# Patient Record
Sex: FEMALE | Race: WHITE | Employment: OTHER
[De-identification: names, ages, dates, MRNs, and addresses within clinical notes are randomized per-mention and may not be internally consistent; named-entity substitution may affect disease eponyms.]

---

## 2017-01-03 ENCOUNTER — OFFICE VISIT (OUTPATIENT)
Dept: FAMILY MEDICINE CLINIC | Facility: CLINIC | Age: 72
End: 2017-01-03

## 2017-01-03 VITALS
DIASTOLIC BLOOD PRESSURE: 84 MMHG | WEIGHT: 151.5 LBS | BODY MASS INDEX: 29.74 KG/M2 | SYSTOLIC BLOOD PRESSURE: 120 MMHG | TEMPERATURE: 98.5 F | HEART RATE: 94 BPM | RESPIRATION RATE: 15 BRPM | HEIGHT: 60 IN

## 2017-01-03 DIAGNOSIS — K92.1 BLACK TARRY STOOLS: ICD-10-CM

## 2017-01-03 DIAGNOSIS — J40 BRONCHITIS: Primary | ICD-10-CM

## 2017-01-03 DIAGNOSIS — R19.7 DIARRHEA, UNSPECIFIED TYPE: ICD-10-CM

## 2017-01-03 PROCEDURE — 99214 OFFICE O/P EST MOD 30 MIN: CPT | Performed by: NURSE PRACTITIONER

## 2017-01-03 RX ORDER — AZITHROMYCIN 250 MG/1
TABLET, FILM COATED ORAL
Qty: 1 PACKET | Refills: 0 | Status: SHIPPED | OUTPATIENT
Start: 2017-01-03 | End: 2017-01-13

## 2017-01-03 ASSESSMENT — ENCOUNTER SYMPTOMS
DIARRHEA: 1
CHANGE IN BOWEL HABIT: 1
VISUAL CHANGE: 0
SWOLLEN GLANDS: 0
BLOOD IN STOOL: 0
ABDOMINAL DISTENTION: 0
WHEEZING: 1
CONSTIPATION: 0
ABDOMINAL PAIN: 1
NAUSEA: 1
SHORTNESS OF BREATH: 1
CHEST TIGHTNESS: 1
COUGH: 1
SORE THROAT: 1
VOMITING: 1
RECTAL PAIN: 0

## 2017-01-13 ENCOUNTER — TELEPHONE (OUTPATIENT)
Dept: FAMILY MEDICINE CLINIC | Facility: CLINIC | Age: 72
End: 2017-01-13

## 2017-01-13 RX ORDER — BENZONATATE 100 MG/1
100 CAPSULE ORAL 3 TIMES DAILY PRN
Qty: 28 CAPSULE | Refills: 0 | Status: SHIPPED | OUTPATIENT
Start: 2017-01-13 | End: 2017-04-25 | Stop reason: SDUPTHER

## 2017-01-13 RX ORDER — DOXYCYCLINE HYCLATE 100 MG
100 TABLET ORAL 2 TIMES DAILY
Qty: 20 TABLET | Refills: 0 | Status: SHIPPED | OUTPATIENT
Start: 2017-01-13 | End: 2017-01-23

## 2017-01-16 ENCOUNTER — NURSE ONLY (OUTPATIENT)
Dept: FAMILY MEDICINE CLINIC | Facility: CLINIC | Age: 72
End: 2017-01-16

## 2017-01-16 DIAGNOSIS — K92.1 BLACK TARRY STOOLS: ICD-10-CM

## 2017-01-16 DIAGNOSIS — Z12.11 COLON CANCER SCREENING: Primary | ICD-10-CM

## 2017-01-16 LAB
CONTROL: PRESENT
HEMOCCULT STL QL: NEGATIVE

## 2017-01-16 PROCEDURE — 82274 ASSAY TEST FOR BLOOD FECAL: CPT | Performed by: NURSE PRACTITIONER

## 2017-02-13 ENCOUNTER — HOSPITAL ENCOUNTER (OUTPATIENT)
Dept: PAIN MANAGEMENT | Age: 72
Discharge: HOME | End: 2017-02-13
Attending: NURSE PRACTITIONER | Admitting: NURSE PRACTITIONER
Payer: MEDICARE

## 2017-02-13 DIAGNOSIS — M19.011 ARTHRITIS OF SHOULDER REGION, RIGHT: ICD-10-CM

## 2017-02-13 DIAGNOSIS — G89.29 CHRONIC BILATERAL LOW BACK PAIN WITHOUT SCIATICA: ICD-10-CM

## 2017-02-13 DIAGNOSIS — M79.7 FIBROMYALGIA: ICD-10-CM

## 2017-02-13 DIAGNOSIS — G89.4 CHRONIC PAIN ASSOCIATED WITH SIGNIFICANT PSYCHOSOCIAL DYSFUNCTION: ICD-10-CM

## 2017-02-13 DIAGNOSIS — M47.817 LUMBAR AND SACRAL ARTHRITIS: ICD-10-CM

## 2017-02-13 DIAGNOSIS — G56.03 CARPAL TUNNEL SYNDROME ON BOTH SIDES: ICD-10-CM

## 2017-02-13 DIAGNOSIS — M51.36 DDD (DEGENERATIVE DISC DISEASE), LUMBAR: ICD-10-CM

## 2017-02-13 DIAGNOSIS — M25.561 ACUTE PAIN OF RIGHT KNEE: ICD-10-CM

## 2017-02-13 DIAGNOSIS — R52 PAIN MANAGEMENT: ICD-10-CM

## 2017-02-13 DIAGNOSIS — M15.9 PRIMARY OSTEOARTHRITIS INVOLVING MULTIPLE JOINTS: ICD-10-CM

## 2017-02-13 DIAGNOSIS — M47.26 OSTEOARTHRITIS OF SPINE WITH RADICULOPATHY, LUMBAR REGION: ICD-10-CM

## 2017-02-13 DIAGNOSIS — M77.8 TENDINITIS OF RIGHT SHOULDER: ICD-10-CM

## 2017-02-13 DIAGNOSIS — M47.816 SPONDYLOSIS OF LUMBAR REGION WITHOUT MYELOPATHY OR RADICULOPATHY: ICD-10-CM

## 2017-02-13 DIAGNOSIS — Z51.81 ENCOUNTER FOR MEDICATION MONITORING: Primary | ICD-10-CM

## 2017-02-13 DIAGNOSIS — M48.061 LUMBAR STENOSIS: ICD-10-CM

## 2017-02-13 DIAGNOSIS — M54.50 CHRONIC BILATERAL LOW BACK PAIN WITHOUT SCIATICA: ICD-10-CM

## 2017-02-13 DIAGNOSIS — M47.896 OTHER OSTEOARTHRITIS OF SPINE, LUMBAR REGION: ICD-10-CM

## 2017-02-13 DIAGNOSIS — M85.80 OSTEOPENIA: ICD-10-CM

## 2017-02-13 PROCEDURE — 99213 OFFICE O/P EST LOW 20 MIN: CPT

## 2017-02-13 PROCEDURE — 99212 OFFICE O/P EST SF 10 MIN: CPT | Performed by: NURSE PRACTITIONER

## 2017-02-13 RX ORDER — OXYCODONE AND ACETAMINOPHEN 10; 325 MG/1; MG/1
1 TABLET ORAL EVERY 6 HOURS PRN
Qty: 120 TABLET | Refills: 0 | Status: SHIPPED | OUTPATIENT
Start: 2017-02-13 | End: 2017-03-01 | Stop reason: SDUPTHER

## 2017-02-13 RX ORDER — QUETIAPINE FUMARATE 200 MG/1
200 TABLET, FILM COATED ORAL DAILY
Qty: 90 TABLET | Refills: 1 | Status: SHIPPED | OUTPATIENT
Start: 2017-02-13 | End: 2017-02-14 | Stop reason: SDUPTHER

## 2017-02-14 DIAGNOSIS — F41.9 ANXIETY: Chronic | ICD-10-CM

## 2017-02-14 DIAGNOSIS — F33.1 MAJOR DEPRESSIVE DISORDER, RECURRENT EPISODE, MODERATE (HCC): ICD-10-CM

## 2017-02-14 RX ORDER — FLUOXETINE HYDROCHLORIDE 40 MG/1
40 CAPSULE ORAL DAILY
Qty: 30 CAPSULE | Refills: 2 | Status: SHIPPED | OUTPATIENT
Start: 2017-02-14 | End: 2017-05-19 | Stop reason: SDUPTHER

## 2017-02-14 RX ORDER — QUETIAPINE FUMARATE 200 MG/1
TABLET, FILM COATED ORAL
Qty: 30 TABLET | Refills: 2 | Status: SHIPPED | OUTPATIENT
Start: 2017-02-14 | End: 2017-09-17 | Stop reason: SDUPTHER

## 2017-02-17 ENCOUNTER — OFFICE VISIT (OUTPATIENT)
Dept: FAMILY MEDICINE CLINIC | Facility: CLINIC | Age: 72
End: 2017-02-17

## 2017-02-17 VITALS
TEMPERATURE: 98 F | HEART RATE: 64 BPM | SYSTOLIC BLOOD PRESSURE: 120 MMHG | HEIGHT: 60 IN | WEIGHT: 153 LBS | RESPIRATION RATE: 20 BRPM | DIASTOLIC BLOOD PRESSURE: 86 MMHG | BODY MASS INDEX: 30.04 KG/M2

## 2017-02-17 DIAGNOSIS — E78.00 PURE HYPERCHOLESTEROLEMIA: Primary | Chronic | ICD-10-CM

## 2017-02-17 DIAGNOSIS — E55.9 VITAMIN D DEFICIENCY: ICD-10-CM

## 2017-02-17 DIAGNOSIS — J30.2 SEASONAL ALLERGIC RHINITIS, UNSPECIFIED ALLERGIC RHINITIS TRIGGER: ICD-10-CM

## 2017-02-17 DIAGNOSIS — J41.0 SIMPLE CHRONIC BRONCHITIS (HCC): Chronic | ICD-10-CM

## 2017-02-17 DIAGNOSIS — E11.42 DIABETIC PERIPHERAL NEUROPATHY (HCC): ICD-10-CM

## 2017-02-17 DIAGNOSIS — E53.8 VITAMIN B 12 DEFICIENCY: ICD-10-CM

## 2017-02-17 DIAGNOSIS — E79.0 HYPERURICEMIA: ICD-10-CM

## 2017-02-17 DIAGNOSIS — Z23 NEED FOR DIPHTHERIA-TETANUS-PERTUSSIS (TDAP) VACCINE, ADULT/ADOLESCENT: ICD-10-CM

## 2017-02-17 DIAGNOSIS — F11.20 UNCOMPLICATED OPIOID DEPENDENCE (HCC): ICD-10-CM

## 2017-02-17 DIAGNOSIS — F33.1 MODERATE EPISODE OF RECURRENT MAJOR DEPRESSIVE DISORDER (HCC): ICD-10-CM

## 2017-02-17 PROCEDURE — 99214 OFFICE O/P EST MOD 30 MIN: CPT | Performed by: PEDIATRICS

## 2017-02-17 RX ORDER — MONTELUKAST SODIUM 10 MG/1
10 TABLET ORAL NIGHTLY
Qty: 30 TABLET | Refills: 5 | Status: SHIPPED | OUTPATIENT
Start: 2017-02-17 | End: 2017-05-25

## 2017-02-17 RX ORDER — B-COMPLEX WITH VITAMIN C
1 TABLET ORAL 3 TIMES DAILY
Qty: 90 TABLET | Refills: 5 | Status: SHIPPED | OUTPATIENT
Start: 2017-02-17 | End: 2020-07-29

## 2017-02-17 ASSESSMENT — ENCOUNTER SYMPTOMS
DIARRHEA: 0
PRIMARY PULMONARY SYMPTOMS: SEASONAL ALLERGIES
CONSTIPATION: 0
WHEEZING: 0
SHORTNESS OF BREATH: 0
EYE DISCHARGE: 1
COUGH: 0

## 2017-02-17 ASSESSMENT — COPD QUESTIONNAIRES: COPD: 1

## 2017-03-01 RX ORDER — OXYCODONE AND ACETAMINOPHEN 10; 325 MG/1; MG/1
1 TABLET ORAL EVERY 6 HOURS PRN
Qty: 120 TABLET | Refills: 0 | Status: SHIPPED | OUTPATIENT
Start: 2017-03-15 | End: 2017-04-17 | Stop reason: SDUPTHER

## 2017-03-07 ENCOUNTER — OFFICE VISIT (OUTPATIENT)
Dept: FAMILY MEDICINE CLINIC | Facility: CLINIC | Age: 72
End: 2017-03-07

## 2017-03-07 ENCOUNTER — HOSPITAL ENCOUNTER (OUTPATIENT)
Age: 72
Setting detail: SPECIMEN
Discharge: HOME | End: 2017-03-07
Payer: MEDICARE

## 2017-03-07 VITALS
RESPIRATION RATE: 18 BRPM | DIASTOLIC BLOOD PRESSURE: 86 MMHG | TEMPERATURE: 97.7 F | HEART RATE: 78 BPM | BODY MASS INDEX: 30.19 KG/M2 | WEIGHT: 154.6 LBS | SYSTOLIC BLOOD PRESSURE: 128 MMHG

## 2017-03-07 DIAGNOSIS — E79.0 HYPERURICEMIA: ICD-10-CM

## 2017-03-07 DIAGNOSIS — K86.1 OTHER CHRONIC PANCREATITIS (HCC): ICD-10-CM

## 2017-03-07 DIAGNOSIS — J01.40 ACUTE NON-RECURRENT PANSINUSITIS: Primary | ICD-10-CM

## 2017-03-07 DIAGNOSIS — E53.8 VITAMIN B 12 DEFICIENCY: ICD-10-CM

## 2017-03-07 LAB
URIC ACID: 4.6 MG/DL (ref 2.4–5.7)
VITAMIN B-12: 351 PG/ML (ref 211–946)

## 2017-03-07 PROCEDURE — 99213 OFFICE O/P EST LOW 20 MIN: CPT | Performed by: NURSE PRACTITIONER

## 2017-03-07 RX ORDER — AZITHROMYCIN 250 MG/1
TABLET, FILM COATED ORAL
Qty: 1 PACKET | Refills: 0 | Status: SHIPPED | OUTPATIENT
Start: 2017-03-07 | End: 2017-03-17

## 2017-03-07 RX ORDER — LATANOPROST 50 UG/ML
SOLUTION/ DROPS OPHTHALMIC
Refills: 11 | COMMUNITY
Start: 2017-02-23 | End: 2017-10-23

## 2017-03-07 RX ORDER — ALLOPURINOL 100 MG/1
100 TABLET ORAL DAILY
Qty: 30 TABLET | Refills: 3 | Status: SHIPPED | OUTPATIENT
Start: 2017-03-07 | End: 2017-07-05 | Stop reason: SDUPTHER

## 2017-03-07 ASSESSMENT — ENCOUNTER SYMPTOMS
EYE ITCHING: 0
NAUSEA: 0
EYE PAIN: 0
EYE DISCHARGE: 0
CHEST TIGHTNESS: 0
PHOTOPHOBIA: 0
SINUS PRESSURE: 1
SORE THROAT: 0
ABDOMINAL DISTENTION: 0
RHINORRHEA: 1
COUGH: 1
DIARRHEA: 0

## 2017-03-08 DIAGNOSIS — D64.9 ANEMIA, NORMOCYTIC NORMOCHROMIC: Primary | ICD-10-CM

## 2017-03-08 RX ORDER — CYANOCOBALAMIN 1000 UG/ML
1000 INJECTION INTRAMUSCULAR; SUBCUTANEOUS
Status: DISCONTINUED | OUTPATIENT
Start: 2017-03-22 | End: 2017-10-23

## 2017-03-14 ENCOUNTER — TELEPHONE (OUTPATIENT)
Dept: FAMILY MEDICINE CLINIC | Age: 72
End: 2017-03-14

## 2017-03-14 DIAGNOSIS — J01.41 ACUTE RECURRENT PANSINUSITIS: Primary | ICD-10-CM

## 2017-03-14 RX ORDER — CIPROFLOXACIN 500 MG/1
500 TABLET, FILM COATED ORAL 2 TIMES DAILY
Qty: 20 TABLET | Refills: 0 | Status: SHIPPED | OUTPATIENT
Start: 2017-03-14 | End: 2017-03-24

## 2017-03-21 ENCOUNTER — HOSPITAL ENCOUNTER (OUTPATIENT)
Dept: PAIN MANAGEMENT | Age: 72
Discharge: HOME | End: 2017-03-21
Payer: MEDICARE

## 2017-03-21 DIAGNOSIS — M47.26 OSTEOARTHRITIS OF SPINE WITH RADICULOPATHY, LUMBAR REGION: ICD-10-CM

## 2017-03-21 DIAGNOSIS — E55.9 VITAMIN D DEFICIENCY: ICD-10-CM

## 2017-03-21 DIAGNOSIS — Z51.81 ENCOUNTER FOR MEDICATION MONITORING: Primary | ICD-10-CM

## 2017-03-21 DIAGNOSIS — M15.9 PRIMARY OSTEOARTHRITIS INVOLVING MULTIPLE JOINTS: ICD-10-CM

## 2017-03-21 DIAGNOSIS — M75.51 BURSITIS OF RIGHT SHOULDER: ICD-10-CM

## 2017-03-21 DIAGNOSIS — M47.816 SPONDYLOSIS OF LUMBAR REGION WITHOUT MYELOPATHY OR RADICULOPATHY: ICD-10-CM

## 2017-03-21 DIAGNOSIS — M48.061 LUMBAR STENOSIS: ICD-10-CM

## 2017-03-21 DIAGNOSIS — G89.4 CHRONIC PAIN ASSOCIATED WITH SIGNIFICANT PSYCHOSOCIAL DYSFUNCTION: ICD-10-CM

## 2017-03-21 DIAGNOSIS — M79.7 FIBROMYALGIA: ICD-10-CM

## 2017-03-21 DIAGNOSIS — G56.03 CARPAL TUNNEL SYNDROME ON BOTH SIDES: ICD-10-CM

## 2017-03-21 DIAGNOSIS — E53.8 VITAMIN B 12 DEFICIENCY: ICD-10-CM

## 2017-03-21 DIAGNOSIS — M54.50 CHRONIC BILATERAL LOW BACK PAIN WITHOUT SCIATICA: ICD-10-CM

## 2017-03-21 DIAGNOSIS — M47.817 LUMBAR AND SACRAL ARTHRITIS: ICD-10-CM

## 2017-03-21 DIAGNOSIS — F41.9 ANXIETY: ICD-10-CM

## 2017-03-21 DIAGNOSIS — M51.36 DDD (DEGENERATIVE DISC DISEASE), LUMBAR: ICD-10-CM

## 2017-03-21 DIAGNOSIS — G89.29 CHRONIC BILATERAL LOW BACK PAIN WITHOUT SCIATICA: ICD-10-CM

## 2017-03-21 DIAGNOSIS — R52 PAIN MANAGEMENT: ICD-10-CM

## 2017-03-21 DIAGNOSIS — M85.80 OSTEOPENIA: ICD-10-CM

## 2017-03-21 PROCEDURE — 99213 OFFICE O/P EST LOW 20 MIN: CPT | Performed by: NURSE PRACTITIONER

## 2017-03-21 PROCEDURE — 99213 OFFICE O/P EST LOW 20 MIN: CPT

## 2017-03-22 ENCOUNTER — NURSE ONLY (OUTPATIENT)
Dept: FAMILY MEDICINE CLINIC | Age: 72
End: 2017-03-22
Payer: MEDICARE

## 2017-03-22 DIAGNOSIS — Z23 IMMUNIZATION DUE: Primary | ICD-10-CM

## 2017-03-22 PROCEDURE — 96372 THER/PROPH/DIAG INJ SC/IM: CPT | Performed by: NURSE PRACTITIONER

## 2017-03-22 RX ADMIN — CYANOCOBALAMIN 1000 MCG: 1000 INJECTION INTRAMUSCULAR; SUBCUTANEOUS at 09:54

## 2017-04-06 ENCOUNTER — OFFICE VISIT (OUTPATIENT)
Dept: FAMILY MEDICINE CLINIC | Age: 72
End: 2017-04-06
Payer: MEDICARE

## 2017-04-06 VITALS
TEMPERATURE: 97.1 F | RESPIRATION RATE: 15 BRPM | BODY MASS INDEX: 30.04 KG/M2 | WEIGHT: 153.8 LBS | DIASTOLIC BLOOD PRESSURE: 86 MMHG | SYSTOLIC BLOOD PRESSURE: 120 MMHG | HEART RATE: 80 BPM

## 2017-04-06 DIAGNOSIS — R51.9 PRESSURE AND PAIN OF LEFT SIDE OF FACE: ICD-10-CM

## 2017-04-06 DIAGNOSIS — J32.9 RECURRENT SINUSITIS: Primary | ICD-10-CM

## 2017-04-06 PROCEDURE — 99213 OFFICE O/P EST LOW 20 MIN: CPT | Performed by: NURSE PRACTITIONER

## 2017-04-06 ASSESSMENT — ENCOUNTER SYMPTOMS
HOARSE VOICE: 0
COUGH: 1
SHORTNESS OF BREATH: 0
SINUS PRESSURE: 1
SORE THROAT: 0

## 2017-04-11 ASSESSMENT — ENCOUNTER SYMPTOMS
TROUBLE SWALLOWING: 0
CHEST TIGHTNESS: 0
DIARRHEA: 0
ABDOMINAL DISTENTION: 0
NAUSEA: 1

## 2017-04-17 ENCOUNTER — HOSPITAL ENCOUNTER (OUTPATIENT)
Dept: PAIN MANAGEMENT | Age: 72
Discharge: HOME | End: 2017-04-17
Payer: MEDICARE

## 2017-04-17 DIAGNOSIS — G89.4 CHRONIC PAIN ASSOCIATED WITH SIGNIFICANT PSYCHOSOCIAL DYSFUNCTION: ICD-10-CM

## 2017-04-17 DIAGNOSIS — M47.26 OSTEOARTHRITIS OF SPINE WITH RADICULOPATHY, LUMBAR REGION: ICD-10-CM

## 2017-04-17 DIAGNOSIS — G56.03 CARPAL TUNNEL SYNDROME ON BOTH SIDES: ICD-10-CM

## 2017-04-17 DIAGNOSIS — E55.9 VITAMIN D DEFICIENCY: ICD-10-CM

## 2017-04-17 DIAGNOSIS — M48.061 LUMBAR STENOSIS: ICD-10-CM

## 2017-04-17 DIAGNOSIS — M79.7 FIBROMYALGIA: ICD-10-CM

## 2017-04-17 DIAGNOSIS — M47.817 LUMBAR AND SACRAL ARTHRITIS: ICD-10-CM

## 2017-04-17 DIAGNOSIS — M77.8 TENDINITIS OF RIGHT SHOULDER: Primary | ICD-10-CM

## 2017-04-17 DIAGNOSIS — M47.816 SPONDYLOSIS OF LUMBAR REGION WITHOUT MYELOPATHY OR RADICULOPATHY: ICD-10-CM

## 2017-04-17 DIAGNOSIS — M75.51 BURSITIS OF RIGHT SHOULDER: ICD-10-CM

## 2017-04-17 DIAGNOSIS — M51.36 DDD (DEGENERATIVE DISC DISEASE), LUMBAR: ICD-10-CM

## 2017-04-17 DIAGNOSIS — M47.896 OTHER OSTEOARTHRITIS OF SPINE, LUMBAR REGION: ICD-10-CM

## 2017-04-17 DIAGNOSIS — M19.011 ARTHRITIS OF SHOULDER REGION, RIGHT: ICD-10-CM

## 2017-04-17 DIAGNOSIS — M54.50 CHRONIC BILATERAL LOW BACK PAIN WITHOUT SCIATICA: ICD-10-CM

## 2017-04-17 DIAGNOSIS — G89.29 CHRONIC BILATERAL LOW BACK PAIN WITHOUT SCIATICA: ICD-10-CM

## 2017-04-17 DIAGNOSIS — M85.80 OSTEOPENIA: ICD-10-CM

## 2017-04-17 DIAGNOSIS — M15.9 PRIMARY OSTEOARTHRITIS INVOLVING MULTIPLE JOINTS: ICD-10-CM

## 2017-04-17 DIAGNOSIS — E11.42 DIABETIC PERIPHERAL NEUROPATHY (HCC): ICD-10-CM

## 2017-04-17 DIAGNOSIS — F51.01 PRIMARY INSOMNIA: ICD-10-CM

## 2017-04-17 DIAGNOSIS — R52 PAIN MANAGEMENT: ICD-10-CM

## 2017-04-17 PROCEDURE — 99213 OFFICE O/P EST LOW 20 MIN: CPT

## 2017-04-17 PROCEDURE — 99213 OFFICE O/P EST LOW 20 MIN: CPT | Performed by: NURSE PRACTITIONER

## 2017-04-17 RX ORDER — OXYCODONE AND ACETAMINOPHEN 10; 325 MG/1; MG/1
1 TABLET ORAL EVERY 6 HOURS PRN
Qty: 120 TABLET | Refills: 0 | Status: SHIPPED | OUTPATIENT
Start: 2017-04-17 | End: 2017-05-17 | Stop reason: SDUPTHER

## 2017-04-17 RX ORDER — MELOXICAM 7.5 MG/1
7.5 TABLET ORAL DAILY
Qty: 30 TABLET | Refills: 2 | Status: SHIPPED | OUTPATIENT
Start: 2017-04-17 | End: 2017-07-24 | Stop reason: SDUPTHER

## 2017-04-21 ENCOUNTER — NURSE ONLY (OUTPATIENT)
Dept: FAMILY MEDICINE CLINIC | Age: 72
End: 2017-04-21
Payer: MEDICARE

## 2017-04-21 DIAGNOSIS — E53.8 VITAMIN B 12 DEFICIENCY: Primary | ICD-10-CM

## 2017-04-21 PROCEDURE — 96372 THER/PROPH/DIAG INJ SC/IM: CPT | Performed by: PEDIATRICS

## 2017-04-21 RX ORDER — CYANOCOBALAMIN 1000 UG/ML
1000 INJECTION INTRAMUSCULAR; SUBCUTANEOUS ONCE
Status: COMPLETED | OUTPATIENT
Start: 2017-04-21 | End: 2017-04-21

## 2017-04-21 RX ADMIN — CYANOCOBALAMIN 1000 MCG: 1000 INJECTION INTRAMUSCULAR; SUBCUTANEOUS at 10:16

## 2017-04-26 RX ORDER — BENZONATATE 100 MG/1
100 CAPSULE ORAL 3 TIMES DAILY PRN
Qty: 28 CAPSULE | Refills: 0 | Status: SHIPPED | OUTPATIENT
Start: 2017-04-26 | End: 2017-07-17 | Stop reason: ALTCHOICE

## 2017-05-17 ENCOUNTER — HOSPITAL ENCOUNTER (OUTPATIENT)
Dept: PAIN MANAGEMENT | Age: 72
Discharge: HOME | End: 2017-05-17
Payer: MEDICARE

## 2017-05-17 VITALS
HEART RATE: 76 BPM | TEMPERATURE: 97.6 F | WEIGHT: 140 LBS | DIASTOLIC BLOOD PRESSURE: 70 MMHG | OXYGEN SATURATION: 96 % | BODY MASS INDEX: 27.48 KG/M2 | SYSTOLIC BLOOD PRESSURE: 114 MMHG | RESPIRATION RATE: 16 BRPM | HEIGHT: 60 IN

## 2017-05-17 DIAGNOSIS — M48.061 LUMBAR STENOSIS: Primary | ICD-10-CM

## 2017-05-17 DIAGNOSIS — M47.26 OSTEOARTHRITIS OF SPINE WITH RADICULOPATHY, LUMBAR REGION: ICD-10-CM

## 2017-05-17 DIAGNOSIS — G89.4 CHRONIC PAIN ASSOCIATED WITH SIGNIFICANT PSYCHOSOCIAL DYSFUNCTION: ICD-10-CM

## 2017-05-17 DIAGNOSIS — M47.816 SPONDYLOSIS OF LUMBAR REGION WITHOUT MYELOPATHY OR RADICULOPATHY: ICD-10-CM

## 2017-05-17 DIAGNOSIS — M1A.9XX1 CHRONIC GOUT WITH TOPHUS, UNSPECIFIED CAUSE, UNSPECIFIED SITE: ICD-10-CM

## 2017-05-17 DIAGNOSIS — M79.7 FIBROMYALGIA: ICD-10-CM

## 2017-05-17 DIAGNOSIS — M51.36 DDD (DEGENERATIVE DISC DISEASE), LUMBAR: ICD-10-CM

## 2017-05-17 DIAGNOSIS — M47.896 OTHER OSTEOARTHRITIS OF SPINE, LUMBAR REGION: ICD-10-CM

## 2017-05-17 DIAGNOSIS — M75.51 BURSITIS OF RIGHT SHOULDER: ICD-10-CM

## 2017-05-17 DIAGNOSIS — M85.80 OSTEOPENIA: ICD-10-CM

## 2017-05-17 DIAGNOSIS — G89.29 CHRONIC BILATERAL LOW BACK PAIN WITHOUT SCIATICA: ICD-10-CM

## 2017-05-17 DIAGNOSIS — M77.8 TENDINITIS OF RIGHT SHOULDER: ICD-10-CM

## 2017-05-17 DIAGNOSIS — R52 PAIN MANAGEMENT: ICD-10-CM

## 2017-05-17 DIAGNOSIS — Z51.81 ENCOUNTER FOR MEDICATION MONITORING: ICD-10-CM

## 2017-05-17 DIAGNOSIS — M19.011 ARTHRITIS OF SHOULDER REGION, RIGHT: ICD-10-CM

## 2017-05-17 DIAGNOSIS — M54.50 CHRONIC BILATERAL LOW BACK PAIN WITHOUT SCIATICA: ICD-10-CM

## 2017-05-17 PROCEDURE — G0463 HOSPITAL OUTPT CLINIC VISIT: HCPCS

## 2017-05-17 PROCEDURE — 99214 OFFICE O/P EST MOD 30 MIN: CPT | Performed by: PAIN MEDICINE

## 2017-05-17 PROCEDURE — 99213 OFFICE O/P EST LOW 20 MIN: CPT

## 2017-05-17 RX ORDER — OXYCODONE AND ACETAMINOPHEN 10; 325 MG/1; MG/1
1 TABLET ORAL EVERY 6 HOURS PRN
Qty: 120 TABLET | Refills: 0 | Status: SHIPPED | OUTPATIENT
Start: 2017-05-17 | End: 2017-06-14 | Stop reason: SDUPTHER

## 2017-05-17 RX ORDER — BRIMONIDINE TARTRATE 1.5 MG/ML
SOLUTION/ DROPS OPHTHALMIC
COMMUNITY
Start: 2017-03-30 | End: 2017-10-23

## 2017-05-17 ASSESSMENT — PAIN DESCRIPTION - PAIN TYPE: TYPE: CHRONIC PAIN

## 2017-05-17 ASSESSMENT — ENCOUNTER SYMPTOMS
DIARRHEA: 0
GASTROINTESTINAL NEGATIVE: 1
BACK PAIN: 1
CONSTIPATION: 0
VOMITING: 0
ABDOMINAL PAIN: 0
HEARTBURN: 0
BLURRED VISION: 0
SHORTNESS OF BREATH: 0
COUGH: 0
RESPIRATORY NEGATIVE: 1
PHOTOPHOBIA: 0

## 2017-05-17 ASSESSMENT — PAIN DESCRIPTION - FREQUENCY: FREQUENCY: CONTINUOUS

## 2017-05-17 ASSESSMENT — PAIN DESCRIPTION - ONSET: ONSET: ON-GOING

## 2017-05-17 ASSESSMENT — PAIN SCALES - GENERAL: PAINLEVEL_OUTOF10: 6

## 2017-05-17 ASSESSMENT — PAIN DESCRIPTION - ORIENTATION: ORIENTATION: RIGHT;LEFT;LOWER

## 2017-05-17 ASSESSMENT — PAIN DESCRIPTION - DIRECTION: RADIATING_TOWARDS: NO

## 2017-05-17 ASSESSMENT — PAIN DESCRIPTION - PROGRESSION: CLINICAL_PROGRESSION: NOT CHANGED

## 2017-05-19 DIAGNOSIS — F51.01 PRIMARY INSOMNIA: ICD-10-CM

## 2017-05-19 RX ORDER — FLUOXETINE HYDROCHLORIDE 40 MG/1
40 CAPSULE ORAL DAILY
Qty: 30 CAPSULE | Refills: 2 | Status: SHIPPED | OUTPATIENT
Start: 2017-05-19 | End: 2018-04-09

## 2017-05-19 RX ORDER — TRAZODONE HYDROCHLORIDE 50 MG/1
50 TABLET ORAL NIGHTLY
Qty: 30 TABLET | Refills: 2 | Status: SHIPPED | OUTPATIENT
Start: 2017-05-19 | End: 2017-09-07 | Stop reason: SDUPTHER

## 2017-05-25 ENCOUNTER — OFFICE VISIT (OUTPATIENT)
Dept: FAMILY MEDICINE CLINIC | Age: 72
End: 2017-05-25
Payer: MEDICARE

## 2017-05-25 VITALS
BODY MASS INDEX: 29.26 KG/M2 | HEART RATE: 83 BPM | TEMPERATURE: 98.7 F | DIASTOLIC BLOOD PRESSURE: 84 MMHG | RESPIRATION RATE: 15 BRPM | WEIGHT: 149.8 LBS | SYSTOLIC BLOOD PRESSURE: 126 MMHG

## 2017-05-25 DIAGNOSIS — E11.9 CONTROLLED TYPE 2 DIABETES MELLITUS WITHOUT COMPLICATION, WITHOUT LONG-TERM CURRENT USE OF INSULIN (HCC): ICD-10-CM

## 2017-05-25 DIAGNOSIS — E79.0 HYPERURICEMIA: ICD-10-CM

## 2017-05-25 DIAGNOSIS — G25.81 RESTLESS LEG: ICD-10-CM

## 2017-05-25 DIAGNOSIS — J30.89 ALLERGIC RHINITIS DUE TO OTHER ALLERGIC TRIGGER, UNSPECIFIED RHINITIS SEASONALITY: ICD-10-CM

## 2017-05-25 DIAGNOSIS — E53.8 VITAMIN B 12 DEFICIENCY: ICD-10-CM

## 2017-05-25 DIAGNOSIS — L23.7 POISON IVY DERMATITIS: Primary | ICD-10-CM

## 2017-05-25 PROCEDURE — 96372 THER/PROPH/DIAG INJ SC/IM: CPT | Performed by: NURSE PRACTITIONER

## 2017-05-25 PROCEDURE — 99214 OFFICE O/P EST MOD 30 MIN: CPT | Performed by: NURSE PRACTITIONER

## 2017-05-25 RX ORDER — METHYLPREDNISOLONE ACETATE 80 MG/ML
80 INJECTION, SUSPENSION INTRA-ARTICULAR; INTRALESIONAL; INTRAMUSCULAR; SOFT TISSUE ONCE
Status: COMPLETED | OUTPATIENT
Start: 2017-05-25 | End: 2017-05-25

## 2017-05-25 RX ORDER — CYANOCOBALAMIN 1000 UG/ML
1000 INJECTION INTRAMUSCULAR; SUBCUTANEOUS ONCE
Status: COMPLETED | OUTPATIENT
Start: 2017-05-25 | End: 2017-05-25

## 2017-05-25 RX ORDER — GABAPENTIN 300 MG/1
300 CAPSULE ORAL NIGHTLY
Qty: 30 CAPSULE | Refills: 3 | Status: SHIPPED | OUTPATIENT
Start: 2017-05-25 | End: 2018-04-09

## 2017-05-25 RX ADMIN — CYANOCOBALAMIN 1000 MCG: 1000 INJECTION INTRAMUSCULAR; SUBCUTANEOUS at 11:35

## 2017-05-25 RX ADMIN — METHYLPREDNISOLONE ACETATE 80 MG: 80 INJECTION, SUSPENSION INTRA-ARTICULAR; INTRALESIONAL; INTRAMUSCULAR; SOFT TISSUE at 11:36

## 2017-05-25 ASSESSMENT — PATIENT HEALTH QUESTIONNAIRE - PHQ9
SUM OF ALL RESPONSES TO PHQ9 QUESTIONS 1 & 2: 0
1. LITTLE INTEREST OR PLEASURE IN DOING THINGS: 0
SUM OF ALL RESPONSES TO PHQ QUESTIONS 1-9: 0
2. FEELING DOWN, DEPRESSED OR HOPELESS: 0

## 2017-05-25 ASSESSMENT — ENCOUNTER SYMPTOMS
COUGH: 1
RHINORRHEA: 1
SORE THROAT: 0
DIARRHEA: 1
SINUS PRESSURE: 0
NAUSEA: 0
VOICE CHANGE: 0
CHEST TIGHTNESS: 0
SHORTNESS OF BREATH: 0
TROUBLE SWALLOWING: 0

## 2017-06-14 ENCOUNTER — HOSPITAL ENCOUNTER (OUTPATIENT)
Dept: PAIN MANAGEMENT | Age: 72
Discharge: HOME | End: 2017-06-14
Payer: MEDICARE

## 2017-06-14 DIAGNOSIS — M51.36 DDD (DEGENERATIVE DISC DISEASE), LUMBAR: ICD-10-CM

## 2017-06-14 DIAGNOSIS — M75.51 BURSITIS OF RIGHT SHOULDER: ICD-10-CM

## 2017-06-14 DIAGNOSIS — M47.896 OTHER OSTEOARTHRITIS OF SPINE, LUMBAR REGION: ICD-10-CM

## 2017-06-14 DIAGNOSIS — F51.01 PRIMARY INSOMNIA: ICD-10-CM

## 2017-06-14 DIAGNOSIS — M48.061 LUMBAR STENOSIS: Primary | ICD-10-CM

## 2017-06-14 DIAGNOSIS — M77.8 TENDINITIS OF RIGHT SHOULDER: ICD-10-CM

## 2017-06-14 DIAGNOSIS — R52 PAIN MANAGEMENT: ICD-10-CM

## 2017-06-14 DIAGNOSIS — G89.29 CHRONIC BILATERAL LOW BACK PAIN WITHOUT SCIATICA: ICD-10-CM

## 2017-06-14 DIAGNOSIS — M47.816 SPONDYLOSIS OF LUMBAR REGION WITHOUT MYELOPATHY OR RADICULOPATHY: ICD-10-CM

## 2017-06-14 DIAGNOSIS — G89.4 CHRONIC PAIN ASSOCIATED WITH SIGNIFICANT PSYCHOSOCIAL DYSFUNCTION: ICD-10-CM

## 2017-06-14 DIAGNOSIS — M19.011 ARTHRITIS OF SHOULDER REGION, RIGHT: ICD-10-CM

## 2017-06-14 DIAGNOSIS — M79.7 FIBROMYALGIA: ICD-10-CM

## 2017-06-14 DIAGNOSIS — M54.50 CHRONIC BILATERAL LOW BACK PAIN WITHOUT SCIATICA: ICD-10-CM

## 2017-06-14 DIAGNOSIS — M15.9 PRIMARY OSTEOARTHRITIS INVOLVING MULTIPLE JOINTS: ICD-10-CM

## 2017-06-14 DIAGNOSIS — M47.26 OSTEOARTHRITIS OF SPINE WITH RADICULOPATHY, LUMBAR REGION: ICD-10-CM

## 2017-06-14 DIAGNOSIS — M47.817 LUMBAR AND SACRAL ARTHRITIS: ICD-10-CM

## 2017-06-14 DIAGNOSIS — M85.80 OSTEOPENIA: ICD-10-CM

## 2017-06-14 PROCEDURE — 99213 OFFICE O/P EST LOW 20 MIN: CPT

## 2017-06-14 PROCEDURE — 99213 OFFICE O/P EST LOW 20 MIN: CPT | Performed by: NURSE PRACTITIONER

## 2017-06-14 PROCEDURE — G0463 HOSPITAL OUTPT CLINIC VISIT: HCPCS

## 2017-06-14 RX ORDER — OXYCODONE AND ACETAMINOPHEN 10; 325 MG/1; MG/1
1 TABLET ORAL EVERY 6 HOURS PRN
Qty: 120 TABLET | Refills: 0 | Status: SHIPPED | OUTPATIENT
Start: 2017-06-16 | End: 2017-07-17 | Stop reason: SDUPTHER

## 2017-06-23 ENCOUNTER — NURSE ONLY (OUTPATIENT)
Dept: FAMILY MEDICINE CLINIC | Age: 72
End: 2017-06-23
Payer: MEDICARE

## 2017-06-23 DIAGNOSIS — E53.8 VITAMIN B12 DEFICIENCY: Primary | ICD-10-CM

## 2017-06-23 PROCEDURE — 96372 THER/PROPH/DIAG INJ SC/IM: CPT | Performed by: NURSE PRACTITIONER

## 2017-06-23 RX ADMIN — CYANOCOBALAMIN 1000 MCG: 1000 INJECTION INTRAMUSCULAR; SUBCUTANEOUS at 10:25

## 2017-07-05 ENCOUNTER — OFFICE VISIT (OUTPATIENT)
Dept: FAMILY MEDICINE CLINIC | Age: 72
End: 2017-07-05
Payer: MEDICARE

## 2017-07-05 VITALS
RESPIRATION RATE: 15 BRPM | SYSTOLIC BLOOD PRESSURE: 124 MMHG | DIASTOLIC BLOOD PRESSURE: 82 MMHG | HEART RATE: 75 BPM | TEMPERATURE: 98.7 F

## 2017-07-05 DIAGNOSIS — L23.7 POISON IVY DERMATITIS: Primary | ICD-10-CM

## 2017-07-05 DIAGNOSIS — M54.2 NECK PAIN, BILATERAL: ICD-10-CM

## 2017-07-05 PROCEDURE — 99213 OFFICE O/P EST LOW 20 MIN: CPT | Performed by: NURSE PRACTITIONER

## 2017-07-05 PROCEDURE — 96372 THER/PROPH/DIAG INJ SC/IM: CPT | Performed by: NURSE PRACTITIONER

## 2017-07-05 RX ORDER — METHYLPREDNISOLONE ACETATE 80 MG/ML
80 INJECTION, SUSPENSION INTRA-ARTICULAR; INTRALESIONAL; INTRAMUSCULAR; SOFT TISSUE ONCE
Status: COMPLETED | OUTPATIENT
Start: 2017-07-05 | End: 2017-07-05

## 2017-07-05 RX ORDER — BACLOFEN 10 MG/1
10 TABLET ORAL 3 TIMES DAILY PRN
Qty: 30 TABLET | Refills: 0 | Status: SHIPPED | OUTPATIENT
Start: 2017-07-05 | End: 2017-07-17

## 2017-07-05 RX ORDER — ALLOPURINOL 100 MG/1
100 TABLET ORAL DAILY
Qty: 30 TABLET | Refills: 3 | Status: SHIPPED | OUTPATIENT
Start: 2017-07-05 | End: 2018-04-09 | Stop reason: SDUPTHER

## 2017-07-05 RX ADMIN — METHYLPREDNISOLONE ACETATE 80 MG: 80 INJECTION, SUSPENSION INTRA-ARTICULAR; INTRALESIONAL; INTRAMUSCULAR; SOFT TISSUE at 14:05

## 2017-07-05 ASSESSMENT — ENCOUNTER SYMPTOMS
COUGH: 1
EYE PAIN: 0
SORE THROAT: 0
SHORTNESS OF BREATH: 0
RHINORRHEA: 0

## 2017-07-17 ENCOUNTER — HOSPITAL ENCOUNTER (OUTPATIENT)
Dept: PAIN MANAGEMENT | Age: 72
Discharge: HOME | End: 2017-07-17
Payer: MEDICARE

## 2017-07-17 VITALS
HEART RATE: 87 BPM | TEMPERATURE: 98.1 F | HEIGHT: 60 IN | SYSTOLIC BLOOD PRESSURE: 104 MMHG | BODY MASS INDEX: 27.48 KG/M2 | WEIGHT: 140 LBS | OXYGEN SATURATION: 96 % | RESPIRATION RATE: 16 BRPM | DIASTOLIC BLOOD PRESSURE: 71 MMHG

## 2017-07-17 DIAGNOSIS — M15.9 PRIMARY OSTEOARTHRITIS INVOLVING MULTIPLE JOINTS: ICD-10-CM

## 2017-07-17 DIAGNOSIS — M51.36 DDD (DEGENERATIVE DISC DISEASE), LUMBAR: ICD-10-CM

## 2017-07-17 DIAGNOSIS — M19.011 ARTHRITIS OF SHOULDER REGION, RIGHT: ICD-10-CM

## 2017-07-17 DIAGNOSIS — R52 PAIN MANAGEMENT: ICD-10-CM

## 2017-07-17 DIAGNOSIS — M85.80 OSTEOPENIA: ICD-10-CM

## 2017-07-17 DIAGNOSIS — M48.061 LUMBAR STENOSIS: Primary | ICD-10-CM

## 2017-07-17 DIAGNOSIS — M79.7 FIBROMYALGIA: ICD-10-CM

## 2017-07-17 DIAGNOSIS — M77.8 TENDINITIS OF RIGHT SHOULDER: ICD-10-CM

## 2017-07-17 DIAGNOSIS — M47.26 OSTEOARTHRITIS OF SPINE WITH RADICULOPATHY, LUMBAR REGION: ICD-10-CM

## 2017-07-17 DIAGNOSIS — Z51.81 ENCOUNTER FOR MEDICATION MONITORING: ICD-10-CM

## 2017-07-17 DIAGNOSIS — G89.4 CHRONIC PAIN ASSOCIATED WITH SIGNIFICANT PSYCHOSOCIAL DYSFUNCTION: ICD-10-CM

## 2017-07-17 DIAGNOSIS — M47.816 SPONDYLOSIS OF LUMBAR REGION WITHOUT MYELOPATHY OR RADICULOPATHY: ICD-10-CM

## 2017-07-17 DIAGNOSIS — M47.817 LUMBAR AND SACRAL ARTHRITIS: ICD-10-CM

## 2017-07-17 PROCEDURE — G0463 HOSPITAL OUTPT CLINIC VISIT: HCPCS

## 2017-07-17 PROCEDURE — 80307 DRUG TEST PRSMV CHEM ANLYZR: CPT

## 2017-07-17 PROCEDURE — 99213 OFFICE O/P EST LOW 20 MIN: CPT

## 2017-07-17 PROCEDURE — 99214 OFFICE O/P EST MOD 30 MIN: CPT | Performed by: PAIN MEDICINE

## 2017-07-17 RX ORDER — OXYCODONE AND ACETAMINOPHEN 10; 325 MG/1; MG/1
1 TABLET ORAL EVERY 6 HOURS PRN
Qty: 120 TABLET | Refills: 0 | Status: SHIPPED | OUTPATIENT
Start: 2017-07-17 | End: 2019-05-09 | Stop reason: ALTCHOICE

## 2017-07-17 ASSESSMENT — ENCOUNTER SYMPTOMS
SORE THROAT: 0
HEARTBURN: 0
SHORTNESS OF BREATH: 0
PHOTOPHOBIA: 0
ABDOMINAL PAIN: 0
BLURRED VISION: 0
COUGH: 0
RESPIRATORY NEGATIVE: 1
VOMITING: 0
GASTROINTESTINAL NEGATIVE: 1
DIARRHEA: 0
BACK PAIN: 1
CONSTIPATION: 0

## 2017-07-17 ASSESSMENT — PAIN DESCRIPTION - FREQUENCY: FREQUENCY: INTERMITTENT

## 2017-07-17 ASSESSMENT — PAIN DESCRIPTION - LOCATION: LOCATION: NECK

## 2017-07-17 ASSESSMENT — PAIN DESCRIPTION - ONSET: ONSET: ON-GOING

## 2017-07-17 ASSESSMENT — PAIN DESCRIPTION - DESCRIPTORS: DESCRIPTORS: ACHING

## 2017-07-17 ASSESSMENT — PAIN DESCRIPTION - ORIENTATION: ORIENTATION: LOWER

## 2017-07-17 ASSESSMENT — PAIN DESCRIPTION - PROGRESSION: CLINICAL_PROGRESSION: NOT CHANGED

## 2017-07-17 ASSESSMENT — PAIN DESCRIPTION - PAIN TYPE: TYPE: CHRONIC PAIN

## 2017-07-20 ENCOUNTER — OFFICE VISIT (OUTPATIENT)
Dept: FAMILY MEDICINE CLINIC | Age: 72
End: 2017-07-20
Payer: MEDICARE

## 2017-07-20 VITALS
BODY MASS INDEX: 29.23 KG/M2 | SYSTOLIC BLOOD PRESSURE: 112 MMHG | WEIGHT: 148.9 LBS | DIASTOLIC BLOOD PRESSURE: 74 MMHG | HEIGHT: 60 IN | RESPIRATION RATE: 16 BRPM | HEART RATE: 84 BPM

## 2017-07-20 DIAGNOSIS — L25.5 TOXICODENDRON DERMATITIS: Primary | ICD-10-CM

## 2017-07-20 DIAGNOSIS — E53.8 VITAMIN B 12 DEFICIENCY: ICD-10-CM

## 2017-07-20 PROCEDURE — 96372 THER/PROPH/DIAG INJ SC/IM: CPT | Performed by: INTERNAL MEDICINE

## 2017-07-20 PROCEDURE — 99212 OFFICE O/P EST SF 10 MIN: CPT | Performed by: INTERNAL MEDICINE

## 2017-07-20 RX ORDER — METHYLPREDNISOLONE ACETATE 80 MG/ML
80 INJECTION, SUSPENSION INTRA-ARTICULAR; INTRALESIONAL; INTRAMUSCULAR; SOFT TISSUE ONCE
Status: COMPLETED | OUTPATIENT
Start: 2017-07-20 | End: 2017-07-20

## 2017-07-20 RX ADMIN — METHYLPREDNISOLONE ACETATE 80 MG: 80 INJECTION, SUSPENSION INTRA-ARTICULAR; INTRALESIONAL; INTRAMUSCULAR; SOFT TISSUE at 14:46

## 2017-07-20 RX ADMIN — CYANOCOBALAMIN 1000 MCG: 1000 INJECTION INTRAMUSCULAR; SUBCUTANEOUS at 14:45

## 2017-07-20 ASSESSMENT — ENCOUNTER SYMPTOMS
ABDOMINAL PAIN: 0
NAUSEA: 0
VOMITING: 0

## 2017-07-21 ENCOUNTER — TELEPHONE (OUTPATIENT)
Dept: FAMILY MEDICINE CLINIC | Age: 72
End: 2017-07-21

## 2017-07-21 DIAGNOSIS — L25.5 TOXICODENDRON DERMATITIS: Primary | ICD-10-CM

## 2017-07-21 LAB
6-ACETYLMORPHINE, UR: NOT DETECTED
7-AMINOCLONAZEPAM, URINE: NOT DETECTED
ALPHA-OH-ALPRAZ, URINE: NOT DETECTED
ALPRAZOLAM, URINE: NOT DETECTED
AMPHETAMINES, URINE: NOT DETECTED
BARBITURATES, URINE: NOT DETECTED
BENZOYLECGONINE, UR: NOT DETECTED
BUPRENORPHINE URINE: NOT DETECTED
CARISOPRODOL, UR: NOT DETECTED
CLONAZEPAM, URINE: NOT DETECTED
CODEINE, URINE: NOT DETECTED
CREATININE URINE: 30.2 MG/DL (ref 20–400)
DIAZEPAM, URINE: NOT DETECTED
DRUGS EXPECTED, UR: NORMAL
EER HI RES INTERP UR: NORMAL
ETHYL GLUCURONIDE UR: PRESENT
FENTANYL URINE: NOT DETECTED
HYDROCODONE, URINE: NOT DETECTED
HYDROMORPHONE, URINE: NOT DETECTED
LORAZEPAM, URINE: NOT DETECTED
MARIJUANA METAB, UR: NOT DETECTED
MDA, UR: NOT DETECTED
MDEA, EVE, UR: NOT DETECTED
MDMA URINE: NOT DETECTED
MEPERIDINE METAB, UR: NOT DETECTED
METHADONE, URINE: NOT DETECTED
METHAMPHETAMINE, URINE: NOT DETECTED
METHYLPHENIDATE: NOT DETECTED
MIDAZOLAM, URINE: NOT DETECTED
MORPHINE URINE: NOT DETECTED
NORBUPRENORPHINE, URINE: NOT DETECTED
NORDIAZEPAM, URINE: NOT DETECTED
NORFENTANYL, URINE: NOT DETECTED
NORHYDROCODONE, URINE: NOT DETECTED
NOROXYCODONE, URINE: PRESENT
NOROXYMORPHONE, URINE: NOT DETECTED
OXAZEPAM, URINE: NOT DETECTED
OXYCODONE URINE: PRESENT
OXYMORPHONE, URINE: NOT DETECTED
PAIN MANAGEMENT DRUG PANEL INTERP, URINE: NORMAL
PAIN MGT DRUG PANEL, HI RES, UR: NORMAL
PCP,URINE: NOT DETECTED
PHENTERMINE, UR: NOT DETECTED
PROPOXYPHENE, URINE: NOT DETECTED
TAPENTADOL, URINE: NOT DETECTED
TAPENTADOL-O-SULFATE, URINE: NOT DETECTED
TEMAZEPAM, URINE: NOT DETECTED
TRAMADOL, URINE: NOT DETECTED
ZOLPIDEM, URINE: NOT DETECTED

## 2017-07-21 RX ORDER — RANITIDINE 150 MG/1
150 TABLET ORAL 2 TIMES DAILY
Qty: 20 TABLET | Refills: 0 | Status: SHIPPED | OUTPATIENT
Start: 2017-07-21 | End: 2018-04-09 | Stop reason: ALTCHOICE

## 2017-07-24 RX ORDER — MELOXICAM 7.5 MG/1
7.5 TABLET ORAL DAILY
Qty: 30 TABLET | Refills: 0 | Status: SHIPPED | OUTPATIENT
Start: 2017-07-24 | End: 2017-10-23 | Stop reason: ALTCHOICE

## 2017-07-24 RX ORDER — ATORVASTATIN CALCIUM 40 MG/1
40 TABLET, FILM COATED ORAL DAILY
Qty: 30 TABLET | Refills: 1 | Status: SHIPPED | OUTPATIENT
Start: 2017-07-24 | End: 2017-07-26 | Stop reason: SDUPTHER

## 2017-07-25 ENCOUNTER — TELEPHONE (OUTPATIENT)
Dept: PAIN MANAGEMENT | Age: 72
End: 2017-07-25

## 2017-07-26 RX ORDER — ATORVASTATIN CALCIUM 40 MG/1
40 TABLET, FILM COATED ORAL DAILY
Qty: 30 TABLET | Refills: 5 | Status: SHIPPED | OUTPATIENT
Start: 2017-07-26 | End: 2018-02-21 | Stop reason: SDUPTHER

## 2017-07-31 ENCOUNTER — OFFICE VISIT (OUTPATIENT)
Dept: FAMILY MEDICINE CLINIC | Age: 72
End: 2017-07-31
Payer: MEDICARE

## 2017-07-31 ENCOUNTER — HOSPITAL ENCOUNTER (OUTPATIENT)
Age: 72
Setting detail: SPECIMEN
Discharge: HOME | End: 2017-07-31
Payer: MEDICARE

## 2017-07-31 VITALS
HEART RATE: 80 BPM | SYSTOLIC BLOOD PRESSURE: 122 MMHG | BODY MASS INDEX: 28.27 KG/M2 | TEMPERATURE: 98.2 F | WEIGHT: 144 LBS | HEIGHT: 60 IN | RESPIRATION RATE: 20 BRPM | DIASTOLIC BLOOD PRESSURE: 80 MMHG

## 2017-07-31 DIAGNOSIS — E78.00 PURE HYPERCHOLESTEROLEMIA: Chronic | ICD-10-CM

## 2017-07-31 DIAGNOSIS — E11.42 DIABETIC PERIPHERAL NEUROPATHY (HCC): ICD-10-CM

## 2017-07-31 DIAGNOSIS — L23.7 POISON IVY DERMATITIS: Primary | ICD-10-CM

## 2017-07-31 LAB
ALBUMIN SERPL-MCNC: 4.3 G/DL (ref 3.5–5.2)
ALBUMIN/GLOBULIN RATIO: 1.4 (ref 1–2.5)
ALP BLD-CCNC: 85 U/L (ref 35–104)
ALT SERPL-CCNC: 20 U/L (ref 5–33)
ANION GAP SERPL CALCULATED.3IONS-SCNC: 19 MMOL/L (ref 9–17)
AST SERPL-CCNC: 22 U/L
BILIRUB SERPL-MCNC: 0.38 MG/DL (ref 0.3–1.2)
BUN BLDV-MCNC: 21 MG/DL (ref 8–23)
BUN/CREAT BLD: ABNORMAL (ref 9–20)
CALCIUM SERPL-MCNC: 9.2 MG/DL (ref 8.6–10.4)
CHLORIDE BLD-SCNC: 103 MMOL/L (ref 98–107)
CHOLESTEROL/HDL RATIO: 2.7
CHOLESTEROL: 226 MG/DL
CO2: 24 MMOL/L (ref 20–31)
CREAT SERPL-MCNC: 0.71 MG/DL (ref 0.5–0.9)
ESTIMATED AVERAGE GLUCOSE: 111 MG/DL
GFR AFRICAN AMERICAN: >60 ML/MIN
GFR NON-AFRICAN AMERICAN: >60 ML/MIN
GFR SERPL CREATININE-BSD FRML MDRD: ABNORMAL ML/MIN/{1.73_M2}
GFR SERPL CREATININE-BSD FRML MDRD: ABNORMAL ML/MIN/{1.73_M2}
GLUCOSE BLD-MCNC: 95 MG/DL (ref 70–99)
HBA1C MFR BLD: 5.5 % (ref 4–6)
HDLC SERPL-MCNC: 84 MG/DL
LDL CHOLESTEROL: 120 MG/DL (ref 0–130)
POTASSIUM SERPL-SCNC: 4.4 MMOL/L (ref 3.7–5.3)
SODIUM BLD-SCNC: 146 MMOL/L (ref 135–144)
TOTAL PROTEIN: 7.4 G/DL (ref 6.4–8.3)
TRIGL SERPL-MCNC: 111 MG/DL
VLDLC SERPL CALC-MCNC: ABNORMAL MG/DL (ref 1–30)

## 2017-07-31 PROCEDURE — 99213 OFFICE O/P EST LOW 20 MIN: CPT | Performed by: NURSE PRACTITIONER

## 2017-07-31 PROCEDURE — 96372 THER/PROPH/DIAG INJ SC/IM: CPT | Performed by: NURSE PRACTITIONER

## 2017-07-31 RX ORDER — METHYLPREDNISOLONE ACETATE 80 MG/ML
80 INJECTION, SUSPENSION INTRA-ARTICULAR; INTRALESIONAL; INTRAMUSCULAR; SOFT TISSUE ONCE
Status: COMPLETED | OUTPATIENT
Start: 2017-07-31 | End: 2017-07-31

## 2017-07-31 RX ADMIN — METHYLPREDNISOLONE ACETATE 80 MG: 80 INJECTION, SUSPENSION INTRA-ARTICULAR; INTRALESIONAL; INTRAMUSCULAR; SOFT TISSUE at 14:40

## 2017-08-01 ENCOUNTER — TELEPHONE (OUTPATIENT)
Dept: PAIN MANAGEMENT | Age: 72
End: 2017-08-01

## 2017-08-01 ENCOUNTER — TELEPHONE (OUTPATIENT)
Dept: FAMILY MEDICINE CLINIC | Age: 72
End: 2017-08-01

## 2017-08-01 DIAGNOSIS — E11.9 CONTROLLED TYPE 2 DIABETES MELLITUS WITHOUT COMPLICATION, WITHOUT LONG-TERM CURRENT USE OF INSULIN (HCC): Primary | ICD-10-CM

## 2017-08-01 DIAGNOSIS — E78.00 PURE HYPERCHOLESTEROLEMIA: ICD-10-CM

## 2017-08-03 ENCOUNTER — TELEPHONE (OUTPATIENT)
Dept: PAIN MANAGEMENT | Age: 72
End: 2017-08-03

## 2017-08-03 ENCOUNTER — TELEPHONE (OUTPATIENT)
Dept: FAMILY MEDICINE CLINIC | Age: 72
End: 2017-08-03

## 2017-08-03 DIAGNOSIS — M47.816 SPONDYLOSIS OF LUMBAR REGION WITHOUT MYELOPATHY OR RADICULOPATHY: Primary | ICD-10-CM

## 2017-08-03 DIAGNOSIS — G89.4 CHRONIC PAIN ASSOCIATED WITH SIGNIFICANT PSYCHOSOCIAL DYSFUNCTION: ICD-10-CM

## 2017-08-03 DIAGNOSIS — R52 PAIN MANAGEMENT: ICD-10-CM

## 2017-08-03 NOTE — TELEPHONE ENCOUNTER
Retrieved voice message left by pt on nurse line. She inquires re: Dr Dayron Joiner response to her call yesterday. Upon chart review, it is noted that she has had two urine drug screens positive for alcohol (August 2016, and July,2017). Writer calls patient to review same; left voice message advising will be available to take her call today until .  [employer] RN

## 2017-08-06 ASSESSMENT — ENCOUNTER SYMPTOMS
SORE THROAT: 0
COUGH: 0
SHORTNESS OF BREATH: 0
EYE PAIN: 0
RHINORRHEA: 0

## 2017-08-07 ENCOUNTER — TELEPHONE (OUTPATIENT)
Dept: FAMILY MEDICINE CLINIC | Age: 72
End: 2017-08-07

## 2017-08-07 RX ORDER — METHYLPREDNISOLONE 4 MG/1
TABLET ORAL
Qty: 1 KIT | Refills: 0 | Status: SHIPPED | OUTPATIENT
Start: 2017-08-07 | End: 2017-08-13

## 2017-08-09 ENCOUNTER — TELEPHONE (OUTPATIENT)
Dept: FAMILY MEDICINE CLINIC | Age: 72
End: 2017-08-09

## 2017-08-09 DIAGNOSIS — G89.4 CHRONIC PAIN ASSOCIATED WITH SIGNIFICANT PSYCHOSOCIAL DYSFUNCTION: Primary | ICD-10-CM

## 2017-08-09 DIAGNOSIS — R52 PAIN MANAGEMENT: ICD-10-CM

## 2017-08-15 ENCOUNTER — HOSPITAL ENCOUNTER (OUTPATIENT)
Dept: PAIN MANAGEMENT | Age: 72
Discharge: HOME | End: 2017-08-15

## 2017-08-17 ENCOUNTER — NURSE ONLY (OUTPATIENT)
Dept: FAMILY MEDICINE CLINIC | Age: 72
End: 2017-08-17
Payer: MEDICARE

## 2017-08-17 DIAGNOSIS — E53.8 VITAMIN B 12 DEFICIENCY: Primary | ICD-10-CM

## 2017-08-17 PROCEDURE — 96372 THER/PROPH/DIAG INJ SC/IM: CPT | Performed by: NURSE PRACTITIONER

## 2017-08-17 RX ORDER — CYANOCOBALAMIN 1000 UG/ML
1000 INJECTION INTRAMUSCULAR; SUBCUTANEOUS ONCE
Status: COMPLETED | OUTPATIENT
Start: 2017-08-17 | End: 2017-08-17

## 2017-08-17 RX ADMIN — CYANOCOBALAMIN 1000 MCG: 1000 INJECTION INTRAMUSCULAR; SUBCUTANEOUS at 11:02

## 2017-09-07 DIAGNOSIS — J30.2 SEASONAL ALLERGIC RHINITIS, UNSPECIFIED ALLERGIC RHINITIS TRIGGER: ICD-10-CM

## 2017-09-07 DIAGNOSIS — F51.01 PRIMARY INSOMNIA: ICD-10-CM

## 2017-09-08 RX ORDER — MONTELUKAST SODIUM 10 MG/1
10 TABLET ORAL NIGHTLY
Qty: 30 TABLET | Refills: 1 | Status: SHIPPED | OUTPATIENT
Start: 2017-09-08 | End: 2018-04-09

## 2017-09-08 RX ORDER — TRAZODONE HYDROCHLORIDE 50 MG/1
50 TABLET ORAL NIGHTLY
Qty: 30 TABLET | Refills: 1 | Status: SHIPPED | OUTPATIENT
Start: 2017-09-08 | End: 2017-11-10 | Stop reason: SDUPTHER

## 2017-09-17 DIAGNOSIS — F41.9 ANXIETY: Chronic | ICD-10-CM

## 2017-09-17 DIAGNOSIS — F33.1 MAJOR DEPRESSIVE DISORDER, RECURRENT EPISODE, MODERATE (HCC): ICD-10-CM

## 2017-09-17 RX ORDER — QUETIAPINE FUMARATE 200 MG/1
200 TABLET, FILM COATED ORAL DAILY
Qty: 90 TABLET | Refills: 1 | Status: SHIPPED | OUTPATIENT
Start: 2017-09-17 | End: 2018-02-14 | Stop reason: SDUPTHER

## 2017-09-22 ENCOUNTER — NURSE ONLY (OUTPATIENT)
Dept: FAMILY MEDICINE CLINIC | Age: 72
End: 2017-09-22
Payer: MEDICARE

## 2017-09-22 VITALS
DIASTOLIC BLOOD PRESSURE: 70 MMHG | SYSTOLIC BLOOD PRESSURE: 116 MMHG | HEART RATE: 84 BPM | WEIGHT: 143.96 LBS | BODY MASS INDEX: 28.59 KG/M2 | RESPIRATION RATE: 18 BRPM

## 2017-09-22 DIAGNOSIS — E53.8 VITAMIN B12 DEFICIENCY: Primary | ICD-10-CM

## 2017-09-22 DIAGNOSIS — E55.9 VITAMIN D DEFICIENCY: ICD-10-CM

## 2017-09-22 PROCEDURE — 96372 THER/PROPH/DIAG INJ SC/IM: CPT | Performed by: NURSE PRACTITIONER

## 2017-09-22 RX ADMIN — CYANOCOBALAMIN 1000 MCG: 1000 INJECTION INTRAMUSCULAR; SUBCUTANEOUS at 10:59

## 2017-10-23 ENCOUNTER — OFFICE VISIT (OUTPATIENT)
Dept: FAMILY MEDICINE CLINIC | Age: 72
End: 2017-10-23
Payer: MEDICARE

## 2017-10-23 VITALS
HEART RATE: 80 BPM | SYSTOLIC BLOOD PRESSURE: 132 MMHG | RESPIRATION RATE: 16 BRPM | WEIGHT: 149.5 LBS | DIASTOLIC BLOOD PRESSURE: 90 MMHG | TEMPERATURE: 97.5 F | BODY MASS INDEX: 29.69 KG/M2

## 2017-10-23 DIAGNOSIS — R03.0 ELEVATED BLOOD PRESSURE READING: ICD-10-CM

## 2017-10-23 DIAGNOSIS — Z12.31 ENCOUNTER FOR SCREENING MAMMOGRAM FOR MALIGNANT NEOPLASM OF BREAST: ICD-10-CM

## 2017-10-23 DIAGNOSIS — E11.9 CONTROLLED TYPE 2 DIABETES MELLITUS WITHOUT COMPLICATION, WITHOUT LONG-TERM CURRENT USE OF INSULIN (HCC): ICD-10-CM

## 2017-10-23 DIAGNOSIS — G25.81 RESTLESS LEG SYNDROME: ICD-10-CM

## 2017-10-23 DIAGNOSIS — Z23 NEEDS FLU SHOT: ICD-10-CM

## 2017-10-23 DIAGNOSIS — E53.8 VITAMIN B12 DEFICIENCY: ICD-10-CM

## 2017-10-23 DIAGNOSIS — E78.00 PURE HYPERCHOLESTEROLEMIA: Chronic | ICD-10-CM

## 2017-10-23 DIAGNOSIS — J41.0 SIMPLE CHRONIC BRONCHITIS (HCC): Primary | Chronic | ICD-10-CM

## 2017-10-23 DIAGNOSIS — M79.7 FIBROMYALGIA: ICD-10-CM

## 2017-10-23 PROCEDURE — 96372 THER/PROPH/DIAG INJ SC/IM: CPT | Performed by: PEDIATRICS

## 2017-10-23 PROCEDURE — 90688 IIV4 VACCINE SPLT 0.5 ML IM: CPT | Performed by: PEDIATRICS

## 2017-10-23 PROCEDURE — 99214 OFFICE O/P EST MOD 30 MIN: CPT | Performed by: PEDIATRICS

## 2017-10-23 PROCEDURE — G0008 ADMIN INFLUENZA VIRUS VAC: HCPCS | Performed by: PEDIATRICS

## 2017-10-23 RX ORDER — DORZOLAMIDE HCL 20 MG/ML
1 SOLUTION/ DROPS OPHTHALMIC 2 TIMES DAILY
Refills: 11 | COMMUNITY
Start: 2017-10-02 | End: 2018-10-23

## 2017-10-23 RX ORDER — ROPINIROLE 0.25 MG/1
0.25 TABLET, FILM COATED ORAL NIGHTLY
Qty: 30 TABLET | Refills: 2 | Status: SHIPPED | OUTPATIENT
Start: 2017-10-23 | End: 2018-04-09

## 2017-10-23 RX ORDER — CYANOCOBALAMIN 1000 UG/ML
1000 INJECTION INTRAMUSCULAR; SUBCUTANEOUS ONCE
Status: CANCELLED | OUTPATIENT
Start: 2017-10-23 | End: 2017-10-23

## 2017-10-23 RX ORDER — CYANOCOBALAMIN 1000 UG/ML
1000 INJECTION INTRAMUSCULAR; SUBCUTANEOUS
Status: SHIPPED | OUTPATIENT
Start: 2017-10-23 | End: 2018-10-18

## 2017-10-23 RX ADMIN — CYANOCOBALAMIN 1000 MCG: 1000 INJECTION INTRAMUSCULAR; SUBCUTANEOUS at 16:07

## 2017-10-23 ASSESSMENT — ENCOUNTER SYMPTOMS
BACK PAIN: 1
CONSTIPATION: 0
DIARRHEA: 0
SHORTNESS OF BREATH: 1

## 2017-10-23 NOTE — PROGRESS NOTES
Subjective:      Patient ID: Jaziel Huffman is a 74 y.o. female. Visit Information    Have you changed or started any medications since your last visit including any over-the-counter medicines, vitamins, or herbal medicines? yes - Reviewed   Are you having any side effects from any of your medications? -  no  Have you stopped taking any of your medications? Is so, why? -  yes -List updated    Have you seen any other physician or provider since your last visit? Yes - Records Obtained  Have you had any other diagnostic tests since your last visit? No  Have you been seen in the emergency room and/or had an admission to a hospital since we last saw you? No  Have you had your routine dental cleaning in the past 6 months? yes - routinely    Have you activated your [employer] account? If not, what are your barriers? Yes     Patient Care Team:  Pat Pena MD as PCP - General (Internal Medicine/Pediatrics)  Bunnie Litten, CNP as PCP - S Attributed Provider    Medical History Review  Past Medical, Family, and Social History reviewed and does contribute to the patient presenting condition    Health Maintenance   Topic Date Due    Diabetic retinal exam  05/24/1955    DTaP/Tdap/Td vaccine (1 - Tdap) 05/24/1962    Zostavax vaccine  05/24/2003    Diabetic microalbuminuria test  05/12/2017    Diabetic foot exam  05/23/2017    Flu vaccine (1) 09/01/2017    Colon Cancer Screen FIT/FOBT  01/13/2018    Breast cancer screen  05/25/2018    Diabetic hemoglobin A1C test  07/31/2018    Lipid screen  07/31/2018    DEXA (modify frequency per FRAX score)  Completed    Pneumococcal low/med risk  Completed     HPI     Hyperlipidemia: Bernard Curiel presents for follow up of lipids. She has had high cholesterol onset years ago. Compliance with treatment thus far has been excellent. A repeat fasting lipid profile was not done.   She does not use medications that may worsen dyslipidemias (corticosteroids, progestins, anabolic steroids, fatigue and fever. Respiratory: Positive for shortness of breath (at times this last week. ). Cardiovascular: Negative for chest pain and leg swelling. Gastrointestinal: Negative for constipation and diarrhea. Endocrine: Negative for polydipsia and polyuria. Genitourinary: Negative for dysuria and frequency. Musculoskeletal: Positive for arthralgias and back pain. Neurological: Negative for headaches. Psychiatric/Behavioral: Positive for sleep disturbance. Negative for confusion, decreased concentration and dysphoric mood. Objective:   Physical Exam   Constitutional: She is oriented to person, place, and time. She appears well-developed. No distress. Overweight    HENT:   Head: Normocephalic. Eyes: Conjunctivae are normal. Right eye exhibits no discharge. Left eye exhibits no discharge. Neck: No thyromegaly present. Cardiovascular: Normal rate, regular rhythm and normal heart sounds. Pulses:       Carotid pulses are 2+ on the right side, and 2+ on the left side. Radial pulses are 2+ on the right side, and 2+ on the left side. Pulmonary/Chest: Effort normal. She has decreased breath sounds (mild). She has no wheezes. She has no rhonchi. Abdominal: Soft. Bowel sounds are normal. She exhibits no distension. There is no tenderness. Protuberant    Musculoskeletal:   No red or swollen joints. Diffuse DJD   Lymphadenopathy:     She has no cervical adenopathy. Neurological: She is alert and oriented to person, place, and time. She exhibits normal muscle tone. Skin: Skin is warm. No erythema. Diabetic Foot Exam    Pulses:  normal,   Edema: negative  Skin: normal    Sensory exam  Monofilament Sensation:  abnormal - slight decrease in left foot  (minimum of 5 random plantar locations tested, avoid callous areas - > 1 area with absence of sensation is + for neuropathy)      Plus one of the following  Pinprick:  N/A  Proprioception:  N/A  Vibration (128 Hz):   Intact Psychiatric: She has a normal mood and affect. Her behavior is normal.       Assessment:      1. Simple chronic bronchitis (HCC)  Some increase in symptoms. Resume dulera  Monitor.   - mometasone-formoterol (DULERA) 200-5 MCG/ACT inhaler; Inhale 2 puffs into the lungs 2 times daily  Dispense: 1 Inhaler; Refill: 5  - XR CHEST STANDARD (2 VW); Future    2. Pure hypercholesterolemia  Stable. Continue meds. Labs as planned in December    3. Fibromyalgia  Stable pains. Follow up with pain management. 4. Restless leg syndrome  Still with symptoms. New. Trial of medication. monitor  - rOPINIRole (REQUIP) 0.25 MG tablet; Take 1 tablet by mouth nightly  Dispense: 30 tablet; Refill: 2    5. Vitamin B12 deficiency  Stable. Continue meds  - cyanocobalamin injection 1,000 mcg; Inject 1 mL into the muscle every 30 days    6. Needs flu shot    - INFLUENZA, QUADV, 3 YRS AND OLDER, IM, MDV, 0.5ML (Vikash Sand)    7. Encounter for screening mammogram for malignant neoplasm of breast    - LIBIA DIGITAL SCREEN W CAD BILATERAL; Future    8. Controlled type 2 diabetes mellitus without complication, without long-term current use of insulin (HCC)  Stable. Continue diet. Monitor labs for blood sugars. -  DIABETES FOOT EXAM  - POCT microalbumin    9. Elevated blood pressure reading  Monitor blood pressures. Consider bp medication. bp check   - [facility] Blood Pressure Flowsheet          Plan:      Resume inhaler. Fasting labs in early December  Follow breathing  Chest x-ray  Screening mammogram  Flu shot  Continue Vitamin B12   requip for restless leg - call in 2 weeks with update of treatment  Chest x-ray  Limit salt in diet. Follow blood pressure. Blood pressure check in 4 weeks  Monitor bp at home. 1.  Aure received counseling on the following healthy behaviors: nutrition, exercise and medication adherence  2. Reviewed prior labs and health maintenance  3. Continue current medications, diet and exercise.   4.

## 2017-10-23 NOTE — PATIENT INSTRUCTIONS
Today's office visit was for the following diagnosis    ICD-10-CM ICD-9-CM    1. Simple chronic bronchitis (HCC) J41.0 491.0 mometasone-formoterol (DULERA) 200-5 MCG/ACT inhaler      XR CHEST STANDARD (2 VW)   2. Pure hypercholesterolemia E78.00 272.0    3. Fibromyalgia M79.7 729.1    4. Restless leg syndrome G25.81 333.94 rOPINIRole (REQUIP) 0.25 MG tablet   5. Vitamin B12 deficiency E53.8 266.2    6. Needs flu shot Z23 V04.81 INFLUENZA, QUADV, 3 YRS AND OLDER, IM, MDV, 0.5ML ([facility])   7. Encounter for screening mammogram for malignant neoplasm of breast Z12.31 V76.12 LIBIA DIGITAL SCREEN W CAD BILATERAL       The treatment plan consists of the following     Resume inhaler. Fasting labs in early December  Follow breathing  Chest x-ray  Screening mammogram  Flu shot  Continue Vitamin B12   requip for restless leg - call in 2 weeks with update of treatment  Chest x-ray  Limit salt in diet. Follow blood pressure. Blood pressure check in 4 weeks        All Future Testing planned in [facility]  Lab Frequency Next Occurrence   CT Sinus WO Contrast Once 04/06/2017   Uric Acid Once 12/01/2017   CBC Once 12/01/2017   Microalbumin / Creatinine Urine Ratio Once 12/01/2017   Lipid Panel Once 12/01/2017   Comprehensive Metabolic Panel Once 27/20/3259   LIBIA DIGITAL SCREEN W CAD BILATERAL Once 11/22/2017   XR CHEST STANDARD (2 VW) Once 10/23/2017       Survey of office experience to help improve our quality of service. Please note that you may receive a patient satisfaction survey either by [employer] [address] postal mail service or email. We would appreciate you taking the time to complete and return the survey. We value your opinion and will use your comments to help improve our care and  service to our patients    Health Maintenance  Please also note the list of Health maintenance items for you and their due dates. Help us continue to provide excellent health care by keeping these items up to date.   We will be happy to assist you in getting this completed in a timely fashion. Health Maintenance Due   Topic Date Due    Diabetic retinal exam  05/24/1955    DTaP/Tdap/Td vaccine (1 - Tdap) 05/24/1962    Zostavax vaccine  05/24/2003    Diabetic microalbuminuria test  05/12/2017    Diabetic foot exam  05/23/2017    Flu vaccine (1) 09/01/2017         After-Hours [facility] [address] -  24 hours emergency services daily  [address]      Patient Education        Restless Legs Syndrome: Care Instructions  Your Care Instructions  Restless legs syndrome is a common nervous system problem. People with this syndrome feel a creeping, achy, or unpleasant feeling in the legs and an overpowering urge to move them. It often occurs in the evening and at night and can lead to sleep problems and tiredness. Your doctor may suggest doing a study of your sleep patterns to figure out what is happening when you try to sleep. Many people get relief from symptoms when they get regular exercise, eat well, and avoid caffeine, alcohol, and tobacco.  Follow-up care is a key part of your treatment and safety. Be sure to make and go to all appointments, and call your doctor if you are having problems. It's also a good idea to know your test results and keep a list of the medicines you take. How can you care for yourself at home? · Take your medicines exactly as prescribed. Call your doctor if you think you are having a problem with your medicine. · Try bathing in hot or cold water. Applying a heating pad or ice bag to your legs may also help symptoms. · Stretch and massage your legs before bed or when discomfort begins. · Get some exercise for at least 30 minutes a day on most days of the week. Stop exercising at least 3 hours before bedtime. · Try to plan for situations where you will need to remain seated for long stretches.  For example, if you are traveling by car, plan some stops so you can get out and walk under license by [facility] If you have questions about a medical condition or this instruction, always ask your healthcare professional. [address] any warranty or liability for your use of this information.

## 2017-10-26 ENCOUNTER — HOSPITAL ENCOUNTER (OUTPATIENT)
Dept: GENERAL RADIOLOGY | Age: 72
Discharge: HOME | End: 2017-10-26
Payer: MEDICARE

## 2017-10-26 ENCOUNTER — HOSPITAL ENCOUNTER (OUTPATIENT)
Age: 72
Discharge: HOME | End: 2017-10-26
Payer: MEDICARE

## 2017-10-26 DIAGNOSIS — M47.812 OSTEOARTHRITIS OF CERVICAL SPINE, UNSPECIFIED SPINAL OSTEOARTHRITIS COMPLICATION STATUS: ICD-10-CM

## 2017-10-26 DIAGNOSIS — M51.26 DISPLACEMENT OF LUMBAR INTERVERTEBRAL DISC WITHOUT MYELOPATHY: ICD-10-CM

## 2017-10-26 DIAGNOSIS — J41.0 SIMPLE CHRONIC BRONCHITIS (HCC): Chronic | ICD-10-CM

## 2017-10-26 PROCEDURE — 72050 X-RAY EXAM NECK SPINE 4/5VWS: CPT

## 2017-10-26 PROCEDURE — 72114 X-RAY EXAM L-S SPINE BENDING: CPT

## 2017-10-26 PROCEDURE — 71020 XR CHEST STANDARD TWO VW: CPT

## 2017-10-27 NOTE — TELEPHONE ENCOUNTER
Insurance denied Juli Medina, per [facility], patient is to try Anoro. Patient informed.     Electronically signed by Dunia Johnson on 10/27/2017 at 12:53 PM

## 2017-11-02 ENCOUNTER — TELEPHONE (OUTPATIENT)
Dept: FAMILY MEDICINE CLINIC | Age: 72
End: 2017-11-02

## 2017-11-02 DIAGNOSIS — J41.0 SIMPLE CHRONIC BRONCHITIS (HCC): Chronic | ICD-10-CM

## 2017-11-10 DIAGNOSIS — F51.01 PRIMARY INSOMNIA: ICD-10-CM

## 2017-11-10 RX ORDER — TRAZODONE HYDROCHLORIDE 50 MG/1
50 TABLET ORAL NIGHTLY
Qty: 30 TABLET | Refills: 1 | Status: SHIPPED | OUTPATIENT
Start: 2017-11-10 | End: 2018-01-19 | Stop reason: SDUPTHER

## 2017-11-10 NOTE — TELEPHONE ENCOUNTER
LOV 10-23-17  RTO 1-20-18  LRF 9-8-17  Health Maintenance   Topic Date Due    Diabetic retinal exam  05/24/1955    DTaP/Tdap/Td vaccine (1 - Tdap) 05/24/1962    Zostavax vaccine  05/24/2003    Diabetic microalbuminuria test  05/12/2017    Colon Cancer Screen FIT/FOBT  01/13/2018    Breast cancer screen  05/25/2018    Diabetic hemoglobin A1C test  07/31/2018    Lipid screen  07/31/2018    Diabetic foot exam  10/23/2018    DEXA (modify frequency per FRAX score)  Completed    Flu vaccine  Completed    Pneumococcal low/med risk  Completed             (applicable per patient's age: Cancer Screenings, Depression Screening, Fall Risk Screening, Immunizations)    Hemoglobin A1C (%)   Date Value   07/31/2017 5.5   05/12/2016 5.0   11/23/2014 5.6     Microalb/Crt.  Ratio (mcg/mg creat)   Date Value   05/12/2016 7     LDL Cholesterol (mg/dL)   Date Value   07/31/2017 120     LDL Calculated (mg/dL)   Date Value   11/13/2014 109     AST (U/L)   Date Value   07/31/2017 22     ALT (U/L)   Date Value   07/31/2017 20     BUN (mg/dL)   Date Value   07/31/2017 21      (goal A1C is < 7)   (goal LDL is <100) need 30-50% reduction from baseline     BP Readings from Last 3 Encounters:   10/23/17 (!) 132/90   09/22/17 116/70   07/31/17 122/80    (goal /80)      All Future Testing planned in [facility]:  Lab Frequency Next Occurrence   CT Sinus WO Contrast Once 04/06/2017   Uric Acid Once 12/01/2017   CBC Once 12/01/2017   Microalbumin / Creatinine Urine Ratio Once 12/01/2017   Lipid Panel Once 12/01/2017   Comprehensive Metabolic Panel Once 39/83/1937   LIBIA DIGITAL SCREEN W CAD BILATERAL Once 11/22/2017       Next Visit Date:  Future Appointments  Date Time Provider Glenroy Falcon   11/20/2017 11:00 AM ARACELI Ojeda PC JESUSOC Jean [facility]   1/30/2018 10:20 AM MD Rochelle Otero [facility]            Patient Active Problem List:     Hyperlipidemia     Fibromyalgia     Osteopenia     Hx of bilateral breast implants     Vitamin D deficiency     Grief at loss of child     Lumbar stenosis     Arthritis of shoulder region, right     COPD (chronic obstructive pulmonary disease) (HCC)     Anxiety     Anemia, normocytic normochromic     Chronic pain associated with significant psychosocial dysfunction     Carpal tunnel syndrome on both sides     Primary osteoarthritis involving multiple joints     Spondylosis 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     Vitamin B12 deficiency     Type 2 diabetes mellitus, controlled ([facility])     Diabetic peripheral neuropathy ([facility])     Primary insomnia     Lumbar and sacral arthritis     Chronic biliary pancreatitis ([facility])     Moderate episode of recurrent major depressive disorder ([facility])     Uncomplicated opioid dependence ([facility])     Chronic bilateral low back pain without sciatica     Hyperuricemia

## 2017-11-20 ENCOUNTER — NURSE ONLY (OUTPATIENT)
Dept: FAMILY MEDICINE CLINIC | Age: 72
End: 2017-11-20

## 2017-11-20 DIAGNOSIS — E53.8 B12 DEFICIENCY: Primary | ICD-10-CM

## 2017-11-20 NOTE — PROGRESS NOTES
Patient presents today for her B-12 injection. Patient was well dressed for the weather and seemed under no distress. Patient tolerated injection well.

## 2017-12-06 DIAGNOSIS — D64.9 ANEMIA, NORMOCYTIC NORMOCHROMIC: Primary | ICD-10-CM

## 2017-12-20 ENCOUNTER — HOSPITAL ENCOUNTER (OUTPATIENT)
Age: 72
Setting detail: SPECIMEN
Discharge: HOME | End: 2017-12-20
Payer: MEDICARE

## 2017-12-20 ENCOUNTER — NURSE ONLY (OUTPATIENT)
Dept: FAMILY MEDICINE CLINIC | Age: 72
End: 2017-12-20

## 2017-12-20 DIAGNOSIS — G25.81 RESTLESS LEG: ICD-10-CM

## 2017-12-20 DIAGNOSIS — D64.9 ANEMIA, NORMOCYTIC NORMOCHROMIC: ICD-10-CM

## 2017-12-20 DIAGNOSIS — E53.8 VITAMIN B12 DEFICIENCY: ICD-10-CM

## 2017-12-20 DIAGNOSIS — E53.8 VITAMIN B12 DEFICIENCY: Primary | ICD-10-CM

## 2017-12-20 LAB
HCT VFR BLD CALC: 43.9 % (ref 36.3–47.1)
HEMOGLOBIN: 14 G/DL (ref 11.9–15.1)
IRON SATURATION: 23 % (ref 20–55)
IRON: 73 UG/DL (ref 37–145)
MCH RBC QN AUTO: 29.9 PG (ref 25.2–33.5)
MCHC RBC AUTO-ENTMCNC: 31.9 G/DL (ref 28.4–34.8)
MCV RBC AUTO: 93.8 FL (ref 82.6–102.9)
PDW BLD-RTO: 12.9 % (ref 11.8–14.4)
PLATELET # BLD: 340 K/UL (ref 138–453)
PMV BLD AUTO: 10 FL (ref 8.1–13.5)
RBC # BLD: 4.68 M/UL (ref 3.95–5.11)
TOTAL IRON BINDING CAPACITY: 324 UG/DL (ref 250–450)
UNSATURATED IRON BINDING CAPACITY: 251 UG/DL (ref 112–347)
VITAMIN B-12: >2000 PG/ML (ref 232–1245)
WBC # BLD: 5.5 K/UL (ref 3.5–11.3)

## 2017-12-20 NOTE — PROGRESS NOTES
Patient presented for her B-12 injection. Patient was well dressed for the weather and seemed under no distress. Patient tolerated injection well. This was her last injection ordered. She has labs done wondered if she needs her B-12 added on. Saranya Lopez in lab said she did draw the correct tube if you want added on.  Electronically signed by Laney Hernandez on 12/20/2017 at 10:54 AM

## 2017-12-22 DIAGNOSIS — E53.8 VITAMIN B12 DEFICIENCY: Primary | ICD-10-CM

## 2018-01-10 RX ORDER — BENZONATATE 100 MG/1
100 CAPSULE ORAL 3 TIMES DAILY PRN
Qty: 30 CAPSULE | Refills: 0 | Status: SHIPPED | OUTPATIENT
Start: 2018-01-10 | End: 2018-05-25 | Stop reason: SDUPTHER

## 2018-01-11 ENCOUNTER — OFFICE VISIT (OUTPATIENT)
Dept: FAMILY MEDICINE CLINIC | Age: 73
End: 2018-01-11
Payer: MEDICARE

## 2018-01-11 VITALS
WEIGHT: 156 LBS | HEART RATE: 83 BPM | BODY MASS INDEX: 30.98 KG/M2 | TEMPERATURE: 96 F | SYSTOLIC BLOOD PRESSURE: 125 MMHG | RESPIRATION RATE: 19 BRPM | DIASTOLIC BLOOD PRESSURE: 81 MMHG

## 2018-01-11 DIAGNOSIS — R05.9 COUGH: ICD-10-CM

## 2018-01-11 DIAGNOSIS — J40 BRONCHITIS: Primary | ICD-10-CM

## 2018-01-11 DIAGNOSIS — R50.9 FEVER, UNSPECIFIED FEVER CAUSE: ICD-10-CM

## 2018-01-11 DIAGNOSIS — R52 BODY ACHES: ICD-10-CM

## 2018-01-11 LAB
INFLUENZA A ANTIBODY: NEGATIVE
INFLUENZA B ANTIBODY: NEGATIVE

## 2018-01-11 PROCEDURE — 87804 INFLUENZA ASSAY W/OPTIC: CPT | Performed by: NURSE PRACTITIONER

## 2018-01-11 PROCEDURE — 99213 OFFICE O/P EST LOW 20 MIN: CPT | Performed by: NURSE PRACTITIONER

## 2018-01-11 RX ORDER — OXYCODONE HYDROCHLORIDE 15 MG/1
15 TABLET, FILM COATED, EXTENDED RELEASE ORAL DAILY
Refills: 0 | COMMUNITY
Start: 2017-12-01 | End: 2018-07-09 | Stop reason: ALTCHOICE

## 2018-01-11 RX ORDER — AZITHROMYCIN 250 MG/1
TABLET, FILM COATED ORAL
Qty: 6 TABLET | Refills: 0 | Status: SHIPPED | OUTPATIENT
Start: 2018-01-11 | End: 2018-01-15 | Stop reason: ALTCHOICE

## 2018-01-11 ASSESSMENT — ENCOUNTER SYMPTOMS
RHINORRHEA: 1
COUGH: 1
SORE THROAT: 1
SINUS PAIN: 1
NAUSEA: 1
DIARRHEA: 0
WHEEZING: 1
VOMITING: 1
SWOLLEN GLANDS: 1
SHORTNESS OF BREATH: 1

## 2018-01-11 NOTE — PROGRESS NOTES
Subjective:      Patient ID: Bret Lemus is a 74 y.o. female. Visit Information    Have you changed or started any medications since your last visit including any over-the-counter medicines, vitamins, or herbal medicines? no   Have you stopped taking any of your medications? Is so, why? -  no  Are you having any side effects from any of your medications? - no    Have you seen any other physician or provider since your last visit?  no   Have you had any other diagnostic tests since your last visit?  no   Have you been seen in the emergency room and/or had an admission in a hospital since we last saw you?  no   Have you had your routine dental cleaning in the past 6 months?  no     Do you have an active MyChart account? If no, what is the barrier? Yes    Patient Care Team:  Checo Abraham MD as PCP - General (Internal Medicine/Pediatrics)  Octavia Ortiz CNP as PCP - S Attributed Provider    Medical History Review  Past Medical, Family, and Social History reviewed and does not contribute to the patient presenting condition    Health Maintenance   Topic Date Due    Diabetic retinal exam  05[birth year]24/1955    Zostavax vaccine  05/24/2003    Diabetic microalbuminuria test  05/12/2017    Colon Cancer Screen FIT/FOBT  01/13/2018    DTaP/Tdap/Td vaccine (1 - Tdap) 11/01/2018 (Originally [birth year]24/1962)    Breast cancer screen  05/25/2018    A1C test (Diabetic or Prediabetic)  07/31/2018    Lipid screen  07/31/2018    Diabetic foot exam  10/23/2018    DEXA (modify frequency per FRAX score)  Completed    Flu vaccine  Completed    Pneumococcal low/med risk  Completed       Has tried Theraflu, delsym, zarbee, nyquil, and mucinex without relief       Cough   This is a new problem. The current episode started 1 to 4 weeks ago (2 weeks). The problem has been gradually worsening. The problem occurs constantly. The cough is non-productive.  Associated symptoms include chest pain, chills, ear congestion, ear pain, a fever

## 2018-01-30 ENCOUNTER — TELEPHONE (OUTPATIENT)
Dept: FAMILY MEDICINE CLINIC | Age: 73
End: 2018-01-30

## 2018-01-30 NOTE — TELEPHONE ENCOUNTER
Called in regards to no show. Patient states that she has been busy with her son's cancer. She apologizes.

## 2018-02-22 RX ORDER — ATORVASTATIN CALCIUM 40 MG/1
40 TABLET, FILM COATED ORAL DAILY
Qty: 90 TABLET | Refills: 0 | Status: SHIPPED | OUTPATIENT
Start: 2018-02-22 | End: 2018-05-05 | Stop reason: SDUPTHER

## 2018-03-02 ENCOUNTER — HOSPITAL ENCOUNTER (OUTPATIENT)
Age: 73
Setting detail: SPECIMEN
Discharge: HOME | End: 2018-03-02
Payer: MEDICARE

## 2018-03-02 DIAGNOSIS — E78.00 PURE HYPERCHOLESTEROLEMIA: ICD-10-CM

## 2018-03-02 DIAGNOSIS — D64.9 ANEMIA, NORMOCYTIC NORMOCHROMIC: Primary | ICD-10-CM

## 2018-03-02 DIAGNOSIS — E53.8 VITAMIN B12 DEFICIENCY: ICD-10-CM

## 2018-03-02 DIAGNOSIS — D64.9 ANEMIA, NORMOCYTIC NORMOCHROMIC: ICD-10-CM

## 2018-03-02 DIAGNOSIS — E11.9 CONTROLLED TYPE 2 DIABETES MELLITUS WITHOUT COMPLICATION, WITHOUT LONG-TERM CURRENT USE OF INSULIN (HCC): ICD-10-CM

## 2018-03-02 LAB
ALBUMIN SERPL-MCNC: 4 G/DL (ref 3.5–5.2)
ALBUMIN/GLOBULIN RATIO: 1.4 (ref 1–2.5)
ALP BLD-CCNC: 96 U/L (ref 35–104)
ALT SERPL-CCNC: 14 U/L (ref 5–33)
ANION GAP SERPL CALCULATED.3IONS-SCNC: 17 MMOL/L (ref 9–17)
AST SERPL-CCNC: 26 U/L
BILIRUB SERPL-MCNC: 0.33 MG/DL (ref 0.3–1.2)
BUN BLDV-MCNC: 16 MG/DL (ref 8–23)
BUN/CREAT BLD: ABNORMAL (ref 9–20)
CALCIUM SERPL-MCNC: 8.8 MG/DL (ref 8.6–10.4)
CHLORIDE BLD-SCNC: 101 MMOL/L (ref 98–107)
CHOLESTEROL/HDL RATIO: 2.8
CHOLESTEROL: 179 MG/DL
CO2: 22 MMOL/L (ref 20–31)
CREAT SERPL-MCNC: 0.8 MG/DL (ref 0.5–0.9)
GFR AFRICAN AMERICAN: >60 ML/MIN
GFR NON-AFRICAN AMERICAN: >60 ML/MIN
GFR SERPL CREATININE-BSD FRML MDRD: ABNORMAL ML/MIN/{1.73_M2}
GFR SERPL CREATININE-BSD FRML MDRD: ABNORMAL ML/MIN/{1.73_M2}
GLUCOSE BLD-MCNC: 86 MG/DL (ref 70–99)
HDLC SERPL-MCNC: 64 MG/DL
LDL CHOLESTEROL: 84 MG/DL (ref 0–130)
POTASSIUM SERPL-SCNC: 5.5 MMOL/L (ref 3.7–5.3)
SODIUM BLD-SCNC: 140 MMOL/L (ref 135–144)
TOTAL PROTEIN: 6.8 G/DL (ref 6.4–8.3)
TRIGL SERPL-MCNC: 154 MG/DL
VITAMIN B-12: 463 PG/ML (ref 232–1245)
VLDLC SERPL CALC-MCNC: ABNORMAL MG/DL (ref 1–30)

## 2018-03-13 ENCOUNTER — HOSPITAL ENCOUNTER (OUTPATIENT)
Age: 73
Discharge: HOME | End: 2018-03-15
Payer: MEDICARE

## 2018-03-13 ENCOUNTER — HOSPITAL ENCOUNTER (OUTPATIENT)
Dept: GENERAL RADIOLOGY | Age: 73
Discharge: HOME | End: 2018-03-15
Payer: MEDICARE

## 2018-03-13 DIAGNOSIS — M17.0 ARTHRITIS OF BOTH KNEES: ICD-10-CM

## 2018-03-13 PROCEDURE — 73562 X-RAY EXAM OF KNEE 3: CPT

## 2018-04-09 ENCOUNTER — OFFICE VISIT (OUTPATIENT)
Dept: FAMILY MEDICINE CLINIC | Age: 73
End: 2018-04-09
Payer: MEDICARE

## 2018-04-09 VITALS
OXYGEN SATURATION: 97 % | HEIGHT: 59 IN | HEART RATE: 85 BPM | SYSTOLIC BLOOD PRESSURE: 112 MMHG | WEIGHT: 149.9 LBS | DIASTOLIC BLOOD PRESSURE: 72 MMHG | RESPIRATION RATE: 16 BRPM | BODY MASS INDEX: 30.22 KG/M2

## 2018-04-09 DIAGNOSIS — E11.42 DIABETIC PERIPHERAL NEUROPATHY (HCC): ICD-10-CM

## 2018-04-09 DIAGNOSIS — E78.00 PURE HYPERCHOLESTEROLEMIA: Primary | Chronic | ICD-10-CM

## 2018-04-09 DIAGNOSIS — E79.0 HYPERURICEMIA: ICD-10-CM

## 2018-04-09 DIAGNOSIS — F41.9 ANXIETY: Chronic | ICD-10-CM

## 2018-04-09 DIAGNOSIS — Z12.11 SCREEN FOR COLON CANCER: ICD-10-CM

## 2018-04-09 DIAGNOSIS — J41.0 SIMPLE CHRONIC BRONCHITIS (HCC): Chronic | ICD-10-CM

## 2018-04-09 DIAGNOSIS — E87.5 SERUM POTASSIUM ELEVATED: ICD-10-CM

## 2018-04-09 DIAGNOSIS — E53.8 B12 DEFICIENCY: ICD-10-CM

## 2018-04-09 DIAGNOSIS — E11.9 CONTROLLED TYPE 2 DIABETES MELLITUS WITHOUT COMPLICATION, WITHOUT LONG-TERM CURRENT USE OF INSULIN (HCC): ICD-10-CM

## 2018-04-09 DIAGNOSIS — F33.1 MODERATE EPISODE OF RECURRENT MAJOR DEPRESSIVE DISORDER (HCC): ICD-10-CM

## 2018-04-09 DIAGNOSIS — F11.20 UNCOMPLICATED OPIOID DEPENDENCE (HCC): ICD-10-CM

## 2018-04-09 DIAGNOSIS — Z13.31 POSITIVE DEPRESSION SCREENING: ICD-10-CM

## 2018-04-09 PROCEDURE — G0444 DEPRESSION SCREEN ANNUAL: HCPCS | Performed by: PEDIATRICS

## 2018-04-09 PROCEDURE — 99214 OFFICE O/P EST MOD 30 MIN: CPT | Performed by: PEDIATRICS

## 2018-04-09 PROCEDURE — 96372 THER/PROPH/DIAG INJ SC/IM: CPT | Performed by: PEDIATRICS

## 2018-04-09 PROCEDURE — G8431 POS CLIN DEPRES SCRN F/U DOC: HCPCS | Performed by: PEDIATRICS

## 2018-04-09 RX ORDER — ALLOPURINOL 100 MG/1
100 TABLET ORAL DAILY
Qty: 30 TABLET | Refills: 3 | Status: SHIPPED | OUTPATIENT
Start: 2018-04-09 | End: 2018-07-30 | Stop reason: SDUPTHER

## 2018-04-09 RX ORDER — CYANOCOBALAMIN 1000 UG/ML
1000 INJECTION INTRAMUSCULAR; SUBCUTANEOUS ONCE
Status: DISCONTINUED | OUTPATIENT
Start: 2018-04-09 | End: 2018-04-09

## 2018-04-09 RX ADMIN — CYANOCOBALAMIN 1000 MCG: 1000 INJECTION INTRAMUSCULAR; SUBCUTANEOUS at 11:45

## 2018-04-09 ASSESSMENT — PATIENT HEALTH QUESTIONNAIRE - PHQ9
4. FEELING TIRED OR HAVING LITTLE ENERGY: 2
2. FEELING DOWN, DEPRESSED OR HOPELESS: 1
3. TROUBLE FALLING OR STAYING ASLEEP: 2
1. LITTLE INTEREST OR PLEASURE IN DOING THINGS: 2
SUM OF ALL RESPONSES TO PHQ9 QUESTIONS 1 & 2: 3
10. IF YOU CHECKED OFF ANY PROBLEMS, HOW DIFFICULT HAVE THESE PROBLEMS MADE IT FOR YOU TO DO YOUR WORK, TAKE CARE OF THINGS AT HOME, OR GET ALONG WITH OTHER PEOPLE: 1
8. MOVING OR SPEAKING SO SLOWLY THAT OTHER PEOPLE COULD HAVE NOTICED. OR THE OPPOSITE, BEING SO FIGETY OR RESTLESS THAT YOU HAVE BEEN MOVING AROUND A LOT MORE THAN USUAL: 0
7. TROUBLE CONCENTRATING ON THINGS, SUCH AS READING THE NEWSPAPER OR WATCHING TELEVISION: 0
6. FEELING BAD ABOUT YOURSELF - OR THAT YOU ARE A FAILURE OR HAVE LET YOURSELF OR YOUR FAMILY DOWN: 0
9. THOUGHTS THAT YOU WOULD BE BETTER OFF DEAD, OR OF HURTING YOURSELF: 0
5. POOR APPETITE OR OVEREATING: 1
SUM OF ALL RESPONSES TO PHQ QUESTIONS 1-9: 8

## 2018-04-09 ASSESSMENT — ENCOUNTER SYMPTOMS
CONSTIPATION: 0
DIARRHEA: 0
EYE DISCHARGE: 0
COUGH: 1
WHEEZING: 1
SHORTNESS OF BREATH: 0

## 2018-04-20 DIAGNOSIS — F51.01 PRIMARY INSOMNIA: ICD-10-CM

## 2018-04-20 RX ORDER — TRAZODONE HYDROCHLORIDE 50 MG/1
50 TABLET ORAL NIGHTLY
Qty: 30 TABLET | Refills: 5 | Status: SHIPPED | OUTPATIENT
Start: 2018-04-20 | End: 2018-08-03 | Stop reason: SDUPTHER

## 2018-05-10 ENCOUNTER — NURSE ONLY (OUTPATIENT)
Dept: FAMILY MEDICINE CLINIC | Age: 73
End: 2018-05-10
Payer: MEDICARE

## 2018-05-10 DIAGNOSIS — E53.8 VITAMIN B12 DEFICIENCY: Primary | ICD-10-CM

## 2018-05-10 PROCEDURE — 96372 THER/PROPH/DIAG INJ SC/IM: CPT | Performed by: NURSE PRACTITIONER

## 2018-05-10 PROCEDURE — 99211 OFF/OP EST MAY X REQ PHY/QHP: CPT | Performed by: NURSE PRACTITIONER

## 2018-05-10 RX ADMIN — CYANOCOBALAMIN 1000 MCG: 1000 INJECTION INTRAMUSCULAR; SUBCUTANEOUS at 10:16

## 2018-05-16 DIAGNOSIS — F41.9 ANXIETY: Chronic | ICD-10-CM

## 2018-05-16 DIAGNOSIS — F33.1 MAJOR DEPRESSIVE DISORDER, RECURRENT EPISODE, MODERATE (HCC): ICD-10-CM

## 2018-05-16 RX ORDER — QUETIAPINE FUMARATE 200 MG/1
200 TABLET, FILM COATED ORAL DAILY
Qty: 90 TABLET | Refills: 0 | Status: SHIPPED | OUTPATIENT
Start: 2018-05-16 | End: 2018-07-17 | Stop reason: SDUPTHER

## 2018-05-23 ENCOUNTER — OFFICE VISIT (OUTPATIENT)
Dept: FAMILY MEDICINE CLINIC | Age: 73
End: 2018-05-23
Payer: MEDICARE

## 2018-05-23 ENCOUNTER — HOSPITAL ENCOUNTER (OUTPATIENT)
Age: 73
Setting detail: SPECIMEN
Discharge: HOME | End: 2018-05-23
Payer: MEDICARE

## 2018-05-23 VITALS
TEMPERATURE: 97.3 F | RESPIRATION RATE: 16 BRPM | WEIGHT: 151 LBS | HEART RATE: 64 BPM | DIASTOLIC BLOOD PRESSURE: 82 MMHG | SYSTOLIC BLOOD PRESSURE: 126 MMHG | BODY MASS INDEX: 30 KG/M2

## 2018-05-23 DIAGNOSIS — R05.3 CHRONIC COUGH: ICD-10-CM

## 2018-05-23 DIAGNOSIS — M25.562 POSTERIOR LEFT KNEE PAIN: Primary | ICD-10-CM

## 2018-05-23 DIAGNOSIS — E87.5 SERUM POTASSIUM ELEVATED: ICD-10-CM

## 2018-05-23 DIAGNOSIS — J41.0 SIMPLE CHRONIC BRONCHITIS (HCC): Chronic | ICD-10-CM

## 2018-05-23 DIAGNOSIS — K86.1 CHRONIC BILIARY PANCREATITIS (HCC): ICD-10-CM

## 2018-05-23 DIAGNOSIS — E11.9 CONTROLLED TYPE 2 DIABETES MELLITUS WITHOUT COMPLICATION, WITHOUT LONG-TERM CURRENT USE OF INSULIN (HCC): ICD-10-CM

## 2018-05-23 DIAGNOSIS — M71.22 BAKER'S CYST OF KNEE, LEFT: ICD-10-CM

## 2018-05-23 LAB — POTASSIUM SERPL-SCNC: 4.4 MMOL/L (ref 3.7–5.3)

## 2018-05-23 PROCEDURE — 99214 OFFICE O/P EST MOD 30 MIN: CPT | Performed by: NURSE PRACTITIONER

## 2018-05-23 ASSESSMENT — ENCOUNTER SYMPTOMS
ABDOMINAL PAIN: 0
RHINORRHEA: 0
SHORTNESS OF BREATH: 1
COUGH: 1
SINUS PRESSURE: 1
VOMITING: 1
CONSTIPATION: 0
DIARRHEA: 0
SORE THROAT: 0
VOICE CHANGE: 1
NAUSEA: 1
WHEEZING: 1

## 2018-05-24 ENCOUNTER — HOSPITAL ENCOUNTER (OUTPATIENT)
Age: 73
Discharge: HOME | End: 2018-05-26
Payer: MEDICARE

## 2018-05-24 ENCOUNTER — HOSPITAL ENCOUNTER (OUTPATIENT)
Dept: GENERAL RADIOLOGY | Age: 73
Discharge: HOME | End: 2018-05-26
Payer: MEDICARE

## 2018-05-24 DIAGNOSIS — Z12.11 SCREEN FOR COLON CANCER: ICD-10-CM

## 2018-05-24 DIAGNOSIS — J41.0 SIMPLE CHRONIC BRONCHITIS (HCC): Chronic | ICD-10-CM

## 2018-05-24 DIAGNOSIS — R05.3 CHRONIC COUGH: ICD-10-CM

## 2018-05-24 LAB
CONTROL: PRESENT
ESTIMATED AVERAGE GLUCOSE: 105 MG/DL
HBA1C MFR BLD: 5.3 % (ref 4–6)
HEMOCCULT STL QL: NORMAL

## 2018-05-24 PROCEDURE — 82274 ASSAY TEST FOR BLOOD FECAL: CPT | Performed by: PEDIATRICS

## 2018-05-24 PROCEDURE — 71046 X-RAY EXAM CHEST 2 VIEWS: CPT

## 2018-05-24 RX ORDER — AZITHROMYCIN 250 MG/1
TABLET, FILM COATED ORAL
Qty: 6 TABLET | Refills: 0 | Status: SHIPPED | OUTPATIENT
Start: 2018-05-24 | End: 2018-06-03

## 2018-05-25 DIAGNOSIS — R05.9 COUGH: Primary | ICD-10-CM

## 2018-05-25 RX ORDER — BENZONATATE 100 MG/1
100 CAPSULE ORAL 3 TIMES DAILY PRN
Qty: 30 CAPSULE | Refills: 0 | Status: SHIPPED | OUTPATIENT
Start: 2018-05-25 | End: 2018-06-22 | Stop reason: SDUPTHER

## 2018-06-05 ENCOUNTER — HOSPITAL ENCOUNTER (OUTPATIENT)
Dept: ULTRASOUND IMAGING | Facility: CLINIC | Age: 73
Discharge: HOME | End: 2018-06-07
Payer: MEDICARE

## 2018-06-05 DIAGNOSIS — M25.462 EFFUSION OF LEFT KNEE: Primary | ICD-10-CM

## 2018-06-05 DIAGNOSIS — M25.562 POSTERIOR LEFT KNEE PAIN: ICD-10-CM

## 2018-06-05 DIAGNOSIS — M71.22 BAKER'S CYST OF KNEE, LEFT: ICD-10-CM

## 2018-06-05 PROCEDURE — 76881 US COMPL JOINT R-T W/IMG: CPT

## 2018-06-05 RX ORDER — FLUOXETINE HYDROCHLORIDE 40 MG/1
40 CAPSULE ORAL DAILY
Qty: 7 CAPSULE | Refills: 1 | OUTPATIENT
Start: 2018-06-05 | End: 2019-06-05

## 2018-06-11 ENCOUNTER — NURSE ONLY (OUTPATIENT)
Dept: FAMILY MEDICINE CLINIC | Age: 73
End: 2018-06-11
Payer: MEDICARE

## 2018-06-11 VITALS — BODY MASS INDEX: 30.24 KG/M2 | HEART RATE: 74 BPM | HEIGHT: 59 IN | RESPIRATION RATE: 16 BRPM | WEIGHT: 150 LBS

## 2018-06-11 DIAGNOSIS — E53.8 VITAMIN B12 DEFICIENCY: Primary | ICD-10-CM

## 2018-06-11 PROCEDURE — 99211 OFF/OP EST MAY X REQ PHY/QHP: CPT | Performed by: PEDIATRICS

## 2018-06-11 PROCEDURE — 96372 THER/PROPH/DIAG INJ SC/IM: CPT | Performed by: PEDIATRICS

## 2018-06-11 RX ORDER — CYANOCOBALAMIN 1000 UG/ML
1000 INJECTION INTRAMUSCULAR; SUBCUTANEOUS ONCE
Status: COMPLETED | OUTPATIENT
Start: 2018-06-11 | End: 2018-06-11

## 2018-06-11 RX ADMIN — CYANOCOBALAMIN 1000 MCG: 1000 INJECTION INTRAMUSCULAR; SUBCUTANEOUS at 11:57

## 2018-06-13 ENCOUNTER — APPOINTMENT (OUTPATIENT)
Dept: GENERAL RADIOLOGY | Age: 73
End: 2018-06-13
Payer: MEDICARE

## 2018-06-13 ENCOUNTER — HOSPITAL ENCOUNTER (EMERGENCY)
Age: 73
Discharge: HOME | End: 2018-06-13
Attending: EMERGENCY MEDICINE
Payer: MEDICARE

## 2018-06-13 VITALS
OXYGEN SATURATION: 98 % | HEART RATE: 77 BPM | BODY MASS INDEX: 25.52 KG/M2 | SYSTOLIC BLOOD PRESSURE: 157 MMHG | WEIGHT: 130 LBS | TEMPERATURE: 97.7 F | RESPIRATION RATE: 16 BRPM | HEIGHT: 60 IN | DIASTOLIC BLOOD PRESSURE: 95 MMHG
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
## 2018-06-13 DIAGNOSIS — M25.562 ACUTE PAIN OF LEFT KNEE: Primary | ICD-10-CM

## 2018-06-13 PROCEDURE — 99284 EMERGENCY DEPT VISIT MOD MDM: CPT

## 2018-06-13 PROCEDURE — 73562 X-RAY EXAM OF KNEE 3: CPT

## 2018-06-13 ASSESSMENT — PAIN DESCRIPTION - PAIN TYPE: TYPE: ACUTE PAIN

## 2018-06-13 ASSESSMENT — PAIN DESCRIPTION - LOCATION: LOCATION: KNEE

## 2018-06-13 ASSESSMENT — PAIN SCALES - GENERAL: PAINLEVEL_OUTOF10: 6

## 2018-06-13 ASSESSMENT — PAIN DESCRIPTION - DESCRIPTORS: DESCRIPTORS: THROBBING

## 2018-06-13 ASSESSMENT — PAIN DESCRIPTION - ORIENTATION: ORIENTATION: LEFT

## 2018-06-14 ENCOUNTER — APPOINTMENT (OUTPATIENT)
Dept: CT IMAGING | Age: 73
End: 2018-06-14
Payer: MEDICARE

## 2018-06-14 ENCOUNTER — HOSPITAL ENCOUNTER (EMERGENCY)
Age: 73
Discharge: HOME | End: 2018-06-14
Attending: EMERGENCY MEDICINE
Payer: MEDICARE

## 2018-06-14 VITALS
HEART RATE: 82 BPM | WEIGHT: 130 LBS | DIASTOLIC BLOOD PRESSURE: 80 MMHG | HEIGHT: 60 IN | TEMPERATURE: 97.7 F | SYSTOLIC BLOOD PRESSURE: 134 MMHG | OXYGEN SATURATION: 98 % | BODY MASS INDEX: 25.52 KG/M2 | RESPIRATION RATE: 16 BRPM

## 2018-06-14 DIAGNOSIS — S16.1XXA ACUTE STRAIN OF NECK MUSCLE, INITIAL ENCOUNTER: Primary | ICD-10-CM
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
## 2018-06-14 PROCEDURE — 6370000000 HC RX 637 (ALT 250 FOR IP): Performed by: EMERGENCY MEDICINE

## 2018-06-14 PROCEDURE — 99284 EMERGENCY DEPT VISIT MOD MDM: CPT

## 2018-06-14 PROCEDURE — 72125 CT NECK SPINE W/O DYE: CPT

## 2018-06-14 RX ORDER — CYCLOBENZAPRINE HCL 10 MG
10 TABLET ORAL 3 TIMES DAILY PRN
Qty: 15 TABLET | Refills: 0 | Status: SHIPPED | OUTPATIENT
Start: 2018-06-14 | End: 2018-06-24

## 2018-06-14 RX ORDER — HYDROCODONE BITARTRATE AND ACETAMINOPHEN 5; 325 MG/1; MG/1
1 TABLET ORAL ONCE
Status: COMPLETED | OUTPATIENT
Start: 2018-06-14 | End: 2018-06-14

## 2018-06-14 RX ORDER — IBUPROFEN 600 MG/1
600 TABLET ORAL ONCE
Status: COMPLETED | OUTPATIENT
Start: 2018-06-14 | End: 2018-06-14

## 2018-06-14 RX ADMIN — IBUPROFEN 600 MG: 600 TABLET ORAL at 09:17

## 2018-06-14 RX ADMIN — HYDROCODONE BITARTRATE AND ACETAMINOPHEN 1 TABLET: 5; 325 TABLET ORAL at 09:17

## 2018-06-14 ASSESSMENT — PAIN SCALES - GENERAL
PAINLEVEL_OUTOF10: 8
PAINLEVEL_OUTOF10: 8

## 2018-06-22 ENCOUNTER — OFFICE VISIT (OUTPATIENT)
Dept: ORTHOPEDIC SURGERY | Age: 73
End: 2018-06-22
Payer: MEDICARE

## 2018-06-22 VITALS — RESPIRATION RATE: 18 BRPM | HEIGHT: 60 IN | HEART RATE: 100 BPM | BODY MASS INDEX: 25.52 KG/M2 | WEIGHT: 130 LBS

## 2018-06-22 DIAGNOSIS — R05.9 COUGH: ICD-10-CM

## 2018-06-22 DIAGNOSIS — M17.12 ARTHRITIS OF LEFT KNEE: ICD-10-CM

## 2018-06-22 DIAGNOSIS — S89.92XA INJURY OF LEFT KNEE, INITIAL ENCOUNTER: Primary | ICD-10-CM

## 2018-06-22 PROCEDURE — 99203 OFFICE O/P NEW LOW 30 MIN: CPT | Performed by: ORTHOPAEDIC SURGERY

## 2018-06-22 RX ORDER — BENZONATATE 100 MG/1
100 CAPSULE ORAL 3 TIMES DAILY PRN
Qty: 30 CAPSULE | Refills: 0 | Status: SHIPPED | OUTPATIENT
Start: 2018-06-22 | End: 2018-07-09 | Stop reason: ALTCHOICE

## 2018-06-22 NOTE — TELEPHONE ENCOUNTER
LR was 5-25-18  LOV was 5-23-18    Health Maintenance   Topic Date Due    Shingles Vaccine (1 of 2 - 2 Dose Series) 05/24/1993    DTaP/Tdap/Td vaccine (1 - Tdap) 11/01/2018 (Originally 5/24/1962)    Diabetic foot exam  10/23/2018    Lipid screen  03/02/2019    Colon Cancer Screen FIT/FOBT  05/22/2019    A1C test (Diabetic or Prediabetic)  05/23/2019    Diabetic retinal exam  06/04/2019    DEXA (modify frequency per FRAX score)  Completed    Flu vaccine  Completed    Pneumococcal low/med risk  Completed             (applicable per patient's age: Cancer Screenings, Depression Screening, Fall Risk Screening, Immunizations)    Hemoglobin A1C (%)   Date Value   05/23/2018 5.3   07/31/2017 5.5   05/12/2016 5.0     Microalb/Crt.  Ratio (mcg/mg creat)   Date Value   05/12/2016 7     LDL Cholesterol (mg/dL)   Date Value   03/02/2018 84     LDL Calculated (mg/dL)   Date Value   11/13/2014 109     AST (U/L)   Date Value   03/02/2018 26     ALT (U/L)   Date Value   03/02/2018 14     BUN (mg/dL)   Date Value   03/02/2018 16      (goal A1C is < 7)   (goal LDL is <100) need 30-50% reduction from baseline     BP Readings from Last 3 Encounters:   06/14/18 134/80   06/13/18 (!) 157/95   05/23/18 126/82    (goal /80)      All Future Testing planned in [facility]:  Lab Frequency Next Occurrence   Uric Acid Once 05/09/2018   Microalbumin / Creatinine Urine Ratio Once 05/09/2018   LIBIA DIGITAL SCREEN W CAD BILATERAL Once 05/09/2018   MRI Knee Left WO Contrast Once 06/22/2018       Next Visit Date:  Future Appointments  Date Time Provider Glenroy Falcon   7/9/2018 9:20 [facility]            Patient Active Problem List:     Hyperlipidemia     Fibromyalgia     Osteopenia     Hx of bilateral breast implants     Vitamin D deficiency     Grief at loss of child     Lumbar stenosis     Arthritis of shoulder region, right     COPD (chronic obstructive pulmonary disease) (HCC)     Anxiety     Anemia, normocytic normochromic     Chronic pain associated with significant psychosocial dysfunction     Carpal tunnel syndrome on both sides     Primary osteoarthritis involving multiple joints     Spondylosis 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     Vitamin B12 deficiency     Type 2 diabetes mellitus, controlled (HCC)     Diabetic peripheral neuropathy (HCC)     Primary insomnia     Lumbar and sacral arthritis     Chronic biliary pancreatitis (HCC)     Moderate episode of recurrent major depressive disorder (HCC)     Uncomplicated opioid dependence (HCC)     Chronic bilateral low back pain without sciatica     Hyperuricemia

## 2018-07-03 ENCOUNTER — HOSPITAL ENCOUNTER (OUTPATIENT)
Dept: MRI IMAGING | Age: 73
Discharge: HOME | End: 2018-07-05
Payer: MEDICARE

## 2018-07-03 DIAGNOSIS — S89.92XA INJURY OF LEFT KNEE, INITIAL ENCOUNTER: ICD-10-CM

## 2018-07-03 PROCEDURE — 73721 MRI JNT OF LWR EXTRE W/O DYE: CPT

## 2018-07-05 ENCOUNTER — TELEPHONE (OUTPATIENT)
Dept: ORTHOPEDIC SURGERY | Age: 73
End: 2018-07-05

## 2018-07-05 NOTE — TELEPHONE ENCOUNTER
LOV:8-21-25  ROV:7-9-18  LRF:7-17-17  Health Maintenance   Topic Date Due    Shingles Vaccine (1 of 2 - 2 Dose Series) 05/24/1993    DTaP/Tdap/Td vaccine (1 - Tdap) 11/01/2018 (Originally 5/24/1962)    Flu vaccine (1) 09/01/2018    Diabetic foot exam  10/23/2018    Lipid screen  03/02/2019    Colon Cancer Screen FIT/FOBT  05/22/2019    A1C test (Diabetic or Prediabetic)  05/23/2019    Diabetic retinal exam  06/04/2019    DEXA (modify frequency per FRAX score)  Completed    Pneumococcal low/med risk  Completed             (applicable per patient's age: Cancer Screenings, Depression Screening, Fall Risk Screening, Immunizations)    Hemoglobin A1C (%)   Date Value   05/23/2018 5.3   07/31/2017 5.5   05/12/2016 5.0     Microalb/Crt.  Ratio (mcg/mg creat)   Date Value   05/12/2016 7     LDL Cholesterol (mg/dL)   Date Value   03/02/2018 84     LDL Calculated (mg/dL)   Date Value   11/13/2014 109     AST (U/L)   Date Value   03/02/2018 26     ALT (U/L)   Date Value   03/02/2018 14     BUN (mg/dL)   Date Value   03/02/2018 16      (goal A1C is < 7)   (goal LDL is <100) need 30-50% reduction from baseline     BP Readings from Last 3 Encounters:   06/14/18 134/80   06/13/18 (!) 157/95   05/23/18 126/82    (goal /80)      All Future Testing planned in [facility]:  Lab Frequency Next Occurrence   Uric Acid Once 05/09/2018   Microalbumin / Creatinine Urine Ratio Once 05/09/2018   LIBIA DIGITAL SCREEN W CAD BILATERAL Once 05/09/2018       Next Visit Date:  Future Appointments  Date Time Provider Glenroy Falcon   7/9/2018 9:20 AM [facility]            Patient Active Problem List:     Hyperlipidemia     Fibromyalgia     Osteopenia     Hx of bilateral breast implants     Vitamin D deficiency     Grief at loss of child     Lumbar stenosis     Arthritis of shoulder region, right     COPD (chronic obstructive pulmonary disease) (HCC)     Anxiety     Anemia, normocytic normochromic     Chronic pain associated with significant psychosocial dysfunction     Carpal tunnel syndrome on both sides     Primary osteoarthritis involving multiple joints     Spondylosis 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     Vitamin B12 deficiency     Type 2 diabetes mellitus, controlled (HCC)     Diabetic peripheral neuropathy (HCC)     Primary insomnia     Lumbar and sacral arthritis     Chronic biliary pancreatitis (HCC)     Moderate episode of recurrent major depressive disorder (HCC)     Uncomplicated opioid dependence (HCC)     Chronic bilateral low back pain without sciatica     Hyperuricemia

## 2018-07-06 RX ORDER — FLUOXETINE HYDROCHLORIDE 40 MG/1
40 CAPSULE ORAL DAILY
Qty: 7 CAPSULE | Refills: 1 | OUTPATIENT
Start: 2018-07-06 | End: 2019-07-06

## 2018-07-06 NOTE — TELEPHONE ENCOUNTER
Pt state she would like to talk about this in her appointment and  She does not have mammogram scheduled.

## 2018-07-09 ENCOUNTER — OFFICE VISIT (OUTPATIENT)
Dept: FAMILY MEDICINE CLINIC | Age: 73
End: 2018-07-09
Payer: MEDICARE

## 2018-07-09 ENCOUNTER — TELEPHONE (OUTPATIENT)
Dept: ORTHOPEDIC SURGERY | Age: 73
End: 2018-07-09

## 2018-07-09 VITALS
DIASTOLIC BLOOD PRESSURE: 60 MMHG | RESPIRATION RATE: 16 BRPM | HEIGHT: 61 IN | SYSTOLIC BLOOD PRESSURE: 102 MMHG | HEART RATE: 80 BPM | WEIGHT: 151.8 LBS | BODY MASS INDEX: 28.66 KG/M2

## 2018-07-09 DIAGNOSIS — F33.1 MODERATE EPISODE OF RECURRENT MAJOR DEPRESSIVE DISORDER (HCC): ICD-10-CM

## 2018-07-09 DIAGNOSIS — J30.2 CHRONIC SEASONAL ALLERGIC RHINITIS, UNSPECIFIED TRIGGER: ICD-10-CM

## 2018-07-09 DIAGNOSIS — J41.0 SIMPLE CHRONIC BRONCHITIS (HCC): Primary | Chronic | ICD-10-CM

## 2018-07-09 DIAGNOSIS — F41.9 ANXIETY: Chronic | ICD-10-CM

## 2018-07-09 PROCEDURE — 96372 THER/PROPH/DIAG INJ SC/IM: CPT | Performed by: PEDIATRICS

## 2018-07-09 PROCEDURE — 99214 OFFICE O/P EST MOD 30 MIN: CPT | Performed by: PEDIATRICS

## 2018-07-09 RX ORDER — QUETIAPINE FUMARATE 200 MG/1
200 TABLET, FILM COATED ORAL DAILY
Qty: 90 TABLET | Refills: 1 | Status: CANCELLED | OUTPATIENT
Start: 2018-07-09 | End: 2019-07-09

## 2018-07-09 RX ORDER — MONTELUKAST SODIUM 10 MG/1
10 TABLET ORAL NIGHTLY
Qty: 30 TABLET | Refills: 5 | Status: SHIPPED | OUTPATIENT
Start: 2018-07-09 | End: 2019-03-01 | Stop reason: SDUPTHER

## 2018-07-09 RX ORDER — ALPRAZOLAM 0.25 MG/1
TABLET ORAL
Qty: 2 TABLET | Refills: 0 | Status: SHIPPED | OUTPATIENT
Start: 2018-07-09 | End: 2018-07-30

## 2018-07-09 RX ADMIN — CYANOCOBALAMIN 1000 MCG: 1000 INJECTION INTRAMUSCULAR; SUBCUTANEOUS at 09:45

## 2018-07-09 ASSESSMENT — ENCOUNTER SYMPTOMS
WHEEZING: 0
GASTROINTESTINAL NEGATIVE: 1
SHORTNESS OF BREATH: 0

## 2018-07-09 NOTE — PROGRESS NOTES
by mouth 3 times daily 90 tablet 5    tiotropium (SPIRIVA RESPIMAT) 2.5 MCG/ACT AERS inhaler Inhale 2 puffs into the lungs daily 1 Inhaler 2    oxyCODONE-acetaminophen (PERCOCET)  MG per tablet Take 1 tablet by mouth every 6 hours as needed for Pain . Earliest Fill Date: 7/17/17 120 tablet 0     Current Facility-Administered Medications   Medication Dose Route Frequency Provider Last Rate Last Dose    cyanocobalamin injection 1,000 mcg  1,000 mcg Intramuscular Q30 Days Sebastián Michelle MD   1,000 mcg at 07/09/18 0945       Patient Active Problem List   Diagnosis    Hyperlipidemia    Fibromyalgia    Osteopenia    Hx of bilateral breast implants    Vitamin D deficiency    Grief at loss of child    Lumbar stenosis    Arthritis of shoulder region, right    COPD (chronic obstructive pulmonary disease) (HCC)    Anxiety    Anemia, normocytic normochromic    Chronic pain associated with significant psychosocial dysfunction    Carpal tunnel syndrome on both sides    Primary osteoarthritis involving multiple joints    Spondylosis 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    Vitamin B12 deficiency    Type 2 diabetes mellitus, controlled ([address]    Diabetic peripheral neuropathy (HCC)    Primary insomnia    Lumbar and sacral arthritis    Chronic biliary pancreatitis (HCC)    Moderate episode of recurrent major depressive disorder ([address]    Uncomplicated opioid dependence ([address]    Chronic bilateral low back pain without sciatica    Hyperuricemia       Social History   Substance Use Topics    Smoking status: Never Smoker    Smokeless tobacco: Never Used    Alcohol use No         Review of Systems   Constitutional: Negative for fatigue and fever. Eyes:        Some blur. Has eye doc follow up. Respiratory: Negative for shortness of breath and wheezing. Doing well now. Cardiovascular: Negative. Gastrointestinal: Negative.     Endocrine: Negative for polydipsia and

## 2018-07-09 NOTE — PATIENT INSTRUCTIONS
tubes) get inflamed and make a lot of mucus. This can narrow or block the airways, making it hard for you to breathe. It is a form of COPD (chronic obstructive pulmonary disease). Chronic bronchitis is usually caused by smoking. But chemical fumes, dust, or air pollution also can cause it over time. What can you expect when you have chronic bronchitis? Chronic bronchitis gets worse over time. You cannot undo the damage to your lungs. Over time, you may find that:  · You get short of breath even when you do simple things like get dressed or fix a meal.  · It is hard to eat or exercise. · You lose weight and feel weaker. Over many years, the swelling and mucus from chronic bronchitis make it more likely that you will get lung infections. But there are things you can do to prevent more damage and feel better. What are the symptoms? The main symptoms of chronic bronchitis are:  · A cough that will not go away. · Mucus that comes up when you cough. · Shortness of breath that gets worse when you exercise. At times, your symptoms may suddenly flare up and get much worse. This is a called an exacerbation (say \"[facility]\"). When this happens, your usual symptoms quickly get worse and stay bad. This can be dangerous. You may have to go to the hospital.  How can you keep chronic bronchitis from getting worse? Don't smoke. That is the best way to keep chronic bronchitis from getting worse. If you already smoke, it is never too late to stop. If you need help quitting, talk to your doctor about stop-smoking programs and medicines. These can increase your chances of quitting for good. You can do other things to keep chronic bronchitis from getting worse:  · Avoid bad air. Air pollution, chemical fumes, and dust also can make chronic bronchitis worse. · Get a flu shot every year. A shot may keep the flu from turning into something more serious, like pneumonia.  A flu shot also may lower your chances of having a flare-up. · Get a pneumococcal shot. A shot can prevent some of the serious complications of pneumonia. Ask your doctor how often you should get this shot. How is chronic bronchitis treated? Chronic bronchitis is treated with medicines and oxygen. You also can take steps at home to stay healthy and keep your condition from getting worse. Medicines and oxygen therapy  · You may be taking medicines such as:  ¨ Bronchodilators. These help open your airways and make breathing easier. Bronchodilators are either short-acting (work for 6 to 9 hours) or long-acting (work for 24 hours). You inhale most bronchodilators, so they start to act quickly. Always carry your quick-relief inhaler with you in case you need it while you are away from home. ¨ Corticosteroids. These reduce airway inflammation. They come in pill or inhaled form. You must take these medicines every day for them to work well. ¨ Antibiotics. These medicines are used when you have a bacterial lung infection. · Take your medicines exactly as prescribed. Call your doctor if you think you are having a problem with your medicine. · Oxygen therapy boosts the amount of oxygen in your blood and helps you breathe easier. Use the flow rate your doctor has recommended, and do not change it without talking to your doctor first.  Other care at home  · If your doctor recommends it, get more exercise. Walking is a good choice. Bit by bit, increase the amount you walk every day. Try for at least 30 minutes on most days of the week. · Learn breathing methods-such as breathing through pursed lips-to help you become less short of breath. · If your doctor has not set you up with a pulmonary rehabilitation program, talk to him or her about whether rehab is right for you. Rehab includes exercise programs, education about your disease and how to manage it, help with diet and other changes, and emotional support. · Eat regular, healthy meals.  Use bronchodilators about 1 hour before you eat to make it easier to eat. Eat several small meals instead of three large ones. Drink beverages at the end of the meal. Avoid foods that are hard to chew. Follow-up care is a key part of your treatment and safety. Be sure to make and go to all appointments, and call your doctor if you are having problems. It's also a good idea to know your test results and keep a list of the medicines you take. Where can you learn more? Go to https://[email or web address]. org and sign in to your [employer] account. Enter W021 in the [facility] box to learn more about \"Learning About Chronic Bronchitis. \"     If you do not have an account, please click on the \"Sign Up Now\" link. Current as of: December 6, 2017  Content Version: 11.6  © 2453-4629 [employer]. Care instructions adapted under license by [facility] ([place]). If you have questions about a medical condition or this instruction, always ask your healthcare professional. [place] [address] any warranty or liability for your use of this information.

## 2018-07-17 DIAGNOSIS — F33.1 MAJOR DEPRESSIVE DISORDER, RECURRENT EPISODE, MODERATE (HCC): ICD-10-CM

## 2018-07-17 DIAGNOSIS — F41.9 ANXIETY: Chronic | ICD-10-CM

## 2018-07-17 RX ORDER — QUETIAPINE FUMARATE 200 MG/1
200 TABLET, FILM COATED ORAL DAILY
Qty: 90 TABLET | Refills: 1 | Status: SHIPPED | OUTPATIENT
Start: 2018-07-17 | End: 2019-01-18 | Stop reason: SDUPTHER

## 2018-07-17 NOTE — TELEPHONE ENCOUNTER
(chronic obstructive pulmonary disease) (HCC)     Anxiety     Anemia, normocytic normochromic     Chronic pain associated with significant psychosocial dysfunction     Carpal tunnel syndrome on both sides     Primary osteoarthritis involving multiple joints     Spondylosis 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     Vitamin B12 deficiency     Type 2 diabetes mellitus, controlled (HCC)     Diabetic peripheral neuropathy (HCC)     Primary insomnia     Lumbar and sacral arthritis     Chronic biliary pancreatitis (HCC)     Moderate episode of recurrent major depressive disorder (HCC)     Uncomplicated opioid dependence (HCC)     Chronic bilateral low back pain without sciatica     Hyperuricemia

## 2018-07-30 ENCOUNTER — HOSPITAL ENCOUNTER (OUTPATIENT)
Dept: PREADMISSION TESTING | Age: 73
Discharge: HOME | End: 2018-08-03
Payer: MEDICARE

## 2018-07-30 VITALS
HEIGHT: 60 IN | BODY MASS INDEX: 27.48 KG/M2 | OXYGEN SATURATION: 99 % | HEART RATE: 74 BPM | DIASTOLIC BLOOD PRESSURE: 89 MMHG | TEMPERATURE: 98.9 F | RESPIRATION RATE: 18 BRPM | WEIGHT: 140 LBS | SYSTOLIC BLOOD PRESSURE: 122 MMHG

## 2018-07-30 DIAGNOSIS — E79.0 HYPERURICEMIA: ICD-10-CM

## 2018-07-30 LAB
ABSOLUTE EOS #: 0.3 K/UL (ref 0–0.4)
ABSOLUTE IMMATURE GRANULOCYTE: ABNORMAL K/UL (ref 0–0.3)
ABSOLUTE LYMPH #: 1.8 K/UL (ref 1–4.8)
ABSOLUTE MONO #: 0.4 K/UL (ref 0.1–1.3)
ANION GAP SERPL CALCULATED.3IONS-SCNC: 13 MMOL/L (ref 9–17)
BASOPHILS # BLD: 2 % (ref 0–2)
BASOPHILS ABSOLUTE: 0.1 K/UL (ref 0–0.2)
BUN BLDV-MCNC: 15 MG/DL (ref 8–23)
BUN/CREAT BLD: NORMAL (ref 9–20)
CALCIUM SERPL-MCNC: 9.1 MG/DL (ref 8.6–10.4)
CHLORIDE BLD-SCNC: 104 MMOL/L (ref 98–107)
CO2: 22 MMOL/L (ref 20–31)
CREAT SERPL-MCNC: 0.86 MG/DL (ref 0.5–0.9)
DIFFERENTIAL TYPE: ABNORMAL
EKG ATRIAL RATE: 67 BPM
EKG P AXIS: 59 DEGREES
EKG P-R INTERVAL: 134 MS
EKG Q-T INTERVAL: 394 MS
EKG QRS DURATION: 74 MS
EKG QTC CALCULATION (BAZETT): 416 MS
EKG R AXIS: 17 DEGREES
EKG T AXIS: 34 DEGREES
EKG VENTRICULAR RATE: 67 BPM
EOSINOPHILS RELATIVE PERCENT: 7 % (ref 0–4)
GFR AFRICAN AMERICAN: >60 ML/MIN
GFR NON-AFRICAN AMERICAN: >60 ML/MIN
GFR SERPL CREATININE-BSD FRML MDRD: NORMAL ML/MIN/{1.73_M2}
GFR SERPL CREATININE-BSD FRML MDRD: NORMAL ML/MIN/{1.73_M2}
GLUCOSE BLD-MCNC: 90 MG/DL (ref 70–99)
HCT VFR BLD CALC: 40 % (ref 36–46)
HEMOGLOBIN: 13.3 G/DL (ref 12–16)
IMMATURE GRANULOCYTES: ABNORMAL %
LYMPHOCYTES # BLD: 39 % (ref 24–44)
MCH RBC QN AUTO: 30.7 PG (ref 26–34)
MCHC RBC AUTO-ENTMCNC: 33.2 G/DL (ref 31–37)
MCV RBC AUTO: 92.3 FL (ref 80–100)
MONOCYTES # BLD: 9 % (ref 1–7)
NRBC AUTOMATED: ABNORMAL PER 100 WBC
PDW BLD-RTO: 14.1 % (ref 11.5–14.9)
PLATELET # BLD: 284 K/UL (ref 150–450)
PLATELET ESTIMATE: ABNORMAL
PMV BLD AUTO: 7.7 FL (ref 6–12)
POTASSIUM SERPL-SCNC: 4.6 MMOL/L (ref 3.7–5.3)
RBC # BLD: 4.34 M/UL (ref 4–5.2)
RBC # BLD: ABNORMAL 10*6/UL
SEG NEUTROPHILS: 43 % (ref 36–66)
SEGMENTED NEUTROPHILS ABSOLUTE COUNT: 2 K/UL (ref 1.3–9.1)
SODIUM BLD-SCNC: 139 MMOL/L (ref 135–144)
WBC # BLD: 4.5 K/UL (ref 3.5–11)
WBC # BLD: ABNORMAL 10*3/UL

## 2018-07-30 PROCEDURE — 93005 ELECTROCARDIOGRAM TRACING: CPT

## 2018-07-30 PROCEDURE — 85025 COMPLETE CBC W/AUTO DIFF WBC: CPT

## 2018-07-30 PROCEDURE — 80048 BASIC METABOLIC PNL TOTAL CA: CPT

## 2018-07-30 PROCEDURE — 36415 COLL VENOUS BLD VENIPUNCTURE: CPT

## 2018-07-30 RX ORDER — MELOXICAM 10 MG/1
CAPSULE ORAL DAILY PRN
COMMUNITY
End: 2018-10-10 | Stop reason: SDUPTHER

## 2018-07-31 ENCOUNTER — ANESTHESIA EVENT (OUTPATIENT)
Dept: OPERATING ROOM | Age: 73
End: 2018-07-31
Payer: MEDICARE

## 2018-07-31 RX ORDER — SODIUM CHLORIDE 0.9 % (FLUSH) 0.9 %
10 SYRINGE (ML) INJECTION PRN
Status: CANCELLED | OUTPATIENT
Start: 2018-07-31

## 2018-07-31 RX ORDER — SODIUM CHLORIDE, SODIUM LACTATE, POTASSIUM CHLORIDE, CALCIUM CHLORIDE 600; 310; 30; 20 MG/100ML; MG/100ML; MG/100ML; MG/100ML
INJECTION, SOLUTION INTRAVENOUS CONTINUOUS
Status: CANCELLED | OUTPATIENT
Start: 2018-07-31

## 2018-07-31 RX ORDER — LIDOCAINE HYDROCHLORIDE 10 MG/ML
1 INJECTION, SOLUTION EPIDURAL; INFILTRATION; INTRACAUDAL; PERINEURAL
Status: CANCELLED | OUTPATIENT
Start: 2018-07-31 | End: 2018-07-31

## 2018-07-31 RX ORDER — SODIUM CHLORIDE 0.9 % (FLUSH) 0.9 %
10 SYRINGE (ML) INJECTION EVERY 12 HOURS SCHEDULED
Status: CANCELLED | OUTPATIENT
Start: 2018-07-31

## 2018-07-31 RX ORDER — ALLOPURINOL 100 MG/1
100 TABLET ORAL DAILY
Qty: 30 TABLET | Refills: 5 | Status: SHIPPED | OUTPATIENT
Start: 2018-07-31 | End: 2019-01-10 | Stop reason: SDUPTHER

## 2018-08-03 DIAGNOSIS — F51.01 PRIMARY INSOMNIA: ICD-10-CM

## 2018-08-03 DIAGNOSIS — E78.00 PURE HYPERCHOLESTEROLEMIA: Primary | Chronic | ICD-10-CM

## 2018-08-03 DIAGNOSIS — M15.9 PRIMARY OSTEOARTHRITIS INVOLVING MULTIPLE JOINTS: ICD-10-CM

## 2018-08-03 DIAGNOSIS — F41.9 ANXIETY: Chronic | ICD-10-CM

## 2018-08-03 RX ORDER — ATORVASTATIN CALCIUM 40 MG/1
40 TABLET, FILM COATED ORAL DAILY
Qty: 30 TABLET | Refills: 4 | OUTPATIENT
Start: 2018-08-03 | End: 2019-08-03

## 2018-08-03 RX ORDER — FLUOXETINE HYDROCHLORIDE 40 MG/1
40 CAPSULE ORAL DAILY
Qty: 30 CAPSULE | Refills: 11 | OUTPATIENT
Start: 2018-08-03 | End: 2019-08-03

## 2018-08-03 RX ORDER — ALLOPURINOL 100 MG/1
100 TABLET ORAL DAILY
Qty: 30 TABLET | Refills: 2 | OUTPATIENT
Start: 2018-08-03 | End: 2019-08-03

## 2018-08-03 NOTE — TELEPHONE ENCOUNTER
LRF   Fluoxetine 5/5/2018 - Patient wants to continue to take. Allopurinol 7/3/2018   Lipitor 5/22/2018  LOV 4/9/2018  RTO 3 Months    Health Maintenance   Topic Date Due    Shingles Vaccine (1 of 2 - 2 Dose Series) 05/24/1993    DTaP/Tdap/Td vaccine (1 - Tdap) 11/01/2018 (Originally 5/24/1962)    Flu vaccine (1) 09/01/2018    Diabetic foot exam  10/23/2018    Lipid screen  03/02/2019    Colon Cancer Screen FIT/FOBT  05/22/2019    A1C test (Diabetic or Prediabetic)  05/23/2019    Diabetic retinal exam  06/04/2019    DEXA (modify frequency per FRAX score)  Completed    Pneumococcal low/med risk  Completed             (applicable per patient's age: Cancer Screenings, Depression Screening, Fall Risk Screening, Immunizations)    Hemoglobin A1C (%)   Date Value   05/23/2018 5.3   07/31/2017 5.5   05/12/2016 5.0     Microalb/Crt.  Ratio (mcg/mg creat)   Date Value   05/12/2016 7     LDL Cholesterol (mg/dL)   Date Value   03/02/2018 84     LDL Calculated (mg/dL)   Date Value   11/13/2014 109     AST (U/L)   Date Value   03/02/2018 26     ALT (U/L)   Date Value   03/02/2018 14     BUN (mg/dL)   Date Value   07/30/2018 15      (goal A1C is < 7)   (goal LDL is <100) need 30-50% reduction from baseline     BP Readings from Last 3 Encounters:   07/30/18 122/89   07/09/18 102/60   06/14/18 134/80    (goal /80)      All Future Testing planned in [facility]:  Lab Frequency Next Occurrence   LIBIA DIGITAL SCREEN W CAD BILATERAL Once 05/09/2018       Next Visit Date:  Future Appointments  Date Time Provider Glenroy Falcon   8/23/2018 1:20 PM Sena Dockery MD [facility]   10/16/2018 10:20 AM MD Guillaume Storey [facility]            Patient Active Problem List:     Hyperlipidemia     Fibromyalgia     Osteopenia     Hx of bilateral breast implants     Vitamin D deficiency     Grief at loss of child     Lumbar stenosis     Arthritis of shoulder region, right     COPD (chronic obstructive pulmonary disease)

## 2018-08-03 NOTE — TELEPHONE ENCOUNTER
significant psychosocial dysfunction     Carpal tunnel syndrome on both sides     Primary osteoarthritis involving multiple joints     Spondylosis 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     Vitamin B12 deficiency     Type 2 diabetes mellitus, controlled (HCC)     Diabetic peripheral neuropathy (HCC)     Primary insomnia     Lumbar and sacral arthritis     Chronic biliary pancreatitis (HCC)     Moderate episode of recurrent major depressive disorder (HCC)     Uncomplicated opioid dependence (HCC)     Chronic bilateral low back pain without sciatica     Hyperuricemia

## 2018-08-06 RX ORDER — TRAZODONE HYDROCHLORIDE 50 MG/1
50 TABLET ORAL NIGHTLY
Qty: 30 TABLET | Refills: 1 | Status: SHIPPED | OUTPATIENT
Start: 2018-08-06 | End: 2018-10-27 | Stop reason: SDUPTHER

## 2018-08-09 ENCOUNTER — NURSE ONLY (OUTPATIENT)
Dept: FAMILY MEDICINE CLINIC | Age: 73
End: 2018-08-09
Payer: MEDICARE

## 2018-08-09 DIAGNOSIS — E53.8 VITAMIN B12 DEFICIENCY: Primary | ICD-10-CM

## 2018-08-09 PROCEDURE — 96372 THER/PROPH/DIAG INJ SC/IM: CPT | Performed by: NURSE PRACTITIONER

## 2018-08-09 RX ORDER — CYANOCOBALAMIN 1000 UG/ML
1000 INJECTION INTRAMUSCULAR; SUBCUTANEOUS ONCE
Status: COMPLETED | OUTPATIENT
Start: 2018-08-09 | End: 2018-08-09

## 2018-08-09 RX ADMIN — CYANOCOBALAMIN 1000 MCG: 1000 INJECTION INTRAMUSCULAR; SUBCUTANEOUS at 14:33

## 2018-08-09 NOTE — PROGRESS NOTES
Patient presents to office for b12 injection. Patient tolerated injection well. Denied any concerns or questions.

## 2018-08-13 ENCOUNTER — HOSPITAL ENCOUNTER (OUTPATIENT)
Age: 73
Setting detail: OUTPATIENT SURGERY
Discharge: HOME | End: 2018-08-13
Attending: ORTHOPAEDIC SURGERY | Admitting: ORTHOPAEDIC SURGERY
Payer: MEDICARE

## 2018-08-13 ENCOUNTER — ANESTHESIA (OUTPATIENT)
Dept: OPERATING ROOM | Age: 73
End: 2018-08-13
Payer: MEDICARE

## 2018-08-13 VITALS
HEART RATE: 78 BPM | HEIGHT: 60 IN | OXYGEN SATURATION: 98 % | SYSTOLIC BLOOD PRESSURE: 111 MMHG | DIASTOLIC BLOOD PRESSURE: 72 MMHG | TEMPERATURE: 97.5 F | BODY MASS INDEX: 27.48 KG/M2 | WEIGHT: 140 LBS | RESPIRATION RATE: 18 BRPM

## 2018-08-13 VITALS — SYSTOLIC BLOOD PRESSURE: 113 MMHG | TEMPERATURE: 94.8 F | OXYGEN SATURATION: 97 % | DIASTOLIC BLOOD PRESSURE: 76 MMHG

## 2018-08-13 DIAGNOSIS — S83.242A ACUTE MEDIAL MENISCUS TEAR OF LEFT KNEE, INITIAL ENCOUNTER: Primary | ICD-10-CM

## 2018-08-13 PROCEDURE — 3700000000 HC ANESTHESIA ATTENDED CARE: Performed by: ORTHOPAEDIC SURGERY

## 2018-08-13 PROCEDURE — 29880 ARTHRS KNE SRG MNISECTMY M&L: CPT | Performed by: ORTHOPAEDIC SURGERY

## 2018-08-13 PROCEDURE — 6370000000 HC RX 637 (ALT 250 FOR IP): Performed by: ANESTHESIOLOGY

## 2018-08-13 PROCEDURE — 7100000031 HC ASPR PHASE II RECOVERY - ADDTL 15 MIN: Performed by: ORTHOPAEDIC SURGERY

## 2018-08-13 PROCEDURE — 6360000002 HC RX W HCPCS: Performed by: ANESTHESIOLOGY

## 2018-08-13 PROCEDURE — 6360000002 HC RX W HCPCS

## 2018-08-13 PROCEDURE — 3600000003 HC SURGERY LEVEL 3 BASE: Performed by: ORTHOPAEDIC SURGERY

## 2018-08-13 PROCEDURE — 7100000001 HC PACU RECOVERY - ADDTL 15 MIN: Performed by: ORTHOPAEDIC SURGERY

## 2018-08-13 PROCEDURE — 3700000001 HC ADD 15 MINUTES (ANESTHESIA): Performed by: ORTHOPAEDIC SURGERY

## 2018-08-13 PROCEDURE — 2580000003 HC RX 258: Performed by: ANESTHESIOLOGY

## 2018-08-13 PROCEDURE — 6360000002 HC RX W HCPCS: Performed by: ORTHOPAEDIC SURGERY

## 2018-08-13 PROCEDURE — 2709999900 HC NON-CHARGEABLE SUPPLY: Performed by: ORTHOPAEDIC SURGERY

## 2018-08-13 PROCEDURE — 7100000030 HC ASPR PHASE II RECOVERY - FIRST 15 MIN: Performed by: ORTHOPAEDIC SURGERY

## 2018-08-13 PROCEDURE — 7100000000 HC PACU RECOVERY - FIRST 15 MIN: Performed by: ORTHOPAEDIC SURGERY

## 2018-08-13 PROCEDURE — 3600000013 HC SURGERY LEVEL 3 ADDTL 15MIN: Performed by: ORTHOPAEDIC SURGERY

## 2018-08-13 RX ORDER — SODIUM CHLORIDE 0.9 % (FLUSH) 0.9 %
10 SYRINGE (ML) INJECTION PRN
Status: DISCONTINUED | OUTPATIENT
Start: 2018-08-13 | End: 2018-08-13 | Stop reason: HOSPADM

## 2018-08-13 RX ORDER — ONDANSETRON 2 MG/ML
4 INJECTION INTRAMUSCULAR; INTRAVENOUS
Status: COMPLETED | OUTPATIENT
Start: 2018-08-13 | End: 2018-08-13

## 2018-08-13 RX ORDER — FENTANYL CITRATE 50 UG/ML
25 INJECTION, SOLUTION INTRAMUSCULAR; INTRAVENOUS EVERY 5 MIN PRN
Status: DISCONTINUED | OUTPATIENT
Start: 2018-08-13 | End: 2018-08-13 | Stop reason: HOSPADM

## 2018-08-13 RX ORDER — KETOROLAC TROMETHAMINE 30 MG/ML
INJECTION, SOLUTION INTRAMUSCULAR; INTRAVENOUS PRN
Status: DISCONTINUED | OUTPATIENT
Start: 2018-08-13 | End: 2018-08-13 | Stop reason: SDUPTHER

## 2018-08-13 RX ORDER — LABETALOL HYDROCHLORIDE 5 MG/ML
5 INJECTION, SOLUTION INTRAVENOUS EVERY 10 MIN PRN
Status: DISCONTINUED | OUTPATIENT
Start: 2018-08-13 | End: 2018-08-13 | Stop reason: HOSPADM

## 2018-08-13 RX ORDER — METOCLOPRAMIDE HYDROCHLORIDE 5 MG/ML
10 INJECTION INTRAMUSCULAR; INTRAVENOUS
Status: DISCONTINUED | OUTPATIENT
Start: 2018-08-13 | End: 2018-08-13 | Stop reason: HOSPADM

## 2018-08-13 RX ORDER — MEPERIDINE HYDROCHLORIDE 50 MG/ML
12.5 INJECTION INTRAMUSCULAR; INTRAVENOUS; SUBCUTANEOUS EVERY 5 MIN PRN
Status: DISCONTINUED | OUTPATIENT
Start: 2018-08-13 | End: 2018-08-13 | Stop reason: HOSPADM

## 2018-08-13 RX ORDER — SODIUM CHLORIDE 0.9 % (FLUSH) 0.9 %
10 SYRINGE (ML) INJECTION EVERY 12 HOURS SCHEDULED
Status: DISCONTINUED | OUTPATIENT
Start: 2018-08-13 | End: 2018-08-13 | Stop reason: HOSPADM

## 2018-08-13 RX ORDER — FENTANYL CITRATE 50 UG/ML
INJECTION, SOLUTION INTRAMUSCULAR; INTRAVENOUS PRN
Status: DISCONTINUED | OUTPATIENT
Start: 2018-08-13 | End: 2018-08-13 | Stop reason: SDUPTHER

## 2018-08-13 RX ORDER — MIDAZOLAM HYDROCHLORIDE 1 MG/ML
INJECTION INTRAMUSCULAR; INTRAVENOUS PRN
Status: DISCONTINUED | OUTPATIENT
Start: 2018-08-13 | End: 2018-08-13 | Stop reason: SDUPTHER

## 2018-08-13 RX ORDER — PROPOFOL 10 MG/ML
INJECTION, EMULSION INTRAVENOUS PRN
Status: DISCONTINUED | OUTPATIENT
Start: 2018-08-13 | End: 2018-08-13 | Stop reason: SDUPTHER

## 2018-08-13 RX ORDER — LIDOCAINE HYDROCHLORIDE 10 MG/ML
1 INJECTION, SOLUTION EPIDURAL; INFILTRATION; INTRACAUDAL; PERINEURAL
Status: DISCONTINUED | OUTPATIENT
Start: 2018-08-13 | End: 2018-08-13 | Stop reason: HOSPADM

## 2018-08-13 RX ORDER — ROPIVACAINE HYDROCHLORIDE 5 MG/ML
INJECTION, SOLUTION EPIDURAL; INFILTRATION; PERINEURAL PRN
Status: DISCONTINUED | OUTPATIENT
Start: 2018-08-13 | End: 2018-08-13 | Stop reason: HOSPADM

## 2018-08-13 RX ORDER — SODIUM CHLORIDE, SODIUM LACTATE, POTASSIUM CHLORIDE, CALCIUM CHLORIDE 600; 310; 30; 20 MG/100ML; MG/100ML; MG/100ML; MG/100ML
INJECTION, SOLUTION INTRAVENOUS CONTINUOUS
Status: DISCONTINUED | OUTPATIENT
Start: 2018-08-13 | End: 2018-08-13 | Stop reason: HOSPADM

## 2018-08-13 RX ORDER — FENTANYL CITRATE 50 UG/ML
50 INJECTION, SOLUTION INTRAMUSCULAR; INTRAVENOUS EVERY 5 MIN PRN
Status: DISCONTINUED | OUTPATIENT
Start: 2018-08-13 | End: 2018-08-13 | Stop reason: HOSPADM

## 2018-08-13 RX ORDER — HYDROCODONE BITARTRATE AND ACETAMINOPHEN 5; 325 MG/1; MG/1
1 TABLET ORAL PRN
Status: COMPLETED | OUTPATIENT
Start: 2018-08-13 | End: 2018-08-13

## 2018-08-13 RX ORDER — HYDROCODONE BITARTRATE AND ACETAMINOPHEN 5; 325 MG/1; MG/1
2 TABLET ORAL PRN
Status: COMPLETED | OUTPATIENT
Start: 2018-08-13 | End: 2018-08-13

## 2018-08-13 RX ORDER — DEXAMETHASONE SODIUM PHOSPHATE 4 MG/ML
INJECTION, SOLUTION INTRA-ARTICULAR; INTRALESIONAL; INTRAMUSCULAR; INTRAVENOUS; SOFT TISSUE PRN
Status: DISCONTINUED | OUTPATIENT
Start: 2018-08-13 | End: 2018-08-13 | Stop reason: SDUPTHER

## 2018-08-13 RX ORDER — MORPHINE SULFATE 2 MG/ML
2 INJECTION, SOLUTION INTRAMUSCULAR; INTRAVENOUS EVERY 5 MIN PRN
Status: DISCONTINUED | OUTPATIENT
Start: 2018-08-13 | End: 2018-08-13 | Stop reason: HOSPADM

## 2018-08-13 RX ORDER — HYDRALAZINE HYDROCHLORIDE 20 MG/ML
5 INJECTION INTRAMUSCULAR; INTRAVENOUS EVERY 10 MIN PRN
Status: DISCONTINUED | OUTPATIENT
Start: 2018-08-13 | End: 2018-08-13 | Stop reason: HOSPADM

## 2018-08-13 RX ORDER — ONDANSETRON 2 MG/ML
INJECTION INTRAMUSCULAR; INTRAVENOUS PRN
Status: DISCONTINUED | OUTPATIENT
Start: 2018-08-13 | End: 2018-08-13 | Stop reason: SDUPTHER

## 2018-08-13 RX ORDER — HYDROCODONE BITARTRATE AND ACETAMINOPHEN 5; 325 MG/1; MG/1
1-2 TABLET ORAL EVERY 4 HOURS PRN
Qty: 30 TABLET | Refills: 0 | Status: SHIPPED | OUTPATIENT
Start: 2018-08-13 | End: 2018-08-20

## 2018-08-13 RX ORDER — DIPHENHYDRAMINE HYDROCHLORIDE 50 MG/ML
12.5 INJECTION INTRAMUSCULAR; INTRAVENOUS
Status: DISCONTINUED | OUTPATIENT
Start: 2018-08-13 | End: 2018-08-13 | Stop reason: HOSPADM

## 2018-08-13 RX ADMIN — FENTANYL CITRATE 25 MCG: 50 INJECTION, SOLUTION INTRAMUSCULAR; INTRAVENOUS at 11:39

## 2018-08-13 RX ADMIN — ONDANSETRON 4 MG: 2 INJECTION INTRAMUSCULAR; INTRAVENOUS at 11:55

## 2018-08-13 RX ADMIN — PROPOFOL 40 MG: 10 INJECTION, EMULSION INTRAVENOUS at 11:48

## 2018-08-13 RX ADMIN — FENTANYL CITRATE 25 MCG: 50 INJECTION, SOLUTION INTRAMUSCULAR; INTRAVENOUS at 11:54

## 2018-08-13 RX ADMIN — FENTANYL CITRATE 25 MCG: 50 INJECTION, SOLUTION INTRAMUSCULAR; INTRAVENOUS at 11:53

## 2018-08-13 RX ADMIN — FENTANYL CITRATE 25 MCG: 50 INJECTION, SOLUTION INTRAMUSCULAR; INTRAVENOUS at 11:57

## 2018-08-13 RX ADMIN — FENTANYL CITRATE 25 MCG: 50 INJECTION, SOLUTION INTRAMUSCULAR; INTRAVENOUS at 11:49

## 2018-08-13 RX ADMIN — SODIUM CHLORIDE, POTASSIUM CHLORIDE, SODIUM LACTATE AND CALCIUM CHLORIDE: 600; 310; 30; 20 INJECTION, SOLUTION INTRAVENOUS at 10:19

## 2018-08-13 RX ADMIN — MIDAZOLAM 2 MG: 1 INJECTION INTRAMUSCULAR; INTRAVENOUS at 11:26

## 2018-08-13 RX ADMIN — HYDROCODONE BITARTRATE AND ACETAMINOPHEN 2 TABLET: 5; 325 TABLET ORAL at 13:21

## 2018-08-13 RX ADMIN — KETOROLAC TROMETHAMINE 30 MG: 30 INJECTION, SOLUTION INTRAMUSCULAR at 11:56

## 2018-08-13 RX ADMIN — PROPOFOL 150 MG: 10 INJECTION, EMULSION INTRAVENOUS at 11:31

## 2018-08-13 RX ADMIN — FENTANYL CITRATE 50 MCG: 50 INJECTION, SOLUTION INTRAMUSCULAR; INTRAVENOUS at 11:31

## 2018-08-13 RX ADMIN — FENTANYL CITRATE 25 MCG: 50 INJECTION, SOLUTION INTRAMUSCULAR; INTRAVENOUS at 12:01

## 2018-08-13 RX ADMIN — HYDROMORPHONE HYDROCHLORIDE 0.5 MG: 1 INJECTION, SOLUTION INTRAMUSCULAR; INTRAVENOUS; SUBCUTANEOUS at 12:40

## 2018-08-13 RX ADMIN — DEXAMETHASONE SODIUM PHOSPHATE 4 MG: 4 INJECTION, SOLUTION INTRAMUSCULAR; INTRAVENOUS at 11:38

## 2018-08-13 RX ADMIN — SODIUM CHLORIDE, POTASSIUM CHLORIDE, SODIUM LACTATE AND CALCIUM CHLORIDE 500 ML: 600; 310; 30; 20 INJECTION, SOLUTION INTRAVENOUS at 12:49

## 2018-08-13 RX ADMIN — ONDANSETRON 4 MG: 2 INJECTION, SOLUTION INTRAMUSCULAR; INTRAVENOUS at 12:35

## 2018-08-13 ASSESSMENT — PAIN DESCRIPTION - FREQUENCY: FREQUENCY: INTERMITTENT

## 2018-08-13 ASSESSMENT — PULMONARY FUNCTION TESTS
PIF_VALUE: 15
PIF_VALUE: 2
PIF_VALUE: 19
PIF_VALUE: 15
PIF_VALUE: 16
PIF_VALUE: 3
PIF_VALUE: 0
PIF_VALUE: 16
PIF_VALUE: 1
PIF_VALUE: 2
PIF_VALUE: 16
PIF_VALUE: 16
PIF_VALUE: 15
PIF_VALUE: 16
PIF_VALUE: 16
PIF_VALUE: 15
PIF_VALUE: 1
PIF_VALUE: 2
PIF_VALUE: 16
PIF_VALUE: 15
PIF_VALUE: 16
PIF_VALUE: 1
PIF_VALUE: 26
PIF_VALUE: 2
PIF_VALUE: 16
PIF_VALUE: 16
PIF_VALUE: 25
PIF_VALUE: 24
PIF_VALUE: 16
PIF_VALUE: 16
PIF_VALUE: 24
PIF_VALUE: 23
PIF_VALUE: 15
PIF_VALUE: 15

## 2018-08-13 ASSESSMENT — PAIN SCALES - GENERAL
PAINLEVEL_OUTOF10: 7
PAINLEVEL_OUTOF10: 6
PAINLEVEL_OUTOF10: 7
PAINLEVEL_OUTOF10: 0
PAINLEVEL_OUTOF10: 5

## 2018-08-13 ASSESSMENT — PAIN - FUNCTIONAL ASSESSMENT: PAIN_FUNCTIONAL_ASSESSMENT: 0-10

## 2018-08-13 ASSESSMENT — PAIN DESCRIPTION - PAIN TYPE
TYPE: SURGICAL PAIN
TYPE: SURGICAL PAIN;CHRONIC PAIN

## 2018-08-13 ASSESSMENT — PAIN DESCRIPTION - ONSET: ONSET: AWAKENED FROM SLEEP

## 2018-08-13 ASSESSMENT — PAIN DESCRIPTION - LOCATION
LOCATION: KNEE

## 2018-08-13 ASSESSMENT — PAIN DESCRIPTION - ORIENTATION
ORIENTATION: LEFT

## 2018-08-13 ASSESSMENT — PAIN DESCRIPTION - DESCRIPTORS: DESCRIPTORS: BURNING;PRESSURE

## 2018-08-13 NOTE — H&P
cracking in the left Knee. Pt describes the pain as 9/10 in intensity at times. Symptoms started 2 months ago. The knee does buckle and give way under the patient. No locking up, No recent falls or trauma. No redness, swelling or rashes.   No Hx of MRSA infections in the past.     PAST MEDICAL HISTORY      Past Medical History        Past Medical History:   Diagnosis Date    Anemia      COPD [facility] obstructive pulmonary disease) [facility] [address]      DDD (degenerative disc disease)      Depression      Fibromyalgia      Glaucoma      Hyperlipidemia      Osteoarthritis of more than one site 4/9/2014    Right middle lobe pneumonia ([facility] [address] 3/24/2016               SURGICAL HISTORY        Past Surgical History         Past Surgical History:   Procedure Laterality Date    BREAST BIOPSY Left 5/2016    CARPAL TUNNEL RELEASE Bilateral       x 2 each    COLONOSCOPY        ENDOSCOPY, COLON, DIAGNOSTIC         EGD    EYE SURGERY Right       stent placed    FOOT SURGERY Right      HYSTERECTOMY        PANCREAS BIOPSY   10/03/2016    SMALL INTESTINE SURGERY         due to blockage               SOCIAL HISTORY        Social History   Social History            Social History    Marital status:        Spouse name: N/A    Number of children: N/A    Years of education: N/A           Occupational History    NOT EMPLOYEED             Social History Main Topics    Smoking status: Never Smoker    Smokeless tobacco: Never Used    Alcohol use No    Drug use: No    Sexual activity: Not Asked           Other Topics Concern    None          Social History Narrative    None               REVIEW OF SYSTEMS             Allergies   Allergen Reactions    Pcn [Penicillins] Anaphylaxis       Patient states PCN allergy was 20 years ago, unsure if reaction still present    Adhesive Tape      Nubain [Nalbuphine Hcl]         Leg  Cramping  When mixed with vistaril    Seasonal      Vistaril [Hydroxyzine]

## 2018-08-13 NOTE — ANESTHESIA PRE PROCEDURE
Department of Anesthesiology  Preprocedure Note       Name:  Lynne Rivas   Age:  74 y.o.  :  1945                                          MRN:  [id number]         Date:  2018      Surgeon: Jesica Hilton):  Ministerio Castillo MD    Procedure: Procedure(s):  KNEE ARTHROSCOPY    Medications prior to admission:   Prior to Admission medications    Medication Sig Start Date End Date Taking? Authorizing Provider   HYDROcodone-acetaminophen (NORCO) 5-325 MG per tablet Take 1-2 tablets by mouth every 4 hours as needed for Pain for up to 7 days. Messi Yaquelin Date: 18 Yes Ministerio Castillo MD   traZODone (DESYREL) 50 MG tablet Take 1 tablet by mouth nightly 18 Yes Kimberlee Pulliam MD   allopurinol (ZYLOPRIM) 100 MG tablet Take 1 tablet by mouth daily 18 Yes Kimberlee Pulliam MD   sertraline (ZOLOFT) 50 MG tablet Take 1 tablet by mouth daily 18 Yes Kimberlee Pulliam MD   Meloxicam 10 MG CAPS Take by mouth daily as needed   Yes Historical Provider, MD   QUEtiapine (SEROQUEL) 200 MG tablet Take 1 tablet by mouth daily 18 Yes Kimberlee Pulliam MD   montelukast (SINGULAIR) 10 MG tablet Take 1 tablet by mouth nightly 18 Yes Kimberlee Pulliam MD   mometasone-formoterol [facility]) 100-5 MCG/ACT inhaler Inhale 2 puffs into the lungs 2 times daily 18 Yes Kimberlee Pulliam MD   atorvastatin (LIPITOR) 40 MG tablet Take 1 tablet by mouth daily 18 Yes Kimberlee Pulliam MD   dorzolamide (TRUSOPT) 2 % ophthalmic solution Place 1 drop into the right eye 2 times daily 10/2/17 10/23/18 Yes Historical Provider, MD   oxyCODONE-acetaminophen (PERCOCET)  MG per tablet Take 1 tablet by mouth every 6 hours as needed for Pain .  Earliest Fill Date: 17  Yes Parker Russ MD   Calcium Carbonate-Vitamin D (OYSTER SHELL CALCIUM/D) 500-200 MG-UNIT TABS Take 1 tablet by mouth 3 times daily  Patient taking differently: Take 1 tablet by mouth daily

## 2018-08-21 ENCOUNTER — OFFICE VISIT (OUTPATIENT)
Dept: FAMILY MEDICINE CLINIC | Age: 73
End: 2018-08-21
Payer: MEDICARE

## 2018-08-21 VITALS
WEIGHT: 149.4 LBS | TEMPERATURE: 98.8 F | RESPIRATION RATE: 16 BRPM | HEIGHT: 60 IN | BODY MASS INDEX: 29.33 KG/M2 | SYSTOLIC BLOOD PRESSURE: 120 MMHG | HEART RATE: 84 BPM | DIASTOLIC BLOOD PRESSURE: 72 MMHG

## 2018-08-21 DIAGNOSIS — R09.89 RHONCHI: Primary | ICD-10-CM

## 2018-08-21 DIAGNOSIS — R05.9 COUGH: ICD-10-CM

## 2018-08-21 DIAGNOSIS — Z87.01 HISTORY OF PNEUMONIA: ICD-10-CM

## 2018-08-21 PROCEDURE — 99214 OFFICE O/P EST MOD 30 MIN: CPT | Performed by: NURSE PRACTITIONER

## 2018-08-21 RX ORDER — GUAIFENESIN 600 MG/1
600 TABLET, EXTENDED RELEASE ORAL 2 TIMES DAILY
Qty: 28 TABLET | Refills: 0 | Status: SHIPPED | OUTPATIENT
Start: 2018-08-21 | End: 2018-09-04

## 2018-08-21 RX ORDER — LEVOFLOXACIN 750 MG/1
750 TABLET ORAL DAILY
Qty: 7 TABLET | Refills: 0 | Status: SHIPPED | OUTPATIENT
Start: 2018-08-21 | End: 2018-08-28

## 2018-08-21 RX ORDER — BENZONATATE 100 MG/1
100 CAPSULE ORAL 3 TIMES DAILY PRN
Qty: 30 CAPSULE | Refills: 0 | Status: SHIPPED | OUTPATIENT
Start: 2018-08-21 | End: 2018-11-14 | Stop reason: SDUPTHER

## 2018-08-21 ASSESSMENT — ENCOUNTER SYMPTOMS
SORE THROAT: 0
RHINORRHEA: 1
CONSTIPATION: 0
NAUSEA: 0
SHORTNESS OF BREATH: 1
EYE REDNESS: 0
DIARRHEA: 0
EYE DISCHARGE: 0
ABDOMINAL PAIN: 0
COUGH: 1
EYE ITCHING: 0

## 2018-08-21 NOTE — PROGRESS NOTES
Subjective:      Visit Information    Have you changed or started any medications since your last visit including any over-the-counter medicines, vitamins, or herbal medicines? yes - Med list updated   Are you having any side effects from any of your medications? -  no  Have you stopped taking any of your medications? Is so, why? -  yes - Med list updated    Have you seen any other physician or provider since your last visit? Yes - Kenny Carpenter doctor Dr. Evelia Fu   Have you had any other diagnostic tests since your last visit? No  Have you been seen in the emergency room and/or had an admission to a hospital since we last saw you? No  Have you had your routine dental cleaning in the past 6 months? no    Have you activated your [employer] account? If not, what are your barriers?  Yes     Patient Care Team:  Beck Rivear MD as PCP - General (Internal Medicine/Pediatrics)  Beck Rivera MD as PCP - S Attributed Provider    Medical History Review  Past Medical, Family, and Social History reviewed and does not contribute to the patient presenting condition    Health Maintenance   Topic Date Due    Shingles Vaccine (1 of 2 - 2 Dose Series) 05/24/1993    DTaP/Tdap/Td vaccine (1 - Tdap) 11/01/2018 (Originally 5/24/1962)    Flu vaccine (1) 09/01/2018    Diabetic foot exam  10/23/2018    Lipid screen  03/02/2019    Colon Cancer Screen FIT/FOBT  05/22/2019    A1C test (Diabetic or Prediabetic)  05/23/2019    Diabetic retinal exam  06/04/2019    DEXA (modify frequency per FRAX score)  Completed    Pneumococcal low/med risk  Completed       Current Outpatient Prescriptions   Medication Sig Dispense Refill    traZODone (DESYREL) 50 MG tablet Take 1 tablet by mouth nightly 30 tablet 1    allopurinol (ZYLOPRIM) 100 MG tablet Take 1 tablet by mouth daily 30 tablet 5    sertraline (ZOLOFT) 50 MG tablet Take 1 tablet by mouth daily 30 tablet 2    Meloxicam 10 MG CAPS Take by mouth daily as needed     

## 2018-08-23 ENCOUNTER — OFFICE VISIT (OUTPATIENT)
Dept: ORTHOPEDIC SURGERY | Age: 73
End: 2018-08-23

## 2018-08-23 DIAGNOSIS — Z98.890 HISTORY OF ARTHROSCOPY OF LEFT KNEE: Primary | ICD-10-CM

## 2018-08-23 DIAGNOSIS — Z48.02 VISIT FOR SUTURE REMOVAL: ICD-10-CM

## 2018-08-23 PROCEDURE — 99024 POSTOP FOLLOW-UP VISIT: CPT | Performed by: ORTHOPAEDIC SURGERY

## 2018-08-23 NOTE — PROGRESS NOTES
Gracie Schroeder M.D.            [address], [address] [facility]             Dept Phone: [phone]             Dept Fax:  [phone]  And returns today status post arthroscopic partial medial partial lateral meniscectomies of the left knee on 8/13/18. She states that her knee feels a little swollen achy but otherwise doing okay. She had feels, Dorita Moreno he has she does have a little bit of pneumonia    Examination notes her portal sites are pristine. She has a little to moderate effusion of the knee tenderness negative Homans motion 095 degrees. Impression  Status post arthroscopic partial medial partial lateral meniscectomy left knee    Plan  Patient was given a prescription for Medrol Dosepak as well as some exercises. Also she can wear a knee sleeve to help give her some stability. Also given exercises. Should do well overall. We'll see her back here when necessary          Review of Systems   Constitutional: Negative. HENT: Negative. Respiratory: Negative. Cardiovascular: Negative. Musculoskeletal: Negative. Neurological: Negative.          Past Medical History:   Diagnosis Date    Anemia     COPD (chronic obstructive pulmonary disease) ([address]     DDD (degenerative disc disease)     Depression     Fibromyalgia     Glaucoma     Hyperlipidemia     Osteoarthritis of more than one site 4/9/2014    Right middle lobe pneumonia ([address] 3/24/2016     Past Surgical History:   Procedure Laterality Date    BREAST BIOPSY Left 5/2016    CARPAL TUNNEL RELEASE Bilateral     x 2 each    COLONOSCOPY      ENDOSCOPY, COLON, DIAGNOSTIC      EGD    EYE SURGERY Right     stent placed    FOOT SURGERY Right     HYSTERECTOMY      PANCREAS BIOPSY  10/03/2016    WA ARTHRS KNE SURG W/MENISCECTOMY MED/LAT W/SHVG Left 8/13/2018    KNEE ARTHROSCOPY FOR PARTIAL MEDIAL AND PARTIAL LATERAL MENISCECTOMY performed by Amarilys Snyder Lanie Odonnell MD at [address]      due to blockage     Family History   Problem Relation Age of Onset    High Blood Pressure Mother     Stroke Mother     Heart Disease Father     Stroke Maternal Grandmother          No orders of the defined types were placed in this encounter. 1. History of arthroscopy of left knee    2. Visit for suture removal        Richar Diggs MD    Please note that this chart was generated using voice recognition Dragon dictation software. Although every effort was made to ensure the accuracy of this automated transcription, some errors in transcription may have occurred.

## 2018-08-24 RX ORDER — METHYLPREDNISOLONE 4 MG/1
TABLET ORAL
Qty: 1 KIT | Refills: 0 | Status: SHIPPED | OUTPATIENT
Start: 2018-08-24 | End: 2018-11-14 | Stop reason: ALTCHOICE

## 2018-09-04 RX ORDER — FLUOXETINE HYDROCHLORIDE 40 MG/1
40 CAPSULE ORAL DAILY
Qty: 7 CAPSULE | Refills: 1 | OUTPATIENT
Start: 2018-09-04 | End: 2019-09-04

## 2018-09-04 NOTE — TELEPHONE ENCOUNTER
LOV:7-9-18  YPD:64-80-68  LRF:5-19-17  Health Maintenance   Topic Date Due    Shingles Vaccine (1 of 2 - 2 Dose Series) 05/24/1993    Flu vaccine (1) 09/01/2018    DTaP/Tdap/Td vaccine (1 - Tdap) 11/01/2018 (Originally 5/24/1962)    Diabetic foot exam  10/23/2018    Lipid screen  03/02/2019    Colon Cancer Screen FIT/FOBT  05/22/2019    A1C test (Diabetic or Prediabetic)  05/23/2019    Diabetic retinal exam  06/04/2019    DEXA (modify frequency per FRAX score)  Completed    Pneumococcal low/med risk  Completed             (applicable per patient's age: Cancer Screenings, Depression Screening, Fall Risk Screening, Immunizations)    Hemoglobin A1C (%)   Date Value   05/23/2018 5.3   07/31/2017 5.5   05/12/2016 5.0     Microalb/Crt.  Ratio (mcg/mg creat)   Date Value   05/12/2016 7     LDL Cholesterol (mg/dL)   Date Value   03/02/2018 84     LDL Calculated (mg/dL)   Date Value   11/13/2014 109     AST (U/L)   Date Value   03/02/2018 26     ALT (U/L)   Date Value   03/02/2018 14     BUN (mg/dL)   Date Value   07/30/2018 15      (goal A1C is < 7)   (goal LDL is <100) need 30-50% reduction from baseline     BP Readings from Last 3 Encounters:   08/21/18 120/72   08/13/18 113/76   08/13/18 111/72    (goal /80)      All Future Testing planned in [facility]:  Lab Frequency Next Occurrence   LIBIA DIGITAL SCREEN W CAD BILATERAL Once 05/09/2018       Next Visit Date:  Future Appointments  Date Time Provider Glenroy Falcon   9/10/2018 2:00 PM SCHEDULE, ARACELI SHAHTOLPARNULFO   10/16/2018 10:20 AM MD Any Justice [facility]            Patient Active Problem List:     Hyperlipidemia     Fibromyalgia     Osteopenia     Hx of bilateral breast implants     Vitamin D deficiency     Grief at loss of child     Lumbar stenosis     Arthritis of shoulder region, right     COPD (chronic obstructive pulmonary disease) (HCC)     Anxiety     Anemia, normocytic normochromic     Chronic pain associated with significant psychosocial dysfunction     Carpal tunnel syndrome on both sides     Primary osteoarthritis involving multiple joints     Spondylosis 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     Vitamin B12 deficiency     Type 2 diabetes mellitus, controlled (HCC)     Diabetic peripheral neuropathy (HCC)     Primary insomnia     Lumbar and sacral arthritis     Chronic biliary pancreatitis (HCC)     Moderate episode of recurrent major depressive disorder (HCC)     Uncomplicated opioid dependence (HCC)     Chronic bilateral low back pain without sciatica     Hyperuricemia

## 2018-09-11 ENCOUNTER — NURSE ONLY (OUTPATIENT)
Dept: FAMILY MEDICINE CLINIC | Age: 73
End: 2018-09-11
Payer: MEDICARE

## 2018-09-11 VITALS — RESPIRATION RATE: 18 BRPM | BODY MASS INDEX: 29.29 KG/M2 | TEMPERATURE: 98.6 F | WEIGHT: 150 LBS

## 2018-09-11 DIAGNOSIS — E53.8 B12 DEFICIENCY: Primary | ICD-10-CM

## 2018-09-11 PROCEDURE — 99211 OFF/OP EST MAY X REQ PHY/QHP: CPT | Performed by: NURSE PRACTITIONER

## 2018-09-11 PROCEDURE — 96372 THER/PROPH/DIAG INJ SC/IM: CPT | Performed by: NURSE PRACTITIONER

## 2018-09-11 RX ORDER — CYANOCOBALAMIN 1000 UG/ML
1000 INJECTION INTRAMUSCULAR; SUBCUTANEOUS ONCE
Status: COMPLETED | OUTPATIENT
Start: 2018-09-11 | End: 2018-09-11

## 2018-09-11 RX ADMIN — CYANOCOBALAMIN 1000 MCG: 1000 INJECTION INTRAMUSCULAR; SUBCUTANEOUS at 14:05

## 2018-10-09 ENCOUNTER — NURSE ONLY (OUTPATIENT)
Dept: FAMILY MEDICINE CLINIC | Age: 73
End: 2018-10-09
Payer: MEDICARE

## 2018-10-09 ENCOUNTER — TELEPHONE (OUTPATIENT)
Dept: FAMILY MEDICINE CLINIC | Age: 73
End: 2018-10-09

## 2018-10-09 VITALS — WEIGHT: 148 LBS | TEMPERATURE: 98.1 F | RESPIRATION RATE: 18 BRPM | BODY MASS INDEX: 28.9 KG/M2

## 2018-10-09 DIAGNOSIS — M48.061 SPINAL STENOSIS OF LUMBAR REGION, UNSPECIFIED WHETHER NEUROGENIC CLAUDICATION PRESENT: ICD-10-CM

## 2018-10-09 DIAGNOSIS — F33.1 MODERATE EPISODE OF RECURRENT MAJOR DEPRESSIVE DISORDER (HCC): Primary | ICD-10-CM

## 2018-10-09 DIAGNOSIS — E53.8 VITAMIN B12 DEFICIENCY: Primary | ICD-10-CM

## 2018-10-09 PROCEDURE — 96372 THER/PROPH/DIAG INJ SC/IM: CPT | Performed by: NURSE PRACTITIONER

## 2018-10-09 RX ORDER — CYANOCOBALAMIN 1000 UG/ML
1000 INJECTION INTRAMUSCULAR; SUBCUTANEOUS ONCE
Status: COMPLETED | OUTPATIENT
Start: 2018-10-09 | End: 2018-10-09

## 2018-10-09 RX ADMIN — CYANOCOBALAMIN 1000 MCG: 1000 INJECTION INTRAMUSCULAR; SUBCUTANEOUS at 13:24

## 2018-10-09 NOTE — TELEPHONE ENCOUNTER
Luzmaria Tellez.  I will order PT when she has a location determined. Please verify her meloxicam dose. .. This is an nsaid.

## 2018-10-10 RX ORDER — MELOXICAM 15 MG/1
15 TABLET ORAL DAILY
Qty: 30 TABLET | Refills: 3 | COMMUNITY
Start: 2018-10-10 | End: 2020-07-29

## 2018-10-15 DIAGNOSIS — Z98.890 HISTORY OF ARTHROSCOPY OF LEFT KNEE: Primary | ICD-10-CM

## 2018-10-16 ENCOUNTER — OFFICE VISIT (OUTPATIENT)
Dept: FAMILY MEDICINE CLINIC | Age: 73
End: 2018-10-16
Payer: MEDICARE

## 2018-10-16 VITALS
HEART RATE: 68 BPM | RESPIRATION RATE: 14 BRPM | WEIGHT: 148 LBS | TEMPERATURE: 97.8 F | DIASTOLIC BLOOD PRESSURE: 80 MMHG | SYSTOLIC BLOOD PRESSURE: 118 MMHG | BODY MASS INDEX: 28.9 KG/M2

## 2018-10-16 DIAGNOSIS — Z23 NEED FOR INFLUENZA VACCINATION: ICD-10-CM

## 2018-10-16 DIAGNOSIS — E11.42 DIABETIC PERIPHERAL NEUROPATHY (HCC): ICD-10-CM

## 2018-10-16 DIAGNOSIS — J41.0 SIMPLE CHRONIC BRONCHITIS (HCC): Primary | Chronic | ICD-10-CM

## 2018-10-16 DIAGNOSIS — E78.00 PURE HYPERCHOLESTEROLEMIA: Chronic | ICD-10-CM

## 2018-10-16 DIAGNOSIS — E11.42 CONTROLLED TYPE 2 DIABETES MELLITUS WITH DIABETIC POLYNEUROPATHY, WITHOUT LONG-TERM CURRENT USE OF INSULIN (HCC): ICD-10-CM

## 2018-10-16 DIAGNOSIS — E53.8 VITAMIN B12 DEFICIENCY: ICD-10-CM

## 2018-10-16 LAB
CREATININE URINE POCT: NORMAL
MICROALBUMIN/CREAT 24H UR: NORMAL MG/G{CREAT}
MICROALBUMIN/CREAT UR-RTO: NORMAL

## 2018-10-16 PROCEDURE — G0008 ADMIN INFLUENZA VIRUS VAC: HCPCS | Performed by: PEDIATRICS

## 2018-10-16 PROCEDURE — 90688 IIV4 VACCINE SPLT 0.5 ML IM: CPT | Performed by: PEDIATRICS

## 2018-10-16 PROCEDURE — 99213 OFFICE O/P EST LOW 20 MIN: CPT | Performed by: PEDIATRICS

## 2018-10-16 PROCEDURE — 82044 UR ALBUMIN SEMIQUANTITATIVE: CPT | Performed by: PEDIATRICS

## 2018-10-16 ASSESSMENT — ENCOUNTER SYMPTOMS
SHORTNESS OF BREATH: 1
CONSTIPATION: 0
WHEEZING: 1
DIARRHEA: 0
COUGH: 0

## 2018-10-16 NOTE — PROGRESS NOTES
Subjective:      Patient ID: Tyler Munguia is a 74 y.o. female. Visit Information    Have you changed or started any medications since your last visit including any over-the-counter medicines, vitamins, or herbal medicines? no   Have you stopped taking any of your medications? Is so, why? -  no  Are you having any side effects from any of your medications? - no    Have you seen any other physician or provider since your last visit?  no   Have you had any other diagnostic tests since your last visit?  no   Have you been seen in the emergency room and/or had an admission in a hospital since we last saw you?  no   Have you had your routine dental cleaning in the past 6 months?  yes -      Do you have an active MyChart account? If no, what is the barrier?   Yes    Patient Care Team:  Kiersten Jerry MD as PCP - General (Internal Medicine/Pediatrics)  DES Mason CNP as PCP - S Attributed Provider    Medical History Review  Past Medical, Family, and Social History reviewed and does contribute to the patient presenting condition    Health Maintenance   Topic Date Due    Shingles Vaccine (1 of 2 - 2 Dose Series) 05/24/1993    Flu vaccine (1) 09/01/2018    Diabetic foot exam  10/23/2018    DTaP/Tdap/Td vaccine (1 - Tdap) 11/01/2018 (Originally 5/24/1962)    Lipid screen  03/02/2019    Colon Cancer Screen FIT/FOBT  05/22/2019    A1C test (Diabetic or Prediabetic)  05/23/2019    Diabetic retinal exam  06/04/2019    DEXA (modify frequency per FRAX score)  Completed    Pneumococcal low/med risk  Completed         HPI  Chief Complaint   Patient presents with    COPD    Mood Swings       1 Year Mortality Risk(calculation based on current risk factors)  8.64    The 10-year ASCVD risk score (Marielena Aguilar., et al., 2013) is: 24.9%    Values used to calculate the score:      Age: 74 years      Sex: Female      Is Non- : No      Diabetic: Yes      Tobacco smoker: No      Systolic Blood

## 2018-10-20 ASSESSMENT — ENCOUNTER SYMPTOMS: EYES NEGATIVE: 1

## 2018-10-23 ENCOUNTER — HOSPITAL ENCOUNTER (OUTPATIENT)
Dept: PHYSICAL THERAPY | Age: 73
Setting detail: THERAPIES SERIES
Discharge: HOME | End: 2018-10-23
Payer: MEDICARE

## 2018-10-23 PROCEDURE — 97161 PT EVAL LOW COMPLEX 20 MIN: CPT

## 2018-10-23 PROCEDURE — 97110 THERAPEUTIC EXERCISES: CPT

## 2018-10-23 PROCEDURE — G8979 MOBILITY GOAL STATUS: HCPCS

## 2018-10-23 PROCEDURE — G8978 MOBILITY CURRENT STATUS: HCPCS

## 2018-10-23 NOTE — PROGRESS NOTES
previously given. Treatment Plan:  [x] Therapeutic Exercise    [x] Modalities: PRN for pain  [] Therapeutic Activity    [] Ultrasound  [] Electrical Stimulation  [x] Gait Training     [] Massage       [] Lumbar/Cervical Traction  [] Neuromuscular Re-education [] Cold/hotpack [] Iontophoresis: 4 mg/mL  [x] Instruction in HEP             Dexamethasone Sodium  [] Manual Therapy             Phosphate 40-80 mAmin  [] Aquatic Therapy  [] Vasocompression/    [] Other:       Game Ready    []  Medication allergies reviewed for use of    Dexamethasone Sodium Phosphate 4mg/ml     with iontophoresis treatments. Pt is not allergic. Frequency:  2 x/week for 10 visits      Todays Treatment:  Modalities:   Precautions:  Exercises:  Exercise Reps/ Time Weight/ Level Comments   Heel slides 2x10     SLR 10 5\"    Calf stretch 3x30\"     HS stretch 3x30\"     Patellar mobs      Other:    Specific Instructions for next treatment: begin strengthening exercises    Treatment Charges: Mins Units   [x] Evaluation       [x]  Low       []  Moderate       []  High 45 1   []  Modalities     [x]  Ther Exercise 10 1   []  Manual Therapy     []  Ther Activities     []  Aquatics     []  Neuromuscular     []  Other       TOTAL TREATMENT TIME: 60    Time in:230   Time Out:328    Electronically signed by: Malaika Jaquez PT        Physician Signature:________________________________Date:__________________  By signing above or cosigning this note, I have reviewed this plan of care and certify a need for medically necessary rehabilitation services.      *PLEASE SIGN ABOVE AND FAX BACK ALL PAGES*

## 2018-10-25 ENCOUNTER — HOSPITAL ENCOUNTER (OUTPATIENT)
Dept: PHYSICAL THERAPY | Age: 73
Setting detail: THERAPIES SERIES
Discharge: HOME | End: 2018-10-25
Payer: MEDICARE

## 2018-10-25 PROCEDURE — 97110 THERAPEUTIC EXERCISES: CPT

## 2018-10-25 PROCEDURE — G0283 ELEC STIM OTHER THAN WOUND: HCPCS

## 2018-10-30 ENCOUNTER — HOSPITAL ENCOUNTER (OUTPATIENT)
Dept: PHYSICAL THERAPY | Age: 73
Setting detail: THERAPIES SERIES
Discharge: HOME | End: 2018-10-30
Payer: MEDICARE

## 2018-10-30 PROCEDURE — G0283 ELEC STIM OTHER THAN WOUND: HCPCS

## 2018-10-30 PROCEDURE — 97110 THERAPEUTIC EXERCISES: CPT

## 2018-10-31 RX ORDER — FLUOXETINE HYDROCHLORIDE 40 MG/1
CAPSULE ORAL
Qty: 7 CAPSULE | Refills: 1 | OUTPATIENT
Start: 2018-10-31

## 2018-11-02 ENCOUNTER — HOSPITAL ENCOUNTER (OUTPATIENT)
Dept: PHYSICAL THERAPY | Age: 73
Setting detail: THERAPIES SERIES
Discharge: HOME | End: 2018-11-02
Payer: MEDICARE

## 2018-11-02 PROCEDURE — 97110 THERAPEUTIC EXERCISES: CPT

## 2018-11-02 PROCEDURE — G0283 ELEC STIM OTHER THAN WOUND: HCPCS

## 2018-11-06 ENCOUNTER — HOSPITAL ENCOUNTER (OUTPATIENT)
Dept: PHYSICAL THERAPY | Age: 73
Setting detail: THERAPIES SERIES
Discharge: HOME | End: 2018-11-06
Payer: MEDICARE

## 2018-11-06 PROCEDURE — 97110 THERAPEUTIC EXERCISES: CPT

## 2018-11-06 PROCEDURE — G0283 ELEC STIM OTHER THAN WOUND: HCPCS

## 2018-11-09 ENCOUNTER — HOSPITAL ENCOUNTER (OUTPATIENT)
Dept: PHYSICAL THERAPY | Age: 73
Setting detail: THERAPIES SERIES
Discharge: HOME | End: 2018-11-09
Payer: MEDICARE

## 2018-11-09 PROCEDURE — 97110 THERAPEUTIC EXERCISES: CPT

## 2018-11-09 PROCEDURE — 97016 VASOPNEUMATIC DEVICE THERAPY: CPT

## 2018-11-09 NOTE — PROGRESS NOTES
Verbalizes understanding. [] Demonstrates understanding. [] Needs review. [] Demonstrates/verbalizes HEP/Ed previously given. Plan: [x] Continue per plan of care.    [] Other:      Time In: 145       Time Out: 245    Electronically signed by:  Meri Nageotte, PT

## 2018-11-12 ENCOUNTER — NURSE ONLY (OUTPATIENT)
Dept: FAMILY MEDICINE CLINIC | Age: 73
End: 2018-11-12
Payer: MEDICARE

## 2018-11-12 DIAGNOSIS — E53.8 VITAMIN B12 DEFICIENCY: Primary | ICD-10-CM

## 2018-11-12 PROCEDURE — 96372 THER/PROPH/DIAG INJ SC/IM: CPT | Performed by: PEDIATRICS

## 2018-11-12 RX ORDER — CYANOCOBALAMIN 1000 UG/ML
1000 INJECTION INTRAMUSCULAR; SUBCUTANEOUS ONCE
Status: COMPLETED | OUTPATIENT
Start: 2018-11-12 | End: 2018-11-12

## 2018-11-12 RX ADMIN — CYANOCOBALAMIN 1000 MCG: 1000 INJECTION INTRAMUSCULAR; SUBCUTANEOUS at 12:38

## 2018-11-14 ENCOUNTER — HOSPITAL ENCOUNTER (OUTPATIENT)
Dept: PHYSICAL THERAPY | Age: 73
Setting detail: THERAPIES SERIES
Discharge: HOME | End: 2018-11-14
Payer: MEDICARE

## 2018-11-14 ENCOUNTER — OFFICE VISIT (OUTPATIENT)
Dept: FAMILY MEDICINE CLINIC | Age: 73
End: 2018-11-14
Payer: MEDICARE

## 2018-11-14 VITALS
TEMPERATURE: 98 F | DIASTOLIC BLOOD PRESSURE: 80 MMHG | SYSTOLIC BLOOD PRESSURE: 116 MMHG | BODY MASS INDEX: 29.29 KG/M2 | HEART RATE: 76 BPM | RESPIRATION RATE: 16 BRPM | OXYGEN SATURATION: 95 % | WEIGHT: 150 LBS

## 2018-11-14 DIAGNOSIS — J41.0 SIMPLE CHRONIC BRONCHITIS (HCC): Primary | Chronic | ICD-10-CM

## 2018-11-14 DIAGNOSIS — R05.9 COUGH: ICD-10-CM

## 2018-11-14 LAB
EXPIRATORY TIME-POST: NORMAL SEC
EXPIRATORY TIME: NORMAL SEC
FEF 25-75% %CHNG: NORMAL
FEF 25-75% %PRED-POST: NORMAL %
FEF 25-75% %PRED-PRE: NORMAL L/SEC
FEF 25-75% PRED: NORMAL L/SEC
FEF 25-75%-POST: NORMAL L/SEC
FEF 25-75%-PRE: NORMAL L/SEC
FEV1 %PRED-POST: NORMAL %
FEV1 %PRED-PRE: NORMAL %
FEV1 PRED: NORMAL L
FEV1-POST: NORMAL L
FEV1-PRE: NORMAL L
FEV1/FVC %PRED-POST: NORMAL %
FEV1/FVC %PRED-PRE: NORMAL %
FEV1/FVC PRED: NORMAL %
FEV1/FVC-POST: NORMAL %
FEV1/FVC-PRE: NORMAL %
FVC %PRED-POST: NORMAL L
FVC %PRED-PRE: NORMAL %
FVC PRED: NORMAL L
FVC-POST: NORMAL L
FVC-PRE: NORMAL L
PEF %PRED-POST: NORMAL %
PEF %PRED-PRE: NORMAL L/SEC
PEF PRED: NORMAL L/SEC
PEF%CHNG: NORMAL
PEF-POST: NORMAL L/SEC
PEF-PRE: NORMAL L/SEC

## 2018-11-14 PROCEDURE — 99214 OFFICE O/P EST MOD 30 MIN: CPT | Performed by: NURSE PRACTITIONER

## 2018-11-14 RX ORDER — BUDESONIDE 1 MG/2ML
1 INHALANT ORAL 2 TIMES DAILY
Qty: 2 ML | Refills: 3 | Status: SHIPPED | OUTPATIENT
Start: 2018-11-14 | End: 2018-12-03 | Stop reason: SDUPTHER

## 2018-11-14 RX ORDER — AZITHROMYCIN 250 MG/1
250 TABLET, FILM COATED ORAL SEE ADMIN INSTRUCTIONS
Qty: 6 TABLET | Refills: 0 | Status: SHIPPED | OUTPATIENT
Start: 2018-11-14 | End: 2018-11-19

## 2018-11-14 RX ORDER — PREDNISONE 20 MG/1
TABLET ORAL
Qty: 30 TABLET | Refills: 0 | Status: SHIPPED | OUTPATIENT
Start: 2018-11-14 | End: 2018-11-24

## 2018-11-14 RX ORDER — BENZONATATE 100 MG/1
100 CAPSULE ORAL 3 TIMES DAILY PRN
Qty: 30 CAPSULE | Refills: 0 | Status: SHIPPED | OUTPATIENT
Start: 2018-11-14 | End: 2018-11-28 | Stop reason: SDUPTHER

## 2018-11-14 ASSESSMENT — ENCOUNTER SYMPTOMS
NAUSEA: 0
ABDOMINAL PAIN: 0
EYE ITCHING: 0
EYE REDNESS: 0
COUGH: 1
SORE THROAT: 0
DIARRHEA: 0
SHORTNESS OF BREATH: 1
EYE DISCHARGE: 0
WHEEZING: 1
CONSTIPATION: 0
CHEST TIGHTNESS: 1
RHINORRHEA: 0

## 2018-11-14 NOTE — PROGRESS NOTES
Subjective:      Patient ID: Horace Landers is a 74 y.o. female. Visit Information    Have you changed or started any medications since your last visit including any over-the-counter medicines, vitamins, or herbal medicines? no   Are you having any side effects from any of your medications? -  no  Have you stopped taking any of your medications? Is so, why? -  no    Have you seen any other physician or provider since your last visit? No  Have you had any other diagnostic tests since your last visit? No  Have you been seen in the emergency room and/or had an admission to a hospital since we last saw you? No  Have you had your routine dental cleaning in the past 6 months? yes - routine    Have you activated your [employer] account? If not, what are your barriers?  Yes     Patient Care Team:  Brad Alvarez MD as PCP - General (Internal Medicine/Pediatrics)  DES Mckeon CNP as PCP - S Attributed Provider    Medical History Review  Past Medical, Family, and Social History reviewed and does contribute to the patient presenting condition    Health Maintenance   Topic Date Due    DTaP/Tdap/Td vaccine (1 - Tdap) 05/24/1962    Shingles Vaccine (1 of 2 - 2 Dose Series) 05/24/1993    Lipid screen  03/02/2019    Colon Cancer Screen FIT/FOBT  05/22/2019    A1C test (Diabetic or Prediabetic)  05/23/2019    Diabetic retinal exam  06/04/2019    Diabetic foot exam  10/16/2019    DEXA (modify frequency per FRAX score)  Completed    Flu vaccine  Completed    Pneumococcal low/med risk  Completed       PHQ Scores 4/9/2018 5/25/2017 4/20/2016   PHQ2 Score 3 0 0   PHQ9 Score 8 0 0     Interpretation of Total Score DepressionSeverity: 1-4 = Minimal depression, 5-9 = Mild depression, 10-14 = Moderate depression, 15-19 = Moderately severe depression, 20-27 = Severe depression    Current Outpatient Prescriptions   Medication Sig Dispense Refill    budesonide (PULMICORT) 1 MG/2ML nebulizer suspension Take 2 mLs by this visit. Symptoms never completely went away but did get better after a week. At Dentist a couple months ago and told to keep brushing well. Cannot get into the dentist now. Sore above a tooth. Review of Systems   Constitutional: Positive for fatigue. Negative for activity change, appetite change and fever. HENT: Negative for congestion, ear pain, postnasal drip, rhinorrhea and sore throat. Decayed teeth, dentist soon, using salt water gurgles for pain and possible infection she believes. Eyes: Negative for discharge, redness and itching. Respiratory: Positive for cough (Coughs up yellow greenish material), chest tightness (and burning), shortness of breath and wheezing. Cardiovascular: Negative for chest pain. Gastrointestinal: Negative for abdominal pain, constipation, diarrhea and nausea. Genitourinary: Negative for dysuria. Skin: Negative for rash. Neurological: Positive for weakness (after activity). Negative for dizziness, light-headedness and headaches. Psychiatric/Behavioral: Negative for dysphoric mood and sleep disturbance. The patient is not nervous/anxious. Objective:     /80 (Site: Left Upper Arm, Position: Sitting, Cuff Size: Medium Adult)   Pulse 76   Temp 98 °F (36.7 °C) (Tympanic)   Resp 16   Wt 150 lb (68 kg)   SpO2 95%   BMI 29.29 kg/m²      Physical Exam   Constitutional: She is oriented to person, place, and time. She appears well-developed and well-nourished. She is active. No distress. HENT:   Head: Normocephalic and atraumatic. Right Ear: Tympanic membrane and external ear normal.   Left Ear: Tympanic membrane and external ear normal.   Nose: Nose normal.   Mouth/Throat: Uvula is midline and oropharynx is clear and moist. No oropharyngeal exudate. Eyes: Pupils are equal, round, and reactive to light. Right eye exhibits no discharge. Left eye exhibits no discharge. Cardiovascular: Normal rate, regular rhythm and normal heart sounds. No murmur heard. Pulses:       Radial pulses are 2+ on the right side, and 2+ on the left side. Pulmonary/Chest: Effort normal and breath sounds normal. No respiratory distress. She has no decreased breath sounds. She has no wheezes. Light decreased air movement and course lung sounds. Improved since writer's assessment in August.   Spirometry normal for age. Abdominal: Soft. Bowel sounds are normal.   Musculoskeletal:   No Red or Swollen Joints. Neurological: She is alert and oriented to person, place, and time. She has normal strength. Skin: Skin is warm, dry and intact. No rash noted. Psychiatric: She has a normal mood and affect. Her speech is normal and behavior is normal.   Vitals reviewed. Assessment:      Diagnosis Orders   1. Simple chronic bronchitis (HCC)  Spirometry With Bronchodilator    Spirometry With Bronchodilator    budesonide (PULMICORT) 1 MG/2ML nebulizer suspension    predniSONE (DELTASONE) 20 MG tablet    benzonatate (TESSALON PERLES) 100 MG capsule    azithromycin (ZITHROMAX) 250 MG tablet   2. Cough  predniSONE (DELTASONE) 20 MG tablet    benzonatate (TESSALON PERLES) 100 MG capsule     Plan:     Return if symptoms worsen or fail to improve. Discussed bronchitis etiology, diagnosis, symptomology, and treatment. No need for antibiotic at this time but may treat empirically due to patient's persistence. Continue with Nebulizer for breathing ease. Continue with Tessalon Perles to reduce coughing. Mucinex for excess mucous production. Steroid pack to reduce inflammation. Encouraged healthy diet and exercise. Call office with any new or worsening symptoms or concerns. Aure received counseling on the following healthy behaviors: nutrition, exercise and medication adherence  Reviewed prior labs and health maintenance. Continue current medications, diet and exercise. Discussed use, benefit, and side effects of prescribed medications.  Barriers to medication

## 2018-11-16 ENCOUNTER — HOSPITAL ENCOUNTER (OUTPATIENT)
Dept: PHYSICAL THERAPY | Age: 73
Setting detail: THERAPIES SERIES
Discharge: HOME | End: 2018-11-16
Payer: MEDICARE

## 2018-11-16 PROCEDURE — 97016 VASOPNEUMATIC DEVICE THERAPY: CPT

## 2018-11-16 PROCEDURE — 97110 THERAPEUTIC EXERCISES: CPT

## 2018-11-19 ENCOUNTER — HOSPITAL ENCOUNTER (OUTPATIENT)
Dept: PHYSICAL THERAPY | Age: 73
Setting detail: THERAPIES SERIES
Discharge: HOME | End: 2018-11-19
Payer: MEDICARE

## 2018-11-19 PROCEDURE — 97016 VASOPNEUMATIC DEVICE THERAPY: CPT

## 2018-11-19 PROCEDURE — 97110 THERAPEUTIC EXERCISES: CPT

## 2018-11-19 NOTE — PROGRESS NOTES
Programs  6. Demonstrate Knowledge of fall prevention                    Patient goals: to move knee without pain    Pt. Education:  [x] Yes  [] No  [] Reviewed Prior HEP/Ed  Method of Education: [x] Verbal  [x] Demo  [] Written  Comprehension of Education:  [x] Verbalizes understanding. [] Demonstrates understanding. [] Needs review. [] Demonstrates/verbalizes HEP/Ed previously given. Plan: [x] Continue per plan of care.    [] Other:      Time In: 1515       Time Out: 1600    Electronically signed by:  Hannah Infante, PTA

## 2018-11-21 ENCOUNTER — APPOINTMENT (OUTPATIENT)
Dept: PHYSICAL THERAPY | Age: 73
End: 2018-11-21
Payer: MEDICARE

## 2018-11-26 ENCOUNTER — HOSPITAL ENCOUNTER (OUTPATIENT)
Dept: PHYSICAL THERAPY | Age: 73
Setting detail: THERAPIES SERIES
Discharge: HOME | End: 2018-11-26
Payer: MEDICARE

## 2018-11-26 PROCEDURE — 97110 THERAPEUTIC EXERCISES: CPT

## 2018-11-26 PROCEDURE — G8979 MOBILITY GOAL STATUS: HCPCS

## 2018-11-26 PROCEDURE — G8978 MOBILITY CURRENT STATUS: HCPCS

## 2018-11-28 DIAGNOSIS — R05.9 COUGH: ICD-10-CM

## 2018-11-28 DIAGNOSIS — J41.0 SIMPLE CHRONIC BRONCHITIS (HCC): Chronic | ICD-10-CM

## 2018-11-28 RX ORDER — BENZONATATE 100 MG/1
100 CAPSULE ORAL 3 TIMES DAILY PRN
Qty: 30 CAPSULE | Refills: 0 | Status: SHIPPED | OUTPATIENT
Start: 2018-11-28 | End: 2018-12-27 | Stop reason: SDUPTHER

## 2018-11-30 ENCOUNTER — HOSPITAL ENCOUNTER (OUTPATIENT)
Dept: PHYSICAL THERAPY | Age: 73
Setting detail: THERAPIES SERIES
Discharge: HOME | End: 2018-11-30
Payer: MEDICARE

## 2018-12-03 DIAGNOSIS — J41.0 SIMPLE CHRONIC BRONCHITIS (HCC): Chronic | ICD-10-CM

## 2018-12-03 RX ORDER — BUDESONIDE 1 MG/2ML
1 INHALANT ORAL 2 TIMES DAILY
Qty: 120 ML | Refills: 5 | Status: SHIPPED | OUTPATIENT
Start: 2018-12-03 | End: 2018-12-27 | Stop reason: ALTCHOICE

## 2018-12-03 RX ORDER — FLUOXETINE HYDROCHLORIDE 40 MG/1
CAPSULE ORAL
Qty: 7 CAPSULE | Refills: 1 | OUTPATIENT
Start: 2018-12-03

## 2018-12-05 NOTE — PROGRESS NOTES
[address] [facility]  [address] [facility]         Phone: [phone]       Fax: [phone]    THERAPY RESPONSIBILITY OF CARE TRANSFER FORM       PATIENT NAME: Candelaria Mcneil  MRN: [id number]   : 1945      TRANSFERRING FACILITY:    [] [facility]   [x] [facility] Outpatient   []  [facility]   [] [facility]   [] [facility]   [] [facility]   [] [facility]    [] Other:       ACCEPTING FACILITY   [] [facility]   [x] [facility] Outpatient   []  [facility]   [] [facility]   [] [facility]   [] [facility]   [] [facility]    [] Other:          REASON FOR TRANSFER: Therapist Leaving      TRANSFER OF CARE:    I am transferring the care of the above patient to: Poly Alejandre, PT  2018      ACCEPTANCE OF CARE:     I am accepting the care of the above patient.  Chris Magallanes, PT

## 2018-12-12 ENCOUNTER — NURSE ONLY (OUTPATIENT)
Dept: FAMILY MEDICINE CLINIC | Age: 73
End: 2018-12-12
Payer: MEDICARE

## 2018-12-12 DIAGNOSIS — E53.8 B12 DEFICIENCY: Primary | ICD-10-CM

## 2018-12-12 PROCEDURE — 96372 THER/PROPH/DIAG INJ SC/IM: CPT | Performed by: NURSE PRACTITIONER

## 2018-12-12 RX ORDER — CYANOCOBALAMIN 1000 UG/ML
1000 INJECTION INTRAMUSCULAR; SUBCUTANEOUS ONCE
Status: COMPLETED | OUTPATIENT
Start: 2018-12-12 | End: 2018-12-12

## 2018-12-12 RX ADMIN — CYANOCOBALAMIN 1000 MCG: 1000 INJECTION INTRAMUSCULAR; SUBCUTANEOUS at 10:56

## 2018-12-26 DIAGNOSIS — R05.9 COUGH: ICD-10-CM

## 2018-12-26 DIAGNOSIS — J41.0 SIMPLE CHRONIC BRONCHITIS (HCC): Chronic | ICD-10-CM

## 2018-12-27 ENCOUNTER — OFFICE VISIT (OUTPATIENT)
Dept: FAMILY MEDICINE CLINIC | Age: 73
End: 2018-12-27
Payer: MEDICARE

## 2018-12-27 VITALS
HEART RATE: 84 BPM | TEMPERATURE: 98.4 F | RESPIRATION RATE: 20 BRPM | SYSTOLIC BLOOD PRESSURE: 136 MMHG | BODY MASS INDEX: 29.88 KG/M2 | DIASTOLIC BLOOD PRESSURE: 88 MMHG | WEIGHT: 153 LBS

## 2018-12-27 DIAGNOSIS — J41.0 SIMPLE CHRONIC BRONCHITIS (HCC): Chronic | ICD-10-CM

## 2018-12-27 DIAGNOSIS — R05.9 COUGH: ICD-10-CM

## 2018-12-27 DIAGNOSIS — J40 BRONCHITIS: Primary | ICD-10-CM

## 2018-12-27 PROCEDURE — 99213 OFFICE O/P EST LOW 20 MIN: CPT | Performed by: NURSE PRACTITIONER

## 2018-12-27 RX ORDER — BENZONATATE 100 MG/1
100 CAPSULE ORAL 3 TIMES DAILY PRN
Qty: 30 CAPSULE | Refills: 2 | OUTPATIENT
Start: 2018-12-27 | End: 2019-12-27

## 2018-12-27 RX ORDER — CEFDINIR 300 MG/1
300 CAPSULE ORAL 2 TIMES DAILY
Qty: 20 CAPSULE | Refills: 0 | Status: SHIPPED | OUTPATIENT
Start: 2018-12-27 | End: 2019-01-06

## 2018-12-27 RX ORDER — ALBUTEROL SULFATE 90 UG/1
2 AEROSOL, METERED RESPIRATORY (INHALATION) 4 TIMES DAILY PRN
Qty: 1 INHALER | Refills: 0 | Status: SHIPPED | OUTPATIENT
Start: 2018-12-27 | End: 2019-03-13 | Stop reason: SDUPTHER

## 2018-12-27 RX ORDER — BENZONATATE 100 MG/1
100 CAPSULE ORAL 3 TIMES DAILY PRN
Qty: 30 CAPSULE | Refills: 0 | Status: SHIPPED | OUTPATIENT
Start: 2018-12-27 | End: 2019-01-02 | Stop reason: SDUPTHER

## 2018-12-27 RX ORDER — PREDNISONE 20 MG/1
20 TABLET ORAL 2 TIMES DAILY
Qty: 10 TABLET | Refills: 0 | Status: SHIPPED | OUTPATIENT
Start: 2018-12-27 | End: 2019-01-01

## 2018-12-27 ASSESSMENT — ENCOUNTER SYMPTOMS
SHORTNESS OF BREATH: 1
DIARRHEA: 0
NAUSEA: 0
CHEST TIGHTNESS: 1
CONSTIPATION: 0
TROUBLE SWALLOWING: 0
EYE ITCHING: 0
WHEEZING: 1
SORE THROAT: 0
EYE REDNESS: 0
ABDOMINAL PAIN: 0
COUGH: 1
EYE DISCHARGE: 0
RHINORRHEA: 0

## 2018-12-27 NOTE — PROGRESS NOTES
Subjective:      Patient ID: America Bob is a 74 y.o. female. Visit Information    Have you changed or started any medications since your last visit including any over-the-counter medicines, vitamins, or herbal medicines? no   Are you having any side effects from any of your medications? -  no  Have you stopped taking any of your medications? Is so, why? -  no    Have you seen any other physician or provider since your last visit? Yes, Pain management  Have you had any other diagnostic tests since your last visit? No  Have you been seen in the emergency room and/or had an admission to a hospital since we last saw you? No  Have you had your routine dental cleaning in the past 6 months? yes -     Have you activated your [employer] account? If not, what are your barriers?  Yes     Patient Care Team:  Amandeep Albert MD as PCP - General (Internal Medicine/Pediatrics)  DES Hughes CNP as PCP - [facility] Attributed Provider    Medical History Review  Past Medical, Family, and Social History reviewed and does contribute to the patient presenting condition    Health Maintenance   Topic Date Due    DTaP/Tdap/Td vaccine (1 - Tdap) 05/24/1962    Shingles Vaccine (1 of 2 - 2 Dose Series) 05/24/1993    Lipid screen  03/02/2019    Colon Cancer Screen FIT/FOBT  05/22/2019    A1C test (Diabetic or Prediabetic)  05/23/2019    Diabetic retinal exam  06/04/2019    Diabetic foot exam  10/16/2019    DEXA (modify frequency per FRAX score)  Completed    Flu vaccine  Completed    Pneumococcal low/med risk  Completed     /88 (Site: Right Upper Arm, Position: Sitting, Cuff Size: Medium Adult)   Pulse 84   Temp 98.4 °F (36.9 °C) (Tympanic)   Resp 20   Wt 153 lb (69.4 kg)   BMI 29.88 kg/m²      PHQ Scores 4/9/2018 5/25/2017 4/20/2016   PHQ2 Score 3 0 0   PHQ9 Score 8 0 0     Interpretation of Total Score DepressionSeverity: 1-4 = Minimal depression, 5-9 = Mild depression, 10-14 = Moderate depression, 15-19 = Moderately

## 2018-12-28 ENCOUNTER — HOSPITAL ENCOUNTER (OUTPATIENT)
Dept: GENERAL RADIOLOGY | Age: 73
Discharge: HOME | End: 2018-12-30
Payer: MEDICARE

## 2018-12-28 ENCOUNTER — HOSPITAL ENCOUNTER (OUTPATIENT)
Age: 73
Discharge: HOME | End: 2018-12-30
Payer: MEDICARE

## 2018-12-28 DIAGNOSIS — J40 BRONCHITIS: ICD-10-CM

## 2018-12-28 DIAGNOSIS — J41.0 SIMPLE CHRONIC BRONCHITIS (HCC): Chronic | ICD-10-CM

## 2018-12-28 PROCEDURE — 71046 X-RAY EXAM CHEST 2 VIEWS: CPT

## 2018-12-30 DIAGNOSIS — R05.9 COUGH: ICD-10-CM

## 2018-12-30 DIAGNOSIS — F33.1 MODERATE EPISODE OF RECURRENT MAJOR DEPRESSIVE DISORDER (HCC): Primary | ICD-10-CM

## 2018-12-30 DIAGNOSIS — J41.0 SIMPLE CHRONIC BRONCHITIS (HCC): Chronic | ICD-10-CM

## 2019-01-02 RX ORDER — BENZONATATE 100 MG/1
100 CAPSULE ORAL 3 TIMES DAILY PRN
Qty: 30 CAPSULE | Refills: 0 | Status: SHIPPED | OUTPATIENT
Start: 2019-01-02 | End: 2019-03-14 | Stop reason: ALTCHOICE

## 2019-01-02 RX ORDER — FLUOXETINE HYDROCHLORIDE 40 MG/1
40 CAPSULE ORAL DAILY
Qty: 7 CAPSULE | Refills: 1 | OUTPATIENT
Start: 2019-01-02 | End: 2020-01-02

## 2019-01-11 ENCOUNTER — NURSE ONLY (OUTPATIENT)
Dept: FAMILY MEDICINE CLINIC | Age: 74
End: 2019-01-11
Payer: MEDICARE

## 2019-01-11 DIAGNOSIS — E53.8 B12 DEFICIENCY: Primary | ICD-10-CM

## 2019-01-11 PROCEDURE — 96372 THER/PROPH/DIAG INJ SC/IM: CPT | Performed by: NURSE PRACTITIONER

## 2019-01-11 RX ORDER — CYANOCOBALAMIN 1000 UG/ML
1000 INJECTION INTRAMUSCULAR; SUBCUTANEOUS ONCE
Status: COMPLETED | OUTPATIENT
Start: 2019-01-11 | End: 2019-01-11

## 2019-01-11 RX ORDER — ALLOPURINOL 100 MG/1
100 TABLET ORAL DAILY
Qty: 30 TABLET | Refills: 5 | Status: SHIPPED | OUTPATIENT
Start: 2019-01-11 | End: 2019-10-29 | Stop reason: SDUPTHER

## 2019-01-11 RX ADMIN — CYANOCOBALAMIN 1000 MCG: 1000 INJECTION INTRAMUSCULAR; SUBCUTANEOUS at 13:00

## 2019-01-18 DIAGNOSIS — F33.1 MAJOR DEPRESSIVE DISORDER, RECURRENT EPISODE, MODERATE (HCC): ICD-10-CM

## 2019-01-18 DIAGNOSIS — F41.9 ANXIETY: Chronic | ICD-10-CM

## 2019-01-18 RX ORDER — QUETIAPINE FUMARATE 200 MG/1
200 TABLET, FILM COATED ORAL DAILY
Qty: 90 TABLET | Refills: 1 | Status: SHIPPED | OUTPATIENT
Start: 2019-01-18 | End: 2019-07-17 | Stop reason: SDUPTHER

## 2019-02-06 ENCOUNTER — OFFICE VISIT (OUTPATIENT)
Dept: ORTHOPEDIC SURGERY | Age: 74
End: 2019-02-06
Payer: MEDICARE

## 2019-02-06 VITALS — HEIGHT: 60 IN | HEART RATE: 102 BPM | BODY MASS INDEX: 26.5 KG/M2 | WEIGHT: 135 LBS | OXYGEN SATURATION: 98 %

## 2019-02-06 DIAGNOSIS — G89.29 CHRONIC PAIN OF LEFT KNEE: Primary | ICD-10-CM

## 2019-02-06 DIAGNOSIS — M25.562 CHRONIC PAIN OF LEFT KNEE: Primary | ICD-10-CM

## 2019-02-06 PROBLEM — H40.1111 PRIMARY OPEN-ANGLE GLAUCOMA, RIGHT EYE, MILD STAGE: Status: ACTIVE | Noted: 2019-02-06

## 2019-02-06 PROBLEM — H40.1121 PRIMARY OPEN-ANGLE GLAUCOMA, LEFT EYE, MILD STAGE: Status: ACTIVE | Noted: 2019-02-06

## 2019-02-06 PROCEDURE — 99213 OFFICE O/P EST LOW 20 MIN: CPT | Performed by: ORTHOPAEDIC SURGERY

## 2019-02-19 ENCOUNTER — HOSPITAL ENCOUNTER (OUTPATIENT)
Age: 74
Setting detail: SPECIMEN
Discharge: HOME | End: 2019-02-19
Payer: MEDICARE

## 2019-02-19 ENCOUNTER — OFFICE VISIT (OUTPATIENT)
Dept: FAMILY MEDICINE CLINIC | Age: 74
End: 2019-02-19
Payer: MEDICARE

## 2019-02-19 VITALS
BODY MASS INDEX: 29.49 KG/M2 | DIASTOLIC BLOOD PRESSURE: 78 MMHG | RESPIRATION RATE: 16 BRPM | WEIGHT: 151 LBS | TEMPERATURE: 96.4 F | SYSTOLIC BLOOD PRESSURE: 116 MMHG | HEART RATE: 74 BPM

## 2019-02-19 DIAGNOSIS — E11.42 CONTROLLED TYPE 2 DIABETES MELLITUS WITH DIABETIC POLYNEUROPATHY, WITHOUT LONG-TERM CURRENT USE OF INSULIN (HCC): ICD-10-CM

## 2019-02-19 DIAGNOSIS — K86.1 CHRONIC BILIARY PANCREATITIS (HCC): ICD-10-CM

## 2019-02-19 DIAGNOSIS — E78.00 PURE HYPERCHOLESTEROLEMIA: Chronic | ICD-10-CM

## 2019-02-19 DIAGNOSIS — F33.1 MODERATE EPISODE OF RECURRENT MAJOR DEPRESSIVE DISORDER (HCC): ICD-10-CM

## 2019-02-19 DIAGNOSIS — E11.42 DIABETIC PERIPHERAL NEUROPATHY (HCC): ICD-10-CM

## 2019-02-19 DIAGNOSIS — E53.8 VITAMIN B12 DEFICIENCY: ICD-10-CM

## 2019-02-19 DIAGNOSIS — J41.0 SIMPLE CHRONIC BRONCHITIS (HCC): Primary | Chronic | ICD-10-CM

## 2019-02-19 LAB
ALBUMIN SERPL-MCNC: 4 G/DL (ref 3.5–5.2)
ALBUMIN/GLOBULIN RATIO: 1.5 (ref 1–2.5)
ALP BLD-CCNC: 110 U/L (ref 35–104)
ALT SERPL-CCNC: 17 U/L (ref 5–33)
ANION GAP SERPL CALCULATED.3IONS-SCNC: 14 MMOL/L (ref 9–17)
AST SERPL-CCNC: 22 U/L
BILIRUB SERPL-MCNC: 0.26 MG/DL (ref 0.3–1.2)
BUN BLDV-MCNC: 19 MG/DL (ref 8–23)
BUN/CREAT BLD: ABNORMAL (ref 9–20)
CALCIUM SERPL-MCNC: 9.3 MG/DL (ref 8.6–10.4)
CHLORIDE BLD-SCNC: 110 MMOL/L (ref 98–107)
CHOLESTEROL, FASTING: 181 MG/DL
CHOLESTEROL/HDL RATIO: 4.1
CO2: 18 MMOL/L (ref 20–31)
CREAT SERPL-MCNC: 0.74 MG/DL (ref 0.5–0.9)
ESTIMATED AVERAGE GLUCOSE: 111 MG/DL
GFR AFRICAN AMERICAN: >60 ML/MIN
GFR NON-AFRICAN AMERICAN: >60 ML/MIN
GFR SERPL CREATININE-BSD FRML MDRD: ABNORMAL ML/MIN/{1.73_M2}
GFR SERPL CREATININE-BSD FRML MDRD: ABNORMAL ML/MIN/{1.73_M2}
GLUCOSE FASTING: 69 MG/DL (ref 70–99)
HBA1C MFR BLD: 5.5 % (ref 4–6)
HDLC SERPL-MCNC: 44 MG/DL
LDL CHOLESTEROL: 89 MG/DL (ref 0–130)
POTASSIUM SERPL-SCNC: 4.3 MMOL/L (ref 3.7–5.3)
SODIUM BLD-SCNC: 142 MMOL/L (ref 135–144)
TOTAL PROTEIN: 6.7 G/DL (ref 6.4–8.3)
TRIGLYCERIDE, FASTING: 238 MG/DL
VITAMIN B-12: 472 PG/ML (ref 232–1245)
VLDLC SERPL CALC-MCNC: ABNORMAL MG/DL (ref 1–30)

## 2019-02-19 PROCEDURE — 96372 THER/PROPH/DIAG INJ SC/IM: CPT | Performed by: PEDIATRICS

## 2019-02-19 PROCEDURE — 99214 OFFICE O/P EST MOD 30 MIN: CPT | Performed by: PEDIATRICS

## 2019-02-19 RX ORDER — CYANOCOBALAMIN 1000 UG/ML
1000 INJECTION INTRAMUSCULAR; SUBCUTANEOUS ONCE
Status: COMPLETED | OUTPATIENT
Start: 2019-02-19 | End: 2019-02-19

## 2019-02-19 RX ORDER — DORZOLAMIDE HYDROCHLORIDE AND TIMOLOL MALEATE 20; 5 MG/ML; MG/ML
1 SOLUTION/ DROPS OPHTHALMIC 2 TIMES DAILY
Refills: 11 | COMMUNITY
Start: 2019-01-30 | End: 2020-07-29 | Stop reason: ALTCHOICE

## 2019-02-19 RX ADMIN — CYANOCOBALAMIN 1000 MCG: 1000 INJECTION INTRAMUSCULAR; SUBCUTANEOUS at 09:16

## 2019-02-19 ASSESSMENT — ENCOUNTER SYMPTOMS
SHORTNESS OF BREATH: 1
WHEEZING: 1
COUGH: 1

## 2019-02-21 DIAGNOSIS — R73.09 ELEVATED GLUCOSE: Primary | ICD-10-CM

## 2019-02-24 ASSESSMENT — ENCOUNTER SYMPTOMS
DIARRHEA: 0
CONSTIPATION: 0

## 2019-03-01 DIAGNOSIS — J30.2 CHRONIC SEASONAL ALLERGIC RHINITIS: ICD-10-CM

## 2019-03-01 RX ORDER — MONTELUKAST SODIUM 10 MG/1
10 TABLET ORAL NIGHTLY
Qty: 30 TABLET | Refills: 5 | Status: SHIPPED | OUTPATIENT
Start: 2019-03-01 | End: 2020-04-03

## 2019-03-13 ENCOUNTER — HOSPITAL ENCOUNTER (EMERGENCY)
Age: 74
Discharge: HOME | End: 2019-03-13
Attending: EMERGENCY MEDICINE
Payer: MEDICARE

## 2019-03-13 ENCOUNTER — APPOINTMENT (OUTPATIENT)
Dept: GENERAL RADIOLOGY | Age: 74
End: 2019-03-13
Payer: MEDICARE

## 2019-03-13 ENCOUNTER — APPOINTMENT (OUTPATIENT)
Dept: CT IMAGING | Age: 74
End: 2019-03-13
Payer: MEDICARE

## 2019-03-13 VITALS
TEMPERATURE: 97.7 F | WEIGHT: 140 LBS | HEART RATE: 105 BPM | DIASTOLIC BLOOD PRESSURE: 91 MMHG | HEIGHT: 60 IN | BODY MASS INDEX: 27.48 KG/M2 | SYSTOLIC BLOOD PRESSURE: 140 MMHG | OXYGEN SATURATION: 97 % | RESPIRATION RATE: 16 BRPM

## 2019-03-13 DIAGNOSIS — J40 BRONCHITIS: Primary | ICD-10-CM

## 2019-03-13 DIAGNOSIS — T80.1XXA INTRAVENOUS INFILTRATION, INITIAL ENCOUNTER: ICD-10-CM

## 2019-03-13 DIAGNOSIS — R06.02 SHORTNESS OF BREATH: ICD-10-CM

## 2019-03-13 LAB
-: NORMAL
ABSOLUTE EOS #: 0.4 K/UL (ref 0–0.4)
ABSOLUTE IMMATURE GRANULOCYTE: ABNORMAL K/UL (ref 0–0.3)
ABSOLUTE LYMPH #: 1.9 K/UL (ref 1–4.8)
ABSOLUTE MONO #: 0.6 K/UL (ref 0.1–1.3)
ANION GAP SERPL CALCULATED.3IONS-SCNC: 14 MMOL/L (ref 9–17)
BASOPHILS # BLD: 1 % (ref 0–2)
BASOPHILS ABSOLUTE: 0.1 K/UL (ref 0–0.2)
BNP INTERPRETATION: NORMAL
BUN BLDV-MCNC: 19 MG/DL (ref 8–23)
BUN/CREAT BLD: NORMAL (ref 9–20)
CALCIUM SERPL-MCNC: 9.9 MG/DL (ref 8.6–10.4)
CHLORIDE BLD-SCNC: 104 MMOL/L (ref 98–107)
CO2: 24 MMOL/L (ref 20–31)
CREAT SERPL-MCNC: 0.72 MG/DL (ref 0.5–0.9)
DIFFERENTIAL TYPE: ABNORMAL
DIRECT EXAM: NORMAL
EOSINOPHILS RELATIVE PERCENT: 7 % (ref 0–4)
GFR AFRICAN AMERICAN: >60 ML/MIN
GFR NON-AFRICAN AMERICAN: >60 ML/MIN
GFR SERPL CREATININE-BSD FRML MDRD: NORMAL ML/MIN/{1.73_M2}
GFR SERPL CREATININE-BSD FRML MDRD: NORMAL ML/MIN/{1.73_M2}
GLUCOSE BLD-MCNC: 85 MG/DL (ref 70–99)
HCT VFR BLD CALC: 41.4 % (ref 36–46)
HEMOGLOBIN: 13.7 G/DL (ref 12–16)
IMMATURE GRANULOCYTES: ABNORMAL %
LYMPHOCYTES # BLD: 29 % (ref 24–44)
Lab: NORMAL
MCH RBC QN AUTO: 30.8 PG (ref 26–34)
MCHC RBC AUTO-ENTMCNC: 33 G/DL (ref 31–37)
MCV RBC AUTO: 93.3 FL (ref 80–100)
MONOCYTES # BLD: 9 % (ref 1–7)
NRBC AUTOMATED: ABNORMAL PER 100 WBC
PDW BLD-RTO: 13.3 % (ref 11.5–14.9)
PLATELET # BLD: 307 K/UL (ref 150–450)
PLATELET ESTIMATE: ABNORMAL
PMV BLD AUTO: 8.1 FL (ref 6–12)
POTASSIUM SERPL-SCNC: 4.5 MMOL/L (ref 3.7–5.3)
PRO-BNP: 84 PG/ML
RBC # BLD: 4.44 M/UL (ref 4–5.2)
RBC # BLD: ABNORMAL 10*6/UL
REASON FOR REJECTION: NORMAL
SEG NEUTROPHILS: 54 % (ref 36–66)
SEGMENTED NEUTROPHILS ABSOLUTE COUNT: 3.5 K/UL (ref 1.3–9.1)
SODIUM BLD-SCNC: 142 MMOL/L (ref 135–144)
SPECIMEN DESCRIPTION: NORMAL
TROPONIN INTERP: NORMAL
TROPONIN T: NORMAL NG/ML
TROPONIN, HIGH SENSITIVITY: 8 NG/L (ref 0–14)
WBC # BLD: 6.5 K/UL (ref 3.5–11)
WBC # BLD: ABNORMAL 10*3/UL
ZZ NTE CLEAN UP: ORDERED TEST: NORMAL
ZZ NTE WITH NAME CLEAN UP: SPECIMEN SOURCE: NORMAL

## 2019-03-13 PROCEDURE — 83880 ASSAY OF NATRIURETIC PEPTIDE: CPT

## 2019-03-13 PROCEDURE — 87804 INFLUENZA ASSAY W/OPTIC: CPT

## 2019-03-13 PROCEDURE — 36415 COLL VENOUS BLD VENIPUNCTURE: CPT

## 2019-03-13 PROCEDURE — 85025 COMPLETE CBC W/AUTO DIFF WBC: CPT

## 2019-03-13 PROCEDURE — 80048 BASIC METABOLIC PNL TOTAL CA: CPT

## 2019-03-13 PROCEDURE — 84484 ASSAY OF TROPONIN QUANT: CPT

## 2019-03-13 PROCEDURE — 71046 X-RAY EXAM CHEST 2 VIEWS: CPT

## 2019-03-13 PROCEDURE — 99285 EMERGENCY DEPT VISIT HI MDM: CPT

## 2019-03-13 RX ORDER — ALBUTEROL SULFATE 90 UG/1
2 AEROSOL, METERED RESPIRATORY (INHALATION) 4 TIMES DAILY PRN
Qty: 1 INHALER | Refills: 0 | Status: SHIPPED | OUTPATIENT
Start: 2019-03-13 | End: 2019-10-31

## 2019-03-13 RX ORDER — AZITHROMYCIN 250 MG/1
TABLET, FILM COATED ORAL
Qty: 1 PACKET | Refills: 0 | Status: SHIPPED | OUTPATIENT
Start: 2019-03-13 | End: 2019-03-23

## 2019-03-13 ASSESSMENT — PAIN DESCRIPTION - FREQUENCY: FREQUENCY: INTERMITTENT

## 2019-03-13 ASSESSMENT — ENCOUNTER SYMPTOMS
NAUSEA: 0
WHEEZING: 1
ABDOMINAL PAIN: 0
VOMITING: 0
COUGH: 1
SHORTNESS OF BREATH: 1

## 2019-03-13 ASSESSMENT — PAIN SCALES - GENERAL: PAINLEVEL_OUTOF10: 3

## 2019-03-13 ASSESSMENT — PAIN DESCRIPTION - PAIN TYPE: TYPE: ACUTE PAIN

## 2019-03-14 ENCOUNTER — HOSPITAL ENCOUNTER (OUTPATIENT)
Dept: NUCLEAR MEDICINE | Age: 74
Discharge: HOME | End: 2019-03-16
Payer: MEDICARE

## 2019-03-14 ENCOUNTER — OFFICE VISIT (OUTPATIENT)
Dept: FAMILY MEDICINE CLINIC | Age: 74
End: 2019-03-14
Payer: MEDICARE

## 2019-03-14 VITALS
SYSTOLIC BLOOD PRESSURE: 126 MMHG | TEMPERATURE: 98.2 F | RESPIRATION RATE: 24 BRPM | WEIGHT: 153.2 LBS | DIASTOLIC BLOOD PRESSURE: 82 MMHG | BODY MASS INDEX: 29.92 KG/M2 | HEART RATE: 108 BPM

## 2019-03-14 DIAGNOSIS — F11.20 UNCOMPLICATED OPIOID DEPENDENCE (HCC): ICD-10-CM

## 2019-03-14 DIAGNOSIS — T80.1XXD INTRAVENOUS INFILTRATION, SUBSEQUENT ENCOUNTER: ICD-10-CM

## 2019-03-14 DIAGNOSIS — R06.89 DECREASED BREATH SOUNDS: ICD-10-CM

## 2019-03-14 DIAGNOSIS — R06.02 SHORTNESS OF BREATH: ICD-10-CM

## 2019-03-14 DIAGNOSIS — J41.0 SIMPLE CHRONIC BRONCHITIS (HCC): Primary | Chronic | ICD-10-CM

## 2019-03-14 DIAGNOSIS — R06.02 SHORT OF BREATH ON EXERTION: ICD-10-CM

## 2019-03-14 DIAGNOSIS — R05.3 CHRONIC COUGH: ICD-10-CM

## 2019-03-14 PROCEDURE — A9538 TC99M PYROPHOSPHATE: HCPCS | Performed by: NURSE PRACTITIONER

## 2019-03-14 PROCEDURE — A9540 TC99M MAA: HCPCS | Performed by: NURSE PRACTITIONER

## 2019-03-14 PROCEDURE — 3430000000 HC RX DIAGNOSTIC RADIOPHARMACEUTICAL: Performed by: NURSE PRACTITIONER

## 2019-03-14 PROCEDURE — 99214 OFFICE O/P EST MOD 30 MIN: CPT | Performed by: NURSE PRACTITIONER

## 2019-03-14 PROCEDURE — 2580000003 HC RX 258: Performed by: NURSE PRACTITIONER

## 2019-03-14 PROCEDURE — 78582 LUNG VENTILAT&PERFUS IMAGING: CPT

## 2019-03-14 RX ORDER — BENZONATATE 100 MG/1
100 CAPSULE ORAL EVERY 6 HOURS PRN
Qty: 30 CAPSULE | Refills: 0 | Status: SHIPPED | OUTPATIENT
Start: 2019-03-14 | End: 2019-03-24

## 2019-03-14 RX ORDER — SODIUM CHLORIDE 0.9 % (FLUSH) 0.9 %
10 SYRINGE (ML) INJECTION ONCE
Status: COMPLETED | OUTPATIENT
Start: 2019-03-14 | End: 2019-03-14

## 2019-03-14 RX ADMIN — Medication 10 ML: at 12:45

## 2019-03-14 RX ADMIN — Medication 4.6 MILLICURIE: at 12:45

## 2019-03-14 RX ADMIN — Medication 40.1 MILLICURIE: at 12:45

## 2019-03-14 ASSESSMENT — ENCOUNTER SYMPTOMS
CONSTIPATION: 0
COUGH: 1
WHEEZING: 1
SHORTNESS OF BREATH: 1
DIARRHEA: 0

## 2019-03-15 DIAGNOSIS — J44.9 CHRONIC OBSTRUCTIVE PULMONARY DISEASE, UNSPECIFIED COPD TYPE (HCC): Primary | Chronic | ICD-10-CM

## 2019-03-20 ENCOUNTER — NURSE ONLY (OUTPATIENT)
Dept: FAMILY MEDICINE CLINIC | Age: 74
End: 2019-03-20
Payer: MEDICARE

## 2019-03-20 DIAGNOSIS — E53.8 B12 DEFICIENCY: Primary | ICD-10-CM

## 2019-03-20 PROCEDURE — 96372 THER/PROPH/DIAG INJ SC/IM: CPT | Performed by: NURSE PRACTITIONER

## 2019-03-20 RX ORDER — CYANOCOBALAMIN 1000 UG/ML
1000 INJECTION INTRAMUSCULAR; SUBCUTANEOUS ONCE
Status: COMPLETED | OUTPATIENT
Start: 2019-03-20 | End: 2019-03-20

## 2019-03-20 RX ADMIN — CYANOCOBALAMIN 1000 MCG: 1000 INJECTION INTRAMUSCULAR; SUBCUTANEOUS at 11:15

## 2019-03-27 DIAGNOSIS — F51.01 PRIMARY INSOMNIA: ICD-10-CM

## 2019-03-28 RX ORDER — TRAZODONE HYDROCHLORIDE 50 MG/1
TABLET ORAL
Qty: 30 TABLET | Refills: 1 | Status: SHIPPED | OUTPATIENT
Start: 2019-03-28 | End: 2019-06-10 | Stop reason: SDUPTHER

## 2019-04-23 ENCOUNTER — HOSPITAL ENCOUNTER (OUTPATIENT)
Dept: PULMONOLOGY | Age: 74
Discharge: HOME | End: 2019-04-23
Payer: MEDICARE

## 2019-04-23 DIAGNOSIS — J44.9 CHRONIC OBSTRUCTIVE PULMONARY DISEASE, UNSPECIFIED COPD TYPE (HCC): Chronic | ICD-10-CM

## 2019-04-23 PROCEDURE — 94664 DEMO&/EVAL PT USE INHALER: CPT

## 2019-04-23 PROCEDURE — 94729 DIFFUSING CAPACITY: CPT

## 2019-04-23 PROCEDURE — 94728 AIRWY RESIST BY OSCILLOMETRY: CPT

## 2019-04-23 PROCEDURE — 94060 EVALUATION OF WHEEZING: CPT

## 2019-04-23 PROCEDURE — 6360000002 HC RX W HCPCS: Performed by: NURSE PRACTITIONER

## 2019-04-23 PROCEDURE — 94726 PLETHYSMOGRAPHY LUNG VOLUMES: CPT

## 2019-04-23 PROCEDURE — 94727 GAS DIL/WSHOT DETER LNG VOL: CPT

## 2019-04-23 RX ORDER — ALBUTEROL SULFATE 2.5 MG/3ML
2.5 SOLUTION RESPIRATORY (INHALATION) ONCE
Status: COMPLETED | OUTPATIENT
Start: 2019-04-23 | End: 2019-04-23

## 2019-04-23 RX ADMIN — ALBUTEROL SULFATE 2.5 MG: 2.5 SOLUTION RESPIRATORY (INHALATION) at 11:05

## 2019-04-25 ENCOUNTER — NURSE ONLY (OUTPATIENT)
Dept: FAMILY MEDICINE CLINIC | Age: 74
End: 2019-04-25
Payer: MEDICARE

## 2019-04-25 DIAGNOSIS — F51.01 PRIMARY INSOMNIA: Primary | ICD-10-CM

## 2019-04-25 DIAGNOSIS — E53.8 B12 DEFICIENCY: ICD-10-CM

## 2019-04-25 PROCEDURE — 96372 THER/PROPH/DIAG INJ SC/IM: CPT | Performed by: NURSE PRACTITIONER

## 2019-04-25 RX ORDER — CYANOCOBALAMIN 1000 UG/ML
1000 INJECTION INTRAMUSCULAR; SUBCUTANEOUS ONCE
Status: COMPLETED | OUTPATIENT
Start: 2019-04-25 | End: 2019-04-25

## 2019-04-25 RX ORDER — QUETIAPINE FUMARATE 50 MG/1
TABLET, FILM COATED ORAL
Qty: 60 TABLET | Refills: 0 | Status: CANCELLED | OUTPATIENT
Start: 2019-04-25 | End: 2020-04-25

## 2019-04-25 RX ADMIN — CYANOCOBALAMIN 1000 MCG: 1000 INJECTION INTRAMUSCULAR; SUBCUTANEOUS at 11:33

## 2019-04-25 NOTE — PROGRESS NOTES
Patient presents in office today for B12 injection. She is alert, dressed appropriately for the weather, and no signs of reaction. Injection tolerated well.

## 2019-04-26 RX ORDER — QUETIAPINE FUMARATE 50 MG/1
TABLET, FILM COATED ORAL
Qty: 60 TABLET | Refills: 0 | Status: SHIPPED | OUTPATIENT
Start: 2019-04-26 | End: 2019-04-29 | Stop reason: SDUPTHER

## 2019-04-29 DIAGNOSIS — E79.0 HYPERURICEMIA: ICD-10-CM

## 2019-04-29 DIAGNOSIS — F51.01 PRIMARY INSOMNIA: ICD-10-CM

## 2019-04-29 DIAGNOSIS — E78.00 PURE HYPERCHOLESTEROLEMIA: Primary | Chronic | ICD-10-CM

## 2019-04-29 RX ORDER — QUETIAPINE FUMARATE 50 MG/1
TABLET, FILM COATED ORAL
Qty: 60 TABLET | Refills: 0 | Status: SHIPPED | OUTPATIENT
Start: 2019-04-29 | End: 2019-06-28 | Stop reason: SDUPTHER

## 2019-04-29 RX ORDER — ATORVASTATIN CALCIUM 40 MG/1
40 TABLET, FILM COATED ORAL DAILY
Qty: 30 TABLET | Refills: 5 | Status: SHIPPED | OUTPATIENT
Start: 2019-04-29 | End: 2020-01-30

## 2019-04-29 NOTE — TELEPHONE ENCOUNTER
LRF 10-29-18 atorvastatin # 30 RF 5, sertraline # 30 RF 5,seroquel was sent 4-26-19. LOV 2-19-19  RTO 3 mo, scheduled    Health Maintenance   Topic Date Due    DTaP/Tdap/Td vaccine (1 - Tdap) 05/24/1962    Shingles Vaccine (1 of 2) 05/24/1993    Colon Cancer Screen FIT/FOBT  05/22/2019    Diabetic foot exam  10/16/2019    Diabetic retinal exam  02/01/2020    A1C test (Diabetic or Prediabetic)  02/19/2020    Lipid screen  02/19/2020    DEXA (modify frequency per FRAX score)  Completed    Flu vaccine  Completed    Pneumococcal 65+ years Vaccine  Completed             (applicable per patient's age: Cancer Screenings, Depression Screening, Fall Risk Screening, Immunizations)    Hemoglobin A1C (%)   Date Value   02/19/2019 5.5   05/23/2018 5.3   07/31/2017 5.5     Microalb/Crt.  Ratio (mcg/mg creat)   Date Value   05/12/2016 7     LDL Cholesterol (mg/dL)   Date Value   02/19/2019 89     LDL Calculated (mg/dL)   Date Value   11/13/2014 109     AST (U/L)   Date Value   02/19/2019 22     ALT (U/L)   Date Value   02/19/2019 17     BUN (mg/dL)   Date Value   03/13/2019 19      (goal A1C is < 7)   (goal LDL is <100) need 30-50% reduction from baseline     BP Readings from Last 3 Encounters:   03/14/19 126/82   03/13/19 (!) 140/91   02/19/19 116/78    (goal /80)      All Future Testing planned in [facility]:      Next Visit Date:  Future Appointments   Date Time Provider Glenroy Falcon   5/8/2019  9:40 AM DES Lan CNP [facility]   5/22/2019  9:20 AM [place] Beavers, APRN - CNP [id number]            Patient Active Problem List:     Hyperlipidemia     Fibromyalgia     Osteopenia     Hx of bilateral breast implants     Vitamin D deficiency     Grief at loss of child     Lumbar stenosis     Arthritis of shoulder region, right     COPD (chronic obstructive pulmonary disease) (HCC)     Anxiety     Anemia, normocytic normochromic     Chronic pain associated with significant psychosocial dysfunction     Carpal tunnel syndrome on both sides     Primary osteoarthritis involving multiple joints     Spondylosis 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 neuropathy (HCC)     Primary insomnia     Lumbar and sacral arthritis     Chronic biliary pancreatitis (HCC)     Moderate episode of recurrent major depressive disorder (HCC)     Uncomplicated opioid dependence (HCC)     Chronic bilateral low back pain without sciatica     Hyperuricemia     Primary open-angle glaucoma, right eye, mild stage     Primary open-angle glaucoma, left eye, mild stage

## 2019-04-30 NOTE — PROCEDURES
[address]                 [address] [facility], [address]                               PULMONARY FUNCTION    PATIENT NAME: Tyler RIVERO                   :        1945  MED REC NO:   [id number]                              ROOM:  ACCOUNT NO:   [de-identified]                           ADMIT DATE: 2019  PROVIDER:     Rosa Todd    DATE OF PROCEDURE:  2019    RESULTS:  The FVC and FEV1 are normal.  FEV1/FVC ratio is normal.  There  is no response to bronchodilators. Lung volumes are normal.  Diffusion  capacity is normal.  Airways resistance is normal.    IMPRESSION:  Normal pulmonary function testing.         Gilma Jimenez    D: 2019 22:38:12       T: 2019 7:34:08     [id number]  Job#: [id number]     Doc#: [id number]    CC:

## 2019-05-09 ENCOUNTER — HOSPITAL ENCOUNTER (EMERGENCY)
Age: 74
Discharge: HOME | End: 2019-05-09
Attending: EMERGENCY MEDICINE
Payer: MEDICARE

## 2019-05-09 ENCOUNTER — APPOINTMENT (OUTPATIENT)
Dept: GENERAL RADIOLOGY | Age: 74
End: 2019-05-09
Payer: MEDICARE

## 2019-05-09 VITALS
TEMPERATURE: 98 F | DIASTOLIC BLOOD PRESSURE: 86 MMHG | HEART RATE: 103 BPM | BODY MASS INDEX: 23.9 KG/M2 | WEIGHT: 140 LBS | HEIGHT: 64 IN | OXYGEN SATURATION: 96 % | RESPIRATION RATE: 16 BRPM | SYSTOLIC BLOOD PRESSURE: 150 MMHG

## 2019-05-09 DIAGNOSIS — M71.21 BAKER CYST, RIGHT: Primary | ICD-10-CM

## 2019-05-09 PROCEDURE — 99284 EMERGENCY DEPT VISIT MOD MDM: CPT

## 2019-05-09 PROCEDURE — 93971 EXTREMITY STUDY: CPT

## 2019-05-09 PROCEDURE — 73560 X-RAY EXAM OF KNEE 1 OR 2: CPT

## 2019-05-09 PROCEDURE — 6370000000 HC RX 637 (ALT 250 FOR IP): Performed by: EMERGENCY MEDICINE

## 2019-05-09 RX ORDER — TRAMADOL HYDROCHLORIDE 50 MG/1
50 TABLET ORAL EVERY 8 HOURS PRN
Qty: 9 TABLET | Refills: 0 | Status: SHIPPED | OUTPATIENT
Start: 2019-05-09 | End: 2019-05-12

## 2019-05-09 RX ORDER — HYDROCODONE BITARTRATE AND ACETAMINOPHEN 5; 325 MG/1; MG/1
1 TABLET ORAL ONCE
Status: COMPLETED | OUTPATIENT
Start: 2019-05-09 | End: 2019-05-09

## 2019-05-09 RX ADMIN — HYDROCODONE BITARTRATE AND ACETAMINOPHEN 1 TABLET: 5; 325 TABLET ORAL at 18:32

## 2019-05-09 ASSESSMENT — PAIN DESCRIPTION - PAIN TYPE: TYPE: ACUTE PAIN

## 2019-05-09 ASSESSMENT — PAIN SCALES - GENERAL
PAINLEVEL_OUTOF10: 10
PAINLEVEL_OUTOF10: 5

## 2019-05-09 ASSESSMENT — PAIN DESCRIPTION - ORIENTATION: ORIENTATION: RIGHT;POSTERIOR

## 2019-05-09 ASSESSMENT — PAIN DESCRIPTION - LOCATION: LOCATION: KNEE;LEG

## 2019-05-09 NOTE — ED PROVIDER NOTES
10/03/2016    [place] ARTHRS KNE SURG W/MENISCECTOMY MED/LAT W/SHVG Left 8/13/2018    KNEE ARTHROSCOPY FOR PARTIAL MEDIAL AND PARTIAL LATERAL MENISCECTOMY performed by Amelia Dillard MD at [address] [place] [address]      due to blockage     CURRENT MEDICATIONS       Discharge Medication List as of 5/9/2019  7:22 PM      CONTINUE these medications which have NOT CHANGED    Details   !! QUEtiapine (SEROQUEL) 50 MG tablet Take 1-2 tablets by mouth nightly as needed for sleep., Disp-60 tablet, R-0Normal      atorvastatin (LIPITOR) 40 MG tablet Take 1 tablet by mouth daily, Disp-30 tablet, R-5Normal      sertraline (ZOLOFT) 50 MG tablet Take 1 tablet by mouth daily, Disp-30 tablet, R-5Normal      traZODone (DESYREL) 50 MG tablet TAKE 1 TABLET BY MOUTH AT BEDTIME , Disp-30 tablet, R-1Normal      Fluticasone-Umeclidin-Vilant (TRELEGY ELLIPTA) 100-62.5-25 MCG/INH AEPB Inhale 1 puff into the lungs daily, Disp-2 each, R-0Normal      albuterol sulfate  (90 Base) MCG/ACT inhaler Inhale 2 puffs into the lungs 4 times daily as needed for Wheezing, Disp-1 Inhaler, R-0Print      montelukast (SINGULAIR) 10 MG tablet Take 1 tablet by mouth nightly, Disp-30 tablet, R-5Normal      metFORMIN (GLUCOPHAGE) 500 MG tablet Take 1 tablet by mouth 2 times daily (with meals), Disp-60 tablet, R-5Normal      dorzolamide-timolol (COSOPT) 22.3-6.8 MG/ML ophthalmic solution Place 1 drop into both eyes 2 times daily, R-11Historical Med      !! QUEtiapine (SEROQUEL) 200 MG tablet Take 1 tablet by mouth daily, Disp-90 tablet, R-1Normal      allopurinol (ZYLOPRIM) 100 MG tablet Take 1 tablet by mouth daily, Disp-30 tablet, R-5Normal      mometasone-formoterol (DULERA) 200-5 MCG/ACT inhaler Inhale 2 puffs into the lungs every 12 hours, Disp-1 Inhaler, R-0Normal      meloxicam (MOBIC) 15 MG tablet Take 1 tablet by mouth daily, Disp-30 tablet, R-3Historical Med      Calcium Carbonate-Vitamin D (OYSTER SHELL CALCIUM/D) 500-200 MG-UNIT TABS unlikely given no direct trauma or tenderness. Dislocation considered but also seems highly unlikely. ED Course as of May 09 2047   Thu May 09, 2019   4808 No acute fracture, multiple osteophytes, small loose foreign body in the posterior knee. Reviewing other x-rays, this is been before. XR KNEE RIGHT (1-2 VIEWS) [TR]   1919 DVT ultrasound negative for DVT but positive for Baker's cyst.   VL DUP LOWER EXTREMITY VENOUS RIGHT [TR]   1919 Given presentation, location of pain, ultrasound findings, no acute finding on x-ray, her clinical picture is most consistent with a Baker's cyst.  Patient will be given analgesic pain control, Ace wrap, and supportive care. [TR]      ED Course User Index  [TR] Janay Lafleur MD         Procedures    DIAGNOSTIC RESULTS   EKG:All EKG's are interpreted by the Emergency Department Physician who either signs or Co-signs this chart in the absence of a cardiologist.      RADIOLOGY:All plain film, CT, MRI, and formal ultrasound images (except ED bedside ultrasound) are read by the radiologist, see reports below, unless otherwisenoted in MDM or here. XR KNEE RIGHT (1-2 VIEWS)   Final Result   1. Small joint effusion. 2. Tricompartmental osteoarthrosis with mild narrowing of the patellofemoral   compartment. 3. Small loose body projecting over the posterior joint recess measuring up   to 3 mm. 4. No acute fracture or gross dislocation. VL DUP LOWER EXTREMITY VENOUS RIGHT    (Results Pending)     LABS: All lab results were reviewed by myself, and all abnormals are listed below.   Labs Reviewed - No data to display  EMERGENCY DEPARTMENTCOURSE:   Vitals:    Vitals:    05/09/19 1720   BP: (!) 150/86   Pulse: 103   Resp: 16   Temp: 98 °F (36.7 °C)   SpO2: 96%   Weight: 140 lb (63.5 kg)   Height: 5' 4\" (1.626 m)       The patient was given the following medications while in the emergency department:  Orders Placed This Encounter   Medications    HYDROcodone-acetaminophen (NORCO) 5-325 MG per tablet 1 tablet    traMADol (ULTRAM) 50 MG tablet     Sig: Take 1 tablet by mouth every 8 hours as needed for Pain for up to 3 days. Dispense:  9 tablet     Refill:  0     CONSULTS:  None    FINAL IMPRESSION      1. Baker cyst, right          DISPOSITION/PLAN   DISPOSITION Decision To Discharge 05/09/2019 07:17:04 PM      PATIENT REFERRED TO:  Gil Westfall MD  [address] [place] [address] 60. [address] [facility]  [phone]      If symptoms worsen    DISCHARGE MEDICATIONS:  Discharge Medication List as of 5/9/2019  7:22 PM      START taking these medications    Details   traMADol (ULTRAM) 50 MG tablet Take 1 tablet by mouth every 8 hours as needed for Pain for up to 3 days. , Disp-9 tablet, R-0Print           Lili Poe MD  Attending Emergency Physician  [employer] voice recognition software used in portions of this document.                     Lili Poe MD  05/09/19 1813

## 2019-05-10 ENCOUNTER — CARE COORDINATION (OUTPATIENT)
Dept: CARE COORDINATION | Age: 74
End: 2019-05-10

## 2019-05-10 NOTE — CARE COORDINATION
Patient was called to follow up with most recent ER visit. There was no answer. A message was left on voicemail to have patient call back regarding ER visit. Office number given [phone].       FU appts/Provider:    Future Appointments   Date Time Provider Glenroy Falcon   5/22/2019  9:20 AM Deborra Banker, APRN - CNP [facility]

## 2019-05-15 ENCOUNTER — OFFICE VISIT (OUTPATIENT)
Dept: FAMILY MEDICINE CLINIC | Age: 74
End: 2019-05-15
Payer: MEDICARE

## 2019-05-15 VITALS — TEMPERATURE: 98.4 F | DIASTOLIC BLOOD PRESSURE: 86 MMHG | HEART RATE: 80 BPM | SYSTOLIC BLOOD PRESSURE: 114 MMHG

## 2019-05-15 DIAGNOSIS — M71.21 BAKER'S CYST OF KNEE, RIGHT: Primary | ICD-10-CM

## 2019-05-15 DIAGNOSIS — M25.561 ACUTE PAIN OF RIGHT KNEE: ICD-10-CM

## 2019-05-15 PROCEDURE — 99213 OFFICE O/P EST LOW 20 MIN: CPT | Performed by: NURSE PRACTITIONER

## 2019-05-15 ASSESSMENT — ENCOUNTER SYMPTOMS
COUGH: 1
SHORTNESS OF BREATH: 1
CONSTIPATION: 0
DIARRHEA: 0
WHEEZING: 1

## 2019-05-15 NOTE — PROGRESS NOTES
Subjective:     HPI: Ludwig Mcdonald is a 74 y.o. female who presents in office today for knee pain. Was recently in ED for a baker's cyst. States she was going down steps when she suddenly felt something \"pop\" on the back of her knee along with severe pain. Leg buckled and she subsequently fell forwards. Was able to brace most of her fall and only hit her knee, although she denies any current anterior knee pain or leg pain. She denies any head injury. Back of her knee still extremely painful to the point that she cannot put any weight on it because it causes severe sharp pain. Has been keeping it up as much as she can.  isn't helping her much. Swelling is slowly getting better. She denies pain anywhere else. She denies any leg numbness or tingling. Trouble getting out of the car also. Cannot put pressure on it still. Walking with a quad cane. She denies any pain prior to this episode. Taking ibuprofen and tylenol, tries to take an old percocet and it made her sick, cannot take these. She denies any leg swelling.     Review of Systems   Constitutional: Negative for appetite change, chills, diaphoresis, fatigue and fever. HENT: Negative. Chronic cataracts   Eyes: Positive for visual disturbance. Feels vision is ok. Stable, recent eye surgery, still healing, another surgery coming up   Respiratory: Positive for cough, shortness of breath and wheezing. Chronic. Stable, no better lately, no worse   Cardiovascular: Negative for chest pain. Gastrointestinal: Negative for constipation and diarrhea. Endocrine: Negative for polydipsia and polyuria. Tries to eat healthy. Frustrated due to weight gain. Genitourinary: Negative for dysuria. Musculoskeletal: Positive for arthralgias (left knee) and gait problem (cannot put weight on legs). Hematological: Negative. Psychiatric/Behavioral: Positive for dysphoric mood and sleep disturbance (due to pain).  The patient is nervous/anxious. She feels her mood is ok. Follows with specialist.      Patient Care Team:  Harris Alston MD as PCP - Leatha Poon MD as PCP - S Attributed Provider    Visit Information    Have you changed or started any medications since your last visit including any over-the-counter medicines, vitamins, or herbal medicines? no   Are you having any side effects from any of your medications? -  no  Have you stopped taking any of your medications? Is so, why? -  no    Have you seen any other physician or provider since your last visit? No  Have you had any other diagnostic tests since your last visit? Yes - Records Obtained  Have you been seen in the emergency room and/or had an admission to a hospital since we last saw you? Yes - Records Obtained  Have you had your routine dental cleaning in the past 6 months? yes -     Have you activated your [employer] account? If not, what are your barriers? Yes   If activated, Do you have the mobile miguelina and comfortable using functions?  No:      Medical History Review    Social History     Socioeconomic History    Marital status:      Spouse name: None    Number of children: None    Years of education: None    Highest education level: None   Occupational History    Occupation: NOT EMPLOYEED   Social Needs    Financial resource strain: None    Food insecurity:     Worry: None     Inability: None    Transportation needs:     Medical: None     Non-medical: None   Tobacco Use    Smoking status: Never Smoker    Smokeless tobacco: Never Used   Substance and Sexual Activity    Alcohol use: No     Alcohol/week: 0.0 oz    Drug use: No    Sexual activity: None   Lifestyle    Physical activity:     Days per week: None     Minutes per session: None    Stress: None   Relationships    Social connections:     Talks on phone: None     Gets together: None     Attends Anglican service: None     Active member of club or organization: None Fluticasone-Umeclidin-Vilant (TRELEGY ELLIPTA) 100-62.5-25 MCG/INH AEPB Inhale 1 puff into the lungs daily 2 each 0    albuterol sulfate  (90 Base) MCG/ACT inhaler Inhale 2 puffs into the lungs 4 times daily as needed for Wheezing 1 Inhaler 0    montelukast (SINGULAIR) 10 MG tablet Take 1 tablet by mouth nightly 30 tablet 5    metFORMIN (GLUCOPHAGE) 500 MG tablet Take 1 tablet by mouth 2 times daily (with meals) 60 tablet 5    dorzolamide-timolol (COSOPT) 22.3-6.8 MG/ML ophthalmic solution Place 1 drop into both eyes 2 times daily  11    QUEtiapine (SEROQUEL) 200 MG tablet Take 1 tablet by mouth daily 90 tablet 1    allopurinol (ZYLOPRIM) 100 MG tablet Take 1 tablet by mouth daily 30 tablet 5    mometasone-formoterol (DULERA) 200-5 MCG/ACT inhaler Inhale 2 puffs into the lungs every 12 hours 1 Inhaler 0    meloxicam (MOBIC) 15 MG tablet Take 1 tablet by mouth daily 30 tablet 3    Calcium Carbonate-Vitamin D (OYSTER SHELL CALCIUM/D) 500-200 MG-UNIT TABS Take 1 tablet by mouth 3 times daily (Patient taking differently: Take 1 tablet by mouth daily ) 90 tablet 5     No current facility-administered medications for this visit. Objective:     /86 (Site: Left Upper Arm, Position: Sitting, Cuff Size: Medium Adult)   Pulse 80   Temp 98.4 °F (36.9 °C) (Tympanic)      Physical Exam   Constitutional: She is oriented to person, place, and time. She appears well-developed. HENT:   Head: Normocephalic. Eyes: Conjunctivae are normal.   Cardiovascular: Normal rate and regular rhythm. Pulmonary/Chest: Effort normal. No respiratory distress. She has decreased breath sounds. She has no wheezes. Diffuse decrease anterior and posterior. Bronchitic cough. No wheezes. Moderate effort. Abdominal: Soft. She exhibits no distension. There is no tenderness. Protuberant    Musculoskeletal: She exhibits no edema. Right knee: She exhibits decreased range of motion, swelling and effusion (mild). She exhibits no erythema and no bony tenderness. Tenderness found. Medial joint line tenderness noted. Neurological: She is alert and oriented to person, place, and time. Skin: Skin is warm. Capillary refill takes less than 2 seconds. No erythema. Psychiatric: She has a normal mood and affect. Assessment:      Diagnosis Orders   1. Baker's cyst of knee, right  Wilbert Pinedo MD, Orthopedic Surgery, [place]   2. Acute pain of right knee  Wilbert Pinedo MD, Orthopedic Surgery, [place]     Plan:     Return if symptoms worsen or fail to improve. Discussed acute musculoskeletal pain. Discussed course that we must take due to insurance and to heal this possible injury. Exercised provided. Referral to PT discussed. Reviewed xray's. Patient to call  to see if he will do her pain injection in knee. If not ortho likely can do this. Additional imaging possible in future. Referral to Ortho. Encouraged healthy diet and exercise. Call office with any new or worsening symptoms or concerns. Aure received counseling on the following healthy behaviors: nutrition, exercise and medication adherence  Reviewed prior labs and health maintenance. Continue current medications, diet and exercise. Discussed use, benefit, and side effects of prescribed medications. Barriers to medication compliance addressed. Patient given educational materials - see patient instructions. All patient questions answered. Patient voiced understanding.            Electronically signed by DES Nichole CNP on 5/15/2019 at 3:29 PM

## 2019-05-22 ENCOUNTER — OFFICE VISIT (OUTPATIENT)
Dept: FAMILY MEDICINE CLINIC | Age: 74
End: 2019-05-22
Payer: MEDICARE

## 2019-05-22 VITALS
OXYGEN SATURATION: 98 % | DIASTOLIC BLOOD PRESSURE: 84 MMHG | TEMPERATURE: 97.9 F | HEART RATE: 100 BPM | SYSTOLIC BLOOD PRESSURE: 128 MMHG

## 2019-05-22 DIAGNOSIS — F41.9 ANXIETY: Chronic | ICD-10-CM

## 2019-05-22 DIAGNOSIS — M15.9 PRIMARY OSTEOARTHRITIS INVOLVING MULTIPLE JOINTS: ICD-10-CM

## 2019-05-22 DIAGNOSIS — G89.4 CHRONIC PAIN ASSOCIATED WITH SIGNIFICANT PSYCHOSOCIAL DYSFUNCTION: ICD-10-CM

## 2019-05-22 DIAGNOSIS — H40.1111 PRIMARY OPEN-ANGLE GLAUCOMA, RIGHT EYE, MILD STAGE: ICD-10-CM

## 2019-05-22 DIAGNOSIS — M19.011 ARTHRITIS OF SHOULDER REGION, RIGHT: Chronic | ICD-10-CM

## 2019-05-22 DIAGNOSIS — J41.0 SIMPLE CHRONIC BRONCHITIS (HCC): Primary | Chronic | ICD-10-CM

## 2019-05-22 DIAGNOSIS — H40.1121 PRIMARY OPEN-ANGLE GLAUCOMA, LEFT EYE, MILD STAGE: ICD-10-CM

## 2019-05-22 DIAGNOSIS — M79.7 FIBROMYALGIA: ICD-10-CM

## 2019-05-22 DIAGNOSIS — F11.20 UNCOMPLICATED OPIOID DEPENDENCE (HCC): ICD-10-CM

## 2019-05-22 DIAGNOSIS — M47.816 SPONDYLOSIS OF LUMBAR REGION WITHOUT MYELOPATHY OR RADICULOPATHY: ICD-10-CM

## 2019-05-22 DIAGNOSIS — F33.1 MODERATE EPISODE OF RECURRENT MAJOR DEPRESSIVE DISORDER (HCC): ICD-10-CM

## 2019-05-22 DIAGNOSIS — E78.00 PURE HYPERCHOLESTEROLEMIA: Chronic | ICD-10-CM

## 2019-05-22 DIAGNOSIS — E11.42 DIABETIC PERIPHERAL NEUROPATHY (HCC): ICD-10-CM

## 2019-05-22 DIAGNOSIS — F51.01 PRIMARY INSOMNIA: ICD-10-CM

## 2019-05-22 DIAGNOSIS — K86.1 CHRONIC BILIARY PANCREATITIS (HCC): ICD-10-CM

## 2019-05-22 DIAGNOSIS — E53.8 VITAMIN B12 DEFICIENCY: ICD-10-CM

## 2019-05-22 DIAGNOSIS — E55.9 VITAMIN D DEFICIENCY: ICD-10-CM

## 2019-05-22 DIAGNOSIS — E11.42 CONTROLLED TYPE 2 DIABETES MELLITUS WITH DIABETIC POLYNEUROPATHY, WITHOUT LONG-TERM CURRENT USE OF INSULIN (HCC): ICD-10-CM

## 2019-05-22 DIAGNOSIS — E79.0 HYPERURICEMIA: ICD-10-CM

## 2019-05-22 PROCEDURE — 96372 THER/PROPH/DIAG INJ SC/IM: CPT | Performed by: NURSE PRACTITIONER

## 2019-05-22 PROCEDURE — 99213 OFFICE O/P EST LOW 20 MIN: CPT | Performed by: NURSE PRACTITIONER

## 2019-05-22 RX ORDER — CYANOCOBALAMIN 1000 UG/ML
1000 INJECTION INTRAMUSCULAR; SUBCUTANEOUS ONCE
Status: COMPLETED | OUTPATIENT
Start: 2019-05-22 | End: 2019-05-22

## 2019-05-22 RX ADMIN — CYANOCOBALAMIN 1000 MCG: 1000 INJECTION INTRAMUSCULAR; SUBCUTANEOUS at 17:08

## 2019-05-22 ASSESSMENT — ENCOUNTER SYMPTOMS
SHORTNESS OF BREATH: 1
CONSTIPATION: 0
EYE DISCHARGE: 0
WHEEZING: 1
EYE ITCHING: 0
NAUSEA: 0
ABDOMINAL PAIN: 0
DIARRHEA: 0
RHINORRHEA: 0
SORE THROAT: 0
EYE REDNESS: 0
COUGH: 1

## 2019-05-22 NOTE — PROGRESS NOTES
Patient Care Team:  DES Pacheco CNP as PCP - General (Nurse Practitioner)  Raheel Keen MD as PCP - [facility] Attributed Provider    Visit Information    Have you changed or started any medications since your last visit including any over-the-counter medicines, vitamins, or herbal medicines? no   Are you having any side effects from any of your medications? -  no  Have you stopped taking any of your medications? Is so, why? -  no    Have you seen any other physician or provider since your last visit? No  Have you had any other diagnostic tests since your last visit? No  Have you been seen in the emergency room and/or had an admission to a hospital since we last saw you? No  Have you had your routine dental cleaning in the past 6 months? yes -     Have you activated your [employer] account? If not, what are your barriers? Yes   If activated, Do you have the mobile miguelina and comfortable using functions?  Yes     Medical History Review    Social History     Socioeconomic History    Marital status:      Spouse name: None    Number of children: None    Years of education: None    Highest education level: None   Occupational History    Occupation: NOT EMPLOYEED   Social Needs    Financial resource strain: None    Food insecurity:     Worry: None     Inability: None    Transportation needs:     Medical: None     Non-medical: None   Tobacco Use    Smoking status: Never Smoker    Smokeless tobacco: Never Used   Substance and Sexual Activity    Alcohol use: No     Alcohol/week: 0.0 oz    Drug use: No    Sexual activity: None   Lifestyle    Physical activity:     Days per week: None     Minutes per session: None    Stress: None   Relationships    Social connections:     Talks on phone: None     Gets together: None     Attends Adventist service: None     Active member of club or organization: None     Attends meetings of clubs or organizations: None     Relationship status: None    Intimate partner each 0    albuterol sulfate  (90 Base) MCG/ACT inhaler Inhale 2 puffs into the lungs 4 times daily as needed for Wheezing 1 Inhaler 0    montelukast (SINGULAIR) 10 MG tablet Take 1 tablet by mouth nightly 30 tablet 5    metFORMIN (GLUCOPHAGE) 500 MG tablet Take 1 tablet by mouth 2 times daily (with meals) 60 tablet 5    dorzolamide-timolol (COSOPT) 22.3-6.8 MG/ML ophthalmic solution Place 1 drop into both eyes 2 times daily  11    QUEtiapine (SEROQUEL) 200 MG tablet Take 1 tablet by mouth daily 90 tablet 1    allopurinol (ZYLOPRIM) 100 MG tablet Take 1 tablet by mouth daily 30 tablet 5    mometasone-formoterol (DULERA) 200-5 MCG/ACT inhaler Inhale 2 puffs into the lungs every 12 hours 1 Inhaler 0    meloxicam (MOBIC) 15 MG tablet Take 1 tablet by mouth daily 30 tablet 3    Calcium Carbonate-Vitamin D (OYSTER SHELL CALCIUM/D) 500-200 MG-UNIT TABS Take 1 tablet by mouth 3 times daily (Patient taking differently: Take 1 tablet by mouth daily ) 90 tablet 5     No current facility-administered medications for this visit. Objective:     /84 (Site: Right Upper Arm, Position: Sitting, Cuff Size: Medium Adult)   Pulse 100   Temp 97.9 °F (36.6 °C) (Tympanic)   SpO2 98%      Physical Exam   Constitutional: She is oriented to person, place, and time. She appears well-developed. HENT:   Head: Normocephalic. Eyes: Conjunctivae are normal.   Cardiovascular: Normal rate and regular rhythm. Pulmonary/Chest: Effort normal. No respiratory distress. She has no decreased breath sounds. She has no wheezes. Abdominal: Soft. She exhibits no distension. There is no tenderness. Protuberant    Musculoskeletal: She exhibits no edema. Right knee: She exhibits decreased range of motion, swelling and effusion (mild). She exhibits no erythema and no bony tenderness. Tenderness found. Medial joint line tenderness noted. Neurological: She is alert and oriented to person, place, and time.    Skin: Skin is warm. Capillary refill takes less than 2 seconds. No erythema. Psychiatric: She has a normal mood and affect. Assessment:      Diagnosis Orders   1. Simple chronic bronchitis ([address]     2. Chronic biliary pancreatitis ([address]     3. Controlled type 2 diabetes mellitus with diabetic polyneuropathy, without long-term current use of insulin ([address]     4. Diabetic peripheral neuropathy ([address]     5. Arthritis of shoulder region, right     6. Primary osteoarthritis involving multiple joints     7. Spondylosis 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     8. Anxiety     9. Chronic pain associated with significant psychosocial dysfunction     10. Fibromyalgia     11. Pure hypercholesterolemia     12. Hyperuricemia     13. Moderate episode of recurrent major depressive disorder ([address]     14. Primary insomnia     15. Primary open-angle glaucoma, left eye, mild stage     16. Primary open-angle glaucoma, right eye, mild stage     17. Uncomplicated opioid dependence ([address]     18. Vitamin B12 deficiency     19. Vitamin D deficiency         Plan:     No follow-ups on file. Stop inhaler for 2 weeks. No evidence of COPD in computer anywhere. Will continue to search through records. Will see if this resolves chart. Spirometry if she calls and says breathing is bad. Continue other medications as prescribed. Encouraged healthy diet and exercise. Call office with any new or worsening symptoms or concerns. Aure received counseling on the following healthy behaviors: nutrition, exercise and medication adherence  Reviewed prior labs and health maintenance. Continue current medications, diet and exercise. Discussed use, benefit, and side effects of prescribed medications. Barriers to medication compliance addressed. Patient given educational materials - see patient instructions. All patient questions answered. Patient voiced understanding.          Electronically signed by DES Mojica -

## 2019-05-31 DIAGNOSIS — F51.01 PRIMARY INSOMNIA: ICD-10-CM

## 2019-06-02 RX ORDER — TRAZODONE HYDROCHLORIDE 50 MG/1
50 TABLET ORAL NIGHTLY PRN
Qty: 30 TABLET | Refills: 5 | OUTPATIENT
Start: 2019-06-02

## 2019-06-10 DIAGNOSIS — F51.01 PRIMARY INSOMNIA: ICD-10-CM

## 2019-06-10 RX ORDER — TRAZODONE HYDROCHLORIDE 50 MG/1
TABLET ORAL
Qty: 30 TABLET | Refills: 1 | Status: SHIPPED | OUTPATIENT
Start: 2019-06-10 | End: 2019-06-20 | Stop reason: SDUPTHER

## 2019-06-10 NOTE — TELEPHONE ENCOUNTER
LRF 3-28-19 # 30 RF 5  LOV 2-19-19  RTO 3 mo  Left message for patient to call for appt    Health Maintenance   Topic Date Due    DTaP/Tdap/Td vaccine (1 - Tdap) 05/24/1962    Shingles Vaccine (1 of 2) 05/24/1993    DEXA (modify frequency per FRAX score)  Completed    Flu vaccine  Completed    Pneumococcal 65+ years Vaccine  Completed             (applicable per patient's age: Cancer Screenings, Depression Screening, Fall Risk Screening, Immunizations)    Hemoglobin A1C (%)   Date Value   02/19/2019 5.5   05/23/2018 5.3   07/31/2017 5.5     Microalb/Crt.  Ratio (mcg/mg creat)   Date Value   05/12/2016 7     LDL Cholesterol (mg/dL)   Date Value   02/19/2019 89     LDL Calculated (mg/dL)   Date Value   11/13/2014 109     AST (U/L)   Date Value   02/19/2019 22     ALT (U/L)   Date Value   02/19/2019 17     BUN (mg/dL)   Date Value   03/13/2019 19      (goal A1C is < 7)   (goal LDL is <100) need 30-50% reduction from baseline     BP Readings from Last 3 Encounters:   05/22/19 128/84   05/15/19 114/86   05/09/19 (!) 150/86    (goal /80)      All Future Testing planned in [facility]:      Next Visit Date:  Future Appointments   Date Time Provider Glenroy Falcon   6/11/2019  8:50 AM Kirt Parker MD [facility]            Patient Active Problem List:     Hyperlipidemia     Fibromyalgia     Osteopenia     Hx of bilateral breast implants     Vitamin D deficiency     Grief at loss of child     Arthritis of shoulder region, right     COPD (chronic obstructive pulmonary disease) (HCC)     Anxiety     Anemia, normocytic normochromic     Chronic pain associated with significant psychosocial dysfunction     Primary osteoarthritis involving multiple joints     Spondylosis 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     Vitamin B12 deficiency     Controlled type 2 diabetes mellitus with diabetic polyneuropathy, without long-term current use of insulin ([address]     Diabetic peripheral neuropathy ([address]

## 2019-06-11 ENCOUNTER — OFFICE VISIT (OUTPATIENT)
Dept: ORTHOPEDIC SURGERY | Age: 74
End: 2019-06-11
Payer: MEDICARE

## 2019-06-11 DIAGNOSIS — M17.11 ARTHRITIS OF RIGHT KNEE: ICD-10-CM

## 2019-06-11 DIAGNOSIS — M25.561 ACUTE PAIN OF RIGHT KNEE: ICD-10-CM

## 2019-06-11 DIAGNOSIS — M25.562 CHRONIC PAIN OF LEFT KNEE: Primary | ICD-10-CM

## 2019-06-11 DIAGNOSIS — G89.29 CHRONIC PAIN OF LEFT KNEE: Primary | ICD-10-CM

## 2019-06-11 PROCEDURE — 99213 OFFICE O/P EST LOW 20 MIN: CPT | Performed by: ORTHOPAEDIC SURGERY

## 2019-06-11 NOTE — PROGRESS NOTES
Arthritis of right knee            Plan:      Physical therapy    Patient is already been restarted on Mobic    Follow-up Dr. Romero Huynh 6 weeks        Wes Porter MD

## 2019-06-20 DIAGNOSIS — F51.01 PRIMARY INSOMNIA: ICD-10-CM

## 2019-06-20 RX ORDER — TRAZODONE HYDROCHLORIDE 50 MG/1
TABLET ORAL
Qty: 30 TABLET | Refills: 1 | Status: SHIPPED | OUTPATIENT
Start: 2019-06-20 | End: 2019-11-01 | Stop reason: SDUPTHER

## 2019-06-20 NOTE — TELEPHONE ENCOUNTER
Patient is taking 50 mg, writer did talk to her
([facility]     Uncomplicated opioid dependence ([facility]     Hyperuricemia     Primary open-angle glaucoma, right eye, mild stage     Primary open-angle glaucoma, left eye, mild stage  ]

## 2019-07-02 DIAGNOSIS — J41.0 SIMPLE CHRONIC BRONCHITIS (HCC): Chronic | ICD-10-CM

## 2019-07-03 NOTE — TELEPHONE ENCOUNTER
neuropathy ([facility]     Primary insomnia     Lumbar and sacral arthritis     Chronic biliary pancreatitis (HCC)     Moderate episode of recurrent major depressive disorder (HCC)     Uncomplicated opioid dependence (HCC)     Hyperuricemia     Primary open-angle glaucoma, right eye, mild stage     Primary open-angle glaucoma, left eye, mild stage

## 2019-07-05 ENCOUNTER — HOSPITAL ENCOUNTER (OUTPATIENT)
Dept: MRI IMAGING | Age: 74
Discharge: HOME | End: 2019-07-07
Payer: MEDICARE

## 2019-07-05 DIAGNOSIS — M17.0 ARTHRITIS OF BOTH KNEES: ICD-10-CM

## 2019-07-05 PROCEDURE — 73721 MRI JNT OF LWR EXTRE W/O DYE: CPT

## 2019-07-17 DIAGNOSIS — F41.9 ANXIETY: Chronic | ICD-10-CM

## 2019-07-17 DIAGNOSIS — F33.1 MAJOR DEPRESSIVE DISORDER, RECURRENT EPISODE, MODERATE (HCC): ICD-10-CM

## 2019-07-17 RX ORDER — QUETIAPINE FUMARATE 200 MG/1
TABLET, FILM COATED ORAL
Qty: 90 TABLET | Refills: 1 | Status: SHIPPED | OUTPATIENT
Start: 2019-07-17 | End: 2020-01-31 | Stop reason: SDUPTHER

## 2019-07-31 ENCOUNTER — NURSE ONLY (OUTPATIENT)
Dept: FAMILY MEDICINE CLINIC | Age: 74
End: 2019-07-31
Payer: MEDICARE

## 2019-07-31 DIAGNOSIS — F41.9 ANXIETY: Chronic | ICD-10-CM

## 2019-07-31 DIAGNOSIS — E53.8 VITAMIN B12 DEFICIENCY: Primary | ICD-10-CM

## 2019-07-31 DIAGNOSIS — F33.1 MAJOR DEPRESSIVE DISORDER, RECURRENT EPISODE, MODERATE (HCC): ICD-10-CM

## 2019-07-31 DIAGNOSIS — F51.01 PRIMARY INSOMNIA: ICD-10-CM

## 2019-07-31 PROCEDURE — 96372 THER/PROPH/DIAG INJ SC/IM: CPT | Performed by: NURSE PRACTITIONER

## 2019-07-31 RX ORDER — CYANOCOBALAMIN 1000 UG/ML
1000 INJECTION INTRAMUSCULAR; SUBCUTANEOUS ONCE
Status: COMPLETED | OUTPATIENT
Start: 2019-07-31 | End: 2019-07-31

## 2019-07-31 RX ORDER — QUETIAPINE FUMARATE 50 MG/1
TABLET, FILM COATED ORAL
Qty: 60 TABLET | Refills: 3 | OUTPATIENT
Start: 2019-07-31 | End: 2020-07-31

## 2019-07-31 RX ADMIN — CYANOCOBALAMIN 1000 MCG: 1000 INJECTION INTRAMUSCULAR; SUBCUTANEOUS at 14:49

## 2019-07-31 NOTE — TELEPHONE ENCOUNTER
Patient requesting refill of seroquel [facility]. LOV 5/22/19  RTO 6 months  LRF 7/02/19; 7/17/19    Health Maintenance   Topic Date Due    DTaP/Tdap/Td vaccine (1 - Tdap) 05/24/1962    Shingles Vaccine (1 of 2) 05/24/1993    Flu vaccine (1) 09/01/2019    DEXA (modify frequency per FRAX score)  Completed    Pneumococcal 65+ years Vaccine  Completed             (applicable per patient's age: Cancer Screenings, Depression Screening, Fall Risk Screening, Immunizations)    Hemoglobin A1C (%)   Date Value   02/19/2019 5.5   05/23/2018 5.3   07/31/2017 5.5     Microalb/Crt.  Ratio (mcg/mg creat)   Date Value   05/12/2016 7     LDL Cholesterol (mg/dL)   Date Value   02/19/2019 89     LDL Calculated (mg/dL)   Date Value   11/13/2014 109     AST (U/L)   Date Value   02/19/2019 22     ALT (U/L)   Date Value   02/19/2019 17     BUN (mg/dL)   Date Value   03/13/2019 19      (goal A1C is < 7)   (goal LDL is <100) need 30-50% reduction from baseline     BP Readings from Last 3 Encounters:   05/22/19 128/84   05/15/19 114/86   05/09/19 (!) 150/86    (goal /80)      All Future Testing planned in [facility]:      Next Visit Date:  Future Appointments   Date Time Provider Glenroy Falcon   8/21/2019  1:40 PM Michaelle Leak, APRN - CNP [facility]   9/3/2019 10:00 AM SCHEDULE, [facility]            Patient Active Problem List:     Hyperlipidemia     Fibromyalgia     Osteopenia     Hx of bilateral breast implants     Vitamin D deficiency     Grief at loss of child     Arthritis of shoulder region, right     COPD (chronic obstructive pulmonary disease) (HCC)     Anxiety     Anemia, normocytic normochromic     Chronic pain associated with significant psychosocial dysfunction     Primary osteoarthritis involving multiple joints     Spondylosis 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 neuropathy (HCC)     Primary insomnia     Lumbar and sacral arthritis     Chronic biliary pancreatitis (HCC)     Moderate episode of recurrent major depressive disorder (HCC)     Uncomplicated opioid dependence (HCC)     Hyperuricemia     Primary open-angle glaucoma, right eye, mild stage     Primary open-angle glaucoma, left eye, mild stage

## 2019-07-31 NOTE — PROGRESS NOTES
Patient presents in the office today for b12 injection. Patient is alert, oriented, and dressed appropriately for the day. Patient tolerated injection well with no reaction. States she is taking a b12 supplement when she remembers; trying to take care of herself again. Pre-op appointment was rescheduled per ortho. No concerns at this time.

## 2019-08-08 ENCOUNTER — HOSPITAL ENCOUNTER (OUTPATIENT)
Dept: PHYSICAL THERAPY | Age: 74
Setting detail: THERAPIES SERIES
Discharge: HOME | End: 2019-08-08
Payer: MEDICARE

## 2019-08-08 PROCEDURE — 97016 VASOPNEUMATIC DEVICE THERAPY: CPT

## 2019-08-08 PROCEDURE — 97161 PT EVAL LOW COMPLEX 20 MIN: CPT

## 2019-08-08 PROCEDURE — 97110 THERAPEUTIC EXERCISES: CPT

## 2019-08-12 ENCOUNTER — HOSPITAL ENCOUNTER (OUTPATIENT)
Dept: PHYSICAL THERAPY | Age: 74
Setting detail: THERAPIES SERIES
Discharge: HOME | End: 2019-08-12
Payer: MEDICARE

## 2019-08-12 PROCEDURE — 97110 THERAPEUTIC EXERCISES: CPT

## 2019-08-12 NOTE — PROGRESS NOTES
[facility]   [address]   Phone: [phone]   Fax: [phone]    Physical Therapy Daily Treatment Note    Date:  2019  Patient Name:  Stacey Kolb \"Shilpa\" : 1945                      MRN: [id number]  Physician: Dr. Jet Pardo DO                                   Insurance: [facility]  Medical Diagnosis: K65.858N - Other tear of medial meniscus, current injury, right knee, subsequent encounter               Rehab Codes: M25.561, M25.562, M25.469, M62.81, R26.2  Onset date: May 2019                       Next Dr's appt.: 8/15/19  Visit# / total visits: 2/ 12    (2 x/week for 12 visits) Cancels/No Shows: 0/0       Subjective:  Upon arrival, patient reported pain of about 5-6 out of 10 on the back of the right knee. Patient states that the pain is usually worse when she sits for too long. Patient has been using step-to technique on the stairs, instead of reciprocal pattern, like she normally does. Patient states that her pain is worse in the morning, she can barely walk around, she has to hold onto furniture or use a walker.   Pain:  [x] Yes  [] No Location: posterior right knee  Pain Rating: (0-10 scale) 6/10  Pain altered Tx:  [x] No  [] Yes  Action:  Comments:    Todays Treatment:    Game ready was not available today     Modalities: vasocompression x 13 minutes (pt asked to remove with 2 minutes remaining) on min pressure and 34 degrees  Precautions:  Exercises:  Exercise Reps/ Time Weight/ Level Performed Today Comments   Supine HS stretch 2x30\" ea   X     Heel slides 10x   X     Hooklying hip adduction 15x 5\" ball X     Hooklying hip abduction 15x 5\" blueberry X            3-Way Hip 10x  X           Quad sets 10x 5\"  X    SAQ 15x 5\"  X    LAQ 15x  X    Bridges 10x  X    Clamshells 15x  X                                    Other:    Specific Instructions for next treatment: gait training, continue to progress hip strengthening

## 2019-08-14 ENCOUNTER — HOSPITAL ENCOUNTER (OUTPATIENT)
Dept: PHYSICAL THERAPY | Age: 74
Setting detail: THERAPIES SERIES
Discharge: HOME | End: 2019-08-14
Payer: MEDICARE

## 2019-08-14 PROCEDURE — 97016 VASOPNEUMATIC DEVICE THERAPY: CPT

## 2019-08-14 PROCEDURE — 97110 THERAPEUTIC EXERCISES: CPT

## 2019-08-19 ENCOUNTER — HOSPITAL ENCOUNTER (OUTPATIENT)
Dept: PHYSICAL THERAPY | Age: 74
Setting detail: THERAPIES SERIES
Discharge: HOME | End: 2019-08-19
Payer: MEDICARE

## 2019-08-19 PROCEDURE — 97110 THERAPEUTIC EXERCISES: CPT

## 2019-08-21 ENCOUNTER — OFFICE VISIT (OUTPATIENT)
Dept: FAMILY MEDICINE CLINIC | Age: 74
End: 2019-08-21
Payer: MEDICARE

## 2019-08-21 VITALS
BODY MASS INDEX: 25.16 KG/M2 | WEIGHT: 146.6 LBS | HEART RATE: 97 BPM | DIASTOLIC BLOOD PRESSURE: 80 MMHG | RESPIRATION RATE: 18 BRPM | SYSTOLIC BLOOD PRESSURE: 122 MMHG | OXYGEN SATURATION: 98 %

## 2019-08-21 DIAGNOSIS — R05.3 CHRONIC COUGH: Primary | ICD-10-CM

## 2019-08-21 DIAGNOSIS — E11.42 CONTROLLED TYPE 2 DIABETES MELLITUS WITH DIABETIC POLYNEUROPATHY, WITHOUT LONG-TERM CURRENT USE OF INSULIN (HCC): ICD-10-CM

## 2019-08-21 DIAGNOSIS — E53.8 VITAMIN B 12 DEFICIENCY: ICD-10-CM

## 2019-08-21 PROCEDURE — 99213 OFFICE O/P EST LOW 20 MIN: CPT | Performed by: NURSE PRACTITIONER

## 2019-08-21 PROCEDURE — 96372 THER/PROPH/DIAG INJ SC/IM: CPT | Performed by: NURSE PRACTITIONER

## 2019-08-21 RX ORDER — BENZONATATE 100 MG/1
CAPSULE ORAL
COMMUNITY
End: 2019-08-21 | Stop reason: SDUPTHER

## 2019-08-21 RX ORDER — OXYCODONE AND ACETAMINOPHEN 10; 325 MG/1; MG/1
1 TABLET ORAL EVERY 4 HOURS PRN
Refills: 0 | COMMUNITY
Start: 2019-07-19

## 2019-08-21 RX ORDER — BENZONATATE 100 MG/1
100 CAPSULE ORAL 3 TIMES DAILY PRN
Qty: 90 CAPSULE | Refills: 0 | Status: SHIPPED | OUTPATIENT
Start: 2019-08-21 | End: 2019-11-19

## 2019-08-21 RX ORDER — OMEPRAZOLE 40 MG/1
40 CAPSULE, DELAYED RELEASE ORAL DAILY
Qty: 30 CAPSULE | Refills: 0 | Status: SHIPPED | OUTPATIENT
Start: 2019-08-21 | End: 2019-11-20

## 2019-08-21 RX ORDER — PREDNISONE 20 MG/1
TABLET ORAL
Qty: 30 TABLET | Refills: 0 | Status: SHIPPED | OUTPATIENT
Start: 2019-08-21 | End: 2019-08-31

## 2019-08-21 RX ORDER — CYANOCOBALAMIN 1000 UG/ML
1000 INJECTION INTRAMUSCULAR; SUBCUTANEOUS ONCE
Status: COMPLETED | OUTPATIENT
Start: 2019-08-21 | End: 2019-08-21

## 2019-08-21 RX ADMIN — CYANOCOBALAMIN 1000 MCG: 1000 INJECTION INTRAMUSCULAR; SUBCUTANEOUS at 14:42

## 2019-08-21 ASSESSMENT — ENCOUNTER SYMPTOMS
EYE REDNESS: 0
EYE DISCHARGE: 0
SHORTNESS OF BREATH: 1
CONSTIPATION: 0
ABDOMINAL PAIN: 0
COUGH: 1
RHINORRHEA: 0
EYE ITCHING: 0
SORE THROAT: 0
WHEEZING: 1
NAUSEA: 0
DIARRHEA: 0

## 2019-08-21 NOTE — PROGRESS NOTES
Talks on phone: None     Gets together: None     Attends Mandaen service: None     Active member of club or organization: None     Attends meetings of clubs or organizations: None     Relationship status: None    Intimate partner violence:     Fear of current or ex partner: None     Emotionally abused: None     Physically abused: None     Forced sexual activity: None   Other Topics Concern    None   Social History Narrative    None     Past Medical History:   Diagnosis Date    Anemia     Bronchitis 02/19/2019    COPD [facility] obstructive [facility] disease) [facility]     DDD (degenerative disc disease)     Depression     Fibromyalgia     Glaucoma     Hyperlipidemia     Knee injuries 02/19/2019    Osteoarthritis of more than one site 4/9/2014    Right middle lobe pneumonia ([facility] 3/24/2016     Past Medical, Family, and Social History reviewed and does contribute to the patient presenting condition    Health Maintenance   Topic Date Due    DTaP/Tdap/Td vaccine (1 - Tdap) 05/24/1962    Shingles Vaccine (1 of 2) 05/24/1993    Flu vaccine (1) 09/01/2019    DEXA (modify frequency per FRAX score)  Completed    Pneumococcal 65+ years Vaccine  Completed     PHQ Scores 4/9/2018 5/25/2017 4/20/2016   PHQ2 Score 3 0 0   PHQ9 Score 8 0 0     Interpretation of Total Score DepressionSeverity: 1-4 = Minimal depression, 5-9 = Mild depression, 10-14 = Moderate depression, 15-19 = Moderately severe depression, 20-27 = Severe depression    Current Outpatient Medications   Medication Sig Dispense Refill    benzonatate (TESSALON) 100 MG capsule Take 1 capsule by mouth 3 times daily as needed for Cough 90 capsule 0    omeprazole (PRILOSEC) 40 MG delayed release capsule Take 1 capsule by mouth Daily 30 capsule 0    predniSONE (DELTASONE) 20 MG tablet 4 tabs po daily for 4 days, then 3 po daily for 3 days, then 2 po daily for 2 days, then 1 po daily for 1 day.  30 tablet 0    QUEtiapine (SEROQUEL) 200 MG tablet TAKE 1

## 2019-08-22 ENCOUNTER — HOSPITAL ENCOUNTER (OUTPATIENT)
Dept: PHYSICAL THERAPY | Age: 74
Setting detail: THERAPIES SERIES
Discharge: HOME | End: 2019-08-22
Payer: MEDICARE

## 2019-08-22 PROCEDURE — 97110 THERAPEUTIC EXERCISES: CPT

## 2019-08-22 NOTE — FLOWSHEET NOTE
[]  Aquatics     []  Neuromuscular     []  Other     Total Treatment time 40 3       Assessment: [x] Progressing toward goals. Patient had difficulty with end range flexion with \"popping\" @ about 75 degrees of flexion. Held vasocompression due to patient request and will use home cryotherapy later as needed. [] No change. [x] Other: Added sidelying hip abduction, slant board stretch      Goals (per PT, as of 8/8/2019):  STG: (to be met in 6 treatments)  1. ? Pain: Pt will report decreased right knee pain to 6/10 following stair climbing and walking. 2. ? ROM: Pt will increase right knee flexion AROM to 110 degrees in order to increase tolerance to descending stairs. 3. ? Strength: Pt will increase bilateral hip strength to 4+/5 globally in order to increase bilateral knee stability. 4. ? Function: Pt will report improvement in right knee giving way by 25%. 5. Independent with Home Exercise Programs  6. Demonstrate Knowledge of fall prevention  LTG: (to be met in 12 treatments)  7. ? Pain: Pt will report decreased right knee pain to 4/10 following stair climbing and walking. 8. ? Strength: Pt will increase right knee strength to 4+/5 in order to increase tolerance to weight bearing activities. 9. ? Function: Pt will demonstrate improved functional activity tolerance as evident by an improved score on the LEFI to less than 60% impairment.                  Patient goals: \"to be able to walk properly\"      Pt. Education:  [x] Yes  [] No  [x] Reviewed Prior HEP/Ed  Method of Education: [x] Verbal  [x] Demo  [] Written  Comprehension of Education:  [] Verbalizes understanding. [] Demonstrates understanding. [] Needs review. [x] Demonstrates/verbalizes HEP/Ed previously given. Plan: [x] Continue per plan of care.    [] Other:      Time In:1330              Time Out:  6109          Electronically signed by:  Michael Young PTA

## 2019-08-26 ENCOUNTER — HOSPITAL ENCOUNTER (OUTPATIENT)
Dept: PHYSICAL THERAPY | Age: 74
Setting detail: THERAPIES SERIES
Discharge: HOME | End: 2019-08-26
Payer: MEDICARE

## 2019-08-26 PROCEDURE — 97110 THERAPEUTIC EXERCISES: CPT

## 2019-08-26 NOTE — FLOWSHEET NOTE
5 4+   Ankle DF     5 4+   PF     5 4+   INV           EVER                                 Treatment Charges: Mins Units   []  Modalities Vasocompression     [x]  Ther Exercise 40 3   []  Manual Therapy     []  Ther Activities     []  Aquatics     []  Neuromuscular     []  Other     Total Treatment time 40 3       Assessment: [x] Progressing toward goals. Increased tolerance to flexion activities at this date, some catching at end range. [] No change. [x] Other:  Added fwd/lat step ups, assessed short term goals      Goals (per PT, as of 8/26/2019):  STG: (to be met in 6 treatments)  1. ? Pain: Pt will report decreased right knee pain to 6/10 following stair climbing and walking. - MET 8/26, averaging 4/10  2. ? ROM: Pt will increase right knee flexion AROM to 110 degrees in order to increase tolerance to descending stairs. - MET 8/26   3. ? Strength: Pt will increase bilateral hip strength to 4+/5 globally in order to increase bilateral knee stability. - Progressing 8/26  4. ? Function: Pt will report improvement in right knee giving way by 25%. - Progressing 8/26, improved by about 15-20% currently  5. Independent with Home Exercise Programs - MET 8/26  6. Demonstrate Knowledge of fall prevention - MET 8/26  LTG: (to be met in 12 treatments)  7. ? Pain: Pt will report decreased right knee pain to 4/10 following stair climbing and walking. 8. ? Strength: Pt will increase right knee strength to 4+/5 in order to increase tolerance to weight bearing activities. 9. ? Function: Pt will demonstrate improved functional activity tolerance as evident by an improved score on the LEFI to less than 60% impairment.                  Patient goals: \"to be able to walk properly\"      Pt. Education:  [] Yes  [] No  [x] Reviewed Prior HEP/Ed  Method of Education: [] Verbal  [x] Demo  [] Written  Comprehension of Education:  [] Verbalizes understanding. [] Demonstrates understanding. [] Needs review.   [x]

## 2019-08-28 ENCOUNTER — HOSPITAL ENCOUNTER (OUTPATIENT)
Dept: PHYSICAL THERAPY | Age: 74
Setting detail: THERAPIES SERIES
Discharge: HOME | End: 2019-08-28
Payer: MEDICARE

## 2019-08-28 PROCEDURE — 97110 THERAPEUTIC EXERCISES: CPT

## 2019-09-03 ENCOUNTER — HOSPITAL ENCOUNTER (OUTPATIENT)
Dept: PHYSICAL THERAPY | Age: 74
Setting detail: THERAPIES SERIES
Discharge: HOME | End: 2019-09-03
Payer: MEDICARE

## 2019-09-03 PROCEDURE — 97110 THERAPEUTIC EXERCISES: CPT

## 2019-09-05 ENCOUNTER — HOSPITAL ENCOUNTER (OUTPATIENT)
Dept: PHYSICAL THERAPY | Age: 74
Setting detail: THERAPIES SERIES
Discharge: HOME | End: 2019-09-05
Payer: MEDICARE

## 2019-09-05 PROCEDURE — 97110 THERAPEUTIC EXERCISES: CPT

## 2019-09-05 NOTE — FLOWSHEET NOTE
[address] [place] [address] Outpatient Physical Therapy   [facility] [place]   Phone: [phone]   Fax: [phone]    Physical Therapy Daily Treatment Note    Date:  2019  Patient Name:  David Stover \"Shilpa\" : 1945                      MRN: [id number]  Physician: Dr. Britany Castro,                                    Insurance: [facility]  Medical Diagnosis: U15.929O - Other tear of medial meniscus, current injury, right knee, subsequent encounter               Rehab Codes: M25.561, M25.562, M25.469, M62.81, R26.2  Onset date: May 2019                       Next Dr's appt.: 8/15/19  Visit# / total visits:    Cancels/No Shows: 0/0       Subjective:  Pt reports no changes at this time in pain or symptoms. States left knee having some pain at times and thinks she is over compensating when Right knee is bother her.   Pain:  [x] Yes  [] No Location: posterior right knee  Pain Rating: (0-10 scale) 2/10  Pain altered Tx:  [x] No  [] Yes  Action:  Comments:    Todays Treatment:      Modalities: vasocompression x 15 minutes on min pressure and 34 degrees - will use home cryotherapy as needed  Precautions:  Exercises:  Exercise Reps/ Time Weight/ Level Performed Today Comments   Supine HS stretch 2x30\" ea   X     Heel slides 10x   X     Hooklying hip adduction 15x 5\" ball X     Hooklying hip abduction 15x 5\" blueberry X            3-Way Hip 15x Orange X Added orange Tband           Quad sets 10x 5\"  X    SAQ 15x 5\" #1.5 X Added weight    LAQ 15x  X    Bridges 15x5\"  X Inc reps    Clamshells 20x  X Inc reps    Sidelying hip ABD 20x  X Inc reps   SLR  2x10 #1.5 X Added weight 9/3/19   Standing HS curl 15x  X Inc reps  15x  X Inc reps    Slant board stretch  3x30\"   X     Mini squats 10x      Fwd/lat step ups 20x ea 6\"  X Inc reps    Other:    Specific Instructions for next treatment: progress weights with HS curl and marches      Treatment Charges: Mins Units   []  Modalities Vasocompression     [x]  Ther Exercise 45 3   []  Manual Therapy     []  Ther Activities     []  Aquatics     []  Neuromuscular     []  Other     Total Treatment time 45 3       Assessment: [x] Progressing toward goals. Tolerated increased exercises reps and weight well today with no complaints of increased pain. Notes some discomfort with mini squats but not bad. [] No change. [x] Other:      Goals (per PT, as of 8/26/2019):  STG: (to be met in 6 treatments)  1. ? Pain: Pt will report decreased right knee pain to 6/10 following stair climbing and walking. - MET 8/26, averaging 4/10  2. ? ROM: Pt will increase right knee flexion AROM to 110 degrees in order to increase tolerance to descending stairs. - MET 8/26   3. ? Strength: Pt will increase bilateral hip strength to 4+/5 globally in order to increase bilateral knee stability. - Progressing 8/26  4. ? Function: Pt will report improvement in right knee giving way by 25%. - Progressing 8/26, improved by about 15-20% currently  5. Independent with Home Exercise Programs - MET 8/26  6. Demonstrate Knowledge of fall prevention - MET 8/26  LTG: (to be met in 12 treatments)  7. ? Pain: Pt will report decreased right knee pain to 4/10 following stair climbing and walking. 8. ? Strength: Pt will increase right knee strength to 4+/5 in order to increase tolerance to weight bearing activities. 9. ? Function: Pt will demonstrate improved functional activity tolerance as evident by an improved score on the LEFI to less than 60% impairment.                  Patient goals: \"to be able to walk properly\"      Pt. Education:  [] Yes  [] No  [x] Reviewed Prior HEP/Ed  Method of Education: [] Verbal  [x] Demo  [] Written  Comprehension of Education:  [] Verbalizes understanding. [] Demonstrates understanding. [] Needs review. [x] Demonstrates/verbalizes HEP/Ed previously given. Plan: [x] Continue per plan of care.    [] Other:      Time In: 1320              Time Out:  1410          Electronically signed by:  Jailyn Hamilton PTA

## 2019-09-09 NOTE — FLOWSHEET NOTE
[] [address]  [address]    [phone]  F: [phone]   [] [address] [facility] [address]   [address]  P: [phone]  F: [phone]  [] [address]  [address] [facility]  P: [phone]  F: [phone]   [] [address] [facility] [address]   P: [phone]  F: [phone]  [] [address]  [place]: [phone]   F: [phone]     Physical Therapy Cancel/No Show note    Date: 2019  Patient:  Bassam Ventura  : 1945  MRN: [id number]    Cancels/No Shows to date:     For today's appointment patient:    [x]  Cancelled    [] Rescheduled appointment    [] No-show     Reason given by patient:    []  Patient ill    [x]  Conflicting appointment    [] No transportation      [] Conflict with work    [] No reason given    [] Weather related    [] Other:      Comments:        [x] Next appointment was confirmed    Electronically signed by: Bill Nogueira PTA

## 2019-09-10 ENCOUNTER — HOSPITAL ENCOUNTER (OUTPATIENT)
Dept: PHYSICAL THERAPY | Age: 74
Setting detail: THERAPIES SERIES
Discharge: HOME | End: 2019-09-10
Payer: MEDICARE

## 2019-09-12 ENCOUNTER — HOSPITAL ENCOUNTER (OUTPATIENT)
Dept: PHYSICAL THERAPY | Age: 74
Setting detail: THERAPIES SERIES
Discharge: HOME | End: 2019-09-12
Payer: MEDICARE

## 2019-09-12 PROCEDURE — 97110 THERAPEUTIC EXERCISES: CPT

## 2019-09-16 ENCOUNTER — HOSPITAL ENCOUNTER (OUTPATIENT)
Dept: PHYSICAL THERAPY | Age: 74
Setting detail: THERAPIES SERIES
Discharge: HOME | End: 2019-09-16
Payer: MEDICARE

## 2019-09-16 PROCEDURE — 97110 THERAPEUTIC EXERCISES: CPT

## 2019-09-16 NOTE — FLOWSHEET NOTE
[]  Manual Therapy     []  Ther Activities     []  Aquatics     []  Neuromuscular     []  Other     Total Treatment time 45 3       Assessment: [x] Progressing toward goals. Progressed exercises and reps as noted above. Increased fatigue after exercises today. [] No change. [] Other:      STG: (to be met in 6 treatments)  1. ? Pain: Pt will report decreased right knee pain to 6/10 following stair climbing and walking. - MET 8/26, averaging 4/10  2. ? ROM: Pt will increase right knee flexion AROM to 110 degrees in order to increase tolerance to descending stairs. - MET 8/26   3. ? Strength: Pt will increase bilateral hip strength to 4+/5 globally in order to increase bilateral knee stability. - Progressing 9/12  4. ? Function: Pt will report improvement in right knee giving way by 25%. - MET 9/12, currently 35% improvement  5. Independent with Home Exercise Programs - MET 8/26  6. Demonstrate Knowledge of fall prevention - MET 8/26  LTG: (to be met in 12 treatments)  7. ? Pain: Pt will report decreased right knee pain to 4/10 following stair climbing and walking. - MET 9/12  8. ? Strength: Pt will increase right knee strength to 4+/5 in order to increase tolerance to weight bearing activities. - MET 9/12  9. ? Function: Pt will demonstrate improved functional activity tolerance as evident by an improved score on the LEFI to less than 60% impairment. - Progressing 9/12, currently 62.5% impairment                  Patient goals: \"to be able to walk properly\"      Pt. Education:  [] Yes  [] No  [x] Reviewed Prior HEP/Ed  Method of Education: [] Verbal  [x] Demo  [x] Written - updated HEP 9/12  Comprehension of Education:  [] Verbalizes understanding. [] Demonstrates understanding. [] Needs review. [x] Demonstrates/verbalizes HEP/Ed previously given. Plan: [x] Continue per plan of care.    [] Other:      Time In: 6894              Time Out:  1430          Electronically signed by:  Estela Don PTA

## 2019-09-19 ENCOUNTER — HOSPITAL ENCOUNTER (OUTPATIENT)
Dept: PHYSICAL THERAPY | Age: 74
Setting detail: THERAPIES SERIES
Discharge: HOME | End: 2019-09-19
Payer: MEDICARE

## 2019-09-19 PROCEDURE — 97016 VASOPNEUMATIC DEVICE THERAPY: CPT

## 2019-09-19 PROCEDURE — 97110 THERAPEUTIC EXERCISES: CPT

## 2019-09-19 NOTE — FLOWSHEET NOTE
Education:  [] Verbalizes understanding. [] Demonstrates understanding. [] Needs review. [x] Demonstrates/verbalizes HEP/Ed previously given. Plan: [x] Continue per plan of care.    [] Other:      Time In: 1330              Time Out:  [address]          Electronically signed by:  Amadeo Anne PTA

## 2019-09-23 ENCOUNTER — HOSPITAL ENCOUNTER (OUTPATIENT)
Dept: PHYSICAL THERAPY | Age: 74
Setting detail: THERAPIES SERIES
Discharge: HOME | End: 2019-09-23
Payer: MEDICARE

## 2019-09-23 PROCEDURE — 97110 THERAPEUTIC EXERCISES: CPT

## 2019-09-24 ENCOUNTER — NURSE ONLY (OUTPATIENT)
Dept: FAMILY MEDICINE CLINIC | Age: 74
End: 2019-09-24
Payer: MEDICARE

## 2019-09-24 DIAGNOSIS — E53.8 VITAMIN B 12 DEFICIENCY: Primary | ICD-10-CM

## 2019-09-24 PROCEDURE — 96372 THER/PROPH/DIAG INJ SC/IM: CPT | Performed by: NURSE PRACTITIONER

## 2019-09-24 RX ORDER — CYANOCOBALAMIN 1000 UG/ML
1000 INJECTION INTRAMUSCULAR; SUBCUTANEOUS ONCE
Status: COMPLETED | OUTPATIENT
Start: 2019-09-24 | End: 2019-09-24

## 2019-09-24 RX ADMIN — CYANOCOBALAMIN 1000 MCG: 1000 INJECTION INTRAMUSCULAR; SUBCUTANEOUS at 11:36

## 2019-09-25 ENCOUNTER — HOSPITAL ENCOUNTER (OUTPATIENT)
Dept: PHYSICAL THERAPY | Age: 74
Setting detail: THERAPIES SERIES
Discharge: HOME | End: 2019-09-25
Payer: MEDICARE

## 2019-09-25 PROCEDURE — 97110 THERAPEUTIC EXERCISES: CPT

## 2019-09-30 ENCOUNTER — HOSPITAL ENCOUNTER (OUTPATIENT)
Dept: PHYSICAL THERAPY | Age: 74
Setting detail: THERAPIES SERIES
Discharge: HOME | End: 2019-09-30
Payer: MEDICARE

## 2019-09-30 PROCEDURE — 97110 THERAPEUTIC EXERCISES: CPT

## 2019-10-03 ENCOUNTER — HOSPITAL ENCOUNTER (OUTPATIENT)
Dept: PHYSICAL THERAPY | Age: 74
Setting detail: THERAPIES SERIES
Discharge: HOME | End: 2019-10-03
Payer: MEDICARE

## 2019-10-25 ENCOUNTER — NURSE ONLY (OUTPATIENT)
Dept: FAMILY MEDICINE CLINIC | Age: 74
End: 2019-10-25
Payer: MEDICARE

## 2019-10-25 DIAGNOSIS — E53.8 VITAMIN B 12 DEFICIENCY: ICD-10-CM

## 2019-10-25 DIAGNOSIS — J40 BRONCHITIS: ICD-10-CM

## 2019-10-25 DIAGNOSIS — Z23 NEED FOR INFLUENZA VACCINATION: Primary | ICD-10-CM

## 2019-10-25 PROCEDURE — G0008 ADMIN INFLUENZA VIRUS VAC: HCPCS | Performed by: NURSE PRACTITIONER

## 2019-10-25 PROCEDURE — 96372 THER/PROPH/DIAG INJ SC/IM: CPT | Performed by: NURSE PRACTITIONER

## 2019-10-25 PROCEDURE — 90686 IIV4 VACC NO PRSV 0.5 ML IM: CPT | Performed by: NURSE PRACTITIONER

## 2019-10-25 RX ORDER — DOXYCYCLINE HYCLATE 100 MG
100 TABLET ORAL 2 TIMES DAILY
Qty: 20 TABLET | Refills: 0 | Status: SHIPPED | OUTPATIENT
Start: 2019-10-25 | End: 2019-11-04

## 2019-10-25 RX ORDER — CYANOCOBALAMIN 1000 UG/ML
1000 INJECTION INTRAMUSCULAR; SUBCUTANEOUS ONCE
Status: COMPLETED | OUTPATIENT
Start: 2019-10-25 | End: 2019-10-25

## 2019-10-25 RX ADMIN — CYANOCOBALAMIN 1000 MCG: 1000 INJECTION INTRAMUSCULAR; SUBCUTANEOUS at 12:42

## 2019-10-27 DIAGNOSIS — F51.01 PRIMARY INSOMNIA: ICD-10-CM

## 2019-10-29 ENCOUNTER — TELEPHONE (OUTPATIENT)
Dept: FAMILY MEDICINE CLINIC | Age: 74
End: 2019-10-29

## 2019-10-29 RX ORDER — QUETIAPINE FUMARATE 50 MG/1
50 TABLET, FILM COATED ORAL 2 TIMES DAILY
Qty: 60 TABLET | Refills: 5 | Status: SHIPPED | OUTPATIENT
Start: 2019-10-29 | End: 2019-10-31 | Stop reason: SDUPTHER

## 2019-10-29 RX ORDER — ALLOPURINOL 100 MG/1
100 TABLET ORAL DAILY
Qty: 30 TABLET | Refills: 5 | Status: SHIPPED | OUTPATIENT
Start: 2019-10-29 | End: 2020-07-29

## 2019-10-29 NOTE — H&P
Anaphylaxis     Patient states PCN allergy was 20 years ago, unsure if reaction still present    Adhesive Tape     Nubain [Nalbuphine Hcl]      Leg  Cramping  When mixed with vistaril    Seasonal     Vistaril [Hydroxyzine]        Current Outpatient Prescriptions on File Prior to Encounter   Medication Sig Dispense Refill    QUEtiapine (SEROQUEL) 200 MG tablet Take 1 tablet by mouth daily 90 tablet 1    montelukast (SINGULAIR) 10 MG tablet Take 1 tablet by mouth nightly 30 tablet 5    mometasone-formoterol (DULERA) 100-5 MCG/ACT inhaler Inhale 2 puffs into the lungs 2 times daily 1 Inhaler 3    atorvastatin (LIPITOR) 40 MG tablet Take 1 tablet by mouth daily 90 tablet 1    traZODone (DESYREL) 50 MG tablet Take 1 tablet by mouth nightly 30 tablet 5    allopurinol (ZYLOPRIM) 100 MG tablet Take 1 tablet by mouth daily 30 tablet 3    dorzolamide (TRUSOPT) 2 % ophthalmic solution Place 1 drop into the right eye 2 times daily  11    oxyCODONE-acetaminophen (PERCOCET)  MG per tablet Take 1 tablet by mouth every 6 hours as needed for Pain . Earliest Fill Date: 7/17/17 120 tablet 0    Calcium Carbonate-Vitamin D (OYSTER SHELL CALCIUM/D) 500-200 MG-UNIT TABS Take 1 tablet by mouth 3 times daily (Patient taking differently: Take 1 tablet by mouth daily ) 90 tablet 5     Current Facility-Administered Medications on File Prior to Encounter   Medication Dose Route Frequency Provider Last Rate Last Dose    cyanocobalamin injection 1,000 mcg  1,000 mcg Intramuscular Q30 Days Sebastián Michelle MD   1,000 mcg at 07/09/18 0945       Negative except for what is mentioned in the HPI. GENERAL PHYSICAL EXAM     Vitals: /89   Pulse 74   Temp 98.9 °F (37.2 °C)   Resp 18   Ht 5' (1.524 m)   Wt 140 lb (63.5 kg)   SpO2 99%   BMI 27.34 kg/m²  Body mass index is 27.34 kg/m². GENERAL APPEARANCE:   Robe Johns is 74 y.o.,  female, moderately obese, nourished, conscious, alert.   Does not appear to
Warm

## 2019-10-31 ENCOUNTER — OFFICE VISIT (OUTPATIENT)
Dept: FAMILY MEDICINE CLINIC | Age: 74
End: 2019-10-31
Payer: MEDICARE

## 2019-10-31 VITALS
TEMPERATURE: 96.9 F | SYSTOLIC BLOOD PRESSURE: 120 MMHG | OXYGEN SATURATION: 96 % | HEART RATE: 88 BPM | RESPIRATION RATE: 16 BRPM | HEIGHT: 59 IN | BODY MASS INDEX: 30.7 KG/M2 | DIASTOLIC BLOOD PRESSURE: 82 MMHG | WEIGHT: 152.3 LBS

## 2019-10-31 DIAGNOSIS — E78.00 PURE HYPERCHOLESTEROLEMIA: Chronic | ICD-10-CM

## 2019-10-31 DIAGNOSIS — M47.817 LUMBAR AND SACRAL ARTHRITIS: ICD-10-CM

## 2019-10-31 DIAGNOSIS — E55.9 VITAMIN D DEFICIENCY: ICD-10-CM

## 2019-10-31 DIAGNOSIS — M79.7 FIBROMYALGIA: ICD-10-CM

## 2019-10-31 DIAGNOSIS — D64.9 ANEMIA, NORMOCYTIC NORMOCHROMIC: ICD-10-CM

## 2019-10-31 DIAGNOSIS — E79.0 HYPERURICEMIA: ICD-10-CM

## 2019-10-31 DIAGNOSIS — F41.9 ANXIETY: Chronic | ICD-10-CM

## 2019-10-31 DIAGNOSIS — F51.01 PRIMARY INSOMNIA: ICD-10-CM

## 2019-10-31 DIAGNOSIS — H40.1111 PRIMARY OPEN-ANGLE GLAUCOMA, RIGHT EYE, MILD STAGE: ICD-10-CM

## 2019-10-31 DIAGNOSIS — M19.011 ARTHRITIS OF SHOULDER REGION, RIGHT: Chronic | ICD-10-CM

## 2019-10-31 DIAGNOSIS — M15.9 PRIMARY OSTEOARTHRITIS INVOLVING MULTIPLE JOINTS: ICD-10-CM

## 2019-10-31 DIAGNOSIS — Z63.4 GRIEF AT LOSS OF CHILD: ICD-10-CM

## 2019-10-31 DIAGNOSIS — G89.4 CHRONIC PAIN ASSOCIATED WITH SIGNIFICANT PSYCHOSOCIAL DYSFUNCTION: ICD-10-CM

## 2019-10-31 DIAGNOSIS — H40.1121 PRIMARY OPEN-ANGLE GLAUCOMA, LEFT EYE, MILD STAGE: ICD-10-CM

## 2019-10-31 DIAGNOSIS — F11.20 UNCOMPLICATED OPIOID DEPENDENCE (HCC): ICD-10-CM

## 2019-10-31 DIAGNOSIS — Z01.818 PRE-OP EVALUATION: ICD-10-CM

## 2019-10-31 DIAGNOSIS — S83.241S ACUTE MEDIAL MENISCUS TEAR OF RIGHT KNEE, SEQUELA: Primary | ICD-10-CM

## 2019-10-31 DIAGNOSIS — F43.21 GRIEF AT LOSS OF CHILD: ICD-10-CM

## 2019-10-31 DIAGNOSIS — E53.8 VITAMIN B12 DEFICIENCY: ICD-10-CM

## 2019-10-31 DIAGNOSIS — F33.1 MODERATE EPISODE OF RECURRENT MAJOR DEPRESSIVE DISORDER (HCC): ICD-10-CM

## 2019-10-31 DIAGNOSIS — M47.816 SPONDYLOSIS OF LUMBAR REGION WITHOUT MYELOPATHY OR RADICULOPATHY: ICD-10-CM

## 2019-10-31 DIAGNOSIS — K86.1 CHRONIC BILIARY PANCREATITIS (HCC): ICD-10-CM

## 2019-10-31 DIAGNOSIS — E11.42 CONTROLLED TYPE 2 DIABETES MELLITUS WITH DIABETIC POLYNEUROPATHY, WITHOUT LONG-TERM CURRENT USE OF INSULIN (HCC): ICD-10-CM

## 2019-10-31 DIAGNOSIS — Z98.82 HX OF BILATERAL BREAST IMPLANTS: ICD-10-CM

## 2019-10-31 DIAGNOSIS — E11.42 DIABETIC PERIPHERAL NEUROPATHY (HCC): ICD-10-CM

## 2019-10-31 PROCEDURE — 93000 ELECTROCARDIOGRAM COMPLETE: CPT | Performed by: NURSE PRACTITIONER

## 2019-10-31 PROCEDURE — 99214 OFFICE O/P EST MOD 30 MIN: CPT | Performed by: NURSE PRACTITIONER

## 2019-10-31 RX ORDER — MELOXICAM 15 MG/1
15 TABLET ORAL DAILY
Qty: 30 TABLET | Refills: 3 | Status: CANCELLED | OUTPATIENT
Start: 2019-10-31

## 2019-10-31 RX ORDER — AZITHROMYCIN 250 MG/1
250 TABLET, FILM COATED ORAL DAILY
Refills: 0 | COMMUNITY
Start: 2019-10-09 | End: 2019-11-20

## 2019-10-31 RX ORDER — QUETIAPINE FUMARATE 50 MG/1
50 TABLET, FILM COATED ORAL 2 TIMES DAILY
Qty: 60 TABLET | Refills: 5 | Status: SHIPPED | OUTPATIENT
Start: 2019-10-31 | End: 2020-06-29 | Stop reason: SDUPTHER

## 2019-10-31 SDOH — SOCIAL STABILITY - SOCIAL INSECURITY: DISSAPEARANCE AND DEATH OF FAMILY MEMBER: Z63.4

## 2019-10-31 ASSESSMENT — ENCOUNTER SYMPTOMS
EYE REDNESS: 0
COUGH: 1
SORE THROAT: 0
EYE ITCHING: 0
ABDOMINAL PAIN: 0
EYE DISCHARGE: 0
SHORTNESS OF BREATH: 1
CONSTIPATION: 0
RHINORRHEA: 0
DIARRHEA: 0
WHEEZING: 1
NAUSEA: 0

## 2019-11-01 DIAGNOSIS — F51.01 PRIMARY INSOMNIA: ICD-10-CM

## 2019-11-02 RX ORDER — TRAZODONE HYDROCHLORIDE 50 MG/1
TABLET ORAL
Qty: 30 TABLET | Refills: 5 | Status: SHIPPED | OUTPATIENT
Start: 2019-11-02 | End: 2020-07-29

## 2019-11-06 ENCOUNTER — HOSPITAL ENCOUNTER (OUTPATIENT)
Age: 74
Setting detail: SPECIMEN
Discharge: HOME | End: 2019-11-06
Payer: MEDICARE

## 2019-11-06 DIAGNOSIS — Z01.818 PRE-OP EVALUATION: ICD-10-CM

## 2019-11-06 LAB
ABSOLUTE EOS #: 0.37 K/UL (ref 0–0.44)
ABSOLUTE IMMATURE GRANULOCYTE: <0.03 K/UL (ref 0–0.3)
ABSOLUTE LYMPH #: 2.13 K/UL (ref 1.1–3.7)
ABSOLUTE MONO #: 0.49 K/UL (ref 0.1–1.2)
ANION GAP SERPL CALCULATED.3IONS-SCNC: 14 MMOL/L (ref 9–17)
BASOPHILS # BLD: 1 % (ref 0–2)
BASOPHILS ABSOLUTE: 0.06 K/UL (ref 0–0.2)
BUN BLDV-MCNC: 18 MG/DL (ref 8–23)
BUN/CREAT BLD: NORMAL (ref 9–20)
CALCIUM SERPL-MCNC: 9.3 MG/DL (ref 8.6–10.4)
CHLORIDE BLD-SCNC: 103 MMOL/L (ref 98–107)
CO2: 23 MMOL/L (ref 20–31)
CREAT SERPL-MCNC: 0.67 MG/DL (ref 0.5–0.9)
DIFFERENTIAL TYPE: ABNORMAL
EOSINOPHILS RELATIVE PERCENT: 8 % (ref 1–4)
GFR AFRICAN AMERICAN: >60 ML/MIN
GFR NON-AFRICAN AMERICAN: >60 ML/MIN
GFR SERPL CREATININE-BSD FRML MDRD: NORMAL ML/MIN/{1.73_M2}
GFR SERPL CREATININE-BSD FRML MDRD: NORMAL ML/MIN/{1.73_M2}
GLUCOSE BLD-MCNC: 79 MG/DL (ref 70–99)
HCT VFR BLD CALC: 43.1 % (ref 36.3–47.1)
HEMOGLOBIN: 13.2 G/DL (ref 11.9–15.1)
IMMATURE GRANULOCYTES: 0 %
LYMPHOCYTES # BLD: 45 % (ref 24–43)
MCH RBC QN AUTO: 29.7 PG (ref 25.2–33.5)
MCHC RBC AUTO-ENTMCNC: 30.6 G/DL (ref 28.4–34.8)
MCV RBC AUTO: 96.9 FL (ref 82.6–102.9)
MONOCYTES # BLD: 10 % (ref 3–12)
NRBC AUTOMATED: 0 PER 100 WBC
PDW BLD-RTO: 13.6 % (ref 11.8–14.4)
PLATELET # BLD: 315 K/UL (ref 138–453)
PLATELET ESTIMATE: ABNORMAL
PMV BLD AUTO: 10.4 FL (ref 8.1–13.5)
POTASSIUM SERPL-SCNC: 4.3 MMOL/L (ref 3.7–5.3)
RBC # BLD: 4.45 M/UL (ref 3.95–5.11)
RBC # BLD: ABNORMAL 10*6/UL
SEG NEUTROPHILS: 36 % (ref 36–65)
SEGMENTED NEUTROPHILS ABSOLUTE COUNT: 1.73 K/UL (ref 1.5–8.1)
SODIUM BLD-SCNC: 140 MMOL/L (ref 135–144)
WBC # BLD: 4.8 K/UL (ref 3.5–11.3)
WBC # BLD: ABNORMAL 10*3/UL

## 2019-11-20 ENCOUNTER — HOSPITAL ENCOUNTER (OUTPATIENT)
Dept: GENERAL RADIOLOGY | Age: 74
Discharge: HOME | End: 2019-11-22
Payer: MEDICARE

## 2019-11-20 ENCOUNTER — HOSPITAL ENCOUNTER (OUTPATIENT)
Dept: PREADMISSION TESTING | Age: 74
Discharge: HOME | End: 2019-11-24
Payer: MEDICARE

## 2019-11-20 ENCOUNTER — TELEPHONE (OUTPATIENT)
Dept: FAMILY MEDICINE CLINIC | Age: 74
End: 2019-11-20

## 2019-11-20 ENCOUNTER — HOSPITAL ENCOUNTER (OUTPATIENT)
Age: 74
Discharge: HOME | End: 2019-11-22
Payer: MEDICARE

## 2019-11-20 VITALS
HEIGHT: 59 IN | WEIGHT: 151.24 LBS | HEART RATE: 80 BPM | DIASTOLIC BLOOD PRESSURE: 62 MMHG | BODY MASS INDEX: 30.49 KG/M2 | OXYGEN SATURATION: 97 % | RESPIRATION RATE: 16 BRPM | SYSTOLIC BLOOD PRESSURE: 113 MMHG

## 2019-11-20 DIAGNOSIS — S83.241D ACUTE MEDIAL MENISCAL TEAR, RIGHT, SUBSEQUENT ENCOUNTER: ICD-10-CM

## 2019-11-20 DIAGNOSIS — R05.9 COUGH: Primary | ICD-10-CM

## 2019-11-20 DIAGNOSIS — Z79.2 PROPHYLACTIC ANTIBIOTIC: ICD-10-CM

## 2019-11-20 DIAGNOSIS — R06.2 WHEEZING: ICD-10-CM

## 2019-11-20 PROCEDURE — 73562 X-RAY EXAM OF KNEE 3: CPT

## 2019-11-20 PROCEDURE — 71046 X-RAY EXAM CHEST 2 VIEWS: CPT

## 2019-11-21 RX ORDER — DOXYCYCLINE HYCLATE 100 MG
100 TABLET ORAL 2 TIMES DAILY
Qty: 28 TABLET | Refills: 0 | Status: SHIPPED | OUTPATIENT
Start: 2019-11-21 | End: 2019-12-05

## 2019-11-26 ENCOUNTER — NURSE ONLY (OUTPATIENT)
Dept: FAMILY MEDICINE CLINIC | Age: 74
End: 2019-11-26
Payer: MEDICARE

## 2019-11-26 DIAGNOSIS — E55.9 VITAMIN D DEFICIENCY: Primary | ICD-10-CM

## 2019-11-26 PROCEDURE — 96372 THER/PROPH/DIAG INJ SC/IM: CPT | Performed by: NURSE PRACTITIONER

## 2019-11-26 RX ORDER — CYANOCOBALAMIN 1000 UG/ML
1000 INJECTION INTRAMUSCULAR; SUBCUTANEOUS ONCE
Status: COMPLETED | OUTPATIENT
Start: 2019-11-26 | End: 2019-11-26

## 2019-11-26 RX ADMIN — CYANOCOBALAMIN 1000 MCG: 1000 INJECTION INTRAMUSCULAR; SUBCUTANEOUS at 10:19

## 2019-12-04 ENCOUNTER — ANESTHESIA EVENT (OUTPATIENT)
Dept: OPERATING ROOM | Age: 74
End: 2019-12-04
Payer: MEDICARE

## 2019-12-04 ENCOUNTER — HOSPITAL ENCOUNTER (OUTPATIENT)
Age: 74
Setting detail: OUTPATIENT SURGERY
Discharge: HOME | End: 2019-12-04
Attending: ORTHOPAEDIC SURGERY | Admitting: ORTHOPAEDIC SURGERY
Payer: MEDICARE

## 2019-12-04 ENCOUNTER — ANESTHESIA (OUTPATIENT)
Dept: OPERATING ROOM | Age: 74
End: 2019-12-04
Payer: MEDICARE

## 2019-12-04 VITALS
RESPIRATION RATE: 17 BRPM | SYSTOLIC BLOOD PRESSURE: 136 MMHG | OXYGEN SATURATION: 95 % | BODY MASS INDEX: 30.49 KG/M2 | TEMPERATURE: 97.2 F | WEIGHT: 151.24 LBS | HEART RATE: 81 BPM | DIASTOLIC BLOOD PRESSURE: 84 MMHG | HEIGHT: 59 IN

## 2019-12-04 VITALS — SYSTOLIC BLOOD PRESSURE: 131 MMHG | DIASTOLIC BLOOD PRESSURE: 63 MMHG | OXYGEN SATURATION: 99 % | TEMPERATURE: 96.3 F

## 2019-12-04 DIAGNOSIS — G89.18 POST-OP PAIN: Primary | ICD-10-CM

## 2019-12-04 LAB — VITAMIN D 25-HYDROXY: 50.3 NG/ML (ref 30–100)

## 2019-12-04 PROCEDURE — 7100000011 HC PHASE II RECOVERY - ADDTL 15 MIN: Performed by: ORTHOPAEDIC SURGERY

## 2019-12-04 PROCEDURE — 2500000003 HC RX 250 WO HCPCS: Performed by: NURSE ANESTHETIST, CERTIFIED REGISTERED

## 2019-12-04 PROCEDURE — 6360000002 HC RX W HCPCS: Performed by: ANESTHESIOLOGY

## 2019-12-04 PROCEDURE — 7100000000 HC PACU RECOVERY - FIRST 15 MIN: Performed by: ORTHOPAEDIC SURGERY

## 2019-12-04 PROCEDURE — 3700000000 HC ANESTHESIA ATTENDED CARE: Performed by: ORTHOPAEDIC SURGERY

## 2019-12-04 PROCEDURE — 6370000000 HC RX 637 (ALT 250 FOR IP): Performed by: ANESTHESIOLOGY

## 2019-12-04 PROCEDURE — 2500000003 HC RX 250 WO HCPCS: Performed by: ORTHOPAEDIC SURGERY

## 2019-12-04 PROCEDURE — 7100000001 HC PACU RECOVERY - ADDTL 15 MIN: Performed by: ORTHOPAEDIC SURGERY

## 2019-12-04 PROCEDURE — 7100000010 HC PHASE II RECOVERY - FIRST 15 MIN: Performed by: ORTHOPAEDIC SURGERY

## 2019-12-04 PROCEDURE — 2580000003 HC RX 258: Performed by: ANESTHESIOLOGY

## 2019-12-04 PROCEDURE — 3600000013 HC SURGERY LEVEL 3 ADDTL 15MIN: Performed by: ORTHOPAEDIC SURGERY

## 2019-12-04 PROCEDURE — 3700000001 HC ADD 15 MINUTES (ANESTHESIA): Performed by: ORTHOPAEDIC SURGERY

## 2019-12-04 PROCEDURE — 3600000003 HC SURGERY LEVEL 3 BASE: Performed by: ORTHOPAEDIC SURGERY

## 2019-12-04 PROCEDURE — 2709999900 HC NON-CHARGEABLE SUPPLY: Performed by: ORTHOPAEDIC SURGERY

## 2019-12-04 PROCEDURE — 6360000002 HC RX W HCPCS: Performed by: NURSE ANESTHETIST, CERTIFIED REGISTERED

## 2019-12-04 PROCEDURE — 2720000010 HC SURG SUPPLY STERILE: Performed by: ORTHOPAEDIC SURGERY

## 2019-12-04 PROCEDURE — 82306 VITAMIN D 25 HYDROXY: CPT

## 2019-12-04 RX ORDER — CLINDAMYCIN PHOSPHATE 900 MG/50ML
INJECTION INTRAVENOUS PRN
Status: DISCONTINUED | OUTPATIENT
Start: 2019-12-04 | End: 2019-12-04 | Stop reason: SDUPTHER

## 2019-12-04 RX ORDER — FENTANYL CITRATE 50 UG/ML
INJECTION, SOLUTION INTRAMUSCULAR; INTRAVENOUS PRN
Status: DISCONTINUED | OUTPATIENT
Start: 2019-12-04 | End: 2019-12-04 | Stop reason: SDUPTHER

## 2019-12-04 RX ORDER — SODIUM CHLORIDE 9 MG/ML
INJECTION, SOLUTION INTRAVENOUS CONTINUOUS
Status: DISCONTINUED | OUTPATIENT
Start: 2019-12-04 | End: 2019-12-04 | Stop reason: HOSPADM

## 2019-12-04 RX ORDER — SODIUM CHLORIDE 0.9 % (FLUSH) 0.9 %
10 SYRINGE (ML) INJECTION PRN
Status: DISCONTINUED | OUTPATIENT
Start: 2019-12-04 | End: 2019-12-04 | Stop reason: HOSPADM

## 2019-12-04 RX ORDER — PROPOFOL 10 MG/ML
INJECTION, EMULSION INTRAVENOUS PRN
Status: DISCONTINUED | OUTPATIENT
Start: 2019-12-04 | End: 2019-12-04 | Stop reason: SDUPTHER

## 2019-12-04 RX ORDER — FENTANYL CITRATE 50 UG/ML
50 INJECTION, SOLUTION INTRAMUSCULAR; INTRAVENOUS EVERY 5 MIN PRN
Status: COMPLETED | OUTPATIENT
Start: 2019-12-04 | End: 2019-12-04

## 2019-12-04 RX ORDER — HYDROMORPHONE HCL 110MG/55ML
0.5 PATIENT CONTROLLED ANALGESIA SYRINGE INTRAVENOUS EVERY 5 MIN PRN
Status: DISCONTINUED | OUTPATIENT
Start: 2019-12-04 | End: 2019-12-04 | Stop reason: HOSPADM

## 2019-12-04 RX ORDER — HYDROMORPHONE HCL 110MG/55ML
0.25 PATIENT CONTROLLED ANALGESIA SYRINGE INTRAVENOUS EVERY 5 MIN PRN
Status: DISCONTINUED | OUTPATIENT
Start: 2019-12-04 | End: 2019-12-04 | Stop reason: HOSPADM

## 2019-12-04 RX ORDER — LIDOCAINE HYDROCHLORIDE 10 MG/ML
1 INJECTION, SOLUTION EPIDURAL; INFILTRATION; INTRACAUDAL; PERINEURAL
Status: DISCONTINUED | OUTPATIENT
Start: 2019-12-04 | End: 2019-12-04 | Stop reason: HOSPADM

## 2019-12-04 RX ORDER — SODIUM CHLORIDE 0.9 % (FLUSH) 0.9 %
10 SYRINGE (ML) INJECTION EVERY 12 HOURS SCHEDULED
Status: DISCONTINUED | OUTPATIENT
Start: 2019-12-04 | End: 2019-12-04 | Stop reason: HOSPADM

## 2019-12-04 RX ORDER — SODIUM CHLORIDE, SODIUM LACTATE, POTASSIUM CHLORIDE, CALCIUM CHLORIDE 600; 310; 30; 20 MG/100ML; MG/100ML; MG/100ML; MG/100ML
INJECTION, SOLUTION INTRAVENOUS CONTINUOUS
Status: DISCONTINUED | OUTPATIENT
Start: 2019-12-04 | End: 2019-12-04 | Stop reason: HOSPADM

## 2019-12-04 RX ORDER — ONDANSETRON 2 MG/ML
4 INJECTION INTRAMUSCULAR; INTRAVENOUS
Status: DISCONTINUED | OUTPATIENT
Start: 2019-12-04 | End: 2019-12-04 | Stop reason: HOSPADM

## 2019-12-04 RX ORDER — OXYCODONE HYDROCHLORIDE AND ACETAMINOPHEN 5; 325 MG/1; MG/1
1 TABLET ORAL ONCE
Status: COMPLETED | OUTPATIENT
Start: 2019-12-04 | End: 2019-12-04

## 2019-12-04 RX ORDER — FENTANYL CITRATE 50 UG/ML
25 INJECTION, SOLUTION INTRAMUSCULAR; INTRAVENOUS EVERY 5 MIN PRN
Status: DISCONTINUED | OUTPATIENT
Start: 2019-12-04 | End: 2019-12-04 | Stop reason: HOSPADM

## 2019-12-04 RX ORDER — BUPIVACAINE HYDROCHLORIDE AND EPINEPHRINE 5; 5 MG/ML; UG/ML
INJECTION, SOLUTION EPIDURAL; INTRACAUDAL; PERINEURAL PRN
Status: DISCONTINUED | OUTPATIENT
Start: 2019-12-04 | End: 2019-12-04 | Stop reason: ALTCHOICE

## 2019-12-04 RX ORDER — LIDOCAINE HYDROCHLORIDE 20 MG/ML
INJECTION, SOLUTION EPIDURAL; INFILTRATION; INTRACAUDAL; PERINEURAL PRN
Status: DISCONTINUED | OUTPATIENT
Start: 2019-12-04 | End: 2019-12-04 | Stop reason: SDUPTHER

## 2019-12-04 RX ORDER — DEXAMETHASONE SODIUM PHOSPHATE 10 MG/ML
INJECTION INTRAMUSCULAR; INTRAVENOUS PRN
Status: DISCONTINUED | OUTPATIENT
Start: 2019-12-04 | End: 2019-12-04 | Stop reason: SDUPTHER

## 2019-12-04 RX ORDER — OXYCODONE HYDROCHLORIDE AND ACETAMINOPHEN 5; 325 MG/1; MG/1
1 TABLET ORAL EVERY 6 HOURS PRN
Qty: 28 TABLET | Refills: 0 | Status: SHIPPED | OUTPATIENT
Start: 2019-12-04 | End: 2019-12-11

## 2019-12-04 RX ORDER — EPHEDRINE SULFATE/0.9% NACL/PF 50 MG/5 ML
SYRINGE (ML) INTRAVENOUS PRN
Status: DISCONTINUED | OUTPATIENT
Start: 2019-12-04 | End: 2019-12-04 | Stop reason: SDUPTHER

## 2019-12-04 RX ADMIN — OXYCODONE AND ACETAMINOPHEN 1 TABLET: 5; 325 TABLET ORAL at 15:17

## 2019-12-04 RX ADMIN — Medication 10 MG: at 12:58

## 2019-12-04 RX ADMIN — SODIUM CHLORIDE, POTASSIUM CHLORIDE, SODIUM LACTATE AND CALCIUM CHLORIDE: 600; 310; 30; 20 INJECTION, SOLUTION INTRAVENOUS at 14:40

## 2019-12-04 RX ADMIN — FENTANYL CITRATE 50 MCG: 50 INJECTION, SOLUTION INTRAMUSCULAR; INTRAVENOUS at 14:25

## 2019-12-04 RX ADMIN — Medication 10 MG: at 13:02

## 2019-12-04 RX ADMIN — Medication 50 MCG: at 12:50

## 2019-12-04 RX ADMIN — DEXAMETHASONE SODIUM PHOSPHATE 10 MG: 10 INJECTION INTRAMUSCULAR; INTRAVENOUS at 13:00

## 2019-12-04 RX ADMIN — SODIUM CHLORIDE, POTASSIUM CHLORIDE, SODIUM LACTATE AND CALCIUM CHLORIDE: 600; 310; 30; 20 INJECTION, SOLUTION INTRAVENOUS at 12:18

## 2019-12-04 RX ADMIN — Medication 50 MCG: at 13:12

## 2019-12-04 RX ADMIN — Medication 15 MG: at 13:04

## 2019-12-04 RX ADMIN — Medication 50 MCG: at 13:20

## 2019-12-04 RX ADMIN — LIDOCAINE HYDROCHLORIDE 60 MG: 20 INJECTION, SOLUTION EPIDURAL; INFILTRATION; INTRACAUDAL; PERINEURAL at 12:55

## 2019-12-04 RX ADMIN — PROPOFOL 150 MG: 10 INJECTION, EMULSION INTRAVENOUS at 12:55

## 2019-12-04 RX ADMIN — FENTANYL CITRATE 50 MCG: 50 INJECTION, SOLUTION INTRAMUSCULAR; INTRAVENOUS at 14:41

## 2019-12-04 RX ADMIN — FENTANYL CITRATE 50 MCG: 50 INJECTION, SOLUTION INTRAMUSCULAR; INTRAVENOUS at 14:15

## 2019-12-04 RX ADMIN — Medication 50 MCG: at 12:54

## 2019-12-04 RX ADMIN — FENTANYL CITRATE 50 MCG: 50 INJECTION, SOLUTION INTRAMUSCULAR; INTRAVENOUS at 14:59

## 2019-12-04 RX ADMIN — CLINDAMYCIN PHOSPHATE 600 MG: 18 INJECTION, SOLUTION INTRAMUSCULAR; INTRAVENOUS at 13:03

## 2019-12-04 RX ADMIN — Medication 15 MG: at 13:00

## 2019-12-04 ASSESSMENT — PULMONARY FUNCTION TESTS
PIF_VALUE: 20
PIF_VALUE: 20
PIF_VALUE: 18
PIF_VALUE: 2
PIF_VALUE: 3
PIF_VALUE: 14
PIF_VALUE: 20
PIF_VALUE: 18
PIF_VALUE: 1
PIF_VALUE: 20
PIF_VALUE: 1
PIF_VALUE: 2
PIF_VALUE: 18
PIF_VALUE: 20
PIF_VALUE: 2
PIF_VALUE: 20
PIF_VALUE: 20
PIF_VALUE: 2
PIF_VALUE: 20
PIF_VALUE: 2
PIF_VALUE: 4
PIF_VALUE: 20
PIF_VALUE: 18
PIF_VALUE: 1
PIF_VALUE: 20
PIF_VALUE: 20
PIF_VALUE: 1
PIF_VALUE: 20
PIF_VALUE: 18
PIF_VALUE: 20
PIF_VALUE: 18
PIF_VALUE: 20
PIF_VALUE: 20
PIF_VALUE: 12
PIF_VALUE: 20
PIF_VALUE: 18
PIF_VALUE: 20
PIF_VALUE: 18
PIF_VALUE: 19
PIF_VALUE: 20
PIF_VALUE: 20
PIF_VALUE: 18
PIF_VALUE: 20
PIF_VALUE: 0
PIF_VALUE: 18
PIF_VALUE: 20
PIF_VALUE: 18
PIF_VALUE: 20
PIF_VALUE: 20
PIF_VALUE: 2
PIF_VALUE: 18
PIF_VALUE: 18
PIF_VALUE: 2
PIF_VALUE: 20
PIF_VALUE: 20
PIF_VALUE: 2
PIF_VALUE: 18
PIF_VALUE: 20
PIF_VALUE: 20
PIF_VALUE: 18
PIF_VALUE: 18
PIF_VALUE: 2
PIF_VALUE: 20

## 2019-12-04 ASSESSMENT — PAIN DESCRIPTION - DESCRIPTORS
DESCRIPTORS: THROBBING
DESCRIPTORS: THROBBING
DESCRIPTORS: CONSTANT;THROBBING;SHARP
DESCRIPTORS: THROBBING
DESCRIPTORS: THROBBING;SHARP
DESCRIPTORS: THROBBING
DESCRIPTORS: THROBBING
DESCRIPTORS: ACHING;THROBBING
DESCRIPTORS: THROBBING

## 2019-12-04 ASSESSMENT — PAIN - FUNCTIONAL ASSESSMENT
PAIN_FUNCTIONAL_ASSESSMENT: 0-10
PAIN_FUNCTIONAL_ASSESSMENT: PREVENTS OR INTERFERES SOME ACTIVE ACTIVITIES AND ADLS

## 2019-12-04 ASSESSMENT — PAIN DESCRIPTION - ONSET
ONSET: ON-GOING
ONSET: GRADUAL
ONSET: AWAKENED FROM SLEEP
ONSET: ON-GOING

## 2019-12-04 ASSESSMENT — PAIN DESCRIPTION - ORIENTATION
ORIENTATION: RIGHT

## 2019-12-04 ASSESSMENT — PAIN DESCRIPTION - PAIN TYPE
TYPE: SURGICAL PAIN

## 2019-12-04 ASSESSMENT — PAIN SCALES - GENERAL
PAINLEVEL_OUTOF10: 5
PAINLEVEL_OUTOF10: 5
PAINLEVEL_OUTOF10: 7
PAINLEVEL_OUTOF10: 5
PAINLEVEL_OUTOF10: 7
PAINLEVEL_OUTOF10: 3
PAINLEVEL_OUTOF10: 7

## 2019-12-04 ASSESSMENT — PAIN DESCRIPTION - LOCATION
LOCATION: KNEE

## 2019-12-04 ASSESSMENT — PAIN DESCRIPTION - FREQUENCY
FREQUENCY: CONTINUOUS

## 2019-12-04 ASSESSMENT — PAIN DESCRIPTION - PROGRESSION
CLINICAL_PROGRESSION: GRADUALLY IMPROVING
CLINICAL_PROGRESSION: NOT CHANGED

## 2020-01-03 ENCOUNTER — NURSE ONLY (OUTPATIENT)
Dept: FAMILY MEDICINE CLINIC | Age: 75
End: 2020-01-03
Payer: MEDICARE

## 2020-01-03 PROCEDURE — 96372 THER/PROPH/DIAG INJ SC/IM: CPT | Performed by: NURSE PRACTITIONER

## 2020-01-03 RX ORDER — CYANOCOBALAMIN 1000 UG/ML
1000 INJECTION INTRAMUSCULAR; SUBCUTANEOUS ONCE
Status: COMPLETED | OUTPATIENT
Start: 2020-01-03 | End: 2020-01-03

## 2020-01-03 RX ADMIN — CYANOCOBALAMIN 1000 MCG: 1000 INJECTION INTRAMUSCULAR; SUBCUTANEOUS at 10:33

## 2020-01-03 NOTE — PROGRESS NOTES
Patient presents to office for b12 injection. Patient is well alert and oriented. Dressed approprietly. Tolerated injection well. No concerns or questions.

## 2020-01-09 ENCOUNTER — HOSPITAL ENCOUNTER (OUTPATIENT)
Dept: GENERAL RADIOLOGY | Age: 75
Discharge: HOME | End: 2020-01-11
Payer: OTHER MISCELLANEOUS

## 2020-01-09 ENCOUNTER — HOSPITAL ENCOUNTER (OUTPATIENT)
Age: 75
Discharge: HOME | End: 2020-01-11
Payer: OTHER MISCELLANEOUS

## 2020-01-09 PROCEDURE — 73562 X-RAY EXAM OF KNEE 3: CPT

## 2020-01-14 ENCOUNTER — HOSPITAL ENCOUNTER (OUTPATIENT)
Dept: PHYSICAL THERAPY | Age: 75
Setting detail: THERAPIES SERIES
Discharge: HOME | End: 2020-01-14
Payer: MEDICARE

## 2020-01-14 PROCEDURE — 97016 VASOPNEUMATIC DEVICE THERAPY: CPT

## 2020-01-14 PROCEDURE — 97161 PT EVAL LOW COMPLEX 20 MIN: CPT

## 2020-01-14 PROCEDURE — 97110 THERAPEUTIC EXERCISES: CPT

## 2020-01-14 NOTE — FLOWSHEET NOTE
Joy Fall Risk Assessment    Patient Name:  Marcey Osgood  : 1945        Risk Factor Scale  Score   History of Falls [] Yes  [x] No 25  0 0   Secondary Diagnosis [] Yes  [x] No 15  0 0   Ambulatory Aid [] Furniture  [x] Crutches/cane/walker  [] None/bedrest/wheelchair/nurse 30  15  0 15   IV/Heparin Lock [] Yes  [x] No 20  0 0   Gait/Transferring [] Impaired  [] Weak  [x] Normal/bedrest/immobile 20  10  0 0   Mental Status [] Forgets limitations  [x] Oriented to own ability 15  0 0      Total: 15     Based on the Assessment score: check the appropriate box.     [x]  No intervention needed   Low =   Score of 0-24    []  Use standard prevention interventions Moderate =  Score of 24-44   [] Give patient handout and discuss fall prevention strategies   [] Establish goal of education for patient/family RE: fall prevention strategies    []  Use high risk prevention interventions High = Score of 45 and higher   [] Give patient handout and discuss fall prevention strategies   [] Establish goal of education for patient/family Re: fall prevention strategies   [] Discuss lifeline / other resources    Electronically signed by:   Rahat Malik PT  Date: 2020
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
## 2020-01-17 ENCOUNTER — HOSPITAL ENCOUNTER (OUTPATIENT)
Dept: PHYSICAL THERAPY | Age: 75
Setting detail: THERAPIES SERIES
Discharge: HOME | End: 2020-01-17
Payer: OTHER MISCELLANEOUS

## 2020-01-17 PROCEDURE — 97016 VASOPNEUMATIC DEVICE THERAPY: CPT

## 2020-01-17 PROCEDURE — 97110 THERAPEUTIC EXERCISES: CPT

## 2020-01-17 NOTE — PROGRESS NOTES
Physical Therapy    [address] [facility]   [address]   Phone: [phone]   Fax: [phone]    Physical Therapy Daily Treatment Note      Date:  2020  Patient Name:  Marcey Osgood    :  1945  MRN: [id number]  Physician: Britney Ruiz DO                             Insurance: Generic [employer]  Medical Diagnosis: Z49.817J - Other tear of medial meniscus, current injury, right knee, subsequent encounter; M17.0 - Bilateral primary osteoarthritis of knee                    Rehab Codes: M25.561, M25.562, M62.81, R26.2  Onset date: 19 R knee scope with total meniscectomy                     Next Dr's appt. : Visit# / total visits:   Cancels/No Shows: 0/0    Subjective:    Pain:  [] Yes  [] No Location: R KNEE Pain Rating: (0-10 scale)  7 /10  Pain altered Tx:  [] No  [] Yes  Action:  Comments: BURNING ANTERIOR KNEE PAIN TODAY. PATIENT REPORTS SHE HAS ONLY DONE HER HEP A FEW TIMES SINCE HER LAST SESSION. Objective:  Modalities: vasocompression to R knee on min pressure and 34 degrees following treatment session  Precautions:  EXERCISES:  Exercise Reps/ Time Weight/ Level Comments   NUSTEP  5' L2    3 WAY SLR (R) ONLY TODAY 10   TRIED LEFT BUT HAD INCREASED PAIN    HEEL/TOE RAISES 10 EA     MINI SQUATS 10X5\"     STANDING HS CURLS   (R) ONLY         SITING HS STRETCH 10\" X5     Quad sets (R) 10x5\"       SLR 5x ea       SAQ 10x5' ea       Heel slides (R) 10x5\"       Sidelying hip abd 10x ea         Other: PATIENT ARRIVES [facility]) WITH CANE IN R UE, REMINDED CANE SHOULD BE IN THE LEFT. CUES TO PLACE ALL LEGS OF QUAD CANE DOWN TOGETHER. Specific Instructions for next treatment:PROGRESS HIP AND KNEE STRENGTHENING EXERCISES.     Treatment Charges: Mins Units   []  Modalities     [x]  Ther Exercise 30 2   []  Manual Therapy     []  Ther Activities     []  Aquatics     []  Neuromuscular     [x] Vasocompression 15 1   []  Other     Total Treatment time

## 2020-01-20 ENCOUNTER — HOSPITAL ENCOUNTER (OUTPATIENT)
Dept: PHYSICAL THERAPY | Age: 75
Setting detail: THERAPIES SERIES
Discharge: HOME | End: 2020-01-20
Payer: OTHER MISCELLANEOUS

## 2020-01-20 PROCEDURE — 97110 THERAPEUTIC EXERCISES: CPT

## 2020-01-20 PROCEDURE — 97016 VASOPNEUMATIC DEVICE THERAPY: CPT

## 2020-01-20 NOTE — PROGRESS NOTES
changes made today due to high pain after last visit. Better tolerance to exercises with no complaints of pain throughout treatment. [] Other:    STG: (to be met in 8 treatments)  1. ? Pain: Pt will report decreased bilateral knee pain to no greater than 5/10 upon waking and getting out of bed.  2. ? ROM: Pt will increase R knee AROM to 0 degrees of extension and 120 degrees of flexion in order to normalize gait pattern. 3. ? Strength: Pt will increase bilateral hip abduction strength to 4/5 in order to decrease strain placed on bilateral knees. 4. ? Function: Pt will demonstrate normalized gait pattern without assistive device in order to increase ambulation distance int he community. 5. Independent with Home Exercise Programs  LTG: (to be met in 16 treatments)  1. Pt will increase R knee strength to 5/5 in order to improve stair climbing ability. 2. Pt will report no instances over 2 week duration of R knee giving way. 3. Pt will demonstrate improved functional activity tolerance as evident by an improved score on the LEFI to less than 50% impairment. 4. Pt will demonstrate improvement in R knee edema as evident by decreased circumference around the patella by 2 cm.                   Patient goals: \"less pain, more mobility\"    Pt. Education:  [x] Yes  [] No  [x] Reviewed Prior HEP/Ed  Method of Education: [x] Verbal  [x] Demo  [] Written  Comprehension of Education:  [x] Verbalizes understanding. [x] Demonstrates understanding. [] Needs review. [] Demonstrates/verbalizes HEP/Ed previously given. Plan: [x] Continue per plan of care.    [] Other:      Time In: 1300           Time Out: 1404    Electronically signed by:  Orly Lainez PTA

## 2020-01-23 ENCOUNTER — HOSPITAL ENCOUNTER (OUTPATIENT)
Dept: PHYSICAL THERAPY | Age: 75
Discharge: HOME | End: 2020-01-23

## 2020-01-23 NOTE — FLOWSHEET NOTE
[] [address]  [address]    [phone]  F: [phone]   [] [address]  [facility] [address]   [address]  P: [phone]  F: [phone]  [] [place]  [address]  P: [phone]  F: [phone]  [] [address] [facility]   [address]   [place]: [phone]  F: [phone]   [x] [address]  [place]: [phone]   F: [phone]     Physical Therapy Cancel/No Show note    Date: 2020  Patient: Silvina Anguiano  : 1945  MRN: [id number]    Cancels/No Shows to date:     For today's appointment patient:    [x]  Cancelled    [] Rescheduled appointment    [] No-show     Reason given by patient:    []  Patient ill    []  Conflicting appointment    [] No transportation      [] Conflict with work    [] No reason given    [] Weather related    [x] Other:      Comments: Too much pain to come in today.       [] Next appointment was confirmed    Electronically signed by: Kimmy Saenz PTA

## 2020-01-24 ENCOUNTER — TELEPHONE (OUTPATIENT)
Dept: FAMILY MEDICINE CLINIC | Age: 75
End: 2020-01-24

## 2020-01-27 ENCOUNTER — HOSPITAL ENCOUNTER (OUTPATIENT)
Dept: GENERAL RADIOLOGY | Age: 75
Discharge: HOME | End: 2020-01-29
Payer: MEDICARE

## 2020-01-27 ENCOUNTER — HOSPITAL ENCOUNTER (OUTPATIENT)
Dept: PHYSICAL THERAPY | Age: 75
Discharge: HOME | End: 2020-01-27

## 2020-01-27 ENCOUNTER — HOSPITAL ENCOUNTER (OUTPATIENT)
Age: 75
Discharge: HOME | End: 2020-01-29
Payer: MEDICARE

## 2020-01-27 ENCOUNTER — OFFICE VISIT (OUTPATIENT)
Dept: FAMILY MEDICINE CLINIC | Age: 75
End: 2020-01-27
Payer: MEDICARE

## 2020-01-27 VITALS
HEART RATE: 91 BPM | HEIGHT: 60 IN | OXYGEN SATURATION: 95 % | TEMPERATURE: 97 F | WEIGHT: 151 LBS | DIASTOLIC BLOOD PRESSURE: 70 MMHG | BODY MASS INDEX: 29.64 KG/M2 | SYSTOLIC BLOOD PRESSURE: 101 MMHG

## 2020-01-27 PROCEDURE — 71046 X-RAY EXAM CHEST 2 VIEWS: CPT

## 2020-01-27 PROCEDURE — 99213 OFFICE O/P EST LOW 20 MIN: CPT | Performed by: PHYSICIAN ASSISTANT

## 2020-01-27 RX ORDER — BROMPHENIRAMINE MALEATE, PSEUDOEPHEDRINE HYDROCHLORIDE, AND DEXTROMETHORPHAN HYDROBROMIDE 2; 30; 10 MG/5ML; MG/5ML; MG/5ML
SYRUP ORAL
Qty: 180 ML | Refills: 0 | Status: SHIPPED | OUTPATIENT
Start: 2020-01-27 | End: 2020-07-29 | Stop reason: ALTCHOICE

## 2020-01-27 RX ORDER — LEVOFLOXACIN 500 MG/1
500 TABLET, FILM COATED ORAL DAILY
Qty: 7 TABLET | Refills: 0 | Status: SHIPPED | OUTPATIENT
Start: 2020-01-27 | End: 2020-02-03

## 2020-01-27 RX ORDER — BENZONATATE 200 MG/1
200 CAPSULE ORAL 3 TIMES DAILY PRN
Qty: 21 CAPSULE | Refills: 0 | Status: SHIPPED | OUTPATIENT
Start: 2020-01-27 | End: 2020-02-03

## 2020-01-27 RX ORDER — METHYLPREDNISOLONE 4 MG/1
TABLET ORAL
Qty: 1 KIT | Refills: 0 | Status: SHIPPED | OUTPATIENT
Start: 2020-01-27 | End: 2020-02-04

## 2020-01-27 ASSESSMENT — ENCOUNTER SYMPTOMS
WHEEZING: 1
SINUS PRESSURE: 0
SHORTNESS OF BREATH: 1
GASTROINTESTINAL NEGATIVE: 1
RHINORRHEA: 1
HEARTBURN: 0
COUGH: 1
SORE THROAT: 0
SINUS PAIN: 0
HEMOPTYSIS: 0
EYES NEGATIVE: 1

## 2020-01-27 NOTE — TELEPHONE ENCOUNTER
Patient came in to office today to see Olvin Núñez for acute cough. She is asking if she can be prescribed xanax or something comparable to help with her anxiety. Her son was just placed in palliative care and she is having a hard time with it. She does have an upcoming appointment with PCP in the next 14 days to follow up on this. Please advise. Next Visit Date:  Future Appointments   Date Time Provider Glenroy Falcon   1/27/2020  1:00 PM Yuan Rivas, PTA [facility]   1/30/2020  1:30 PM Jonnathan Lweis, PT [facility] [place]   2/4/2020 11:00 AM DES Sims CNP [facility]   2/11/2020 11:40 AM DES Sims CNP [facility] [place] [facility]"" [address] Maintenance   Topic Date Due    DTaP/Tdap/Td vaccine (1 - Tdap) 05/24/1954    Shingles Vaccine (1 of 2) 05/24/1993    Lipid screen  02/19/2020    DEXA (modify frequency per FRAX score)  Completed    Flu vaccine  Completed    Pneumococcal 65+ years Vaccine  Completed       Hemoglobin A1C (%)   Date Value   02/19/2019 5.5   05/23/2018 5.3   07/31/2017 5.5             ( goal A1C is < 7)   Microalb/Crt.  Ratio (mcg/mg creat)   Date Value   05/12/2016 7     LDL Cholesterol (mg/dL)   Date Value   02/19/2019 89   03/02/2018 84     LDL Calculated (mg/dL)   Date Value   11/13/2014 109       (goal LDL is <100)   AST (U/L)   Date Value   02/19/2019 22     ALT (U/L)   Date Value   02/19/2019 17     BUN (mg/dL)   Date Value   11/06/2019 18     BP Readings from Last 3 Encounters:   01/27/20 101/70   12/04/19 136/84   12/04/19 131/63          (goal 120/80)    All Future Testing planned in [facility]  Lab Frequency Next Occurrence   XR CHEST STANDARD (2 VW) Once 01/27/2020               Patient Active Problem List:     Hyperlipidemia     Fibromyalgia     Osteopenia     Hx of bilateral breast implants     Vitamin D deficiency     Arthritis of shoulder region, right     Anxiety     Anemia, normocytic normochromic     Chronic pain associated with significant psychosocial dysfunction     Primary osteoarthritis involving multiple joints     Spondylosis 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 neuropathy (HCC)     Primary insomnia     Lumbar and sacral arthritis     Chronic biliary pancreatitis (HCC)     Moderate episode of recurrent major depressive disorder (HCC)     Uncomplicated opioid dependence (HCC)     Hyperuricemia     Primary open-angle glaucoma, right eye, mild stage     Primary open-angle glaucoma, left eye, mild stage

## 2020-01-27 NOTE — PATIENT INSTRUCTIONS
Patient Education        Cough: Care Instructions  Your Care Instructions    A cough is your body's response to something that bothers your throat or airways. Many things can cause a cough. You might cough because of a cold or the flu, bronchitis, or asthma. Smoking, postnasal drip, allergies, and stomach acid that backs up into your throat also can cause coughs. A cough is a symptom, not a disease. Most coughs stop when the cause, such as a cold, goes away. You can take a few steps at home to cough less and feel better. Follow-up care is a key part of your treatment and safety. Be sure to make and go to all appointments, and call your doctor if you are having problems. It's also a good idea to know your test results and keep a list of the medicines you take. How can you care for yourself at home? · Drink lots of water and other fluids. This helps thin the mucus and soothes a dry or sore throat. Honey or lemon juice in hot water or tea may ease a dry cough. · Take cough medicine as directed by your doctor. · Prop up your head on pillows to help you breathe and ease a dry cough. · Try cough drops to soothe a dry or sore throat. Cough drops don't stop a cough. Medicine-flavored cough drops are no better than candy-flavored drops or hard candy. · Do not smoke. Avoid secondhand smoke. If you need help quitting, talk to your doctor about stop-smoking programs and medicines. These can increase your chances of quitting for good. When should you call for help? Call 911 anytime you think you may need emergency care.  For example, call if:    · You have severe trouble breathing.    Call your doctor now or seek immediate medical care if:    · You cough up blood.     · You have new or worse trouble breathing.     · You have a new or higher fever.     · You have a new rash.    Watch closely for changes in your health, and be sure to contact your doctor if:    · You cough more deeply or more often, especially if you notice more mucus or a change in the color of your mucus.     · You have new symptoms, such as a sore throat, an earache, or sinus pain.     · You do not get better as expected. Where can you learn more? Go to [email or web address] and sign in to your [employer] account. Enter D279 in the [employer] box to learn more about \"Cough: Care Instructions. \"     If you do not have an account, please click on the \"Sign Up Now\" link. Current as of: June 9, 2019  Content Version: 12.3  © 5963-5780 Healthwise, Incorporated. Care instructions adapted under license by [facility] ([facility]). If you have questions about a medical condition or this instruction, always ask your healthcare professional. [address] any warranty or liability for your use of this information.

## 2020-01-27 NOTE — PROGRESS NOTES
MOUTH AT BEDTIME 30 tablet 5    QUEtiapine (SEROQUEL) 50 MG tablet Take 1 tablet by mouth 2 times daily TAKE 1 OR 2 TABLETS BY MOUTH AT BEDTIME AS NEEDED FOR SLEEP 60 tablet 5    allopurinol (ZYLOPRIM) 100 MG tablet Take 1 tablet by mouth daily 30 tablet 5    oxyCODONE-acetaminophen (PERCOCET)  MG per tablet Take 1 tablet by mouth every 4 hours as needed for Pain.   0    QUEtiapine (SEROQUEL) 200 MG tablet TAKE 1 TABLET DAILY (Patient taking differently: Take 200 mg by mouth nightly ) 90 tablet 1    atorvastatin (LIPITOR) 40 MG tablet Take 1 tablet by mouth daily (Patient taking differently: Take 40 mg by mouth nightly ) 30 tablet 5    montelukast (SINGULAIR) 10 MG tablet Take 1 tablet by mouth nightly 30 tablet 5    dorzolamide-timolol (COSOPT) 22.3-6.8 MG/ML ophthalmic solution Place 1 drop into both eyes 2 times daily  11    meloxicam (MOBIC) 15 MG tablet Take 1 tablet by mouth daily 30 tablet 3    Calcium Carbonate-Vitamin D (OYSTER SHELL CALCIUM/D) 500-200 MG-UNIT TABS Take 1 tablet by mouth 3 times daily 90 tablet 5     No current facility-administered medications for this visit. ALLERGIES     Pcn [penicillins]; Adhesive tape; Nubain [nalbuphine hcl]; Seasonal; and Vistaril [hydroxyzine]    FAMILY HISTORY           Problem Relation Age of Onset    High Blood Pressure Mother     Stroke Mother     Heart Disease Father     Stroke Maternal Grandmother      Family Status   Relation Name Status    Mother      Father      MGM  (Not Specified)          SOCIAL HISTORY      reports that she has never smoked. She has never used smokeless tobacco. She reports that she does not drink alcohol or use drugs.       PHYSICAL EXAM    (up to 7 for level 4, 8 or more for level 5)     Vitals:    20 0926   BP: 101/70   Pulse: 91   Temp: 97 °F (36.1 °C)   TempSrc: Tympanic   SpO2: 95%   Weight: 151 lb (68.5 kg)   Height: 5' (1.524 m)         Physical Exam  Vitals signs and nursing note reviewed. Constitutional:       Appearance: Normal appearance. HENT:      Head: Normocephalic and atraumatic. Right Ear: Tympanic membrane, ear canal and external ear normal. There is no impacted cerumen. Left Ear: Tympanic membrane, ear canal and external ear normal. There is no impacted cerumen. Nose: Congestion present. Mouth/Throat:      Mouth: Mucous membranes are moist.   Eyes:      Extraocular Movements: Extraocular movements intact. Conjunctiva/sclera: Conjunctivae normal.      Pupils: Pupils are equal, round, and reactive to light. Neck:      Musculoskeletal: Normal range of motion and neck supple. Cardiovascular:      Rate and Rhythm: Normal rate and regular rhythm. Pulses: Normal pulses. Heart sounds: Normal heart sounds. Pulmonary:      Effort: Pulmonary effort is normal.      Breath sounds: Wheezing present. Abdominal:      Palpations: Abdomen is soft. Skin:     General: Skin is warm and dry. Neurological:      Mental Status: She is alert and oriented to person, place, and time. DIFFERENTIAL DIAGNOSIS:       Cleven Holter reviewed the disposition diagnosis with the patient and or their family/guardian. I have answered their questions and given discharge instructions. They voiced understanding of these instructions and did not have anyfurther questions or complaints. PROCEDURES:  Orders Placed This Encounter   Procedures    XR CHEST STANDARD (2 VW)     Standing Status:   Future     Standing Expiration Date:   1/27/2021     Order Specific Question:   Reason for exam:     Answer:   Cough/ chest congestion       No results found for this visit on 01/27/20. FINALIMPRESSION      1. Cough    2. Chest congestion            PLAN     Return if symptoms worsen or fail to improve.         DISCHARGEMEDICATIONS:  Orders Placed This Encounter   Medications    methylPREDNISolone (MEDROL DOSEPACK) 4 MG tablet     Sig: Take as medrol dose

## 2020-01-28 RX ORDER — ALPRAZOLAM 0.5 MG/1
0.5 TABLET ORAL 2 TIMES DAILY PRN
Qty: 20 TABLET | Refills: 0 | Status: SHIPPED | OUTPATIENT
Start: 2020-01-28 | End: 2020-02-04 | Stop reason: SDUPTHER

## 2020-01-30 ENCOUNTER — HOSPITAL ENCOUNTER (OUTPATIENT)
Dept: PHYSICAL THERAPY | Age: 75
Discharge: HOME | End: 2020-01-30

## 2020-01-30 RX ORDER — ATORVASTATIN CALCIUM 40 MG/1
40 TABLET, FILM COATED ORAL DAILY
Qty: 30 TABLET | Refills: 5 | Status: SHIPPED | OUTPATIENT
Start: 2020-01-30 | End: 2020-08-13 | Stop reason: DRUGHIGH

## 2020-01-30 NOTE — TELEPHONE ENCOUNTER
ZIZ:[id number]  BSP:3-4-14  LRF:4-29-19  Health Maintenance   Topic Date Due    DTaP/Tdap/Td vaccine (1 - Tdap) 05/24/1954    Shingles Vaccine (1 of 2) 05/24/1993    Lipid screen  02/19/2020    DEXA (modify frequency per FRAX score)  Completed    Flu vaccine  Completed    Pneumococcal 65+ years Vaccine  Completed             (applicable per patient's age: Cancer Screenings, Depression Screening, Fall Risk Screening, Immunizations)    Hemoglobin A1C (%)   Date Value   02/19/2019 5.5   05/23/2018 5.3   07/31/2017 5.5     Microalb/Crt.  Ratio (mcg/mg creat)   Date Value   05/12/2016 7     LDL Cholesterol (mg/dL)   Date Value   02/19/2019 89     LDL Calculated (mg/dL)   Date Value   11/13/2014 109     AST (U/L)   Date Value   02/19/2019 22     ALT (U/L)   Date Value   02/19/2019 17     BUN (mg/dL)   Date Value   11/06/2019 18      (goal A1C is < 7)   (goal LDL is <100) need 30-50% reduction from baseline     BP Readings from Last 3 Encounters:   01/27/20 101/70   12/04/19 136/84   12/04/19 131/63    (goal /80)      All Future Testing planned in [facility]:      Next Visit Date:  Future Appointments   Date Time Provider Glenroy Falcon   1/30/2020  1:30 PM Piper Nichols, PT [facility]   2/4/2020 11:00 AM Ardena Latch, APRN - CNP [place] [facility]   2/11/2020 11:40 AM DES Kaufman CNP [facility]            Patient Active Problem List:     Hyperlipidemia     Fibromyalgia     Osteopenia     Hx of bilateral breast implants     Vitamin D deficiency     Arthritis of shoulder region, right     Anxiety     Anemia, normocytic normochromic     Chronic pain associated with significant psychosocial dysfunction     Primary osteoarthritis involving multiple joints     Spondylosis 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

## 2020-01-30 NOTE — FLOWSHEET NOTE
[] [address]  [address]    [phone]  F: [phone]   [] [address]  [facility] [address]   [address]  P: [phone]  F: [phone]  [] [address]  [address] [address]  P: [phone]  F: [phone]  [] [address]  [facility]   [address]   [place]: [phone]  F: [phone]   [x] [place] [address] [facility] [address]  [place]: [phone]   F: [phone]     Physical Therapy Cancel/No Show note    Date: 2020  Patient: Bryce Lanza  : 1945  MRN: [id number]    Cancels/No Shows to date: 3/0    For today's appointment patient:    [x]  Cancelled    [] Rescheduled appointment    [] No-show     Reason given by patient:    [x]  Patient ill    []  Conflicting appointment    [] No transportation      [] Conflict with work    [] No reason given    [] Weather related    [] Other:      Comments: Pt cancelled due to having pneumonia, will call to reschedule when she is feeling better.       [] Next appointment was confirmed    Electronically signed by: Bryant Magana PT

## 2020-01-31 RX ORDER — QUETIAPINE FUMARATE 200 MG/1
TABLET, FILM COATED ORAL
Qty: 90 TABLET | Refills: 1 | Status: SHIPPED | OUTPATIENT
Start: 2020-01-31 | End: 2020-06-29 | Stop reason: SDUPTHER

## 2020-02-04 ENCOUNTER — HOSPITAL ENCOUNTER (OUTPATIENT)
Age: 75
Setting detail: SPECIMEN
Discharge: HOME | End: 2020-02-04
Payer: MEDICARE

## 2020-02-04 ENCOUNTER — NURSE ONLY (OUTPATIENT)
Dept: FAMILY MEDICINE CLINIC | Age: 75
End: 2020-02-04
Payer: MEDICARE

## 2020-02-04 LAB

## 2020-02-04 PROCEDURE — 96372 THER/PROPH/DIAG INJ SC/IM: CPT | Performed by: NURSE PRACTITIONER

## 2020-02-04 RX ORDER — ALPRAZOLAM 0.5 MG/1
0.5 TABLET ORAL 2 TIMES DAILY PRN
Qty: 20 TABLET | Refills: 0 | Status: SHIPPED | OUTPATIENT
Start: 2020-02-04 | End: 2020-07-29 | Stop reason: SDUPTHER

## 2020-02-04 RX ORDER — PREDNISONE 20 MG/1
TABLET ORAL
Qty: 30 TABLET | Refills: 0 | Status: SHIPPED | OUTPATIENT
Start: 2020-02-04 | End: 2020-02-14

## 2020-02-04 RX ORDER — CYANOCOBALAMIN 1000 UG/ML
1000 INJECTION INTRAMUSCULAR; SUBCUTANEOUS ONCE
Status: COMPLETED | OUTPATIENT
Start: 2020-02-04 | End: 2020-02-04

## 2020-02-04 RX ORDER — DOXYCYCLINE HYCLATE 100 MG
100 TABLET ORAL 2 TIMES DAILY
Qty: 20 TABLET | Refills: 0 | Status: SHIPPED | OUTPATIENT
Start: 2020-02-04 | End: 2020-02-14

## 2020-02-04 RX ADMIN — CYANOCOBALAMIN 1000 MCG: 1000 INJECTION INTRAMUSCULAR; SUBCUTANEOUS at 12:31

## 2020-02-04 NOTE — PROGRESS NOTES
Patient presents in the office today for b12 injection. Patient is alert, oriented, and dressed appropriately for the day. Tolerated injection well with no reactions.

## 2020-02-04 NOTE — PROGRESS NOTES
Sent patient another antibiotic and steroid, try these for continue pneumonia type illness, Resp swab today as well, xray in 1 week, xanax for son's illness. -Chente NUNES-CNP

## 2020-02-10 ENCOUNTER — HOSPITAL ENCOUNTER (OUTPATIENT)
Age: 75
Discharge: HOME | End: 2020-02-12
Payer: MEDICARE

## 2020-02-10 ENCOUNTER — HOSPITAL ENCOUNTER (OUTPATIENT)
Dept: GENERAL RADIOLOGY | Age: 75
Discharge: HOME | End: 2020-02-12
Payer: MEDICARE

## 2020-02-10 PROCEDURE — 71046 X-RAY EXAM CHEST 2 VIEWS: CPT

## 2020-02-11 ENCOUNTER — OFFICE VISIT (OUTPATIENT)
Dept: FAMILY MEDICINE CLINIC | Age: 75
End: 2020-02-11
Payer: MEDICARE

## 2020-02-11 VITALS
RESPIRATION RATE: 16 BRPM | DIASTOLIC BLOOD PRESSURE: 70 MMHG | OXYGEN SATURATION: 96 % | SYSTOLIC BLOOD PRESSURE: 102 MMHG | WEIGHT: 158.8 LBS | BODY MASS INDEX: 31.01 KG/M2 | HEART RATE: 91 BPM | TEMPERATURE: 96.5 F

## 2020-02-11 PROCEDURE — 99213 OFFICE O/P EST LOW 20 MIN: CPT | Performed by: NURSE PRACTITIONER

## 2020-02-11 ASSESSMENT — ENCOUNTER SYMPTOMS
WHEEZING: 0
SHORTNESS OF BREATH: 0
CONSTIPATION: 0
SORE THROAT: 0
ABDOMINAL PAIN: 0
RHINORRHEA: 0
DIARRHEA: 0
EYE REDNESS: 0
NAUSEA: 0
COUGH: 1
EYE ITCHING: 0
EYE DISCHARGE: 0

## 2020-02-11 NOTE — PROGRESS NOTES
Face to face end of shift report received from Amee WADSWORTH RN. Rounding completed. Patient observed.     Ita Montenegro  5/23/2017  11:32 PM           History of Present Illness: Humera Whaley is a 66 y.o. female who presents in office today with Self medication specific follow up for pneumonia like symtpoms. Patient is still on antibiotic and prednisone. Completed chest XR. Overall improving. Son still in palliative care, xanax has helped her cope significantly, able to sleep at night. No other concerns right now. HPI    Patient Care Team:  DES Downing CNP as PCP - General (Nurse Practitioner)  DES Downing CNP as PCP - [facility] Empaneled Provider    Visit Information    Have you changed or started any medications since your last visit including any over-the-counter medicines, vitamins, or herbal medicines? yes - Med list udpated   Are you having any side effects from any of your medications? -  no  Have you stopped taking any of your medications? Is so, why? -  yes - Med list updated  Have you seen any other physician or provider since your last visit? No  Have you had any other diagnostic tests since your last visit? Yes - Records Obtained Chest XR  Have you been seen in the emergency room and/or had an admission to a hospital since we last saw you? No  Have you had your routine dental cleaning in the past 6 months? Yes  Have you activated your ERUCES account? Yes  If activated, Do you have the mobile miguelina and comfortable using functions? No    Reviewed     [x] Past Medical, Family, and Social History was reviewed per writer and does contribute to the patient presenting condition.     [x] Laboratory Results, Vital signs, Imaging, Active Problems, Immunizations, Current/Recently Discontinued Medications, Health Maintenance Activities Due, Referral Notes (if available) were reviewed per writer     [x] Reviewed Depression screening if taken or valid today or any other valid screening tool (others seen below) Interpretation of Total Score DepressionSeverity: 1-4 = Minimal depression, 5-9 = Mild depression, 10-14 = Moderate depression, BMI 31.01 kg/m²      Physical Exam  Vitals signs reviewed. Constitutional:       General: She is not in acute distress. Appearance: She is well-developed. HENT:      Head: Normocephalic and atraumatic. Right Ear: Tympanic membrane and external ear normal.      Left Ear: Tympanic membrane and external ear normal.      Nose: Nose normal.      Mouth/Throat:      Pharynx: Uvula midline. No oropharyngeal exudate. Eyes:      General:         Right eye: No discharge. Left eye: No discharge. Conjunctiva/sclera: Conjunctivae normal.      Pupils: Pupils are equal, round, and reactive to light. Cardiovascular:      Rate and Rhythm: Normal rate and regular rhythm. Pulses:           Radial pulses are 2+ on the right side and 2+ on the left side. Heart sounds: Normal heart sounds. No murmur. Pulmonary:      Effort: Pulmonary effort is normal. No respiratory distress. Breath sounds: Decreased breath sounds (mild, improving) present. No wheezing. Abdominal:      General: Bowel sounds are normal. There is no distension. Palpations: Abdomen is soft. Tenderness: There is no abdominal tenderness. Comments: Protuberant    Musculoskeletal:      Right knee: She exhibits decreased range of motion, swelling and effusion (mild). She exhibits no erythema and no bony tenderness. Tenderness found. Medial joint line tenderness noted. Comments: No visible red or swollen joints to bilateral upper and lower extremities. Right knee brace on today. Lymphadenopathy:      Cervical: No cervical adenopathy. Skin:     General: Skin is warm and dry. Capillary Refill: Capillary refill takes less than 2 seconds. Findings: No erythema or rash. Neurological:      Mental Status: She is alert and oriented to person, place, and time. Psychiatric:         Mood and Affect: Mood is anxious and depressed.          Speech: Speech normal.         Behavior: Behavior normal. Thought Content: Thought content normal.       Diagnoses / Plan:     1. Pneumonia of both lower lobes due to infectious organism [facility])    Continue medication as prescribed, alert to worsening symptoms. 2. Moderate episode of recurrent major depressive disorder (HCC)    Continue current medications. 3. Anxiety    Xanax okay in interim with ill child. and Electronically signed today by DES Katz CNP on 2/11/2020 at 12:06 PM    Items for Patient/Writer/Staff to [address] [place] [address] Records in System from urgent care/EMERGENCY ROOM/Admission/Specialist, Medications sent and will be available at pharmacy or mail away and Mychart already complete or Patient to complete their Mychart Sign up and instructed on easiest way to contact writer. Encouraged healthy diet and exercise. Call office with any new or worsening symptoms or concerns. Return if symptoms worsen or fail to improve. \"There is beauty in all things if you choose to see it\"    Yusuf Lobo, MSN, APRN-CNP   [address] [facility]  [email or web address]   Office: [phone]   Cell: [phone]

## 2020-02-25 ENCOUNTER — HOSPITAL ENCOUNTER (OUTPATIENT)
Dept: PHYSICAL THERAPY | Age: 75
Setting detail: THERAPIES SERIES
Discharge: HOME | End: 2020-02-25
Payer: OTHER MISCELLANEOUS

## 2020-02-26 NOTE — TELEPHONE ENCOUNTER
Controlled type 2 diabetes mellitus with diabetic polyneuropathy, without long-term current use of insulin (HCC)     Diabetic peripheral neuropathy (HCC)     Primary insomnia     Lumbar and sacral arthritis     Chronic biliary pancreatitis (HCC)     Moderate episode of recurrent major depressive disorder (HCC)     Uncomplicated opioid dependence (HCC)     Hyperuricemia     Primary open-angle glaucoma, right eye, mild stage     Primary open-angle glaucoma, left eye, mild stage

## 2020-03-05 ENCOUNTER — NURSE ONLY (OUTPATIENT)
Dept: FAMILY MEDICINE CLINIC | Age: 75
End: 2020-03-05
Payer: MEDICARE

## 2020-03-05 PROCEDURE — 96372 THER/PROPH/DIAG INJ SC/IM: CPT | Performed by: NURSE PRACTITIONER

## 2020-03-05 RX ORDER — CYANOCOBALAMIN 1000 UG/ML
1000 INJECTION INTRAMUSCULAR; SUBCUTANEOUS ONCE
Status: COMPLETED | OUTPATIENT
Start: 2020-03-05 | End: 2020-03-05

## 2020-03-05 RX ADMIN — CYANOCOBALAMIN 1000 MCG: 1000 INJECTION INTRAMUSCULAR; SUBCUTANEOUS at 11:30

## 2020-03-05 NOTE — PROGRESS NOTES
Patient presents in the office today for b12 injection. Patient is alert, oriented, and dressed appropriately for the day. Tolerated injection well with no reaction.

## 2020-04-04 RX ORDER — MONTELUKAST SODIUM 10 MG/1
10 TABLET ORAL NIGHTLY
Qty: 30 TABLET | Refills: 0 | Status: SHIPPED | OUTPATIENT
Start: 2020-04-04 | End: 2020-05-21

## 2020-05-11 ENCOUNTER — NURSE ONLY (OUTPATIENT)
Dept: FAMILY MEDICINE CLINIC | Age: 75
End: 2020-05-11
Payer: MEDICARE

## 2020-05-11 PROCEDURE — 96372 THER/PROPH/DIAG INJ SC/IM: CPT | Performed by: NURSE PRACTITIONER

## 2020-05-11 RX ORDER — CYANOCOBALAMIN 1000 UG/ML
1000 INJECTION INTRAMUSCULAR; SUBCUTANEOUS ONCE
Status: COMPLETED | OUTPATIENT
Start: 2020-05-11 | End: 2020-05-11

## 2020-05-11 RX ADMIN — CYANOCOBALAMIN 1000 MCG: 1000 INJECTION INTRAMUSCULAR; SUBCUTANEOUS at 13:27

## 2020-05-22 ENCOUNTER — HOSPITAL ENCOUNTER (OUTPATIENT)
Dept: GENERAL RADIOLOGY | Age: 75
Discharge: HOME | End: 2020-05-24
Payer: MEDICARE

## 2020-05-22 ENCOUNTER — HOSPITAL ENCOUNTER (OUTPATIENT)
Age: 75
Discharge: HOME | End: 2020-05-24
Payer: MEDICARE

## 2020-05-22 PROCEDURE — 73562 X-RAY EXAM OF KNEE 3: CPT

## 2020-07-07 RX ORDER — QUETIAPINE FUMARATE 200 MG/1
200 TABLET, FILM COATED ORAL DAILY
Qty: 90 TABLET | Refills: 1 | Status: SHIPPED | OUTPATIENT
Start: 2020-07-07 | End: 2020-12-24

## 2020-07-07 RX ORDER — QUETIAPINE FUMARATE 50 MG/1
TABLET, FILM COATED ORAL
Qty: 60 TABLET | Refills: 5 | Status: SHIPPED | OUTPATIENT
Start: 2020-07-07 | End: 2021-02-19

## 2020-07-07 NOTE — TELEPHONE ENCOUNTER
Patient states that she needs these sent to [facility] because [facility] is delayed right now and she cannot wait.  I did verify dosing for her and poli and she said everything is correct and she needs them as 90d sup because it costs the same
type 2 diabetes mellitus with diabetic polyneuropathy, without long-term current use of insulin (HCC)     Diabetic peripheral neuropathy (HCC)     Primary insomnia     Lumbar and sacral arthritis     Chronic biliary pancreatitis (HCC)     Moderate episode of recurrent major depressive disorder (HCC)     Uncomplicated opioid dependence (HCC)     Hyperuricemia     Primary open-angle glaucoma, right eye, mild stage     Primary open-angle glaucoma, left eye, mild stage

## 2020-07-29 ENCOUNTER — OFFICE VISIT (OUTPATIENT)
Dept: FAMILY MEDICINE CLINIC | Age: 75
End: 2020-07-29
Payer: MEDICARE

## 2020-07-29 VITALS
DIASTOLIC BLOOD PRESSURE: 70 MMHG | BODY MASS INDEX: 30.47 KG/M2 | HEART RATE: 78 BPM | SYSTOLIC BLOOD PRESSURE: 110 MMHG | WEIGHT: 156 LBS | OXYGEN SATURATION: 96 % | RESPIRATION RATE: 16 BRPM

## 2020-07-29 PROCEDURE — 81003 URINALYSIS AUTO W/O SCOPE: CPT | Performed by: NURSE PRACTITIONER

## 2020-07-29 PROCEDURE — 99215 OFFICE O/P EST HI 40 MIN: CPT | Performed by: NURSE PRACTITIONER

## 2020-07-29 RX ORDER — ALPRAZOLAM 0.5 MG/1
0.5 TABLET ORAL NIGHTLY PRN
Qty: 14 TABLET | Refills: 0 | Status: SHIPPED | OUTPATIENT
Start: 2020-07-29 | End: 2020-11-23

## 2020-07-29 RX ORDER — MONTELUKAST SODIUM 10 MG/1
10 TABLET ORAL DAILY
Qty: 90 TABLET | Refills: 1 | Status: SHIPPED | OUTPATIENT
Start: 2020-07-29 | End: 2021-02-03

## 2020-07-29 ASSESSMENT — ENCOUNTER SYMPTOMS
CONSTIPATION: 0
TROUBLE SWALLOWING: 0
SHORTNESS OF BREATH: 0
NAUSEA: 0
COUGH: 0
SINUS PRESSURE: 0
WHEEZING: 0
CHEST TIGHTNESS: 0
SORE THROAT: 0
RHINORRHEA: 0
VOMITING: 0
ABDOMINAL PAIN: 0
DIARRHEA: 0
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
## 2020-07-31 ASSESSMENT — ENCOUNTER SYMPTOMS: BACK PAIN: 1

## 2020-08-12 ENCOUNTER — HOSPITAL ENCOUNTER (OUTPATIENT)
Age: 75
Setting detail: SPECIMEN
Discharge: HOME | End: 2020-08-12
Payer: MEDICARE

## 2020-08-12 LAB
-: NORMAL
ALBUMIN SERPL-MCNC: 4.1 G/DL (ref 3.5–5.2)
ALBUMIN/GLOBULIN RATIO: 1.6 (ref 1–2.5)
ALP BLD-CCNC: 117 U/L (ref 35–104)
ALT SERPL-CCNC: 19 U/L (ref 5–33)
AMORPHOUS: NORMAL
ANION GAP SERPL CALCULATED.3IONS-SCNC: 17 MMOL/L (ref 9–17)
AST SERPL-CCNC: 25 U/L
BACTERIA: NORMAL
BILIRUB SERPL-MCNC: 0.22 MG/DL (ref 0.3–1.2)
BILIRUBIN URINE: NEGATIVE
BUN BLDV-MCNC: 15 MG/DL (ref 8–23)
BUN/CREAT BLD: ABNORMAL (ref 9–20)
CALCIUM SERPL-MCNC: 9.7 MG/DL (ref 8.6–10.4)
CASTS UA: NORMAL /LPF (ref 0–8)
CHLORIDE BLD-SCNC: 103 MMOL/L (ref 98–107)
CHOLESTEROL/HDL RATIO: 6.2
CHOLESTEROL: 285 MG/DL
CO2: 22 MMOL/L (ref 20–31)
COLOR: YELLOW
COMMENT UA: ABNORMAL
CREAT SERPL-MCNC: 0.78 MG/DL (ref 0.5–0.9)
CRYSTALS, UA: NORMAL /HPF
EPITHELIAL CELLS UA: NORMAL /HPF (ref 0–5)
ESTIMATED AVERAGE GLUCOSE: 114 MG/DL
FOLATE: 11.9 NG/ML
GFR AFRICAN AMERICAN: >60 ML/MIN
GFR NON-AFRICAN AMERICAN: >60 ML/MIN
GFR SERPL CREATININE-BSD FRML MDRD: ABNORMAL ML/MIN/{1.73_M2}
GFR SERPL CREATININE-BSD FRML MDRD: ABNORMAL ML/MIN/{1.73_M2}
GLUCOSE FASTING: 87 MG/DL (ref 70–99)
GLUCOSE URINE: NEGATIVE
HBA1C MFR BLD: 5.6 % (ref 4–6)
HCT VFR BLD CALC: 46.6 % (ref 36.3–47.1)
HDLC SERPL-MCNC: 46 MG/DL
HEMOGLOBIN: 14.1 G/DL (ref 11.9–15.1)
KETONES, URINE: NEGATIVE
LDL CHOLESTEROL: 179 MG/DL (ref 0–130)
LEUKOCYTE ESTERASE, URINE: NEGATIVE
MCH RBC QN AUTO: 28.9 PG (ref 25.2–33.5)
MCHC RBC AUTO-ENTMCNC: 30.3 G/DL (ref 28.4–34.8)
MCV RBC AUTO: 95.5 FL (ref 82.6–102.9)
MUCUS: NORMAL
NITRITE, URINE: NEGATIVE
NRBC AUTOMATED: 0 PER 100 WBC
OTHER OBSERVATIONS UA: NORMAL
PDW BLD-RTO: 13.2 % (ref 11.8–14.4)
PH UA: 6.5 (ref 5–8)
PLATELET # BLD: 347 K/UL (ref 138–453)
PMV BLD AUTO: 10.6 FL (ref 8.1–13.5)
POTASSIUM SERPL-SCNC: 5.2 MMOL/L (ref 3.7–5.3)
PROTEIN UA: ABNORMAL
RBC # BLD: 4.88 M/UL (ref 3.95–5.11)
RBC UA: NORMAL /HPF (ref 0–4)
RENAL EPITHELIAL, UA: NORMAL /HPF
SODIUM BLD-SCNC: 142 MMOL/L (ref 135–144)
SPECIFIC GRAVITY UA: 1.02 (ref 1–1.03)
TOTAL PROTEIN: 6.7 G/DL (ref 6.4–8.3)
TRICHOMONAS: NORMAL
TRIGL SERPL-MCNC: 301 MG/DL
TSH SERPL DL<=0.05 MIU/L-ACNC: 2.79 MIU/L (ref 0.3–5)
TURBIDITY: CLEAR
URINE HGB: NEGATIVE
UROBILINOGEN, URINE: NORMAL
VITAMIN B-12: 370 PG/ML (ref 232–1245)
VITAMIN D 25-HYDROXY: 45.6 NG/ML (ref 30–100)
VLDLC SERPL CALC-MCNC: ABNORMAL MG/DL (ref 1–30)
WBC # BLD: 4.4 K/UL (ref 3.5–11.3)
WBC UA: NORMAL /HPF (ref 0–5)
YEAST: NORMAL

## 2020-08-13 ENCOUNTER — TELEPHONE (OUTPATIENT)
Dept: FAMILY MEDICINE CLINIC | Age: 75
End: 2020-08-13

## 2020-08-13 RX ORDER — ATORVASTATIN CALCIUM 80 MG/1
80 TABLET, FILM COATED ORAL DAILY
Qty: 30 TABLET | Refills: 3 | Status: SHIPPED | OUTPATIENT
Start: 2020-08-13 | End: 2020-11-18

## 2020-09-09 ENCOUNTER — NURSE TRIAGE (OUTPATIENT)
Dept: OTHER | Facility: CLINIC | Age: 75
End: 2020-09-09

## 2020-11-05 ENCOUNTER — OFFICE VISIT (OUTPATIENT)
Dept: FAMILY MEDICINE CLINIC | Age: 75
End: 2020-11-05
Payer: MEDICARE

## 2020-11-05 VITALS
HEART RATE: 82 BPM | SYSTOLIC BLOOD PRESSURE: 110 MMHG | TEMPERATURE: 98 F | DIASTOLIC BLOOD PRESSURE: 76 MMHG | WEIGHT: 152.6 LBS | BODY MASS INDEX: 30.76 KG/M2 | RESPIRATION RATE: 18 BRPM | OXYGEN SATURATION: 97 % | HEIGHT: 59 IN

## 2020-11-05 PROCEDURE — 90694 VACC AIIV4 NO PRSRV 0.5ML IM: CPT | Performed by: NURSE PRACTITIONER

## 2020-11-05 PROCEDURE — G0008 ADMIN INFLUENZA VIRUS VAC: HCPCS | Performed by: NURSE PRACTITIONER

## 2020-11-05 PROCEDURE — 99497 ADVNCD CARE PLAN 30 MIN: CPT | Performed by: NURSE PRACTITIONER

## 2020-11-05 PROCEDURE — 99213 OFFICE O/P EST LOW 20 MIN: CPT | Performed by: NURSE PRACTITIONER

## 2020-11-05 PROCEDURE — G0438 PPPS, INITIAL VISIT: HCPCS | Performed by: NURSE PRACTITIONER

## 2020-11-05 RX ORDER — IBUPROFEN 800 MG/1
TABLET ORAL
COMMUNITY
End: 2020-11-05 | Stop reason: ALTCHOICE

## 2020-11-05 RX ORDER — DEXAMETHASONE 4 MG/1
TABLET ORAL
COMMUNITY
End: 2020-11-05 | Stop reason: ALTCHOICE

## 2020-11-05 RX ORDER — DORZOLAMIDE HYDROCHLORIDE AND TIMOLOL MALEATE 20; 5 MG/ML; MG/ML
SOLUTION/ DROPS OPHTHALMIC
COMMUNITY
Start: 2020-11-02 | End: 2021-06-24

## 2020-11-05 RX ORDER — MELOXICAM 7.5 MG/1
7.5 TABLET ORAL DAILY
Qty: 90 TABLET | Refills: 1 | Status: SHIPPED | OUTPATIENT
Start: 2020-11-05 | End: 2021-03-08

## 2020-11-05 RX ORDER — GREEN TEA/HOODIA GORDONII 315-12.5MG
1 CAPSULE ORAL 2 TIMES DAILY
Qty: 60 TABLET | Refills: 0 | Status: SHIPPED | OUTPATIENT
Start: 2020-11-05 | End: 2021-01-08

## 2020-11-05 ASSESSMENT — ENCOUNTER SYMPTOMS
SINUS PRESSURE: 0
CHEST TIGHTNESS: 0
DIARRHEA: 0
CONSTIPATION: 0
BACK PAIN: 0
WHEEZING: 0
SORE THROAT: 0
TROUBLE SWALLOWING: 0
COUGH: 0
EYES NEGATIVE: 1
ABDOMINAL PAIN: 0
NAUSEA: 0
SHORTNESS OF BREATH: 0
RHINORRHEA: 0
BLOOD IN STOOL: 0

## 2020-11-05 ASSESSMENT — PATIENT HEALTH QUESTIONNAIRE - PHQ9
SUM OF ALL RESPONSES TO PHQ QUESTIONS 1-9: 0
SUM OF ALL RESPONSES TO PHQ9 QUESTIONS 1 & 2: 0
1. LITTLE INTEREST OR PLEASURE IN DOING THINGS: 0
2. FEELING DOWN, DEPRESSED OR HOPELESS: 0

## 2020-11-05 ASSESSMENT — LIFESTYLE VARIABLES: HOW OFTEN DO YOU HAVE A DRINK CONTAINING ALCOHOL: 0

## 2020-11-05 ASSESSMENT — VISUAL ACUITY: OU: 1
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
## 2020-11-05 NOTE — PROGRESS NOTES
Vaccine Information Sheet, \"Influenza - Inactivated\"  given to Tegan Christianson  ,or parent/legal guardian of  Tegan Christianson and verbalized understanding. Patient responses:    Have you ever had a reaction to a flu vaccine? No  Are you able to eat eggs without adverse effects? Yes  Do you have any current illness? No  Have you ever had [place] Syndrome? No    Flu vaccine given per order. Please see immunization tab.

## 2020-11-13 ENCOUNTER — TELEPHONE (OUTPATIENT)
Dept: FAMILY MEDICINE CLINIC | Age: 75
End: 2020-11-13

## 2020-11-13 NOTE — TELEPHONE ENCOUNTER
Pt picked up her prescription prescribed to her by Jyoti New but there was another in her bag that she is not familiar with and is for yeast infections/irritable bowel and other things that she does not suffer from. She will not take it until she speaks to someone from the office about what it is and if she should be taking it.

## 2020-11-16 NOTE — TELEPHONE ENCOUNTER
Patient wondering why probiotic was [facility] 11[facility]? She states she is not experiencing any abdominal pain/discomfort or digestion issues. Please advise.

## 2020-11-18 RX ORDER — ATORVASTATIN CALCIUM 80 MG/1
80 TABLET, FILM COATED ORAL DAILY
Qty: 30 TABLET | Refills: 5 | Status: SHIPPED | OUTPATIENT
Start: 2020-11-18 | End: 2021-11-30 | Stop reason: SDUPTHER

## 2020-11-18 NOTE — TELEPHONE ENCOUNTER
Chronic biliary pancreatitis (HCC)     Moderate episode of recurrent major depressive disorder (HCC)     Uncomplicated opioid dependence ([facility] [address]     Hyperuricemia     Primary open-angle glaucoma, right eye, mild stage     Primary open-angle glaucoma, left eye, mild stage

## 2020-11-23 NOTE — TELEPHONE ENCOUNTER
Last visit: 11/5/2020  Last Med refill: 7/29/2020  Does patient have enough medication for 72 hours: No:     Next Visit Date:  Future Appointments   Date Time Provider Glenroy Falcon   5/5/2021 11:00 AM DES Hendricks - TORITO Staley [address] Maintenance   Topic Date Due    Shingles Vaccine (1 of 2) 07/29/2021 (Originally 5/24/1993)    DTaP/Tdap/Td vaccine (1 - Tdap) 11/05/2021 (Originally 5/24/1964)    Lipid screen  08/12/2021    DEXA (modify frequency per FRAX score)  Completed    Flu vaccine  Completed    Pneumococcal 65+ years Vaccine  Completed    Hepatitis A vaccine  Aged Out    Hib vaccine  Aged Out    Meningococcal (ACWY) vaccine  Aged Out       Hemoglobin A1C (%)   Date Value   08/12/2020 5.6   02/19/2019 5.5   05/23/2018 5.3             ( goal A1C is < 7)   Microalb/Crt.  Ratio (mcg/mg creat)   Date Value   05/12/2016 7     LDL Cholesterol (mg/dL)   Date Value   08/12/2020 179 (H)   02/19/2019 89     LDL Calculated (mg/dL)   Date Value   11/13/2014 109       (goal LDL is <100)   AST (U/L)   Date Value   08/12/2020 25     ALT (U/L)   Date Value   08/12/2020 19     BUN (mg/dL)   Date Value   08/12/2020 15     BP Readings from Last 3 Encounters:   11/05/20 110/76   07/29/20 110/70   02/11/20 102/70          (goal 120/80)    All Future Testing planned in [facility]              Patient Active Problem List:     Hyperlipidemia     Fibromyalgia     Osteopenia     Hx of bilateral breast implants     Vitamin D deficiency     Arthritis of shoulder region, right     Anxiety     Anemia, normocytic normochromic     Chronic pain associated with significant psychosocial dysfunction     Primary osteoarthritis involving multiple joints     Spondylosis 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     Vitamin B12 deficiency     Controlled type 2 diabetes mellitus with diabetic polyneuropathy, without long-term current use of insulin (HCC)     Primary insomnia     Lumbar and sacral arthritis     Chronic biliary pancreatitis (HCC)     Moderate episode of recurrent major depressive disorder ([address]     Uncomplicated opioid dependence ([address]     Hyperuricemia     Primary open-angle glaucoma, right eye, mild stage     Primary open-angle glaucoma, left eye, mild stage

## 2020-11-24 RX ORDER — ALPRAZOLAM 0.5 MG/1
0.5 TABLET ORAL NIGHTLY PRN
Qty: 14 TABLET | Refills: 0 | Status: SHIPPED | OUTPATIENT
Start: 2020-11-24 | End: 2020-12-22

## 2020-12-22 RX ORDER — ALPRAZOLAM 0.5 MG/1
0.5 TABLET ORAL NIGHTLY PRN
Qty: 14 TABLET | Refills: 0 | Status: SHIPPED | OUTPATIENT
Start: 2020-12-22 | End: 2021-01-12 | Stop reason: SDUPTHER

## 2020-12-22 NOTE — TELEPHONE ENCOUNTER
LOV 11/5/20  RTO 6 months; F/U scheduled  [facility] 11/24/20    Health Maintenance   Topic Date Due    Shingles Vaccine (1 of 2) 07/29/2021 (Originally 5/24/1993)    DTaP/Tdap/Td vaccine (1 - Tdap) 11/05/2021 (Originally 5/24/1962)    Lipid screen  08/12/2021    DEXA (modify frequency per FRAX score)  Completed    Flu vaccine  Completed    Pneumococcal 65+ years Vaccine  Completed    Hepatitis A vaccine  Aged Out    Hib vaccine  Aged Out    Meningococcal (ACWY) vaccine  Aged Out             (applicable per patient's age: Cancer Screenings, Depression Screening, Fall Risk Screening, Immunizations)    Hemoglobin A1C (%)   Date Value   08/12/2020 5.6   02/19/2019 5.5   05/23/2018 5.3     Microalb/Crt.  Ratio (mcg/mg creat)   Date Value   05/12/2016 7     LDL Cholesterol (mg/dL)   Date Value   08/12/2020 179 (H)     LDL Calculated (mg/dL)   Date Value   11/13/2014 109     AST (U/L)   Date Value   08/12/2020 25     ALT (U/L)   Date Value   08/12/2020 19     BUN (mg/dL)   Date Value   08/12/2020 15      (goal A1C is < 7)   (goal LDL is <100) need 30-50% reduction from baseline     BP Readings from Last 3 Encounters:   11/05/20 110/76   07/29/20 110/70   02/11/20 102/70    (goal /80)      All Future Testing planned in [facility]:      Next Visit Date:  Future Appointments   Date Time Provider Glenroy Falcon   5/5/2021 11:00 AM Marli Vergara, APRN - CNP [facility]            Patient Active Problem List:     Hyperlipidemia     Fibromyalgia     Osteopenia     Hx of bilateral breast implants     Vitamin D deficiency     Arthritis of shoulder region, right     Anxiety     Anemia, normocytic normochromic     Chronic pain associated with significant psychosocial dysfunction     Primary osteoarthritis involving multiple joints     Spondylosis 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     Vitamin B12 deficiency     Controlled type 2 diabetes mellitus with diabetic polyneuropathy, without long-term current use of insulin (HCC)     Primary insomnia     Lumbar and sacral arthritis     Chronic biliary pancreatitis (HCC)     Moderate episode of recurrent major depressive disorder (HCC)     Uncomplicated opioid dependence (HCC)     Hyperuricemia     Primary open-angle glaucoma, right eye, mild stage     Primary open-angle glaucoma, left eye, mild stage

## 2020-12-24 RX ORDER — QUETIAPINE FUMARATE 200 MG/1
TABLET, FILM COATED ORAL
Qty: 90 TABLET | Refills: 1 | Status: SHIPPED | OUTPATIENT
Start: 2020-12-24 | End: 2021-02-03 | Stop reason: SDUPTHER

## 2020-12-24 NOTE — TELEPHONE ENCOUNTER
polyneuropathy, without long-term current use of insulin (HCC)     Primary insomnia     Lumbar and sacral arthritis     Chronic biliary pancreatitis (HCC)     Moderate episode of recurrent major depressive disorder (HCC)     Uncomplicated opioid dependence (HCC)     Hyperuricemia     Primary open-angle glaucoma, right eye, mild stage     Primary open-angle glaucoma, left eye, mild stage

## 2021-01-08 DIAGNOSIS — G89.4 CHRONIC PAIN ASSOCIATED WITH SIGNIFICANT PSYCHOSOCIAL DYSFUNCTION: ICD-10-CM

## 2021-01-08 RX ORDER — L. ACIDOPHILUS/PECTIN, CITRUS 25MM-100MG
TABLET ORAL
Qty: 60 TABLET | Refills: 5 | Status: SHIPPED | OUTPATIENT
Start: 2021-01-08 | End: 2021-06-24

## 2021-01-08 NOTE — TELEPHONE ENCOUNTER
Last visit: 11/05/20  Last Med refill: 11/5/20    Next Visit Date:  Future Appointments   Date Time Provider Glenroy Falcon   1/11/2021  3:30 PM DES Phillips CNP  [facility]   5/5/2021 11:00 AM DES Phillips CNP [facility] [address] Maintenance   Topic Date Due    Hepatitis C screen  1945    Shingles Vaccine (1 of 2) 07/29/2021 (Originally 5/24/1993)    DTaP/Tdap/Td vaccine (1 - Tdap) 11/05/2021 (Originally 5/24/1962)    Lipid screen  08/12/2021    DEXA (modify frequency per FRAX score)  Completed    Flu vaccine  Completed    Pneumococcal 65+ years Vaccine  Completed    Hepatitis A vaccine  Aged Out    Hib vaccine  Aged Out    Meningococcal (ACWY) vaccine  Aged Out       Hemoglobin A1C (%)   Date Value   08/12/2020 5.6   02/19/2019 5.5   05/23/2018 5.3             ( goal A1C is < 7)   Microalb/Crt.  Ratio (mcg/mg creat)   Date Value   05/12/2016 7     LDL Cholesterol (mg/dL)   Date Value   08/12/2020 179 (H)   02/19/2019 89     LDL Calculated (mg/dL)   Date Value   11/13/2014 109       (goal LDL is <100)   AST (U/L)   Date Value   08/12/2020 25     ALT (U/L)   Date Value   08/12/2020 19     BUN (mg/dL)   Date Value   08/12/2020 15     BP Readings from Last 3 Encounters:   11/05/20 110/76   07/29/20 110/70   02/11/20 102/70          (goal 120/80)    All Future Testing planned in [facility]              Patient Active Problem List:     Hyperlipidemia     Fibromyalgia     Osteopenia     Hx of bilateral breast implants     Vitamin D deficiency     Arthritis of shoulder region, right     Anxiety     Anemia, normocytic normochromic     Chronic pain associated with significant psychosocial dysfunction     Primary osteoarthritis involving multiple joints     Spondylosis 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     Vitamin B12 deficiency     Controlled type 2 diabetes mellitus with diabetic polyneuropathy, without long-term current use of insulin ([address] Primary insomnia     Lumbar and sacral arthritis     Chronic biliary pancreatitis (HCC)     Moderate episode of recurrent major depressive disorder (HCC)     Uncomplicated opioid dependence (HCC)     Hyperuricemia     Primary open-angle glaucoma, right eye, mild stage     Primary open-angle glaucoma, left eye, mild stage

## 2021-01-11 ENCOUNTER — OFFICE VISIT (OUTPATIENT)
Dept: FAMILY MEDICINE CLINIC | Age: 76
End: 2021-01-11
Payer: MEDICARE

## 2021-01-11 VITALS
BODY MASS INDEX: 31.02 KG/M2 | RESPIRATION RATE: 20 BRPM | TEMPERATURE: 97.8 F | SYSTOLIC BLOOD PRESSURE: 124 MMHG | WEIGHT: 153.6 LBS | OXYGEN SATURATION: 96 % | HEART RATE: 65 BPM | DIASTOLIC BLOOD PRESSURE: 82 MMHG

## 2021-01-11 DIAGNOSIS — J41.0 SIMPLE CHRONIC BRONCHITIS (HCC): Primary | Chronic | ICD-10-CM

## 2021-01-11 DIAGNOSIS — E53.8 VITAMIN B12 DEFICIENCY: ICD-10-CM

## 2021-01-11 DIAGNOSIS — R09.89 RHONCHI: ICD-10-CM

## 2021-01-11 DIAGNOSIS — F41.9 ANXIETY: ICD-10-CM

## 2021-01-11 PROCEDURE — 99213 OFFICE O/P EST LOW 20 MIN: CPT | Performed by: NURSE PRACTITIONER

## 2021-01-11 NOTE — PROGRESS NOTES
[facility] [address]  [phone]    1/11/21     Patient ID: Yamilex Doss is a 66 y.o. female. Established patient    CHIEF COMPLAINT:     Yamilex Doss presents today for   Chief Complaint   Patient presents with    Shortness of Breath     Recheck lungs    Cough     Possible bronchitis. Productive. Onset 2 weeks. Gets bronchitis every year     .     VISIT INFORMATION:      Patient Care Team:  DES Miller CNP as PCP - General (Nurse Practitioner Family)  DES Miller CNP as PCP - [facility] Empaneled Provider  Seth Teague MD (Anesthesiology)  Linette Mosquera DO as Surgeon (Orthopedic Surgery)    REVIEWED:     [x] Laboratory Results, Vital signs, Imaging, Active Problems, Immunizations, Current/Recently Discontinued Medications, Health Maintenance Activities Due, Referral Notes (if available) were reviewed per writer     [x] Reviewed Depression screening if taken or valid today or any other valid screening tool (others seen below) Interpretation of Total Score DepressionSeverity: 1-4 = Minimal depression, 5-9 = Mild depression, 10-14 = Moderate depression, 15-19 = Moderately severe depression, 20-27 =Severe depression    PHQ Scores 11/5/2020 4/9/2018 5/25/2017 4/20/2016   PHQ2 Score 0 3 0 0   PHQ9 Score 0 8 0 0       Interpretation of Total Score DepressionSeverity: 1-4 = Minimal depression, 5-9 = Mild depression, 10-14 = Moderate depression, 15-19 = Moderately severe depression, 20-27 = Severe depression    Allergies   Allergen Reactions    Pcn [Penicillins] Anaphylaxis     Patient states PCN allergy was 20 years ago, unsure if reaction still present    Adhesive Tape     Nubain [Nalbuphine Hcl]      Leg  Cramping  When mixed with vistaril    Seasonal     Vistaril [Hydroxyzine] Other (See Comments)     fainted     Current Outpatient Medications   Medication Sig Dispense Refill    cyanocobalamin ([employer] VITAMIN B12) 1000 MCG tablet Take 2 tablets by mouth daily 60 tablet 2    ALPRAZolam (XANAX) 0.5 MG tablet Take 1 tablet by mouth nightly as needed for Sleep. 14 tablet 0    fluticasone-umeclidin-vilant (TRELEGY ELLIPTA) 100-62.5-25 MCG/INH AEPB Inhale 1 puff into the lungs daily 1 each 5    albuterol sulfate HFA (VENTOLIN HFA) 108 (90 Base) MCG/ACT inhaler Inhale 2 puffs into the lungs 4 times daily as needed for Wheezing 3 Inhaler 1    doxycycline hyclate (VIBRA-TABS) 100 MG tablet Take 1 tablet by mouth 2 times daily for 10 days 20 tablet 0    benzonatate (TESSALON) 200 MG capsule Take 1 capsule by mouth 3 times daily as needed for Cough 30 capsule 0    diclofenac sodium (VOLTAREN) 1 % GEL as needed      Lactobacillus Acid-Pectin (ACIDOPHILUS/CITRUS PECTIN) TABS TAKE 1 TABLET BY MOUTH TWO TIMES A DAY  60 tablet 5    QUEtiapine (SEROQUEL) 200 MG tablet TAKE 1 TABLET BY MOUTH ONE TIME A DAY  90 tablet 1    atorvastatin (LIPITOR) 80 MG tablet Take 1 tablet by mouth daily 30 tablet 5    dorzolamide-timolol (COSOPT) 22.3-6.8 MG/ML ophthalmic solution       meloxicam (MOBIC) 7.5 MG tablet Take 1 tablet by mouth daily 90 tablet 1    montelukast (SINGULAIR) 10 MG tablet Take 1 tablet by mouth daily 90 tablet 1    QUEtiapine (SEROQUEL) 50 MG tablet TAKE 1 OR 2 TABLETS BY MOUTH AT BEDTIME AS NEEDED FOR SLEEP 60 tablet 5    oxyCODONE-acetaminophen (PERCOCET)  MG per tablet Take 1 tablet by mouth every 4 hours as needed for Pain.   0     No current facility-administered medications for this visit. Past Medical History:   Diagnosis Date    Anemia     Bowel obstruction ([address]     Bronchitis 02/19/2019    Frequent episodes of bronchitis.      DDD (degenerative disc disease)     Depression     Diabetic peripheral neuropathy ([facility] [address] 5/24/2016    Fibromyalgia     Glaucoma     Hyperlipidemia     Knee injuries 02/19/2019    Osteoarthritis of more than one site 4/9/2014    Pneumonia     Last episode of pneumonia was in the winter of 2018 (Written 11/20/2019).  Right middle lobe pneumonia 3/24/2016    Seasonal allergies       Past Surgical History:   Procedure Laterality Date    BREAST BIOPSY Left 5/2016    CARPAL TUNNEL RELEASE Bilateral     x 2 each    COLONOSCOPY      ENDOSCOPY, COLON, DIAGNOSTIC      EGD    EYE SURGERY Left     stent for glaucoma    EYE SURGERY Left     cataract    EYE SURGERY Left     lower lid    FOOT SURGERY Right     HYSTERECTOMY      KNEE ARTHROSCOPY Right 12/4/2019    RIGHT KNEE ARTHROSCOPY WITH TOTAL MENISCECTOMY performed by Shelbie Martin DO at [facility] [address]  10/03/2016    CO ARTHRS KNE SURG W/MENISCECTOMY MED/LAT W/SHVG Left 8/13/2018    KNEE ARTHROSCOPY FOR PARTIAL MEDIAL AND PARTIAL LATERAL MENISCECTOMY performed by Zain Cohen MD at [facility]      due to blockage     Family History   Problem Relation Age of Onset    High Blood Pressure Mother     Stroke Mother     Heart Disease Father     Stroke Maternal Grandmother      Social History     Tobacco Use    Smoking status: Never Smoker    Smokeless tobacco: Never Used   Substance Use Topics    Alcohol use: No     Alcohol/week: 0.0 standard drinks      Health Maintenance   Topic Date Due    Hepatitis C screen  1945    Shingles Vaccine (1 of 2) 07/29/2021 (Originally 5/24/1993)    DTaP/Tdap/Td vaccine (1 - Tdap) 11/05/2021 (Originally 5/24/1962)    Lipid screen  08/12/2021    DEXA (modify frequency per FRAX score)  Completed    Flu vaccine  Completed    Pneumococcal 65+ years Vaccine  Completed    Hepatitis A vaccine  Aged Out    Hib vaccine  Aged Out    Meningococcal (ACWY) vaccine  Aged Out       VISIT SUMMARY:      Patient is a very pleasant 69-year-old  female. She presents the office today for a follow-up. It is noted that she has been caring for her  who tested positive for COVID-19 about a month ago.   Patient also is known to get chronic bronchitis as well as pneumonia sometimes this time year. Patient denies any fever. However she does have a very deep and congested cough she does have some fatigue. No other symptoms of coronavirus. No abdominal discomfort, no diarrhea, no body aches, no fever, no loss of taste or smell, no headaches. HISTOrY OF PRESENT ILLNESS:     Cough  This is a recurrent problem. The current episode started 1 to 4 weeks ago. The problem has been waxing and waning. The cough is productive of sputum. Associated symptoms include nasal congestion and shortness of breath. Pertinent negatives include no chest pain, chills, fever, headaches, postnasal drip, rhinorrhea, sore throat or wheezing. She has tried a beta-agonist inhaler and rest for the symptoms. The treatment provided mild relief. Her past medical history is significant for bronchitis, COPD and pneumonia. REVIEW OF SYMPTOMS:      Review of Systems   Constitutional: Positive for fatigue. Negative for appetite change, chills and fever. HENT: Positive for congestion. Negative for postnasal drip, rhinorrhea and sore throat. Respiratory: Positive for cough and shortness of breath. Negative for chest tightness and wheezing. Cardiovascular: Negative for chest pain and palpitations. Gastrointestinal: Negative for abdominal pain, constipation, diarrhea and nausea. Genitourinary: Negative for difficulty urinating and dysuria. Musculoskeletal: Positive for arthralgias, back pain, gait problem and joint swelling. Chronic    Neurological: Negative for dizziness, light-headedness and headaches. Psychiatric/Behavioral: Positive for sleep disturbance. Negative for agitation. The patient is not nervous/anxious.         PHYSICAL EXAM:     /82 (Site: Right Upper Arm, Position: Sitting, Cuff Size: Medium Adult)   Pulse 65   Temp 97.8 °F (36.6 °C) (Temporal)   Resp 20   Wt 153 lb 9.6 oz (69.7 kg)   SpO2 96%   BMI 31.02 kg/m²      Physical Exam  Vitals signs and nursing note reviewed. Constitutional:       General: She is not in acute distress. Appearance: Normal appearance. She is well-developed, well-groomed and overweight. She is not ill-appearing or toxic-appearing. Cardiovascular:      Rate and Rhythm: Normal rate and regular rhythm. No extrasystoles are present. Heart sounds: Normal heart sounds, S1 normal and S2 normal. No murmur. Pulmonary:      Effort: Pulmonary effort is normal. No prolonged expiration or respiratory distress. Breath sounds: Decreased air movement (LLL) present. Examination of the left-lower field reveals rhonchi. Rhonchi present. Skin:     General: Skin is warm and dry. Coloration: Skin is not ashen, cyanotic, jaundiced or pale. Neurological:      Mental Status: She is alert and oriented to person, place, and time. Motor: Motor function is intact. Gait: Gait is intact. Psychiatric:         Attention and Perception: Attention and perception normal.         Mood and Affect: Mood and affect normal.         Speech: Speech normal.         Behavior: Behavior normal. Behavior is cooperative. Thought Content: Thought content normal.         Cognition and Memory: Cognition and memory normal.         Judgment: Judgment normal.          ASSESSMENT / PLAN     1. Simple chronic bronchitis ([facility] [address]  -     fluticasone-umeclidin-vilant (Raynelle Lundborg) 100-62.5-25 MCG/INH AEPB; Inhale 1 puff into the lungs daily, Disp-1 each, R-5Normal  -     albuterol sulfate HFA (VENTOLIN HFA) 108 (90 Base) MCG/ACT inhaler; Inhale 2 puffs into the lungs 4 times daily as needed for Wheezing, Disp-3 Inhaler, R-1Normal  -     doxycycline hyclate (VIBRA-TABS) 100 MG tablet; Take 1 tablet by mouth 2 times daily for 10 days, Disp-20 tablet, R-0Normal  -     benzonatate (TESSALON) 200 MG capsule; Take 1 capsule by mouth 3 times daily as needed for Cough, Disp-30 capsule, R-0Normal  2. Rhonchi  3.  Anxiety  -     ALPRAZolam (XANAX) 0.5 MG tablet; Take 1 tablet by mouth nightly as needed for Sleep., Disp-14 tablet, R-0Normal  4. Vitamin B12 deficiency  -     cyanocobalamin ([employer] VITAMIN B12) 1000 MCG tablet; Take 2 tablets by mouth daily, Disp-60 tablet, R-2Normal      Return for Call if systems do not improve or get worse. No orders of the defined types were placed in this encounter. Orders Placed This Encounter   Medications    cyanocobalamin ([employer] VITAMIN B12) 1000 MCG tablet     Sig: Take 2 tablets by mouth daily     Dispense:  60 tablet     Refill:  2    ALPRAZolam (XANAX) 0.5 MG tablet     Sig: Take 1 tablet by mouth nightly as needed for Sleep.      Dispense:  14 tablet     Refill:  0    fluticasone-umeclidin-vilant (TRELEGY ELLIPTA) 100-62.5-25 MCG/INH AEPB     Sig: Inhale 1 puff into the lungs daily     Dispense:  1 each     Refill:  5    albuterol sulfate HFA (VENTOLIN HFA) 108 (90 Base) MCG/ACT inhaler     Sig: Inhale 2 puffs into the lungs 4 times daily as needed for Wheezing     Dispense:  3 Inhaler     Refill:  1    doxycycline hyclate (VIBRA-TABS) 100 MG tablet     Sig: Take 1 tablet by mouth 2 times daily for 10 days     Dispense:  20 tablet     Refill:  0    benzonatate (TESSALON) 200 MG capsule     Sig: Take 1 capsule by mouth 3 times daily as needed for Cough     Dispense:  30 capsule     Refill:  0           COMMUNICATION:     Health maintenance discussed  Education given  All patient and spouse questions answered  Patient and spouse voiced understanding to plan of care  Instructed to continue medications as discussed, healthy diet and exercise    QUALITY MEASURES:     BMI Readings from Last 1 Encounters:   01/11/21 31.02 kg/m²        Lab Results   Component Value Date    LABA1C 5.6 08/12/2020       BP Readings from Last 3 Encounters:   01/11/21 124/82   11/05/20 110/76   07/29/20 110/70        The 10-year ASCVD risk score (Curt Magallanes et al., 2013) is: 32.6%    Values used to calculate the score:      Age: 66 years Sex: Female      Is Non- : No      Diabetic: Yes      Tobacco smoker: No      Systolic Blood Pressure: 547 mmHg      Is BP treated: No      HDL Cholesterol: 46 mg/dL      Total Cholesterol: 285 mg/dL         This note is created with the assistance of a speech-recognition program. While intending to generate a document that actually reflects the content of the visit, no guarantees can be provided that every mistake has been identified and corrected by editing.     Electronically signed by ELOISE Syed on 1/14/2021 at 5:23 AM

## 2021-01-11 NOTE — PROGRESS NOTES
Mis Coombs (:  1945) is a 66 y.o. female,Established patient, here for evaluation of the following chief complaint(s):  Shortness of Breath (Recheck lungs)      ASSESSMENT/PLAN:  {There are no diagnoses linked to this encounter. (Refresh or delete this SmartLink)}    No follow-ups on file. SUBJECTIVE/OBJECTIVE:  HPI    Review of Systems    Physical Exam      {Time Documentation Optional:[id number]}      An electronic signature was used to authenticate this note.     --Enrique Base

## 2021-01-12 RX ORDER — ALPRAZOLAM 0.5 MG/1
0.5 TABLET ORAL NIGHTLY PRN
Qty: 14 TABLET | Refills: 0 | Status: SHIPPED | OUTPATIENT
Start: 2021-01-12 | End: 2021-03-12

## 2021-01-12 RX ORDER — FLUTICASONE FUROATE, UMECLIDINIUM BROMIDE AND VILANTEROL TRIFENATATE 100; 62.5; 25 UG/1; UG/1; UG/1
1 POWDER RESPIRATORY (INHALATION) DAILY
Qty: 1 EACH | Refills: 5 | Status: SHIPPED | OUTPATIENT
Start: 2021-01-12 | End: 2021-03-16 | Stop reason: SDUPTHER

## 2021-01-12 RX ORDER — DOXYCYCLINE HYCLATE 100 MG
100 TABLET ORAL 2 TIMES DAILY
Qty: 20 TABLET | Refills: 0 | Status: SHIPPED | OUTPATIENT
Start: 2021-01-12 | End: 2021-01-22

## 2021-01-12 RX ORDER — ALBUTEROL SULFATE 90 UG/1
2 AEROSOL, METERED RESPIRATORY (INHALATION) 4 TIMES DAILY PRN
Qty: 3 INHALER | Refills: 1 | Status: SHIPPED | OUTPATIENT
Start: 2021-01-12 | End: 2021-08-13

## 2021-01-12 RX ORDER — BENZONATATE 200 MG/1
200 CAPSULE ORAL 3 TIMES DAILY PRN
Qty: 30 CAPSULE | Refills: 0 | Status: SHIPPED | OUTPATIENT
Start: 2021-01-12 | End: 2021-01-26 | Stop reason: SDUPTHER

## 2021-01-12 NOTE — TELEPHONE ENCOUNTER
Pt called stating pharmacy does not have her medications from appt on 1/12/21. Pharmacy confirmed with pt. Pt would like a call once meds are sent.

## 2021-01-13 ENCOUNTER — HOSPITAL ENCOUNTER (OUTPATIENT)
Age: 76
Discharge: HOME | End: 2021-01-15
Payer: MEDICARE

## 2021-01-13 ENCOUNTER — HOSPITAL ENCOUNTER (OUTPATIENT)
Dept: GENERAL RADIOLOGY | Age: 76
Discharge: HOME | End: 2021-01-15
Payer: MEDICARE

## 2021-01-13 DIAGNOSIS — M47.812 CERVICAL SPONDYLOSIS WITHOUT MYELOPATHY: ICD-10-CM

## 2021-01-13 DIAGNOSIS — M51.26 DISPLACEMENT OF LUMBAR INTERVERTEBRAL DISC WITHOUT MYELOPATHY: ICD-10-CM

## 2021-01-13 DIAGNOSIS — M17.0 ARTHRITIS OF BOTH KNEES: ICD-10-CM

## 2021-01-13 PROCEDURE — 72114 X-RAY EXAM L-S SPINE BENDING: CPT

## 2021-01-13 PROCEDURE — 73560 X-RAY EXAM OF KNEE 1 OR 2: CPT

## 2021-01-13 PROCEDURE — 72050 X-RAY EXAM NECK SPINE 4/5VWS: CPT

## 2021-01-14 ASSESSMENT — ENCOUNTER SYMPTOMS
ABDOMINAL PAIN: 0
CHEST TIGHTNESS: 0
CONSTIPATION: 0
BACK PAIN: 1
RHINORRHEA: 0
DIARRHEA: 0
NAUSEA: 0
SORE THROAT: 0
SHORTNESS OF BREATH: 1
WHEEZING: 0
COUGH: 1

## 2021-01-26 DIAGNOSIS — J41.0 SIMPLE CHRONIC BRONCHITIS (HCC): Chronic | ICD-10-CM

## 2021-01-26 RX ORDER — BENZONATATE 200 MG/1
200 CAPSULE ORAL 3 TIMES DAILY PRN
Qty: 30 CAPSULE | Refills: 0 | Status: SHIPPED | OUTPATIENT
Start: 2021-01-26 | End: 2021-03-16 | Stop reason: SDUPTHER

## 2021-02-03 DIAGNOSIS — F51.01 PRIMARY INSOMNIA: ICD-10-CM

## 2021-02-03 DIAGNOSIS — F41.9 ANXIETY: Chronic | ICD-10-CM

## 2021-02-03 DIAGNOSIS — J30.89 NON-SEASONAL ALLERGIC RHINITIS DUE TO OTHER ALLERGIC TRIGGER: ICD-10-CM

## 2021-02-03 DIAGNOSIS — F33.1 MAJOR DEPRESSIVE DISORDER, RECURRENT EPISODE, MODERATE (HCC): ICD-10-CM

## 2021-02-03 RX ORDER — QUETIAPINE FUMARATE 200 MG/1
200 TABLET, FILM COATED ORAL DAILY
Qty: 90 TABLET | Refills: 1 | Status: SHIPPED | OUTPATIENT
Start: 2021-02-03 | End: 2021-05-28 | Stop reason: SDUPTHER

## 2021-02-03 RX ORDER — MONTELUKAST SODIUM 10 MG/1
10 TABLET ORAL DAILY
Qty: 90 TABLET | Refills: 1 | Status: SHIPPED | OUTPATIENT
Start: 2021-02-03 | End: 2021-08-04

## 2021-02-03 NOTE — TELEPHONE ENCOUNTER
LOV 1/11/21  RTO If symptoms worsen; F/U scheduled  LRF 12/24/20 and 7/29/20    Health Maintenance   Topic Date Due    Hepatitis C screen  1945    COVID-19 Vaccine (1 of 2) 05/24/1959    Shingles Vaccine (1 of 2) 07/29/2021 (Originally 5/24/1993)    DTaP/Tdap/Td vaccine (1 - Tdap) 11/05/2021 (Originally 5/24/1962)    Lipid screen  08/12/2021    DEXA (modify frequency per FRAX score)  Completed    Flu vaccine  Completed    Pneumococcal 65+ years Vaccine  Completed    Hepatitis A vaccine  Aged Out    Hib vaccine  Aged Out    Meningococcal (ACWY) vaccine  Aged Out             (applicable per patient's age: Cancer Screenings, Depression Screening, Fall Risk Screening, Immunizations)    Hemoglobin A1C (%)   Date Value   08/12/2020 5.6   02/19/2019 5.5   05/23/2018 5.3     Microalb/Crt.  Ratio (mcg/mg creat)   Date Value   05/12/2016 7     LDL Cholesterol (mg/dL)   Date Value   08/12/2020 179 (H)     LDL Calculated (mg/dL)   Date Value   11/13/2014 109     AST (U/L)   Date Value   08/12/2020 25     ALT (U/L)   Date Value   08/12/2020 19     BUN (mg/dL)   Date Value   08/12/2020 15      (goal A1C is < 7)   (goal LDL is <100) need 30-50% reduction from baseline     BP Readings from Last 3 Encounters:   01/11/21 124/82   11/05/20 110/76   07/29/20 110/70    (goal /80)      All Future Testing planned in [facility]:      Next Visit Date:  Future Appointments   Date Time Provider Glenroy Falcon   5/5/2021 11:00 AM Marli Corado APRN - CNP [place] [facility]            Patient Active Problem List:     Hyperlipidemia     Fibromyalgia     Osteopenia     Hx of bilateral breast implants     Vitamin D deficiency     Arthritis of shoulder region, right     Anxiety     Anemia, normocytic normochromic     Chronic pain associated with significant psychosocial dysfunction     Primary osteoarthritis involving multiple joints     Spondylosis 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     Vitamin B12 deficiency     Controlled type 2 diabetes mellitus with diabetic polyneuropathy, without long-term current use of insulin (HCC)     Primary insomnia     Lumbar and sacral arthritis     Chronic biliary pancreatitis (HCC)     Moderate episode of recurrent major depressive disorder (HCC)     Uncomplicated opioid dependence (HCC)     Hyperuricemia     Primary open-angle glaucoma, right eye, mild stage     Primary open-angle glaucoma, left eye, mild stage

## 2021-02-19 DIAGNOSIS — F51.01 PRIMARY INSOMNIA: ICD-10-CM

## 2021-02-19 RX ORDER — QUETIAPINE FUMARATE 50 MG/1
TABLET, FILM COATED ORAL
Qty: 60 TABLET | Refills: 5 | Status: SHIPPED | OUTPATIENT
Start: 2021-02-19 | End: 2021-10-05

## 2021-02-19 NOTE — TELEPHONE ENCOUNTER
LOV 1/11/21  RTO If symptoms do not improve; F/U scheduled  LRF 7/7/20    Health Maintenance   Topic Date Due    Hepatitis C screen  1945    COVID-19 Vaccine (1 of 2) 05/24/1959    Shingles Vaccine (1 of 2) 07/29/2021 (Originally 5/24/1993)    DTaP/Tdap/Td vaccine (1 - Tdap) 11/05/2021 (Originally 5/24/1962)    Lipid screen  08/12/2021    DEXA (modify frequency per FRAX score)  Completed    Flu vaccine  Completed    Pneumococcal 65+ years Vaccine  Completed    Hepatitis A vaccine  Aged Out    Hib vaccine  Aged Out    Meningococcal (ACWY) vaccine  Aged Out             (applicable per patient's age: Cancer Screenings, Depression Screening, Fall Risk Screening, Immunizations)    Hemoglobin A1C (%)   Date Value   08/12/2020 5.6   02/19/2019 5.5   05/23/2018 5.3     Microalb/Crt.  Ratio (mcg/mg creat)   Date Value   05/12/2016 7     LDL Cholesterol (mg/dL)   Date Value   08/12/2020 179 (H)     LDL Calculated (mg/dL)   Date Value   11/13/2014 109     AST (U/L)   Date Value   08/12/2020 25     ALT (U/L)   Date Value   08/12/2020 19     BUN (mg/dL)   Date Value   08/12/2020 15      (goal A1C is < 7)   (goal LDL is <100) need 30-50% reduction from baseline     BP Readings from Last 3 Encounters:   01/11/21 124/82   11/05/20 110/76   07/29/20 110/70    (goal /80)      All Future Testing planned in [facility]:      Next Visit Date:  Future Appointments   Date Time Provider Glenroy Falcon   5/5/2021 11:00 AM Marli Carmona, APRN - CNP [facility]            Patient Active Problem List:     Hyperlipidemia     Fibromyalgia     Osteopenia     Hx of bilateral breast implants     Vitamin D deficiency     Arthritis of shoulder region, right     Anxiety     Anemia, normocytic normochromic     Chronic pain associated with significant psychosocial dysfunction     Primary osteoarthritis involving multiple joints     Spondylosis 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     Vitamin B12 deficiency Controlled type 2 diabetes mellitus with diabetic polyneuropathy, without long-term current use of insulin (HCC)     Primary insomnia     Lumbar and sacral arthritis     Chronic biliary pancreatitis (HCC)     Moderate episode of recurrent major depressive disorder (HCC)     Uncomplicated opioid dependence (HCC)     Hyperuricemia     Primary open-angle glaucoma, right eye, mild stage     Primary open-angle glaucoma, left eye, mild stage

## 2021-03-08 ENCOUNTER — TELEPHONE (OUTPATIENT)
Dept: FAMILY MEDICINE CLINIC | Age: 76
End: 2021-03-08

## 2021-03-08 DIAGNOSIS — M79.7 FIBROMYALGIA: ICD-10-CM

## 2021-03-08 DIAGNOSIS — M15.9 PRIMARY OSTEOARTHRITIS INVOLVING MULTIPLE JOINTS: ICD-10-CM

## 2021-03-08 DIAGNOSIS — M85.88 OSTEOPENIA OF LUMBAR SPINE: ICD-10-CM

## 2021-03-08 DIAGNOSIS — G89.4 CHRONIC PAIN ASSOCIATED WITH SIGNIFICANT PSYCHOSOCIAL DYSFUNCTION: Primary | ICD-10-CM

## 2021-03-08 DIAGNOSIS — E53.8 VITAMIN B12 DEFICIENCY: ICD-10-CM

## 2021-03-08 RX ORDER — MELOXICAM 7.5 MG/1
7.5 TABLET ORAL DAILY
Qty: 90 TABLET | Refills: 1 | Status: SHIPPED | OUTPATIENT
Start: 2021-03-08 | End: 2021-11-30 | Stop reason: ALTCHOICE

## 2021-03-08 NOTE — TELEPHONE ENCOUNTER
LOV 1/11/21  RTO If symptoms worsen; F/U scheduled  LRF 11/5/20 and 1/12/21    Health Maintenance   Topic Date Due    Hepatitis C screen  Never done    COVID-19 Vaccine (1 of 2) Never done    Shingles Vaccine (1 of 2) 07/29/2021 (Originally 5/24/1993)    DTaP/Tdap/Td vaccine (1 - Tdap) 11/05/2021 (Originally 5/24/1962)    Lipid screen  08/12/2021    DEXA (modify frequency per FRAX score)  Completed    Flu vaccine  Completed    Pneumococcal 65+ years Vaccine  Completed    Hepatitis A vaccine  Aged Out    Hib vaccine  Aged Out    Meningococcal (ACWY) vaccine  Aged Out             (applicable per patient's age: Cancer Screenings, Depression Screening, Fall Risk Screening, Immunizations)    Hemoglobin A1C (%)   Date Value   08/12/2020 5.6   02/19/2019 5.5   05/23/2018 5.3     Microalb/Crt.  Ratio (mcg/mg creat)   Date Value   05/12/2016 7     LDL Cholesterol (mg/dL)   Date Value   08/12/2020 179 (H)     LDL Calculated (mg/dL)   Date Value   11/13/2014 109     AST (U/L)   Date Value   08/12/2020 25     ALT (U/L)   Date Value   08/12/2020 19     BUN (mg/dL)   Date Value   08/12/2020 15      (goal A1C is < 7)   (goal LDL is <100) need 30-50% reduction from baseline     BP Readings from Last 3 Encounters:   01/11/21 124/82   11/05/20 110/76   07/29/20 110/70    (goal /80)      All Future Testing planned in [facility]:      Next Visit Date:  Future Appointments   Date Time Provider Glenroy Falcon   5/5/2021 11:00 AM [facility]li Rogers[facility]            Patient Active Problem List:     Hyperlipidemia     Fibromyalgia     Osteopenia     Hx of bilateral breast implants     Vitamin D deficiency     Arthritis of shoulder region, right     Anxiety     Anemia, normocytic normochromic     Chronic pain associated with significant psychosocial dysfunction     Primary osteoarthritis involving multiple joints     Spondylosis 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     Vitamin B12 deficiency     Controlled type 2 diabetes mellitus with diabetic polyneuropathy, without long-term current use of insulin (HCC)     Primary insomnia     Lumbar and sacral arthritis     Chronic biliary pancreatitis (HCC)     Moderate episode of recurrent major depressive disorder (HCC)     Uncomplicated opioid dependence (HCC)     Hyperuricemia     Primary open-angle glaucoma, right eye, mild stage     Primary open-angle glaucoma, left eye, mild stage

## 2021-03-10 ENCOUNTER — APPOINTMENT (OUTPATIENT)
Dept: GENERAL RADIOLOGY | Age: 76
End: 2021-03-10
Payer: MEDICARE

## 2021-03-10 ENCOUNTER — HOSPITAL ENCOUNTER (EMERGENCY)
Age: 76
Discharge: HOME | End: 2021-03-10
Attending: EMERGENCY MEDICINE
Payer: MEDICARE

## 2021-03-10 ENCOUNTER — APPOINTMENT (OUTPATIENT)
Dept: CT IMAGING | Age: 76
End: 2021-03-10
Payer: MEDICARE

## 2021-03-10 ENCOUNTER — NURSE TRIAGE (OUTPATIENT)
Dept: OTHER | Facility: CLINIC | Age: 76
End: 2021-03-10

## 2021-03-10 VITALS
HEART RATE: 85 BPM | OXYGEN SATURATION: 92 % | BODY MASS INDEX: 30.84 KG/M2 | DIASTOLIC BLOOD PRESSURE: 93 MMHG | HEIGHT: 59 IN | RESPIRATION RATE: 19 BRPM | SYSTOLIC BLOOD PRESSURE: 163 MMHG | WEIGHT: 153 LBS | TEMPERATURE: 97 F

## 2021-03-10 DIAGNOSIS — J40 BRONCHITIS: Primary | ICD-10-CM

## 2021-03-10 LAB
-: NORMAL
ABSOLUTE EOS #: 0.3 K/UL (ref 0–0.4)
ABSOLUTE IMMATURE GRANULOCYTE: ABNORMAL K/UL (ref 0–0.3)
ABSOLUTE LYMPH #: 1.8 K/UL (ref 1–4.8)
ABSOLUTE MONO #: 0.5 K/UL (ref 0.1–1.3)
ANION GAP SERPL CALCULATED.3IONS-SCNC: 11 MMOL/L (ref 9–17)
BASOPHILS # BLD: 1 % (ref 0–2)
BASOPHILS ABSOLUTE: 0 K/UL (ref 0–0.2)
BNP INTERPRETATION: NORMAL
BUN BLDV-MCNC: 20 MG/DL (ref 8–23)
BUN/CREAT BLD: ABNORMAL (ref 9–20)
CALCIUM SERPL-MCNC: 9.2 MG/DL (ref 8.6–10.4)
CHLORIDE BLD-SCNC: 108 MMOL/L (ref 98–107)
CO2: 20 MMOL/L (ref 20–31)
CREAT SERPL-MCNC: 0.88 MG/DL (ref 0.5–0.9)
D-DIMER QUANTITATIVE: 0.99 MG/L FEU (ref 0–0.59)
DIFFERENTIAL TYPE: ABNORMAL
EOSINOPHILS RELATIVE PERCENT: 5 % (ref 0–4)
GFR AFRICAN AMERICAN: >60 ML/MIN
GFR NON-AFRICAN AMERICAN: >60 ML/MIN
GFR SERPL CREATININE-BSD FRML MDRD: ABNORMAL ML/MIN/{1.73_M2}
GFR SERPL CREATININE-BSD FRML MDRD: ABNORMAL ML/MIN/{1.73_M2}
GLUCOSE BLD-MCNC: 87 MG/DL (ref 70–99)
HCT VFR BLD CALC: 42.3 % (ref 36–46)
HEMOGLOBIN: 13.8 G/DL (ref 12–16)
IMMATURE GRANULOCYTES: ABNORMAL %
LYMPHOCYTES # BLD: 32 % (ref 24–44)
MCH RBC QN AUTO: 31 PG (ref 26–34)
MCHC RBC AUTO-ENTMCNC: 32.6 G/DL (ref 31–37)
MCV RBC AUTO: 95.1 FL (ref 80–100)
MONOCYTES # BLD: 9 % (ref 1–7)
NRBC AUTOMATED: ABNORMAL PER 100 WBC
PDW BLD-RTO: 14.1 % (ref 11.5–14.9)
PLATELET # BLD: 278 K/UL (ref 150–450)
PLATELET ESTIMATE: ABNORMAL
PMV BLD AUTO: 7.7 FL (ref 6–12)
POTASSIUM SERPL-SCNC: 4.7 MMOL/L (ref 3.7–5.3)
PRO-BNP: 142 PG/ML
RBC # BLD: 4.45 M/UL (ref 4–5.2)
RBC # BLD: ABNORMAL 10*6/UL
REASON FOR REJECTION: NORMAL
SARS-COV-2, RAPID: NOT DETECTED
SEG NEUTROPHILS: 53 % (ref 36–66)
SEGMENTED NEUTROPHILS ABSOLUTE COUNT: 3 K/UL (ref 1.3–9.1)
SODIUM BLD-SCNC: 139 MMOL/L (ref 135–144)
SPECIMEN DESCRIPTION: NORMAL
TROPONIN INTERP: NORMAL
TROPONIN INTERP: NORMAL
TROPONIN T: NORMAL NG/ML
TROPONIN T: NORMAL NG/ML
TROPONIN, HIGH SENSITIVITY: 11 NG/L (ref 0–14)
TROPONIN, HIGH SENSITIVITY: 13 NG/L (ref 0–14)
TSH SERPL DL<=0.05 MIU/L-ACNC: 2.16 MIU/L (ref 0.3–5)
WBC # BLD: 5.6 K/UL (ref 3.5–11)
WBC # BLD: ABNORMAL 10*3/UL
ZZ NTE CLEAN UP: ORDERED TEST: NORMAL
ZZ NTE WITH NAME CLEAN UP: SPECIMEN SOURCE: NORMAL

## 2021-03-10 PROCEDURE — 84484 ASSAY OF TROPONIN QUANT: CPT

## 2021-03-10 PROCEDURE — 87804 INFLUENZA ASSAY W/OPTIC: CPT

## 2021-03-10 PROCEDURE — 36415 COLL VENOUS BLD VENIPUNCTURE: CPT

## 2021-03-10 PROCEDURE — 71260 CT THORAX DX C+: CPT

## 2021-03-10 PROCEDURE — 80048 BASIC METABOLIC PNL TOTAL CA: CPT

## 2021-03-10 PROCEDURE — 71045 X-RAY EXAM CHEST 1 VIEW: CPT

## 2021-03-10 PROCEDURE — 96374 THER/PROPH/DIAG INJ IV PUSH: CPT

## 2021-03-10 PROCEDURE — 85379 FIBRIN DEGRADATION QUANT: CPT

## 2021-03-10 PROCEDURE — 85025 COMPLETE CBC W/AUTO DIFF WBC: CPT

## 2021-03-10 PROCEDURE — 84443 ASSAY THYROID STIM HORMONE: CPT

## 2021-03-10 PROCEDURE — 93005 ELECTROCARDIOGRAM TRACING: CPT | Performed by: GENERAL PRACTICE

## 2021-03-10 PROCEDURE — 6360000004 HC RX CONTRAST MEDICATION: Performed by: GENERAL PRACTICE

## 2021-03-10 PROCEDURE — 83880 ASSAY OF NATRIURETIC PEPTIDE: CPT

## 2021-03-10 PROCEDURE — 2580000003 HC RX 258: Performed by: GENERAL PRACTICE

## 2021-03-10 PROCEDURE — U0002 COVID-19 LAB TEST NON-CDC: HCPCS

## 2021-03-10 PROCEDURE — 99285 EMERGENCY DEPT VISIT HI MDM: CPT

## 2021-03-10 RX ORDER — SODIUM CHLORIDE 0.9 % (FLUSH) 0.9 %
10 SYRINGE (ML) INJECTION PRN
Status: DISCONTINUED | OUTPATIENT
Start: 2021-03-10 | End: 2021-03-11 | Stop reason: HOSPADM

## 2021-03-10 RX ORDER — 0.9 % SODIUM CHLORIDE 0.9 %
80 INTRAVENOUS SOLUTION INTRAVENOUS ONCE
Status: COMPLETED | OUTPATIENT
Start: 2021-03-10 | End: 2021-03-10

## 2021-03-10 RX ORDER — 0.9 % SODIUM CHLORIDE 0.9 %
500 INTRAVENOUS SOLUTION INTRAVENOUS ONCE
Status: COMPLETED | OUTPATIENT
Start: 2021-03-10 | End: 2021-03-10

## 2021-03-10 RX ADMIN — Medication 10 ML: at 20:43

## 2021-03-10 RX ADMIN — SODIUM CHLORIDE 500 ML: 9 INJECTION, SOLUTION INTRAVENOUS at 21:35

## 2021-03-10 RX ADMIN — SODIUM CHLORIDE 80 ML: 9 INJECTION, SOLUTION INTRAVENOUS at 20:43

## 2021-03-10 RX ADMIN — IOPAMIDOL 75 ML: 755 INJECTION, SOLUTION INTRAVENOUS at 20:43

## 2021-03-10 ASSESSMENT — ENCOUNTER SYMPTOMS
SHORTNESS OF BREATH: 1
COUGH: 1
BACK PAIN: 1
VOMITING: 0
CHEST TIGHTNESS: 0
NAUSEA: 0
ABDOMINAL PAIN: 0
SORE THROAT: 0

## 2021-03-10 ASSESSMENT — PAIN SCALES - GENERAL: PAINLEVEL_OUTOF10: 4

## 2021-03-10 NOTE — PLAN OF CARE
RN notified tech that patient has no IV access. Unable to do CT scan with contrast at this time. ED will call CT once the ordering provider decides what to do.

## 2021-03-10 NOTE — ED NOTES
Covid swab collected. Patient refusing flu swab. Resdient notified.       Kirk Jeffery RN  03/10/21 [id number]

## 2021-03-10 NOTE — TELEPHONE ENCOUNTER
Reason for Disposition   MODERATE difficulty breathing (e.g., speaks in phrases, SOB even at rest, pulse 100-120) of new onset or worse than normal    Answer Assessment - Initial Assessment Questions  1. RESPIRATORY STATUS: \"Describe your breathing? \" (e.g., wheezing, shortness of breath, unable to speak, severe coughing)       Short of breath, non-productive cough, wheezing    2. ONSET: \"When did this breathing problem begin? \"       SOB two months since diagnosed with pneumonia. Cough x 1 day. 3. PATTERN \"Does the difficult breathing come and go, or has it been constant since it started? \"       Constant for 2 months. 4. SEVERITY: \"How bad is your breathing? \" (e.g., mild, moderate, severe)     - MILD: No SOB at rest, mild SOB with walking, speaks normally in sentences, can lay down, no retractions, pulse < 100.     - MODERATE: SOB at rest, SOB with minimal exertion and prefers to sit, cannot lie down flat, speaks in phrases, mild retractions, audible wheezing, pulse 100-120.     - SEVERE: Very SOB at rest, speaks in single words, struggling to breathe, sitting hunched forward, retractions, pulse > 120       Moderate,  worse with exertion    5. RECURRENT SYMPTOM: \"Have you had difficulty breathing before? \" If so, ask: \"When was the last time? \" and \"What happened that time? \"       Two months    6. CARDIAC HISTORY: \"Do you have any history of heart disease? \" (e.g., heart attack, angina, bypass surgery, angioplasty)       None  \  7. LUNG HISTORY: \"Do you have any history of lung disease? \"  (e.g., pulmonary embolus, asthma, emphysema)      Bronchitis, pneumonia every year    8. CAUSE: \"What do you think is causing the breathing problem? \"       Worried about pneumonia    9. OTHER SYMPTOMS: \"Do you have any other symptoms? (e.g., dizziness, runny nose, cough, chest pain, fever)      Intermittent runny nose    10. PREGNANCY: \"Is there any chance you are pregnant? \" \"When was your last menstrual period? \" No    11. TRAVEL: \"Have you traveled out of the country in the last month? \" (e.g., travel history, exposures)        no    Protocols used: BREATHING DIFFICULTY-ADULT-OH    Patient called Lucille Guidry at [facility])  with red flag complaint. Brief description of triage: Chronic SOB x one month since diagnosed with pneumonia, worse since yesterday and with a new cough (non-productive) since yesterday. Audible SOB, pt states wheezing as well- no change in wheezing with inhaler. First COVID vaccine yesterday. Triage indicates for patient to go to ED now. Pt agrees. Care advice provided, patient verbalizes understanding; denies any other questions or concerns; instructed to call back for any new or worsening symptoms. Attention Provider: Thank you for allowing me to participate in the care of your patient. The patient was connected to triage in response to information provided to the ECC. Please do not respond through this encounter as the response is not directed to a shared pool.

## 2021-03-10 NOTE — ED PROVIDER NOTES
[address] ED  Emergency Department Encounter  EmergencyMedicine Resident     Pt Name:Aure Torres  MRN: [id number]  Birthdate 1945  Date of evaluation: 3/10/21  PCP:  DES Srivastava CNP    CHIEF COMPLAINT       Chief Complaint   Patient presents with    Fatigue    Shortness of Breath    Cough    Chest Pain    Back Pain       HISTORY OF PRESENT ILLNESS  (Location/Symptom, Timing/Onset, Context/Setting, Quality, Duration, Modifying Factors, Severity.)      Emily Zarate is a 66 y.o. female who presents with chest pain shortness of breath. Patient states that she was diagnosed with pneumonia on February 15 finished antibiotics and was prescribed albuterol. Patient states that she gets pneumonia every month secondary to allergies and bronchitis. Patient states that for the last 2 days she has had worsening shortness of breath, nonproductive cough, fever and chills. Patient states that she has chronic back pain which she is in pain management for. Patient denies any trauma, no inciting event. Denies any tearing or ripping sensation, no focal neuro deficit, no numbness or tingling in the upper or lower extremities. Patient states she called her primary care provider and was instructed to come to emergency department for evaluation. PAST MEDICAL / SURGICAL / SOCIAL / FAMILY HISTORY      has a past medical history of Anemia, Bowel obstruction (HCC), Bronchitis, DDD (degenerative disc disease), Depression, Diabetic peripheral neuropathy ([address] Fibromyalgia, Glaucoma, Hyperlipidemia, Knee injuries, Osteoarthritis of more than one site, Pneumonia, Right middle lobe pneumonia, and Seasonal allergies. has a past surgical history that includes Hysterectomy; Foot surgery (Right); Small intestine surgery; Carpal tunnel release (Bilateral); Breast biopsy (Left, 5/2016); Pancreas Biopsy (10/03/2016);  Colonoscopy; Endoscopy, colon, diagnostic; pr arthrs kne surg w/meniscectomy med/lat w/shvg (Left, 8/13/2018); eye surgery (Left); eye surgery (Left); eye surgery (Left); and Knee arthroscopy (Right, 12/4/2019). Social History     Socioeconomic History    Marital status:      Spouse name: Not on file    Number of children: Not on file    Years of education: Not on file    Highest education level: Not on file   Occupational History    Occupation: NOT EMPLOYEED   Social Needs    Financial resource strain: Not on file    Food insecurity     Worry: Not on file     Inability: Not on file    Transportation needs     Medical: Not on file     Non-medical: Not on file   Tobacco Use    Smoking status: Never Smoker    Smokeless tobacco: Never Used   Substance and Sexual Activity    Alcohol use: No     Alcohol/week: 0.0 standard drinks    Drug use: No    Sexual activity: Not Currently   Lifestyle    Physical activity     Days per week: Not on file     Minutes per session: Not on file    Stress: Not on file   Relationships    Social connections     Talks on phone: Not on file     Gets together: Not on file     Attends Voodoo service: Not on file     Active member of club or organization: Not on file     Attends meetings of clubs or organizations: Not on file     Relationship status: Not on file    Intimate partner violence     Fear of current or ex partner: Not on file     Emotionally abused: Not on file     Physically abused: Not on file     Forced sexual activity: Not on file   Other Topics Concern    Not on file   Social History Narrative    Not on file       Family History   Problem Relation Age of Onset    High Blood Pressure Mother     Stroke Mother     Heart Disease Father     Stroke Maternal Grandmother        Allergies:  Pcn [penicillins], Adhesive tape, Nubain [nalbuphine hcl], Seasonal, and Vistaril [hydroxyzine]    Home Medications:  Prior to Admission medications    Medication Sig Start Date End Date Taking?  Authorizing Provider   meloxicam (MOBIC) 7.5 MG tablet Take 1 tablet by mouth daily 3/8/21 9/4/21  DES Leigh CNP   Handicap Placard MISC by Does not apply route Exp 3/8/2026 3/8/21   DES Leigh CNP   cyanocobalamin ([employer] VITAMIN B12) 1000 MCG tablet Take 2 tablets by mouth daily 3/8/21 9/4/21  DES Leigh CNP   QUEtiapine (SEROQUEL) 50 MG tablet TAKE 1 OR 2 TABLETS BY MOUTH AT BEDTIME AS NEEDED FOR SLEEP 2/19/21 2/19/22  DES Flores NP   montelukast (SINGULAIR) 10 MG tablet Take 1 tablet by mouth daily 2/3/21 8/2/21  DES Leigh CNP   QUEtiapine (SEROQUEL) 200 MG tablet Take 1 tablet by mouth daily 2/3/21 8/2/21  DES Leigh - TORITO   ALPRAZolam Gillermina Hunter) 0.5 MG tablet Take 1 tablet by mouth nightly as needed for Sleep. 1/12/21 1/12/22  DES Leigh CNP   albuterol sulfate HFA (VENTOLIN HFA) 108 (90 Base) MCG/ACT inhaler Inhale 2 puffs into the lungs 4 times daily as needed for Wheezing 1/12/21   DES Leigh CNP   diclofenac sodium (VOLTAREN) 1 % GEL as needed    Historical Provider, MD   Lactobacillus Acid-Pectin (ACIDOPHILUS/CITRUS PECTIN) TABS TAKE 1 TABLET BY MOUTH TWO TIMES A DAY  1/8/21   EDS Leigh CNP   atorvastatin (LIPITOR) 80 MG tablet Take 1 tablet by mouth daily 11/18/20 5/17/21  DES Leigh CNP   dorzolamide-timolol (COSOPT) 22.3-6.8 MG/ML ophthalmic solution  11/2/20   Historical Provider, MD   oxyCODONE-acetaminophen (PERCOCET)  MG per tablet Take 1 tablet by mouth every 4 hours as needed for Pain. 7/19/19   Historical Provider, MD       REVIEW OF SYSTEMS    (2-9 systems for level 4, 10 or more for level 5)      Review of Systems   Constitutional: Positive for activity change, chills and fever. HENT: Negative for ear pain and sore throat. Respiratory: Positive for cough and shortness of breath. Negative for chest tightness. Cardiovascular: Positive for chest pain. Negative for leg swelling.    Gastrointestinal: Negative for abdominal pain, nausea and vomiting. Musculoskeletal: Positive for back pain. Negative for gait problem. Skin: Negative for rash and wound. Neurological: Negative for dizziness, light-headedness and headaches. Psychiatric/Behavioral: Negative for confusion. The patient is not nervous/anxious. PHYSICAL EXAM   (up to 7 for level 4, 8 or more for level 5)      INITIAL VITALS:   BP (!) 163/93   Pulse 85   Temp 97 °F (36.1 °C) (Temporal)   Resp 19   Ht 4' 11\" (1.499 m)   Wt 153 lb (69.4 kg)   SpO2 92%   BMI 30.90 kg/m²     Physical Exam  Constitutional:       General: She is not in acute distress. Appearance: She is well-developed. She is not ill-appearing, toxic-appearing or diaphoretic. HENT:      Head: Normocephalic and atraumatic. Eyes:      Extraocular Movements: Extraocular movements intact. Pupils: Pupils are equal, round, and reactive to light. Cardiovascular:      Rate and Rhythm: Normal rate and regular rhythm. Pulmonary:      Effort: Pulmonary effort is normal.      Comments: Breathing comfortably room air, symmetric chest rise, mild wheezing noted in lung fields. Chest:      Chest wall: No mass, deformity or tenderness. Musculoskeletal:      Right lower leg: No edema. Left lower leg: No edema. Skin:     Capillary Refill: Capillary refill takes less than 2 seconds. Neurological:      General: No focal deficit present. Mental Status: She is alert and oriented to person, place, and time. Cranial Nerves: No cranial nerve deficit. Motor: No weakness. Psychiatric:         Mood and Affect: Mood normal. Mood is not anxious. Behavior: Behavior normal. Behavior is not agitated.          DIFFERENTIAL  DIAGNOSIS     PLAN (LABS / IMAGING / EKG):  Orders Placed This Encounter   Procedures    SARS-CoV-2 NAAT (Rapid)    RAPID INFLUENZA A/B ANTIGENS    XR CHEST PORTABLE    CT CHEST PULMONARY EMBOLISM W CONTRAST    Troponin    SPECIMEN REJECTION    Basic Metabolic Panel    Brain Natriuretic Peptide    CBC Auto Differential    D-Dimer, Quantitative    Troponin    TSH with Reflex    EKG 12 Lead       MEDICATIONS ORDERED:  Orders Placed This Encounter   Medications    0.9 % sodium chloride bolus    0.9 % sodium chloride bolus    sodium chloride flush 0.9 % injection 10 mL    iopamidol (ISOVUE-370) 76 % injection 75 mL       DDX: Pneumonia, ACS/MI, Covid, PE, bronchitis    DIAGNOSTIC RESULTS / EMERGENCY DEPARTMENT COURSE / MDM   :  Results for orders placed or performed during the hospital encounter of 03/10/21   SARS-CoV-2 NAAT (Rapid)    Specimen: Nasopharyngeal Swab   Result Value Ref Range    Specimen Description . NASOPHARYNGEAL SWAB     SARS-CoV-2, Rapid Not Detected Not Detected   Troponin   Result Value Ref Range    Troponin, High Sensitivity 13 0 - 14 ng/L    Troponin T NOT REPORTED <0.03 ng/mL    Troponin Interp NOT REPORTED    SPECIMEN REJECTION   Result Value Ref Range    Specimen Source . BLOOD     Ordered Test BNP BMP TROPI CDP DIME     Reason for Rejection Unable to perform testing: Specimen clotted.      - NOT REPORTED    Basic Metabolic Panel   Result Value Ref Range    Glucose 87 70 - 99 mg/dL    BUN 20 8 - 23 mg/dL    CREATININE 0.88 0.50 - 0.90 mg/dL    Bun/Cre Ratio NOT REPORTED 9 - 20    Calcium 9.2 8.6 - 10.4 mg/dL    Sodium 139 135 - 144 mmol/L    Potassium 4.7 3.7 - 5.3 mmol/L    Chloride 108 (H) 98 - 107 mmol/L    CO2 20 20 - 31 mmol/L    Anion Gap 11 9 - 17 mmol/L    GFR Non-African American >60 >60 mL/min    GFR African American >60 >60 mL/min    GFR Comment          GFR Staging NOT REPORTED    Brain Natriuretic Peptide   Result Value Ref Range    Pro- <300 pg/mL    BNP Interpretation Pro-BNP Reference Range:    CBC Auto Differential   Result Value Ref Range    WBC 5.6 3.5 - 11.0 k/uL    RBC 4.45 4.0 - 5.2 m/uL    Hemoglobin 13.8 12.0 - 16.0 g/dL    Hematocrit 42.3 36 - 46 %    MCV 95.1 80 - 100 fL    MCH 31.0 26 - 34 pg    MCHC Condition (MA)   Reason for Exam: Fatigue; Shortness of Breath; Cough; Chest Pain; Back Pain.   patient states she had pneumonia one month ago and the SOB never went away   Acuity: Unknown   Type of Exam: Unknown       FINDINGS:   Pulmonary Arteries: Pulmonary arteries are adequately opacified for   evaluation.  No evidence of intraluminal filling defect to suggest pulmonary   embolism.  Main pulmonary artery is normal in caliber.       Mediastinum: No enlarged thoracic lymph node by CT criteria.  The heart and   pericardium demonstrate no acute abnormality.  There is no acute abnormality   of the thoracic aorta.       Large hiatus hernia has increased in size.       Lungs/pleura: Redemonstrated is chronic atelectasis versus scarring in the   posterior left lower lobe.  There is mild mosaic attenuation of the lungs. Otherwise, no lung consolidation or infiltrate.  No suspicious or significant   pulmonary nodule.  Stable trace bilateral pleural fluid versus pleural   thickening.  No pneumothorax.       Upper Abdomen: Limited images of the upper abdomen are unremarkable.       Soft Tissues/Bones:  The right breast saline implant demonstrates interval   rupture.  No acute bone abnormality.           Impression   No evidence of pulmonary embolism.       Nonspecific mosaic attenuation of the lungs.  This could be due to   constrictive bronchiolitis, hypersensitivity pneumonitis or related to   pulmonary hypertension.             EKG  EKG Interpretation    Interpreted by me    Rhythm: normal sinus   Rate: normal  Axis: normal  Ectopy: none  Conduction: normal  ST Segments: no acute change  T Waves: no acute change  Q Waves: none    Clinical Impression: no acute changes and normal EKG    All EKG's are interpreted by the Emergency Department Physician who either signs or Co-signs this chart in the absence of a cardiologist.    EMERGENCY DEPARTMENT COURSE/IMPRESSION: 57-year-old female present emergency department with worsening shortness of breath and cough. Patient afebrile, upon initial presentation patient was tachycardic in the 120s, this was transient. Patient has history of bronchitis and pneumonia. Recently finished antibiotics. Concern for return pneumonia. Work-up was initiated found elevated D-dimer, patient was a very difficult stick, once lab was obtained obtain CT chest to rule out PE which was unremarkable. Remainder of work-up was negative. Symptoms are likely secondary to bronchitis. Patient has primary care provider she can follow-up with, edition return precautions were discussed. PROCEDURES:  None    CONSULTS:  None    CRITICAL CARE:  None    FINAL IMPRESSION      1.  Bronchitis          DISPOSITION / PLAN     DISPOSITION Decision To Discharge 03/10/2021 09:03:42 PM      PATIENT REFERRED TO:  DES Kim - CNP  [phone] [id number]    In 2 days      [address] [facility] ED  [facility] [address]  [address] [facility]  [phone]    As needed, If symptoms worsen      DISCHARGE MEDICATIONS:  New Prescriptions    No medications on file       Teodora Jeff DO  Emergency Medicine Resident    (Please note that portions of thisnote were completed with a voice recognition program.  Efforts were made to edit the dictations but occasionally words are mis-transcribed.)     Teodora Jeff DO  Resident  03/10/21 2948

## 2021-03-11 ENCOUNTER — TELEPHONE (OUTPATIENT)
Dept: FAMILY MEDICINE CLINIC | Age: 76
End: 2021-03-11

## 2021-03-11 LAB
EKG ATRIAL RATE: 90 BPM
EKG P AXIS: 37 DEGREES
EKG P-R INTERVAL: 136 MS
EKG Q-T INTERVAL: 356 MS
EKG QRS DURATION: 72 MS
EKG QTC CALCULATION (BAZETT): 435 MS
EKG R AXIS: 4 DEGREES
EKG T AXIS: 24 DEGREES
EKG VENTRICULAR RATE: 90 BPM

## 2021-03-11 PROCEDURE — 93010 ELECTROCARDIOGRAM REPORT: CPT | Performed by: INTERNAL MEDICINE

## 2021-03-11 NOTE — ED NOTES
Report given to KEN Wagoner from ED. Report method in person   The following was reviewed with receiving RN:   Current vital signs:  BP (!) 138/98   Pulse 86   Temp 97 °F (36.1 °C) (Temporal)   Resp 22   Ht 4' 11\" (1.499 m)   Wt 153 lb (69.4 kg)   SpO2 91%   BMI 30.90 kg/m²                MEWS Score: 4     Any medication or safety alerts were reviewed. Any pending diagnostics and notifications were also reviewed, as well as any safety concerns or issues, abnormal labs, abnormal imaging, and abnormal assessment findings. Questions were answered.             Andressa Ace RN  03/10/21 1422

## 2021-03-12 DIAGNOSIS — F41.9 ANXIETY: ICD-10-CM

## 2021-03-12 RX ORDER — ALPRAZOLAM 0.5 MG/1
TABLET ORAL
Qty: 14 TABLET | Refills: 0 | Status: SHIPPED | OUTPATIENT
Start: 2021-03-12 | End: 2022-03-01 | Stop reason: SDUPTHER

## 2021-03-12 NOTE — TELEPHONE ENCOUNTER
Patient is requesting something for cough and a antibiotic. Patient stated Dom Montemayor did not treat with medication only testing and dx her with Bronchitis.

## 2021-03-12 NOTE — TELEPHONE ENCOUNTER
Last visit: 1/11/21  Last Med refill: 1/12/21    Next Visit Date:  Future Appointments   Date Time Provider Glenroy Falcon   3/16/2021 11:30 AM DES Burt CNP [facility]   5/5/2021 11:00 AM [place] [facility] [facility] [address] Maintenance   Topic Date Due    Hepatitis C screen  Never done    Shingles Vaccine (1 of 2) 07/29/2021 (Originally 5/24/1993)    DTaP/Tdap/Td vaccine (1 - Tdap) 11/05/2021 (Originally 5/24/1962)    COVID-19 Vaccine (2 - Moderna 2-dose series) 04/06/2021    Lipid screen  08/12/2021    DEXA (modify frequency per FRAX score)  Completed    Flu vaccine  Completed    Pneumococcal 65+ years Vaccine  Completed    Hepatitis A vaccine  Aged Out    Hib vaccine  Aged Out    Meningococcal (ACWY) vaccine  Aged Out       Hemoglobin A1C (%)   Date Value   08/12/2020 5.6   02/19/2019 5.5   05/23/2018 5.3             ( goal A1C is < 7)   Microalb/Crt.  Ratio (mcg/mg creat)   Date Value   05/12/2016 7     LDL Cholesterol (mg/dL)   Date Value   08/12/2020 179 (H)   02/19/2019 89     LDL Calculated (mg/dL)   Date Value   11/13/2014 109       (goal LDL is <100)   AST (U/L)   Date Value   08/12/2020 25     ALT (U/L)   Date Value   08/12/2020 19     BUN (mg/dL)   Date Value   03/10/2021 20     BP Readings from Last 3 Encounters:   03/10/21 (!) 163/93   01/11/21 124/82   11/05/20 110/76          (goal 120/80)    All Future Testing planned in [facility]              Patient Active Problem List:     Hyperlipidemia     Fibromyalgia     Osteopenia     Hx of bilateral breast implants     Vitamin D deficiency     Arthritis of shoulder region, right     Anxiety     Anemia, normocytic normochromic     Chronic pain associated with significant psychosocial dysfunction     Primary osteoarthritis involving multiple joints     Spondylosis 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     Vitamin B12 deficiency     Controlled type 2 diabetes mellitus with diabetic polyneuropathy, without long-term current use of insulin (HCC)     Primary insomnia     Lumbar and sacral arthritis     Chronic biliary pancreatitis (HCC)     Moderate episode of recurrent major depressive disorder (HCC)     Uncomplicated opioid dependence (HCC)     Hyperuricemia     Primary open-angle glaucoma, right eye, mild stage     Primary open-angle glaucoma, left eye, mild stage

## 2021-03-12 NOTE — TELEPHONE ENCOUNTER
ED Follow up Call    Reason for ED visit:  Chest congestion and cough  Status:     not changed    Did you call your PCP prior to going to the ED? No      Did you receive a discharge instructions from the Emergency Room? Yes  Review of Instructions:     Understands what to report/when to return?:  Yes   Understands discharge instructions?:  Yes   Following discharge instructions?:  Yes   If not why? Are there any new complaints of pain? No  New Pain Meds? No    Constipation prophylaxis needed? No  If you have a wound is the dressing clean, dry, and intact? No  Understands wound care regimen? No    Are there any other complaints/concerns that you wish to tell your provider? No     FU appts/Provider:    Future Appointments   Date Time Provider Glenroy Falcon   5/5/2021 11:00 AM DES Leigh - CNP [place] [facility]           New Medications?:   No      Medication Reconciliation by phone - No  Understands Medications? Yes  Taking Medications? No  Can you swallow your pills? Yes    Any further needs in the home i.e. Equipment?   No    Link to services in community?:  No   Which services:

## 2021-03-16 ENCOUNTER — OFFICE VISIT (OUTPATIENT)
Dept: FAMILY MEDICINE CLINIC | Age: 76
End: 2021-03-16
Payer: MEDICARE

## 2021-03-16 ENCOUNTER — TELEPHONE (OUTPATIENT)
Dept: FAMILY MEDICINE CLINIC | Age: 76
End: 2021-03-16

## 2021-03-16 VITALS
HEART RATE: 76 BPM | BODY MASS INDEX: 31.1 KG/M2 | RESPIRATION RATE: 18 BRPM | SYSTOLIC BLOOD PRESSURE: 140 MMHG | WEIGHT: 154 LBS | OXYGEN SATURATION: 96 % | DIASTOLIC BLOOD PRESSURE: 84 MMHG | TEMPERATURE: 97.9 F

## 2021-03-16 DIAGNOSIS — R05.9 COUGH: ICD-10-CM

## 2021-03-16 DIAGNOSIS — Z20.822 SUSPECTED COVID-19 VIRUS INFECTION: ICD-10-CM

## 2021-03-16 DIAGNOSIS — J02.0 ACUTE STREPTOCOCCAL PHARYNGITIS: ICD-10-CM

## 2021-03-16 DIAGNOSIS — J18.9 PNEUMONIA OF BOTH LOWER LOBES DUE TO INFECTIOUS ORGANISM: Primary | ICD-10-CM

## 2021-03-16 DIAGNOSIS — F11.20 UNCOMPLICATED OPIOID DEPENDENCE (HCC): ICD-10-CM

## 2021-03-16 DIAGNOSIS — F33.1 MODERATE EPISODE OF RECURRENT MAJOR DEPRESSIVE DISORDER (HCC): ICD-10-CM

## 2021-03-16 DIAGNOSIS — K86.1 CHRONIC BILIARY PANCREATITIS (HCC): ICD-10-CM

## 2021-03-16 DIAGNOSIS — R50.9 FEVER, UNSPECIFIED FEVER CAUSE: ICD-10-CM

## 2021-03-16 DIAGNOSIS — J02.9 SORE THROAT: ICD-10-CM

## 2021-03-16 DIAGNOSIS — J41.0 SIMPLE CHRONIC BRONCHITIS (HCC): Chronic | ICD-10-CM

## 2021-03-16 DIAGNOSIS — E11.9 CONTROLLED TYPE 2 DIABETES MELLITUS WITHOUT COMPLICATION, WITHOUT LONG-TERM CURRENT USE OF INSULIN (HCC): ICD-10-CM

## 2021-03-16 LAB
INFLUENZA A ANTIBODY: NEGATIVE
INFLUENZA B ANTIBODY: NEGATIVE
S PYO AG THROAT QL: POSITIVE

## 2021-03-16 PROCEDURE — 87880 STREP A ASSAY W/OPTIC: CPT | Performed by: NURSE PRACTITIONER

## 2021-03-16 PROCEDURE — 87804 INFLUENZA ASSAY W/OPTIC: CPT | Performed by: NURSE PRACTITIONER

## 2021-03-16 PROCEDURE — 99214 OFFICE O/P EST MOD 30 MIN: CPT | Performed by: NURSE PRACTITIONER

## 2021-03-16 RX ORDER — BENZONATATE 200 MG/1
200 CAPSULE ORAL 3 TIMES DAILY PRN
Qty: 30 CAPSULE | Refills: 0 | Status: SHIPPED | OUTPATIENT
Start: 2021-03-16 | End: 2021-03-26

## 2021-03-16 RX ORDER — METHYLPREDNISOLONE 4 MG/1
TABLET ORAL
Qty: 21 TABLET | Refills: 0 | Status: SHIPPED | OUTPATIENT
Start: 2021-03-16 | End: 2021-03-22

## 2021-03-16 RX ORDER — FLUTICASONE FUROATE, UMECLIDINIUM BROMIDE AND VILANTEROL TRIFENATATE 100; 62.5; 25 UG/1; UG/1; UG/1
1 POWDER RESPIRATORY (INHALATION) DAILY
Qty: 1 EACH | Refills: 5 | Status: SHIPPED | OUTPATIENT
Start: 2021-03-16 | End: 2022-03-15 | Stop reason: SDUPTHER

## 2021-03-16 RX ORDER — LEVOFLOXACIN 500 MG/1
500 TABLET, FILM COATED ORAL DAILY
Qty: 14 TABLET | Refills: 0 | Status: SHIPPED | OUTPATIENT
Start: 2021-03-16 | End: 2021-03-30

## 2021-03-16 ASSESSMENT — ENCOUNTER SYMPTOMS
CONSTIPATION: 0
SHORTNESS OF BREATH: 1
EYE ITCHING: 1
CHEST TIGHTNESS: 1
TROUBLE SWALLOWING: 1
EYE DISCHARGE: 1
BACK PAIN: 1
SORE THROAT: 1
SINUS PRESSURE: 1
SINUS PAIN: 1
DIARRHEA: 0
ABDOMINAL DISTENTION: 0
COUGH: 1
RHINORRHEA: 1
NAUSEA: 0
ABDOMINAL PAIN: 0
WHEEZING: 0

## 2021-03-16 ASSESSMENT — VISUAL ACUITY: OU: 1

## 2021-03-16 NOTE — TELEPHONE ENCOUNTER
[id number] [facility] [place] [facility] contacted the office with drug interaction clarification for xanax and percocet. Per provider she reviewed OARRS and patient has been taking both medications consistently for 2 years. No issues. She is just continuing a medication. Writer spoke to Bernadette Gómez.

## 2021-03-16 NOTE — PROGRESS NOTES
Controlled type 2 diabetes mellitus without complication, without long-term current use of insulin (HCC)  Assessment & Plan:  Stable, medicated, monitored. 9. Moderate episode of recurrent major depressive disorder (HCC)  Assessment & Plan:  Stable, medicated, monitored. 10. Uncomplicated opioid dependence ([facility] [address]  Assessment & Plan:  Stable, monitored. 11. Chronic biliary pancreatitis ([facility] [address]  Assessment & Plan:  Stable, monitored. Return for Call if systems do not improve or get worse. Patient Care Team:  DES Limon CNP as PCP - General (Nurse Practitioner Family)  DES Limon CNP as PCP - [facility]  Efren Garcia MD (Anesthesiology)  Mae Rosas DO as Surgeon (Orthopedic Surgery)    SUBJECTIVE/OBJECTIVE    History of Present illness / Visit Summary   And is a pleasant 49-year-old  female. She presents the office today for ED follow-up. It is noted the patient recently went to the emergency room and was diagnosed with bronchitis. Patient at this appointment time is actually worse since she was in the emergency room. It is noted that his CT of her chest was completed to rule out PE. Also a chest x-ray was completed. Patient was given 1 L of IV fluids and sent home. Her T-max is 100.0. It is also noted that she was told if she wants Neobiotic she needs to follow-up with her primary care provider. Patient in the office was rapid tested for influenza a and B both negative. She was however positive for strep. Patient will be treated with antibiotics for the coverage of both strep as well as potential pneumonia. Patient encouraged to complete nebulizer aerosol treatments throughout the day and something to rest her cough in the evening so she can rest better. Patient also encouraged to cough and deep breathe multiple times throughout the day.   Patient is to notify me if her symptoms worsen or she still is not feeling better within a few days.  Cough  This is a recurrent problem. The current episode started 1 to 4 weeks ago. The problem has been gradually worsening. The cough is productive of brown sputum and productive of purulent sputum. Associated symptoms include a fever (T amx 100.2), headaches, myalgias, nasal congestion, rhinorrhea, a sore throat and shortness of breath. Pertinent negatives include no chest pain, chills, hemoptysis, postnasal drip, rash or wheezing. She has tried a beta-agonist inhaler, body position changes, OTC cough suppressant and rest for the symptoms. The treatment provided mild relief. Her past medical history is significant for bronchitis and emphysema. Review of Systems  Review of Systems   Constitutional: Positive for activity change (declined), appetite change (declined), fatigue and fever (T amx 100.2). Negative for chills. HENT: Positive for congestion, rhinorrhea, sinus pressure, sinus pain, sore throat and trouble swallowing. Negative for postnasal drip. Eyes: Positive for discharge and itching. Respiratory: Positive for cough (productive, barky ), chest tightness and shortness of breath. Negative for hemoptysis and wheezing. Cardiovascular: Negative for chest pain, palpitations and leg swelling. Gastrointestinal: Negative for abdominal distention, abdominal pain, constipation, diarrhea and nausea. Genitourinary: Positive for frequency. Negative for decreased urine volume, difficulty urinating, dysuria, hematuria and urgency. Musculoskeletal: Positive for arthralgias (generalized), back pain (chronic lumbar), gait problem, joint swelling and myalgias. Chronic    Skin: Positive for pallor. Negative for rash. Neurological: Positive for dizziness and headaches. Negative for syncope, light-headedness and numbness. Hematological: Does not bruise/bleed easily. Psychiatric/Behavioral: Positive for sleep disturbance. Negative for agitation, self-injury and suicidal ideas.  The patient is not nervous/anxious. Physical exam   Physical Exam  Vitals signs and nursing note reviewed. Constitutional:       General: She is not in acute distress. Appearance: Normal appearance. She is well-developed, well-groomed and overweight. She is not ill-appearing or toxic-appearing. HENT:      Head: Normocephalic. Right Ear: Ear canal and external ear normal. No middle ear effusion. There is no impacted cerumen. Tympanic membrane is erythematous. Tympanic membrane is not retracted or bulging. Left Ear: Ear canal and external ear normal.  No middle ear effusion. There is no impacted cerumen. Tympanic membrane is erythematous. Tympanic membrane is not retracted or bulging. Nose: Mucosal edema present. No congestion or rhinorrhea. Right Turbinates: Not enlarged or swollen. Left Turbinates: Not enlarged or swollen. Right Sinus: No maxillary sinus tenderness or frontal sinus tenderness. Left Sinus: No maxillary sinus tenderness or frontal sinus tenderness. Mouth/Throat:      Lips: Pink. Mouth: Mucous membranes are moist.      Dentition: Abnormal dentition. Pharynx: Oropharynx is clear. Uvula midline. Posterior oropharyngeal erythema and uvula swelling present. No oropharyngeal exudate. Tonsils: No tonsillar exudate. Eyes:      General: Lids are normal. Vision grossly intact. No allergic shiner. Conjunctiva/sclera:      Right eye: Right conjunctiva is injected. Left eye: Left conjunctiva is injected. Neck:      Musculoskeletal: Normal range of motion. No pain with movement or torticollis. Cardiovascular:      Rate and Rhythm: Normal rate and regular rhythm. No extrasystoles are present. Pulses: Normal pulses. Radial pulses are 2+ on the right side and 2+ on the left side. Dorsalis pedis pulses are 2+ on the right side and 2+ on the left side. Heart sounds: Normal heart sounds, S1 normal and S2 normal. No murmur. Pulmonary:      Effort: Pulmonary effort is normal. Prolonged expiration present. No accessory muscle usage or respiratory distress. Breath sounds: Decreased air movement and transmitted upper airway sounds present. Examination of the right-lower field reveals decreased breath sounds. Examination of the left-lower field reveals decreased breath sounds. Decreased breath sounds present. Abdominal:      General: Bowel sounds are normal. There is no distension. Palpations: Abdomen is soft. Tenderness: There is no abdominal tenderness. Musculoskeletal:      Right lower leg: No edema. Left lower leg: No edema. Lymphadenopathy:      Cervical: Cervical adenopathy present. Right cervical: Superficial cervical adenopathy present. Left cervical: Superficial cervical adenopathy present. Skin:     General: Skin is warm and dry. Capillary Refill: Capillary refill takes 2 to 3 seconds. Coloration: Skin is pale. Skin is not ashen, cyanotic or jaundiced. Neurological:      General: No focal deficit present. Mental Status: She is alert and oriented to person, place, and time. Motor: Motor function is intact. Gait: Gait is intact. Psychiatric:         Attention and Perception: Attention and perception normal.         Mood and Affect: Affect normal. Mood is anxious. Speech: Speech normal.         Behavior: Behavior normal. Behavior is cooperative. Thought Content: Thought content normal. Thought content does not include suicidal ideation. Thought content does not include suicidal plan.          Cognition and Memory: Cognition and memory normal.         Judgment: Judgment normal.         Medical history    [x] Laboratory Results, Vital signs, Imaging, Active Problems, Immunizations, Current/Recently Discontinued Medications, Health Maintenance Activities Due, Referral Notes (if available) were reviewed per writer     Quality Measures    [x] Reviewed

## 2021-03-22 ENCOUNTER — TELEPHONE (OUTPATIENT)
Dept: FAMILY MEDICINE CLINIC | Age: 76
End: 2021-03-22

## 2021-03-25 NOTE — TELEPHONE ENCOUNTER
Unable to [facility] phone rang busy.

## 2021-03-27 ASSESSMENT — ENCOUNTER SYMPTOMS: HEMOPTYSIS: 0

## 2021-04-06 ENCOUNTER — TELEPHONE (OUTPATIENT)
Dept: FAMILY MEDICINE CLINIC | Age: 76
End: 2021-04-06

## 2021-04-06 NOTE — TELEPHONE ENCOUNTER
Pt states she is having left arm pain between shoulder & elbow. Pt states she is taking pain meds & cream with no relief. Pt wants to know if this discomfort could from pneumonia/bronchitis. Pt states she does not want to go to ER. Pt also states she has had some chest discomfort for 2 days but was able to fall asleep after taking antacid. UPDATE:  Provider advises pt must go to ER. Writer called pt, pt plans to go to [facility].

## 2021-05-20 ENCOUNTER — CARE COORDINATION (OUTPATIENT)
Dept: CARE COORDINATION | Age: 76
End: 2021-05-20

## 2021-05-20 NOTE — CARE COORDINATION
Ambulatory Care Coordination Note  5/20/2021  CM Risk Score: 9  Charlson 10 Year Mortality Risk Score: 100%     ACC: Shantell Jordan RN    Summary Note: was asked by the pt payor to reach out to the pt and follow up from her pneumonia in March. Pt said she thinks she still has pneumonia. She still feels like she is wheezing. She is using her inhalers. There is a call from April when she was advised to go to the ER she declined going to the ER. She agrees she can make an apt to see her pcp. She is about an hour away from her provider. Will see when she can get her in to see her. Prior to Admission medications    Medication Sig Start Date End Date Taking?  Authorizing Provider   Handicap Placard MISC by Does not apply route Exp 3/16/2026 3/16/21   DES Wang CNP   meloxicam (MOBIC) 7.5 MG tablet Take 1 tablet by mouth daily 3/8/21 9/4/21  DES Wang CNP   cyanocobalamin ([employer] VITAMIN B12) 1000 MCG tablet Take 2 tablets by mouth daily 3/8/21 9/4/21  DES Wang CNP   QUEtiapine (SEROQUEL) 50 MG tablet TAKE 1 OR 2 TABLETS BY MOUTH AT BEDTIME AS NEEDED FOR SLEEP 2/19/21 2/19/22  DES Zuniga NP   montelukast (SINGULAIR) 10 MG tablet Take 1 tablet by mouth daily 2/3/21 8/2/21  DES Wang CNP   QUEtiapine (SEROQUEL) 200 MG tablet Take 1 tablet by mouth daily 2/3/21 8/2/21  DES Wang CNP   albuterol sulfate HFA (VENTOLIN HFA) 108 (90 Base) MCG/ACT inhaler Inhale 2 puffs into the lungs 4 times daily as needed for Wheezing 1/12/21   DES Wang CNP   diclofenac sodium (VOLTAREN) 1 % GEL as needed    Historical Provider, MD   Lactobacillus Acid-Pectin (ACIDOPHILUS/CITRUS PECTIN) TABS TAKE 1 TABLET BY MOUTH TWO TIMES A DAY  1/8/21   DES Wang CNP   atorvastatin (LIPITOR) 80 MG tablet Take 1 tablet by mouth daily 11/18/20 5/17/21  DES Wang CNP   dorzolamide-timolol (COSOPT) 22.3-6.8 MG/ML ophthalmic solution  11/2/20 Historical Provider, MD   oxyCODONE-acetaminophen (PERCOCET)  MG per tablet Take 1 tablet by mouth every 4 hours as needed for Pain. 7/19/19   Historical Provider, MD       No future appointments.

## 2021-05-27 ENCOUNTER — CARE COORDINATION (OUTPATIENT)
Dept: CARE COORDINATION | Age: 76
End: 2021-05-27

## 2021-05-27 DIAGNOSIS — F41.9 ANXIETY: Chronic | ICD-10-CM

## 2021-05-27 DIAGNOSIS — F33.1 MAJOR DEPRESSIVE DISORDER, RECURRENT EPISODE, MODERATE (HCC): ICD-10-CM

## 2021-05-27 NOTE — CARE COORDINATION
tablet Take 1 tablet by mouth daily 11/18/20 5/27/21 Yes DES Falk CNP   dorzolamide-timolol (COSOPT) 22.3-6.8 MG/ML ophthalmic solution  11/2/20  Yes Historical Provider, MD   oxyCODONE-acetaminophen (PERCOCET)  MG per tablet Take 1 tablet by mouth every 4 hours as needed for Pain. 7/19/19  Yes Historical Provider, MD   Handicap Placard [facility] by Does not apply route Exp 3/16/2026 3/16/21   DES Falk CNP   meloxicam (MOBIC) 7.5 MG tablet Take 1 tablet by mouth daily  Patient not taking: Reported on 5/27/2021 3/8/21 9/4/21  DES Falk CNP   diclofenac sodium (VOLTAREN) 1 % GEL as needed    Historical Provider, MD       Future Appointments   Date Time Provider Glenroy Falcon   6/24/2021  3:00 PM DES Falk CNP  [facility]      and   Diabetes Assessment    Medic Alert ID: No  Meal Planning: None   How often do you test your blood sugar?: No Testing   Do you have barriers with adherence to non-pharmacologic self-management interventions?  (Nutrition/Exercise/Self-Monitoring): No   Have you ever had to go to the ED for symptoms of low blood sugar?: No       No patient-reported symptoms

## 2021-05-27 NOTE — TELEPHONE ENCOUNTER
Last refill feb 20201 pt wants to [facility] to [facility].  She takes a total of 30o nightly 2 50 mg tabs and a 200 mg tab. 50's were sent to [facility] but not 200's

## 2021-05-28 RX ORDER — QUETIAPINE FUMARATE 200 MG/1
200 TABLET, FILM COATED ORAL DAILY
Qty: 90 TABLET | Refills: 1 | Status: SHIPPED | OUTPATIENT
Start: 2021-05-28 | End: 2021-11-30 | Stop reason: SDUPTHER

## 2021-06-03 ENCOUNTER — CARE COORDINATION (OUTPATIENT)
Dept: CARE COORDINATION | Age: 76
End: 2021-06-03

## 2021-06-03 SDOH — ECONOMIC STABILITY: TRANSPORTATION INSECURITY
IN THE PAST 12 MONTHS, HAS LACK OF TRANSPORTATION KEPT YOU FROM MEETINGS, WORK, OR FROM GETTING THINGS NEEDED FOR DAILY LIVING?: NO

## 2021-06-03 SDOH — HEALTH STABILITY: PHYSICAL HEALTH: ON AVERAGE, HOW MANY MINUTES DO YOU ENGAGE IN EXERCISE AT THIS LEVEL?: 20 MIN

## 2021-06-03 SDOH — ECONOMIC STABILITY: INCOME INSECURITY: IN THE LAST 12 MONTHS, WAS THERE A TIME WHEN YOU WERE NOT ABLE TO PAY THE MORTGAGE OR RENT ON TIME?: NO

## 2021-06-03 SDOH — ECONOMIC STABILITY: HOUSING INSECURITY
IN THE LAST 12 MONTHS, WAS THERE A TIME WHEN YOU DID NOT HAVE A STEADY PLACE TO SLEEP OR SLEPT IN A SHELTER (INCLUDING NOW)?: NO

## 2021-06-03 SDOH — ECONOMIC STABILITY: TRANSPORTATION INSECURITY
IN THE PAST 12 MONTHS, HAS THE LACK OF TRANSPORTATION KEPT YOU FROM MEDICAL APPOINTMENTS OR FROM GETTING MEDICATIONS?: NO

## 2021-06-03 SDOH — ECONOMIC STABILITY: FOOD INSECURITY: WITHIN THE PAST 12 MONTHS, YOU WORRIED THAT YOUR FOOD WOULD RUN OUT BEFORE YOU GOT MONEY TO BUY MORE.: NEVER TRUE

## 2021-06-03 SDOH — ECONOMIC STABILITY: FOOD INSECURITY: WITHIN THE PAST 12 MONTHS, THE FOOD YOU BOUGHT JUST DIDN'T LAST AND YOU DIDN'T HAVE MONEY TO GET MORE.: NEVER TRUE

## 2021-06-03 SDOH — ECONOMIC STABILITY: HOUSING INSECURITY: IN THE LAST 12 MONTHS, HOW MANY PLACES HAVE YOU LIVED?: 1

## 2021-06-03 SDOH — HEALTH STABILITY: PHYSICAL HEALTH: ON AVERAGE, HOW MANY DAYS PER WEEK DO YOU ENGAGE IN MODERATE TO STRENUOUS EXERCISE (LIKE A BRISK WALK)?: 3 DAYS

## 2021-06-03 ASSESSMENT — SOCIAL DETERMINANTS OF HEALTH (SDOH)
HOW OFTEN DO YOU ATTEND CHURCH OR RELIGIOUS SERVICES?: 1 TO 4 TIMES PER YEAR
DO YOU BELONG TO ANY CLUBS OR ORGANIZATIONS SUCH AS CHURCH GROUPS UNIONS, FRATERNAL OR ATHLETIC GROUPS, OR SCHOOL GROUPS?: NO
HOW OFTEN DO YOU GET TOGETHER WITH FRIENDS OR RELATIVES?: MORE THAN THREE TIMES A WEEK
HOW HARD IS IT FOR YOU TO PAY FOR THE VERY BASICS LIKE FOOD, HOUSING, MEDICAL CARE, AND HEATING?: NOT HARD AT ALL
HOW OFTEN DO YOU ATTENT MEETINGS OF THE CLUB OR ORGANIZATION YOU BELONG TO?: NEVER
IN A TYPICAL WEEK, HOW MANY TIMES DO YOU TALK ON THE PHONE WITH FAMILY, FRIENDS, OR NEIGHBORS?: MORE THAN THREE TIMES A WEEK

## 2021-06-03 ASSESSMENT — LIFESTYLE VARIABLES
HOW MANY STANDARD DRINKS CONTAINING ALCOHOL DO YOU HAVE ON A TYPICAL DAY: 1 OR 2
HOW OFTEN DO YOU HAVE A DRINK CONTAINING ALCOHOL: NEVER

## 2021-06-03 NOTE — CARE COORDINATION
Ambulatory Care Coordination Note  6/3/2021  CM Risk Score: 9  Charlson 10 Year Mortality Risk Score: 100%     ACC: Elliot Richardson RN    Summary Note: spoke with pt who said she is feeling about the same. She will see her provider on June 24 to follow up on her breathing. She is followed by pain mgt as well    1) acp done   2) sdoh done   3) med review done   4) fu pcp June  5) fu on breathing. Care Coordination Interventions    Program Enrollment: Complex Care  Referral from Primary Care Provider: No  Suggested Interventions and Community Resources  Zone Management Tools: Completed         Goals Addressed                 This Visit's Progress     Conditions and Symptoms   On track     I will schedule office visits, as directed by my provider. I will keep my appointment or reschedule if I have to cancel. I will notify my provider of any barriers to my plan of care. Barriers: overwhelmed by complexity of regimen  Plan for overcoming my barriers: care coordination   Confidence: 9/10  Anticipated Goal Completion Date: 11/20/21              Prior to Admission medications    Medication Sig Start Date End Date Taking?  Authorizing Provider   QUEtiapine (SEROQUEL) 200 MG tablet Take 1 tablet by mouth daily 5/28/21 11/24/21  DES Hobbs CNP   Handicap Placard MISC by Does not apply route Exp 3/16/2026 3/16/21   DES Hobbs CNP   meloxicam (MOBIC) 7.5 MG tablet Take 1 tablet by mouth daily  Patient not taking: Reported on 5/27/2021 3/8/21 9/4/21  DES Hobbs CNP   cyanocobalamin ([employer] VITAMIN B12) 1000 MCG tablet Take 2 tablets by mouth daily 3/8/21 9/4/21  DES Hobbs CNP   QUEtiapine (SEROQUEL) 50 MG tablet TAKE 1 OR 2 TABLETS BY MOUTH AT BEDTIME AS NEEDED FOR SLEEP 2/19/21 2/19/22  Minor DES Waite NP   montelukast (SINGULAIR) 10 MG tablet Take 1 tablet by mouth daily 2/3/21 8/2/21  DES Hobbs CNP   albuterol sulfate HFA (VENTOLIN HFA) 108 (90 Base) MCG/ACT inhaler Inhale 2 puffs into the lungs 4 times daily as needed for Wheezing 1/12/21   DES Oliveira CNP   diclofenac sodium (VOLTAREN) 1 % GEL as needed    Historical Provider, MD   Lactobacillus Acid-Pectin (ACIDOPHILUS/CITRUS PECTIN) TABS TAKE 1 TABLET BY MOUTH TWO TIMES A DAY  1/8/21   DES Oliveira CNP   atorvastatin (LIPITOR) 80 MG tablet Take 1 tablet by mouth daily 11/18/20 5/27/21  DES Oliveira CNP   dorzolamide-timolol (COSOPT) 22.3-6.8 MG/ML ophthalmic solution  11/2/20   Historical Provider, MD   oxyCODONE-acetaminophen (PERCOCET)  MG per tablet Take 1 tablet by mouth every 4 hours as needed for Pain. 7/19/19   Historical Provider, MD       Future Appointments   Date Time Provider Glenroy Falcon   6/24/2021  3:00 PM DES Oliveira CNP [facility]      and   Diabetes Assessment    Medic Alert ID: No  Meal Planning: None   How often do you test your blood sugar?: No Testing   Do you have barriers with adherence to non-pharmacologic self-management interventions?  (Nutrition/Exercise/Self-Monitoring): No   Have you ever had to go to the ED for symptoms of low blood sugar?: No       No patient-reported symptoms

## 2021-06-03 NOTE — ACP (ADVANCE CARE PLANNING)
Advance Care Planning   Healthcare Decision Maker:poli       Click here to complete Healthcare Decision Makers including selection of the Healthcare Decision Maker Relationship (ie \"Primary\"). Today we documented Decision Maker(s) consistent with Legal Next of Kin hierarchy.

## 2021-06-08 ENCOUNTER — OFFICE VISIT (OUTPATIENT)
Dept: PRIMARY CARE CLINIC | Age: 76
End: 2021-06-08
Payer: MEDICARE

## 2021-06-08 VITALS
BODY MASS INDEX: 30.5 KG/M2 | TEMPERATURE: 98 F | HEART RATE: 78 BPM | SYSTOLIC BLOOD PRESSURE: 132 MMHG | WEIGHT: 151 LBS | OXYGEN SATURATION: 98 % | DIASTOLIC BLOOD PRESSURE: 86 MMHG

## 2021-06-08 DIAGNOSIS — J40 BRONCHITIS: Primary | ICD-10-CM

## 2021-06-08 PROCEDURE — 99214 OFFICE O/P EST MOD 30 MIN: CPT | Performed by: FAMILY MEDICINE

## 2021-06-08 RX ORDER — PREDNISONE 20 MG/1
40 TABLET ORAL DAILY
Qty: 10 TABLET | Refills: 0 | Status: SHIPPED | OUTPATIENT
Start: 2021-06-08 | End: 2021-06-13

## 2021-06-08 RX ORDER — AZITHROMYCIN 250 MG/1
TABLET, FILM COATED ORAL
Qty: 1 PACKET | Refills: 0 | Status: SHIPPED | OUTPATIENT
Start: 2021-06-08 | End: 2021-06-18

## 2021-06-08 NOTE — PROGRESS NOTES
Subjective: Juan Murcia presents for   Chief Complaint   Patient presents with    Cough     x 2weeks. Off and on all winter. She has has bronchitis/strep/pneumonia and hasnt really ever gotten much better.  Fever    Chills    Fatigue    Shortness of Breath   does not want covid testing    Place of employment: retired  Exposure history to COVID-19: no  Length of symptoms? It has been months since she fetl well. Got worse 2weeks ago. SOB: yes  Dry Cough: mostly    Nasal congestion/rhinorrhea: slight drippy  Sinus pressure: no  Sore throat: no    Any GI sx? no    Fever: ?she blames the airconditionning  Myalgias: yes  Fatigue: yes    Fluid intake: good  Appetite: good        Risk Factors  Smoker?: no  COPD/underlying lung disease?: no  CAD/CHF?: no  DM2?: no  CKD?: no  Liver disease?: no  Immunosuppressed or current chemotherapy use?: no  Steroid use?no  Travel recently/Where?: no      Objective:  Physical Exam   Vitals:   Vitals:    06/08/21 1321   BP: 132/86   Pulse: 78   Temp: 98 °F (36.7 °C)   SpO2: 98%   Weight: 151 lb (68.5 kg)     Wt Readings from Last 3 Encounters:   06/08/21 151 lb (68.5 kg)   03/16/21 154 lb (69.9 kg)   03/10/21 153 lb (69.4 kg)     Ht Readings from Last 3 Encounters:   03/10/21 4' 11\" (1.499 m)   11/05/20 4' 11\" (1.499 m)   01/27/20 5' (1.524 m)     Body mass index is 30.5 kg/m². Constitutional: She is oriented to person, place, and time. She appears well-developed and well-nourished and in no acute distress. Answers all my questions appropriately. Head: Normocephalic and atraumatic. Eyes:conjunctiva appear normal.  Right Ear: External ear normal. TM is clear  Left Ear: External ear normal. TM is clear  Nose: pink, non-edematous mucosa. No polyps. No septal deviation  Throat: no erythema, tonsillar hypertrophy or exudate. No ulcerations noted. Lips/Teeth/Gums all appear normal.  Neck: Normal range of motion. Neck supple. No tracheal deviation present.  No abnormal lymphadenopathy. Heart - RRR w/o murmur. No S3/S4 noted  Chest: decreased breath sounds noted. wehezing noted mildy bilaterally, whit a few rhonchi   No respiratory retractions noted. Wall has symmetrical movement with respirations. Assessment:   Encounter Diagnosis   Name Primary?  Bronchitis Yes         Plan:   There are no discontinued medications. THE ABOVE NOTED DISCONTINUED MEDS MAY ONLY BE FROM 'CLEANING UP' THE MED LIST AND WERE NOT ACTUALLY CANCELED;  SEE CHART FOR DETAILS! Orders Placed This Encounter   Medications    predniSONE (DELTASONE) 20 MG tablet     Sig: Take 2 tablets by mouth daily for 5 days     Dispense:  10 tablet     Refill:  0    azithromycin (ZITHROMAX) 250 MG tablet     Si tablets now then 1 daily until gone. Dispense:  1 packet     Refill:  0     No orders of the defined types were placed in this encounter. Return in about 2 weeks (around 2021). There are no Patient Instructions on file for this visit. Follow up with Damon Perez      Use the inhalers at home. Stay out of the heat. This visit was provided as a focused evaluation during the COVID -19 pandemic/national emergency. A comprehensive review of all previous patient history and testing was not conducted. Pertinent findings were elicited during the visit.

## 2021-06-17 ENCOUNTER — CARE COORDINATION (OUTPATIENT)
Dept: CARE COORDINATION | Age: 76
End: 2021-06-17

## 2021-06-17 NOTE — CARE COORDINATION
Ambulatory Care Coordination Note  6/17/2021  CM Risk Score: 9  Charlson 10 Year Mortality Risk Score: 100%     ACC: Darío Melton RN    Summary Note: spoke with pt who said she did go to the walk in clinic who gave her zpack 250 mg for 5 days. She said 250 mg \"does not ever help me\" and she usually needs something stronger. She complete a steroid as well she still feels sob and has wheezing. She is coughing sometimes productive. When she did cough up something it was black phlegm. She is speaking in full sentences while on phone with acm. She has not had a repeat chest xray since march 2021. She has not had any fevers she is aware of no night sweats. She does have fatigue. She is using her inhaler and nebulizer. 1) acp done   2) sdoh done   3) med review done   4) fu pcp June  5) fu on breathing.       Care Coordination Interventions    Program Enrollment: Complex Care  Referral from Primary Care Provider: No  Suggested Interventions and Community Resources  Zone Management Tools: Completed         Goals Addressed                 This Visit's Progress     Conditions and Symptoms   On track     I will schedule office visits, as directed by my provider. I will keep my appointment or reschedule if I have to cancel. I will notify my provider of any barriers to my plan of care. Barriers: overwhelmed by complexity of regimen  Plan for overcoming my barriers: care coordination   Confidence: 9/10  Anticipated Goal Completion Date: 11/20/21              Prior to Admission medications    Medication Sig Start Date End Date Taking? Authorizing Provider   azithromycin (ZITHROMAX) 250 MG tablet 2 tablets now then 1 daily until gone.  6/8/21 6/18/21  Ro Grayson MD   QUEtiapine (SEROQUEL) 200 MG tablet Take 1 tablet by mouth daily 5/28/21 11/24/21  DES Ryan CNP   Handicap Placard MISC by Does not apply route Exp 3/16/2026 3/16/21   DES Ryan CNP   meloxicam (MOBIC) 7.5 MG tablet Take 1 tablet by mouth daily 3/8/21 9/4/21  DES Aguilar CNP   cyanocobalamin ([employer] VITAMIN B12) 1000 MCG tablet Take 2 tablets by mouth daily 3/8/21 9/4/21  DES Aguilar CNP   QUEtiapine (SEROQUEL) 50 MG tablet TAKE 1 OR 2 TABLETS BY MOUTH AT BEDTIME AS NEEDED FOR SLEEP 2/19/21 2/19/22  DES Pierre NP   montelukast (SINGULAIR) 10 MG tablet Take 1 tablet by mouth daily 2/3/21 8/2/21  DES Aguilar CNP   albuterol sulfate HFA (VENTOLIN HFA) 108 (90 Base) MCG/ACT inhaler Inhale 2 puffs into the lungs 4 times daily as needed for Wheezing 1/12/21   DES Aguilar CNP   diclofenac sodium (VOLTAREN) 1 % GEL as needed    Historical Provider, MD   Lactobacillus Acid-Pectin (ACIDOPHILUS/CITRUS PECTIN) TABS TAKE 1 TABLET BY MOUTH TWO TIMES A DAY  1/8/21   DES Aguilar CNP   atorvastatin (LIPITOR) 80 MG tablet Take 1 tablet by mouth daily 11/18/20 5/27/21  DES Aguilar CNP   dorzolamide-timolol (COSOPT) 22.3-6.8 MG/ML ophthalmic solution  11/2/20   Historical Provider, MD   oxyCODONE-acetaminophen (PERCOCET)  MG per tablet Take 1 tablet by mouth every 4 hours as needed for Pain. 7/19/19   Historical Provider, MD       Future Appointments   Date Time Provider Glenroy Falcon   6/24/2021  3:00 PM Marli Kimani Pleasant, APRN - [facility]      and   Diabetes Assessment    Medic Alert ID: No  Meal Planning: None   How often do you test your blood sugar?: No Testing   Do you have barriers with adherence to non-pharmacologic self-management interventions?  (Nutrition/Exercise/Self-Monitoring): No   Have you ever had to go to the ED for symptoms of low blood sugar?: No

## 2021-06-24 ENCOUNTER — HOSPITAL ENCOUNTER (OUTPATIENT)
Age: 76
Setting detail: SPECIMEN
Discharge: HOME | End: 2021-06-24
Payer: MEDICARE

## 2021-06-24 ENCOUNTER — OFFICE VISIT (OUTPATIENT)
Dept: FAMILY MEDICINE CLINIC | Age: 76
End: 2021-06-24
Payer: MEDICARE

## 2021-06-24 VITALS
HEART RATE: 87 BPM | SYSTOLIC BLOOD PRESSURE: 118 MMHG | OXYGEN SATURATION: 98 % | WEIGHT: 154 LBS | BODY MASS INDEX: 31.1 KG/M2 | TEMPERATURE: 98 F | RESPIRATION RATE: 22 BRPM | DIASTOLIC BLOOD PRESSURE: 84 MMHG

## 2021-06-24 DIAGNOSIS — K21.00 HIATAL HERNIA WITH GERD AND ESOPHAGITIS: Primary | ICD-10-CM

## 2021-06-24 DIAGNOSIS — D64.9 ANEMIA, NORMOCYTIC NORMOCHROMIC: ICD-10-CM

## 2021-06-24 DIAGNOSIS — K92.1 FRANK BLOOD IN STOOL: ICD-10-CM

## 2021-06-24 DIAGNOSIS — K44.9 HIATAL HERNIA WITH GERD AND ESOPHAGITIS: ICD-10-CM

## 2021-06-24 DIAGNOSIS — K21.9 GASTROESOPHAGEAL REFLUX DISEASE, UNSPECIFIED WHETHER ESOPHAGITIS PRESENT: ICD-10-CM

## 2021-06-24 DIAGNOSIS — E53.8 VITAMIN B12 DEFICIENCY: ICD-10-CM

## 2021-06-24 DIAGNOSIS — K44.9 HIATAL HERNIA WITH GERD AND ESOPHAGITIS: Primary | ICD-10-CM

## 2021-06-24 DIAGNOSIS — K86.1 CHRONIC BILIARY PANCREATITIS (HCC): ICD-10-CM

## 2021-06-24 DIAGNOSIS — K21.00 HIATAL HERNIA WITH GERD AND ESOPHAGITIS: ICD-10-CM

## 2021-06-24 LAB
-: NORMAL
ABSOLUTE EOS #: 0.33 K/UL (ref 0–0.44)
ABSOLUTE IMMATURE GRANULOCYTE: <0.03 K/UL (ref 0–0.3)
ABSOLUTE LYMPH #: 2.1 K/UL (ref 1.1–3.7)
ABSOLUTE MONO #: 0.52 K/UL (ref 0.1–1.2)
ALBUMIN SERPL-MCNC: 3.5 G/DL (ref 3.5–5.2)
ALBUMIN/GLOBULIN RATIO: 1 (ref 1–2.5)
ALP BLD-CCNC: 127 U/L (ref 35–104)
ALT SERPL-CCNC: 14 U/L (ref 5–33)
AMYLASE: 101 U/L (ref 28–100)
ANION GAP SERPL CALCULATED.3IONS-SCNC: 12 MMOL/L (ref 9–17)
AST SERPL-CCNC: 22 U/L
BASOPHILS # BLD: 1 % (ref 0–2)
BASOPHILS ABSOLUTE: 0.07 K/UL (ref 0–0.2)
BILIRUB SERPL-MCNC: 0.18 MG/DL (ref 0.3–1.2)
BILIRUBIN URINE: NEGATIVE
BUN BLDV-MCNC: 10 MG/DL (ref 8–23)
BUN/CREAT BLD: ABNORMAL (ref 9–20)
C-REACTIVE PROTEIN: 11.7 MG/L (ref 0–5)
CALCIUM SERPL-MCNC: 9.2 MG/DL (ref 8.6–10.4)
CHLORIDE BLD-SCNC: 103 MMOL/L (ref 98–107)
CO2: 20 MMOL/L (ref 20–31)
COLOR: YELLOW
COMMENT UA: NORMAL
CREAT SERPL-MCNC: 0.67 MG/DL (ref 0.5–0.9)
DIFFERENTIAL TYPE: ABNORMAL
EOSINOPHILS RELATIVE PERCENT: 6 % (ref 1–4)
FERRITIN: 34 UG/L (ref 13–150)
FOLATE: 14.8 NG/ML
GFR AFRICAN AMERICAN: >60 ML/MIN
GFR NON-AFRICAN AMERICAN: >60 ML/MIN
GFR SERPL CREATININE-BSD FRML MDRD: ABNORMAL ML/MIN/{1.73_M2}
GFR SERPL CREATININE-BSD FRML MDRD: ABNORMAL ML/MIN/{1.73_M2}
GLUCOSE FASTING: 86 MG/DL (ref 70–99)
GLUCOSE URINE: NEGATIVE
HCT VFR BLD CALC: 47.4 % (ref 36.3–47.1)
HEMOGLOBIN: 13.6 G/DL (ref 11.9–15.1)
IMMATURE GRANULOCYTES: 0 %
IRON SATURATION: 12 % (ref 20–55)
IRON: 41 UG/DL (ref 37–145)
KETONES, URINE: NEGATIVE
LEUKOCYTE ESTERASE, URINE: NEGATIVE
LIPASE: 33 U/L (ref 13–60)
LYMPHOCYTES # BLD: 37 % (ref 24–43)
MCH RBC QN AUTO: 29 PG (ref 25.2–33.5)
MCHC RBC AUTO-ENTMCNC: 28.7 G/DL (ref 28.4–34.8)
MCV RBC AUTO: 101.1 FL (ref 82.6–102.9)
MONOCYTES # BLD: 9 % (ref 3–12)
NITRITE, URINE: NEGATIVE
NRBC AUTOMATED: 0 PER 100 WBC
PDW BLD-RTO: 13 % (ref 11.8–14.4)
PH UA: 7.5 (ref 5–8)
PLATELET # BLD: 321 K/UL (ref 138–453)
PLATELET ESTIMATE: ABNORMAL
PMV BLD AUTO: 9.8 FL (ref 8.1–13.5)
POTASSIUM SERPL-SCNC: 4.8 MMOL/L (ref 3.7–5.3)
PROTEIN UA: NEGATIVE
RBC # BLD: 4.69 M/UL (ref 3.95–5.11)
RBC # BLD: ABNORMAL 10*6/UL
REASON FOR REJECTION: NORMAL
SEG NEUTROPHILS: 47 % (ref 36–65)
SEGMENTED NEUTROPHILS ABSOLUTE COUNT: 2.62 K/UL (ref 1.5–8.1)
SODIUM BLD-SCNC: 135 MMOL/L (ref 135–144)
SPECIFIC GRAVITY UA: 1.01 (ref 1–1.03)
TOTAL IRON BINDING CAPACITY: 355 UG/DL (ref 250–450)
TOTAL PROTEIN: 7 G/DL (ref 6.4–8.3)
TRANSFERRIN: 295 MG/DL (ref 200–360)
TURBIDITY: CLEAR
UNSATURATED IRON BINDING CAPACITY: 314 UG/DL (ref 112–347)
URINE HGB: NEGATIVE
UROBILINOGEN, URINE: NORMAL
VITAMIN B-12: 1103 PG/ML (ref 232–1245)
WBC # BLD: 5.7 K/UL (ref 3.5–11.3)
WBC # BLD: ABNORMAL 10*3/UL
ZZ NTE CLEAN UP: ORDERED TEST: NORMAL
ZZ NTE WITH NAME CLEAN UP: SPECIMEN SOURCE: NORMAL

## 2021-06-24 PROCEDURE — 99214 OFFICE O/P EST MOD 30 MIN: CPT | Performed by: NURSE PRACTITIONER

## 2021-06-24 NOTE — PROGRESS NOTES
that this chart was generated using voice recognition Dragon dictation software. Although every effort was made to ensure the accuracy of this automated transcription, some errors in transcription may have occurred.

## 2021-06-25 NOTE — RESULT ENCOUNTER NOTE
Labs reviewed. There is an elevation in her anti-inflammatory markers. Iron is low. Still elevation in amylase (pancreatic enzyme). Blood and urine showing no signs of infection. I will watch for stool studies and CT chest/abdomen. We will decide plan of care at that time.

## 2021-06-28 ENCOUNTER — TELEPHONE (OUTPATIENT)
Dept: FAMILY MEDICINE CLINIC | Age: 76
End: 2021-06-28

## 2021-06-28 DIAGNOSIS — K21.00 HIATAL HERNIA WITH GERD AND ESOPHAGITIS: Primary | ICD-10-CM

## 2021-06-28 DIAGNOSIS — K44.9 HIATAL HERNIA WITH GERD AND ESOPHAGITIS: Primary | ICD-10-CM

## 2021-06-28 NOTE — TELEPHONE ENCOUNTER
Patient contacted office stating that she is unable to schedule the CT. Imaging states the order has to be more specific rather it is with or without contrast. Please clarify. Patient would like a call once the order is updated.

## 2021-06-29 ENCOUNTER — CARE COORDINATION (OUTPATIENT)
Dept: CARE COORDINATION | Age: 76
End: 2021-06-29

## 2021-06-29 NOTE — CARE COORDINATION
Ambulatory Care Coordination Note  6/29/2021  CM Risk Score: 9  Charlson 10 Year Mortality Risk Score: 100%     ACC: Cindy Jeff RN    Summary Note: spoke with pt who said she is still sob and not feeling great. Her CT her pcp ordered is scheduled for Friday July 2 at 2:30. Offered to make her a follow up apt with her pcp and pt would like to wait until after her CT is done to make the apt.   1) acp done   2) sdoh done   3) med review done   4) fu pcp June done   5) fu on breathing. same          Care Coordination Interventions    Program Enrollment: Complex Care  Referral from Primary Care Provider: No  Suggested Interventions and Community Resources  Zone Management Tools: Completed         Goals Addressed                 This Visit's Progress     Conditions and Symptoms   On track     I will schedule office visits, as directed by my provider. I will keep my appointment or reschedule if I have to cancel. I will notify my provider of any barriers to my plan of care. Barriers: overwhelmed by complexity of regimen  Plan for overcoming my barriers: care coordination   Confidence: 9/10  Anticipated Goal Completion Date: 11/20/21              Prior to Admission medications    Medication Sig Start Date End Date Taking?  Authorizing Provider   QUEtiapine (SEROQUEL) 200 MG tablet Take 1 tablet by mouth daily 5/28/21 11/24/21  DES Kennedy CNP   Handicap Placard MISC by Does not apply route Exp 3/16/2026 3/16/21   DES Kennedy CNP   meloxicam (MOBIC) 7.5 MG tablet Take 1 tablet by mouth daily 3/8/21 9/4/21  DES Kennedy CNP   cyanocobalamin ([employer] VITAMIN B12) 1000 MCG tablet Take 2 tablets by mouth daily 3/8/21 9/4/21  DES Kennedy CNP   QUEtiapine (SEROQUEL) 50 MG tablet TAKE 1 OR 2 TABLETS BY MOUTH AT BEDTIME AS NEEDED FOR SLEEP 2/19/21 2/19/22  DES Field NP   montelukast (SINGULAIR) 10 MG tablet Take 1 tablet by mouth daily 2/3/21 8/2/21  DES Kennedy CNP albuterol sulfate HFA (VENTOLIN HFA) 108 (90 Base) MCG/ACT inhaler Inhale 2 puffs into the lungs 4 times daily as needed for Wheezing 1/12/21   DES Edge CNP   diclofenac sodium (VOLTAREN) 1 % GEL as needed    Historical Provider, MD   atorvastatin (LIPITOR) 80 MG tablet Take 1 tablet by mouth daily 11/18/20 6/24/22  DES Edge CNP   oxyCODONE-acetaminophen (PERCOCET)  MG per tablet Take 1 tablet by mouth every 4 hours as needed for Pain. 7/19/19   Historical Provider, MD       Future Appointments   Date Time Provider Glenroy Falcon   7/2/2021  2:30 PM [facility] CT RM 2 FAST SCANNER [facility] CT SCAN [facility] Radiolog      and   Diabetes Assessment    Medic Alert ID: No  Meal Planning: None   How often do you test your blood sugar?: No Testing   Do you have barriers with adherence to non-pharmacologic self-management interventions?  (Nutrition/Exercise/Self-Monitoring): No   Have you ever had to go to the ED for symptoms of low blood sugar?: No       No patient-reported symptoms

## 2021-06-29 NOTE — TELEPHONE ENCOUNTER
Please review did provider still want CT CHEST abd/pelvis W contrast OR just CT ABDOMEN/PELVIS W contrast.

## 2021-06-30 PROBLEM — K21.00 HIATAL HERNIA WITH GERD AND ESOPHAGITIS: Status: ACTIVE | Noted: 2021-06-30

## 2021-06-30 PROBLEM — K44.9 HIATAL HERNIA WITH GERD AND ESOPHAGITIS: Status: ACTIVE | Noted: 2021-06-30

## 2021-06-30 PROBLEM — K21.9 GASTROESOPHAGEAL REFLUX DISEASE: Status: ACTIVE | Noted: 2021-06-30

## 2021-06-30 ASSESSMENT — ENCOUNTER SYMPTOMS
CONSTIPATION: 0
COUGH: 1
BACK PAIN: 0
CHEST TIGHTNESS: 1
HEARTBURN: 0
BLOOD IN STOOL: 1
RHINORRHEA: 0
WHEEZING: 0
ABDOMINAL PAIN: 0
BELCHING: 1
GLOBUS SENSATION: 1
DIARRHEA: 0
SHORTNESS OF BREATH: 1
SORE THROAT: 0
SINUS PRESSURE: 0
TROUBLE SWALLOWING: 0
NAUSEA: 0
ABDOMINAL DISTENTION: 1

## 2021-07-02 ENCOUNTER — HOSPITAL ENCOUNTER (OUTPATIENT)
Dept: CT IMAGING | Age: 76
Discharge: HOME | End: 2021-07-04
Payer: MEDICARE

## 2021-07-02 ENCOUNTER — HOSPITAL ENCOUNTER (OUTPATIENT)
Age: 76
Discharge: HOME | End: 2021-07-02
Payer: MEDICARE

## 2021-07-02 DIAGNOSIS — K21.00 HIATAL HERNIA WITH GERD AND ESOPHAGITIS: ICD-10-CM

## 2021-07-02 DIAGNOSIS — K44.9 HIATAL HERNIA WITH GERD AND ESOPHAGITIS: ICD-10-CM

## 2021-07-02 DIAGNOSIS — K92.1 FRANK BLOOD IN STOOL: ICD-10-CM

## 2021-07-02 LAB
DATE, STOOL #1: NORMAL
DATE, STOOL #2: NORMAL
DATE, STOOL #3: NORMAL
HEMOCCULT SP1 STL QL: NEGATIVE
HEMOCCULT SP2 STL QL: NORMAL
HEMOCCULT SP3 STL QL: NORMAL
TIME, STOOL #1: NORMAL
TIME, STOOL #2: NORMAL
TIME, STOOL #3: NORMAL

## 2021-07-02 PROCEDURE — 74176 CT ABD & PELVIS W/O CONTRAST: CPT

## 2021-07-02 PROCEDURE — 83630 LACTOFERRIN FECAL (QUAL): CPT

## 2021-07-02 PROCEDURE — 83993 ASSAY FOR CALPROTECTIN FECAL: CPT

## 2021-07-02 PROCEDURE — 82272 OCCULT BLD FECES 1-3 TESTS: CPT

## 2021-07-02 RX ORDER — SODIUM CHLORIDE 0.9 % (FLUSH) 0.9 %
10 SYRINGE (ML) INJECTION PRN
Status: DISCONTINUED | OUTPATIENT
Start: 2021-07-02 | End: 2021-07-05 | Stop reason: HOSPADM

## 2021-07-02 RX ORDER — 0.9 % SODIUM CHLORIDE 0.9 %
80 INTRAVENOUS SOLUTION INTRAVENOUS ONCE
Status: DISCONTINUED | OUTPATIENT
Start: 2021-07-02 | End: 2021-07-05 | Stop reason: HOSPADM

## 2021-07-03 LAB — LACTOFERRIN, QUAL: NORMAL

## 2021-07-06 ENCOUNTER — CARE COORDINATION (OUTPATIENT)
Dept: CARE COORDINATION | Age: 76
End: 2021-07-06

## 2021-07-06 LAB — CALPROTECTIN, FECAL: 12 UG/G

## 2021-07-07 DIAGNOSIS — Q79.0: ICD-10-CM

## 2021-07-07 DIAGNOSIS — E53.8 VITAMIN B12 DEFICIENCY: ICD-10-CM

## 2021-07-07 DIAGNOSIS — K44.9 HIATAL HERNIA WITH GERD AND ESOPHAGITIS: ICD-10-CM

## 2021-07-07 DIAGNOSIS — K21.00 HIATAL HERNIA WITH GERD AND ESOPHAGITIS: ICD-10-CM

## 2021-07-07 DIAGNOSIS — D64.9 ANEMIA, NORMOCYTIC NORMOCHROMIC: ICD-10-CM

## 2021-07-07 DIAGNOSIS — K21.00 GASTROESOPHAGEAL REFLUX DISEASE WITH ESOPHAGITIS WITHOUT HEMORRHAGE: ICD-10-CM

## 2021-07-07 DIAGNOSIS — J90 PLEURAL EFFUSION, BILATERAL: Primary | ICD-10-CM

## 2021-07-07 RX ORDER — PANTOPRAZOLE SODIUM 40 MG/1
40 TABLET, DELAYED RELEASE ORAL
Qty: 60 TABLET | Refills: 2 | Status: SHIPPED | OUTPATIENT
Start: 2021-07-07 | End: 2021-08-13

## 2021-07-07 RX ORDER — POTASSIUM CHLORIDE 750 MG/1
10 TABLET, EXTENDED RELEASE ORAL DAILY
Qty: 7 TABLET | Refills: 0 | Status: SHIPPED | OUTPATIENT
Start: 2021-07-07 | End: 2021-08-13

## 2021-07-07 RX ORDER — LEVOFLOXACIN 500 MG/1
500 TABLET, FILM COATED ORAL DAILY
Qty: 7 TABLET | Refills: 0 | Status: SHIPPED | OUTPATIENT
Start: 2021-07-07 | End: 2021-07-14

## 2021-07-07 RX ORDER — FUROSEMIDE 20 MG/1
20 TABLET ORAL DAILY
Qty: 7 TABLET | Refills: 0 | Status: SHIPPED | OUTPATIENT
Start: 2021-07-07 | End: 2021-08-13

## 2021-07-07 NOTE — RESULT ENCOUNTER NOTE
Please call and let her know I reviewed CT of her chest, abdomen and pelvis. It is showing inflammation of her esophagus as well as a moderate to large hiatal hernia. I am sending Protonix for her to take twice a day to the pharmacy. Also with her low levels of iron, I am sending referral to gastroenterology. Further testing will be needed such as an EGD. Also has noted she has another type of hernia on her diaphragm that could be causing some of her symptoms. Her chest is showing atelectasis, and opacities in her bilateral lower lobes. Also showing small bilateral pleural effusions. Please let her know this is additional fluid on the outside of her lungs. Due to the fact we cannot rule this out as bacterial I am sending antibiotics for 1 week. I am also sending a diuretic with potassium replacement for the same week.

## 2021-07-13 ENCOUNTER — CARE COORDINATION (OUTPATIENT)
Dept: CARE COORDINATION | Age: 76
End: 2021-07-13

## 2021-07-27 ENCOUNTER — CARE COORDINATION (OUTPATIENT)
Dept: CARE COORDINATION | Age: 76
End: 2021-07-27

## 2021-07-29 ENCOUNTER — TELEPHONE (OUTPATIENT)
Dept: GASTROENTEROLOGY | Age: 76
End: 2021-07-29

## 2021-07-29 NOTE — TELEPHONE ENCOUNTER
LVM to call and schedule appt from referral - 1st attempt  Letter sent out to call and schedule appt from referral - 2nd attempt

## 2021-08-03 ENCOUNTER — CARE COORDINATION (OUTPATIENT)
Dept: CARE COORDINATION | Age: 76
End: 2021-08-03

## 2021-08-03 DIAGNOSIS — J30.89 NON-SEASONAL ALLERGIC RHINITIS DUE TO OTHER ALLERGIC TRIGGER: ICD-10-CM

## 2021-08-04 NOTE — TELEPHONE ENCOUNTER
Last visit:6/24/21  Last Med refill:2/3/21  Does patient have enough medication for 72 hours: Yes    Next Visit Date:  No future appointments. Health Maintenance   Topic Date Due    Hepatitis C screen  Never done    Shingles Vaccine (1 of 2) Never done    Lipid screen  08/12/2021    DTaP/Tdap/Td vaccine (1 - Tdap) 11/05/2021 (Originally 5/24/1964)    Flu vaccine (1) 09/01/2021    DEXA (modify frequency per FRAX score)  Completed    Pneumococcal 65+ years Vaccine  Completed    COVID-19 Vaccine  Completed    Hepatitis A vaccine  Aged Out    Hib vaccine  Aged Out    Meningococcal (ACWY) vaccine  Aged Out       Hemoglobin A1C (%)   Date Value   08/12/2020 5.6   02/19/2019 5.5   05/23/2018 5.3             ( goal A1C is < 7)   Microalb/Crt.  Ratio (mcg/mg creat)   Date Value   05/12/2016 7     LDL Cholesterol (mg/dL)   Date Value   08/12/2020 179 (H)   02/19/2019 89     LDL Calculated (mg/dL)   Date Value   11/13/2014 109       (goal LDL is <100)   AST (U/L)   Date Value   06/24/2021 22     ALT (U/L)   Date Value   06/24/2021 14     BUN (mg/dL)   Date Value   06/24/2021 10     BP Readings from Last 3 Encounters:   06/24/21 118/84   06/08/21 132/86   03/16/21 (!) 140/84          (goal 120/80)    All Future Testing planned in [facility]  Lab Frequency Next Occurrence   Respiratory Panel, Molecular, with COVID-19 Once 03/16/2022   Comprehensive Metabolic Panel Once 65/34/2346   Lactate Dehydrogenase Once 03/16/2022   Culture, Blood 1 Once 03/16/2022               Patient Active Problem List:     Hyperlipidemia     Fibromyalgia     Osteopenia     Hx of bilateral breast implants     Vitamin D deficiency     Arthritis of shoulder region, right     Anxiety     Anemia, normocytic normochromic     Chronic pain associated with significant psychosocial dysfunction     Primary osteoarthritis involving multiple joints     Spondylosis 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     Vitamin B12 deficiency Controlled type 2 diabetes mellitus without complication, without long-term current use of insulin (HCC)     Primary insomnia     Lumbar and sacral arthritis     Chronic biliary pancreatitis (HCC)     Moderate episode of recurrent major depressive disorder (HCC)     Uncomplicated opioid dependence (HCC)     Hyperuricemia     Primary open-angle glaucoma, right eye, mild stage     Primary open-angle glaucoma, left eye, mild stage     Gastroesophageal reflux disease

## 2021-08-05 RX ORDER — MONTELUKAST SODIUM 10 MG/1
10 TABLET ORAL NIGHTLY
Qty: 90 TABLET | Refills: 1 | Status: SHIPPED | OUTPATIENT
Start: 2021-08-05 | End: 2021-08-13

## 2021-08-10 ENCOUNTER — CARE COORDINATION (OUTPATIENT)
Dept: CARE COORDINATION | Age: 76
End: 2021-08-10

## 2021-08-12 ENCOUNTER — OFFICE VISIT (OUTPATIENT)
Dept: GASTROENTEROLOGY | Age: 76
End: 2021-08-12
Payer: MEDICARE

## 2021-08-12 VITALS
SYSTOLIC BLOOD PRESSURE: 130 MMHG | WEIGHT: 150 LBS | HEIGHT: 59 IN | BODY MASS INDEX: 30.24 KG/M2 | DIASTOLIC BLOOD PRESSURE: 84 MMHG

## 2021-08-12 DIAGNOSIS — F41.9 ANXIETY: Chronic | ICD-10-CM

## 2021-08-12 DIAGNOSIS — M79.7 FIBROMYALGIA: ICD-10-CM

## 2021-08-12 DIAGNOSIS — K44.9 HIATAL HERNIA: Primary | ICD-10-CM

## 2021-08-12 DIAGNOSIS — R13.19 ESOPHAGEAL DYSPHAGIA: ICD-10-CM

## 2021-08-12 DIAGNOSIS — R93.89 ABNORMAL FINDING ON IMAGING: ICD-10-CM

## 2021-08-12 PROCEDURE — 99204 OFFICE O/P NEW MOD 45 MIN: CPT | Performed by: INTERNAL MEDICINE

## 2021-08-12 ASSESSMENT — ENCOUNTER SYMPTOMS
GASTROINTESTINAL NEGATIVE: 1
TROUBLE SWALLOWING: 1

## 2021-08-12 NOTE — PROGRESS NOTES
GI NEW PATIENT OFFICE VISIT    INTERVAL HISTORY:   Tanisha Borrero, APRN - CNP  [address] [facility],  [address] [facility]    Chief Complaint   Patient presents with    New Patient     Patient referred for HH/GERD and anemia. Patient notes she was on a shot for her anemia but her doctor took her off. She notes once those stopped her symptoms worsen and feels very tired. Denies abd pain, nausea or vomiting. Denies constipation or diarrhea. Notes having a stuck sensation in her chest. Notes this isn't all the time. Notices it if she eats fast or is really stressed. Denies GERD       1. Hiatal hernia    2. Esophageal dysphagia    3. Abnormal finding on imaging    4. Fibromyalgia    5. Anxiety      This patient is seen in my office for the first time    She has hx of multiple med issues    She has been having some nonspecific abdominal pain discomfort  A CT scan of the abdomen pelvis has been performed which has revealed the following findings    Moderate to large hiatal hernia, similar when compared to the previous exam.   There is mural thickening of the lower esophagus, which is stable to mildly   increased when compared to the previous exam, presumably reflecting   esophagitis.  However, neoplasm does remain in the differential, therefore   consider direct visualization.      She gives history for chronic acid reflux indigestion burping gas symptoms has been taking pantoprazole  Apparently never had an upper endoscopy done  She said her brother had issues with upper endoscopy so she is scared about it    She also complains of dysphagia symptoms mainly in the lower esophageal area mainly with solid food    She also take narcotic pain medications she denies any rectal bleeding melanotic stools      No known family history for colon cancer  Patient has been complaining of some abdominal pains, off and on cramping  Also complains of abdominal bloating and gas  Has off and on nausea without any sig vomiting  Has some alternating constipation and diarrhea  Has no weight loss  Has some anxiety issues    HISTORY OF PRESENT ILLNESS: Ms.Aure Wilson is a 64 y.o. female with a past history remarkable for , referred for evaluation of   Chief Complaint   Patient presents with    New Patient     Patient referred for HH/GERD and anemia. Patient notes she was on a shot for her anemia but her doctor took her off. She notes once those stopped her symptoms worsen and feels very tired. Denies abd pain, nausea or vomiting. Denies constipation or diarrhea. Notes having a stuck sensation in her chest. Notes this isn't all the time. Notices it if she eats fast or is really stressed. Denies GERD   . Past Medical,Family, and Social History reviewed and does contribute to the patient presenting condition. Patient's PMH/PSH,SH,PSYCH Hx, MEDs, ALLERGIES, and ROS were all reviewed and updated in the appropriate sections. PAST MEDICAL HISTORY:  Past Medical History:   Diagnosis Date    Anemia     Bowel obstruction ([address]     Bronchitis 02/19/2019    Frequent episodes of bronchitis.  Controlled type 2 diabetes mellitus with diabetic polyneuropathy, without long-term current use of insulin ([address]     DDD (degenerative disc disease)     Depression     Diabetic peripheral neuropathy ([address] 5/24/2016    Fibromyalgia     Glaucoma     Hyperlipidemia     Knee injuries 02/19/2019    Osteoarthritis of more than one site 4/9/2014    Pneumonia     Last episode of pneumonia was in the winter of 2018 (Written 11/20/2019).      Right middle lobe pneumonia 3/24/2016    Seasonal allergies        Past Surgical History:   Procedure Laterality Date    BREAST BIOPSY Left 5/2016    CARPAL TUNNEL RELEASE Bilateral     x 2 each    COLONOSCOPY      ENDOSCOPY, COLON, DIAGNOSTIC      EGD    EYE SURGERY Left     stent for glaucoma    EYE SURGERY Left     cataract    EYE SURGERY Left     lower lid    FOOT SURGERY Right     HYSTERECTOMY      KNEE ARTHROSCOPY Right 12/4/2019    RIGHT KNEE ARTHROSCOPY WITH TOTAL MENISCECTOMY performed by Ana Huerta DO at [address]  10/03/2016    NC ARTHRS KNE SURG W/MENISCECTOMY MED/LAT W/SHVG Left 8/13/2018    KNEE ARTHROSCOPY FOR PARTIAL MEDIAL AND PARTIAL LATERAL MENISCECTOMY performed by Roman Roach MD at [address] [place] [address]      due to blockage       CURRENT MEDICATIONS:    Current Outpatient Medications:     montelukast (SINGULAIR) 10 MG tablet, Take 1 tablet by mouth nightly, Disp: 90 tablet, Rfl: 1    pantoprazole (PROTONIX) 40 MG tablet, Take 1 tablet by mouth 2 times daily (before meals), Disp: 60 tablet, Rfl: 2    furosemide (LASIX) 20 MG tablet, Take 1 tablet by mouth daily for 7 days, Disp: 7 tablet, Rfl: 0    potassium chloride (KLOR-CON M) 10 MEQ extended release tablet, Take 1 tablet by mouth daily for 7 days, Disp: 7 tablet, Rfl: 0    QUEtiapine (SEROQUEL) 200 MG tablet, Take 1 tablet by mouth daily, Disp: 90 tablet, Rfl: 1    Handicap Placard MISC, by Does not apply route Exp 3/16/2026, Disp: 1 each, Rfl: 0    meloxicam (MOBIC) 7.5 MG tablet, Take 1 tablet by mouth daily, Disp: 90 tablet, Rfl: 1    cyanocobalamin ([employer]S VITAMIN B12) 1000 MCG tablet, Take 2 tablets by mouth daily, Disp: 180 tablet, Rfl: 1    QUEtiapine (SEROQUEL) 50 MG tablet, TAKE 1 OR 2 TABLETS BY MOUTH AT BEDTIME AS NEEDED FOR SLEEP, Disp: 60 tablet, Rfl: 5    albuterol sulfate HFA (VENTOLIN HFA) 108 (90 Base) MCG/ACT inhaler, Inhale 2 puffs into the lungs 4 times daily as needed for Wheezing, Disp: 3 Inhaler, Rfl: 1    diclofenac sodium (VOLTAREN) 1 % GEL, as needed, Disp: , Rfl:     atorvastatin (LIPITOR) 80 MG tablet, Take 1 tablet by mouth daily, Disp: 30 tablet, Rfl: 5    oxyCODONE-acetaminophen (PERCOCET)  MG per tablet, Take 1 tablet by mouth every 4 hours as needed for Pain. , Disp: , Rfl: 0    ALLERGIES:   Allergies   Allergen Reactions    Pcn [Penicillins] Anaphylaxis     Patient states PCN allergy was 20 years ago, unsure if reaction still present    Adhesive Tape     Nubain [Nalbuphine Hcl]      Leg  Cramping  When mixed with vistaril    Seasonal     Vistaril [Hydroxyzine] Other (See Comments)     fainted       FAMILY HISTORY:       Problem Relation Age of Onset    High Blood Pressure Mother     Stroke Mother     Heart Disease Father     Stroke Maternal Grandmother          SOCIAL HISTORY:   Social History     Socioeconomic History    Marital status:      Spouse name: Not on file    Number of children: Not on file    Years of education: Not on file    Highest education level: Not on file   Occupational History    Occupation: NOT EMPLOYEED   Tobacco Use    Smoking status: Never Smoker    Smokeless tobacco: Never Used   Vaping Use    Vaping Use: Never used   Substance and Sexual Activity    Alcohol use: No     Alcohol/week: 0.0 standard drinks    Drug use: No    Sexual activity: Not Currently   Other Topics Concern    Not on file   Social History Narrative    Not on file     Social Determinants of Health     Financial Resource Strain: Low Risk     Difficulty of Paying Living Expenses: Not hard at all   Food Insecurity: No Food Insecurity    Worried About Running Out of Food in the Last Year: Never true    Maile of Food in the Last Year: Never true   Transportation Needs: No Transportation Needs    Lack of Transportation (Medical): No    Lack of Transportation (Non-Medical): No   Physical Activity: Insufficiently Active    Days of Exercise per Week: 3 days    Minutes of Exercise per Session: 20 min   Stress: No Stress Concern Present    Feeling of Stress : Only a little   Social Connections:  Moderately Integrated    Frequency of Communication with Friends and Family: More than three times a week    Frequency of Social Gatherings with Friends and Family: More than three times a week    Attends Congregational Services: 1 to 4 times per year    Active Member of [employer] or Organizations: No    Attends Club or Organization Meetings: Never    Marital Status:    Intimate Partner Violence:     Fear of Current or Ex-Partner:     Emotionally Abused:     Physically Abused:     Sexually Abused:          REVIEW OF SYSTEMS:         Review of Systems   Constitutional: Positive for fatigue and unexpected weight change (loss x3 week down 7lbs). Negative for appetite change. HENT: Positive for trouble swallowing (occasional). Cardiovascular: Negative. Gastrointestinal: Negative. Neurological: Negative for headaches. PHYSICAL EXAMINATION: Vital signs reviewed per the nursing documentation. /84   Ht 4' 11\" (1.499 m)   Wt 150 lb (68 kg)   BMI 30.30 kg/m²   Body mass index is 30.3 kg/m². Physical Exam  Nursing note reviewed. Constitutional:       Appearance: She is well-developed. Comments: Anxious    HENT:      Head: Normocephalic and atraumatic. Eyes:      Conjunctiva/sclera: Conjunctivae normal.      Pupils: Pupils are equal, round, and reactive to light. Cardiovascular:      Heart sounds: Normal heart sounds. Pulmonary:      Effort: Pulmonary effort is normal.      Breath sounds: Normal breath sounds. Abdominal:      General: Bowel sounds are normal.      Palpations: Abdomen is soft. Comments: NON TENDER, NON DISTENTED  LIVER SPLEEN AND HERNIAS ARE NOT  PALPABLE  BOWEL SOUNDS ARE POSITIVE      Musculoskeletal:         General: Normal range of motion. Cervical back: Normal range of motion and neck supple. Skin:     General: Skin is warm. Neurological:      Mental Status: She is alert and oriented to person, place, and time.    Psychiatric:         Behavior: Behavior normal.           LABORATORY DATA: Reviewed  Lab Results   Component Value Date    WBC 5.7 06/24/2021    HGB 13.6 06/24/2021 HCT 47.4 (H) 06/24/2021    .1 06/24/2021     06/24/2021     06/24/2021    K 4.8 06/24/2021     06/24/2021    CO2 20 06/24/2021    BUN 10 06/24/2021    CREATININE 0.67 06/24/2021    LABPROT 7.2 05/09/2012    LABALBU 3.5 06/24/2021    BILITOT 0.18 (L) 06/24/2021    ALKPHOS 127 (H) 06/24/2021    AST 22 06/24/2021    ALT 14 06/24/2021    INR 1.0 11/21/2014         Lab Results   Component Value Date    RBC 4.69 06/24/2021    HGB 13.6 06/24/2021    .1 06/24/2021    MCH 29.0 06/24/2021    MCHC 28.7 06/24/2021    RDW 13.0 06/24/2021    MPV 9.8 06/24/2021    BASOPCT 1 06/24/2021    LYMPHSABS 2.10 06/24/2021    MONOSABS 0.52 06/24/2021    NEUTROABS 2.62 06/24/2021    EOSABS 0.33 06/24/2021    BASOSABS 0.07 06/24/2021         DIAGNOSTIC TESTING:     No results found. Assessment  1. Hiatal hernia    2. Esophageal dysphagia    3. Abnormal finding on imaging    4. Fibromyalgia    5. Anxiety        Plan    Plan EGD    She is not interested in getting colonoscopy at this time    The Endoscopic procedure was explained to the patient in detail  The prep and NPO were explained  All the Risks, Benefits, and Alternatives were explained  Risk of Bleeding, Perforation and Cardio Respiratory risks were explained  her questions were answered  The procedure has been scheduled with the  in the office  Patient was asked to give us a call for any questions  The patient has verbalized understanding and agreement to this plan. Pt was asked to chew food well  Take time in eating  Sit up or prop up when eating  Don't talk when eating  Walk after finish eating  Use liquids with meals if has issues  The patient has verbalized understanding and agreemenet to this. Pt seems to have signs and symptoms consistent with GERD, acid indigestion and heartburns. She was discussed  in detail about some possible life style and dietary modifications.  She was stressed about the maintenance  of appropriate weight and effect of obesity contributing to reflux symptoms. Routine exercise was streesed. Avoidance of Caffeine, nicotine and chocolate were explained. Pt was asked to avoid spices grease and fried food. Advices were also given about avoidance of any kind of fast foods, soda pops and high energy drinks. Pt was advised to place two small block under the head end of the bed which may help with night time reflux. Was advised not to eat any thin at least 2-3 hrs before going to bed and walk especially after dinner    Pt has verbalized understanding and agreement to this plan. Pt was advised in detail about some life style and dietary modifications. She was advised about avoidance of caffeine, nicotine and chocolate. Pt was also told to stay away from any kind of fast foods, soda pops. She was also advised to avoid lots of spices, grease and fried food etc.     Instructions were also given about trying to arrange the timing, quality and quantity of food. Instructions were given about using ample amount of fiber including dietary and supplemental fiber either metamucil, bennafiber or citrucell etc.  Pt was advised about drinking ample amount of water without any colors or chemicals. Stress was given about regular exercise. Pt has verbalized understanding and agreement to these modifications.       The patient was instructed to start taking some OTC Probiotics products   These are available over the counter at the Pharmacy stores and Grocery stores  He was explained about the beneficial effects they have in the GI track  They will help to establish the good bacterial mago and will help with the digestion and bowel movements  The patient has verbalized understanding and agreement to this plan      More than half of patient's clinic visit time was spent in counseling about lifestyle and dietary modifications  Patient's  questions were answered in this regard as well  The patient has verbalized understanding and agreement       Thank you for allowing me to participate in the care of Ms. Aiken. For any further questions please do not hesitate to contact me. I have reviewed and agree with the ROS entered by the MA/Nurse.          Giovani Condon MD, [facility]  Board Certified in Gastroenterology and 4 Located within [facility]  Office #: [phone]

## 2021-08-13 ENCOUNTER — TELEPHONE (OUTPATIENT)
Dept: FAMILY MEDICINE CLINIC | Age: 76
End: 2021-08-13

## 2021-08-13 ENCOUNTER — HOSPITAL ENCOUNTER (OUTPATIENT)
Dept: PREADMISSION TESTING | Age: 76
Discharge: HOME | End: 2021-08-17
Payer: MEDICARE

## 2021-08-13 VITALS — HEIGHT: 60 IN | WEIGHT: 150 LBS | BODY MASS INDEX: 29.45 KG/M2

## 2021-08-13 RX ORDER — BENZONATATE 100 MG/1
100 CAPSULE ORAL 3 TIMES DAILY PRN
COMMUNITY
End: 2021-10-08

## 2021-08-13 NOTE — PROGRESS NOTES

## 2021-08-13 NOTE — TELEPHONE ENCOUNTER
Information for Provider? Patient states she saw Dr. Amparo Stockton and is   scheduled for a scope for 8/17/21, cough from bronchitis is no better,   wants to know if she should still get the scope at this time. Cough is bad   coughing up phlegm, sometimes can't get to sleep due to cough. What do you   recommend? Please advise.

## 2021-08-13 NOTE — TELEPHONE ENCOUNTER
----- Message from Nancie Maharaj MA sent at 8/13/2021  8:20 AM EDT -----  Subject: Message to Provider    QUESTIONS  Information for Provider? Patient states she saw Dr. Yanelis Hinkle and is   scheduled for a scope for 8/17/21, cough from bronchitis is no better,   wants to know if she should still get the scope at this time. Cough is bad   coughing up phlegm, sometimes can't get to sleep due to cough. What do you   recommend?  ---------------------------------------------------------------------------  --------------  CALL BACK INFO  What is the best way for the office to contact you? OK to leave message on   voicemail  Preferred Call Back Phone Number? [phone]  ---------------------------------------------------------------------------  --------------  SCRIPT ANSWERS  Relationship to Patient?  Self

## 2021-08-16 ENCOUNTER — ANESTHESIA EVENT (OUTPATIENT)
Dept: ENDOSCOPY | Age: 76
End: 2021-08-16
Payer: MEDICARE

## 2021-08-17 ENCOUNTER — ANESTHESIA (OUTPATIENT)
Dept: ENDOSCOPY | Age: 76
End: 2021-08-17
Payer: MEDICARE

## 2021-08-17 ENCOUNTER — HOSPITAL ENCOUNTER (OUTPATIENT)
Age: 76
Setting detail: OUTPATIENT SURGERY
Discharge: HOME | End: 2021-08-17
Attending: INTERNAL MEDICINE | Admitting: INTERNAL MEDICINE
Payer: MEDICARE

## 2021-08-17 VITALS
HEART RATE: 84 BPM | HEIGHT: 60 IN | TEMPERATURE: 97.1 F | DIASTOLIC BLOOD PRESSURE: 89 MMHG | RESPIRATION RATE: 16 BRPM | BODY MASS INDEX: 29.45 KG/M2 | SYSTOLIC BLOOD PRESSURE: 149 MMHG | OXYGEN SATURATION: 97 % | WEIGHT: 150 LBS

## 2021-08-17 VITALS — DIASTOLIC BLOOD PRESSURE: 56 MMHG | SYSTOLIC BLOOD PRESSURE: 98 MMHG | TEMPERATURE: 98.2 F | OXYGEN SATURATION: 100 %

## 2021-08-17 PROCEDURE — 2709999900 HC NON-CHARGEABLE SUPPLY: Performed by: INTERNAL MEDICINE

## 2021-08-17 PROCEDURE — 3700000000 HC ANESTHESIA ATTENDED CARE: Performed by: INTERNAL MEDICINE

## 2021-08-17 PROCEDURE — 88305 TISSUE EXAM BY PATHOLOGIST: CPT

## 2021-08-17 PROCEDURE — 7100000031 HC ASPR PHASE II RECOVERY - ADDTL 15 MIN: Performed by: INTERNAL MEDICINE

## 2021-08-17 PROCEDURE — 7100000001 HC PACU RECOVERY - ADDTL 15 MIN: Performed by: INTERNAL MEDICINE

## 2021-08-17 PROCEDURE — 3609012400 HC EGD TRANSORAL BIOPSY SINGLE/MULTIPLE: Performed by: INTERNAL MEDICINE

## 2021-08-17 PROCEDURE — 7100000010 HC PHASE II RECOVERY - FIRST 15 MIN: Performed by: INTERNAL MEDICINE

## 2021-08-17 PROCEDURE — 2580000003 HC RX 258: Performed by: ANESTHESIOLOGY

## 2021-08-17 PROCEDURE — 6360000002 HC RX W HCPCS: Performed by: NURSE ANESTHETIST, CERTIFIED REGISTERED

## 2021-08-17 PROCEDURE — 3700000001 HC ADD 15 MINUTES (ANESTHESIA): Performed by: INTERNAL MEDICINE

## 2021-08-17 PROCEDURE — 7100000030 HC ASPR PHASE II RECOVERY - FIRST 15 MIN: Performed by: INTERNAL MEDICINE

## 2021-08-17 PROCEDURE — 7100000000 HC PACU RECOVERY - FIRST 15 MIN: Performed by: INTERNAL MEDICINE

## 2021-08-17 PROCEDURE — 7100000011 HC PHASE II RECOVERY - ADDTL 15 MIN: Performed by: INTERNAL MEDICINE

## 2021-08-17 PROCEDURE — 43239 EGD BIOPSY SINGLE/MULTIPLE: CPT | Performed by: INTERNAL MEDICINE

## 2021-08-17 PROCEDURE — 2500000003 HC RX 250 WO HCPCS: Performed by: NURSE ANESTHETIST, CERTIFIED REGISTERED

## 2021-08-17 RX ORDER — PROPOFOL 10 MG/ML
INJECTION, EMULSION INTRAVENOUS CONTINUOUS PRN
Status: DISCONTINUED | OUTPATIENT
Start: 2021-08-17 | End: 2021-08-17 | Stop reason: SDUPTHER

## 2021-08-17 RX ORDER — SODIUM CHLORIDE 9 MG/ML
INJECTION, SOLUTION INTRAVENOUS CONTINUOUS
Status: DISCONTINUED | OUTPATIENT
Start: 2021-08-17 | End: 2021-08-17 | Stop reason: HOSPADM

## 2021-08-17 RX ORDER — LIDOCAINE HYDROCHLORIDE 10 MG/ML
INJECTION, SOLUTION EPIDURAL; INFILTRATION; INTRACAUDAL; PERINEURAL PRN
Status: DISCONTINUED | OUTPATIENT
Start: 2021-08-17 | End: 2021-08-17 | Stop reason: SDUPTHER

## 2021-08-17 RX ORDER — SODIUM CHLORIDE 9 MG/ML
25 INJECTION, SOLUTION INTRAVENOUS PRN
Status: DISCONTINUED | OUTPATIENT
Start: 2021-08-17 | End: 2021-08-17 | Stop reason: HOSPADM

## 2021-08-17 RX ORDER — SODIUM CHLORIDE 0.9 % (FLUSH) 0.9 %
10 SYRINGE (ML) INJECTION PRN
Status: DISCONTINUED | OUTPATIENT
Start: 2021-08-17 | End: 2021-08-17 | Stop reason: HOSPADM

## 2021-08-17 RX ADMIN — LIDOCAINE HYDROCHLORIDE 50 MG: 10 INJECTION, SOLUTION EPIDURAL; INFILTRATION; INTRACAUDAL; PERINEURAL at 09:16

## 2021-08-17 RX ADMIN — SODIUM CHLORIDE: 9 INJECTION, SOLUTION INTRAVENOUS at 08:08

## 2021-08-17 RX ADMIN — PROPOFOL 200 MCG/KG/MIN: 10 INJECTION, EMULSION INTRAVENOUS at 09:16

## 2021-08-17 ASSESSMENT — ENCOUNTER SYMPTOMS
COUGH: 1
SHORTNESS OF BREATH: 1
CHOKING: 1
RHINORRHEA: 0
TROUBLE SWALLOWING: 1
SORE THROAT: 0
WHEEZING: 1

## 2021-08-17 ASSESSMENT — PULMONARY FUNCTION TESTS
PIF_VALUE: 1
PIF_VALUE: 1
PIF_VALUE: 0
PIF_VALUE: 1

## 2021-08-17 ASSESSMENT — PAIN SCALES - GENERAL: PAINLEVEL_OUTOF10: 0

## 2021-08-17 ASSESSMENT — PAIN - FUNCTIONAL ASSESSMENT: PAIN_FUNCTIONAL_ASSESSMENT: 0-10

## 2021-08-17 ASSESSMENT — PAIN DESCRIPTION - DESCRIPTORS: DESCRIPTORS: DISCOMFORT;DULL

## 2021-08-17 NOTE — ANESTHESIA PRE PROCEDURE
Department of Anesthesiology  Preprocedure Note       Name:  Yeni Carey   Age:  64 y.o.  :  1945                                          MRN:  [id number]         Date:  2021      Surgeon: Kuldeep Chan):  Mariah Case MD    Procedure: Procedure(s):  EGD ESOPHAGOGASTRODUODENOSCOPY    Medications prior to admission:   Prior to Admission medications    Medication Sig Start Date End Date Taking? Authorizing Provider   benzonatate (TESSALON) 100 MG capsule Take 100 mg by mouth 3 times daily as needed for Cough   Yes Historical Provider, MD   QUEtiapine (SEROQUEL) 200 MG tablet Take 1 tablet by mouth daily 21 Yes Marli Carter APRN - CNP   meloxicam (MOBIC) 7.5 MG tablet Take 1 tablet by mouth daily 3/8/21 9/4/21 Yes Marli Carter APRN - CNP   cyanocobalamin ([employer] VITAMIN B12) 1000 MCG tablet Take 2 tablets by mouth daily 3/8/21 9/4/21 Yes Marli Carter APRN - CNP   QUEtiapine (SEROQUEL) 50 MG tablet TAKE 1 OR 2 TABLETS BY MOUTH AT BEDTIME AS NEEDED FOR SLEEP 21 Yes DES Mae - NP   diclofenac sodium (VOLTAREN) 1 % GEL as needed   Yes Historical Provider, MD   atorvastatin (LIPITOR) 80 MG tablet Take 1 tablet by mouth daily 20 Yes Marli Carter APRN - T[place]IT[place]   oxyCODONE-acetaminophen (PERCOCET)  MG per tablet Take 1 tablet by mouth every 4 hours as needed for Pain.   19  Yes Historical Provider, MD   Handicap Placard MISC by Does not apply route Exp 3/16/2026 3/16/21   DES Bradshaw CNP       Current medications:    Current Facility-Administered Medications   Medication Dose Route Frequency Provider Last Rate Last Admin    sodium chloride flush 0.9 % injection 10 mL  10 mL Intravenous PRN Bobbi Mckinney MD        0.9 % sodium chloride infusion  25 mL Intravenous PRN Bobbi Mckinney MD        0.9 % sodium chloride infusion   Intravenous Continuous Jeferson Bae  mL/hr at 21 0808 New Bag at 21 0096 Allergies:     Allergies   Allergen Reactions    Pcn [Penicillins] Anaphylaxis     Patient states PCN allergy was 20 years ago, unsure if reaction still present    Adhesive Tape     Nubain [Nalbuphine Hcl]      Leg  Cramping  When mixed with vistaril    Seasonal     Vistaril [Hydroxyzine] Other (See Comments)     fainted       Problem List:    Patient Active Problem List   Diagnosis Code    Hyperlipidemia E78.5    Fibromyalgia M79.7    Osteopenia M85.80    Hx of bilateral breast implants Z98.82    Vitamin D deficiency E55.9    Arthritis of shoulder region, right M19.011    Anxiety F41.9    Anemia, normocytic normochromic D64.9    Chronic pain associated with significant psychosocial dysfunction G89.4    Primary osteoarthritis involving multiple joints M89.49    Spondylosis 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 M47.816    Vitamin B12 deficiency E53.8    Primary insomnia F51.01    Lumbar and sacral arthritis M47.817    Chronic biliary pancreatitis (HCC) K86.1    Moderate episode of recurrent major depressive disorder ([address] L52.2    Uncomplicated opioid dependence ([address] F11.20    Hyperuricemia E79.0    Primary open-angle glaucoma, right eye, mild stage H40.1111    Primary open-angle glaucoma, left eye, mild stage H40.1121    Gastroesophageal reflux disease K21.9    Abnormal finding on imaging R93.89    Esophageal dysphagia R13.10    Hiatal hernia K44.9       Past Medical History:        Diagnosis Date    Anemia     Bochdalek hernia     PER CT 7-2-21    Bowel obstruction ([address]     Bronchitis 02/19/2019    Frequent episodes of bronchitis, usually yearly with pneumonia    Chronic biliary pancreatitis ([address] 09/22/2016    Controlled type 2 diabetes mellitus without complication, without long-term current use of insulin ([address]     Cough 08/13/2021    has had cough for several months    DDD (degenerative disc disease)     Depression     Diverticulosis     Esophageal Readings from Last 3 Encounters:   08/17/21 150 lb (68 kg)   08/13/21 150 lb (68 kg)   08/12/21 150 lb (68 kg)     Body mass index is 29.29 kg/m². CBC:   Lab Results   Component Value Date    WBC 5.7 06/24/2021    RBC 4.69 06/24/2021    RBC 4.55 05/09/2012    HGB 13.6 06/24/2021    HCT 47.4 06/24/2021    .1 06/24/2021    RDW 13.0 06/24/2021     06/24/2021     05/09/2012       CMP:   Lab Results   Component Value Date     06/24/2021    K 4.8 06/24/2021     06/24/2021    CO2 20 06/24/2021    BUN 10 06/24/2021    CREATININE 0.67 06/24/2021    GFRAA >60 06/24/2021    LABGLOM >60 06/24/2021    GLUCOSE 87 03/10/2021    GLUCOSE 87 05/09/2012    PROT 7.0 06/24/2021    CALCIUM 9.2 06/24/2021    BILITOT 0.18 06/24/2021    ALKPHOS 127 06/24/2021    AST 22 06/24/2021    ALT 14 06/24/2021       POC Tests: No results for input(s): POCGLU, POCNA, POCK, POCCL, POCBUN, POCHEMO, POCHCT in the last 72 hours. Coags:   Lab Results   Component Value Date    PROTIME 10.3 11/21/2014    INR 1.0 11/21/2014    APTT 28.1 11/21/2014       HCG (If Applicable): No results found for: PREGTESTUR, PREGSERUM, HCG, HCGQUANT     ABGs:   Lab Results   Component Value Date    PHART 7.399 03/05/2014    PO2ART 72.0 03/05/2014    DEH1MNG 39.6 03/05/2014    EGB4FEA 24.5 03/05/2014    U5GPKLAW 94.8 03/05/2014        Type & Screen (If Applicable):  No results found for: LABABO, LABRH    Drug/Infectious Status (If Applicable):  No results found for: HIV, HEPCAB    COVID-19 Screening (If Applicable):   Lab Results   Component Value Date    COVID19 Not Detected 03/10/2021           Anesthesia Evaluation  Patient summary reviewed and Nursing notes reviewed no history of anesthetic complications:   Airway: Mallampati: III  TM distance: >3 FB   Neck ROM: full  Mouth opening: < 3 FB Dental:      Comment:  Many missing    Pulmonary:normal exam  breath sounds clear to auscultation  (+) pneumonia: resolved, Cardiovascular:Negative CV ROS    (+) hyperlipidemia        Rhythm: regular  Rate: normal                    Neuro/Psych:   (+) neuromuscular disease:, psychiatric history:            GI/Hepatic/Renal:   (+) hiatal hernia, GERD:,           Endo/Other:    (+) DiabetesType II DM, , .                 Abdominal:             Vascular: Other Findings:             Anesthesia Plan      general     ASA 2     (GA with TIVA)  Induction: intravenous. MIPS: Prophylactic antiemetics administered. Anesthetic plan and risks discussed with patient. Plan discussed with CRNA.                   Florence Mitchell MD   8/17/2021

## 2021-08-17 NOTE — H&P
HISTORY and [facility] [address] [address]       NAME:  Jj Fontanez  MRN: [id number]   YOB: 1945   Date: 8/17/2021   Age: 64 y.o. Gender: female     COMPLAINT AND PRESENT HISTORY:   Jj Fontanez is 64 y.o.,  female, undergoing EGD for GERD. EGD QUESTIONNAIRE  LAST EGD: Not sure when. S/S START DATE: Has had issues \"for a long time\"- hx of PNA, bronchitis- dx'd with hiatal hernia. AGGRAVATING FACTORS: Eating. ABD PAIN: Denies. CONSTIPATION: Denies. DIARRHEA: Denies. BLOOD IN STOOL/BLACK, TARRY STOOLS: Denies. NAUSEA/VOMITING/HEMATEMESIS: Denies. FEVER/CHILLS: Denies. APPETITE CHANGE: Denies. RECENT UNINTENDED WEIGHT LOSS: Denies. DIFFICULTY SWALLOWING/PHARYNGITIS/VOICE CHANGE: Denies except some dysphagia when stressed, once in a while notes choking. ADDITIONAL GI HX: Bowel obstruction, chronic biliary pancreatitis. GI MEDICATIONS: None. Narrative   EXAMINATION:   CT OF THE CHEST, ABDOMEN, AND PELVIS WITHOUT CONTRAST 7/2/2021 11:42 am       TECHNIQUE:   CT of the chest, abdomen and pelvis was performed without the administration   of intravenous contrast. Multiplanar reformatted images are provided for   review.  Dose modulation, iterative reconstruction, and/or weight based   adjustment of the mA/kV was utilized to reduce the radiation dose to as low   as reasonably achievable.       COMPARISON:   CT PA, 03/10/2021       CT abdomen pelvis, 07/01/2016       HISTORY:   ORDERING SYSTEM PROVIDED HISTORY: Hiatal hernia with GERD and esophagitis   TECHNOLOGIST PROVIDED HISTORY:       Reason for Exam: gerd, hital hernia       FINDINGS:       Chest:       Mediastinum: Visualized thyroid is unremarkable.  No pathologically enlarged   lymph nodes are identified.  Multiple small shotty lymph nodes are seen,   similar when compared to the previous exam, and likely reactive.       Noncontrast imaging of the cardiac chambers, thoracic aorta, and pulmonary   arteries is unremarkable.       There is a moderate to large hiatal hernia, similar when compared to the   previous exam.  There is mural thickening involving the lower esophagus,   similar to mildly increased when compared to the previous exam.  The upper   esophagus is relatively unremarkable in appearance.       Lungs/pleura: There is a Bochdalek's hernia seen posteriorly and medially on   the left, containing fat only.  Adjacent scarring is noted, some which has a   lobular appearance, stable dating back to at least 2016.       There are low lung volumes with secondary bronchovascular crowding.       There are small bilateral pleural effusions.  Mild adjacent passive   atelectasis is noted.       The seen on the examination from March there is mild patchy ground-glass   opacity noted within the lungs bilaterally.  Mild bronchial wall thickening   is noted diffusely.  No filling defects are identified within the airways.       Soft Tissues/Bones: Redemonstration of a ruptured right breast prosthesis. Visualized extra thoracic soft tissues otherwise unremarkable.  No acute or   suspicious bony abnormalities are identified.           Abdomen/Pelvis:       Lack of intravenous contrast limits evaluation of the solid abdominal   viscera, the hollow abdominal viscera, and the vascular structures.       Organs: Having said that, no acute or suspicious hepatic abnormality is   identified.  The gallbladder is unremarkable.  Noncontrast imaging of the   spleen, adrenals, and pancreas is unremarkable.  The pancreatic ductal   dilation seen in 2016 is not well visualized without intravenous contrast.       No acute or suspicious renal abnormality.  No hydronephrosis or hydroureter.    No renal or ureteral calculi.       GI/Bowel: Mild diverticulosis of the large bowel is present without CT   evidence of diverticulitis.  The large bowel is otherwise unremarkable in   appearance.  The appendix is normal.       Again, moderate to large hiatal hernia.  Stomach and duodenal sweep otherwise   unremarkable.       Anastomotic staple line is seen within the small bowel of the mid to left   lower abdomen.  Small bowel is otherwise unremarkable, with no evidence of   obstruction.       Pelvis: Uterus is surgically absent.  No free pelvic fluid is found.  Urinary   bladder unremarkable.  No pelvic sidewall lymphadenopathy.       Peritoneum/Retroperitoneum: Abdominal aorta is normal in caliber.  No   retroperitoneal lymphadenopathy.       Bones/Soft Tissues: No osteolytic or osteoblastic bone lesions are   identified.  No acute bony abnormalities are detected.           Impression   Chest CT:       Moderate to large hiatal hernia, similar when compared to the previous exam.   There is mural thickening of the lower esophagus, which is stable to mildly   increased when compared to the previous exam, presumably reflecting   esophagitis.  However, neoplasm does remain in the differential, therefore   consider direct visualization.       Patchy ground-glass opacities within both lungs, which are very nonspecific. Given the low lung volumes, it most likely reflects microatelectasis. However, inflammatory/infectious etiologies remain in the differential.       Abdomen and pelvis CT:       Moderate to large hiatal hernia.       Mild diverticulosis of the large bowel is present without CT evidence of   diverticulitis. Review of additional significant medical hx:  HLD:  Current medications r/t condition: LIPITOR    PNA, BRONCHITIS: She states she has had a cough since last Summer- has had PNA and bronchitis numerous time- does not follow w/pulmonogist. Sometimes feels SOB, denies currently because she is sitting- worse w/exhaustion. C/o wheezing/cough, feels like she has bronchitis- did f/u with PCP. She does not use an inhalers. States she spits up a lot of phlegm at times. Denies hx of COPD. Denies hx of DM.     NPO status: Patient states they have been NPO since before midnight. Medications taken TODAY (with sip of water): None. Anticoagulation status: Patient denies taking any anti-coagulants, including aspirin currently. Pertinent family hx: Denies family hx of esophageal and colon CA. Denies personal hx of blood clots. Denies personal hx of MRSA infection. Denies any personal or family hx of previous complications w/anesthesia. Nicotine status: Never smoker.   PAST MEDICAL HISTORY     Past Medical History:   Diagnosis Date    Anemia     Bochdalek hernia     PER CT 7-2-21    Bowel obstruction ([address]     Bronchitis 02/19/2019    Frequent episodes of bronchitis, usually yearly with pneumonia    Chronic biliary pancreatitis ([address] 09/22/2016    Controlled type 2 diabetes mellitus without complication, without long-term current use of insulin ([address]     Cough 08/13/2021    has had cough for several months    DDD (degenerative disc disease)     Depression     Diverticulosis     Esophageal dysphagia     Fibromyalgia     GERD (gastroesophageal reflux disease)     Glaucoma     Hiatal hernia     Hyperlipidemia     Knee injuries 02/19/2019    Osteoarthritis of more than one site 04/09/2014    Pneumonia     gets pneumonia yearly    Right middle lobe pneumonia 03/24/2016    Seasonal allergies        SURGICAL HISTORY       Past Surgical History:   Procedure Laterality Date    BREAST BIOPSY Left 05/2016    CARPAL TUNNEL RELEASE Bilateral     x 2 each    COLONOSCOPY      EYE SURGERY Left     stent for glaucoma    EYE SURGERY Left     cataract    EYE SURGERY Left     lower lid    FOOT SURGERY Right     HYSTERECTOMY      KNEE ARTHROSCOPY Right 12/04/2019    RIGHT KNEE ARTHROSCOPY WITH TOTAL MENISCECTOMY performed by Mich Jessica DO at [facility] [address]  10/03/2016    NM ARTHRS KNE SURG W/MENISCECTOMY MED/LAT W/SHVG Left 08/13/2018    KNEE ARTHROSCOPY FOR PARTIAL MEDIAL AND PARTIAL LATERAL MENISCECTOMY performed by Maribel Diggs Dori Mcclure MD at [address]      due to blockage    UPPER GASTROINTESTINAL ENDOSCOPY      EGD       SOCIAL HISTORY       Social History     Socioeconomic History    Marital status:      Spouse name: None    Number of children: None    Years of education: None    Highest education level: None   Occupational History    Occupation: NOT EMPLOYEED   Tobacco Use    Smoking status: Never Smoker    Smokeless tobacco: Never Used   Vaping Use    Vaping Use: Never used   Substance and Sexual Activity    Alcohol use: No     Alcohol/week: 0.0 standard drinks    Drug use: No    Sexual activity: Not Currently   Other Topics Concern    None   Social History Narrative    None     Social Determinants of Health     Financial Resource Strain: Low Risk     Difficulty of Paying Living Expenses: Not hard at all   Food Insecurity: No Food Insecurity    Worried About Running Out of Food in the Last Year: Never true    Maile of Food in the Last Year: Never true   Transportation Needs: No Transportation Needs    Lack of Transportation (Medical): No    Lack of Transportation (Non-Medical): No   Physical Activity: Insufficiently Active    Days of Exercise per Week: 3 days    Minutes of Exercise per Session: 20 min   Stress: No Stress Concern Present    Feeling of Stress : Only a little   Social Connections:  Moderately Integrated    Frequency of Communication with Friends and Family: More than three times a week    Frequency of Social Gatherings with Friends and Family: More than three times a week    Attends Oriental orthodox Services: 1 to 4 times per year    Active Member of [employer] or Organizations: No    Attends Club or Organization Meetings: Never    Marital Status:    Intimate Partner Violence:     Fear of Current or Ex-Partner:     Emotionally Abused:     Physically Abused:     Sexually Abused:        REVIEW OF SYSTEMS      Allergies   Allergen Reactions    Pcn [Penicillins] Anaphylaxis     Patient states PCN allergy was 20 years ago, unsure if reaction still present    Adhesive Tape     Nubain [Nalbuphine Hcl]      Leg  Cramping  When mixed with vistaril    Seasonal     Vistaril [Hydroxyzine] Other (See Comments)     fainted       No current facility-administered medications on file prior to encounter. Current Outpatient Medications on File Prior to Encounter   Medication Sig Dispense Refill    QUEtiapine (SEROQUEL) 200 MG tablet Take 1 tablet by mouth daily 90 tablet 1    Handicap Placard MISC by Does not apply route Exp 3/16/2026 1 each 0    meloxicam (MOBIC) 7.5 MG tablet Take 1 tablet by mouth daily 90 tablet 1    cyanocobalamin ([employer] VITAMIN B12) 1000 MCG tablet Take 2 tablets by mouth daily 180 tablet 1    QUEtiapine (SEROQUEL) 50 MG tablet TAKE 1 OR 2 TABLETS BY MOUTH AT BEDTIME AS NEEDED FOR SLEEP 60 tablet 5    diclofenac sodium (VOLTAREN) 1 % GEL as needed      atorvastatin (LIPITOR) 80 MG tablet Take 1 tablet by mouth daily 30 tablet 5    oxyCODONE-acetaminophen (PERCOCET)  MG per tablet Take 1 tablet by mouth every 4 hours as needed for Pain.   0     (Notation: Medications listed above are not currently reconciled at the signing of this H&P note, to be reconciled in pre-op per RN)    Review of Systems   Constitutional: Negative for chills and fever. HENT: Positive for trouble swallowing (Ate watermelon yesterday, got stuck, took 30 minutes to vomit back up- last time she had solid food yesterday was 5 PM). Negative for congestion, ear pain, rhinorrhea and sore throat. Respiratory: Positive for cough, choking, shortness of breath and wheezing. Cardiovascular: Negative for chest pain, palpitations and leg swelling. Gastrointestinal:        SEE HPI. Genitourinary: Negative for dysuria and frequency. Musculoskeletal: Positive for arthralgias and gait problem (ANTALGIC). Neurological: Negative for dizziness and headaches.    Hematological: Does not bruise/bleed easily. GENERAL PHYSICAL EXAM     Vitals: Review current vital signs per RN flow sheet. GENERAL APPEARANCE:   Nabor Dumont is 64 y.o.,  female, mildly obese, nourished, conscious, alert. Does not appear to be in any distress or pain at this time. Physical Exam  Constitutional:       General: She is not in acute distress. Appearance: She is well-developed. She is not ill-appearing, toxic-appearing or diaphoretic. HENT:      Head: Normocephalic. Right Ear: External ear normal.      Left Ear: External ear normal.      Nose: Nose normal.      Mouth/Throat:      Pharynx: No oropharyngeal exudate or posterior oropharyngeal erythema. Tonsils: No tonsillar abscesses. Eyes:      General:         Right eye: No discharge. Left eye: No discharge. Cardiovascular:      Rate and Rhythm: Normal rate and regular rhythm. Pulses: Intact distal pulses. Heart sounds: Normal heart sounds. Pulmonary:      Effort: Pulmonary effort is normal. No accessory muscle usage or respiratory distress. Breath sounds: Normal breath sounds. No decreased breath sounds, wheezing, rhonchi or rales. Abdominal:      General: Bowel sounds are normal. There is no distension. Palpations: Abdomen is soft. There is no mass. Tenderness: There is no abdominal tenderness. There is no guarding or rebound. Musculoskeletal:      Right lower leg: No swelling or tenderness. Left lower leg: No swelling or tenderness. Skin:     General: Skin is warm and dry. Neurological:      Mental Status: She is alert and oriented to person, place, and time.    Psychiatric:         Behavior: Behavior normal.         RECENT LAB WORK     Lab Results   Component Value Date     06/24/2021    K 4.8 06/24/2021     06/24/2021    CO2 20 06/24/2021    BUN 10 06/24/2021    CREATININE 0.67 06/24/2021    GLUCOSE 87 03/10/2021    CALCIUM 9.2 06/24/2021    PROT 7.0 06/24/2021    LABALBU 3.5 06/24/2021    BILITOT 0.18 (L) 06/24/2021    ALKPHOS 127 (H) 06/24/2021    AST 22 06/24/2021    ALT 14 06/24/2021    LABGLOM >60 06/24/2021    GFRAA >60 06/24/2021    GLOB NOT REPORTED 11/22/2014     Lab Results   Component Value Date    WBC 5.7 06/24/2021    HGB 13.6 06/24/2021    HCT 47.4 (H) 06/24/2021    .1 06/24/2021     06/24/2021     PROVISIONAL DIAGNOSES / SURGERY:      GERD    EGD    Patient Active Problem List    Diagnosis Date Noted    Abnormal finding on imaging 08/12/2021    Esophageal dysphagia 08/12/2021    Hiatal hernia 08/12/2021    Gastroesophageal reflux disease 06/30/2021    Primary open-angle glaucoma, right eye, mild stage 02/06/2019    Primary open-angle glaucoma, left eye, mild stage 02/06/2019    Hyperuricemia 02/17/2017    Moderate episode of recurrent major depressive disorder ([address] 92/38/7010    Uncomplicated opioid dependence ([address] 10/24/2016    Chronic biliary pancreatitis [address] [address] 09/22/2016    Lumbar and sacral arthritis     Primary insomnia 05/24/2016    Vitamin B12 deficiency 03/10/2016    Primary osteoarthritis involving multiple joints     Spondylosis 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     Chronic pain associated with significant psychosocial dysfunction     Anemia, normocytic normochromic 11/29/2014    Anxiety 11/22/2014    Arthritis of shoulder region, right 07/08/2014    Vitamin D deficiency 06/05/2012    Osteopenia 05/31/2012    Hx of bilateral breast implants 05/31/2012    Hyperlipidemia 05/08/2012    Fibromyalgia 05/08/2012           DES Becker - CNP on 8/17/2021 at 7:49 AM

## 2021-08-17 NOTE — OP NOTE
PROCEDURE NOTE    DATE OF PROCEDURE: 8/17/2021     SURGEON: Bernardo Alfonso MD    ASSISTANT: None    PREOPERATIVE DIAGNOSIS: DYSPHAGIA  ABNORMAL IMAGING    POSTOPERATIVE DIAGNOSIS: As described below    OPERATION: Upper GI endoscopy with Biopsy    ANESTHESIA: MAC PER ANESTHESIA     ESTIMATED BLOOD LOSS: Less than 50 ml    COMPLICATIONS: None. SPECIMENS:  Was Obtained:     HISTORY: The patient is a 64y.o. year old female with history of above preop diagnosis. I recommended esophagogastroduodenoscopy with possible biopsy and I explained the risk, benefits, expected outcome, and alternatives to the procedure. Risks included but are not limited to bleeding, infection, respiratory distress, hypotension, and perforation of the esophagus, stomach, or duodenum. Patient understands and is in agreement. PROCEDURE: The patient was given IV conscious sedation. The patient's SPO2 remained above 90% throughout the procedure. The gastroscope was inserted orally and advanced under direct vision through the esophagus, through the stomach, through the pylorus, and into the descending duodenum. Findings:    Retropharyngeal area was grossly normal appearing    Esophagus: abnormal: SPASMATIC TERTIARY CONTRACTIONS  NON OBSTRUCTING SHCHATZKI;S RING WITH MILD IRREGULAR SCJ AND MILD ESOPHAGITIS   BIOPSIES AND PICTURES WERE TAKEN    PROMINENT 5 CM HIATAL HERNI WITH NORMAL UNDERLYING MUCOSA    Stomach:    Fundus: abnormal: AS ABOVE    Body: normal    Antrum: normal    Duodenum:     Descending: normal    Bulb: normal    The scope was removed and the patient tolerated the procedure well. Recommendations/Plan:   1. F/U Biopsies  2. F/U In Office in 3-4 weeks  3. Discussed with the family  4.  Post sedation patient was stable with stable vital signs and stable O2 saturations    Electronically signed by Bernardo Alfonso MD  on 8/17/2021 at 9:29 AM

## 2021-08-17 NOTE — ANESTHESIA POSTPROCEDURE EVALUATION
Department of Anesthesiology  Postprocedure Note    Patient: Demi Bell  MRN: [id number]  YOB: 1945  Date of evaluation: 8/17/2021  Time:  10:50 AM     Procedure Summary     Date: 08/17/21 Room / Location: [place] [address] / [facility]    Anesthesia Start: 9199 Anesthesia Stop: 5349    Procedure: EGD BIOPSY (N/A Esophagus) Diagnosis: (GERD      PT HAS HAD COVID VACCINE DONE)    Surgeons: Rickey Acuna MD Responsible Provider: Elaine Stephenson MD    Anesthesia Type: general ASA Status: 2          Anesthesia Type: general    Trena Phase I: Trena Score: 9    Trena Phase II:      Last vitals: Reviewed and per EMR flowsheets.        Anesthesia Post Evaluation    Comments: POST- ANESTHESIA EVALUATION       Pt Name: Demi Bell  MRN: [id number]  YOB: 1945  Date of evaluation: 8/17/2021  Time:  10:50 AM      /68   Pulse 83   Temp 97.1 °F (36.2 °C) (Temporal)   Resp 16   Ht 5' (1.524 m)   Wt 150 lb (68 kg)   SpO2 97%   BMI 29.29 kg/m²      Consciousness Level  Awake  Cardiopulmonary Status  Stable  Pain Adequately Treated YES  Nausea / Vomiting  NO  Adequate Hydration  YES  Anesthesia Related Complications NONE      Electronically signed by Serafin Park MD on 8/17/2021 at 10:50 AM

## 2021-08-18 LAB — SURGICAL PATHOLOGY REPORT: NORMAL

## 2021-09-02 ENCOUNTER — TELEPHONE (OUTPATIENT)
Dept: GASTROENTEROLOGY | Age: 76
End: 2021-09-02

## 2021-10-05 DIAGNOSIS — F51.01 PRIMARY INSOMNIA: ICD-10-CM

## 2021-10-05 NOTE — TELEPHONE ENCOUNTER
Last visit: 6-24-21  Last Med refill: 5-28-21  Does patient have enough medication for 72 hours: No:     Next Visit Date:  Future Appointments   Date Time Provider Glenroy Falcon   11/4/2021  9:30 AM DES Kumari - CNP [facility]   11/12/2021 12:30 PM Denzel Reyes MD [facility] Maintenance   Topic Date Due    Hepatitis C screen  Never done    Shingles Vaccine (1 of 2) Never done    Lipid screen  08/12/2021    Flu vaccine (1) 09/01/2021    DTaP/Tdap/Td vaccine (1 - Tdap) 11/05/2021 (Originally 5/24/1964)    DEXA (modify frequency per FRAX score)  Completed    Pneumococcal 65+ years Vaccine  Completed    COVID-19 Vaccine  Completed    Hepatitis A vaccine  Aged Out    Hib vaccine  Aged Out    Meningococcal (ACWY) vaccine  Aged Out       Hemoglobin A1C (%)   Date Value   08/12/2020 5.6   02/19/2019 5.5   05/23/2018 5.3             ( goal A1C is < 7)   Microalb/Crt.  Ratio (mcg/mg creat)   Date Value   05/12/2016 7     LDL Cholesterol (mg/dL)   Date Value   08/12/2020 179 (H)   02/19/2019 89     LDL Calculated (mg/dL)   Date Value   11/13/2014 109       (goal LDL is <100)   AST (U/L)   Date Value   06/24/2021 22     ALT (U/L)   Date Value   06/24/2021 14     BUN (mg/dL)   Date Value   06/24/2021 10     BP Readings from Last 3 Encounters:   08/17/21 (!) 98/56   08/17/21 (!) 149/89   08/12/21 130/84          (goal 120/80)    All Future Testing planned in [facility]  Lab Frequency Next Occurrence   Respiratory Panel, Molecular, with COVID-19 Once 03/16/2022   Comprehensive Metabolic Panel Once 91/79/8378   Lactate Dehydrogenase Once 03/16/2022   Culture, Blood 1 Once 03/16/2022   EGD Once 11/11/2021               Patient Active Problem List:     Hyperlipidemia     Fibromyalgia     Osteopenia     Hx of bilateral breast implants     Vitamin D deficiency     Arthritis of shoulder region, right     Anxiety     Anemia, normocytic normochromic     Chronic pain associated with significant psychosocial dysfunction     Primary osteoarthritis involving multiple joints     Spondylosis 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     Vitamin B12 deficiency     Primary insomnia     Lumbar and sacral arthritis     Chronic biliary pancreatitis (HCC)     Moderate episode of recurrent major depressive disorder (HCC)     Uncomplicated opioid dependence (HCC)     Hyperuricemia     Primary open-angle glaucoma, right eye, mild stage     Primary open-angle glaucoma, left eye, mild stage     Gastroesophageal reflux disease     Abnormal finding on imaging     Esophageal dysphagia     Hiatal hernia

## 2021-10-07 RX ORDER — QUETIAPINE FUMARATE 50 MG/1
TABLET, FILM COATED ORAL
Qty: 60 TABLET | Refills: 0 | Status: SHIPPED | OUTPATIENT
Start: 2021-10-07 | End: 2021-11-29

## 2021-10-08 ENCOUNTER — OFFICE VISIT (OUTPATIENT)
Dept: FAMILY MEDICINE CLINIC | Age: 76
End: 2021-10-08
Payer: MEDICARE

## 2021-10-08 VITALS
SYSTOLIC BLOOD PRESSURE: 135 MMHG | TEMPERATURE: 97.7 F | DIASTOLIC BLOOD PRESSURE: 87 MMHG | OXYGEN SATURATION: 97 % | HEART RATE: 91 BPM

## 2021-10-08 DIAGNOSIS — J98.8 RESPIRATORY INFECTION: Primary | ICD-10-CM

## 2021-10-08 PROCEDURE — 99214 OFFICE O/P EST MOD 30 MIN: CPT | Performed by: FAMILY MEDICINE

## 2021-10-08 RX ORDER — GABAPENTIN 800 MG/1
TABLET ORAL
COMMUNITY
Start: 2021-09-07

## 2021-10-08 RX ORDER — BENZONATATE 200 MG/1
200 CAPSULE ORAL 3 TIMES DAILY PRN
Qty: 30 CAPSULE | Refills: 0 | Status: SHIPPED | OUTPATIENT
Start: 2021-10-08 | End: 2021-10-18

## 2021-10-08 RX ORDER — LEVOFLOXACIN 500 MG/1
500 TABLET, FILM COATED ORAL DAILY
Qty: 7 TABLET | Refills: 0 | Status: SHIPPED | OUTPATIENT
Start: 2021-10-08 | End: 2021-10-15

## 2021-10-08 RX ORDER — PREDNISONE 20 MG/1
40 TABLET ORAL DAILY
Qty: 10 TABLET | Refills: 0 | Status: SHIPPED | OUTPATIENT
Start: 2021-10-08 | End: 2021-10-13

## 2021-10-08 RX ORDER — DORZOLAMIDE HYDROCHLORIDE AND TIMOLOL MALEATE 20; 5 MG/ML; MG/ML
SOLUTION/ DROPS OPHTHALMIC
COMMUNITY
Start: 2021-10-01

## 2021-10-08 ASSESSMENT — ENCOUNTER SYMPTOMS
COUGH: 1
SHORTNESS OF BREATH: 1
RHINORRHEA: 1
WHEEZING: 0
VOMITING: 0
SORE THROAT: 0
DIARRHEA: 0
ABDOMINAL PAIN: 0
NAUSEA: 0

## 2021-10-08 NOTE — PATIENT INSTRUCTIONS
Patient Education        Bronchitis: Care Instructions  Your Care Instructions     Bronchitis is inflammation of the bronchial tubes, which carry air to the lungs. The tubes swell and produce mucus, or phlegm. The mucus and inflamed bronchial tubes make you cough. You may have trouble breathing. Most cases of bronchitis are caused by viruses like those that cause colds. Antibiotics usually do not help and they may be harmful. Bronchitis usually develops rapidly and lasts about 2 to 3 weeks in otherwise healthy people. Follow-up care is a key part of your treatment and safety. Be sure to make and go to all appointments, and call your doctor if you are having problems. It's also a good idea to know your test results and keep a list of the medicines you take. How can you care for yourself at home? · Take all medicines exactly as prescribed. Call your doctor if you think you are having a problem with your medicine. · Get some extra rest.  · Take an over-the-counter pain medicine, such as acetaminophen (Tylenol), ibuprofen (Advil, Motrin), or naproxen (Aleve) to reduce fever and relieve body aches. Read and follow all instructions on the label. · Do not take two or more pain medicines at the same time unless the doctor told you to. Many pain medicines have acetaminophen, which is Tylenol. Too much acetaminophen (Tylenol) can be harmful. · Take an over-the-counter cough medicine to help quiet a dry, hacking cough so that you can sleep. Avoid cough medicines that have more than one active ingredient. Read and follow all instructions on the label. · Do not smoke. Smoking can make bronchitis worse. If you need help quitting, talk to your doctor about stop-smoking programs and medicines. These can increase your chances of quitting for good. When should you call for help? Call 911 anytime you think you may need emergency care. For example, call if:    · You have severe trouble breathing.    Call your doctor now or seek immediate medical care if:    · You have new or worse trouble breathing.     · You cough up dark brown or bloody mucus (sputum).     · You have a new or higher fever.     · You have a new rash. Watch closely for changes in your health, and be sure to contact your doctor if:    · You cough more deeply or more often, especially if you notice more mucus or a change in the color of your mucus.     · You are not getting better as expected. Where can you learn more? Go to [email or web address]://[email or web address]. org and sign in to your [employer] account. Enter H333 in the [employer] box to learn more about \"Bronchitis: Care Instructions. \"     If you do not have an account, please click on the \"Sign Up Now\" link. Current as of: July 6, 2021               Content Version: 13.0  © 0095-9422 Healthwise, Incorporated. Care instructions adapted under license by [facility] [address] If you have questions about a medical condition or this instruction, always ask your healthcare professional. [address] any warranty or liability for your use of this information.        Take levaquin and steroid as prescribed for respiratory infection  If symptoms worsen or do not improve please follow-up with PCP or return to clinic

## 2021-10-08 NOTE — PROGRESS NOTES
[address] [facility]   [address] [place] [address] [address]  Dept: [phone]  Dept Fax: [phone]    Olena Reddy is a 64 y.o. female who presents today for her medical conditions/complaintsas noted below. Olena Reddy is c/o of Cough (onset for 3 weeks , declined covid testing, pt was already tested this week and was negative by her denist. ) and Nasal Congestion        HPI:     Cough  This is a new problem. The current episode started in the past 7 days (3 weeks). The problem has been unchanged. The problem occurs constantly. The cough is non-productive. Associated symptoms include myalgias, nasal congestion, rhinorrhea and shortness of breath. Pertinent negatives include no chills, ear pain, fever, rash, sore throat or wheezing. Associated symptoms comments: Post-tussive. Nothing aggravates the symptoms. Treatments tried: tylenol. The treatment provided no relief. Her past medical history is significant for bronchitis, COPD (Possible), environmental allergies and pneumonia. There is no history of asthma.    Past medical history of anemia, type 2 diabetes, GERD  Recently had negative Covid testing by dentist  Past Medical History:   Diagnosis Date    Anemia     [place] hernia     PER CT 7-2-21    Bowel obstruction ([address]     Bronchitis 02/19/2019    Frequent episodes of bronchitis, usually yearly with pneumonia    Chronic biliary pancreatitis ([address] 09/22/2016    Controlled type 2 diabetes mellitus without complication, without long-term current use of insulin ([address]     Cough 08/13/2021    has had cough for several months    DDD (degenerative disc disease)     Depression     Diverticulosis     Esophageal dysphagia     Fibromyalgia     GERD (gastroesophageal reflux disease)     Glaucoma     Hiatal hernia     Hyperlipidemia     Knee injuries 02/19/2019    Osteoarthritis of more than one site 04/09/2014    Pneumonia     gets pneumonia yearly    Right middle lobe pneumonia 03/24/2016    Seasonal allergies     Past medical history reviewed and pertinent positives/negatives in the HPI    Past Surgical History:   Procedure Laterality Date    BREAST BIOPSY Left 05/2016    CARPAL TUNNEL RELEASE Bilateral     x 2 each    COLONOSCOPY      EYE SURGERY Left     stent for glaucoma    EYE SURGERY Left     cataract    EYE SURGERY Left     lower lid    FOOT SURGERY Right     HYSTERECTOMY      KNEE ARTHROSCOPY Right 12/04/2019    RIGHT KNEE ARTHROSCOPY WITH TOTAL MENISCECTOMY performed by Ban Iraheta DO at [address]  10/03/2016    MS ARTHRS KNE SURG W/MENISCECTOMY MED/LAT W/SHVG Left 08/13/2018    KNEE ARTHROSCOPY FOR PARTIAL MEDIAL AND PARTIAL LATERAL MENISCECTOMY performed by Cara Simeon MD at [address]      due to blockage    UPPER GASTROINTESTINAL ENDOSCOPY      EGD    UPPER GASTROINTESTINAL ENDOSCOPY N/A 8/17/2021    EGD BIOPSY performed [facility] Jered Coombs MD [facility]       Family History   Problem Relation Age of Onset    High Blood Pressure Mother     Stroke Mother     Heart Disease Father     Stroke Maternal Grandmother        Social History     Tobacco Use    Smoking status: Never Smoker    Smokeless tobacco: Never Used   Substance Use Topics    Alcohol use: No     Alcohol/week: 0.0 standard drinks      Current Outpatient Medications   Medication Sig Dispense Refill    dorzolamide-timolol (COSOPT) 22.3-6.8 MG/ML ophthalmic solution INSTILL 1 DROP IN EACH EYE TWO TIMES A DAY      gabapentin (NEURONTIN) 800 MG tablet TAKE 1 TAB BY MOUTH AT BEDTIME FOR ONE WEEK THEN 2 TABS AT BEDTIME FOR ONE WEEK THEN 3 TABS AT BEDTIME THEREAFTER      levoFLOXacin (LEVAQUIN) 500 MG tablet Take 1 tablet by mouth daily for 7 days 7 tablet 0    predniSONE (DELTASONE) 20 MG tablet Take 2 tablets by mouth daily for 5 days 10 tablet 0    benzonatate (TESSALON) 200 MG capsule Take 1 capsule by mouth 3 times daily as needed for Cough 30 capsule 0    QUEtiapine (SEROQUEL) 50 MG tablet TAKE 1 OR 2 TABLETS BY MOUTH AT BEDTIME AS NEEDED FOR SLEEP 60 tablet 0    QUEtiapine (SEROQUEL) 200 MG tablet Take 1 tablet by mouth daily 90 tablet 1    Handicap Placard MISC by Does not apply route Exp 3/16/2026 1 each 0    diclofenac sodium (VOLTAREN) 1 % GEL as needed      atorvastatin (LIPITOR) 80 MG tablet Take 1 tablet by mouth daily 30 tablet 5    oxyCODONE-acetaminophen (PERCOCET)  MG per tablet Take 1 tablet by mouth every 4 hours as needed for Pain.   0    meloxicam (MOBIC) 7.5 MG tablet Take 1 tablet by mouth daily 90 tablet 1    cyanocobalamin ([employer] VITAMIN B12) 1000 MCG tablet Take 2 tablets by mouth daily 180 tablet 1     No current facility-administered medications for this visit. Allergies   Allergen Reactions    Pcn [Penicillins] Anaphylaxis     Patient states PCN allergy was 20 years ago, unsure if reaction still present    Adhesive Tape     Nubain [Nalbuphine Hcl]      Leg  Cramping  When mixed with vistaril    Seasonal     Vistaril [Hydroxyzine] Other (See Comments)     fainted       Health Maintenance   Topic Date Due    Hepatitis C screen  Never done    Shingles Vaccine (1 of 2) Never done    Lipid screen  08/12/2021    Flu vaccine (1) 09/01/2021    DTaP/Tdap/Td vaccine (1 - Tdap) 11/05/2021 (Originally 5/24/1964)    DEXA (modify frequency per FRAX score)  Completed    Pneumococcal 65+ years Vaccine  Completed    COVID-19 Vaccine  Completed    Hepatitis A vaccine  Aged Out    Hib vaccine  Aged Out    Meningococcal (ACWY) vaccine  Aged Out       :      Review of Systems   Constitutional: Negative for chills and fever. HENT: Positive for congestion and rhinorrhea. Negative for ear pain and sore throat. Respiratory: Positive for cough and shortness of breath. Negative for wheezing.     Gastrointestinal: Negative for abdominal pain, diarrhea, nausea and vomiting. Musculoskeletal: Positive for myalgias. Skin: Negative for pallor and rash. Allergic/Immunologic: Positive for environmental allergies. Hematological: Negative for adenopathy. Objective:     Physical Exam  Vitals and nursing note reviewed. Constitutional:       Appearance: Normal appearance. She is ill-appearing. HENT:      Head: Normocephalic and atraumatic. Right Ear: Hearing normal.      Left Ear: Hearing normal.      Nose: Nose normal.      Mouth/Throat:      Lips: Pink. Mouth: Mucous membranes are moist.      Pharynx: Oropharynx is clear. Eyes:      Extraocular Movements: Extraocular movements intact. Conjunctiva/sclera: Conjunctivae normal.   Cardiovascular:      Rate and Rhythm: Normal rate and regular rhythm. Heart sounds: Normal heart sounds. Pulmonary:      Effort: Pulmonary effort is normal.      Breath sounds: Rhonchi present. Musculoskeletal:      Cervical back: Normal range of motion. No muscular tenderness. Skin:     General: Skin is warm and dry. Neurological:      Mental Status: She is alert and oriented to person, place, and time. Mental status is at baseline. Psychiatric:         Mood and Affect: Mood normal.         Behavior: Behavior normal.         Thought Content: Thought content normal.         Judgment: Judgment normal.       /87 (Site: Left Upper Arm, Position: Sitting, Cuff Size: Medium Adult)   Pulse 91   Temp 97.7 °F (36.5 °C) (Infrared)   SpO2 97%     Assessment:       Diagnosis Orders   1. Respiratory infection         Plan:   Due to duration and severity of symptoms we will treat as a bacterial infection at this time  Take levaquin and steroid as prescribed for respiratory infection  If symptoms worsen or do not improve please follow-up with PCP or return to clinic  No orders of the defined types were placed in this encounter.     Orders Placed This Encounter   Medications    levoFLOXacin (LEVAQUIN) 500 MG tablet Sig: Take 1 tablet by mouth daily for 7 days     Dispense:  7 tablet     Refill:  0    predniSONE (DELTASONE) 20 MG tablet     Sig: Take 2 tablets by mouth daily for 5 days     Dispense:  10 tablet     Refill:  0    benzonatate (TESSALON) 200 MG capsule     Sig: Take 1 capsule by mouth 3 times daily as needed for Cough     Dispense:  30 capsule     Refill:  0      Patient given educational materials - see patient instructions. Discussed use, benefit, and side effects of prescribed medications. All patient questions answered. Pt voiced understanding. Follow up as directed.      Electronicallysigned by Macrina Cole MD on 10/8/2021 at 3:33 PM

## 2021-11-02 DIAGNOSIS — F51.01 PRIMARY INSOMNIA: ICD-10-CM

## 2021-11-03 RX ORDER — QUETIAPINE FUMARATE 50 MG/1
TABLET, FILM COATED ORAL
Qty: 60 TABLET | Refills: 0 | OUTPATIENT
Start: 2021-11-03

## 2021-11-03 NOTE — TELEPHONE ENCOUNTER
Last visit: 6/24/21  Last Med refill: 10/7/21    Next Visit Date:  Future Appointments   Date Time Provider Glenroy Navai   11/12/2021 12:30 PM Kevyn Langford MD [facility]   11/30/2021 10:00 AM Marli Pierce, DES - [facility] [address] Maintenance   Topic Date Due    Hepatitis C screen  Never done    Shingles Vaccine (1 of 2) Never done    Lipid screen  08/12/2021    Flu vaccine (1) 09/01/2021    DTaP/Tdap/Td vaccine (1 - Tdap) 11/05/2021 (Originally 5/24/1964)    DEXA (modify frequency per FRAX score)  Completed    Pneumococcal 65+ years Vaccine  Completed    COVID-19 Vaccine  Completed    Hepatitis A vaccine  Aged Out    Hib vaccine  Aged Out    Meningococcal (ACWY) vaccine  Aged Out       Hemoglobin A1C (%)   Date Value   08/12/2020 5.6   02/19/2019 5.5   05/23/2018 5.3             ( goal A1C is < 7)   Microalb/Crt.  Ratio (mcg/mg creat)   Date Value   05/12/2016 7     LDL Cholesterol (mg/dL)   Date Value   08/12/2020 179 (H)   02/19/2019 89     LDL Calculated (mg/dL)   Date Value   11/13/2014 109       (goal LDL is <100)   AST (U/L)   Date Value   06/24/2021 22     ALT (U/L)   Date Value   06/24/2021 14     BUN (mg/dL)   Date Value   06/24/2021 10     BP Readings from Last 3 Encounters:   10/08/21 135/87   08/17/21 (!) 98/56   08/17/21 (!) 149/89          (goal 120/80)    All Future Testing planned in [facility]  Lab Frequency Next Occurrence   Respiratory Panel, Molecular, with COVID-19 Once 03/16/2022   Comprehensive Metabolic Panel Once 46/95/7719   Lactate Dehydrogenase Once 03/16/2022   Culture, Blood 1 Once 03/16/2022   EGD Once 11/11/2021               Patient Active Problem List:     Hyperlipidemia     Fibromyalgia     Osteopenia     Hx of bilateral breast implants     Vitamin D deficiency     Arthritis of shoulder region, right     Anxiety     Anemia, normocytic normochromic     Chronic pain associated with significant psychosocial dysfunction     Primary osteoarthritis involving multiple joints     Spondylosis 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     Vitamin B12 deficiency     Primary insomnia     Lumbar and sacral arthritis     Chronic biliary pancreatitis (HCC)     Moderate episode of recurrent major depressive disorder (HCC)     Uncomplicated opioid dependence (HCC)     Hyperuricemia     Primary open-angle glaucoma, right eye, mild stage     Primary open-angle glaucoma, left eye, mild stage     Gastroesophageal reflux disease     Abnormal finding on imaging     Esophageal dysphagia     Hiatal hernia

## 2021-11-12 ENCOUNTER — OFFICE VISIT (OUTPATIENT)
Dept: GASTROENTEROLOGY | Age: 76
End: 2021-11-12
Payer: MEDICARE

## 2021-11-12 VITALS
WEIGHT: 147.9 LBS | BODY MASS INDEX: 28.88 KG/M2 | HEART RATE: 81 BPM | TEMPERATURE: 97.3 F | DIASTOLIC BLOOD PRESSURE: 87 MMHG | SYSTOLIC BLOOD PRESSURE: 120 MMHG

## 2021-11-12 DIAGNOSIS — J69.0 RECURRENT ASPIRATION PNEUMONIA (HCC): ICD-10-CM

## 2021-11-12 DIAGNOSIS — K44.9 ESOPHAGEAL HIATUS HERNIA: Primary | ICD-10-CM

## 2021-11-12 DIAGNOSIS — K21.9 GASTROESOPHAGEAL REFLUX DISEASE, UNSPECIFIED WHETHER ESOPHAGITIS PRESENT: ICD-10-CM

## 2021-11-12 PROBLEM — E11.9 TYPE 2 DIABETES MELLITUS (HCC): Status: ACTIVE | Noted: 2021-11-12

## 2021-11-12 PROCEDURE — 99214 OFFICE O/P EST MOD 30 MIN: CPT | Performed by: INTERNAL MEDICINE

## 2021-11-12 ASSESSMENT — ENCOUNTER SYMPTOMS
TROUBLE SWALLOWING: 1
GASTROINTESTINAL NEGATIVE: 1

## 2021-11-12 NOTE — PROGRESS NOTES
GI OFFICE FOLLOW UP    Renu Fleming is a 64 y.o. female evaluated via on 11/12/2021. Consent:  She and/or health care decision maker is aware that that she may receive a bill for this telephone service, depending on her insurance coverage, and has provided verbal consent to proceed: YES      INTERVAL HISTORY:   No referring provider defined for this encounter. Chief Complaint   Patient presents with    Follow-up     Patient here for follow up visit for a Hiatal hernia / patient states her GERD is still the same /        1. Esophageal hiatus hernia    2. Gastroesophageal reflux disease, unspecified whether esophagitis present    3. Recurrent aspiration pneumonia [facility])         The patient is here as a follow up of her recent GI procedure.    The results have been sent to you separately   The findings were explained to the patient in detail and biopsies were also discussed   with her    This patient recently underwent an upper endoscopy by me was found to have a 5 cm hiatus hernia some esophagitis was also noted  To have some esophagitis  Patient has history for recurrent pneumonia  She is very upset that nobody told her before that she has hiatus hernia and why she was told recently  I explained that her CT scan has shown the findings and confirmed by endoscopy  She is concerned that she has been getting pneumonia because of these hiatus hernia    She denies any significant rectal bleeding melanotic stools  She is taking over-the-counter Zantac and says that it is working for her and she does not want to change it    She has history for fibromyalgia    Mild anemia  Never had colonoscopy done and does not want to have it done at this time    HISTORY OF PRESENT ILLNESS: Ms.Mary Nile Trejo is a 64 y.o. female with a past history remarkable for , referred for evaluation of   Chief Complaint   Patient presents with    Follow-up     Patient here for follow up visit for a Hiatal hernia / patient states her GERD is still the same /    .    Past Medical,Family, and Social History reviewed and does contribute to the patient presenting condition. Patient's PMH/PSH,SH,PSYCH Hx, MEDs, ALLERGIES, and ROS were all reviewed and updated in the appropriate sections.     PAST MEDICAL HISTORY:  Past Medical History:   Diagnosis Date    Anemia     Bochdalek hernia     PER CT 7-2-21    Bowel obstruction ([address]     Bronchitis 02/19/2019    Frequent episodes of bronchitis, usually yearly with pneumonia    Chronic biliary pancreatitis ([address] 09/22/2016    Controlled type 2 diabetes mellitus without complication, without long-term current use of insulin ([address]     Cough 08/13/2021    has had cough for several months    DDD (degenerative disc disease)     Depression     Diverticulosis     Esophageal dysphagia     Fibromyalgia     GERD (gastroesophageal reflux disease)     Glaucoma     Hiatal hernia     Hyperlipidemia     Knee injuries 02/19/2019    Osteoarthritis of more than one site 04/09/2014    Pneumonia     gets pneumonia yearly    Right middle lobe pneumonia 03/24/2016    Seasonal allergies        Past Surgical History:   Procedure Laterality Date    BREAST BIOPSY Left 05/2016    CARPAL TUNNEL RELEASE Bilateral     x 2 each    COLONOSCOPY      EYE SURGERY Left     stent for glaucoma    EYE SURGERY Left     cataract    EYE SURGERY Left     lower lid    FOOT SURGERY Right     HYSTERECTOMY      KNEE ARTHROSCOPY Right 12/04/2019    RIGHT KNEE ARTHROSCOPY WITH TOTAL MENISCECTOMY performed by Cindy Rincon DO at [facility]  10/03/2016    ME ARTHRS KNE SURG W/MENISCECTOMY MED/LAT W/SHVG Left 08/13/2018    KNEE ARTHROSCOPY FOR PARTIAL MEDIAL AND PARTIAL LATERAL MENISCECTOMY performed by Roya Jaramillo MD at [address]      due to blockage    UPPER GASTROINTESTINAL ENDOSCOPY      EGD    UPPER GASTROINTESTINAL ENDOSCOPY N/A 8/17/2021    EGD BIOPSY performed by Jered Coombs MD at [address]:    Current Outpatient Medications:     dorzolamide-timolol (COSOPT) 22.3-6.8 MG/ML ophthalmic solution, INSTILL 1 DROP IN [facility] EYE TWO TIMES A DAY, Disp: , Rfl:     gabapentin (NEURONTIN) 800 MG tablet, TAKE 1 TAB BY MOUTH AT BEDTIME FOR ONE WEEK THEN 2 TABS AT BEDTIME FOR ONE WEEK THEN 3 TABS AT BEDTIME THEREAFTER, Disp: , Rfl:     QUEtiapine (SEROQUEL) 50 MG tablet, TAKE 1 OR 2 TABLETS BY MOUTH AT BEDTIME AS NEEDED FOR SLEEP, Disp: 60 tablet, Rfl: 0    QUEtiapine (SEROQUEL) 200 MG tablet, Take 1 tablet by mouth daily, Disp: 90 tablet, Rfl: 1    Handicap Placard MISC, by Does not apply route Exp 3/16/2026, Disp: 1 each, Rfl: 0    diclofenac sodium (VOLTAREN) 1 % GEL, as needed, Disp: , Rfl:     atorvastatin (LIPITOR) 80 MG tablet, Take 1 tablet by mouth daily, Disp: 30 tablet, Rfl: 5    oxyCODONE-acetaminophen (PERCOCET)  MG per tablet, Take 1 tablet by mouth every 4 hours as needed for Pain. , Disp: , Rfl: 0    meloxicam (MOBIC) 7.5 MG tablet, Take 1 tablet by mouth daily, Disp: 90 tablet, Rfl: 1    cyanocobalamin ([employer] VITAMIN B12) 1000 MCG tablet, Take 2 tablets by mouth daily, Disp: 180 tablet, Rfl: 1    ALLERGIES:   Allergies   Allergen Reactions    Pcn [Penicillins] Anaphylaxis     Patient states PCN allergy was 20 years ago, unsure if reaction still present    Adhesive Tape     Nubain [Nalbuphine Hcl]      Leg  Cramping  When mixed with vistaril    Seasonal     Vistaril [Hydroxyzine] Other (See Comments)     fainted       FAMILY HISTORY:       Problem Relation Age of Onset    High Blood Pressure Mother     Stroke Mother     Heart Disease Father     Stroke Maternal Grandmother          SOCIAL HISTORY:   Social History     Socioeconomic History    Marital status:      Spouse name: Not on file    Number of children: Not on file    Years of education: Not on file    Highest education level: Not on file   Occupational History    Occupation: NOT EMPLOYEED   Tobacco Use    Smoking status: Never Smoker    Smokeless tobacco: Never Used   Vaping Use    Vaping Use: Never used   Substance and Sexual Activity    Alcohol use: No     Alcohol/week: 0.0 standard drinks    Drug use: No    Sexual activity: Not Currently   Other Topics Concern    Not on file   Social History Narrative    Not on file     Social Determinants of Health     Financial Resource Strain: Low Risk     Difficulty of Paying Living Expenses: Not hard at all   Food Insecurity: No Food Insecurity    Worried About [address] [place] [address] in the Last Year: Never true    Maile of Food in the Last Year: Never true   Transportation Needs: No Transportation Needs    Lack of Transportation (Medical): No    Lack of Transportation (Non-Medical): No   Physical Activity: Insufficiently Active    Days of Exercise per Week: 3 days    Minutes of Exercise per Session: 20 min   Stress: No Stress Concern Present    Feeling of Stress : Only a little   Social Connections:  Moderately Integrated    Frequency of Communication with Friends and Family: More than three times a week    Frequency of Social Gatherings with Friends and Family: More than three times a week    Attends Mu-ism Services: 1 to 4 times per year    Active Member of [address] [place] [address] or Organizations: No    Attends Club or Organization Meetings: Never    Marital Status:    Intimate Partner Violence:     Fear of Current or Ex-Partner: Not on file    Emotionally Abused: Not on file    Physically Abused: Not on file    Sexually Abused: Not on file   Housing Stability: [address] Unable to Pay for Housing in the Last Year: No    Number of [place] in the Last Year: 1    Unstable Housing in the Last Year: No         REVIEW OF SYSTEMS:         Review of Systems   Constitutional: Positive for fatigue and unexpected weight change (loss x3 week down 7lbs). Negative for appetite change. HENT: Positive for trouble swallowing (occasional). Cardiovascular: Negative. Gastrointestinal: Negative. Musculoskeletal: Positive for myalgias. Negative for arthralgias. Neurological: Positive for numbness. Negative for headaches. PHYSICAL EXAMINATION: Vital signs reviewed per the nursing documentation. /87   Pulse 81   Temp 97.3 °F (36.3 °C)   Wt 147 lb 14.4 oz (67.1 kg)   BMI 28.88 kg/m²   Body mass index is 28.88 kg/m². Physical Exam  Nursing note reviewed. Constitutional:       Appearance: She is well-developed. Comments: Anxious    HENT:      Head: Normocephalic and atraumatic. Eyes:      Conjunctiva/sclera: Conjunctivae normal.      Pupils: Pupils are equal, round, and reactive to light. Cardiovascular:      Heart sounds: Normal heart sounds. Pulmonary:      Effort: Pulmonary effort is normal.      Breath sounds: Normal breath sounds. Abdominal:      General: Bowel sounds are normal.      Palpations: Abdomen is soft. Comments: Mild TENDER, NON DISTENTED  LIVER SPLEEN AND HERNIAS ARE NOT  PALPABLE  BOWEL SOUNDS ARE POSITIVE      Musculoskeletal:         General: Normal range of motion. Cervical back: Normal range of motion and neck supple. Skin:     General: Skin is warm. Neurological:      Mental Status: She is alert and oriented to person, place, and time.    Psychiatric:         Behavior: Behavior normal.           LABORATORY DATA: Reviewed  Lab Results   Component Value Date    WBC 5.7 06/24/2021    HGB 13.6 06/24/2021    HCT 47.4 (H) 06/24/2021    .1 06/24/2021     06/24/2021     06/24/2021    K 4.8 06/24/2021     06/24/2021    CO2 20 06/24/2021    BUN 10 06/24/2021    CREATININE 0.67 06/24/2021    LABPROT 7.2 05/09/2012    LABALBU 3.5 06/24/2021    BILITOT 0.18 (L) 06/24/2021    ALKPHOS 127 (H) 06/24/2021    AST 22 06/24/2021    ALT 14 06/24/2021    INR 1.0 11/21/2014         Lab Results   Component Value Date    RBC 4.69 06/24/2021    HGB 13.6 06/24/2021    .1 06/24/2021    MCH 29.0 06/24/2021    MCHC 28.7 06/24/2021    RDW 13.0 06/24/2021    MPV 9.8 06/24/2021    BASOPCT 1 06/24/2021    LYMPHSABS 2.10 06/24/2021    MONOSABS 0.52 06/24/2021    NEUTROABS 2.62 06/24/2021    EOSABS 0.33 06/24/2021    BASOSABS 0.07 06/24/2021         DIAGNOSTIC TESTING:     No results found. Assessment  1. Esophageal hiatus hernia    2. Gastroesophageal reflux disease, unspecified whether esophagitis present    3. Recurrent aspiration pneumonia ([address]        Plan    She wants to keep taking Zantac since it is working for her    Will send to Dr Gregory Moran for evaluation for possible surgery since she has recurrent pneumonia    She does not want to have Colonoscopy understanding the rationale    Pt seems to have signs and symptoms consistent with GERD, acid indigestion and heartburns. She was discussed  in detail about some possible life style and dietary modifications. She was stressed about the maintenance  of appropriate weight and effect of obesity contributing to reflux symptoms. Routine exercise was streesed. Avoidance of Caffeine, nicotine and chocolate were explained. Pt was asked to avoid spices grease and fried food. Advices were also given about avoidance of any kind of fast foods, soda pops and high energy drinks. Pt was advised to place two small block under the head end of the bed which may help with night time reflux. Was advised not to eat any thin at least 2-3 hrs before going to bed and walk especially after dinner    Pt has verbalized understanding and agreement to this plan.     The patient was instructed to start taking some OTC Probiotics products   These are available over the counter at the Pharmacy stores and Grocery stores  He was explained about the beneficial effects they have in the GI track  They will help to establish the good bacterial mago and will help with the digestion and bowel movements  The patient has verbalized understanding and agreement to this plan    She had multiple questions were answered to her satisfaction    More than half of patient's clinic visit time was spent in counseling about lifestyle and dietary modifications  Patient's  questions were answered in this regard as well  The patient has verbalized understanding and agreement       I communicated with the patient and/or health care decision maker about   Details of this discussion including any medical advice provided:YES      I affirm this is a Patient Initiated Episode with an Established Patient who has not had a related appointment within my department in the past 7 days or scheduled within the next 24 hours. Total Time: minutes: 21-30 minutes    Note: not billable if this call serves to triage the patient into an appointment for the relevant concern      Thank you for allowing me to participate in the care of Ms. Aiken. For any further questions please do not hesitate to contact me. I have reviewed and agree with the ROS entered by the MA/JINN.          Courtney Cabot, MD, [facility]  Board Certified in Gastroenterology and [address] [place] [address] Gastroenterology  Office #: [phone]

## 2021-11-26 DIAGNOSIS — F51.01 PRIMARY INSOMNIA: ICD-10-CM

## 2021-11-29 ENCOUNTER — HOSPITAL ENCOUNTER (OUTPATIENT)
Age: 76
Discharge: HOME | End: 2021-12-01
Payer: MEDICARE

## 2021-11-29 ENCOUNTER — HOSPITAL ENCOUNTER (OUTPATIENT)
Dept: GENERAL RADIOLOGY | Age: 76
Discharge: HOME | End: 2021-12-01
Payer: MEDICARE

## 2021-11-29 DIAGNOSIS — R52 PAIN: ICD-10-CM

## 2021-11-29 PROCEDURE — 72114 X-RAY EXAM L-S SPINE BENDING: CPT

## 2021-11-29 PROCEDURE — 73560 X-RAY EXAM OF KNEE 1 OR 2: CPT

## 2021-11-29 PROCEDURE — 72050 X-RAY EXAM NECK SPINE 4/5VWS: CPT

## 2021-11-29 RX ORDER — QUETIAPINE FUMARATE 50 MG/1
TABLET, FILM COATED ORAL
Qty: 60 TABLET | Refills: 2 | Status: SHIPPED | OUTPATIENT
Start: 2021-11-29 | End: 2022-03-02

## 2021-11-29 NOTE — RESULT ENCOUNTER NOTE
Noted. Ordered (to be addressed/treated) per ordering provider. Pain management. Available if needed for correlation of care.

## 2021-11-29 NOTE — TELEPHONE ENCOUNTER
LOV 6/24/21  RTO If sx's worsen; F/U scheduled  LRF 10/7/21    Health Maintenance   Topic Date Due    Hepatitis C screen  Never done    DTaP/Tdap/Td vaccine (1 - Tdap) Never done    Shingles Vaccine (1 of 2) Never done    Lipid screen  08/12/2021    Flu vaccine (1) 09/01/2021    COVID-19 Vaccine (3 - Booster for Moderna series) 10/07/2021    DEXA (modify frequency per FRAX score)  Completed    Pneumococcal 65+ years Vaccine  Completed    Hepatitis A vaccine  Aged Out    Hib vaccine  Aged Out    Meningococcal (ACWY) vaccine  Aged Out             (applicable per patient's age: Cancer Screenings, Depression Screening, Fall Risk Screening, Immunizations)    Hemoglobin A1C (%)   Date Value   08/12/2020 5.6   02/19/2019 5.5   05/23/2018 5.3     Microalb/Crt.  Ratio (mcg/mg creat)   Date Value   05/12/2016 7     LDL Cholesterol (mg/dL)   Date Value   08/12/2020 179 (H)     LDL Calculated (mg/dL)   Date Value   11/13/2014 109     AST (U/L)   Date Value   06/24/2021 22     ALT (U/L)   Date Value   06/24/2021 14     BUN (mg/dL)   Date Value   06/24/2021 10      (goal A1C is < 7)   (goal LDL is <100) need 30-50% reduction from baseline     BP Readings from Last 3 Encounters:   11/12/21 120/87   10/08/21 135/87   08/17/21 (!) 98/56    (goal /80)      All Future Testing planned in [facility]:  Lab Frequency Next Occurrence   Respiratory Panel, Molecular, with COVID-19 Once 03/16/2022   Comprehensive Metabolic Panel Once 10/84/2895   Lactate Dehydrogenase Once 03/16/2022   Culture, Blood 1 Once 03/16/2022   EGD Once 11/11/2021       Next Visit Date:  Future Appointments   Date Time Provider [place] Falcon   11/30/2021 10:00 AM Marli Gutierrez APRN - CNP [facility]   5/13/2022 11:00 AM [facility]            Patient Active Problem List:     Hyperlipidemia     Fibromyalgia     Osteopenia     Hx of bilateral breast implants     Vitamin D deficiency     Arthritis of shoulder region, right     Anxiety     Anemia, normocytic normochromic     Chronic pain associated with significant psychosocial dysfunction     Primary osteoarthritis involving multiple joints     Spondylosis 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     Vitamin B12 deficiency     Primary insomnia     Lumbar and sacral arthritis     Chronic biliary pancreatitis (HCC)     Moderate episode of recurrent major depressive disorder (HCC)     Uncomplicated opioid dependence (HCC)     Hyperuricemia     Primary open-angle glaucoma, right eye, mild stage     Primary open-angle glaucoma, left eye, mild stage     Gastroesophageal reflux disease     Abnormal finding on imaging     Esophageal dysphagia     Hiatal hernia     Type 2 diabetes mellitus     Recurrent aspiration pneumonia (HCC)     Esophageal hiatus hernia

## 2021-11-30 ENCOUNTER — OFFICE VISIT (OUTPATIENT)
Dept: FAMILY MEDICINE CLINIC | Age: 76
End: 2021-11-30
Payer: MEDICARE

## 2021-11-30 VITALS
HEART RATE: 91 BPM | WEIGHT: 147 LBS | HEIGHT: 60 IN | OXYGEN SATURATION: 99 % | BODY MASS INDEX: 28.86 KG/M2 | DIASTOLIC BLOOD PRESSURE: 76 MMHG | SYSTOLIC BLOOD PRESSURE: 118 MMHG

## 2021-11-30 DIAGNOSIS — R73.9 ELEVATED BLOOD SUGAR LEVEL: ICD-10-CM

## 2021-11-30 DIAGNOSIS — E78.00 PURE HYPERCHOLESTEROLEMIA: Chronic | ICD-10-CM

## 2021-11-30 DIAGNOSIS — F41.9 ANXIETY: Chronic | ICD-10-CM

## 2021-11-30 DIAGNOSIS — J30.89 NON-SEASONAL ALLERGIC RHINITIS DUE TO OTHER ALLERGIC TRIGGER: ICD-10-CM

## 2021-11-30 DIAGNOSIS — E79.0 HYPERURICEMIA: ICD-10-CM

## 2021-11-30 DIAGNOSIS — E53.8 VITAMIN B12 DEFICIENCY: ICD-10-CM

## 2021-11-30 DIAGNOSIS — E55.9 VITAMIN D DEFICIENCY: ICD-10-CM

## 2021-11-30 DIAGNOSIS — R13.19 ESOPHAGEAL DYSPHAGIA: ICD-10-CM

## 2021-11-30 DIAGNOSIS — F33.1 MAJOR DEPRESSIVE DISORDER, RECURRENT EPISODE, MODERATE (HCC): Primary | ICD-10-CM

## 2021-11-30 DIAGNOSIS — D64.9 ANEMIA, NORMOCYTIC NORMOCHROMIC: ICD-10-CM

## 2021-11-30 DIAGNOSIS — Z23 NEED FOR INFLUENZA VACCINATION: ICD-10-CM

## 2021-11-30 DIAGNOSIS — K44.9 ESOPHAGEAL HIATUS HERNIA: ICD-10-CM

## 2021-11-30 DIAGNOSIS — K21.00 GASTROESOPHAGEAL REFLUX DISEASE WITH ESOPHAGITIS WITHOUT HEMORRHAGE: ICD-10-CM

## 2021-11-30 PROCEDURE — G0008 ADMIN INFLUENZA VIRUS VAC: HCPCS | Performed by: NURSE PRACTITIONER

## 2021-11-30 PROCEDURE — 99215 OFFICE O/P EST HI 40 MIN: CPT | Performed by: NURSE PRACTITIONER

## 2021-11-30 PROCEDURE — 90694 VACC AIIV4 NO PRSRV 0.5ML IM: CPT | Performed by: NURSE PRACTITIONER

## 2021-11-30 RX ORDER — QUETIAPINE FUMARATE 200 MG/1
200 TABLET, FILM COATED ORAL DAILY
Qty: 90 TABLET | Refills: 1 | Status: SHIPPED | OUTPATIENT
Start: 2021-11-30 | End: 2022-02-02

## 2021-11-30 RX ORDER — MONTELUKAST SODIUM 10 MG/1
10 TABLET ORAL NIGHTLY
Qty: 90 TABLET | Refills: 1 | Status: SHIPPED | OUTPATIENT
Start: 2021-11-30 | End: 2022-03-01 | Stop reason: ALTCHOICE

## 2021-11-30 RX ORDER — ATORVASTATIN CALCIUM 80 MG/1
80 TABLET, FILM COATED ORAL DAILY
Qty: 90 TABLET | Refills: 1 | Status: SHIPPED | OUTPATIENT
Start: 2021-11-30 | End: 2022-05-31

## 2021-11-30 RX ORDER — PANTOPRAZOLE SODIUM 40 MG/1
40 TABLET, DELAYED RELEASE ORAL
Qty: 180 TABLET | Refills: 1 | Status: SHIPPED | OUTPATIENT
Start: 2021-11-30 | End: 2022-03-01 | Stop reason: ALTCHOICE

## 2021-11-30 ASSESSMENT — ENCOUNTER SYMPTOMS
ABDOMINAL DISTENTION: 1
WHEEZING: 0
NAUSEA: 0
GLOBUS SENSATION: 1
DIARRHEA: 0
SHORTNESS OF BREATH: 1
BELCHING: 1
CHEST TIGHTNESS: 1
TROUBLE SWALLOWING: 0
SORE THROAT: 0
RHINORRHEA: 1
COUGH: 1
ABDOMINAL PAIN: 0
HEARTBURN: 1
VOMITING: 0

## 2021-11-30 NOTE — PATIENT INSTRUCTIONS
Follow next week with Dr Tae Trimble   All medicines refilled today to [facility]  Call Dr Livingston File office for a follow up?  Order PT?

## 2021-11-30 NOTE — PROGRESS NOTES
[address]  [phone]    11/30/21     Patient ID  Edwar Galeas is a 64 y.o. female  Established patient    Chief Complaint  Edwar Galeas presents today for Flu Vaccine and Anxiety      ASSESSMENT/PLAN  1. Major depressive disorder, recurrent episode, moderate (HCC)  -     QUEtiapine (SEROQUEL) 200 MG tablet; Take 1 tablet by mouth daily, Disp-90 tablet, R-1Normal  2. Anxiety  -     QUEtiapine (SEROQUEL) 200 MG tablet; Take 1 tablet by mouth daily, Disp-90 tablet, R-1Normal  3. Esophageal hiatus hernia  -     Comprehensive Metabolic Panel, Fasting; Future  -     [facility], Corinna Phy, DO, General Surgery, [place]  4. Esophageal dysphagia  5. Gastroesophageal reflux disease with esophagitis without hemorrhage  -     pantoprazole (PROTONIX) 40 MG tablet; Take 1 tablet by mouth 2 times daily (before meals), Disp-180 tablet, R-1Normal  -     [facility], Corinna Phy, DO, General Surgery, [place]  6. Pure hypercholesterolemia  -     atorvastatin (LIPITOR) 80 MG tablet; Take 1 tablet by mouth daily, Disp-90 tablet, R-1Normal  -     Lipid Panel; Future  7. Anemia, normocytic normochromic  -     Ferritin; Future  -     Iron and TIBC; Future  -     Urinalysis Reflex to Culture; Future  -     Vitamin B12 & Folate; Future  -     CBC With Auto Differential; Future  8. Vitamin B12 deficiency  9. Vitamin D deficiency  -     Vitamin D 25 Hydroxy; Future  10. Non-seasonal allergic rhinitis due to other allergic trigger  -     montelukast (SINGULAIR) 10 MG tablet; Take 1 tablet by mouth nightly, Disp-90 tablet, R-1Normal  11. Hyperuricemia  -     Uric Acid; Future  12. Elevated blood sugar level  -     Hemoglobin A1C; Future  13. Need for influenza vaccination  -     INFLUENZA, QUADV, ADJUVANTED, 65 YRS =, IM, PF, PREFILL SYR, 0.5ML (FLUAD)     Refills sent  Protonix resent - never started?    Make appt with ortho for right knee, potential PT? Follow with [facility] next week     Return in about 4 months (around 3/30/2022). On this date 11/30/2021 I have spent 47+ minutes reviewing previous notes, test results and face to face with the patient discussing the diagnosis and importance of compliance with the treatment plan as well as documenting on the day of the visit. Patient Care Team:  DES Tadeo CNP as PCP - General (Nurse Practitioner Family)  DES Tadeo CNP as PCP - [facility] Empaneled Provider  Perri Goetz MD (Anesthesiology)  Dolores Aronld DO as Surgeon (Orthopedic Surgery)    SUBJECTIVE/OBJECTIVE  History of Present illness / Visit Summary     [facility], pain management, follow up next week  Reviewed xrays   Make appt with ortho? PT? Referral to surgeon for hiatal hernia   Never started Protonix? Resent to pharmacy. GI charted zantac? Gastroesophageal Reflux  She complains of belching, coughing (non productive), early satiety, globus sensation and heartburn. She reports no abdominal pain, no chest pain, no dysphagia, no nausea, no sore throat or no wheezing. This is a recurrent problem. The problem occurs frequently. The problem has been waxing and waning. The heartburn is located in the substernum. The heartburn does not wake her from sleep. The heartburn does not limit her activity. The heartburn doesn't change with position. The symptoms are aggravated by certain foods. Associated symptoms include anemia, fatigue and weight loss. She has tried a PPI and a pro-motility drug for the symptoms. Past procedures include an EGD. 11/12/2021 GI   Assessment  1. Esophageal hiatus hernia    2. Gastroesophageal reflux disease, unspecified whether esophagitis present    3.  Recurrent aspiration pneumonia [facility])       Plan     She wants to keep taking Zantac since it is working for her     Will send to Dr Ana Suarez for evaluation for possible surgery since she has recurrent pneumonia     She does not want to have Colonoscopy understanding the rationale     Pt seems to have signs and symptoms consistent with GERD, acid indigestion and heartburns. She was discussed  in detail about some possible life style and dietary modifications. She was stressed about the maintenance  of appropriate weight and effect of obesity contributing to reflux symptoms. Routine exercise was streesed.      Avoidance of Caffeine, nicotine and chocolate were explained. Pt was asked to avoid spices grease and fried food. Advices were also given about avoidance of any kind of fast foods, soda pops and high energy drinks.     Pt was advised to place two small block under the head end of the bed which may help with night time reflux. Was advised not to eat any thin at least 2-3 hrs before going to bed and walk especially after dinner     Pt has verbalized understanding and agreement to this plan.     The patient was instructed to start taking some OTC Probiotics products   These are available over the counter at the Pharmacy stores and Grocery stores  He was explained about the beneficial effects they have in the GI track  They will help to establish the good bacterial mago and will help with the digestion and bowel movements  The patient has verbalized understanding and agreement to this plan     She had multiple questions were answered to her satisfaction     More than half of patient's clinic visit time was spent in counseling about lifestyle and dietary modifications  Patient's  questions were answered in this regard as well  The patient has verbalized understanding and agreement       Review of Systems  Review of Systems   Constitutional: Positive for fatigue and weight loss. Negative for activity change, appetite change (poor), chills, fever and unexpected weight change. HENT: Positive for congestion, postnasal drip and rhinorrhea. Negative for ear pain, sore throat and trouble swallowing.     Respiratory: Positive for cough (non productive), chest tightness and shortness of breath (w/exertion). Negative for wheezing. Cardiovascular: Negative for chest pain and palpitations. Gastrointestinal: Positive for abdominal distention and heartburn. Negative for abdominal pain, diarrhea, dysphagia, nausea and vomiting. Musculoskeletal: Positive for arthralgias (right knee), gait problem, joint swelling (right knee) and myalgias. Skin: Negative for pallor and rash. Allergic/Immunologic: Positive for environmental allergies. Neurological: Negative for dizziness, weakness, light-headedness and headaches. Hematological: Negative for adenopathy. Psychiatric/Behavioral: Positive for dysphoric mood and sleep disturbance. The patient is nervous/anxious. Cares for ill son        Physical exam   Physical Exam  Vitals and nursing note reviewed. Constitutional:       General: She is not in acute distress. Appearance: Normal appearance. She is well-developed and well-groomed. She is not ill-appearing or toxic-appearing. Cardiovascular:      Rate and Rhythm: Normal rate and regular rhythm. No extrasystoles are present. Heart sounds: S1 normal and S2 normal. Heart sounds are distant. No murmur heard. Pulmonary:      Effort: Pulmonary effort is normal. No prolonged expiration or respiratory distress. Breath sounds: Normal breath sounds and air entry. Musculoskeletal:      Right knee: Swelling present. No effusion, erythema, bony tenderness or crepitus. Decreased range of motion. Tenderness present over the medial joint line and MCL. Skin:     General: Skin is warm and dry. Coloration: Skin is not ashen, cyanotic, jaundiced or pale. Neurological:      Mental Status: She is alert and oriented to person, place, and time. Motor: Motor function is intact. Gait: Gait is intact.    Psychiatric:         Attention and Perception: Attention and perception normal.         Mood and Affect: Mood and affect normal.         Speech: Speech normal.         Behavior: Behavior normal. Behavior is cooperative. Thought Content: Thought content normal. Thought content does not include suicidal ideation. Thought content does not include suicidal plan. Cognition and Memory: Cognition and memory normal.         Judgment: Judgment normal.           Electronically signed by DES Flores CNP, APRN-CNP on 12/30/2021 at 9:45 PM    Please note that this chart was generated using voice recognition Dragon dictation software. Although every effort was made to ensure the accuracy of this automated transcription, some errors in transcription may have occurred.

## 2022-01-04 ENCOUNTER — INITIAL CONSULT (OUTPATIENT)
Dept: BARIATRICS/WEIGHT MGMT | Age: 77
End: 2022-01-04
Payer: MEDICARE

## 2022-01-04 VITALS
WEIGHT: 148 LBS | DIASTOLIC BLOOD PRESSURE: 76 MMHG | RESPIRATION RATE: 20 BRPM | BODY MASS INDEX: 29.06 KG/M2 | HEIGHT: 60 IN | SYSTOLIC BLOOD PRESSURE: 128 MMHG | HEART RATE: 70 BPM

## 2022-01-04 DIAGNOSIS — E66.9 DIABETES MELLITUS TYPE 2 IN OBESE (HCC): ICD-10-CM

## 2022-01-04 DIAGNOSIS — E11.69 DIABETES MELLITUS TYPE 2 IN OBESE (HCC): ICD-10-CM

## 2022-01-04 DIAGNOSIS — K21.9 HIATAL HERNIA WITH GERD: Primary | ICD-10-CM

## 2022-01-04 DIAGNOSIS — K44.9 HIATAL HERNIA WITH GERD: Primary | ICD-10-CM

## 2022-01-04 PROCEDURE — 99204 OFFICE O/P NEW MOD 45 MIN: CPT | Performed by: SURGERY

## 2022-01-04 NOTE — LETTER
[facility] Invasive Bariatric Surg  [address] [place] [address]  Phone: [phone]  Fax: [address] [facility],       January 10, 2022     Patient: Leticia Barton   MR Number: [id number]   YOB: 1945   Date of Visit: 1/4/2022       Dear Dr. Marcos Rodrigues: Thank you for referring Mariana Benton to me for evaluation/treatment. Below are the relevant portions of my assessment and plan of care. If you have questions, please do not hesitate to call me. I look forward to following Miller Lackey along with you.     Sincerely,        Tomer Jones DO    CC providers:  DES Toribio - CNP  [address] [place] [address]  [address] [place]

## 2022-01-10 NOTE — PROGRESS NOTES
[address] [facility] MIN INVASIVE BARIATRIC SURG  [address] [place] [address] CT  [address] [facility] [address]  Dept: [phone]    ROBOTIC & MINIMALLY INVASIVE SURGERY  PROGRESS NOTE INITIAL EVALUATION     Patient: Nola Parr        Service Date: 1/4/2022      HPI:     Chief Complaint   Patient presents with    Consultation     gen surg consult GERD    Gastroesophageal Reflux       The patient is a pleasant 64y.o. year old female  with long standing GERD and a hiatal hernia, who stands Height: 5' (152.4 cm) tall with a weight of Weight: 148 lb (67.1 kg) , resulting in a BMI of Body mass index is 28.9 kg/m². She reports recurrent pneumonia and this is suspected to be related to aspiration. She underwent EGD with Dr. Lana Cottrell showing a large hiatal hernia with signs of reflux. She does have on and off trouble swallowing more solid foods. She denies melena, hematochezia or hematemesis. She has been on PPI, but symptoms persist.    The patient denies  a history of myocardial infarction, deep vein thrombosis, pulmonary embolism, renal failure, hepatic failure and stroke.         Medical History:  Past Medical History:   Diagnosis Date    Anemia     Bochdalek hernia     PER CT 7-2-21    Bowel obstruction (HCC)     Bronchitis 02/19/2019    Frequent episodes of bronchitis, usually yearly with pneumonia    Chronic biliary pancreatitis ([address] 09/22/2016    Controlled type 2 diabetes mellitus without complication, without long-term current use of insulin ([address]     Cough 08/13/2021    has had cough for several months    DDD (degenerative disc disease)     Depression     Diverticulosis     Esophageal dysphagia     Fibromyalgia     GERD (gastroesophageal reflux disease)     Glaucoma     Hiatal hernia     Hyperlipidemia     Knee injuries 02/19/2019    Osteoarthritis of more than one site 04/09/2014    Pneumonia     gets pneumonia yearly    Right middle lobe pneumonia 03/24/2016    Seasonal allergies        Surgical History:  Past Surgical History:   Procedure Laterality Date    BREAST BIOPSY Left 05/2016    CARPAL TUNNEL RELEASE Bilateral     x 2 each    COLONOSCOPY      EYE SURGERY Left     stent for glaucoma    EYE SURGERY Left     cataract    EYE SURGERY Left     lower lid    FOOT SURGERY Right     HYSTERECTOMY      KNEE ARTHROSCOPY Right 12/04/2019    RIGHT KNEE ARTHROSCOPY WITH TOTAL MENISCECTOMY performed by Ever Meneses DO at [address]  10/03/2016    ID ARTHRS KNE SURG W/MENISCECTOMY MED/LAT W/SHVG Left 08/13/2018    KNEE ARTHROSCOPY FOR PARTIAL MEDIAL AND PARTIAL LATERAL MENISCECTOMY performed by Finesse Marks MD at [address] [place] [address]      due to blockage    UPPER GASTROINTESTINAL ENDOSCOPY      EGD    UPPER GASTROINTESTINAL ENDOSCOPY N/A 8/17/2021    EGD BIOPSY performed [facility] Emma Wei MD at [facility]       Family History:      Problem Relation Age of Onset    High Blood Pressure Mother     Stroke Mother     Heart Disease Father     Stroke Maternal Grandmother        Social History:   Social History     Tobacco Use    Smoking status: Never Smoker    Smokeless tobacco: Never Used   Vaping Use    Vaping Use: Never used   Substance Use Topics    Alcohol use: No     Alcohol/week: 0.0 standard drinks    Drug use: No       Current Med List:  Current Outpatient Medications   Medication Sig Dispense Refill    pantoprazole (PROTONIX) 40 MG tablet Take 1 tablet by mouth 2 times daily (before meals) 180 tablet 1    atorvastatin (LIPITOR) 80 MG tablet Take 1 tablet by mouth daily 90 tablet 1    montelukast (SINGULAIR) 10 MG tablet Take 1 tablet by mouth nightly 90 tablet 1    QUEtiapine (SEROQUEL) 200 MG tablet Take 1 tablet by mouth daily 90 tablet 1    QUEtiapine (SEROQUEL) 50 MG tablet TAKE 1 OR 2 TABLETS BY MOUTH AT BEDTIME AS NEEDED for sleep 60 tablet 2    dorzolamide-timolol (COSOPT) 22.3-6.8 MG/ML ophthalmic solution INSTILL 1 DROP IN [facility] EYE TWO TIMES A DAY      gabapentin (NEURONTIN) 800 MG tablet TAKE 1 TAB BY MOUTH AT BEDTIME FOR ONE WEEK THEN 2 TABS AT BEDTIME FOR ONE WEEK THEN 3 TABS AT BEDTIME THEREAFTER      Handicap Placard MISC by Does not apply route Exp 3/16/2026 1 each 0    cyanocobalamin ([employer] VITAMIN B12) 1000 MCG tablet Take 2 tablets by mouth daily 180 tablet 1    diclofenac sodium (VOLTAREN) 1 % GEL as needed      oxyCODONE-acetaminophen (PERCOCET)  MG per tablet Take 1 tablet by mouth every 4 hours as needed for Pain.   0     No current facility-administered medications for this visit. Allergies   Allergen Reactions    Pcn [Penicillins] Anaphylaxis     Patient states PCN allergy was 20 years ago, unsure if reaction still present    Adhesive Tape     Nubain [Nalbuphine Hcl]      Leg  Cramping  When mixed with vistaril    Seasonal     Vistaril [Hydroxyzine] Other (See Comments)     fainted       SOCIAL:      This patient is alone for the evaluation today.       [] HIV Risk Factors (i.e.) intravenous drug abuser; at risk sexual behavior; received blood products    [] TB Risk Factors (i.e.) Medically underserved, institutional care, foreign born, endemic area; exposure to active case    [] Hepatitis B&C Risk Factors (i.e.) Received blood transfusion prior to 1992; recreational drug use; high risk sexual behaviors; tattoos or body piercings; contact with blood or needle sticks in the workplace    Comprehension    Ability to grasp concepts and respond to questions:   [x] High   [] Medium   [] Low    Motivation    [x] Asks Questions; eager to learn   [] Needs education   [] Extreme anxiety    [] uncooperative   [] Denies need for education    English Speaking Ability    [x] Speaks English well   [x] Reads English well   [x] Understands spoken english    [x] Understands written English   [] No need for interpretive support      [] Might benefit from interpretive support   []  required for all services     REVIEW OF SYSTEMS: (Negative unless marked otherwise)     See review of Systems scanned into media    PRESENT ILLNESS:     Weight Parameters  Weight 148 lb (67.1 kg)   Height 5' (1.524 m)   BMI Body mass index is 28.9 kg/m². IBW     EBW               IMMUNIZATION STATUS  Immunization History   Administered Date(s) Administered    COVID-19, Moderna, Primary or Immunocompromised, PF, 100mcg/0.5mL 03/09/2021, 04/07/2021, 10/28/2021    Influenza 09/23/2013    Influenza Vaccine, unspecified formulation 09/23/2013    Influenza Virus Vaccine 09/23/2013, 10/05/2015, 10/16/2018    Influenza, [place] Hum, IM, (6 mo and older Fluzone, Flulaval, Fluarix and 3 yrs and older Afluria) 09/13/2016, 10/23/2017, 10/16/2018    Influenza, Quadv, IM, PF (6 mo and older Fluzone, Flulaval, Fluarix, and 3 yrs and older Afluria) 10/25/2019    Influenza, Quadv, adjuvanted, 65 yrs +, IM, PF (Fluad) 11/05/2020, 11/30/2021    Pneumococcal Conjugate 13-valent ([id number]) 10/05/2015    Pneumococcal Polysaccharide ([id number]) 10/17/2016       FALLS ASSESSMENT    [x] LOW RISK FOR FALLS    [] MODERATE RISK FOR FALLS    [] Difficulty walking/selfcare    [] Falls in the past 2 months    [] Suspicion of Clinician    [] Other:      SMOKING CESSATION     [x] Not needed     [] Instructed to stop smoking    [] Pamphlet community resources given     VTE SCREEN    [] Family hx DVT/PE  /   [] Personal hx of DVT/PE    [x] Denies any family or personal hx of DVT/PE    Physician Review    [x] Past medical, family, & social history reviewed and discussed with patient.      Review of surgery and post-surgical changes (by surgeon for surgical patients only)    [x] Lifelong diet expectations reviewed with patient    [x] Need for lifelong vitamin supplementation reviewed with patient    PHYSICAL EXAMINATION:      /76 (Site: Right Upper Arm, Position: Sitting, Cuff Size: Large Adult)   Pulse 70   Resp 20   Ht 5' (1.524 m)   Wt 148 lb (67.1 kg)   BMI 28.90 kg/m²     Constitutional:  Vital signs are normal. The patient appears well-developed   HEENT:      Head: Normocephalic. Atraumatic     Eyes: pupils are equal and reactive. No scleral icterus is present. Neck: No mass and no thyromegaly present. Cardiovascular: Normal rate, regular rhythm, S1 normal and S2 normal.  Bilateral pulses present. Pulmonary/Chest: Effort normal and breath sounds normal. No retractions. Abdominal: Soft. Normal appearance. There is no organomegaly. No tenderness. There is no rigidity, no rebound, no guarding and no Shine's sign. Musculoskeletal:      Right lower leg: Normal. No tenderness and no edema. Left lower leg: Normal. No tenderness and no edema. Lymphadenopathy:     No cervical adenopathy, No Exrtemity Adenopathy. Neurological: The patient is alert and oriented. Moving all four extremities equally, sensation grossly intact bilateral.  Skin: Skin is warm, dry and intact. Psychiatric: The patient has a normal mood and affect. Speech is normal and behavior is normal. Judgment and thought content normal. Cognition and memory are normal.     RECOMMENDATIONS:     We spent a great deal of time discussing the risks and benefits of Robotic/Laparoscopic Paraesophageal hernia repair with mesh, possible open, fundoplication, including but not limited to injury to intra-abdominal organs, breakdown of the gastric or esophageal wall or perforation, pneumothorax, pleural effusion, the need for re-operative therapy, prolonged hospitalization, mechanical ventilation and death. We discussed the possibility of bleeding, the need for blood transfusions, blood clots, hospital-acquired and intra-abdominal infection, stricture, and worsening GERD or dysphagia. We discussed the need for post-operative visit compliance, behavior modifications and diet changes. PLAN:       Diagnosis Orders   1.  Hiatal hernia with GERD  FL Upper Gi W [place] Oil Contrast    Manometry Esophageal   2.  Diabetes mellitus type 2 in obese [facility])              Check Upper GI  EGD reviewed with patient, large hiatal hernia  Check manometry to rule out motility disorder with consideration of fundoplication  PPI  Sleep with head of bed elevated  Avoid alcohol and nicotine  No meals 3 hours prior to bedtime  All questions answered  We discuss repair and she would like to move forward with work up for repair of her hiatal hernia  Follow up after testing  She will need medical clearance prior to any repair given diabetes and her advanced age    Electronically signed by Ngozi Guido DO on 1/10/2022 at 5:36 PM

## 2022-01-18 ENCOUNTER — TELEPHONE (OUTPATIENT)
Dept: BARIATRICS/WEIGHT MGMT | Age: 77
End: 2022-01-18

## 2022-01-18 DIAGNOSIS — K44.9 HIATAL HERNIA: Primary | ICD-10-CM

## 2022-01-18 RX ORDER — ALPRAZOLAM 0.25 MG/1
0.25 TABLET ORAL ONCE
Qty: 1 TABLET | Refills: 0 | Status: SHIPPED | OUTPATIENT
Start: 2022-01-18 | End: 2022-01-18

## 2022-01-21 ENCOUNTER — HOSPITAL ENCOUNTER (OUTPATIENT)
Age: 77
Setting detail: OUTPATIENT SURGERY
Discharge: HOME | End: 2022-01-21
Attending: SURGERY | Admitting: SURGERY
Payer: MEDICARE

## 2022-01-21 VITALS
HEIGHT: 60 IN | SYSTOLIC BLOOD PRESSURE: 104 MMHG | WEIGHT: 140 LBS | BODY MASS INDEX: 27.48 KG/M2 | RESPIRATION RATE: 18 BRPM | TEMPERATURE: 97.2 F | HEART RATE: 82 BPM | DIASTOLIC BLOOD PRESSURE: 60 MMHG | OXYGEN SATURATION: 91 %

## 2022-01-21 PROCEDURE — 3609015500 HC GASTRIC/DUODENAL MOTILITY &/OR MANOMETRY STUDY: Performed by: SURGERY

## 2022-01-21 PROCEDURE — 91010 ESOPHAGUS MOTILITY STUDY: CPT | Performed by: INTERNAL MEDICINE

## 2022-01-21 ASSESSMENT — PAIN - FUNCTIONAL ASSESSMENT: PAIN_FUNCTIONAL_ASSESSMENT: 0-10

## 2022-01-21 NOTE — PROGRESS NOTES
Patient states she had taken Xanax prior to procedure, appears drowsy but able to tolerate swallows and finish procedure with verbal stimulation throughout test.

## 2022-01-21 NOTE — PROGRESS NOTES
Patient talking, attempting to finish last few swallows. States she feels nauseated now. RN explains 2 swallows left, patient states ok.

## 2022-02-02 DIAGNOSIS — F41.9 ANXIETY: Chronic | ICD-10-CM

## 2022-02-02 DIAGNOSIS — F33.1 MAJOR DEPRESSIVE DISORDER, RECURRENT EPISODE, MODERATE (HCC): ICD-10-CM

## 2022-02-02 RX ORDER — QUETIAPINE FUMARATE 200 MG/1
TABLET, FILM COATED ORAL
Qty: 90 TABLET | Refills: 3 | Status: SHIPPED | OUTPATIENT
Start: 2022-02-02

## 2022-02-02 NOTE — TELEPHONE ENCOUNTER
Last visit: 11/30/21  Last Med refill: 11/30/21  Does patient have enough medication for 72 hours: Yes. Next Visit Date:  Future Appointments   Date Time Provider [place]roy Falcon   2/4/2022  4:00 PM Toan Sheehan, DO bariatric jay [facility]   3/1/2022  1:00 PM Marli Bass, APRN - CNP [facility]   5/13/2022 11:00 AM MD Elise Blue Maintenance   Topic Date Due    Hepatitis C screen  Never done    Depression Monitoring  Never done    DTaP/Tdap/Td vaccine (1 - Tdap) Never done    Shingles Vaccine (1 of 2) Never done    Lipid screen  08/12/2021    DEXA (modify frequency per FRAX score)  Completed    Flu vaccine  Completed    Pneumococcal 65+ years Vaccine  Completed    COVID-19 Vaccine  Completed    Hepatitis A vaccine  Aged Out    Hib vaccine  Aged Out    Meningococcal (ACWY) vaccine  Aged Out       Hemoglobin A1C (%)   Date Value   08/12/2020 5.6   02/19/2019 5.5   05/23/2018 5.3             ( goal A1C is < 7)   Microalb/Crt.  Ratio (mcg/mg creat)   Date Value   05/12/2016 7     LDL Cholesterol (mg/dL)   Date Value   08/12/2020 179 (H)   02/19/2019 89     LDL Calculated (mg/dL)   Date Value   11/13/2014 109       (goal LDL is <100)   AST (U/L)   Date Value   06/24/2021 22     ALT (U/L)   Date Value   06/24/2021 14     BUN (mg/dL)   Date Value   06/24/2021 10     BP Readings from Last 3 Encounters:   01/21/22 104/60   01/04/22 128/76   11/30/21 118/76          (goal 120/80)    All Future Testing planned in [facility]  Lab Frequency Next Occurrence   Respiratory Panel, Molecular, with COVID-19 Once 03/16/2022   Comprehensive Metabolic Panel Once 46/34/9764   Lactate Dehydrogenase Once 03/16/2022   Culture, Blood 1 Once 03/16/2022   EGD Once 11/11/2021   Comprehensive Metabolic Panel, Fasting Once 11/30/2021   Ferritin Once 11/30/2021   Iron and TIBC Once 11/30/2021   Lipid Panel Once 11/30/2021   Urinalysis Reflex to Culture Once 11/30/2021   Vitamin B12 & Folate Once 11/30/2021   Vitamin D 25 Hydroxy Once 11/30/2021   CBC With Auto Differential Once 11/30/2021   Hemoglobin A1C Once 11/30/2021   Uric Acid Once 11/30/2021   FL Upper Gi W Air Contrast Once 01/27/2022   Manometry Esophageal Once 02/12/2022               Patient Active Problem List:     Hyperlipidemia     Fibromyalgia     Osteopenia     Hx of bilateral breast implants     Vitamin D deficiency     Arthritis of shoulder region, right     Anxiety     Anemia, normocytic normochromic     Chronic pain associated with significant psychosocial dysfunction     Primary osteoarthritis involving multiple joints     Spondylosis 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     Vitamin B12 deficiency     Primary insomnia     Lumbar and sacral arthritis     Chronic biliary pancreatitis (HCC)     Moderate episode of recurrent major depressive disorder (HCC)     Uncomplicated opioid dependence ([facility] [address]     Hyperuricemia     Primary open-angle glaucoma, right eye, mild stage     Primary open-angle glaucoma, left eye, mild stage     Gastroesophageal reflux disease     Abnormal finding on imaging     Esophageal dysphagia     Hiatal hernia     Type 2 diabetes mellitus     Recurrent aspiration pneumonia (HCC)     Esophageal hiatus hernia

## 2022-02-10 ENCOUNTER — OFFICE VISIT (OUTPATIENT)
Dept: BARIATRICS/WEIGHT MGMT | Age: 77
End: 2022-02-10
Payer: MEDICARE

## 2022-02-10 VITALS
BODY MASS INDEX: 29.84 KG/M2 | RESPIRATION RATE: 20 BRPM | WEIGHT: 152 LBS | HEART RATE: 70 BPM | DIASTOLIC BLOOD PRESSURE: 80 MMHG | SYSTOLIC BLOOD PRESSURE: 114 MMHG | HEIGHT: 60 IN

## 2022-02-10 DIAGNOSIS — K44.9 HIATAL HERNIA WITH GERD: Primary | ICD-10-CM

## 2022-02-10 DIAGNOSIS — K21.9 HIATAL HERNIA WITH GERD: Primary | ICD-10-CM

## 2022-02-10 PROCEDURE — 99212 OFFICE O/P EST SF 10 MIN: CPT | Performed by: SURGERY

## 2022-02-17 ENCOUNTER — TELEPHONE (OUTPATIENT)
Dept: BARIATRICS/WEIGHT MGMT | Age: 77
End: 2022-02-17

## 2022-02-17 NOTE — PROGRESS NOTES
[address] INVASIVE BARIATRIC SURG  [address] [facility] [address]  [address]  [address] [facility] [address]  Dept: [phone]    ROBOTIC & MINIMALLY INVASIVE SURGERY  PROGRESS NOTE EVALUATION     Patient: Hazel Tristan        Service Date: 2/10/2022      HPI:     Chief Complaint   Patient presents with    Follow-up     f/u test results manometry/ UGI        The patient is a pleasant 64y.o. year old female  with long standing GERD and a hiatal hernia, who stands Height: 5' (152.4 cm) tall with a weight of Weight: 152 lb (68.9 kg) , resulting in a BMI of Body mass index is 29.69 kg/m². She reports recurrent pneumonia and this is suspected to be related to aspiration. She underwent EGD with Dr. Lucy Julien showing a large hiatal hernia with signs of reflux. She does have on and off trouble swallowing more solid foods. Manometry completed. Upper GI not yet completed. She continues to have symptoms.   She is considering repair        Medical History:  Past Medical History:   Diagnosis Date    Anemia     Bochdalek hernia     PER CT 7-2-21    Bowel obstruction ([address]     Bronchitis 02/19/2019    Frequent episodes of bronchitis, usually yearly with pneumonia    Chronic biliary pancreatitis ([address] 09/22/2016    Controlled type 2 diabetes mellitus without complication, without long-term current use of insulin ([address]     Cough 08/13/2021    has had cough for several months    DDD (degenerative disc disease)     Depression     Diverticulosis     Esophageal dysphagia     Fibromyalgia     GERD (gastroesophageal reflux disease)     Glaucoma     Hiatal hernia     Hyperlipidemia     Knee injuries 02/19/2019    Osteoarthritis of more than one site 04/09/2014    Pneumonia     gets pneumonia yearly    Right middle lobe pneumonia 03/24/2016    Seasonal allergies        Surgical History:  Past Surgical History:   Procedure Laterality Date    BREAST BIOPSY Left 05/2016    CARPAL TUNNEL RELEASE Bilateral     x 2 each    COLONOSCOPY      ESOPHAGEAL MOTILITY STUDY N/A 1/21/2022    PES RN-ESOPHAGEAL MOTILITY STUDY performed by Urban Berkowitz DO at [facility]    EYE SURGERY Left     stent for glaucoma    EYE SURGERY Left     cataract    EYE SURGERY Left     lower lid    FOOT SURGERY Right     HYSTERECTOMY      KNEE ARTHROSCOPY Right 12/04/2019    RIGHT KNEE ARTHROSCOPY WITH TOTAL MENISCECTOMY performed by Cooper Chadwick DO at [address]  10/03/2016    VA ARTHRS KNE SURG W/MENISCECTOMY MED/LAT W/SHVG Left 08/13/2018    KNEE ARTHROSCOPY FOR PARTIAL MEDIAL AND PARTIAL LATERAL MENISCECTOMY performed by Phoebe Rubio MD at [address] [place] [address]      due to blockage    UPPER GASTROINTESTINAL ENDOSCOPY      EGD    UPPER GASTROINTESTINAL ENDOSCOPY N/A 8/17/2021    EGD BIOPSY performed by Te Worrell MD at [facility]       Family History:      Problem Relation Age of Onset    High Blood Pressure Mother     Stroke Mother     Heart Disease Father     Stroke Maternal Grandmother        Social History:   Social History     Tobacco Use    Smoking status: Never Smoker    Smokeless tobacco: Never Used   Vaping Use    Vaping Use: Never used   Substance Use Topics    Alcohol use: No     Alcohol/week: 0.0 standard drinks    Drug use: No       Current Med List:  Current Outpatient Medications   Medication Sig Dispense Refill    QUEtiapine (SEROQUEL) 200 MG tablet TAKE 1 TABLET DAILY 90 tablet 3    pantoprazole (PROTONIX) 40 MG tablet Take 1 tablet by mouth 2 times daily (before meals) 180 tablet 1    atorvastatin (LIPITOR) 80 MG tablet Take 1 tablet by mouth daily 90 tablet 1    montelukast (SINGULAIR) 10 MG tablet Take 1 tablet by mouth nightly 90 tablet 1    QUEtiapine (SEROQUEL) 50 MG tablet TAKE 1 OR 2 TABLETS BY MOUTH AT BEDTIME AS NEEDED for sleep 60 tablet 2    dorzolamide-timolol (COSOPT) 22.3-6.8 MG/ML ophthalmic solution INSTILL 1 DROP IN [facility] EYE TWO TIMES A DAY      gabapentin (NEURONTIN) 800 MG tablet TAKE 1 TAB BY MOUTH AT BEDTIME FOR ONE WEEK THEN 2 TABS AT BEDTIME FOR ONE WEEK THEN 3 TABS AT BEDTIME THEREAFTER      Handicap Placard MISC by Does not apply route Exp 3/16/2026 1 each 0    cyanocobalamin ([employer] VITAMIN B12) 1000 MCG tablet Take 2 tablets by mouth daily 180 tablet 1    diclofenac sodium (VOLTAREN) 1 % GEL as needed      oxyCODONE-acetaminophen (PERCOCET)  MG per tablet Take 1 tablet by mouth every 4 hours as needed for Pain.   0     No current facility-administered medications for this visit. Allergies   Allergen Reactions    Pcn [Penicillins] Anaphylaxis     Patient states PCN allergy was 20 years ago, unsure if reaction still present    Adhesive Tape     Nubain [Nalbuphine Hcl]      Leg  Cramping  When mixed with vistaril    Seasonal     Vistaril [Hydroxyzine] Other (See Comments)     fainted       SOCIAL:      This patient is alone for the evaluation today.       [] HIV Risk Factors (i.e.) intravenous drug abuser; at risk sexual behavior; received blood products    [] TB Risk Factors (i.e.) Medically underserved, institutional care, foreign born, endemic area; exposure to active case    [] Hepatitis B&C Risk Factors (i.e.) Received blood transfusion prior to 1992; recreational drug use; high risk sexual behaviors; tattoos or body piercings; contact with blood or needle sticks in the workplace    Comprehension    Ability to grasp concepts and respond to questions:   [x] High   [] Medium   [] Low    Motivation    [x] Asks Questions; eager to learn   [] Needs education   [] Extreme anxiety    [] uncooperative   [] Denies need for education    English Speaking Ability    [x] Speaks English well   [x] Reads English well   [x] Understands spoken english    [x] Understands written English   [] No need for interpretive support      [] Might benefit from interpretive support   []  required for all services     REVIEW OF SYSTEMS: (Negative unless marked otherwise)     See review of Systems scanned into media    PRESENT ILLNESS:     Weight Parameters  Weight 152 lb (68.9 kg)   Height 5' (1.524 m)   BMI Body mass index is 29.69 kg/m². IBW     EBW               IMMUNIZATION STATUS  Immunization History   Administered Date(s) Administered    COVID-19, Moderna, Primary or Immunocompromised, PF, 100mcg/0.5mL 03/09/2021, 04/07/2021, 10/28/2021    Influenza 09/23/2013    Influenza Vaccine, unspecified formulation 09/23/2013    Influenza Virus Vaccine 09/23/2013, 10/05/2015, 10/16/2018    Influenza, Fabiana Dayhoff, IM, (6 mo and older Fluzone, Flulaval, Fluarix and 3 yrs and older Afluria) 09/13/2016, 10/23/2017, 10/16/2018    Influenza, Quadv, IM, PF (6 mo and older Fluzone, Flulaval, Fluarix, and 3 yrs and older Afluria) 10/25/2019    Influenza, Quadv, adjuvanted, 65 yrs +, IM, PF (Fluad) 11/05/2020, 11/30/2021    Pneumococcal Conjugate 13-valent ([id number]) 10/05/2015    Pneumococcal Polysaccharide ([id number]) 10/17/2016       FALLS ASSESSMENT    [x] LOW RISK FOR FALLS    [] MODERATE RISK FOR FALLS    [] Difficulty walking/selfcare    [] Falls in the past 2 months    [] Suspicion of Clinician    [] Other:      SMOKING CESSATION     [x] Not needed     [] Instructed to stop smoking    [] Pamphlet community resources given     VTE SCREEN    [] Family hx DVT/PE  /   [] Personal hx of DVT/PE    [x] Denies any family or personal hx of DVT/PE    Physician Review    [x] Past medical, family, & social history reviewed and discussed with patient.      Review of surgery and post-surgical changes (by surgeon for surgical patients only)    [x] Lifelong diet expectations reviewed with patient    [x] Need for lifelong vitamin supplementation reviewed with patient    PHYSICAL EXAMINATION:      /80 (Site: Right Upper Arm, Position: Sitting, Cuff Size: Medium Adult)   Pulse 70   Resp 20   Ht 5' (1.524 m)   Wt 152 lb (68.9 kg)   BMI 29.69 kg/m²   Constitutional:  Vital signs are normal. The patient appears well-developed and well-nourished. HEENT:   Head: Normocephalic. Atraumatic  Eyes: pupils are equal and reactive. No scleral icterus is present. Neck: No mass and no thyromegaly present. Cardiovascular: Normal rate, regular rhythm, S1 normal and S2 normal.  Radial pulses present   Pulmonary/Chest: Effort normal and breath sounds normal. No retractions  Abdominal: Soft. Normal appearance. There is no organomegaly. No tenderness. There is no rigidity, no rebound, no guarding and no Shine's sign. Musculoskeletal:        Right lower leg: Normal. No tenderness and no edema. Left lower leg: Normal. No tenderness and no edema. Neurological: The patient is alert and oriented. Moving all 4 extremities, sensation grossly intact bilateral  Skin: Skin is warm, dry and intact. Psychiatric: The patient has a normal mood and affect. Speech is normal and behavior is normal. Judgment and thought content normal. Cognition and memory are normal.     RECOMMENDATIONS:     We spent a great deal of time discussing the risks and benefits of Robotic/Laparoscopic Paraesophageal hernia repair with mesh, possible open, fundoplication, including but not limited to injury to intra-abdominal organs, breakdown of the gastric or esophageal wall or perforation, pneumothorax, pleural effusion, the need for re-operative therapy, prolonged hospitalization, mechanical ventilation and death. We discussed the possibility of bleeding, the need for blood transfusions, blood clots, hospital-acquired and intra-abdominal infection, stricture, and worsening GERD or dysphagia. We discussed the need for post-operative visit compliance, behavior modifications and diet changes. PLAN:       Diagnosis Orders   1.  Hiatal hernia with GERD              Needs to complete Upper GI  EGD reviewed with patient, large hiatal hernia  Manometry reviewed with patient  PPI  Sleep with head of bed elevated  Avoid alcohol and nicotine  No meals 3 hours prior to bedtime  All questions answered  We discuss repair and she would like to move forward with work up for repair of her hiatal hernia  Follow up after testing  She will need medical clearance prior to any repair given diabetes and her advanced age    Electronically signed by Toan Sheehan DO on 2/16/2022 at 8:59 PM

## 2022-02-17 NOTE — TELEPHONE ENCOUNTER
Patient called in about her upper GI test. Patient wanted to know if she needed to have another Upper GI test done even though she's had one done in the past.      Advised patient 24-48 hrs for call back

## 2022-02-22 NOTE — OP NOTE
Esophageal high-resolution motility study operative Note      Patient: Issa Enciso  YOB: 1945  MRN: [id number]    Date of Procedure: 1/21/2022    Pre-Op Diagnosis: HIATAL HERNIA WITH GERD    Post-Op Diagnosis: Looks like this is technically not a good study with the catheter possibly curve back rather than going into the stomach evidenced by the butterfly appearance of the tomographic pictures    Patient does have a hiatal hernia    IRP on the negative side but there is evidence of relaxation    Although the computer is reading spasm but I think this is because of the technical difficulty could not be confirmed  Distal latency is been normal in value but one cannot trust the study. Procedure(s):  PES RN-ESOPHAGEAL MOTILITY STUDY    Findings:  During the study which was done by the nurses at [facility] and I am reading the computer sheets generated by them. Ina's endoscopy nurses are still following the [address] parameters and not a doctor the [address] for parameters yet. Looking at the sheet seems like this patient had hiatal hernia evidenced by the two bands of compression at the lower esophageal sphincter. It is estimated to be around 2 cm    The fact that the diagrams are showing butterfly appearance make me think that this motility catheter probably did not go all the way to the stomach and probably cut back in the esophagus  Which make the parameters hard to read  And probably misleading    Nevertheless there is some relaxation of the lower esophageal sphincter following most of the swallows  In the IOP is on the negative side? ?   With that I do not think this patient had achalasia or EG J outflow obstruction    Could not comment on the peristalsis but the patient seems to have peristalsis  But with the possibility of the catheter curving back it is hard to read  The distal latencies are normal  For which although the computer is reading spasm but I do not agree 100% with that      So in summary this patient had hiatal hernia  The catheter seems to be curved back during the study making reading the study difficult    But there is relaxation of the lower esophageal sphincter  And there is peristalsis

## 2022-02-23 DIAGNOSIS — F41.9 ANXIETY: ICD-10-CM

## 2022-02-23 NOTE — TELEPHONE ENCOUNTER
Last visit: 11/30/21  Last Med refill: 01/18/22  Does patient have enough medication for 72 hours: yes. Next Visit Date:  Future Appointments   Date Time Provider Glenroy Falcon   3/1/2022  1:00 PM DES Hicks - CNP [place]  [facility]   5/13/2022 11:00 AM Dasha Mallory MD [facility]   Topic Date Due    Hepatitis C screen  Never done    Depression Monitoring  Never done    DTaP/Tdap/Td vaccine (1 - Tdap) Never done    Shingles Vaccine (1 of 2) Never done    Lipid screen  08/12/2021    DEXA (modify frequency per FRAX score)  Completed    Flu vaccine  Completed    Pneumococcal 65+ years Vaccine  Completed    COVID-19 Vaccine  Completed    Hepatitis A vaccine  Aged Out    Hib vaccine  Aged Out    Meningococcal (ACWY) vaccine  Aged Out       Hemoglobin A1C (%)   Date Value   08/12/2020 5.6   02/19/2019 5.5   05/23/2018 5.3             ( goal A1C is < 7)   Microalb/Crt.  Ratio (mcg/mg creat)   Date Value   05/12/2016 7     LDL Cholesterol (mg/dL)   Date Value   08/12/2020 179 (H)   02/19/2019 89     LDL Calculated (mg/dL)   Date Value   11/13/2014 109       (goal LDL is <100)   AST (U/L)   Date Value   06/24/2021 22     ALT (U/L)   Date Value   06/24/2021 14     BUN (mg/dL)   Date Value   06/24/2021 10     BP Readings from Last 3 Encounters:   02/10/22 114/80   01/21/22 104/60   01/04/22 128/76          (goal 120/80)    All Future Testing planned in [facility]  Lab Frequency Next Occurrence   Respiratory Panel, Molecular, with COVID-19 Once 03/16/2022   Comprehensive Metabolic Panel Once 40/88/6015   Lactate Dehydrogenase Once 03/16/2022   Culture, Blood 1 Once 03/16/2022   EGD Once 11/11/2021   Comprehensive Metabolic Panel, Fasting Once 11/30/2021   Ferritin Once 11/30/2021   Iron and TIBC Once 11/30/2021   Lipid Panel Once 11/30/2021   Urinalysis Reflex to Culture Once 11/30/2021   Vitamin B12 & Folate Once 11/30/2021   Vitamin D 25 Hydroxy Once 11/30/2021

## 2022-02-24 RX ORDER — ALPRAZOLAM 0.5 MG/1
TABLET ORAL
Qty: 14 TABLET | Refills: 0 | OUTPATIENT
Start: 2022-02-24

## 2022-02-25 DIAGNOSIS — K44.9 HIATAL HERNIA WITH GERD: ICD-10-CM

## 2022-02-25 DIAGNOSIS — K21.9 HIATAL HERNIA WITH GERD: ICD-10-CM

## 2022-03-01 ENCOUNTER — HOSPITAL ENCOUNTER (OUTPATIENT)
Age: 77
Setting detail: SPECIMEN
Discharge: HOME | End: 2022-03-01

## 2022-03-01 ENCOUNTER — OFFICE VISIT (OUTPATIENT)
Dept: FAMILY MEDICINE CLINIC | Age: 77
End: 2022-03-01
Payer: MEDICARE

## 2022-03-01 VITALS
OXYGEN SATURATION: 96 % | HEART RATE: 95 BPM | TEMPERATURE: 98.2 F | RESPIRATION RATE: 18 BRPM | SYSTOLIC BLOOD PRESSURE: 120 MMHG | DIASTOLIC BLOOD PRESSURE: 68 MMHG | BODY MASS INDEX: 30.15 KG/M2 | WEIGHT: 154.4 LBS

## 2022-03-01 DIAGNOSIS — F33.1 MODERATE EPISODE OF RECURRENT MAJOR DEPRESSIVE DISORDER (HCC): ICD-10-CM

## 2022-03-01 DIAGNOSIS — F51.01 PRIMARY INSOMNIA: ICD-10-CM

## 2022-03-01 DIAGNOSIS — R13.19 ESOPHAGEAL DYSPHAGIA: ICD-10-CM

## 2022-03-01 DIAGNOSIS — F11.20 UNCOMPLICATED OPIOID DEPENDENCE (HCC): ICD-10-CM

## 2022-03-01 DIAGNOSIS — J41.0 SIMPLE CHRONIC BRONCHITIS (HCC): ICD-10-CM

## 2022-03-01 DIAGNOSIS — K86.1 CHRONIC BILIARY PANCREATITIS (HCC): ICD-10-CM

## 2022-03-01 DIAGNOSIS — F41.9 ANXIETY: Primary | ICD-10-CM

## 2022-03-01 DIAGNOSIS — J69.0 RECURRENT ASPIRATION PNEUMONIA (HCC): ICD-10-CM

## 2022-03-01 DIAGNOSIS — E53.8 VITAMIN B12 DEFICIENCY: ICD-10-CM

## 2022-03-01 PROCEDURE — 99214 OFFICE O/P EST MOD 30 MIN: CPT | Performed by: NURSE PRACTITIONER

## 2022-03-01 RX ORDER — ALPRAZOLAM 0.5 MG/1
0.5 TABLET ORAL DAILY PRN
Qty: 14 TABLET | Refills: 0 | Status: SHIPPED | OUTPATIENT
Start: 2022-03-01 | End: 2022-03-15 | Stop reason: SDUPTHER

## 2022-03-01 NOTE — PROGRESS NOTES
[address]  [phone]    3/1/22     Patient ID  Daniel Lovell is a 64 y.o. female  Established patient    Chief Complaint  Daniel Lovell presents today for Depression (Anxiety), Diabetes, Gastroesophageal Reflux, and Hyperlipidemia    Have you seen any other physician or provider since your last visit? Yes - Records Requested Dr. Jeffrey Sawyer, Bariatrics-Dr. Alferdo Cali, Endoscopy   Have you had any other diagnostic tests since your last visit? No Biopsy, Endoscopy   Have you been seen in the emergency room and/or had an admission to a hospital since we last saw you? No    ASSESSMENT/PLAN  1. Anxiety  -     ALPRAZolam (XANAX) 0.5 MG tablet; Take 1 tablet by mouth daily as needed for Sleep or Anxiety for up to 14 days. , Disp-14 tablet, R-0Normal  2. Moderate episode of recurrent major depressive disorder [facility])  Assessment & Plan:   Monitored by specialist- no acute findings meriting change in the plan  3. Primary insomnia  4. Esophageal dysphagia  5. Simple chronic bronchitis ([address]  Assessment & Plan:  Stable, monitored. 6. Recurrent aspiration pneumonia ([facility]  Assessment & Plan:  Stable, monitored. 7. Uncomplicated opioid dependence ([facility]  Assessment & Plan:   Monitored by specialist- no acute findings meriting change in the plan  8. Chronic biliary pancreatitis [facility])  Assessment & Plan:   Monitored by specialist- no acute findings meriting change in the plan    Refills sent   Encouraged more conversation with surgery for potential procedure for hiatal hernia repair    Return in about 3 months (around 6/1/2022) for Anxiety. On this date 3/1/2022 I have spent 35+ minutes reviewing previous notes, test results and face to face with the patient discussing the diagnosis and importance of compliance with the treatment plan as well as documenting on the day of the visit.     Patient Care Team:  DES Collins - CNP as PCP - General (Nurse Practitioner Family)  DES Simmons - CNP as PCP - [address] Provider  Valerie Armando MD (Anesthesiology)  Jackelyn Camacho DO as Surgeon (Orthopedic Surgery)  Hosea Boudreaux MD as Consulting Physician (Gastroenterology)    SUBJECTIVE/OBJECTIVE  History of Present illness / Visit Summary   Patient presents for follow up   Reviewed health maintenance and any recent labs/imaging   Continues to have ongoing bilateral knee pain, left worse than right  Has had many steroid injections, will need total knee replacements, she is aware. Had pain management appointment this morning, no changes in her plan   Continues to follow with GI and bariatric surgery for large hiatal hernia  Also with frequent chronic bronchitis, aspiration???     2/10/2022 Dr Angelina Camara -   We spent a great deal of time discussing the risks and benefits of Robotic/Laparoscopic Paraesophageal hernia repair with mesh, possible open, fundoplication, including but not limited to injury to intra-abdominal organs, breakdown of the gastric or esophageal wall or perforation, pneumothorax, pleural effusion, the need for re-operative therapy, prolonged hospitalization, mechanical ventilation and death. We discussed the possibility of bleeding, the need for blood transfusions, blood clots, hospital-acquired and intra-abdominal infection, stricture, and worsening GERD or dysphagia. We discussed the need for post-operative visit compliance, behavior modifications and diet changes. Review of Systems  Review of Systems   Constitutional: Positive for fatigue. Negative for activity change, appetite change (poor), chills, fever and unexpected weight change. HENT: Positive for congestion, postnasal drip, rhinorrhea and trouble swallowing. Negative for ear pain and sore throat. Respiratory: Positive for cough (non productive), chest tightness (w/cough ) and shortness of breath (w/exertion). Negative for wheezing.          Chronic pneumonia, bronchitis Cardiovascular: Negative for chest pain and palpitations. Gastrointestinal: Positive for abdominal distention. Negative for abdominal pain, diarrhea, nausea and vomiting. Musculoskeletal: Positive for arthralgias (right knee), gait problem, joint swelling (right knee) and myalgias. Following with pain management and orthopedic    Skin: Negative for pallor and rash. Allergic/Immunologic: Positive for environmental allergies. Neurological: Negative for dizziness, weakness, light-headedness and headaches. Hematological: Negative for adenopathy. Psychiatric/Behavioral: Positive for dysphoric mood and sleep disturbance. The patient is nervous/anxious. Cares for ill son        Physical exam   Physical Exam  Vitals and nursing note reviewed. Constitutional:       General: She is not in acute distress. Appearance: Normal appearance. She is well-developed, well-groomed and overweight. She is not ill-appearing or toxic-appearing. Cardiovascular:      Rate and Rhythm: Normal rate and regular rhythm. No extrasystoles are present. Heart sounds: Normal heart sounds, S1 normal and S2 normal. No murmur heard. Pulmonary:      Effort: Pulmonary effort is normal. No prolonged expiration or respiratory distress. Breath sounds: Normal breath sounds. Decreased air movement and transmitted upper airway sounds present. Musculoskeletal:      Right lower leg: No edema. Left lower leg: No edema. Skin:     General: Skin is warm and dry. Coloration: Skin is not ashen, cyanotic, jaundiced or pale. Neurological:      Mental Status: She is alert and oriented to person, place, and time. Motor: Motor function is intact. Gait: Gait is intact. Psychiatric:         Attention and Perception: Attention and perception normal.         Mood and Affect: Affect normal. Mood is anxious. Speech: Speech normal.         Behavior: Behavior normal. Behavior is cooperative. Thought Content: Thought content normal. Thought content does not include suicidal ideation. Thought content does not include suicidal plan. Cognition and Memory: Cognition and memory normal.         Judgment: Judgment normal.           Electronically signed by DES Rico CNP, APRN-CNP on 3/13/2022 at 1:17 PM    Please note that this chart was generated using voice recognition Dragon dictation software. Although every effort was made to ensure the accuracy of this automated transcription, some errors in transcription may have occurred.

## 2022-03-01 NOTE — TELEPHONE ENCOUNTER
Last visit: 11/30/21  Last Med refill: B-12 07/22/16, Quetiapine 11/29/21  Does patient have enough medication for 72 hours: Yes. Next Visit Date:  Future Appointments   Date Time Provider Glenroy Terrie   3/1/2022  1:00 PM DES Ortega - CNP [facility]   5/13/2022 11:00 AM Marley Simpson MD [facility] Maintenance   Topic Date Due    Hepatitis C screen  Never done    Depression Monitoring  Never done    DTaP/Tdap/Td vaccine (1 - Tdap) Never done    Shingles Vaccine (1 of 2) Never done    Lipid screen  08/12/2021    DEXA (modify frequency per FRAX score)  Completed    Flu vaccine  Completed    Pneumococcal 65+ years Vaccine  Completed    COVID-19 Vaccine  Completed    Hepatitis A vaccine  Aged Out    Hib vaccine  Aged Out    Meningococcal (ACWY) vaccine  Aged Out       Hemoglobin A1C (%)   Date Value   08/12/2020 5.6   02/19/2019 5.5   05/23/2018 5.3             ( goal A1C is < 7)   Microalb/Crt.  Ratio (mcg/mg creat)   Date Value   05/12/2016 7     LDL Cholesterol (mg/dL)   Date Value   08/12/2020 179 (H)   02/19/2019 89     LDL Calculated (mg/dL)   Date Value   11/13/2014 109       (goal LDL is <100)   AST (U/L)   Date Value   06/24/2021 22     ALT (U/L)   Date Value   06/24/2021 14     BUN (mg/dL)   Date Value   06/24/2021 10     BP Readings from Last 3 Encounters:   02/10/22 114/80   01/21/22 104/60   01/04/22 128/76          (goal 120/80)    All Future Testing planned in [facility]  Lab Frequency Next Occurrence   Respiratory Panel, Molecular, with COVID-19 Once 03/16/2022   Comprehensive Metabolic Panel Once 72/96/5540   Lactate Dehydrogenase Once 03/16/2022   Culture, Blood 1 Once 03/16/2022   EGD Once 11/11/2021   CBC With Auto Differential Once 11/30/2021   Hemoglobin A1C Once 11/30/2021   Uric Acid Once 11/30/2021   FL Upper Gi W Air Contrast Once 01/27/2022               Patient Active Problem List:     Hyperlipidemia     Fibromyalgia     Osteopenia Hx of bilateral breast implants     Vitamin D deficiency     Arthritis of shoulder region, right     Anxiety     Anemia, normocytic normochromic     Chronic pain associated with significant psychosocial dysfunction     Primary osteoarthritis involving multiple joints     Spondylosis 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     Vitamin B12 deficiency     Primary insomnia     Lumbar and sacral arthritis     Chronic biliary pancreatitis (HCC)     Moderate episode of recurrent major depressive disorder (HCC)     Uncomplicated opioid dependence (HCC)     Hyperuricemia     Primary open-angle glaucoma, right eye, mild stage     Primary open-angle glaucoma, left eye, mild stage     Gastroesophageal reflux disease     Abnormal finding on imaging     Esophageal dysphagia     Hiatal hernia     Type 2 diabetes mellitus     Recurrent aspiration pneumonia (HCC)     Esophageal hiatus hernia

## 2022-03-02 DIAGNOSIS — E79.0 HYPERURICEMIA: ICD-10-CM

## 2022-03-02 DIAGNOSIS — D64.9 ANEMIA, NORMOCYTIC NORMOCHROMIC: ICD-10-CM

## 2022-03-02 DIAGNOSIS — K21.9 GASTROESOPHAGEAL REFLUX DISEASE WITHOUT ESOPHAGITIS: Primary | ICD-10-CM

## 2022-03-02 DIAGNOSIS — R73.9 ELEVATED BLOOD SUGAR LEVEL: ICD-10-CM

## 2022-03-02 DIAGNOSIS — J18.9 PNEUMONIA OF BOTH LOWER LOBES DUE TO INFECTIOUS ORGANISM: ICD-10-CM

## 2022-03-02 LAB
-: NORMAL
ABSOLUTE EOS #: 0.37 K/UL (ref 0–0.44)
ABSOLUTE IMMATURE GRANULOCYTE: <0.03 K/UL (ref 0–0.3)
ABSOLUTE LYMPH #: 2.21 K/UL (ref 1.1–3.7)
ABSOLUTE MONO #: 0.49 K/UL (ref 0.1–1.2)
ALBUMIN SERPL-MCNC: 4.1 G/DL (ref 3.5–5.2)
ALBUMIN/GLOBULIN RATIO: 1.5 (ref 1–2.5)
ALP BLD-CCNC: 127 U/L (ref 35–104)
ALT SERPL-CCNC: 12 U/L (ref 5–33)
ANION GAP SERPL CALCULATED.3IONS-SCNC: 12 MMOL/L (ref 9–17)
AST SERPL-CCNC: 17 U/L
BASOPHILS # BLD: 1 % (ref 0–2)
BASOPHILS ABSOLUTE: 0.07 K/UL (ref 0–0.2)
BILIRUB SERPL-MCNC: <0.1 MG/DL (ref 0.3–1.2)
BILIRUBIN URINE: NEGATIVE
BUN BLDV-MCNC: 16 MG/DL (ref 8–23)
CALCIUM SERPL-MCNC: 9.3 MG/DL (ref 8.6–10.4)
CASTS UA: NORMAL /LPF (ref 0–8)
CHLORIDE BLD-SCNC: 104 MMOL/L (ref 98–107)
CHOLESTEROL/HDL RATIO: 7.8
CHOLESTEROL: 318 MG/DL
CO2: 25 MMOL/L (ref 20–31)
COLOR: YELLOW
CREAT SERPL-MCNC: 0.68 MG/DL (ref 0.5–0.9)
EOSINOPHILS RELATIVE PERCENT: 7 % (ref 1–4)
EPITHELIAL CELLS UA: NORMAL /HPF (ref 0–5)
ESTIMATED AVERAGE GLUCOSE: 114 MG/DL
FERRITIN: 30 UG/L (ref 13–150)
FOLATE: 13.9 NG/ML
GFR AFRICAN AMERICAN: >60 ML/MIN
GFR NON-AFRICAN AMERICAN: >60 ML/MIN
GFR SERPL CREATININE-BSD FRML MDRD: ABNORMAL ML/MIN/{1.73_M2}
GLUCOSE BLD-MCNC: 85 MG/DL (ref 70–99)
GLUCOSE URINE: NEGATIVE
HBA1C MFR BLD: 5.6 % (ref 4–6)
HCT VFR BLD CALC: 43.6 % (ref 36.3–47.1)
HDLC SERPL-MCNC: 41 MG/DL
HEMOGLOBIN: 13.5 G/DL (ref 11.9–15.1)
IMMATURE GRANULOCYTES: 0 %
IRON SATURATION: 22 % (ref 20–55)
IRON: 85 UG/DL (ref 37–145)
KETONES, URINE: NEGATIVE
LDL CHOLESTEROL DIRECT: 204 MG/DL
LDL CHOLESTEROL: ABNORMAL MG/DL (ref 0–130)
LEUKOCYTE ESTERASE, URINE: ABNORMAL
LYMPHOCYTES # BLD: 40 % (ref 24–43)
MCH RBC QN AUTO: 29.3 PG (ref 25.2–33.5)
MCHC RBC AUTO-ENTMCNC: 31 G/DL (ref 28.4–34.8)
MCV RBC AUTO: 94.8 FL (ref 82.6–102.9)
MONOCYTES # BLD: 9 % (ref 3–12)
NITRITE, URINE: NEGATIVE
NRBC AUTOMATED: 0 PER 100 WBC
PDW BLD-RTO: 14.2 % (ref 11.8–14.4)
PH UA: 6 (ref 5–8)
PLATELET # BLD: 394 K/UL (ref 138–453)
PMV BLD AUTO: 10.3 FL (ref 8.1–13.5)
POTASSIUM SERPL-SCNC: 4.9 MMOL/L (ref 3.7–5.3)
PROTEIN UA: NEGATIVE
RBC # BLD: 4.6 M/UL (ref 3.95–5.11)
RBC UA: NORMAL /HPF (ref 0–4)
SEG NEUTROPHILS: 43 % (ref 36–65)
SEGMENTED NEUTROPHILS ABSOLUTE COUNT: 2.44 K/UL (ref 1.5–8.1)
SODIUM BLD-SCNC: 141 MMOL/L (ref 135–144)
SPECIFIC GRAVITY UA: 1.01 (ref 1–1.03)
TOTAL IRON BINDING CAPACITY: 383 UG/DL (ref 250–450)
TOTAL PROTEIN: 6.8 G/DL (ref 6.4–8.3)
TRIGL SERPL-MCNC: 489 MG/DL
TURBIDITY: CLEAR
UNSATURATED IRON BINDING CAPACITY: 298 UG/DL (ref 112–347)
URIC ACID: 5.9 MG/DL (ref 2.4–5.7)
URINE HGB: NEGATIVE
UROBILINOGEN, URINE: NORMAL
VITAMIN B-12: 489 PG/ML (ref 232–1245)
VITAMIN D 25-HYDROXY: 37.5 NG/ML
WBC # BLD: 5.6 K/UL (ref 3.5–11.3)
WBC UA: NORMAL /HPF (ref 0–5)

## 2022-03-02 RX ORDER — LANOLIN ALCOHOL/MO/W.PET/CERES
CREAM (GRAM) TOPICAL
Qty: 60 TABLET | Refills: 5 | Status: SHIPPED | OUTPATIENT
Start: 2022-03-02 | End: 2022-10-10 | Stop reason: SDUPTHER

## 2022-03-02 RX ORDER — QUETIAPINE FUMARATE 50 MG/1
TABLET, FILM COATED ORAL
Qty: 60 TABLET | Refills: 5 | Status: SHIPPED | OUTPATIENT
Start: 2022-03-02 | End: 2022-09-06

## 2022-03-04 ENCOUNTER — TELEPHONE (OUTPATIENT)
Dept: FAMILY MEDICINE CLINIC | Age: 77
End: 2022-03-04

## 2022-03-04 NOTE — TELEPHONE ENCOUNTER
----- Message from Derek Jeffries sent at 3/1/2022  5:04 PM EST -----  Subject: Message to Provider    QUESTIONS  Information for Provider? Pharmacy needs clarification of a prescription   ---------------------------------------------------------------------------  --------------  CALL BACK INFO  What is the best way for the office to contact you? OK to leave message on   voicemail  Preferred Call Back Phone Number? [phone]  ---------------------------------------------------------------------------  --------------  SCRIPT ANSWERS  Relationship to Patient? Third Party  Representative Name?  pharmacy

## 2022-03-04 NOTE — TELEPHONE ENCOUNTER
Called pharmacy back. They wanted OK to fill xanax since she is also on percocet. Got OK from [facility] in office.

## 2022-03-08 ENCOUNTER — APPOINTMENT (OUTPATIENT)
Dept: GENERAL RADIOLOGY | Age: 77
DRG: 177 | End: 2022-03-08
Payer: MEDICARE

## 2022-03-08 ENCOUNTER — HOSPITAL ENCOUNTER (INPATIENT)
Age: 77
LOS: 2 days | Discharge: HOME | DRG: 177 | End: 2022-03-10
Attending: EMERGENCY MEDICINE | Admitting: INTERNAL MEDICINE
Payer: MEDICARE

## 2022-03-08 DIAGNOSIS — J18.9 PNEUMONIA OF BOTH LOWER LOBES DUE TO INFECTIOUS ORGANISM: ICD-10-CM

## 2022-03-08 DIAGNOSIS — J96.01 ACUTE RESPIRATORY FAILURE WITH HYPOXIA (HCC): Primary | ICD-10-CM

## 2022-03-08 DIAGNOSIS — E78.00 PURE HYPERCHOLESTEROLEMIA: Primary | ICD-10-CM

## 2022-03-08 LAB
ABSOLUTE EOS #: 0.2 K/UL (ref 0–0.4)
ABSOLUTE LYMPH #: 0.7 K/UL (ref 1–4.8)
ABSOLUTE MONO #: 0.5 K/UL (ref 0.1–1.3)
ALBUMIN SERPL-MCNC: 3.7 G/DL (ref 3.5–5.2)
ALLEN TEST: ABNORMAL
ALP BLD-CCNC: 117 U/L (ref 35–104)
ALT SERPL-CCNC: 11 U/L (ref 5–33)
ANION GAP SERPL CALCULATED.3IONS-SCNC: 12 MMOL/L (ref 9–17)
AST SERPL-CCNC: 26 U/L
BACTERIA: NORMAL
BASOPHILS # BLD: 1 % (ref 0–2)
BASOPHILS ABSOLUTE: 0 K/UL (ref 0–0.2)
BILIRUB SERPL-MCNC: 0.32 MG/DL (ref 0.3–1.2)
BILIRUBIN URINE: NEGATIVE
BUN BLDV-MCNC: 23 MG/DL (ref 8–23)
CALCIUM SERPL-MCNC: 8.5 MG/DL (ref 8.6–10.4)
CARBOXYHEMOGLOBIN: 0.4 % (ref 0–5)
CASTS UA: NORMAL /LPF
CHLORIDE BLD-SCNC: 99 MMOL/L (ref 98–107)
CO2: 23 MMOL/L (ref 20–31)
COLOR: YELLOW
CREAT SERPL-MCNC: 1.11 MG/DL (ref 0.5–0.9)
EOSINOPHILS RELATIVE PERCENT: 4 % (ref 0–4)
EPITHELIAL CELLS UA: NORMAL /HPF
GFR AFRICAN AMERICAN: 58 ML/MIN
GFR NON-AFRICAN AMERICAN: 48 ML/MIN
GFR SERPL CREATININE-BSD FRML MDRD: ABNORMAL ML/MIN/{1.73_M2}
GLUCOSE BLD-MCNC: 97 MG/DL (ref 70–99)
GLUCOSE URINE: NEGATIVE
HCO3 ARTERIAL: 25.4 MMOL/L (ref 22–26)
HCT VFR BLD CALC: 34.4 % (ref 36–46)
HEMOGLOBIN: 11.8 G/DL (ref 12–16)
INFLUENZA A: NOT DETECTED
INFLUENZA B: NOT DETECTED
KETONES, URINE: NEGATIVE
LACTIC ACID: 1.4 MMOL/L (ref 0.5–2.2)
LEGIONELLA PNEUMOPHILIA AG, URINE: NEGATIVE
LEUKOCYTE ESTERASE, URINE: ABNORMAL
LYMPHOCYTES # BLD: 11 % (ref 24–44)
MCH RBC QN AUTO: 30 PG (ref 26–34)
MCHC RBC AUTO-ENTMCNC: 34.4 G/DL (ref 31–37)
MCV RBC AUTO: 87.2 FL (ref 80–100)
METHEMOGLOBIN: 0.9 % (ref 0–1.9)
MONOCYTES # BLD: 7 % (ref 1–7)
NEGATIVE BASE EXCESS, ART: 0.4 MMOL/L (ref 0–2)
NITRITE, URINE: NEGATIVE
O2 DEVICE/FLOW/%: ABNORMAL
O2 SAT, ARTERIAL: 97.4 % (ref 95–98)
PATIENT TEMP: 37.1
PCO2 ARTERIAL: 48 MMHG (ref 35–45)
PDW BLD-RTO: 14.5 % (ref 11.5–14.9)
PH ARTERIAL: 7.33 (ref 7.35–7.45)
PH UA: 6 (ref 5–8)
PLATELET # BLD: 273 K/UL (ref 150–450)
PMV BLD AUTO: 7.4 FL (ref 6–12)
PO2 ARTERIAL: 134 MMHG (ref 80–100)
POTASSIUM SERPL-SCNC: 4.6 MMOL/L (ref 3.7–5.3)
PROTEIN UA: ABNORMAL
PT. POSITION: ABNORMAL
RBC # BLD: 3.95 M/UL (ref 4–5.2)
RBC UA: NORMAL /HPF
REASON FOR REJECTION: NORMAL
RESPIRATORY RATE: 20
SAMPLE SITE: ABNORMAL
SARS-COV-2 RNA, RT PCR: NOT DETECTED
SEG NEUTROPHILS: 77 % (ref 36–66)
SEGMENTED NEUTROPHILS ABSOLUTE COUNT: 5 K/UL (ref 1.3–9.1)
SODIUM BLD-SCNC: 134 MMOL/L (ref 135–144)
SOURCE: NORMAL
SOURCE: NORMAL
SPECIFIC GRAVITY UA: 1.01 (ref 1–1.03)
SPECIMEN DESCRIPTION: NORMAL
STREP PNEUMONIAE ANTIGEN: NEGATIVE
TEXT FOR RESPIRATORY: ABNORMAL
TOTAL PROTEIN: 6.9 G/DL (ref 6.4–8.3)
TROPONIN, HIGH SENSITIVITY: 19 NG/L (ref 0–14)
TROPONIN, HIGH SENSITIVITY: 20 NG/L (ref 0–14)
TURBIDITY: CLEAR
URINE HGB: ABNORMAL
UROBILINOGEN, URINE: NORMAL
WBC # BLD: 6.4 K/UL (ref 3.5–11)
WBC UA: NORMAL /HPF
ZZ NTE CLEAN UP: ORDERED TEST: NORMAL
ZZ NTE WITH NAME CLEAN UP: SPECIMEN SOURCE: NORMAL

## 2022-03-08 PROCEDURE — 87636 SARSCOV2 & INF A&B AMP PRB: CPT

## 2022-03-08 PROCEDURE — 87899 AGENT NOS ASSAY W/OPTIC: CPT

## 2022-03-08 PROCEDURE — 87449 NOS EACH ORGANISM AG IA: CPT

## 2022-03-08 PROCEDURE — 83605 ASSAY OF LACTIC ACID: CPT

## 2022-03-08 PROCEDURE — 84484 ASSAY OF TROPONIN QUANT: CPT

## 2022-03-08 PROCEDURE — 36415 COLL VENOUS BLD VENIPUNCTURE: CPT

## 2022-03-08 PROCEDURE — 96365 THER/PROPH/DIAG IV INF INIT: CPT

## 2022-03-08 PROCEDURE — 87040 BLOOD CULTURE FOR BACTERIA: CPT

## 2022-03-08 PROCEDURE — 99223 1ST HOSP IP/OBS HIGH 75: CPT | Performed by: INTERNAL MEDICINE

## 2022-03-08 PROCEDURE — 80053 COMPREHEN METABOLIC PANEL: CPT

## 2022-03-08 PROCEDURE — 85025 COMPLETE CBC W/AUTO DIFF WBC: CPT

## 2022-03-08 PROCEDURE — 6370000000 HC RX 637 (ALT 250 FOR IP)

## 2022-03-08 PROCEDURE — 6360000002 HC RX W HCPCS

## 2022-03-08 PROCEDURE — 2580000003 HC RX 258

## 2022-03-08 PROCEDURE — 81001 URINALYSIS AUTO W/SCOPE: CPT

## 2022-03-08 PROCEDURE — 96366 THER/PROPH/DIAG IV INF ADDON: CPT

## 2022-03-08 PROCEDURE — 82805 BLOOD GASES W/O2 SATURATION: CPT

## 2022-03-08 PROCEDURE — 6360000002 HC RX W HCPCS: Performed by: EMERGENCY MEDICINE

## 2022-03-08 PROCEDURE — 6370000000 HC RX 637 (ALT 250 FOR IP): Performed by: NURSE PRACTITIONER

## 2022-03-08 PROCEDURE — 2060000000 HC ICU INTERMEDIATE R&B

## 2022-03-08 PROCEDURE — 6370000000 HC RX 637 (ALT 250 FOR IP): Performed by: STUDENT IN AN ORGANIZED HEALTH CARE EDUCATION/TRAINING PROGRAM

## 2022-03-08 PROCEDURE — 93005 ELECTROCARDIOGRAM TRACING: CPT | Performed by: EMERGENCY MEDICINE

## 2022-03-08 PROCEDURE — 99285 EMERGENCY DEPT VISIT HI MDM: CPT

## 2022-03-08 PROCEDURE — 2580000003 HC RX 258: Performed by: EMERGENCY MEDICINE

## 2022-03-08 PROCEDURE — 71045 X-RAY EXAM CHEST 1 VIEW: CPT

## 2022-03-08 RX ORDER — OXYCODONE HYDROCHLORIDE AND ACETAMINOPHEN 5; 325 MG/1; MG/1
1 TABLET ORAL EVERY 4 HOURS PRN
Status: DISCONTINUED | OUTPATIENT
Start: 2022-03-08 | End: 2022-03-10 | Stop reason: HOSPADM

## 2022-03-08 RX ORDER — SODIUM CHLORIDE 0.9 % (FLUSH) 0.9 %
5-40 SYRINGE (ML) INJECTION EVERY 12 HOURS SCHEDULED
Status: DISCONTINUED | OUTPATIENT
Start: 2022-03-08 | End: 2022-03-08

## 2022-03-08 RX ORDER — LEVOFLOXACIN 5 MG/ML
750 INJECTION, SOLUTION INTRAVENOUS EVERY 24 HOURS
Status: DISCONTINUED | OUTPATIENT
Start: 2022-03-08 | End: 2022-03-08

## 2022-03-08 RX ORDER — LEVOFLOXACIN 5 MG/ML
750 INJECTION, SOLUTION INTRAVENOUS
Status: DISCONTINUED | OUTPATIENT
Start: 2022-03-10 | End: 2022-03-09

## 2022-03-08 RX ORDER — ONDANSETRON 4 MG/1
4 TABLET, ORALLY DISINTEGRATING ORAL EVERY 8 HOURS PRN
Status: DISCONTINUED | OUTPATIENT
Start: 2022-03-08 | End: 2022-03-08

## 2022-03-08 RX ORDER — ACETAMINOPHEN 325 MG/1
650 TABLET ORAL EVERY 6 HOURS PRN
Status: DISCONTINUED | OUTPATIENT
Start: 2022-03-08 | End: 2022-03-10 | Stop reason: HOSPADM

## 2022-03-08 RX ORDER — PANTOPRAZOLE SODIUM 40 MG/1
40 TABLET, DELAYED RELEASE ORAL
Status: DISCONTINUED | OUTPATIENT
Start: 2022-03-08 | End: 2022-03-08

## 2022-03-08 RX ORDER — ACETAMINOPHEN 325 MG/1
650 TABLET ORAL EVERY 6 HOURS PRN
Status: DISCONTINUED | OUTPATIENT
Start: 2022-03-08 | End: 2022-03-08

## 2022-03-08 RX ORDER — ACETAMINOPHEN 650 MG/1
650 SUPPOSITORY RECTAL EVERY 6 HOURS PRN
Status: DISCONTINUED | OUTPATIENT
Start: 2022-03-08 | End: 2022-03-08

## 2022-03-08 RX ORDER — BENZONATATE 200 MG/1
200 CAPSULE ORAL 3 TIMES DAILY PRN
Status: DISCONTINUED | OUTPATIENT
Start: 2022-03-08 | End: 2022-03-10 | Stop reason: HOSPADM

## 2022-03-08 RX ORDER — DEXTROMETHORPHAN POLISTIREX 30 MG/5ML
60 SUSPENSION ORAL NIGHTLY PRN
Status: DISCONTINUED | OUTPATIENT
Start: 2022-03-09 | End: 2022-03-10 | Stop reason: HOSPADM

## 2022-03-08 RX ORDER — POTASSIUM CHLORIDE 20 MEQ/1
40 TABLET, EXTENDED RELEASE ORAL PRN
Status: DISCONTINUED | OUTPATIENT
Start: 2022-03-08 | End: 2022-03-10 | Stop reason: HOSPADM

## 2022-03-08 RX ORDER — ONDANSETRON 2 MG/ML
4 INJECTION INTRAMUSCULAR; INTRAVENOUS EVERY 6 HOURS PRN
Status: DISCONTINUED | OUTPATIENT
Start: 2022-03-08 | End: 2022-03-08

## 2022-03-08 RX ORDER — SODIUM CHLORIDE 9 MG/ML
25 INJECTION, SOLUTION INTRAVENOUS PRN
Status: DISCONTINUED | OUTPATIENT
Start: 2022-03-08 | End: 2022-03-08

## 2022-03-08 RX ORDER — QUETIAPINE FUMARATE 200 MG/1
200 TABLET, FILM COATED ORAL NIGHTLY
Status: DISCONTINUED | OUTPATIENT
Start: 2022-03-08 | End: 2022-03-10 | Stop reason: HOSPADM

## 2022-03-08 RX ORDER — ACETAMINOPHEN 650 MG/1
650 SUPPOSITORY RECTAL EVERY 6 HOURS PRN
Status: DISCONTINUED | OUTPATIENT
Start: 2022-03-08 | End: 2022-03-10 | Stop reason: HOSPADM

## 2022-03-08 RX ORDER — 0.9 % SODIUM CHLORIDE 0.9 %
30 INTRAVENOUS SOLUTION INTRAVENOUS ONCE
Status: COMPLETED | OUTPATIENT
Start: 2022-03-08 | End: 2022-03-08

## 2022-03-08 RX ORDER — LEVOFLOXACIN 5 MG/ML
750 INJECTION, SOLUTION INTRAVENOUS ONCE
Status: COMPLETED | OUTPATIENT
Start: 2022-03-08 | End: 2022-03-08

## 2022-03-08 RX ORDER — QUETIAPINE FUMARATE 50 MG/1
50 TABLET, FILM COATED ORAL NIGHTLY
Status: DISCONTINUED | OUTPATIENT
Start: 2022-03-08 | End: 2022-03-10 | Stop reason: HOSPADM

## 2022-03-08 RX ORDER — SODIUM CHLORIDE 9 MG/ML
25 INJECTION, SOLUTION INTRAVENOUS PRN
Status: DISCONTINUED | OUTPATIENT
Start: 2022-03-08 | End: 2022-03-10 | Stop reason: HOSPADM

## 2022-03-08 RX ORDER — POLYETHYLENE GLYCOL 3350 17 G/17G
17 POWDER, FOR SOLUTION ORAL DAILY PRN
Status: DISCONTINUED | OUTPATIENT
Start: 2022-03-08 | End: 2022-03-10 | Stop reason: HOSPADM

## 2022-03-08 RX ORDER — SODIUM CHLORIDE 0.9 % (FLUSH) 0.9 %
5-40 SYRINGE (ML) INJECTION EVERY 12 HOURS SCHEDULED
Status: DISCONTINUED | OUTPATIENT
Start: 2022-03-08 | End: 2022-03-10 | Stop reason: HOSPADM

## 2022-03-08 RX ORDER — PANTOPRAZOLE SODIUM 40 MG/1
40 TABLET, DELAYED RELEASE ORAL
Status: DISCONTINUED | OUTPATIENT
Start: 2022-03-08 | End: 2022-03-10 | Stop reason: HOSPADM

## 2022-03-08 RX ORDER — SODIUM CHLORIDE 0.9 % (FLUSH) 0.9 %
5-40 SYRINGE (ML) INJECTION PRN
Status: DISCONTINUED | OUTPATIENT
Start: 2022-03-08 | End: 2022-03-08

## 2022-03-08 RX ORDER — POLYETHYLENE GLYCOL 3350 17 G/17G
17 POWDER, FOR SOLUTION ORAL DAILY PRN
Status: DISCONTINUED | OUTPATIENT
Start: 2022-03-08 | End: 2022-03-08

## 2022-03-08 RX ORDER — ATORVASTATIN CALCIUM 80 MG/1
80 TABLET, FILM COATED ORAL DAILY
Status: DISCONTINUED | OUTPATIENT
Start: 2022-03-08 | End: 2022-03-10 | Stop reason: HOSPADM

## 2022-03-08 RX ORDER — ONDANSETRON 4 MG/1
4 TABLET, ORALLY DISINTEGRATING ORAL EVERY 8 HOURS PRN
Status: DISCONTINUED | OUTPATIENT
Start: 2022-03-08 | End: 2022-03-10 | Stop reason: HOSPADM

## 2022-03-08 RX ORDER — POTASSIUM CHLORIDE 7.45 MG/ML
10 INJECTION INTRAVENOUS PRN
Status: DISCONTINUED | OUTPATIENT
Start: 2022-03-08 | End: 2022-03-10 | Stop reason: HOSPADM

## 2022-03-08 RX ORDER — SODIUM CHLORIDE 0.9 % (FLUSH) 0.9 %
5-40 SYRINGE (ML) INJECTION PRN
Status: DISCONTINUED | OUTPATIENT
Start: 2022-03-08 | End: 2022-03-10 | Stop reason: HOSPADM

## 2022-03-08 RX ORDER — ONDANSETRON 2 MG/ML
4 INJECTION INTRAMUSCULAR; INTRAVENOUS EVERY 6 HOURS PRN
Status: DISCONTINUED | OUTPATIENT
Start: 2022-03-08 | End: 2022-03-10 | Stop reason: HOSPADM

## 2022-03-08 RX ADMIN — QUETIAPINE FUMARATE 200 MG: 200 TABLET ORAL at 22:46

## 2022-03-08 RX ADMIN — ENOXAPARIN SODIUM 40 MG: 100 INJECTION SUBCUTANEOUS at 11:49

## 2022-03-08 RX ADMIN — BENZONATATE 200 MG: 200 CAPSULE ORAL at 22:46

## 2022-03-08 RX ADMIN — OXYCODONE HYDROCHLORIDE AND ACETAMINOPHEN 1 TABLET: 5; 325 TABLET ORAL at 11:50

## 2022-03-08 RX ADMIN — OXYCODONE HYDROCHLORIDE AND ACETAMINOPHEN 1 TABLET: 5; 325 TABLET ORAL at 16:58

## 2022-03-08 RX ADMIN — OXYCODONE HYDROCHLORIDE AND ACETAMINOPHEN 1 TABLET: 5; 325 TABLET ORAL at 21:43

## 2022-03-08 RX ADMIN — QUETIAPINE FUMARATE 50 MG: 50 TABLET ORAL at 21:43

## 2022-03-08 RX ADMIN — ATORVASTATIN CALCIUM 80 MG: 80 TABLET, FILM COATED ORAL at 11:50

## 2022-03-08 RX ADMIN — LEVOFLOXACIN 750 MG: 5 INJECTION, SOLUTION INTRAVENOUS at 06:45

## 2022-03-08 RX ADMIN — SODIUM CHLORIDE 2100 ML: 9 INJECTION, SOLUTION INTRAVENOUS at 06:25

## 2022-03-08 RX ADMIN — PANTOPRAZOLE SODIUM 40 MG: 40 TABLET, DELAYED RELEASE ORAL at 11:50

## 2022-03-08 RX ADMIN — SODIUM CHLORIDE, PRESERVATIVE FREE 10 ML: 5 INJECTION INTRAVENOUS at 21:43

## 2022-03-08 RX ADMIN — PANTOPRAZOLE SODIUM 40 MG: 40 TABLET, DELAYED RELEASE ORAL at 16:58

## 2022-03-08 RX ADMIN — SODIUM CHLORIDE, PRESERVATIVE FREE 10 ML: 5 INJECTION INTRAVENOUS at 11:51

## 2022-03-08 ASSESSMENT — ENCOUNTER SYMPTOMS
NAUSEA: 1
SORE THROAT: 0
WHEEZING: 1
NAUSEA: 0
EYE REDNESS: 0
ABDOMINAL PAIN: 0
VOMITING: 0
SHORTNESS OF BREATH: 1
CHEST TIGHTNESS: 0
VOMITING: 1
COLOR CHANGE: 0
WHEEZING: 0
COUGH: 1
BACK PAIN: 0

## 2022-03-08 ASSESSMENT — PAIN - FUNCTIONAL ASSESSMENT
PAIN_FUNCTIONAL_ASSESSMENT: PREVENTS OR INTERFERES SOME ACTIVE ACTIVITIES AND ADLS
PAIN_FUNCTIONAL_ASSESSMENT: PREVENTS OR INTERFERES SOME ACTIVE ACTIVITIES AND ADLS

## 2022-03-08 ASSESSMENT — PAIN SCALES - GENERAL
PAINLEVEL_OUTOF10: 5
PAINLEVEL_OUTOF10: 8
PAINLEVEL_OUTOF10: 8
PAINLEVEL_OUTOF10: 1
PAINLEVEL_OUTOF10: 6

## 2022-03-08 ASSESSMENT — PAIN DESCRIPTION - PROGRESSION
CLINICAL_PROGRESSION: GRADUALLY WORSENING
CLINICAL_PROGRESSION: GRADUALLY WORSENING

## 2022-03-08 ASSESSMENT — PAIN DESCRIPTION - PAIN TYPE
TYPE: CHRONIC PAIN
TYPE: CHRONIC PAIN

## 2022-03-08 NOTE — PROGRESS NOTES
Patient had pill bottle in purse that contained 97 Percocet . Count verified w/ Alesia ROGERS and placed in narc lock box.

## 2022-03-08 NOTE — ED NOTES
Report given to Kaity Block RN from PCU. Report method by phone   The following was reviewed with receiving RN:   Current vital signs:  /63   Pulse 84   Temp 98.7 °F (37.1 °C) (Oral)   Resp 17   SpO2 94%                MEWS Score: 1     Any medication or safety alerts were reviewed. Any pending diagnostics and notifications were also reviewed, as well as any safety concerns or issues, abnormal labs, abnormal imaging, and abnormal assessment findings. Questions were answered.           Terrie Johnson RN  03/08/22 3324

## 2022-03-08 NOTE — ED NOTES
Report given to KEN Martin from ER. Report method in person   The following was reviewed with receiving RN:   Current vital signs:  /69   Pulse 100   Temp 100.2 °F (37.9 °C)   Resp 15   SpO2 93%                MEWS Score: 1     Any medication or safety alerts were reviewed. Any pending diagnostics and notifications were also reviewed, as well as any safety concerns or issues, abnormal labs, abnormal imaging, and abnormal assessment findings. Questions were answered.             KEN Lakhani  03/08/22 1956 Lori Kasper RN  03/08/22 6298

## 2022-03-08 NOTE — ED PROVIDER NOTES
[address]  EMERGENCY DEPARTMENT ENCOUNTER      Pt Name: Pj Zambrano  MRN: [id number]  [place] 1945  Date of evaluation: 3/8/22      CHIEF COMPLAINT       Chief Complaint   Patient presents with    Fatigue     HISTORY OF PRESENT ILLNESS   HPI 64 y.o. female presents with c/o generalized weakness and fatigue. The patient reports that she started getting sick on the Saturday prior to presentation. She says that she has been coughing, and she feels like she is \" getting pneumonia again\". Cough has been nonproductive. She reports associated chills, fatigue, nausea and vomiting. She denies any abdominal pain or chest pain. Her symptoms of shortness of breath and fatigue worsened overnight EMS was called and they transferred her to the emergency department. No recent hospitalizations. She has not been on any antibiotics or taken any other prehospital treatments. Maryann Rob REVIEW OF SYSTEMS       Review of Systems   Constitutional: Positive for activity change, appetite change, chills and fatigue. Negative for fever. HENT: Negative for congestion. Eyes: Negative for visual disturbance. Respiratory: Positive for cough and shortness of breath. Negative for wheezing. Cardiovascular: Negative for chest pain and leg swelling. Gastrointestinal: Positive for nausea and vomiting. Negative for abdominal pain. Genitourinary: Negative for difficulty urinating. Musculoskeletal: Negative for arthralgias. Skin: Negative for rash. Neurological: Negative for headaches. Hematological: Does not bruise/bleed easily.        PAST MEDICAL HISTORY     Past Medical History:   Diagnosis Date    Anemia     Bochdalek hernia     PER CT 7-2-21    Bowel obstruction ([facility] [address]     Bronchitis 02/19/2019    Frequent episodes of bronchitis, usually yearly with pneumonia    Chronic biliary pancreatitis ([facility] [address] 09/22/2016    Controlled type 2 diabetes mellitus without complication, without long-term current use of insulin ([facility] [address]     Cough 08/13/2021    has had cough for several months    DDD (degenerative disc disease)     Depression     Diverticulosis     Esophageal dysphagia     Fibromyalgia     GERD (gastroesophageal reflux disease)     Glaucoma     Hiatal hernia     Hyperlipidemia     Knee injuries 02/19/2019    Osteoarthritis of more than one site 04/09/2014    Pneumonia     gets pneumonia yearly    Right middle lobe pneumonia 03/24/2016    Seasonal allergies        SURGICAL HISTORY       Past Surgical History:   Procedure Laterality Date    BREAST BIOPSY Left 05/2016    CARPAL TUNNEL RELEASE Bilateral     x 2 each    COLONOSCOPY      ESOPHAGEAL MOTILITY STUDY N/A 1/21/2022    PES RN-ESOPHAGEAL MOTILITY STUDY performed by Justice Leon DO at [facility]    EYE SURGERY Left     stent for glaucoma    EYE SURGERY Left     cataract    EYE SURGERY Left     lower lid    FOOT SURGERY Right     HYSTERECTOMY      KNEE ARTHROSCOPY Right 12/04/2019    RIGHT KNEE ARTHROSCOPY WITH TOTAL MENISCECTOMY [facility]ine [facility]  10/03/2016    TX ARTHRS KNE SURG W/MENISCECTOMY MED/LAT W/SHVG Left 08/13/2018    KNEE ARTHROSCOPY FOR PARTIAL MEDIAL AND PARTIAL LATERAL MENISCECTOMY performed by Kyle Montana MD at [facility]      due to blockage    UPPER GASTROINTESTINAL ENDOSCOPY      EGD    UPPER GASTROINTESTINAL ENDOSCOPY N/A 8/17/2021    EGD BIOPSY performed by Micki Ballesteros MD at [facility]       Previous Medications    ALPRAZOLAM (XANAX) 0.5 MG TABLET    Take 1 tablet by mouth daily as needed for Sleep or Anxiety for up to 14 days.     ATORVASTATIN (LIPITOR) 80 MG TABLET    Take 1 tablet by mouth daily    DICLOFENAC SODIUM (VOLTAREN) 1 % GEL    as needed    DORZOLAMIDE-TIMOLOL (COSOPT) 22.3-6.8 MG/ML OPHTHALMIC SOLUTION    INSTILL 1 DROP IN [facility] TWO TIMES A DAY    GABAPENTIN (NEURONTIN) 800 MG TABLET TAKE 1 TAB BY MOUTH AT BEDTIME FOR ONE WEEK THEN 2 TABS AT BEDTIME FOR ONE WEEK THEN 3 TABS AT BEDTIME THEREAFTER    HANDICAP PLACARD MISC    by Does not apply route Exp 3/16/2026    OXYCODONE-ACETAMINOPHEN (PERCOCET)  MG PER TABLET    Take 1 tablet by mouth every 4 hours as needed for Pain. QUETIAPINE (SEROQUEL) 200 MG TABLET    TAKE 1 TABLET DAILY    QUETIAPINE (SEROQUEL) 50 MG TABLET    TAKE 1 TO 2 TABLETS BY MOUTH AT BEDTIME AS NEEDED FOR SLEEP    VITAMIN B-12 (CYANOCOBALAMIN) 1000 MCG TABLET    TAKE 2 TABLETS BY MOUTH EVERY DAY       ALLERGIES     is allergic to pcn [penicillins], adhesive tape, nubain [nalbuphine hcl], seasonal, and vistaril [hydroxyzine]. FAMILY HISTORY     She indicated that her mother is . She indicated that her father is . She indicated that her maternal grandmother is . SOCIAL HISTORY      reports that she has never smoked. She has never used smokeless tobacco. She reports that she does not drink alcohol and does not use drugs. PHYSICAL EXAM     INITIAL VITALS: /69   Pulse 100   Temp 100.2 °F (37.9 °C)   Resp 15   SpO2 93%   Gen: Appears weak and fatigued  Head: Normocephalic, atraumatic  Eye: Pupils equal round reactive to light, no conjunctivitis  ENT: Dry oral mucosa, there is also dried emesis around her mouth and on her chin. Neck: No JVD  Heart: Tachycardic with a regular rhythm no murmurs  Lungs: Rhonchi in the lower lobes bilaterally worse on the right, hypoxic on presentation put on nasal cannula oxygen chest wall: No crepitus, no tenderness palpation  Abdomen: Soft, nontender, nondistended, with no peritoneal signs  Neurologic: Patient is alert and oriented x person and place, motor and sensation is intact in all 4 extremities, fluent speech  Extremities: No edema, no calf tenderness to palpation, no unequal edema    MEDICAL DECISION MAKING:     MDM  64 y.o. female presenting with hypoxia, clinical signs of pneumonia. She is borderline febrile and tachycardic on presentation septic work-up initiated. She does appear hypovolemic, started on IV fluids. Patient will require admission to the hospital because of her hypoxia. Emergency Department course:  X-ray shows pneumonia. White count and lactic acid are normal.  Because of her hypoxia will admit her to the hospital.  I discussed with the internal medicine service. Patient updated and she is in agreement with the plan. 7:15 AM EST  Spoke with resident physicians. They will be admitting pt. Waiting on call back for Dr. Suman Somers. DIAGNOSTIC RESULTS     EKG: All EKG's are interpreted by the Emergency Department Physician who either signs or Co-signs this chart in the absence of a cardiologist.    EKG shows a sinus rhythm. HR is 98, , QRS 82, , no COLIN, No STD, TWI, the axis is normal.      RADIOLOGY:All plain film, CT, MRI, and formal ultrasound images (except ED bedside ultrasound) are read by the radiologist and the images and interpretations are directly viewed by the emergency physician. XR CHEST PORTABLE   Final Result   1. Prominent perihilar and interstitial lung markings with scattered patchy   airspace opacities. Findings are suggestive of diffuse pulmonary edema. Multifocal pneumonia in the appropriate clinical setting is possible. LABS: All lab results were reviewed by myself, and all abnormals are listed below.   Labs Reviewed   CBC WITH AUTO DIFFERENTIAL - Abnormal; Notable for the following components:       Result Value    RBC 3.95 (*)     Hemoglobin 11.8 (*)     Hematocrit 34.4 (*)     Seg Neutrophils 77 (*)     Lymphocytes 11 (*)     Absolute Lymph # 0.70 (*)     All other components within normal limits   BLOOD GAS, ARTERIAL - Abnormal; Notable for the following components:    pH, Arterial 7.333 (*)     pCO2, Arterial 48.0 (*)     pO2, Arterial 134.0 (*)     All other components within normal limits   CULTURE, BLOOD 1 CULTURE, BLOOD 1   COVID-19 & INFLUENZA COMBO   LACTIC ACID   COMPREHENSIVE METABOLIC PANEL   TROPONIN   LACTIC ACID   URINALYSIS WITH REFLEX TO CULTURE       EMERGENCY DEPARTMENT COURSE:   Vitals:    Vitals:    03/08/22 0548 03/08/22 0554   BP: 110/69    Pulse: 100    Resp: 15    Temp: 100.2 °F (37.9 °C)    SpO2: (!) 85% 93%       The patient was given the following medications while in the emergency department:  Orders Placed This Encounter   Medications    0.9 % sodium chloride bolus    levoFLOXacin (LEVAQUIN) 750 MG/150ML infusion 750 mg     Order Specific Question:   Antimicrobial Indications     Answer:   Pneumonia (VAP)     -------------------------  CRITICAL CARE:   CONSULTS: IP CONSULT TO INTERNAL MEDICINE  PROCEDURES: Procedures     FINAL IMPRESSION      1. Acute respiratory failure with hypoxia ([facility] [address]    2. Pneumonia of both lower lobes due to infectious organism          DISPOSITION/PLAN   DISPOSITION Decision To Admit 03/08/2022 05:46:42 AM      PATIENT REFERRED TO:  No follow-up provider specified.     DISCHARGE MEDICATIONS:  New Prescriptions    No medications on file         Elisha Vyas MD  Attending Emergency Physician                     Elisha Vyas MD  03/08/22 0286       Elisha Vyas MD  03/08/22 6522

## 2022-03-08 NOTE — CARE COORDINATION
CASE MANAGEMENT NOTE:    Admission Date:  3/8/2022 Nicole Vidal is a 64 y.o.  female    Admitted for : Acute respiratory failure with hypoxia ([facility] [address] [J96.01]  Pneumonia of both lower lobes due to infectious organism [J18.9]  Multifocal pneumonia [J18.9]    Met with:  Patient    PCP:  Augustus Khan NP                                Insurance:  [employer]      Is patient alert and oriented at time of discussion:  Yes    Current Residence/ Living Arrangements:  independently at home with spouse         Current Services PTA:  [facility] with [facility] Tonya Mascorro    Does patient go to outpatient dialysis: No  If yes, location and chair time: NA    Is patient agreeable to VNS: Yes    Freedom of choice provided:  Yes    List of [address] provided: NA    VNS chosen:  No    DME:  none    Home Oxygen: No    Nebulizer: No    CPAP/BIPAP: No    Supplier: N/A    Potential Assistance Needed: Yes- VNS     SNF needed:No    Freedom of choice and list provided: NA    [facility]       Does Patient want to use MEDS to BEDS? No    Is patient currently receiving oral anticoagulation therapy? No    Is the Patient an ALETHEA LIVINGSTON [facility] with Readmission Risk Score greater than 14%? No  If yes, pt needs a follow up appointment made within 7 days. Family Members/Caregivers that pt would like involved in their care:    Yes    If yes, list name here:  Marlena Ritter spouse and [address]    Transportation Provider:  Family             Discharge Plan:  3/8 [address] From home with spouse. DME; none Denies VNE but is agreeable if recommended.  [facility] and sees Dr Constantino Garcia 13% Following for needs//JF                 Electronically signed by: Tamiko Wyatt RN on 3/8/2022 at 3:14 PM

## 2022-03-08 NOTE — H&P
[address] [facility]     HISTORY AND PHYSICAL EXAMINATION            Date:   3/8/2022  Patient name: Milton Aiken  Date of admission:  3/8/2022  5:39 AM  MRN:   [id number]  Account:  [de-identified]  YOB: 1945  PCP:    DES You CNP  Room:   2092/2092-01  Code Status:    Full Code    Chief Complaint:     Chief Complaint   Patient presents with    Fatigue       History Obtained From:     patient, non-family caregiver -ED physician, electronic medical record    History of Present Illness: The patient is a 64 y.o. Non- / non  female who presents with Fatigue   and she is admitted to the hospital for the management of pneumonia. Patient is a 58-year-old female with past medical history of chronic biliary pancreatitis, type 2 diabetes mellitus without long-term insulin use, degenerative disc disease, depression, hyperlipidemia who presents with generalized weakness and fatigue. Patient came from home by EMS to the emergency department. She reports that her symptoms started on Saturday that she has been having an associated nonproductive cough with chills, fatigue, and fever. Patient reports that she suffers from pneumonia yearly. She also endorses that she follows with a bariatric surgeon who will be treating her for hiatal hernia. In the ED, patient was found to be tachycardic, febrile, and hypoxic requiring 6 L of oxygen via nasal cannula. Initial blood gases showed a pH of 7.33, PCO2 48, and PO2 of 134. Remarkable labs include creatinine of 1.11, troponin of 19, WBC of 6.4. Other relevant studies include:  EKG showing occasional premature ventricular complexes without any signs of acute ischemia. Chest x-ray showing scattered patchy airspace opacities suggestive of multifocal pneumonia.     Patient was treated in the ED with IV Levaquin with normal saline bolus. Blood and sputum cultures were obtained. Decision was made to admit the patient to the progressive unit for further management of her pneumonia.     Past Medical History:     Past Medical History:   Diagnosis Date    Anemia     Bochdalek hernia     PER CT 7-2-21    Bowel obstruction ([address]     Bronchitis 02/19/2019    Frequent episodes of bronchitis, usually yearly with pneumonia    Chronic biliary pancreatitis ([address] 09/22/2016    Controlled type 2 diabetes mellitus without complication, without long-term current use of insulin ([address]     Cough 08/13/2021    has had cough for several months    DDD (degenerative disc disease)     Depression     Diverticulosis     Esophageal dysphagia     Fibromyalgia     GERD (gastroesophageal reflux disease)     Glaucoma     Hiatal hernia     Hyperlipidemia     Knee injuries 02/19/2019    Osteoarthritis of more than one site 04/09/2014    Pneumonia     gets pneumonia yearly    Right middle lobe pneumonia 03/24/2016    Seasonal allergies         Past SurgicalHistory:     Past Surgical History:   Procedure Laterality Date    BREAST BIOPSY Left 05/2016    CARPAL TUNNEL RELEASE Bilateral     x 2 each    COLONOSCOPY      ESOPHAGEAL MOTILITY STUDY N/A 1/21/2022    PES RN-ESOPHAGEAL MOTILITY STUDY performed by Candelario Frye DO at [facility]    EYE SURGERY Left     stent for glaucoma    EYE SURGERY Left     cataract    EYE SURGERY Left     lower lid    FOOT SURGERY Right     HYSTERECTOMY      KNEE ARTHROSCOPY Right 12/04/2019    RIGHT KNEE ARTHROSCOPY WITH TOTAL MENISCECTOMY performed by Sherif Ernst DO at [facility]  10/03/2016    AZ ARTHRS KNE SURG W/MENISCECTOMY MED/LAT W/SHVG Left 08/13/2018    KNEE ARTHROSCOPY FOR PARTIAL MEDIAL AND PARTIAL LATERAL MENISCECTOMY performed by Mary Solis MD at [address]      due to blockage    UPPER GASTROINTESTINAL ENDOSCOPY      EGD    UPPER GASTROINTESTINAL ENDOSCOPY N/A 8/17/2021    EGD BIOPSY performed by Romario Alston MD at [address] [place] [address]        Medications Prior to Admission:     Prior to Admission medications    Medication Sig Start Date End Date Taking? Authorizing Provider   QUEtiapine (SEROQUEL) 50 MG tablet TAKE 1 TO 2 TABLETS BY MOUTH AT BEDTIME AS NEEDED FOR SLEEP 3/2/22   DES Carter CNP   vitamin B-12 (CYANOCOBALAMIN) 1000 MCG tablet TAKE 2 TABLETS BY MOUTH EVERY DAY 3/2/22 3/2/23  DES Carter CNP   ALPRAZolam Elizabeth Maya) 0.5 MG tablet Take 1 tablet by mouth daily as needed for Sleep or Anxiety for up to 14 days. 3/1/22 3/15/22  DES Carter CNP   QUEtiapine (SEROQUEL) 200 MG tablet TAKE 1 TABLET DAILY 2/2/22   DES Carter CNP   atorvastatin (LIPITOR) 80 MG tablet Take 1 tablet by mouth daily 11/30/21 5/29/22  DES Carter CNP   dorzolamide-timolol (COSOPT) 22.3-6.8 MG/ML ophthalmic solution INSTILL 1 DROP IN [facility] TWO TIMES A DAY 10/1/21   Historical Provider, MD   gabapentin (NEURONTIN) 800 MG tablet TAKE 1 TAB BY MOUTH AT BEDTIME FOR ONE WEEK THEN 2 TABS AT BEDTIME FOR ONE WEEK THEN 3 TABS AT BEDTIME THEREAFTER 9/7/21   Historical Provider, MD   Handicap Placard MISC by Does not apply route Exp 3/16/2026 3/16/21   DES Carter CNP   diclofenac sodium (VOLTAREN) 1 % GEL as needed  Patient not taking: Reported on 3/1/2022    Historical Provider, MD   oxyCODONE-acetaminophen (PERCOCET)  MG per tablet Take 1 tablet by mouth every 4 hours as needed for Pain. 7/19/19   Historical Provider, MD        Allergies:     Pcn [penicillins], Adhesive tape, Nubain [nalbuphine hcl], Seasonal, and Vistaril [hydroxyzine]    Social History:       Tobacco:   Patient  reports that she has never smoked. She has never used smokeless tobacco.  Alcohol:     Patient  reports no history of alcohol use.   Drug Use: Patient  reports no history of drug use.    Family History:     Family History   Problem Relation Age of Onset    High Blood Pressure Mother     Stroke Mother     Heart Disease Father     Stroke Maternal Grandmother        Review of Systems:     Positive and Negative as described in HPI. Review of Systems   Constitutional: Positive for chills and fever. Negative for appetite change and fatigue. HENT: Negative for congestion and sore throat. Eyes: Negative for redness. Respiratory: Positive for cough, shortness of breath and wheezing. Negative for chest tightness. Cardiovascular: Negative for chest pain, palpitations and leg swelling. Gastrointestinal: Negative for abdominal pain, nausea and vomiting. Genitourinary: Negative for dysuria and hematuria. Musculoskeletal: Negative for back pain. Skin: Negative for color change. Neurological: Negative for dizziness, weakness, light-headedness and headaches. Psychiatric/Behavioral: Negative for confusion and decreased concentration. Physical Exam:   /64   Pulse 87   Temp 98.2 °F (36.8 °C) (Oral)   Resp 18   Ht 5' (1.524 m)   Wt 156 lb 4.9 oz (70.9 kg)   SpO2 92%   BMI 30.53 kg/m²   Temp (24hrs), Av.8 °F (37.1 °C), Min:98.1 °F (36.7 °C), Max:100.2 °F (37.9 °C)    No results for input(s): POCGLU in the last 72 hours. No intake or output data in the 24 hours ending 22 1203    Physical Exam  Constitutional:       General: She is not in acute distress. Appearance: Normal appearance. HENT:      Head: Normocephalic and atraumatic. Right Ear: External ear normal.      Left Ear: External ear normal.      Nose: Nose normal.   Eyes:      Extraocular Movements: Extraocular movements intact. Cardiovascular:      Rate and Rhythm: Regular rhythm. Tachycardia present. Pulses: Normal pulses. Heart sounds: Normal heart sounds. No murmur heard. Pulmonary:      Effort: No respiratory distress. Breath sounds: Wheezing and rhonchi present. Comments: Hypoxic placed on supplemental oxygen  Abdominal:      General: Bowel sounds are normal. There is no distension. Palpations: Abdomen is soft. Tenderness: There is no abdominal tenderness. There is no guarding or rebound. Musculoskeletal:      Right lower leg: No edema. Left lower leg: No edema. Skin:     General: Skin is warm. Neurological:      General: No focal deficit present. Mental Status: She is alert and oriented to person, place, and time. Mental status is at baseline. Psychiatric:         Mood and Affect: Mood normal.         Behavior: Behavior normal.         Thought Content: Thought content normal.         Investigations:     Laboratory Testing:  Recent Results (from the past 24 hour(s))   EKG 12 Lead    Collection Time: 03/08/22  5:50 AM   Result Value Ref Range    Ventricular Rate 98 BPM    Atrial Rate 98 BPM    P-R Interval 130 ms    QRS Duration 82 ms    Q-T Interval 316 ms    QTc Calculation (Bazett) 403 ms    P Axis 44 degrees    R Axis 24 degrees    T Axis 28 degrees   Arterial Blood Gases    Collection Time: 03/08/22  6:03 AM   Result Value Ref Range    pH, Arterial 7.333 (L) 7.350 - 7.450    pCO2, Arterial 48.0 (H) 35.0 - 45.0 mmHg    pO2, Arterial 134.0 (H) 80.0 - 100.0 mmHg    HCO3, Arterial 25.4 22.0 - 26.0 mmol/L    Negative Base Excess, Art 0.4 0.0 - 2.0 mmol/L    O2 Sat, Arterial 97.4 95 - 98 %    Carboxyhemoglobin 0.4 0 - 5 %    Methemoglobin 0.9 0.0 - 1.9 %    Pt Temp 37.1     O2 Device/Flow/% Cannula     Respiratory Rate 20     Bao Test PASS     Sample Site Right Radial Artery     Pt. Position FOWLERS     Text for Respiratory RESULT TO RN    Lactic Acid    Collection Time: 03/08/22  6:17 AM   Result Value Ref Range    Lactic Acid 1.4 0.5 - 2.2 mmol/L   COVID-19 & Influenza Combo    Collection Time: 03/08/22  6:19 AM    Specimen: Nasopharyngeal Swab   Result Value Ref Range    Specimen Description . NASOPHARYNGEAL SWAB     Source . NASOPHARYNGEAL SWAB     SARS-CoV-2 RNA, RT PCR Not Detected Not Detected    INFLUENZA A Not Detected Not Detected    INFLUENZA B Not Detected Not Detected   CBC with Auto Differential    Collection Time: 03/08/22  6:21 AM   Result Value Ref Range    WBC 6.4 3.5 - 11.0 k/uL    RBC 3.95 (L) 4.0 - 5.2 m/uL    Hemoglobin 11.8 (L) 12.0 - 16.0 g/dL    Hematocrit 34.4 (L) 36 - 46 %    MCV 87.2 80 - 100 fL    MCH 30.0 26 - 34 pg    MCHC 34.4 31 - 37 g/dL    RDW 14.5 11.5 - 14.9 %    Platelets 175 568 - 818 k/uL    MPV 7.4 6.0 - 12.0 fL    Seg Neutrophils 77 (H) 36 - 66 %    Lymphocytes 11 (L) 24 - 44 %    Monocytes 7 1 - 7 %    Eosinophils % 4 0 - 4 %    Basophils 1 0 - 2 %    Segs Absolute 5.00 1.3 - 9.1 k/uL    Absolute Lymph # 0.70 (L) 1.0 - 4.8 k/uL    Absolute Mono # 0.50 0.1 - 1.3 k/uL    Absolute Eos # 0.20 0.0 - 0.4 k/uL    Basophils Absolute 0.00 0.0 - 0.2 k/uL   SPECIMEN REJECTION    Collection Time: 03/08/22  6:21 AM   Result Value Ref Range    Specimen Source . BLOOD     Ordered Test CP TROPI     Reason for Rejection Unable to perform testing: Specimen hemolyzed. Culture, Blood 1    Collection Time: 03/08/22  6:29 AM    Specimen: Blood   Result Value Ref Range    Specimen Description . BLOOD RFA, ORG 4MLS, GRN 6MLS     Culture NO GROWTH <24 HRS    Culture, Blood 1    Collection Time: 03/08/22  6:30 AM    Specimen: Blood   Result Value Ref Range    Specimen Description . BLOOD L HAND, 10 MLS EACH     Culture NO GROWTH <24 HRS    Comprehensive Metabolic Panel    Collection Time: 03/08/22  7:11 AM   Result Value Ref Range    Glucose 97 70 - 99 mg/dL    BUN 23 8 - 23 mg/dL    CREATININE 1.11 (H) 0.50 - 0.90 mg/dL    Calcium 8.5 (L) 8.6 - 10.4 mg/dL    Sodium 134 (L) 135 - 144 mmol/L    Potassium 4.6 3.7 - 5.3 mmol/L    Chloride 99 98 - 107 mmol/L    CO2 23 20 - 31 mmol/L    Anion Gap 12 9 - 17 mmol/L    Alkaline Phosphatase 117 (H) 35 - 104 U/L    ALT 11 5 - 33 U/L    AST 26 <32 U/L    Total Bilirubin 0.32 0.3 - 1.2 mg/dL    Total Protein 6.9 6.4 - 8.3 g/dL    Albumin 3.7 3.5 - 5.2 g/dL    GFR Non- 48 (L) >60 mL/min    GFR  58 (L) >60 mL/min    GFR Comment         Troponin    Collection Time: 03/08/22  7:11 AM   Result Value Ref Range    Troponin, High Sensitivity 19 (H) 0 - 14 ng/L   Troponin    Collection Time: 03/08/22  9:30 AM   Result Value Ref Range    Troponin, High Sensitivity 20 (H) 0 - 14 ng/L       Imaging/Diagnostics:  XR CHEST PORTABLE    Result Date: 3/8/2022  1. Prominent perihilar and interstitial lung markings with scattered patchy airspace opacities. Findings are suggestive of diffuse pulmonary edema. Multifocal pneumonia in the appropriate clinical setting is possible. Assessment :      Primary Problem  Multifocal pneumonia    Active Hospital Problems    Diagnosis Date Noted    Multifocal pneumonia [J18.9] 03/08/2022    Esophageal hiatus hernia [K44.9] 11/12/2021    Hyperlipidemia [E78.5] 05/08/2012       Plan:     Patient status Admit as inpatient in the  Progressive Unit/Step down    Multifocal aspiration pneumonia 2/2 hiatal hernia:  Hypoxic requiring 3 L of oxygen via nasal cannula  Chest x-ray showing scattered opacities concerning for multifocal pneumonia  Respiratory culture, Legionella, strep pneumo, MRSA antigens pending  Continue Levaquin IV  FL Upper GI w/ air contrast pending    Insomnia: Seroquel  Degenerative Disc Disease: Percocet  Hyperlipidemia: Atorvastatin    DVT prophylaxis: Lovenox 40 mg subcutaneous daily  GI prophylaxis: None indicated  Disposition: Likely home  Code: Full Code   Diet: ADULT DIET; Regular; 4 carb choices (60 gm/meal)   Consults: IP CONSULT TO INTERNAL MEDICINE  IP CONSULT TO SOCIAL WORK   PT/OT    Santos Brown MD  3/8/2022  12:03 PM     Copy sent to Dr. Harvin Dakins, APRN - CNP    Attending Physician Statement    I have discussed the case of Nahid Yee, including pertinent history and exam findings with the resident.  I have seen and examined the patient and the key elements of the encounter have been performed by me. I agree with the assessment, plan, and orders as documented by the resident. He is making steady improvement and has less respiratory distress today.   It is probable that she has paraesophageal hernia, will review her previous work-up  Electronically signed by Jony Macias MD on 3/8/2022 at 1:56 PM

## 2022-03-08 NOTE — RESULT ENCOUNTER NOTE
Patient does not use MyChart. Please ensure patient is aware of results/response. Provider cholesterol is worsening. Her total cholesterol is elevated at 318. We like this less than 200. Her triglycerides also are elevated at 489. These levels are worsening. We will need to add adjunct therapy such as potentially Lovaza which is a fish oil, or a fenofibrate or a WelChol. Either way we will need to have additional pharmacology since she has worsening maxed out on her Lipitor dose. Uric acid level is slightly elevated at 5.9. This lab is utilized to indicate gout flareups. Electrolytes and kidney function are normal.  Only a slight deviation in her liver enzymes. Iron studies are normal.  Her vitamins are also appropriate. Her overall blood count is showing no infection or chronic blood loss. Your hemoglobin A1c is at 5.6%. This is an average daily blood glucose of 114 over the past 2 to 3 months. Her urinalysis is showing no sign of infection or kidney abnormalities. Plan- I would like to start both lovaza (fish oil) and Fenofibrate with her blessing.

## 2022-03-09 ENCOUNTER — APPOINTMENT (OUTPATIENT)
Dept: GENERAL RADIOLOGY | Age: 77
DRG: 177 | End: 2022-03-09
Payer: MEDICARE

## 2022-03-09 LAB
ABSOLUTE EOS #: 0.5 K/UL (ref 0–0.4)
ABSOLUTE LYMPH #: 0.9 K/UL (ref 1–4.8)
ABSOLUTE MONO #: 0.6 K/UL (ref 0.1–1.3)
ANION GAP SERPL CALCULATED.3IONS-SCNC: 14 MMOL/L (ref 9–17)
BASOPHILS # BLD: 0 % (ref 0–2)
BASOPHILS ABSOLUTE: 0 K/UL (ref 0–0.2)
BUN BLDV-MCNC: 13 MG/DL (ref 8–23)
CALCIUM SERPL-MCNC: 8.6 MG/DL (ref 8.6–10.4)
CHLORIDE BLD-SCNC: 105 MMOL/L (ref 98–107)
CO2: 18 MMOL/L (ref 20–31)
CREAT SERPL-MCNC: 0.72 MG/DL (ref 0.5–0.9)
EOSINOPHILS RELATIVE PERCENT: 6 % (ref 0–4)
GFR AFRICAN AMERICAN: >60 ML/MIN
GFR NON-AFRICAN AMERICAN: >60 ML/MIN
GFR SERPL CREATININE-BSD FRML MDRD: ABNORMAL ML/MIN/{1.73_M2}
GLUCOSE BLD-MCNC: 78 MG/DL (ref 70–99)
HCT VFR BLD CALC: 30.6 % (ref 36–46)
HEMOGLOBIN: 10.4 G/DL (ref 12–16)
LYMPHOCYTES # BLD: 10 % (ref 24–44)
MCH RBC QN AUTO: 30 PG (ref 26–34)
MCHC RBC AUTO-ENTMCNC: 34 G/DL (ref 31–37)
MCV RBC AUTO: 88.3 FL (ref 80–100)
MONOCYTES # BLD: 8 % (ref 1–7)
PDW BLD-RTO: 14.7 % (ref 11.5–14.9)
PLATELET # BLD: 247 K/UL (ref 150–450)
PMV BLD AUTO: 7.7 FL (ref 6–12)
POTASSIUM SERPL-SCNC: 4.2 MMOL/L (ref 3.7–5.3)
RBC # BLD: 3.46 M/UL (ref 4–5.2)
SEG NEUTROPHILS: 76 % (ref 36–66)
SEGMENTED NEUTROPHILS ABSOLUTE COUNT: 6.3 K/UL (ref 1.3–9.1)
SODIUM BLD-SCNC: 137 MMOL/L (ref 135–144)
WBC # BLD: 8.3 K/UL (ref 3.5–11)

## 2022-03-09 PROCEDURE — 97166 OT EVAL MOD COMPLEX 45 MIN: CPT

## 2022-03-09 PROCEDURE — 6370000000 HC RX 637 (ALT 250 FOR IP): Performed by: STUDENT IN AN ORGANIZED HEALTH CARE EDUCATION/TRAINING PROGRAM

## 2022-03-09 PROCEDURE — 97535 SELF CARE MNGMENT TRAINING: CPT

## 2022-03-09 PROCEDURE — 2580000003 HC RX 258

## 2022-03-09 PROCEDURE — 2500000003 HC RX 250 WO HCPCS: Performed by: INTERNAL MEDICINE

## 2022-03-09 PROCEDURE — 36415 COLL VENOUS BLD VENIPUNCTURE: CPT

## 2022-03-09 PROCEDURE — 87641 MR-STAPH DNA AMP PROBE: CPT

## 2022-03-09 PROCEDURE — 6370000000 HC RX 637 (ALT 250 FOR IP)

## 2022-03-09 PROCEDURE — 6370000000 HC RX 637 (ALT 250 FOR IP): Performed by: NURSE PRACTITIONER

## 2022-03-09 PROCEDURE — 6360000002 HC RX W HCPCS

## 2022-03-09 PROCEDURE — 2060000000 HC ICU INTERMEDIATE R&B

## 2022-03-09 PROCEDURE — 99233 SBSQ HOSP IP/OBS HIGH 50: CPT | Performed by: INTERNAL MEDICINE

## 2022-03-09 PROCEDURE — 74246 X-RAY XM UPR GI TRC 2CNTRST: CPT

## 2022-03-09 PROCEDURE — 85025 COMPLETE CBC W/AUTO DIFF WBC: CPT

## 2022-03-09 PROCEDURE — 80048 BASIC METABOLIC PNL TOTAL CA: CPT

## 2022-03-09 PROCEDURE — 97162 PT EVAL MOD COMPLEX 30 MIN: CPT

## 2022-03-09 PROCEDURE — 97116 GAIT TRAINING THERAPY: CPT

## 2022-03-09 RX ORDER — LEVOFLOXACIN 5 MG/ML
750 INJECTION, SOLUTION INTRAVENOUS EVERY 24 HOURS
Status: DISCONTINUED | OUTPATIENT
Start: 2022-03-09 | End: 2022-03-10 | Stop reason: HOSPADM

## 2022-03-09 RX ADMIN — ATORVASTATIN CALCIUM 80 MG: 80 TABLET, FILM COATED ORAL at 10:23

## 2022-03-09 RX ADMIN — QUETIAPINE FUMARATE 50 MG: 50 TABLET ORAL at 19:40

## 2022-03-09 RX ADMIN — BARIUM SULFATE 160 ML: 960 POWDER, FOR SUSPENSION ORAL at 08:29

## 2022-03-09 RX ADMIN — QUETIAPINE FUMARATE 200 MG: 200 TABLET ORAL at 19:41

## 2022-03-09 RX ADMIN — LEVOFLOXACIN 750 MG: 5 INJECTION, SOLUTION INTRAVENOUS at 10:38

## 2022-03-09 RX ADMIN — SODIUM CHLORIDE 25 ML: 9 INJECTION, SOLUTION INTRAVENOUS at 10:35

## 2022-03-09 RX ADMIN — PANTOPRAZOLE SODIUM 40 MG: 40 TABLET, DELAYED RELEASE ORAL at 16:09

## 2022-03-09 RX ADMIN — SODIUM CHLORIDE, PRESERVATIVE FREE 10 ML: 5 INJECTION INTRAVENOUS at 19:40

## 2022-03-09 RX ADMIN — OXYCODONE HYDROCHLORIDE AND ACETAMINOPHEN 1 TABLET: 5; 325 TABLET ORAL at 20:30

## 2022-03-09 RX ADMIN — BARIUM SULFATE 140 ML: 980 POWDER, FOR SUSPENSION ORAL at 08:30

## 2022-03-09 RX ADMIN — SODIUM CHLORIDE, PRESERVATIVE FREE 10 ML: 5 INJECTION INTRAVENOUS at 10:24

## 2022-03-09 RX ADMIN — Medication 60 MG: at 19:39

## 2022-03-09 RX ADMIN — ENOXAPARIN SODIUM 40 MG: 100 INJECTION SUBCUTANEOUS at 10:23

## 2022-03-09 RX ADMIN — OXYCODONE HYDROCHLORIDE AND ACETAMINOPHEN 1 TABLET: 5; 325 TABLET ORAL at 10:16

## 2022-03-09 RX ADMIN — OXYCODONE HYDROCHLORIDE AND ACETAMINOPHEN 1 TABLET: 5; 325 TABLET ORAL at 16:09

## 2022-03-09 ASSESSMENT — PAIN SCALES - GENERAL
PAINLEVEL_OUTOF10: 0
PAINLEVEL_OUTOF10: 5
PAINLEVEL_OUTOF10: 6
PAINLEVEL_OUTOF10: 6
PAINLEVEL_OUTOF10: 4
PAINLEVEL_OUTOF10: 6
PAINLEVEL_OUTOF10: 9
PAINLEVEL_OUTOF10: 4
PAINLEVEL_OUTOF10: 5
PAINLEVEL_OUTOF10: 7
PAINLEVEL_OUTOF10: 0

## 2022-03-09 ASSESSMENT — ENCOUNTER SYMPTOMS
COLOR CHANGE: 0
NAUSEA: 0
EYE REDNESS: 0
SORE THROAT: 0
COUGH: 1
CHEST TIGHTNESS: 0
BACK PAIN: 0
SHORTNESS OF BREATH: 1
ABDOMINAL PAIN: 0
VOMITING: 0
WHEEZING: 0

## 2022-03-09 ASSESSMENT — PAIN DESCRIPTION - FREQUENCY
FREQUENCY: CONTINUOUS
FREQUENCY: CONTINUOUS

## 2022-03-09 ASSESSMENT — PAIN DESCRIPTION - PAIN TYPE
TYPE: ACUTE PAIN
TYPE: ACUTE PAIN

## 2022-03-09 ASSESSMENT — PAIN DESCRIPTION - LOCATION
LOCATION: GENERALIZED
LOCATION: KNEE

## 2022-03-09 ASSESSMENT — PAIN DESCRIPTION - ONSET: ONSET: ON-GOING

## 2022-03-09 ASSESSMENT — PAIN DESCRIPTION - ORIENTATION: ORIENTATION: RIGHT;LEFT

## 2022-03-09 ASSESSMENT — PAIN DESCRIPTION - DESCRIPTORS
DESCRIPTORS: ACHING
DESCRIPTORS: ACHING

## 2022-03-09 NOTE — CARE COORDINATION
ONGOING DISCHARGE PLAN:    Patient is alert and oriented x4. Spoke with patient regarding discharge plan and patient confirms that plan is still home with no needs. Pt remains on IV levaquin. O2 per NC at 3lpm 98% Sat, following for home O2. Will continue to follow for additional discharge needs.     Electronically signed by Cleotis Brittle, RN on 3/9/2022 at 3:43 PM

## 2022-03-09 NOTE — PLAN OF CARE
Problem: Pain:  Goal: Pain level will decrease  Description: Pain level will decrease  3/9/2022 0336 by Isreal Chirinos RN  Outcome: Ongoing  Note: Pt given medication to help decrease pain. See MAR.       Problem: Skin Integrity:  Goal: Absence of new skin breakdown  Description: Absence of new skin breakdown  3/9/2022 0336 by Isreal Chirinos RN  Outcome: Ongoing  Note: Pt absent from any new skin breakdown       Problem: Safety:  Goal: Free from intentional harm  Description: Free from intentional harm  Outcome: Ongoing  Note: Pt free from intentional harm     Problem: Skin Integrity:  Goal: Skin integrity will stabilize  Description: Skin integrity will stabilize  Outcome: Ongoing  Note: Pts skin integrity stable

## 2022-03-09 NOTE — FLOWSHEET NOTE
03/09/22 1413   Encounter Summary   Services provided to: Patient   Referral/Consult From: Rounding   Complexity of Encounter Low   Length of Encounter 15 minutes   Spiritual/Christianity   Type Spiritual support   Assessment Sleeping   Intervention Prayer

## 2022-03-09 NOTE — PROGRESS NOTES
Physician Progress Note      Yumiko Tejeda  [facility] #:                  [id number]  :                       1945  ADMIT DATE:       3/8/2022 5:39 AM  DISCH DATE:  RESPONDING  PROVIDER #:        [de-identified] M LEODAN          QUERY TEXT:    Pt admitted with pneumonia. Pt noted to have elevated BUN and Creatinine with   decreased GFR. If possible, please document in the progress notes and   discharge summary if you are evaluating and/or treating any of the following: The medical record reflects the following:  Risk Factors: 64 y.o. female with multiple day history of weakness, fatigue,   nausea, and vomiting. Clinical Indicators: In the setting of above risk factors, ED Note 3/8   states, She does appear hypovolemic, started on IV fluids. BUN/Cr/GFR:   23/1.11/48 (3/8), 13/0.72/>60 (3/9). Treatment: 30 ml/ kg bolus (2,100 ml). Defined by Kidney Disease Improving Global Outcomes (KDIGO) clinical practice   guideline for acute kidney injury:  -Increase in SCr by greater than or equal to 0.3 mg/dl within 48 hours; or  -Increase or decrease in SCr to greater than or equal to 1.5 times baseline,   which is known or presumed to have occurred within the prior 7 days; or  -Urine volume < 0.5ml/kg/h for 6 hours  Options provided:  -- Acute kidney failure  -- Acute kidney failure with acute tubular necrosis  -- Acute kidney injury  -- Other - I will add my own diagnosis  -- Disagree - Not applicable / Not valid  -- Disagree - Clinically unable to determine / Unknown  -- Refer to Clinical Documentation Reviewer    PROVIDER RESPONSE TEXT:    This patient has an Acute kidney injury. Query created by: Jw Carvalho on 3/9/2022 10:42 AM      QUERY TEXT:    Pt admitted with pneumonia. Pt noted to meet SIRS criteria. If possible,   please document in the progress notes and discharge summary if you are   evaluating and /or treating any of the following:     The medical record reflects the following:  Risk Factors: 64 y.o. female with extensive PMH, current history includes   cough, SOB, fever, generalized weakness, and fatigue. Clinical Indicators: In the setting of above risk factors, pt documented in &P   3/8 to have multifocal aspiration pneumonia. T-max 100.2. HR as high as 100. BP as low as 104/64. BUN/Cr/GFR: 23/1.11/48 (3/8), 13/0.72/>60 (3/9). WBCs 6.4   (3/8), 8.3 (3/9)  Treatment: 30 ml/ kg bolus (2,100 ml). Levaquin IV. Options provided:  -- Sepsis d/t pneumonia, present on admission  -- Sepsis d/t pneumonia, present on admission, now resolved  -- Pneumonia without Sepsis  -- Other - I will add my own diagnosis  -- Disagree - Not applicable / Not valid  -- Disagree - Clinically unable to determine / Unknown  -- Refer to Clinical Documentation Reviewer    PROVIDER RESPONSE TEXT:    This patient has pneumonia without Sepsis.     Query created by: Kai Cunningham on 3/9/2022 10:49 AM      Electronically signed by:  Jairo ALCOCER 3/9/2022 1:24 PM

## 2022-03-09 NOTE — PLAN OF CARE
Problem: Pain:  Goal: Control of acute pain  Description: Control of acute pain  3/9/2022 1639 by Danyelle Britton RN  Outcome: Ongoing     Problem: Skin Integrity:  Goal: Absence of new skin breakdown  Description: Absence of new skin breakdown  3/9/2022 1639 by Danyelle Britton RN  Outcome: Ongoing     Problem: Falls - Risk of:  Goal: Will remain free from falls  Description: Will remain free from falls  3/9/2022 1639 by Danyelle Britton RN  Outcome: Ongoing     Problem: Musculor/Skeletal Functional Status  Goal: Absence of falls  Outcome: Ongoing  Pt calling out for help when getting out of bed. Room free from obstacles and non skid socks on.

## 2022-03-09 NOTE — PROGRESS NOTES
Physical Therapy    Facility/Department: [facility] PROGRESSIVE CARE  Initial Assessment    NAME: Chuckie Dickerson  : 1945  MRN: [id number]    Date of Service: 3/9/2022    Discharge Recommendations:  Patient would benefit from continued therapy after discharge   PT Equipment Recommendations  Equipment Needed:  (TBD)    Assessment   Body structures, Functions, Activity limitations: Decreased functional mobility ; Increased pain;Decreased balance;Decreased strength;Decreased safe awareness;Decreased endurance  Assessment: continue per POC to maxmize potential for safe D/C  Treatment Diagnosis: impaired mobility due to limited endurance  Specific instructions for Next Treatment: advance gait distance and progress to 6 steps w/ 2 rails  Prognosis: Good  Decision Making: Medium Complexity  History: pt admitted due to acute respiratory failure  Exam: ROM, MMT, balance and mobility assessments  Clinical Presentation: MOD I for bed mobility, transfers w/ SBA, gait 10' x 4 w/ O2 at 3 L w/ SBA x 1, FALL RISK  PT Education: Goals;PT Role;Plan of Care  Barriers to Learning: [place]  REQUIRES PT FOLLOW UP: Yes  Activity Tolerance  Activity Tolerance: Patient limited by fatigue;Patient limited by endurance; Patient limited by pain       Patient Diagnosis(es): The primary encounter diagnosis was Acute respiratory failure with hypoxia ([address] A diagnosis of Pneumonia of both lower lobes due to infectious organism was also pertinent to this visit.      has a past medical history of Anemia, Bochdalek hernia, Bowel obstruction (HCC), Bronchitis, Chronic biliary pancreatitis ([address] Controlled type 2 diabetes mellitus without complication, without long-term current use of insulin (HCC), Cough, DDD (degenerative disc disease), Depression, Diverticulosis, Esophageal dysphagia, Fibromyalgia, GERD (gastroesophageal reflux disease), Glaucoma, Hiatal hernia, Hyperlipidemia, Knee injuries, Osteoarthritis of more than one site, Pneumonia, Right middle lobe pneumonia, and Seasonal allergies. has a past surgical history that includes Hysterectomy; Foot surgery (Right); Small intestine surgery; Carpal tunnel release (Bilateral); Breast biopsy (Left, 05/2016); Pancreas Biopsy (10/03/2016); Colonoscopy; Upper gastrointestinal endoscopy; pr arthrs kne surg w/meniscectomy med/lat w/shvg (Left, 08/13/2018); eye surgery (Left); eye surgery (Left); eye surgery (Left); Knee arthroscopy (Right, 12/04/2019); Upper gastrointestinal endoscopy (N/A, 8/17/2021); and esophageal motility study (N/A, 1/21/2022). Restrictions  Restrictions/Precautions  Restrictions/Precautions: Fall Risk,Up as Tolerated (peripheral IV right and left hands, O2 3  L, troponins 37 on 3-8-2022)  Required Braces or Orthoses?: No  Vision/Hearing  Vision: Within Functional Limits (had cataract surgery)  Hearing: Exceptions to [facility] Exceptions: Hard of hearing/hearing concerns; No hearing aid     Subjective  General  Patient assessed for rehabilitation services?: Yes  Response To Previous Treatment: Not applicable  Family / Caregiver Present: No  Referring Practitioner: Dr. Gayle Villar  Referral Date : 03/08/22  Diagnosis: acute respiratory failure w/ hypoxia  Follows Commands: Within Functional Limits  Other (Comment): OK per nurse Calles to proceed w/ PT evaluation  Subjective  Subjective: pt reports that she had C/O weakness and fatigue prior to admission. \"I have pneumonia. \"  Pain Screening  Patient Currently in Pain: Yes  Pain Assessment  Pain Assessment: 0-10  Pain Level: 4  Patient's Stated Pain Goal: No pain  Pain Type: Acute pain  Pain Location:  (all over)  Pain Descriptors: Aching  Pain Frequency: Continuous  Pain Onset: On-going  Non-Pharmaceutical Pain Intervention(s): Ambulation/Increased Activity;Repositioned  Response to Pain Intervention: Patient Satisfied  Multiple Pain Sites: No  Vital Signs  Patient Currently in Pain: Yes       Orientation  Orientation  Overall Orientation Status: Within Functional Limits (answered all questions correctly)  Social/Functional History  Social/Functional History  Lives With: Spouse  Type of Home: House  Home Layout: Multi-level,Bed/Bath upstairs,Able to Live on Main level with bedroom/bathroom,Performs ADL's on one level (split level home)  Home Access: Level entry  Entrance Stairs - Number of Steps: level entry into home through the garage; 6 steps w/ 2 rails to upper level (bed & bath)  Bathroom Shower/Tub: Tub/Shower unit,Walk-in shower,Shower chair with back (uses tub/ shower combo mainly, has shower chair in the walk in)  [employer]: Standard  Bathroom Equipment: Shower chair,Grab bars in shower  Bathroom Accessibility: Walker accessible  Home Equipment:  (has rollator)  ADL Assistance: [address] [place] [address] Avenue: Independent  Homemaking Responsibilities: Yes  Ambulation Assistance: Independent (no device)  Transfer Assistance: Independent  Active : Yes  Mode of Transportation: Car  Occupation: Retired  Type of occupation: retired   IADL Comments: sleeps in an adjustable bed  Additional Comments: Spouse is retired but went back to work at [employer] 30-35 hours per week. Pt states that she has 5 children and would be able to assist- \"All I have to do is make a call. \"  Pt follows w/ OP pain management. Plan to start home health PT at D/C.   Cognition        Objective     Observation/Palpation  Observation: peripheral IV right and left hands, O2 3  L    AROM RLE (degrees)  RLE AROM: WFL  AROM LLE (degrees)  LLE AROM : WFL  AROM RUE (degrees)  RUE General AROM: see OT for UE assessment  AROM LUE (degrees)  LUE General AROM: see OT for UE assessment  Strength RLE  Comment: grossly 4/5  Strength LLE  Comment: grossly 4/5  Strength RUE  Comment: see OT for UE assessment  Strength LUE  Comment: see OT for UE assessment     Sensation  Overall Sensation Status: WFL (denies)  Bed mobility  Rolling to Right: Modified independent  Supine to Sit: Modified independent  Sit to Supine: Modified independent  Scooting: Modified independent  Comment: HOB elevated for above activities;  O2 sat at rest supine on 3 L O2 was 96%  Transfers  Sit to Stand: Supervision  Stand to sit: Supervision  Comment: supervision for safety= pt has poor awareness of O2 tubing for transfers; commode transfer completed  Ambulation  Ambulation?: Yes  Ambulation 1  Surface: level tile  Device: No Device  Other Apparatus: O2 (3 L)  Assistance: Stand by assistance  Distance: 10' x 4  Comments: O2 sat post gait 93-94 % towards the doorway then 93-94 % post gait from bathroom; tends to reach out for objects when walking.   Pt refused further distances  Stairs/Curb  Stairs?: No     Balance  Sitting - Static: Good  Sitting - Dynamic: Good  Standing - Static: Good;- (no device)  Standing - Dynamic: Fair;+ (no device)        Plan   Plan  Times per week: 2 treatments  Times per day:  (2 treatments)  Specific instructions for Next Treatment: advance gait distance and progress to 6 steps w/ 2 rails  Current Treatment Recommendations: Strengthening,Home Exercise Program,Safety Education & Training,Balance Training,Endurance Training,Patient/Caregiver Education & Training,Equipment Evaluation, Education, & procurement,Functional Mobility Training,Transfer Training,Gait Training,Stair training  Safety Devices  Type of devices: Gait belt,Call light within reach,Nurse notified,Left in bed,Patient at risk for falls (nurse Marli)    G-Code       OutComes Score                                                  AM-PAC Score  AM-PAC Inpatient Mobility Raw Score : 18 (03/09/22 1355)  AM-PAC Inpatient T-Scale Score : 43.63 (03/09/22 1355)  Mobility Inpatient CMS 0-100% Score: 46.58 (03/09/22 1355)  Mobility Inpatient CMS G-Code Modifier : CK (03/09/22 1355)          Goals  Short term goals  Time Frame for Short term goals: 2 treatments  Short term goal 1: pt to tolerate 1/2 hour of therapuetic exercise and activiy  Short term goal 2: pt to demonstrate good technique for LE strengthening exercises and energy conservation techniques  Short term goal 3: pt to demonstrate independent transfers using AD if needed  Short term goal 4: pt to demonstrate independent gait 50-80' using AD if needed and O2 as ordered  Short term goal 5: pt to demonstrate good balance for ambulation  Short term goal 6: pt to demonstrate ability to ascend/ descend 6 steps w/ 2 rails w/ CGA x 1 to access upper level of her home  Patient Goals   Patient goals : return home tomorrow w/ spouse       Therapy Time   Individual Concurrent Group Co-treatment   Time In 1355         Time Out 1425         Minutes 30         Timed Code Treatment Minutes: Martin Burger, PT

## 2022-03-09 NOTE — PROGRESS NOTES
[facility]    PROGRESS NOTE             3/9/2022    9:57 AM    Name:   Pastor Regalado  MRN:     [id number]     Acct:      [de-identified]   Room:   2092/2092-01   Day:  1  Admit Date:  3/8/2022  5:39 AM    PCP:  DES Johnston CNP  Code Status:  Full Code    Subjective:     C/C:   Chief Complaint   Patient presents with    Fatigue     Interval History Status: not changed. No acute events overnight. Patient seen at bedside this morning. Patient is status post upper GI series. She reports that her coughing has improved. She denies any chest pain or shortness of breath. Patient agrees with the plan of care at this time. Brief History:     Patient is a 77-year-old female with past medical history of chronic biliary pancreatitis, type 2 diabetes mellitus without long-term insulin use, degenerative disc disease, depression, hyperlipidemia who presents with generalized weakness and fatigue. Patient came from home by EMS to the emergency department. She reports that her symptoms started on Saturday that she has been having an associated nonproductive cough with chills, fatigue, and fever. Patient reports that she suffers from pneumonia yearly. She also endorses that she follows with a bariatric surgeon who will be treating her for hiatal hernia. In the ED, patient was found to be tachycardic, febrile, and hypoxic requiring 6 L of oxygen via nasal cannula. Initial blood gases showed a pH of 7.33, PCO2 48, and PO2 of 134. Chest x-ray showing scattered patchy airspace opacities suggestive of multifocal pneumonia. Patient was treated in the ED with IV Levaquin with normal saline bolus. Decision was made to admit the patient to the progressive unit for further management of her pneumonia. Upper GI series shows moderate to large hiatal hernia.       Review of Systems:     Review of Systems   Constitutional: Negative for appetite change, chills, fatigue and fever. HENT: Negative for congestion and sore throat. Eyes: Negative for redness. Respiratory: Positive for cough and shortness of breath. Negative for chest tightness and wheezing. Cardiovascular: Negative for chest pain, palpitations and leg swelling. Gastrointestinal: Negative for abdominal pain, nausea and vomiting. Genitourinary: Negative for dysuria and hematuria. Musculoskeletal: Negative for back pain. Skin: Negative for color change. Neurological: Negative for dizziness, weakness, light-headedness and headaches. Psychiatric/Behavioral: Negative for confusion and decreased concentration. Medications:      Allergies:  Pcn [penicillins], Adhesive tape, Nubain [nalbuphine hcl], Seasonal, and Vistaril [hydroxyzine]    Current Meds:   Scheduled Meds:    levofloxacin  750 mg IntraVENous Q24H    sodium chloride flush  5-40 mL IntraVENous 2 times per day    enoxaparin  40 mg SubCUTAneous Daily    pantoprazole  40 mg Oral BID AC    atorvastatin  80 mg Oral Daily    QUEtiapine  50 mg Oral Nightly    QUEtiapine  200 mg Oral Nightly     Continuous Infusions:    sodium chloride       PRN Meds: sodium chloride flush, sodium chloride, polyethylene glycol, acetaminophen **OR** acetaminophen, ondansetron **OR** ondansetron, potassium chloride **OR** potassium alternative oral replacement **OR** potassium chloride, oxyCODONE-acetaminophen, benzonatate, dextromethorphan    Data:     Past Medical History:    Past Medical History:   Diagnosis Date    Anemia     Bochdalek hernia     PER CT 7-2-21    Bowel obstruction (HCC)     Bronchitis 02/19/2019    Frequent episodes of bronchitis, usually yearly with pneumonia    Chronic biliary pancreatitis ([facility] 09/22/2016    Controlled type 2 diabetes mellitus without complication, without long-term current use of insulin ([facility]     Cough 08/13/2021    has had cough for several months    DDD (degenerative disc disease)     Depression     Diverticulosis     Esophageal dysphagia     Fibromyalgia     GERD (gastroesophageal reflux disease)     Glaucoma     Hiatal hernia     Hyperlipidemia     Knee injuries 2019    Osteoarthritis of more than one site 2014    Pneumonia     gets pneumonia yearly    Right middle lobe pneumonia 2016    Seasonal allergies        Social History:    Tobacco:   Patient  reports that she has never smoked. She has never used smokeless tobacco.  Alcohol:     Patient  reports no history of alcohol use. Drug Use: Patient  reports no history of drug use. Family History:   Family History   Problem Relation Age of Onset    High Blood Pressure Mother     Stroke Mother     Heart Disease Father     Stroke Maternal Grandmother        Vitals:  BP (!) 105/56   Pulse 93   Temp 98 °F (36.7 °C) (Oral)   Resp 18   Ht 5' (1.524 m)   Wt 155 lb 3.3 oz (70.4 kg)   SpO2 97%   BMI 30.31 kg/m²   Temp (24hrs), Av.1 °F (36.7 °C), Min:97.7 °F (36.5 °C), Max:98.4 °F (36.9 °C)    Vitals:    22 1150 22 1930 22 0030 22 0645   BP: 104/64 105/63 105/61 (!) 105/56   Pulse: 87 93 81 93   Resp: 18 18 16 18   Temp: 98.2 °F (36.8 °C) 98.4 °F (36.9 °C) 97.7 °F (36.5 °C) 98 °F (36.7 °C)   TempSrc: Oral Oral Oral Oral   SpO2: 92% 95% 95% 97%   Weight:   155 lb 3.3 oz (70.4 kg)    Height:           O2 Requirements: 3 L oxygen via nasal cannula    Lines/Drains/Access: Peripheral IV    I/O(24Hr):     Intake/Output Summary (Last 24 hours) at 3/9/2022 0957  Last data filed at 3/9/2022 0810  Gross per 24 hour   Intake 350 ml   Output 1500 ml   Net -1150 ml       Labs:  Recent Results (from the past 24 hour(s))   Urinalysis with Reflex to Culture    Collection Time: 22  2:39 PM    Specimen: Urine   Result Value Ref Range    Color, UA Yellow Yellow    Turbidity UA Clear Clear    Glucose, Ur NEGATIVE NEGATIVE    Bilirubin Urine NEGATIVE NEGATIVE    Ketones, Urine NEGATIVE NEGATIVE    Specific [place], UA 1.011 1.000 - 1.030    Urine Hgb TRACE (A) NEGATIVE    pH, UA 6.0 5.0 - 8.0    Protein, UA TRACE (A) NEGATIVE    Urobilinogen, Urine Normal Normal    Nitrite, Urine NEGATIVE NEGATIVE    Leukocyte Esterase, Urine TRACE (A) NEGATIVE   Legionella antigen, urine    Collection Time: 03/08/22  2:39 PM    Specimen: Urine   Result Value Ref Range    Legionella Pneumophilia Ag, Urine NEGATIVE NEGATIVE   Microscopic Urinalysis    Collection Time: 03/08/22  2:39 PM   Result Value Ref Range    WBC, UA 0 TO 2 /HPF    RBC, UA 0 TO 2 /HPF    Casts UA 3 to 5 /LPF    Epithelial Cells UA 0 TO 2 /HPF    Bacteria, UA None None   Strep Pneumoniae Antigen    Collection Time: 03/08/22  2:40 PM    Specimen: Urine, clean catch   Result Value Ref Range    Source . CLEAN CATCH URINE     Strep pneumo Ag NEGATIVE    Basic Metabolic Panel w/ Reflex to MG    Collection Time: 03/09/22  6:10 AM   Result Value Ref Range    Glucose 78 70 - 99 mg/dL    BUN 13 8 - 23 mg/dL    CREATININE 0.72 0.50 - 0.90 mg/dL    Calcium 8.6 8.6 - 10.4 mg/dL    Sodium 137 135 - 144 mmol/L    Potassium 4.2 3.7 - 5.3 mmol/L    Chloride 105 98 - 107 mmol/L    CO2 18 (L) 20 - 31 mmol/L    Anion Gap 14 9 - 17 mmol/L    GFR Non-African American >60 >60 mL/min    GFR African American >60 >60 mL/min    GFR Comment         CBC with Auto Differential    Collection Time: 03/09/22  7:04 AM   Result Value Ref Range    WBC 8.3 3.5 - 11.0 k/uL    RBC 3.46 (L) 4.0 - 5.2 m/uL    Hemoglobin 10.4 (L) 12.0 - 16.0 g/dL    Hematocrit 30.6 (L) 36 - 46 %    MCV 88.3 80 - 100 fL    MCH 30.0 26 - 34 pg    MCHC 34.0 31 - 37 g/dL    RDW 14.7 11.5 - 14.9 %    Platelets 991 254 - 827 k/uL    MPV 7.7 6.0 - 12.0 fL    Seg Neutrophils 76 (H) 36 - 66 %    Lymphocytes 10 (L) 24 - 44 %    Monocytes 8 (H) 1 - 7 %    Eosinophils % 6 (H) 0 - 4 %    Basophils 0 0 - 2 %    Segs Absolute 6.30 1.3 - 9.1 k/uL    Absolute Lymph # 0.90 (L) 1.0 - 4.8 k/uL    Absolute Mono # 0.60 0.1 - 1.3 k/uL    Absolute Eos # 0.50 (H) 0.0 - 0.4 k/uL    Basophils Absolute 0.00 0.0 - 0.2 k/uL       Lab Results   Component Value Date/Time    SPECIAL NOT REPORTED 03/13/2019 12:37 PM     Lab Results   Component Value Date/Time    CULTURE NO GROWTH 1 DAY 03/08/2022 06:30 AM       No results for input(s): POCGLU in the last 72 hours. Radiology:    XR CHEST PORTABLE    Result Date: 3/8/2022  1. Prominent perihilar and interstitial lung markings with scattered patchy airspace opacities. Findings are suggestive of diffuse pulmonary edema. Multifocal pneumonia in the appropriate clinical setting is possible. Physical Examination:        Physical Exam  Constitutional:       General: She is not in acute distress. Appearance: Normal appearance. HENT:      Head: Normocephalic and atraumatic. Right Ear: External ear normal.      Left Ear: External ear normal.      Nose: Nose normal.   Eyes:      Extraocular Movements: Extraocular movements intact. Cardiovascular:      Rate and Rhythm: Regular rhythm. Tachycardia present. Pulses: Normal pulses. Heart sounds: Normal heart sounds. No murmur heard. Pulmonary:      Effort: No respiratory distress. Breath sounds: Rhonchi present. No wheezing. Comments: Hypoxic placed on supplemental oxygen  Abdominal:      General: Bowel sounds are normal. There is no distension. Palpations: Abdomen is soft. Tenderness: There is no abdominal tenderness. There is no guarding or rebound. Musculoskeletal:      Right lower leg: No edema. Left lower leg: No edema. Skin:     General: Skin is warm. Neurological:      General: No focal deficit present. Mental Status: She is alert and oriented to person, place, and time. Mental status is at baseline. Psychiatric:         Mood and Affect: Mood normal.         Behavior: Behavior normal.         Thought Content:  Thought content normal.           Assessment:        Primary Problem  Multifocal pneumonia    Active Hospital Problems    Diagnosis Date Noted    Multifocal pneumonia [J18.9] 03/08/2022    Acute respiratory failure with hypoxia ([facility] [address] [J96.01]     Esophageal hiatus hernia [K44.9] 11/12/2021    Hyperlipidemia [E78.5] 05/08/2012       Plan:        Multifocal aspiration pneumonia 2/2 hiatal hernia:  Hypoxic requiring 3 L of oxygen via nasal cannula  Chest x-ray showing scattered opacities concerning for multifocal pneumonia  Respiratory culture, Legionella, strep pneumo, MRSA antigens all negative  Continue Levaquin IV, dextromethorphan and Tessalon for cough  FL Upper GI w/ air contrast showing moderate to large hiatal hernia  Insomnia: Seroquel  Degenerative Disc Disease: Percocet  Hyperlipidemia: Atorvastatin     DVT prophylaxis: Lovenox 40 mg subcutaneous daily  GI prophylaxis:  Protonix 40 mg twice daily  Disposition: Likely home  Code: Full Code   Diet: ADULT DIET; Regular; 4 carb choices (60 gm/meal)   Consults: IP CONSULT TO INTERNAL MEDICINE  IP CONSULT TO SOCIAL WORK     Annelise Betts MD  3/9/2022  9:57 AM     Attending Physician Statement    I have discussed the case of Fide Bazzi, including pertinent history and exam findings with the resident. I have seen and examined the patient and the key elements of the encounter have been performed by me. I agree with the assessment, plan, and orders as documented by the resident. The patient's clinical condition has improved, she still has productive cough and purulent sputum. On my examination on the right side she had a bronchial breathing. Her upper GI did show evidence of a hiatus hernia which is sliding to and half to one third of her stomach is into her chest cavity.   She does have a multifocal pneumonia and will be kept on intravenous Levaquin  Electronically signed by Jennifer Gant MD on 3/9/2022 at 11:42 AM

## 2022-03-09 NOTE — FLOWSHEET NOTE
03/09/22 1608   Encounter Summary   Services provided to: Patient not available  (patint with nurse)

## 2022-03-09 NOTE — PROGRESS NOTES
[address]    Occupational Therapy Evaluation  Date: 3/9/22  Patient Name: Jasson Farmer       Room:   MRN: [id number]  Account: [de-identified]   : 1945  (72 y.o.) Gender: female     Discharge Recommendations:  Further Occupational Therapy is recommended upon facility discharge. Equipment Needed: No    Referring Practitioner: Dano Barger MD  Diagnosis: Multifocal pneumonia  Additional Pertinent Hx: 20-year-old female with past medical history of chronic biliary pancreatitis, type 2 diabetes mellitus without long-term insulin use, degenerative disc disease, depression, hyperlipidemia who presents with generalized weakness and fatigue. Admitted for management of pneumonia. Treatment Diagnosis: Impaired self-care status  Past Medical History:  has a past medical history of Anemia, Bochdalek hernia, Bowel obstruction (HCC), Bronchitis, Chronic biliary pancreatitis ([facility] [address] Controlled type 2 diabetes mellitus without complication, without long-term current use of insulin (HCC), Cough, DDD (degenerative disc disease), Depression, Diverticulosis, Esophageal dysphagia, Fibromyalgia, GERD (gastroesophageal reflux disease), Glaucoma, Hiatal hernia, Hyperlipidemia, Knee injuries, Osteoarthritis of more than one site, Pneumonia, Right middle lobe pneumonia, and Seasonal allergies. Past Surgical History:   has a past surgical history that includes Hysterectomy; Foot surgery (Right); Small intestine surgery; Carpal tunnel release (Bilateral); Breast biopsy (Left, 2016); Pancreas Biopsy (10/03/2016); Colonoscopy; Upper gastrointestinal endoscopy; pr arthrs kne surg w/meniscectomy med/lat w/shvg (Left, 2018); eye surgery (Left); eye surgery (Left); eye surgery (Left); Knee arthroscopy (Right, 2019); Upper gastrointestinal endoscopy (N/A, 2021); and esophageal motility study (N/A, 2022).     Restrictions  Restrictions/Precautions: Fall Risk,Up as Tolerated  Required Braces or Orthoses?: No     Vitals  Temp: 98.1 °F (36.7 °C)  Pulse: 99  Resp: 18  BP: 101/74  Height: 5' (152.4 cm)  Weight: 155 lb 3.3 oz (70.4 kg)  BMI (Calculated): 30.4  Oxygen Therapy  SpO2: 96 %  Pulse Oximeter Device Mode: Intermittent  Pulse Oximeter Device Location: Finger  O2 Device: Nasal cannula  O2 Flow Rate (L/min): 3 L/min  Level of Consciousness: Alert (0)    Subjective  Subjective: \"I've had pneumonia three times. I get weak and tired easily\"  Comments: Okay for OT/PT per RN. Pt is agreeable for therapy  Overall Orientation Status: Within Functional Limits  Vision  Vision: Within Functional Limits ( had cataract surgery)  Hearing  Hearing: Exceptions to [facility]  Hearing Exceptions: Hard of hearing/hearing concerns,No hearing aid  Social/Functional History  Lives With: Spouse  Type of Home: House  Home Layout: Multi-level,Bed/Bath upstairs,Able to Live on Main level with bedroom/bathroom,Performs ADL's on one level (split level home)  Home Access: Level entry  Entrance Stairs - Number of Steps: level entry into home through the garage; 6 steps w/ 2 rails to upper level (bed & bath)  Bathroom Shower/Tub: Tub/Shower unit,Walk-in shower,Shower chair with back (uses tub/ shower combo mainly, has shower chair in walk-in )  Bathroom Toilet: Standard  Bathroom Equipment: Shower chair,Grab bars in shower  Bathroom Accessibility: Walker accessible  Home Equipment:  (rollator)  ADL Assistance: [address]: [address] [place] [address] Responsibilities: Yes  Ambulation Assistance: Independent (no device)  Transfer Assistance: Independent  Active : Yes  Mode of Transportation: Car  Occupation: Retired  Type of occupation: retired   IADL Comments: sleeps in an adjustable bed  Additional Comments: Spouse is retired but went back to work at [employer] 30-35 hours per week.   Pt states that she has 5 children and would be able to assist- \"All I have to do is make a call. \"  Pt follows w/ OP pain management. Plan to start home health PT at D/C. Pain Assessment  Pain Assessment: 0-10  Pain Level: 4  Patient's Stated Pain Goal: No pain  Pain Type: Acute pain  Pain Location: Generalized  Pain Descriptors: Aching  Pain Frequency: Continuous    Objective      Cognition  Overall Cognitive Status: WFL   Sensation  Overall Sensation Status: WFL ( denies)   ADL  Feeding: Modified independent   Grooming: Modified independent   UE Bathing: Stand by assistance  LE Bathing: Stand by assistance  UE Dressing: Stand by assistance  LE Dressing: Stand by assistance (LB clothing mgmt standing near toilet)  Toileting: Stand by assistance  Additional Comments: ADL scores based on skilled observation and clinical reasoning, unless otherwise noted. Pt donned socks seated EOB, using figure four position to complete. Ambulated into bathroom and urinated seated on toilet. Washed hands standing at sink, UE support on sink as needed.      UE Function           LUE Strength  L Hand General: 4-/5  LUE Strength Comment: Overall 4-/5     LUE Tone: Normotonic     LUE AROM (degrees)  LUE AROM : WFL     Left Hand AROM (degrees)  Left Hand AROM: WFL  RUE Strength  R Hand General: 4-/5  RUE Strength Comment: Overall 4-/5      RUE Tone: Normotonic     RUE AROM (degrees)  RUE AROM : WFL     Right Hand AROM (degrees)  Right Hand AROM: WFL    Fine Motor Skills  Coordination  Movements Are Fluid And Coordinated: No  Coordination and Movement description: Fine motor impairments,Right UE,Left UE                           Mobility  Supine to Sit: Modified independent  Sit to Supine: Modified independent       Balance  Sitting Balance: Supervision  Standing Balance: Stand by assistance  Standing Balance  Time: 1-2 minutes, 2-3 minutes  Activity: functional transfers, functional mobility, LB clothing mgmt  Comment: no device, good tolerance  Functional Mobility  Functional - Mobility Device: No device  Activity: (Throughout pt's room)  Assist Level: Stand by assistance  Functional Mobility Comments: Assist with line mgmt. Slight SOB with activity. Bed mobility  Rolling to Right: Modified independent  Supine to Sit: Modified independent  Sit to Supine: Modified independent  Scooting: Modified independent  Comment: HOB elevated, no complaints of lightheadedness/dizziness     Transfers  Sit to stand: Supervision  Stand to sit: Supervision  Functional Activity Tolerance  Functional Activity Tolerance:  Tolerates 30 min exercise with multiple rests     Assessment  Assessment  Performance deficits / Impairments: Decreased functional mobility ,Decreased ADL status,Decreased strength,Decreased safe awareness,Decreased endurance,Decreased balance,Decreased high-level IADLs  Treatment Diagnosis: Impaired self-care status  Prognosis: Good  Decision Making: Medium Complexity  REQUIRES OT FOLLOW UP: Yes  Discharge Recommendations: Home with assist PRN,Home with Home health OT  Activity Tolerance: Patient Tolerated treatment well         Functional Outcome Measures  AM-PAC Daily Activity Inpatient   How much help for putting on and taking off regular lower body clothing?: A Little  How much help for Bathing?: A Little  How much help for Toileting?: A Little  How much help for putting on and taking off regular upper body clothing?: A Little  How much help for taking care of personal grooming?: A Little  How much help for eating meals?: A Little  AM-[facility] Inpatient Daily Activity Raw Score: 18  AM-PAC Inpatient ADL T-Scale Score : 38.66  ADL Inpatient CMS 0-100% Score: 46.65  ADL Inpatient CMS G-Code Modifier : CK       Goals  Short term goals  Time Frame for Short term goals: By discharge  Short term goal 1: Pt will perform BADLs independently and Good safety  Short term goal 2: Pt will perform transfers/functional mobility independently  Short term goal 3: Pt will V/D home safety/fall prevention (home O2 mgmt if needed) to increase safety in daily routine  Short term goal 4: Pt will V/D EC/WS strategies to increase independence in daily routine  Short term goal 5: Pt will actively participate in 15+ minutes of therapeutic exericse/functional activity to promote safety and independence with self care and mobility    Plan  Safety Devices  Safety Devices in place: Yes  Type of devices:  All fall risk precautions in place,Call light within reach,Gait belt,Patient at risk for falls,Left in bed,Nurse notified     Plan  Times per week: 3-4  Current Treatment Recommendations: Strengthening,Balance Training,Functional Mobility Estela MaganaWingina Education & Training,Patient/Caregiver Education & Training,Equipment Evaluation, Education, & procurement,Self-Care / ADL       Equipment Recommendations  Equipment Needed: No  OT Individual Minutes  Time In: 4530  Time Out: 9808  Minutes: 30    Electronically signed by Amandeep Easton OT on 3/9/22 at 4:34 PM EST         03/09/22 1633   OT Individual Minutes   Time In 9342   Time Out 1582   Minutes 30   Time Code Minutes    Timed Code Treatment Minutes 12 Minutes

## 2022-03-10 ENCOUNTER — TELEPHONE (OUTPATIENT)
Dept: FAMILY MEDICINE CLINIC | Age: 77
End: 2022-03-10

## 2022-03-10 VITALS
DIASTOLIC BLOOD PRESSURE: 82 MMHG | OXYGEN SATURATION: 94 % | SYSTOLIC BLOOD PRESSURE: 142 MMHG | BODY MASS INDEX: 31.03 KG/M2 | HEART RATE: 92 BPM | WEIGHT: 158.07 LBS | RESPIRATION RATE: 17 BRPM | TEMPERATURE: 98.4 F | HEIGHT: 60 IN

## 2022-03-10 LAB
ABSOLUTE EOS #: 0.4 K/UL (ref 0–0.4)
ABSOLUTE LYMPH #: 1.2 K/UL (ref 1–4.8)
ABSOLUTE MONO #: 0.6 K/UL (ref 0.1–1.3)
ANION GAP SERPL CALCULATED.3IONS-SCNC: 12 MMOL/L (ref 9–17)
BASOPHILS # BLD: 0 % (ref 0–2)
BASOPHILS ABSOLUTE: 0 K/UL (ref 0–0.2)
BUN BLDV-MCNC: 8 MG/DL (ref 8–23)
C-REACTIVE PROTEIN: 208.6 MG/L (ref 0–5)
CALCIUM SERPL-MCNC: 8.9 MG/DL (ref 8.6–10.4)
CHLORIDE BLD-SCNC: 105 MMOL/L (ref 98–107)
CO2: 23 MMOL/L (ref 20–31)
CREAT SERPL-MCNC: 0.67 MG/DL (ref 0.5–0.9)
EKG ATRIAL RATE: 98 BPM
EKG P AXIS: 44 DEGREES
EKG P-R INTERVAL: 130 MS
EKG Q-T INTERVAL: 316 MS
EKG QRS DURATION: 82 MS
EKG QTC CALCULATION (BAZETT): 403 MS
EKG R AXIS: 24 DEGREES
EKG T AXIS: 28 DEGREES
EKG VENTRICULAR RATE: 98 BPM
EOSINOPHILS RELATIVE PERCENT: 7 % (ref 0–4)
GFR AFRICAN AMERICAN: >60 ML/MIN
GFR NON-AFRICAN AMERICAN: >60 ML/MIN
GFR SERPL CREATININE-BSD FRML MDRD: NORMAL ML/MIN/{1.73_M2}
GLUCOSE BLD-MCNC: 88 MG/DL (ref 70–99)
HCT VFR BLD CALC: 33.6 % (ref 36–46)
HEMOGLOBIN: 11 G/DL (ref 12–16)
LYMPHOCYTES # BLD: 19 % (ref 24–44)
MCH RBC QN AUTO: 29 PG (ref 26–34)
MCHC RBC AUTO-ENTMCNC: 32.7 G/DL (ref 31–37)
MCV RBC AUTO: 88.7 FL (ref 80–100)
MONOCYTES # BLD: 10 % (ref 1–7)
MRSA, DNA, NASAL: NEGATIVE
PDW BLD-RTO: 14.3 % (ref 11.5–14.9)
PLATELET # BLD: 282 K/UL (ref 150–450)
PMV BLD AUTO: 7.5 FL (ref 6–12)
POTASSIUM SERPL-SCNC: 3.8 MMOL/L (ref 3.7–5.3)
RBC # BLD: 3.79 M/UL (ref 4–5.2)
SEG NEUTROPHILS: 64 % (ref 36–66)
SEGMENTED NEUTROPHILS ABSOLUTE COUNT: 3.9 K/UL (ref 1.3–9.1)
SODIUM BLD-SCNC: 140 MMOL/L (ref 135–144)
SPECIMEN DESCRIPTION: NORMAL
WBC # BLD: 6.2 K/UL (ref 3.5–11)

## 2022-03-10 PROCEDURE — 97116 GAIT TRAINING THERAPY: CPT

## 2022-03-10 PROCEDURE — 36415 COLL VENOUS BLD VENIPUNCTURE: CPT

## 2022-03-10 PROCEDURE — 6360000002 HC RX W HCPCS

## 2022-03-10 PROCEDURE — 6370000000 HC RX 637 (ALT 250 FOR IP): Performed by: STUDENT IN AN ORGANIZED HEALTH CARE EDUCATION/TRAINING PROGRAM

## 2022-03-10 PROCEDURE — 6370000000 HC RX 637 (ALT 250 FOR IP): Performed by: NURSE PRACTITIONER

## 2022-03-10 PROCEDURE — 6370000000 HC RX 637 (ALT 250 FOR IP)

## 2022-03-10 PROCEDURE — 85025 COMPLETE CBC W/AUTO DIFF WBC: CPT

## 2022-03-10 PROCEDURE — 99239 HOSP IP/OBS DSCHRG MGMT >30: CPT | Performed by: INTERNAL MEDICINE

## 2022-03-10 PROCEDURE — 80048 BASIC METABOLIC PNL TOTAL CA: CPT

## 2022-03-10 PROCEDURE — 86140 C-REACTIVE PROTEIN: CPT

## 2022-03-10 PROCEDURE — 2580000003 HC RX 258

## 2022-03-10 PROCEDURE — 93010 ELECTROCARDIOGRAM REPORT: CPT | Performed by: INTERNAL MEDICINE

## 2022-03-10 PROCEDURE — 97110 THERAPEUTIC EXERCISES: CPT

## 2022-03-10 RX ORDER — DEXTROMETHORPHAN POLISTIREX 30 MG/5ML
60 SUSPENSION ORAL NIGHTLY PRN
Qty: 148 ML | Refills: 0 | Status: SHIPPED | OUTPATIENT
Start: 2022-03-10 | End: 2022-03-20

## 2022-03-10 RX ORDER — BENZONATATE 200 MG/1
200 CAPSULE ORAL 3 TIMES DAILY PRN
Qty: 30 CAPSULE | Refills: 0 | Status: SHIPPED | OUTPATIENT
Start: 2022-03-10 | End: 2022-04-27 | Stop reason: SDUPTHER

## 2022-03-10 RX ORDER — LEVOFLOXACIN 750 MG/1
750 TABLET ORAL DAILY
Qty: 5 TABLET | Refills: 0 | Status: SHIPPED | OUTPATIENT
Start: 2022-03-11 | End: 2022-03-16

## 2022-03-10 RX ADMIN — ENOXAPARIN SODIUM 40 MG: 100 INJECTION SUBCUTANEOUS at 09:03

## 2022-03-10 RX ADMIN — BENZONATATE 200 MG: 200 CAPSULE ORAL at 09:04

## 2022-03-10 RX ADMIN — LEVOFLOXACIN 750 MG: 5 INJECTION, SOLUTION INTRAVENOUS at 13:56

## 2022-03-10 RX ADMIN — SODIUM CHLORIDE, PRESERVATIVE FREE 10 ML: 5 INJECTION INTRAVENOUS at 09:04

## 2022-03-10 RX ADMIN — PANTOPRAZOLE SODIUM 40 MG: 40 TABLET, DELAYED RELEASE ORAL at 06:28

## 2022-03-10 RX ADMIN — OXYCODONE HYDROCHLORIDE AND ACETAMINOPHEN 1 TABLET: 5; 325 TABLET ORAL at 10:51

## 2022-03-10 RX ADMIN — ATORVASTATIN CALCIUM 80 MG: 80 TABLET, FILM COATED ORAL at 09:03

## 2022-03-10 ASSESSMENT — PAIN DESCRIPTION - LOCATION
LOCATION: GENERALIZED
LOCATION: GENERALIZED

## 2022-03-10 ASSESSMENT — PAIN SCALES - GENERAL
PAINLEVEL_OUTOF10: 7
PAINLEVEL_OUTOF10: 5
PAINLEVEL_OUTOF10: 5
PAINLEVEL_OUTOF10: 6
PAINLEVEL_OUTOF10: 5

## 2022-03-10 ASSESSMENT — PAIN DESCRIPTION - ONSET
ONSET: ON-GOING
ONSET: ON-GOING

## 2022-03-10 ASSESSMENT — PAIN DESCRIPTION - DESCRIPTORS
DESCRIPTORS: ACHING
DESCRIPTORS: ACHING

## 2022-03-10 ASSESSMENT — PAIN DESCRIPTION - PAIN TYPE: TYPE: ACUTE PAIN

## 2022-03-10 NOTE — PLAN OF CARE
Transfer of care note    Multifocal pneumonia-currently on Levaquin and improving. Final cultures pending  Also on 3 L supplemental oxygen currently. Does not use oxygen at baseline. Will probably need home oxygen eval prior to discharge if continues to be on oxygen. Otherwise stable. Resident team will sign off.

## 2022-03-10 NOTE — PLAN OF CARE
Problem: Pain:  Goal: Pain level will decrease  Description: Pain level will decrease  3/10/2022 0203 by Ofelia Aragon RN  Outcome: Ongoing     Problem: Skin Integrity:  Goal: Will show no infection signs and symptoms  Description: Will show no infection signs and symptoms  3/10/2022 0203 by Ofelia Aragon RN  Outcome: Ongoing     Problem: Falls - Risk of:  Goal: Will remain free from falls  Description: Will remain free from falls  3/10/2022 0203 by Ofelia Aragon RN  Outcome: Ongoing     Problem: Infection:  Goal: Will remain free from infection  Description: Will remain free from infection  3/10/2022 0203 by Ofelia Aragon RN  Outcome: Ongoing     Problem: Safety:  Goal: Free from accidental physical injury  Description: Free from accidental physical injury  3/10/2022 0203 by Ofelia Aragon RN  Outcome: Ongoing     Problem: Daily Care:  Goal: Daily care needs are met  Description: Daily care needs are met  3/10/2022 0203 by Ofelia Aragon RN  Outcome: Ongoing     Problem: Discharge Planning:  Goal: Patients continuum of care needs are met  Description: Patients continuum of care needs are met  3/10/2022 0203 by Ofelia Aragon RN  Outcome: Ongoing     Problem: Musculor/Skeletal Functional Status  Goal: Highest potential functional level  3/10/2022 0203 by Ofelia Aragon RN  Outcome: Ongoing

## 2022-03-10 NOTE — DISCHARGE INSTR - DIET
Good nutrition is important when healing from an illness, injury, or surgery. Follow any nutrition recommendations given to you during your hospital stay. If you were given an oral nutrition supplement while in the hospital, continue to take this supplement at home. You can take it with meals, in-between meals, and/or before bedtime. These supplements can be purchased at most local grocery stores, pharmacies, and chain [employer]. If you have any questions about your diet or nutrition, call the hospital and ask for the dietitian. Resume @ home diet.

## 2022-03-10 NOTE — PROGRESS NOTES
Discharge instructions and medications reviewed with patient. Patient has no questions or concerns. Patient had percocet pill bottle locked in narc box and returned to patient, tape was not tapered with and sealed.

## 2022-03-10 NOTE — PROGRESS NOTES
Home Oxygen Evaluation    Resting SpO2 on room air = 97%    SpO2 on room air with exercise = 95%  SpO2 on oxygen as above with exercise = NA%    Patient ambulated in the hallway and walked stairs with PT. SpO2 was 95% or above.      Rudy Perez  12:06 PM

## 2022-03-10 NOTE — DISCHARGE SUMMARY
[place] [address] Internal Medicine    Discharge Summary     Patient ID: Micha De La Vega  :  1945   MRN: [id number]     ACCOUNT:  [de-identified]   Patient's PCP: DES Dodson CNP  Admit Date: 3/8/2022   Discharge Date: 3/10/22  Length of Stay: 2  Code Status:  Full Code  Admitting Physician: Momo Stockton MD  Discharge Physician: Nicole Arteaga MD     Active Discharge Diagnoses:     Primary Problem  Multifocal pneumonia      Hospital Problems  Active Hospital Problems    Diagnosis Date Noted    Multifocal pneumonia [J18.9] 2022    Acute respiratory failure with hypoxia ([address] [J96.01]     Esophageal hiatus hernia [K44.9] 2021    Hyperlipidemia [E78.5] 2012       Admission Condition:  fair     Discharged Condition: fair    Hospital Stay:     Hospital Course: Micha De La Vega is a 64 y.o. female who was admitted for the management of Multifocal pneumonia , presented to ER with Fatigue    73-year-old female with past medical history of chronic biliary pancreatitis, type 2 diabetes mellitus without long-term insulin use, degenerative disc disease, depression, hyperlipidemia who presents with generalized weakness and fatigue, associated with nonproductive cough fever and chills    Patient was noted to be tachycardic febrile hypoxic in ER requiring 6 L nasal cannula, chest x-ray showed multifocal pneumonia    On the day of discharge 3/10/2022 patient was seen and examined feeling much better, no fever no leukocytosis not requiring supplemental oxygen.   Home O2 eval was done and patient did not qualify  She was discharged home on oral antibiotics, will need follow-up x-ray in 4 to 6 weeks to confirm resolution of pneumonia      Multifocal pneumonia likely aspiration pneumonia secondary to hiatal hernia respiratory culture Legionella strep pneumo MRSA all negative patient treated with IV Levaquin with significant improvement  Moderate to large hiatal hernia-confirmed on upper GI fluoroscopy-to be followed up outpatient-patient reports her bariatric surgeon is treating her for hiatal hernia  Hyperlipidemia on Lipitor  Insomnia continue Seroquel  Degenerative disc disease continue Percocet    Significant therapeutic interventions: As above    Significant Diagnostic Studies:   Labs / Micro:    Lab Results   Component Value Date    WBC 6.2 03/10/2022    HGB 11.0 (L) 03/10/2022    HCT 33.6 (L) 03/10/2022    MCV 88.7 03/10/2022     03/10/2022          Lab Results   Component Value Date     03/10/2022    K 3.8 03/10/2022     03/10/2022    CO2 23 03/10/2022    BUN 8 03/10/2022    CREATININE 0.67 03/10/2022    GLUCOSE 88 03/10/2022    GLUCOSE 87 05/09/2012    CALCIUM 8.9 03/10/2022          Radiology:    FL UGI W AIR CONTRAST    Result Date: 3/9/2022  EXAMINATION: DOUBLE CONTRAST UPPER GI SERIES 3/9/2022 TECHNIQUE: Double contrast upper GI series was performed with barium and air contrast. FLUOROSCOPY DOSE AND TYPE OR TIME AND EXPOSURES: 1 minute 29 seconds; D AP 1434 mGy m2 COMPARISON: CT 07/02/2021 HISTORY: ORDERING SYSTEM PROVIDED HISTORY: hiatal hernia + gerd TECHNOLOGIST PROVIDED HISTORY: hiatal hernia + gerd Reason for Exam: hiatal hernia, gerd Additional signs and symptoms: pt yhas pneumonia, unable to drink much barium FINDINGS: The exam is somewhat limited as the patient could only swallow small quantities of barium. The esophagus distends normally with occasional non propulsive tertiary contractions/spasm of the esophagus. There is a moderate to large sliding hiatal hernia involving the proximal 1/3 to 1/2 of the stomach. The remainder of the stomach is grossly unremarkable. On the static images obtained there is questionable narrowing at the GE junction although this is probably due to underdistention as contrast flows freely through the region. Endoscopy is recommended, if not done previously, to better evaluate the GE junction region. There is a small amount of reflux demonstrated under fluoroscopy. Duodenal bulb and sweep as visualized are grossly unremarkable. Small duodenal diverticulum. Patchy parenchymal opacities in the visualized lungs. Limited exam as the patient could only ingest small quantities of barium. Moderate to large hiatal hernia. XR CHEST PORTABLE    Result Date: 3/8/2022  EXAMINATION: ONE XRAY VIEW OF THE CHEST 3/8/2022 6:00 am COMPARISON: 03/10/2021 HISTORY: ORDERING SYSTEM PROVIDED HISTORY: sob, hypoxia FINDINGS: Stable size and configuration of the cardiomediastinal silhouette. Moderate hiatal hernia. Prominent perihilar and interstitial lung markings with patchy airspace opacities suggestive of diffuse pulmonary edema. No sizable pleural effusion or pneumothorax. No acute osseous findings. 1. Prominent perihilar and interstitial lung markings with scattered patchy airspace opacities. Findings are suggestive of diffuse pulmonary edema. Multifocal pneumonia in the appropriate clinical setting is possible. Consultations:    Consults:     Final Specialist Recommendations/Findings:   IP CONSULT TO INTERNAL MEDICINE  IP CONSULT TO SOCIAL WORK      The patient was seen and examined on day of discharge and this discharge summary is in conjunction with any daily progress note from day of discharge.     Discharge plan:     Disposition: Home      Instructions to Patient: Keep follow-up appointments      Requiring Further Evaluation/Follow Up POST HOSPITALIZATION/Incidental Findings:     Physician Follow Up:     [facility] [place]  [address] [place] [address]  [phone]    Schedule an appointment as soon as possible for a visit in 1 week  For discharge follow up       Activity: Resume as directed    Discharge Medications:      Medication List      START taking these medications    benzonatate 200 MG capsule  Commonly known as: TESSALON  Take 1 capsule by mouth 3 times daily as needed for Cough     dextromethorphan 30 MG/5ML extended release liquid  Commonly known as: DELSYM  Take 10 mLs by mouth nightly as needed for Cough     levoFLOXacin 750 MG tablet  Commonly known as: Levaquin  Take 1 tablet by mouth daily for 5 days  Start taking on: March 11, 2022        CHANGE how you take these medications    * QUEtiapine 200 MG tablet  Commonly known as: SEROQUEL  TAKE 1 TABLET DAILY  What changed: See the new instructions. * QUEtiapine 50 MG tablet  Commonly known as: SEROQUEL  TAKE 1 TO 2 TABLETS BY MOUTH AT BEDTIME AS NEEDED FOR SLEEP  What changed: Another medication with the same name was changed. Make sure you understand how and when to take each. * This list has 2 medication(s) that are the same as other medications prescribed for you. Read the directions carefully, and ask your doctor or other care provider to review them with you. CONTINUE taking these medications    ALPRAZolam 0.5 MG tablet  Commonly known as: XANAX  Take 1 tablet by mouth daily as needed for Sleep or Anxiety for up to 14 days.      atorvastatin 80 MG tablet  Commonly known as: LIPITOR  Take 1 tablet by mouth daily     dorzolamide-timolol 22.3-6.8 MG/ML ophthalmic solution  Commonly known as: COSOPT     gabapentin 800 MG tablet  Commonly known as: NEURONTIN     Handicap Placard Misc  by Does not apply route Exp 3/16/2026     oxyCODONE-acetaminophen  MG per tablet  Commonly known as: PERCOCET     vitamin B-12 1000 MCG tablet  Commonly known as: CYANOCOBALAMIN  TAKE 2 TABLETS BY MOUTH EVERY DAY        STOP taking these medications    diclofenac sodium 1 % Gel  Commonly known as: VOLTAREN           Where to Get Your Medications      These medications were sent to [facility] [address] [place] [address]    Phone: [phone]   benzonatate 200 MG capsule  dextromethorphan 30 MG/5ML extended release liquid  levoFLOXacin 750 MG tablet         Time Spent on discharge is  35 mins in patient examination, evaluation, counseling as well as medication reconciliation, prescriptions for required medications, discharge plan and follow up. Electronically signed by   Pierre Garza MD  3/10/2022  10:00 AM      Thank you Dr. Marilyn Boyer, [facility] [place] for the opportunity to be involved in this patient's care.

## 2022-03-10 NOTE — DISCHARGE INSTR - COC
Continuity of Care Form    Patient Name: Tahira Garces   :  1945  MRN:  [id number]    Admit date:  3/8/2022  Discharge date:  ***    Code Status Order: Full Code   Advance Directives:      Admitting Physician:  Yumiko Dominique MD  PCP: DES Bella - CNP    Discharging Nurse: [facility] Unit/Room#: 2092/2092-01  Discharging Unit Phone Number: ***    Emergency Contact:   Extended Emergency Contact Information  Primary Emergency Contact: Jane Gannoninda  Address: [place] [address] [address] [place] [address], [facility] [address] Phone: [phone]  Work Phone: [phone]  Mobile Phone: [phone]  Relation: Child  Secondary Emergency Contact: Everett Ramos  Address: [address] [place] [address] Phone: [phone]  Work Phone: [phone]  Mobile Phone: [phone]  Relation: Spouse    Past Surgical History:  Past Surgical History:   Procedure Laterality Date    BREAST BIOPSY Left 2016    CARPAL TUNNEL RELEASE Bilateral     x 2 each    COLONOSCOPY      ESOPHAGEAL MOTILITY STUDY N/A 2022    PES RN-ESOPHAGEAL MOTILITY STUDY performed by Sarkis Jordan DO at \[facility]\"" Endoscopy    EYE SURGERY Left     stent for glaucoma    EYE SURGERY Left     cataract    EYE SURGERY Left     lower lid    FOOT SURGERY Right     HYSTERECTOMY      KNEE ARTHROSCOPY Right 2019    RIGHT KNEE ARTHROSCOPY WITH TOTAL MENISCECTOMY performed by Lyric Beckford DO at [address]  10/03/2016    [place] ARTHRS KNE SURG W/MENISCECTOMY MED/LAT W/SHVG Left 2018    KNEE ARTHROSCOPY FOR PARTIAL MEDIAL AND PARTIAL LATERAL MENISCECTOMY performed by Momo Mcdaniel MD at [place] [address]      due to blockage    UPPER GASTROINTESTINAL ENDOSCOPY      EGD    UPPER GASTROINTESTINAL ENDOSCOPY N/A 2021    EGD BIOPSY performed by Tameka Grady MD at [facility]       Immunization History:   Immunization History Administered Date(s) Administered    COVID-19, Moderna, Primary or Immunocompromised, PF, 100mcg/0.5mL 03/09/2021, 04/07/2021, 10/28/2021    Influenza 09/23/2013    Influenza Vaccine, unspecified formulation 09/23/2013    Influenza Virus Vaccine 09/23/2013, 10/05/2015, 10/16/2018    Influenza, Thiago Nim, IM, (6 mo and older Fluzone, Flulaval, Fluarix and 3 yrs and older Afluria) 09/13/2016, 10/23/2017, 10/16/2018    Influenza, Quadv, IM, PF (6 mo and older Fluzone, Flulaval, Fluarix, and 3 yrs and older Afluria) 10/25/2019    Influenza, Quadv, adjuvanted, 65 yrs +, IM, PF (Fluad) 11/05/2020, 11/30/2021    Pneumococcal Conjugate 13-valent (Aydimxa95) 10/05/2015    Pneumococcal Polysaccharide (Mbeiswomb90) 10/17/2016       Active Problems:  Patient Active Problem List   Diagnosis Code    Hyperlipidemia E78.5    Fibromyalgia M79.7    Osteopenia M85.80    Hx of bilateral breast implants Z98.82    Vitamin D deficiency E55.9    Arthritis of shoulder region, right M19.011    Anxiety F41.9    Anemia, normocytic normochromic D64.9    Chronic pain associated with significant psychosocial dysfunction G89.4    Primary osteoarthritis involving multiple joints M89.49    Spondylosis 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 M47.816    Vitamin B12 deficiency E53.8    Primary insomnia F51.01    Lumbar and sacral arthritis M47.817    Chronic biliary pancreatitis (HCC) K86.1    Moderate episode of recurrent major depressive disorder (HCC) H82.1    Uncomplicated opioid dependence ([facility] F11.20    Hyperuricemia E79.0    Primary open-angle glaucoma, right eye, mild stage H40.1111    Primary open-angle glaucoma, left eye, mild stage H40.1121    Gastroesophageal reflux disease K21.9    Abnormal finding on imaging R93.89    Esophageal dysphagia R13.19    Hiatal hernia K44.9    Type 2 diabetes mellitus E11.9    Recurrent aspiration pneumonia (HCC) J69.0    Esophageal hiatus hernia K44.9    Multifocal pneumonia J18.9    Acute respiratory failure with hypoxia ([facility]) J96.01       Isolation/Infection:   Isolation            No Isolation          Patient Infection Status       Infection Onset Added Last Indicated Last Indicated By Review Planned Expiration Resolved Resolved By    None active    Resolved    COVID-19 (Rule Out) 22 COVID-19 & Influenza Combo (Ordered)   22 Rule-Out Test Resulted    COVID-19 (Rule Out) 03/10/21 03/10/21 03/10/21 SARS-CoV-2 NAAT (Rapid) (Ordered)   03/10/21 Rule-Out Test Resulted            Nurse Assessment:  Last Vital Signs: BP (!) 147/77   Pulse 95   Temp 98.5 °F (36.9 °C) (Oral)   Resp 18   Ht 5' (1.524 m)   Wt 158 lb 1.1 oz (71.7 kg)   SpO2 98%   BMI 30.87 kg/m²     Last documented pain score (0-10 scale): Pain Level: 5  Last Weight:   Wt Readings from Last 1 Encounters:   03/10/22 158 lb 1.1 oz (71.7 kg)     Mental Status:  {IP PT MENTAL STATUS:}    IV Access:  { LASHA IV ACCESS:773423532}    Nursing Mobility/ADLs:  Walking   {P DME IDNK:326304070}  Transfer  {P DME QLGJ:595447622}  Bathing  {P DME ZFZM:414166114}  Dressing  {P DME CDZE:056137768}  Toileting  {[facility] DME GKU}  Feeding  {[facility] DME GCFO:401189053}  Med Admin  {[facility] DME XSLM:794921961}  Med Delivery   { LASHA MED Delivery:046265559}    Wound Care Documentation and Therapy:  Incision 18 Knee Left (Active)   Number of days: 1304        Elimination:  Continence: Bowel: {YES / A}  Bladder: {YES / PS:61907}  Urinary Catheter: {Urinary Catheter:743901356}   Colostomy/Ileostomy/Ileal Conduit: {YES / I}       Date of Last BM: ***    Intake/Output Summary (Last 24 hours) at 3/10/2022 0957  Last data filed at 3/9/2022 2038  Gross per 24 hour   Intake 100 ml   Output 550 ml   Net -450 ml     I/O last 3 completed shifts:   In: 100 [P.O.:100]  Out:  [Urine:]    Safety Concerns:     [address] Concerns:016363609}    Impairments/Disabilities:      [address] COL[address] Electronically signed by Checo Rubio MD on 3/10/22 at 9:57 AM EST

## 2022-03-10 NOTE — PROGRESS NOTES
Physical Therapy  [facility]   Physical Therapy Progress Note    Date: 3/10/22  Patient Name: Pardeep Moss       Room:   MRN: [id number]   Account: [de-identified]   : 1945  (72 y.o.) Gender: female     Referring Practitioner: Millie Rubio MD  Diagnosis: Multifocal pneumonia  Past Medical History:  has a past medical history of Anemia, Bo[place]k hernia, Bowel obstruction ([address] Bronchitis, Chronic biliary pancreatitis ([address] Controlled type 2 diabetes mellitus without complication, without long-term current use of insulin ([address] Cough, DDD (degenerative disc disease), Depression, Diverticulosis, Esophageal dysphagia, Fibromyalgia, GERD (gastroesophageal reflux disease), Glaucoma, Hiatal hernia, Hyperlipidemia, Knee injuries, Osteoarthritis of more than one site, Pneumonia, Right middle lobe pneumonia, and Seasonal allergies. Past Surgical History:   has a past surgical history that includes Hysterectomy; Foot surgery (Right); Small intestine surgery; Carpal tunnel release (Bilateral); Breast biopsy (Left, 2016); Pancreas Biopsy (10/03/2016); Colonoscopy; Upper gastrointestinal endoscopy; pr arthrs kne surg w/meniscectomy med/lat w/shvg (Left, 2018); eye surgery (Left); eye surgery (Left); eye surgery (Left); Knee arthroscopy (Right, 2019); Upper gastrointestinal endoscopy (N/A, 2021); and esophageal motility study (N/A, 2022). Restrictions/Precautions  Restrictions/Precautions: Fall Risk;Up as Tolerated  Required Braces or Orthoses?: No       Comments: Pt resting in bed upon arrival to room. Not wearing oxygen due to not being humidified and drying pt's nose out.  SPO2 95% on room air    Vital Signs  Patient Currently in Pain: Yes  Pain Assessment: 0-10  Pain Level: 6  Pain Location: Generalized  Pain Descriptors: Aching  Pain Onset: On-going     Oxygen Therapy  SpO2: 95 %  O2 Device: None (Room air)          Bed Mobility:   Bed Mobility  Rolling: Modified independent  Supine to Sit: Modified independent  Sit to Supine: Modified independent  Scooting: Modified independent  Bed mobility  Scooting: Modified independent    Transfers:  Sit to Stand: Modified independent;Supervision  Stand to sit: Modified independent;Supervision                 Ambulation 1  Surface: level tile  Device: No Device  Assistance: Supervision  Quality of Gait: antalgic gait with increase lateral sway due to \"bad knees\". Steady, no LOB  Gait Deviations: Decreased step length;Decreased step height  Distance: 165ft  Comments: SPO2 at 96% post amb and steps on room air. despite pt experiencing dyspenea        Stairs/Curb  Stairs?: Yes  Stairs  # Steps : 12  Stairs Height: 8\"  Rails: Right ascending  Device: No Device  Assistance: Stand by assistance;Supervision  Comment: Pt able to ascend/decend steps using step-to pattern due to \" bad knees\"        BALANCE Posture: Good  Sitting - Static: Good  Sitting - Dynamic: Good  Standing - Static: Good  Standing - Dynamic: Good;-  Comments: no AD used    EXERCISES    Other exercises?: Yes  Other exercises 1: Discussion of energy conservation techniques and Handout given to pt  Other exercises 2: (B) LE seated ex at pt's gerda(to not agrivate knees) HEP issued to pt           Activity Tolerance: Patient Tolerated treatment well,Patient limited by endurance  PT Equipment Recommendations  Equipment Needed: No       Current Treatment Recommendations: Strengthening,Home Exercise Program,Safety Education & Training,Balance Training,Endurance Training,Patient/Caregiver Education & Training,Equipment Evaluation, Education, & procurement,Functional Mobility Training,Transfer Training,Gait Training,Stair training    Conditions Requiring Skilled Therapeutic Intervention  Body structures, Functions, Activity limitations: Decreased functional mobility ; Increased pain;Decreased balance;Decreased strength;Decreased safe awareness;Decreased endurance  Assessment: continue per POC to maxmize potential for safe D/C  Treatment Diagnosis: impaired mobility due to limited endurance  Prognosis: Good  Decision Making: Medium Complexity  History: pt admitted due to acute respiratory failure  Exam: ROM, MMT, balance and mobility assessments  Clinical Presentation: MOD I for bed mobility, transfers w/ SBA, gait 10' x 4 w/ O2 at 3 L w/ SBA x 1, FALL RISK  REQUIRES PT FOLLOW UP: Yes  Discharge Recommendations: Patient would benefit from continued therapy after discharge    Goals  Short term goals  Time Frame for Short term goals: 2 treatments  Short term goal 1: pt to tolerate 1/2 hour of therapuetic exercise and activiy  Short term goal 2: pt to demonstrate good technique for LE strengthening exercises and energy conservation techniques  Short term goal 3: pt to demonstrate independent transfers using AD if needed  Short term goal 4: pt to demonstrate independent gait 50-80' using AD if needed and O2 as ordered  Short term goal 5: pt to demonstrate good balance for ambulation  Short term goal 6: pt to demonstrate ability to ascend/ descend 6 steps w/ 2 rails w/ CGA x 1 to access upper level of her home       03/10/22 1043   PT Individual Minutes   Time In 1039   Time Out 1110   Minutes 31       Electronically signed by Jaz Byrd PTA on 3/10/22 at 11:25 AM EST

## 2022-03-10 NOTE — CARE COORDINATION
ONGOING DISCHARGE PLAN:    Patient is alert and oriented x4. Spoke with patient regarding discharge plan and patient confirms that plan is still home with spouse. Pt denies VNS. Following for home O2, currently on 3lpm NC 95%. Will need home O2 eval, awaiting results. Possible D/C to home today  On PO Levaquin      Will continue to follow for additional discharge needs. Electronically signed by Katia Hinkle RN on 3/10/2022 at 11:26 AM    Pt does not qualify for Home O2. Pt aware she will not need O2.   Home with no needs//JF

## 2022-03-11 ENCOUNTER — CARE COORDINATION (OUTPATIENT)
Dept: CASE MANAGEMENT | Age: 77
End: 2022-03-11

## 2022-03-11 NOTE — TELEPHONE ENCOUNTER
Patient recently admitted to [facility]. 3/8/22 - 3/10/22  Please call and schedule a hospital follow up appointment. Please request/obtain all hospital documentation including discharge summary, consultation notes, imaging studies, and lab analysis.

## 2022-03-11 NOTE — TELEPHONE ENCOUNTER
[facility] Transitions Initial Follow Up Call    Outreach made within 2 business days of discharge: Yes    Patient: Robe Johns Patient : 1945   MRN: [id number]  Reason for Admission: Pneumonia   Discharge Date: 3/10/22       Spoke with: Patient     Discharge department/facility: [address] [place] [address] ED    TCM Interactive Patient Contact:  Was patient able to fill all prescriptions: No:  Was patient instructed to bring all medications to the follow-up visit: Yes  Is patient taking all medications as directed in the discharge summary?  Yes  Does patient understand their discharge instructions: Yes  Does patient have questions or concerns that need addressed prior to 7-14 day follow up office visit: no    Scheduled appointment with PCP within 7-14 days    Follow Up  Future Appointments   Date Time Provider Glenroy Falcon   2022 11:00 AM Marley Simpson MD [id number]       Arabella Li

## 2022-03-11 NOTE — CARE COORDINATION
[facility] Initial Follow Up Call    Call within 2 business days of discharge: Yes    Patient: Fleurette Cogan Lamprecht Patient : 1945   MRN: <[id number]>  Reason for Admission: RESP FAILURE  pneumonia  Discharge Date: 3/10/22 RARS: Readmission Risk Score: 10.3 ( )      Last Discharge [facility]       Complaint Diagnosis Description Type Department Provider    3/8/22 Fatigue Acute respiratory failure with hypoxia ([facility] . .. ED to Hosp-Admission (Discharged) (ADMITTED) Lizet Kat MD; Acosta Babcock. .. Spoke with: 24 HOUR initial call. Spoke to Megan Cancer. Megan Cancer states she continues to have a cough and dyspnea. Productive cough with thick phlegm. Pt states she has no appetite and has not eaten in 1 week. Attempt to complete medication reconciliation. Megan Cancer states she has no xanax \"they took it from me in the hospital\" pt became very agitated with this writer. Unable to calm to complete medication reconcilliation. Aure then  disconnected phone call. Unable to complete initial call. Final call. Message routed to PCP.      Facility: [address] [place] [address]    Non-face-to-face services provided:  Obtained and reviewed discharge summary and/or continuity of care documents    Care Transitions 24 Hour Call    Do you have all of your prescriptions and are they filled?: Yes  Do you have support at home?: Partner/Spouse/SO  Are you an active caregiver in your home?: No  Care Transitions Interventions         Follow Up  Future Appointments   Date Time Provider Glenroy Falcon   2022 11:00 AM Argenis Ventura MD [id number] LPN Care Transitions Nurse   [phone]

## 2022-03-12 DIAGNOSIS — E78.00 PURE HYPERCHOLESTEROLEMIA: Primary | ICD-10-CM

## 2022-03-12 RX ORDER — FENOFIBRATE 160 MG/1
160 TABLET ORAL DAILY
Qty: 30 TABLET | Refills: 5 | Status: SHIPPED | OUTPATIENT
Start: 2022-03-12 | End: 2022-06-27 | Stop reason: ALTCHOICE

## 2022-03-12 RX ORDER — OMEGA-3-ACID ETHYL ESTERS 1 G/1
2 CAPSULE, LIQUID FILLED ORAL 2 TIMES DAILY
Qty: 120 CAPSULE | Refills: 5 | Status: SHIPPED | OUTPATIENT
Start: 2022-03-12 | End: 2022-06-27 | Stop reason: ALTCHOICE

## 2022-03-12 NOTE — TELEPHONE ENCOUNTER
She was there 3/8 til 3/10 with pneumonia. She needs OV for follow up  Did she fill antibiotics sent to pharamcy? Why is she upset about Xanax? They did not discontinue. Does she need a refill? No refill request sent?

## 2022-03-13 PROBLEM — J41.0 SIMPLE CHRONIC BRONCHITIS (HCC): Status: ACTIVE | Noted: 2022-03-13

## 2022-03-13 LAB
CULTURE: NORMAL
CULTURE: NORMAL
SPECIMEN DESCRIPTION: NORMAL
SPECIMEN DESCRIPTION: NORMAL

## 2022-03-13 ASSESSMENT — ENCOUNTER SYMPTOMS
COUGH: 1
DIARRHEA: 0
SORE THROAT: 0
NAUSEA: 0
SHORTNESS OF BREATH: 1
ABDOMINAL DISTENTION: 1
WHEEZING: 0
CHEST TIGHTNESS: 1
RHINORRHEA: 1
ABDOMINAL PAIN: 0
TROUBLE SWALLOWING: 1
VOMITING: 0

## 2022-03-15 ENCOUNTER — OFFICE VISIT (OUTPATIENT)
Dept: FAMILY MEDICINE CLINIC | Age: 77
End: 2022-03-15
Payer: MEDICARE

## 2022-03-15 VITALS
HEART RATE: 96 BPM | RESPIRATION RATE: 24 BRPM | TEMPERATURE: 98 F | OXYGEN SATURATION: 94 % | SYSTOLIC BLOOD PRESSURE: 124 MMHG | DIASTOLIC BLOOD PRESSURE: 84 MMHG

## 2022-03-15 DIAGNOSIS — K21.00 GASTROESOPHAGEAL REFLUX DISEASE WITH ESOPHAGITIS WITHOUT HEMORRHAGE: ICD-10-CM

## 2022-03-15 DIAGNOSIS — J69.0 RECURRENT ASPIRATION PNEUMONIA (HCC): Primary | ICD-10-CM

## 2022-03-15 DIAGNOSIS — J41.0 SIMPLE CHRONIC BRONCHITIS (HCC): Chronic | ICD-10-CM

## 2022-03-15 DIAGNOSIS — F41.9 ANXIETY: ICD-10-CM

## 2022-03-15 DIAGNOSIS — K44.9 ESOPHAGEAL HIATUS HERNIA: ICD-10-CM

## 2022-03-15 DIAGNOSIS — J18.9 MULTIFOCAL PNEUMONIA: ICD-10-CM

## 2022-03-15 DIAGNOSIS — R13.19 ESOPHAGEAL DYSPHAGIA: ICD-10-CM

## 2022-03-15 DIAGNOSIS — F41.9 ANXIETY: Chronic | ICD-10-CM

## 2022-03-15 DIAGNOSIS — J41.0 SIMPLE CHRONIC BRONCHITIS (HCC): ICD-10-CM

## 2022-03-15 PROCEDURE — 99214 OFFICE O/P EST MOD 30 MIN: CPT | Performed by: NURSE PRACTITIONER

## 2022-03-15 RX ORDER — FLUTICASONE FUROATE, UMECLIDINIUM BROMIDE AND VILANTEROL TRIFENATATE 100; 62.5; 25 UG/1; UG/1; UG/1
1 POWDER RESPIRATORY (INHALATION) DAILY
Qty: 1 EACH | Refills: 2 | Status: SHIPPED | OUTPATIENT
Start: 2022-03-15 | End: 2022-04-14

## 2022-03-15 RX ORDER — ALPRAZOLAM 0.5 MG/1
0.5 TABLET ORAL DAILY PRN
Qty: 14 TABLET | Refills: 0 | Status: SHIPPED | OUTPATIENT
Start: 2022-03-15 | End: 2022-05-09

## 2022-03-15 RX ORDER — PANTOPRAZOLE SODIUM 40 MG/1
40 TABLET, DELAYED RELEASE ORAL
Qty: 60 TABLET | Refills: 2 | Status: SHIPPED | OUTPATIENT
Start: 2022-03-15 | End: 2023-06-27

## 2022-03-15 NOTE — PROGRESS NOTES
Patient presents to office along with spouse for his OV follow up  She recently was admitted 3/8-3/10  She has many questions regarding her stay  It is noted that she was called to schedule hospital follow up and denied. She admits today she was frustrated and felt confused, agrees follow up needed.   Added to schedule  See OV note

## 2022-03-15 NOTE — PROGRESS NOTES
[address]  [phone]    3/15/22     Patient ID  Stephan Ibrahim is a 64 y.o. female  Established patient    Chief Complaint  Stephan Ibrahim presents today for ED Follow-up    Have you seen any other physician or provider since your last visit? Yes - Records Obtained  Have you had any other diagnostic tests since your last visit? Yes - Records Obtained  Have you been seen in the emergency room and/or had an admission to a hospital since we last saw you? Yes - Records Obtained    ASSESSMENT/PLAN  1. Recurrent aspiration pneumonia [facility])  Assessment & Plan:   Borderline controlled, continue current medications and continue current treatment plan  2. Multifocal pneumonia  3. Simple chronic bronchitis ([facility]  Assessment & Plan:   Borderline controlled, continue current medications and continue current treatment plan   Trelegy refilled to pharmacy, in office samples given   4. Esophageal dysphagia  5. Esophageal hiatus hernia  6. Anxiety     Finish prescribed Levaquin and breathing treatments  Protonix BID   Will correlate care for potential hiatal hernia repair surgical intervention   No medication changes, only added Levaquin for 5 days   medication in separate encounter - visit was added on due to in office with spouse     Return in about 4 weeks (around 4/12/2022).     Patient Care Team:  DES Lopez CNP as PCP - General (Nurse Practitioner Family)  DES Lopez CNP as PCP - [facility] Empaneled Provider  Aleksey Wan MD (Anesthesiology)  Vicky Keita DO as Surgeon (Orthopedic Surgery)  Kaylah Lutz MD as Consulting Physician (Gastroenterology)  Florence Rajupt DO as Consulting Physician (Bariatric Surgery)    SUBJECTIVE/OBJECTIVE  History of Present illness / Visit Summary   Here with spouse   Recent hospital stay     Documented in additional orders only encounter -   Patient presents to office along with spouse for his OV follow up  She recently was admitted 3/8-3/10  She has many questions regarding her stay  It is noted that she was called to schedule hospital follow up and denied. She admits today she was frustrated and felt confused, agrees follow up needed. Added to schedule  See OV note   Hospital Course: Nola Parr is a 64 y.o. female who was admitted for the management of Multifocal pneumonia , presented to ER with Fatigue     68-year-old female with past medical history of chronic biliary pancreatitis, type 2 diabetes mellitus without long-term insulin use, degenerative disc disease, depression, hyperlipidemia who presents with generalized weakness and fatigue, associated with nonproductive cough fever and chills     Patient was noted to be tachycardic febrile hypoxic in ER requiring 6 L nasal cannula, chest x-ray showed multifocal pneumonia     On the day of discharge 3/10/2022 patient was seen and examined feeling much better, no fever no leukocytosis not requiring supplemental oxygen. Home O2 eval was done and patient did not qualify  She was discharged home on oral antibiotics, will need follow-up x-ray in 4 to 6 weeks to confirm resolution of pneumonia        1. Multifocal pneumonia likely aspiration pneumonia secondary to hiatal hernia respiratory culture Legionella strep pneumo MRSA all negative patient treated with IV Levaquin with significant improvement  2. Moderate to large hiatal herniaconfirmed on upper GI fluoroscopyto be followed up outpatient-patient reports her bariatric surgeon is treating her for hiatal hernia  3. Hyperlipidemia on Lipitor  4. Insomnia continue Seroquel  5.  Degenerative disc disease continue Percocet    3/8/2022 CXR -       FINDINGS:   Stable size and configuration of the cardiomediastinal silhouette.  Moderate   hiatal hernia.  Prominent perihilar and interstitial lung markings with   patchy airspace opacities suggestive of diffuse pulmonary edema.  No sizable   pleural effusion or pneumothorax.  No acute osseous findings. 3/9/2022 UGI -   FINDINGS:   The exam is somewhat limited as the patient could only swallow small   quantities of barium.       The esophagus distends normally with occasional non propulsive tertiary   contractions/spasm of the esophagus. Neil Priceamo is a moderate to large sliding   hiatal hernia involving the proximal 1/3 to 1/2 of the stomach.  The   remainder of the stomach is grossly unremarkable.  On the static images   obtained there is questionable narrowing at the GE junction although this is   probably due to underdistention as contrast flows freely through the region. Endoscopy is recommended, if not done previously, to better evaluate the GE   junction region. Luis Albertollene Bergamo is a small amount of reflux demonstrated under   fluoroscopy.  Duodenal bulb and sweep as visualized are grossly unremarkable. Small duodenal diverticulum.  Patchy parenchymal opacities in the visualized   lungs. Review of Systems  Review of Systems   Constitutional: Positive for fatigue. Negative for activity change, appetite change (poor), chills, fever and unexpected weight change. HENT: Positive for congestion, postnasal drip, rhinorrhea and trouble swallowing. Negative for ear pain and sore throat. Respiratory: Positive for cough (productive), chest tightness (w/cough ) and shortness of breath (w/exertion). Negative for wheezing. Chronic pneumonia, bronchitis    Cardiovascular: Negative for chest pain and palpitations. Gastrointestinal: Positive for abdominal distention. Negative for abdominal pain, diarrhea, nausea and vomiting. Following with GI  Recent EGD and colonoscopy. Large hiatal hernia. May be rationale for ongoing pneumonia? Aspiration pneumonia? Musculoskeletal: Positive for arthralgias (right knee), gait problem, joint swelling (right knee) and myalgias.         Following with pain management and orthopedic    Skin: Negative for pallor and rash. Allergic/Immunologic: Positive for environmental allergies. Neurological: Negative for dizziness, weakness, light-headedness and headaches. Hematological: Negative for adenopathy. Psychiatric/Behavioral: Positive for agitation, dysphoric mood and sleep disturbance. The patient is nervous/anxious. Cares for ill son - has partial custody of grandson        Physical exam   Physical Exam  Vitals and nursing note reviewed. Constitutional:       General: She is not in acute distress. Appearance: Normal appearance. She is well-developed, well-groomed and overweight. She is not ill-appearing or toxic-appearing. Cardiovascular:      Rate and Rhythm: Normal rate and regular rhythm. No extrasystoles are present. Heart sounds: Normal heart sounds, S1 normal and S2 normal. No murmur heard. Pulmonary:      Effort: Pulmonary effort is normal. No prolonged expiration or respiratory distress. Breath sounds: Normal breath sounds. Decreased air movement present. No wheezing, rhonchi or rales. Musculoskeletal:      Lumbar back: Spasms and tenderness present. Decreased range of motion. Right knee: Decreased range of motion. Tenderness present. Left knee: Decreased range of motion. Tenderness present. Right lower leg: No edema. Left lower leg: No edema. Skin:     General: Skin is warm and dry. Coloration: Skin is not ashen, cyanotic, jaundiced or pale. Neurological:      Mental Status: She is alert and oriented to person, place, and time. Motor: Motor function is intact. Gait: Gait abnormal.   Psychiatric:         Attention and Perception: Attention and perception normal.         Mood and Affect: Mood is anxious. Affect is flat. Speech: Speech normal.         Behavior: Behavior is hyperactive. Behavior is cooperative. Thought Content: Thought content normal. Thought content does not include suicidal ideation.  Thought content does not include suicidal plan. Cognition and Memory: Cognition and memory normal.         Judgment: Judgment normal.           Electronically signed by DES Wray CNP, APRN-CNP on 3/20/2022 at 2:39 PM    Please note that this chart was generated using voice recognition Dragon dictation software. Although every effort was made to ensure the accuracy of this automated transcription, some errors in transcription may have occurred.

## 2022-03-20 PROBLEM — K44.9 HIATAL HERNIA: Status: RESOLVED | Noted: 2021-08-12 | Resolved: 2022-03-20

## 2022-03-20 ASSESSMENT — ENCOUNTER SYMPTOMS
ABDOMINAL PAIN: 0
CHEST TIGHTNESS: 1
NAUSEA: 0
COUGH: 1
DIARRHEA: 0
RHINORRHEA: 1
TROUBLE SWALLOWING: 1
VOMITING: 0
WHEEZING: 0
SHORTNESS OF BREATH: 1
ABDOMINAL DISTENTION: 1
SORE THROAT: 0

## 2022-03-22 ENCOUNTER — APPOINTMENT (OUTPATIENT)
Dept: CT IMAGING | Age: 77
DRG: 871 | End: 2022-03-22
Payer: MEDICARE

## 2022-03-22 ENCOUNTER — APPOINTMENT (OUTPATIENT)
Dept: GENERAL RADIOLOGY | Age: 77
DRG: 871 | End: 2022-03-22
Payer: MEDICARE

## 2022-03-22 ENCOUNTER — HOSPITAL ENCOUNTER (INPATIENT)
Age: 77
LOS: 2 days | Discharge: HOME | DRG: 871 | End: 2022-03-24
Attending: EMERGENCY MEDICINE | Admitting: INTERNAL MEDICINE
Payer: MEDICARE

## 2022-03-22 DIAGNOSIS — A41.9 SEPTICEMIA (HCC): ICD-10-CM

## 2022-03-22 DIAGNOSIS — J96.01 ACUTE RESPIRATORY FAILURE WITH HYPOXIA (HCC): Primary | ICD-10-CM

## 2022-03-22 DIAGNOSIS — J18.9 PNEUMONIA DUE TO INFECTIOUS ORGANISM, UNSPECIFIED LATERALITY, UNSPECIFIED PART OF LUNG: ICD-10-CM

## 2022-03-22 PROBLEM — Z87.39 HISTORY OF DEGENERATIVE DISC DISEASE: Status: ACTIVE | Noted: 2022-03-22

## 2022-03-22 PROBLEM — I21.4 NSTEMI (NON-ST ELEVATED MYOCARDIAL INFARCTION) (HCC): Status: ACTIVE | Noted: 2022-03-22
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
## 2022-03-22 PROBLEM — K22.4 ESOPHAGEAL DYSMOTILITY: Status: ACTIVE | Noted: 2022-03-22

## 2022-03-22 PROBLEM — Z86.39 HX OF DIABETES MELLITUS: Status: ACTIVE | Noted: 2022-03-22

## 2022-03-22 PROBLEM — R93.89 ABNORMAL FINDING ON IMAGING: Status: RESOLVED | Noted: 2021-08-12 | Resolved: 2022-03-22

## 2022-03-22 PROBLEM — M19.90 OA (OSTEOARTHRITIS): Status: ACTIVE | Noted: 2022-03-22

## 2022-03-22 PROBLEM — E11.9 TYPE 2 DIABETES MELLITUS (HCC): Status: RESOLVED | Noted: 2021-11-12 | Resolved: 2022-03-22

## 2022-03-22 LAB
ABSOLUTE EOS #: 0 K/UL (ref 0–0.4)
ABSOLUTE LYMPH #: 0.4 K/UL (ref 1–4.8)
ABSOLUTE MONO #: 0.4 K/UL (ref 0.1–1.3)
ALBUMIN SERPL-MCNC: 3.9 G/DL (ref 3.5–5.2)
ALLEN TEST: ABNORMAL
ALP BLD-CCNC: 129 U/L (ref 35–104)
ALT SERPL-CCNC: 18 U/L (ref 5–33)
ANION GAP SERPL CALCULATED.3IONS-SCNC: 13 MMOL/L (ref 9–17)
ANTI-XA UNFRAC HEPARIN: 0.55 IU/L (ref 0.3–0.7)
AST SERPL-CCNC: 27 U/L
BASOPHILS # BLD: 1 % (ref 0–2)
BASOPHILS ABSOLUTE: 0 K/UL (ref 0–0.2)
BILIRUB SERPL-MCNC: 0.41 MG/DL (ref 0.3–1.2)
BILIRUBIN DIRECT: 0.12 MG/DL
BILIRUBIN URINE: NEGATIVE
BILIRUBIN, INDIRECT: 0.29 MG/DL (ref 0–1)
BUN BLDV-MCNC: 15 MG/DL (ref 8–23)
CALCIUM SERPL-MCNC: 8.9 MG/DL (ref 8.6–10.4)
CARBOXYHEMOGLOBIN: 1.2 % (ref 0–5)
CHLORIDE BLD-SCNC: 103 MMOL/L (ref 98–107)
CO2: 23 MMOL/L (ref 20–31)
COLOR: YELLOW
COMMENT UA: NORMAL
CREAT SERPL-MCNC: 0.88 MG/DL (ref 0.5–0.9)
EOSINOPHILS RELATIVE PERCENT: 1 % (ref 0–4)
GFR AFRICAN AMERICAN: >60 ML/MIN
GFR NON-AFRICAN AMERICAN: >60 ML/MIN
GFR SERPL CREATININE-BSD FRML MDRD: ABNORMAL ML/MIN/{1.73_M2}
GLUCOSE BLD-MCNC: 111 MG/DL (ref 70–99)
GLUCOSE URINE: NEGATIVE
HCO3 ARTERIAL: 25.8 MMOL/L (ref 22–26)
HCT VFR BLD CALC: 34 % (ref 36–46)
HCT VFR BLD CALC: 37.7 % (ref 36–46)
HEMOGLOBIN: 11.6 G/DL (ref 12–16)
HEMOGLOBIN: 12.8 G/DL (ref 12–16)
INFLUENZA A: NOT DETECTED
INFLUENZA B: NOT DETECTED
INR BLD: 1
INR BLD: 1.1
KETONES, URINE: NEGATIVE
LACTIC ACID, SEPSIS: 2.5 MMOL/L (ref 0.5–1.9)
LACTIC ACID, SEPSIS: 2.9 MMOL/L (ref 0.5–1.9)
LEUKOCYTE ESTERASE, URINE: NEGATIVE
LIPASE: 34 U/L (ref 13–60)
LYMPHOCYTES # BLD: 6 % (ref 24–44)
MAGNESIUM: 2.1 MG/DL (ref 1.6–2.6)
MCH RBC QN AUTO: 29.7 PG (ref 26–34)
MCH RBC QN AUTO: 29.9 PG (ref 26–34)
MCHC RBC AUTO-ENTMCNC: 34 G/DL (ref 31–37)
MCHC RBC AUTO-ENTMCNC: 34.2 G/DL (ref 31–37)
MCV RBC AUTO: 87.3 FL (ref 80–100)
MCV RBC AUTO: 87.5 FL (ref 80–100)
METHEMOGLOBIN: 0.6 % (ref 0–1.9)
MONOCYTES # BLD: 5 % (ref 1–7)
NITRITE, URINE: NEGATIVE
O2 DEVICE/FLOW/%: ABNORMAL
O2 SAT, ARTERIAL: 94.3 % (ref 95–98)
PARTIAL THROMBOPLASTIN TIME: 31 SEC (ref 24–36)
PARTIAL THROMBOPLASTIN TIME: 43.1 SEC (ref 24–36)
PATIENT TEMP: 37
PCO2 ARTERIAL: 36.5 MMHG (ref 35–45)
PDW BLD-RTO: 14.7 % (ref 11.5–14.9)
PDW BLD-RTO: 14.8 % (ref 11.5–14.9)
PH ARTERIAL: 7.46 (ref 7.35–7.45)
PH UA: 6 (ref 5–8)
PLATELET # BLD: 449 K/UL (ref 150–450)
PLATELET # BLD: 502 K/UL (ref 150–450)
PMV BLD AUTO: 6.9 FL (ref 6–12)
PMV BLD AUTO: 7.2 FL (ref 6–12)
PO2 ARTERIAL: 70.8 MMHG (ref 80–100)
POSITIVE BASE EXCESS, ART: 1.9 MMOL/L (ref 0–2)
POTASSIUM SERPL-SCNC: 4.1 MMOL/L (ref 3.7–5.3)
PRO-BNP: 113 PG/ML
PROCALCITONIN: 1.86 NG/ML
PROTEIN UA: NEGATIVE
PROTHROMBIN TIME: 13.2 SEC (ref 11.8–14.6)
PROTHROMBIN TIME: 14.1 SEC (ref 11.8–14.6)
PT. POSITION: ABNORMAL
RBC # BLD: 3.89 M/UL (ref 4–5.2)
RBC # BLD: 4.31 M/UL (ref 4–5.2)
RESPIRATORY RATE: 28
SAMPLE SITE: ABNORMAL
SARS-COV-2 RNA, RT PCR: NOT DETECTED
SEG NEUTROPHILS: 87 % (ref 36–66)
SEGMENTED NEUTROPHILS ABSOLUTE COUNT: 5.7 K/UL (ref 1.3–9.1)
SODIUM BLD-SCNC: 139 MMOL/L (ref 135–144)
SOURCE: NORMAL
SPECIFIC GRAVITY UA: 1.01 (ref 1–1.03)
SPECIMEN DESCRIPTION: NORMAL
TEXT FOR RESPIRATORY: ABNORMAL
TOTAL PROTEIN: 7.1 G/DL (ref 6.4–8.3)
TROPONIN, HIGH SENSITIVITY: 119 NG/L (ref 0–14)
TROPONIN, HIGH SENSITIVITY: 205 NG/L (ref 0–14)
TURBIDITY: CLEAR
URINE HGB: NEGATIVE
UROBILINOGEN, URINE: NORMAL
WBC # BLD: 10.1 K/UL (ref 3.5–11)
WBC # BLD: 6.5 K/UL (ref 3.5–11)

## 2022-03-22 PROCEDURE — 2580000003 HC RX 258: Performed by: STUDENT IN AN ORGANIZED HEALTH CARE EDUCATION/TRAINING PROGRAM

## 2022-03-22 PROCEDURE — 80076 HEPATIC FUNCTION PANEL: CPT

## 2022-03-22 PROCEDURE — 2500000003 HC RX 250 WO HCPCS: Performed by: EMERGENCY MEDICINE

## 2022-03-22 PROCEDURE — 84145 PROCALCITONIN (PCT): CPT

## 2022-03-22 PROCEDURE — 96365 THER/PROPH/DIAG IV INF INIT: CPT

## 2022-03-22 PROCEDURE — 36600 WITHDRAWAL OF ARTERIAL BLOOD: CPT

## 2022-03-22 PROCEDURE — 83605 ASSAY OF LACTIC ACID: CPT

## 2022-03-22 PROCEDURE — 99285 EMERGENCY DEPT VISIT HI MDM: CPT

## 2022-03-22 PROCEDURE — 6370000000 HC RX 637 (ALT 250 FOR IP)

## 2022-03-22 PROCEDURE — 85730 THROMBOPLASTIN TIME PARTIAL: CPT

## 2022-03-22 PROCEDURE — 99223 1ST HOSP IP/OBS HIGH 75: CPT | Performed by: INTERNAL MEDICINE

## 2022-03-22 PROCEDURE — 85610 PROTHROMBIN TIME: CPT

## 2022-03-22 PROCEDURE — 6360000002 HC RX W HCPCS: Performed by: INTERNAL MEDICINE

## 2022-03-22 PROCEDURE — 2580000003 HC RX 258: Performed by: INTERNAL MEDICINE

## 2022-03-22 PROCEDURE — 85025 COMPLETE CBC W/AUTO DIFF WBC: CPT

## 2022-03-22 PROCEDURE — 2000000000 HC ICU R&B

## 2022-03-22 PROCEDURE — 94664 DEMO&/EVAL PT USE INHALER: CPT

## 2022-03-22 PROCEDURE — 93005 ELECTROCARDIOGRAM TRACING: CPT | Performed by: STUDENT IN AN ORGANIZED HEALTH CARE EDUCATION/TRAINING PROGRAM

## 2022-03-22 PROCEDURE — 71260 CT THORAX DX C+: CPT

## 2022-03-22 PROCEDURE — 6360000002 HC RX W HCPCS: Performed by: EMERGENCY MEDICINE

## 2022-03-22 PROCEDURE — 71045 X-RAY EXAM CHEST 1 VIEW: CPT

## 2022-03-22 PROCEDURE — 85027 COMPLETE CBC AUTOMATED: CPT

## 2022-03-22 PROCEDURE — 83690 ASSAY OF LIPASE: CPT

## 2022-03-22 PROCEDURE — 87636 SARSCOV2 & INF A&B AMP PRB: CPT

## 2022-03-22 PROCEDURE — 6360000004 HC RX CONTRAST MEDICATION: Performed by: EMERGENCY MEDICINE

## 2022-03-22 PROCEDURE — 83880 ASSAY OF NATRIURETIC PEPTIDE: CPT

## 2022-03-22 PROCEDURE — 85520 HEPARIN ASSAY: CPT

## 2022-03-22 PROCEDURE — 87040 BLOOD CULTURE FOR BACTERIA: CPT

## 2022-03-22 PROCEDURE — 2500000003 HC RX 250 WO HCPCS: Performed by: INTERNAL MEDICINE

## 2022-03-22 PROCEDURE — 2580000003 HC RX 258: Performed by: EMERGENCY MEDICINE

## 2022-03-22 PROCEDURE — 6360000002 HC RX W HCPCS: Performed by: STUDENT IN AN ORGANIZED HEALTH CARE EDUCATION/TRAINING PROGRAM

## 2022-03-22 PROCEDURE — 94761 N-INVAS EAR/PLS OXIMETRY MLT: CPT

## 2022-03-22 PROCEDURE — 6370000000 HC RX 637 (ALT 250 FOR IP): Performed by: EMERGENCY MEDICINE

## 2022-03-22 PROCEDURE — 36415 COLL VENOUS BLD VENIPUNCTURE: CPT

## 2022-03-22 PROCEDURE — 81003 URINALYSIS AUTO W/O SCOPE: CPT

## 2022-03-22 PROCEDURE — 93005 ELECTROCARDIOGRAM TRACING: CPT | Performed by: EMERGENCY MEDICINE

## 2022-03-22 PROCEDURE — 2700000000 HC OXYGEN THERAPY PER DAY

## 2022-03-22 PROCEDURE — 82805 BLOOD GASES W/O2 SATURATION: CPT

## 2022-03-22 PROCEDURE — 96367 TX/PROPH/DG ADDL SEQ IV INF: CPT

## 2022-03-22 PROCEDURE — 94640 AIRWAY INHALATION TREATMENT: CPT

## 2022-03-22 PROCEDURE — 80048 BASIC METABOLIC PNL TOTAL CA: CPT

## 2022-03-22 PROCEDURE — 94660 CPAP INITIATION&MGMT: CPT

## 2022-03-22 PROCEDURE — 92610 EVALUATE SWALLOWING FUNCTION: CPT

## 2022-03-22 PROCEDURE — 74177 CT ABD & PELVIS W/CONTRAST: CPT

## 2022-03-22 PROCEDURE — 83735 ASSAY OF MAGNESIUM: CPT

## 2022-03-22 PROCEDURE — 84484 ASSAY OF TROPONIN QUANT: CPT

## 2022-03-22 RX ORDER — SODIUM CHLORIDE 0.9 % (FLUSH) 0.9 %
5-40 SYRINGE (ML) INJECTION PRN
Status: DISCONTINUED | OUTPATIENT
Start: 2022-03-22 | End: 2022-03-25 | Stop reason: HOSPADM

## 2022-03-22 RX ORDER — HEPARIN SODIUM 1000 [USP'U]/ML
2000 INJECTION, SOLUTION INTRAVENOUS; SUBCUTANEOUS PRN
Status: DISCONTINUED | OUTPATIENT
Start: 2022-03-22 | End: 2022-03-25 | Stop reason: HOSPADM

## 2022-03-22 RX ORDER — HEPARIN SODIUM 1000 [USP'U]/ML
4000 INJECTION, SOLUTION INTRAVENOUS; SUBCUTANEOUS ONCE
Status: COMPLETED | OUTPATIENT
Start: 2022-03-22 | End: 2022-03-22

## 2022-03-22 RX ORDER — PANTOPRAZOLE SODIUM 40 MG/1
40 TABLET, DELAYED RELEASE ORAL
Status: DISCONTINUED | OUTPATIENT
Start: 2022-03-22 | End: 2022-03-25 | Stop reason: HOSPADM

## 2022-03-22 RX ORDER — 0.9 % SODIUM CHLORIDE 0.9 %
2000 INTRAVENOUS SOLUTION INTRAVENOUS ONCE
Status: COMPLETED | OUTPATIENT
Start: 2022-03-22 | End: 2022-03-22

## 2022-03-22 RX ORDER — OXYCODONE HYDROCHLORIDE 5 MG/1
5 TABLET ORAL EVERY 4 HOURS PRN
Status: DISCONTINUED | OUTPATIENT
Start: 2022-03-22 | End: 2022-03-25 | Stop reason: HOSPADM

## 2022-03-22 RX ORDER — SODIUM CHLORIDE 9 MG/ML
INJECTION, SOLUTION INTRAVENOUS CONTINUOUS
Status: DISCONTINUED | OUTPATIENT
Start: 2022-03-22 | End: 2022-03-22

## 2022-03-22 RX ORDER — OMEGA-3-ACID ETHYL ESTERS 1 G/1
2 CAPSULE, LIQUID FILLED ORAL 2 TIMES DAILY
Status: DISCONTINUED | OUTPATIENT
Start: 2022-03-22 | End: 2022-03-23

## 2022-03-22 RX ORDER — ATORVASTATIN CALCIUM 80 MG/1
80 TABLET, FILM COATED ORAL DAILY
Status: DISCONTINUED | OUTPATIENT
Start: 2022-03-22 | End: 2022-03-25 | Stop reason: HOSPADM

## 2022-03-22 RX ORDER — SODIUM CHLORIDE 0.9 % (FLUSH) 0.9 %
5-40 SYRINGE (ML) INJECTION EVERY 12 HOURS SCHEDULED
Status: DISCONTINUED | OUTPATIENT
Start: 2022-03-22 | End: 2022-03-25 | Stop reason: HOSPADM

## 2022-03-22 RX ORDER — OXYCODONE AND ACETAMINOPHEN 10; 325 MG/1; MG/1
1 TABLET ORAL EVERY 4 HOURS PRN
Status: DISCONTINUED | OUTPATIENT
Start: 2022-03-22 | End: 2022-03-22

## 2022-03-22 RX ORDER — ONDANSETRON 4 MG/1
4 TABLET, ORALLY DISINTEGRATING ORAL EVERY 8 HOURS PRN
Status: DISCONTINUED | OUTPATIENT
Start: 2022-03-22 | End: 2022-03-25 | Stop reason: HOSPADM

## 2022-03-22 RX ORDER — POTASSIUM CHLORIDE 7.45 MG/ML
10 INJECTION INTRAVENOUS PRN
Status: DISCONTINUED | OUTPATIENT
Start: 2022-03-22 | End: 2022-03-24

## 2022-03-22 RX ORDER — QUETIAPINE FUMARATE 50 MG/1
50 TABLET, FILM COATED ORAL NIGHTLY PRN
Status: DISCONTINUED | OUTPATIENT
Start: 2022-03-22 | End: 2022-03-25 | Stop reason: HOSPADM

## 2022-03-22 RX ORDER — ACETAMINOPHEN 650 MG/1
650 SUPPOSITORY RECTAL ONCE
Status: COMPLETED | OUTPATIENT
Start: 2022-03-22 | End: 2022-03-22

## 2022-03-22 RX ORDER — SODIUM CHLORIDE 0.9 % (FLUSH) 0.9 %
10 SYRINGE (ML) INJECTION PRN
Status: DISCONTINUED | OUTPATIENT
Start: 2022-03-22 | End: 2022-03-25 | Stop reason: HOSPADM

## 2022-03-22 RX ORDER — 0.9 % SODIUM CHLORIDE 0.9 %
80 INTRAVENOUS SOLUTION INTRAVENOUS ONCE
Status: COMPLETED | OUTPATIENT
Start: 2022-03-22 | End: 2022-03-22

## 2022-03-22 RX ORDER — POTASSIUM CHLORIDE 20 MEQ/1
40 TABLET, EXTENDED RELEASE ORAL PRN
Status: DISCONTINUED | OUTPATIENT
Start: 2022-03-22 | End: 2022-03-24

## 2022-03-22 RX ORDER — FENOFIBRATE 160 MG/1
160 TABLET ORAL DAILY
Refills: 5 | Status: DISCONTINUED | OUTPATIENT
Start: 2022-03-22 | End: 2022-03-25 | Stop reason: HOSPADM

## 2022-03-22 RX ORDER — ONDANSETRON 2 MG/ML
4 INJECTION INTRAMUSCULAR; INTRAVENOUS EVERY 6 HOURS PRN
Status: DISCONTINUED | OUTPATIENT
Start: 2022-03-22 | End: 2022-03-25 | Stop reason: HOSPADM

## 2022-03-22 RX ORDER — MAGNESIUM SULFATE 1 G/100ML
1000 INJECTION INTRAVENOUS PRN
Status: DISCONTINUED | OUTPATIENT
Start: 2022-03-22 | End: 2022-03-25 | Stop reason: HOSPADM

## 2022-03-22 RX ORDER — OXYCODONE HYDROCHLORIDE AND ACETAMINOPHEN 5; 325 MG/1; MG/1
1 TABLET ORAL EVERY 4 HOURS PRN
Status: DISCONTINUED | OUTPATIENT
Start: 2022-03-22 | End: 2022-03-25 | Stop reason: HOSPADM

## 2022-03-22 RX ORDER — LANOLIN ALCOHOL/MO/W.PET/CERES
1000 CREAM (GRAM) TOPICAL DAILY
Status: DISCONTINUED | OUTPATIENT
Start: 2022-03-22 | End: 2022-03-25 | Stop reason: HOSPADM

## 2022-03-22 RX ORDER — QUETIAPINE FUMARATE 200 MG/1
200 TABLET, FILM COATED ORAL DAILY
Status: DISCONTINUED | OUTPATIENT
Start: 2022-03-22 | End: 2022-03-23

## 2022-03-22 RX ORDER — HEPARIN SODIUM 1000 [USP'U]/ML
4000 INJECTION, SOLUTION INTRAVENOUS; SUBCUTANEOUS PRN
Status: DISCONTINUED | OUTPATIENT
Start: 2022-03-22 | End: 2022-03-25 | Stop reason: HOSPADM

## 2022-03-22 RX ORDER — BUDESONIDE AND FORMOTEROL FUMARATE DIHYDRATE 160; 4.5 UG/1; UG/1
2 AEROSOL RESPIRATORY (INHALATION) 2 TIMES DAILY
Status: DISCONTINUED | OUTPATIENT
Start: 2022-03-22 | End: 2022-03-25 | Stop reason: HOSPADM

## 2022-03-22 RX ORDER — SODIUM CHLORIDE 9 MG/ML
25 INJECTION, SOLUTION INTRAVENOUS PRN
Status: DISCONTINUED | OUTPATIENT
Start: 2022-03-22 | End: 2022-03-25 | Stop reason: HOSPADM

## 2022-03-22 RX ADMIN — METRONIDAZOLE 500 MG: 500 INJECTION, SOLUTION INTRAVENOUS at 23:45

## 2022-03-22 RX ADMIN — METRONIDAZOLE 500 MG: 500 INJECTION, SOLUTION INTRAVENOUS at 06:54

## 2022-03-22 RX ADMIN — BUDESONIDE AND FORMOTEROL FUMARATE DIHYDRATE 2 PUFF: 160; 4.5 AEROSOL RESPIRATORY (INHALATION) at 19:53

## 2022-03-22 RX ADMIN — METRONIDAZOLE 500 MG: 500 INJECTION, SOLUTION INTRAVENOUS at 14:44

## 2022-03-22 RX ADMIN — PANTOPRAZOLE SODIUM 40 MG: 40 TABLET, DELAYED RELEASE ORAL at 17:19

## 2022-03-22 RX ADMIN — VANCOMYCIN HYDROCHLORIDE 1750 MG: 1 INJECTION, POWDER, LYOPHILIZED, FOR SOLUTION INTRAVENOUS at 05:52

## 2022-03-22 RX ADMIN — TIOTROPIUM BROMIDE INHALATION SPRAY 2 PUFF: 3.12 SPRAY, METERED RESPIRATORY (INHALATION) at 16:38

## 2022-03-22 RX ADMIN — FENOFIBRATE 160 MG: 160 TABLET ORAL at 17:19

## 2022-03-22 RX ADMIN — SODIUM CHLORIDE, PRESERVATIVE FREE 10 ML: 5 INJECTION INTRAVENOUS at 06:22

## 2022-03-22 RX ADMIN — OXYCODONE HYDROCHLORIDE 5 MG: 5 TABLET ORAL at 17:19

## 2022-03-22 RX ADMIN — QUETIAPINE FUMARATE 200 MG: 200 TABLET ORAL at 17:18

## 2022-03-22 RX ADMIN — ENOXAPARIN SODIUM 40 MG: 40 INJECTION SUBCUTANEOUS at 10:43

## 2022-03-22 RX ADMIN — HEPARIN SODIUM 4000 UNITS: 1000 INJECTION INTRAVENOUS; SUBCUTANEOUS at 15:07

## 2022-03-22 RX ADMIN — ATORVASTATIN CALCIUM 80 MG: 80 TABLET, FILM COATED ORAL at 17:19

## 2022-03-22 RX ADMIN — IOPAMIDOL 75 ML: 755 INJECTION, SOLUTION INTRAVENOUS at 06:22

## 2022-03-22 RX ADMIN — SODIUM CHLORIDE 80 ML: 9 INJECTION, SOLUTION INTRAVENOUS at 06:22

## 2022-03-22 RX ADMIN — CEFEPIME HYDROCHLORIDE 2000 MG: 2 INJECTION, POWDER, FOR SOLUTION INTRAVENOUS at 17:31

## 2022-03-22 RX ADMIN — SODIUM CHLORIDE, PRESERVATIVE FREE 10 ML: 5 INJECTION INTRAVENOUS at 10:43

## 2022-03-22 RX ADMIN — SODIUM CHLORIDE: 9 INJECTION, SOLUTION INTRAVENOUS at 12:58

## 2022-03-22 RX ADMIN — CEFEPIME HYDROCHLORIDE 2000 MG: 2 INJECTION, POWDER, FOR SOLUTION INTRAVENOUS at 05:20

## 2022-03-22 RX ADMIN — SODIUM CHLORIDE, PRESERVATIVE FREE 10 ML: 5 INJECTION INTRAVENOUS at 20:48

## 2022-03-22 RX ADMIN — SODIUM CHLORIDE 2000 ML: 9 INJECTION, SOLUTION INTRAVENOUS at 05:19

## 2022-03-22 RX ADMIN — ACETAMINOPHEN 650 MG: 650 SUPPOSITORY RECTAL at 05:33

## 2022-03-22 RX ADMIN — QUETIAPINE FUMARATE 50 MG: 50 TABLET ORAL at 20:40

## 2022-03-22 RX ADMIN — HEPARIN SODIUM 12 UNITS/KG/HR: 10000 INJECTION, SOLUTION INTRAVENOUS; SUBCUTANEOUS at 15:10

## 2022-03-22 ASSESSMENT — ENCOUNTER SYMPTOMS
SORE THROAT: 0
COUGH: 1
SHORTNESS OF BREATH: 1
RHINORRHEA: 0
VOMITING: 0
ABDOMINAL PAIN: 0
EYE DISCHARGE: 0
EYE REDNESS: 0

## 2022-03-22 ASSESSMENT — PAIN SCALES - GENERAL
PAINLEVEL_OUTOF10: 10
PAINLEVEL_OUTOF10: 0
PAINLEVEL_OUTOF10: 8
PAINLEVEL_OUTOF10: 10

## 2022-03-22 ASSESSMENT — PAIN DESCRIPTION - LOCATION: LOCATION: ABDOMEN

## 2022-03-22 NOTE — ED PROVIDER NOTES
EMERGENCY DEPARTMENT ENCOUNTER    Pt Name: Pastor Regalado  MRN: [id number]  [place] 1945  Date of evaluation: 3/22/22  CHIEF COMPLAINT       Chief Complaint   Patient presents with    Shortness of Breath    Altered Mental Status     HISTORY OF PRESENT ILLNESS   HPI     This is a 78-year-old female comes in today with difficulty in breathing. Per EMS the patient was satting 80% on room air was placed on a nonrebreather.   History is unable to be obtained because the patient is altered    REVIEW OF SYSTEMS     Review of Systems unable to be obtained secondary to the patient's altered mental status  PASTMEDICAL HISTORY     Past Medical History:   Diagnosis Date    Anemia     Bo[place]dale[place] hernia     PER CT 7-2-21    Bowel obstruction ([address]     Bronchitis 02/19/2019    Frequent episodes of bronchitis, usually yearly with pneumonia    Chronic biliary pancreatitis ([address] 09/22/2016    Controlled type 2 diabetes mellitus without complication, without long-term current use of insulin ([address]     Cough 08/13/2021    has had cough for several months    DDD (degenerative disc disease)     Depression     Diverticulosis     Esophageal dysphagia     Fibromyalgia     GERD (gastroesophageal reflux disease)     Glaucoma     Hiatal hernia     Hyperlipidemia     Knee injuries 02/19/2019    Osteoarthritis of more than one site 04/09/2014    Pneumonia     gets pneumonia yearly    Right middle lobe pneumonia 03/24/2016    Seasonal allergies      SURGICAL HISTORY       Past Surgical History:   Procedure Laterality Date    BREAST BIOPSY Left 05/2016    CARPAL TUNNEL RELEASE Bilateral     x 2 each    COLONOSCOPY      ESOPHAGEAL MOTILITY STUDY N/A 1/21/2022    NICKI RN-ESOPHAGEAL MOTILITY STUDY performed by Franki Briceño DO at [facility]    EYE SURGERY Left     stent for glaucoma    EYE SURGERY Left     cataract    EYE SURGERY Left     lower lid    FOOT SURGERY Right     HYSTERECTOMY      KNEE ARTHROSCOPY Right 12/04/2019    RIGHT KNEE ARTHROSCOPY WITH TOTAL MENISCECTOMY performed by Ever Meneses DO at [address]  10/03/2016    ME ARTHRS KNE SURG W/MENISCECTOMY MED/LAT W/SHVG Left 08/13/2018    KNEE ARTHROSCOPY FOR PARTIAL MEDIAL AND PARTIAL LATERAL MENISCECTOMY performed by Deborah Melissa MD at [facility]      due to blockage    UPPER GASTROINTESTINAL ENDOSCOPY      EGD    UPPER GASTROINTESTINAL ENDOSCOPY N/A 8/17/2021    EGD BIOPSY performed by Emma Wei MD at [facility]       Previous Medications    ALPRAZOLAM (XANAX) 0.5 MG TABLET    Take 1 tablet by mouth daily as needed for Sleep or Anxiety for up to 14 days. ATORVASTATIN (LIPITOR) 80 MG TABLET    Take 1 tablet by mouth daily    DORZOLAMIDE-TIMOLOL (COSOPT) 22.3-6.8 MG/ML OPHTHALMIC SOLUTION    INSTILL 1 DROP IN [facility] EYE TWO TIMES A DAY    FENOFIBRATE (TRIGLIDE) 160 MG TABLET    Take 1 tablet by mouth daily    FLUTICASONE-UMECLIDIN-VILANT (TRELEGY ELLIPTA) 100-62.5-25 MCG/INH AEPB    Inhale 1 puff into the lungs daily    FLUTICASONE-UMECLIDIN-VILANT (TRELEGY ELLIPTA) 100-62.5-25 MCG/INH AEPB    Inhale 1 puff into the lungs daily for 14 days Lot [id number] exp 9/2023    GABAPENTIN (NEURONTIN) 800 MG TABLET    TAKE 1 TAB BY MOUTH AT BEDTIME FOR ONE WEEK THEN 2 TABS AT BEDTIME FOR ONE WEEK THEN 3 TABS AT BEDTIME THEREAFTER    HANDICAP PLACARD MISC    by Does not apply route Exp 3/16/2026    OMEGA-3 ACID ETHYL ESTERS (LOVAZA) 1 G CAPSULE    Take 2 capsules by mouth 2 times daily    OXYCODONE-ACETAMINOPHEN (PERCOCET)  MG PER TABLET    Take 1 tablet by mouth every 4 hours as needed for Pain.      PANTOPRAZOLE (PROTONIX) 40 MG TABLET    Take 1 tablet by mouth 2 times daily (before meals)    QUETIAPINE (SEROQUEL) 200 MG TABLET    TAKE 1 TABLET DAILY    QUETIAPINE (SEROQUEL) 50 MG TABLET    TAKE 1 TO 2 TABLETS BY MOUTH AT BEDTIME AS NEEDED FOR SLEEP    VITAMIN B-12 (CYANOCOBALAMIN) 1000 MCG TABLET    TAKE 2 TABLETS BY MOUTH EVERY DAY     ALLERGIES     is allergic to pcn [penicillins], adhesive tape, nubain [nalbuphine hcl], seasonal, and vistaril [hydroxyzine]. FAMILY HISTORY     She indicated that her mother is . She indicated that her father is . She indicated that her maternal grandmother is . SOCIAL HISTORY       Social History     Tobacco Use    Smoking status: Never Smoker    Smokeless tobacco: Never Used   Vaping Use    Vaping Use: Never used   Substance Use Topics    Alcohol use: No     Alcohol/week: 0.0 standard drinks    Drug use: No     PHYSICAL EXAM     INITIAL VITALS: /71   Pulse 118   Temp 101.3 °F (38.5 °C) (Oral)   Resp 24   Ht 5' 4\" (1.626 m)   Wt 160 lb (72.6 kg)   SpO2 98%   BMI 27.46 kg/m²    Physical Exam  Vitals and nursing note reviewed. Constitutional:       Appearance: She is well-developed. Comments: Acute respiratory distress   HENT:      Head: Normocephalic and atraumatic. Eyes:      Conjunctiva/sclera: Conjunctivae normal.   Cardiovascular:      Rate and Rhythm: Regular rhythm. Tachycardia present. Heart sounds: No murmur heard. No friction rub. Pulmonary:      Comments: Tachypneic  Abdominal:      General: There is no distension. Palpations: Abdomen is soft. Tenderness: There is no abdominal tenderness. There is no guarding or rebound. Musculoskeletal:      Cervical back: Neck supple. Skin:     General: Skin is warm and dry. Capillary Refill: Capillary refill takes less than 2 seconds. DIAGNOSTIC RESULTS   RADIOLOGY:All plain film, CT, MRI, and formal ultrasound images (except ED bedside ultrasound) are read by the radiologist, see reports below, unless otherwisenoted in MDM or here. XR CHEST PORTABLE   Final Result   Interval worsening bilateral lung multifocal ill-defined consolidation   consistent with pneumonia versus alveolar edema.          CT CHEST elevation is 119 most likely secondary to severe sepsis. Patient more awake now she said that she was told that she might be aspirated because she has a large hernia causing her pneumonia because of this I did add Flagyl for anaerobic coverage. She is also complaining of abdominal pain LFTs and a CT of the abdomen were added. LFTs normal.  I spoke to Dr. Kathleen Garcia who accepts the patient. 6:37 AM EDT  I spoke to Dr. Dimitri Saeed from pulmonology who recommends placing the patient from BiPAP to high flow respiratory has been notified patient will be admitted for further management       EKG:All EKG's are interpreted by the Emergency Department Physician who either signs or Co-signs this chart in the absence of a cardiologist.  Sinus tachycardia with a heart rate of 140 bpm normal axis no prolonged intervals no acute ST or T wave changes    CRITICAL CARE:   CRITICAL CARE: There was a high probability of clinically significant/life threatening deterioration in this patient's condition which required my urgent intervention. Total critical care time was 45 minutes. This excludes any time for separately reportable procedures.      CONSULTS:  PHARMACY TO DOSE VANCOMYCIN  IP CONSULT TO PULMONOLOGY  IP CONSULT TO FAMILY MEDICINE    Vitals:    Vitals:    03/22/22 0501 03/22/22 0513 03/22/22 0620 03/22/22 0630   BP:    117/71   Pulse:   119 118   Resp: 20   24   Temp:       TempSrc:       SpO2: 94%   98%   Weight:  160 lb (72.6 kg)     Height:  5' 4\" (1.626 m)         The patient was given the following medications while in the emergency department:  Orders Placed This Encounter   Medications    0.9 % sodium chloride bolus    acetaminophen (TYLENOL) suppository 650 mg    cefepime (MAXIPIME) 2000 mg IVPB minibag     Order Specific Question:   Antimicrobial Indications     Answer:   Sepsis of Unknown Etiology    vancomycin (VANCOCIN) 1,750 mg in dextrose 5 % 500 mL IVPB     Order Specific Question:   Antimicrobial Indications     Answer:   Sepsis of Unknown Etiology    vancomycin (VANCOCIN) intermittent dosing (placeholder)     Order Specific Question:   Antimicrobial Indications     Answer:   Sepsis of Unknown Etiology    iopamidol (ISOVUE-370) 76 % injection 75 mL    0.9 % sodium chloride bolus    sodium chloride flush 0.9 % injection 10 mL    metronidazole (FLAGYL) 500 mg in NaCl 100 mL IVPB premix     Order Specific Question:   Antimicrobial Indications     Answer:   Sepsis of Unknown Etiology         FINAL IMPRESSION      1. Acute respiratory failure with hypoxia ([address]    2. Pneumonia due to infectious organism, unspecified laterality, unspecified part of lung    3.  Septicemia [facility])         DISPOSITION/PLAN   DISPOSITION Decision To Admit 03/22/2022 05:43:17 AM    Luana Cline MD  Attending Emergency Physician    This charting supersedes any ED resident or staff charting and was written using speech recognition software        Luana Cline MD  03/22/22 [id number]

## 2022-03-22 NOTE — H&P
[facility]     HISTORY AND PHYSICAL EXAMINATION            Date:   3/22/2022  Patient name: Hanane José  Date of admission:  3/22/2022  4:49 AM  MRN:   [id number]  Account:  [de-identified]  YOB: 1945  PCP:    DES You CNP  Room:   2010/2010-01  Code Status:    Full Code    Chief Complaint: This is a delayed entry note and reflects the patient's condition at time of service. Chief Complaint   Patient presents with    Shortness of Breath    Altered Mental Status     History Obtained From:     patient, electronic medical record    History of Present Illness:     History limited due to patient's poor recollection of today's events. Portion of history obtained from ED notes. Aure Peña is a 17-year-old female with past medical history of chronic biliary pancreatitis, type 2 diabetes mellitus without long-term insulin use, arthritis and degenerative disc disease on chronic pain meds, depression, hyperlipidemia, and recurrent pneumonias presents to the ED on 3/22 with shortness of breath and altered mental status. Patient reports she had been cleaning the bathroom this a.m. and feeling some chills but does not recall subsequent events other than waking up in the hospital.  She states her  called EMS. She endorses increased shortness of breath for past few days. Patient recently admitted at [facility] (3/8 - 3/10) and found to have hiatal hernia; aspiration determined to be likely underlying etiology of pneumonia and pt is following a surgeon for plans of future repair of hernia. In the ED, patient presented febrile with temperature of 101.3 °F, tachycardi at 146 bpm c, hypertensive, and dyspneic with saturations reported to be in the 80s on room air by EMS.   Labs remarkable for lactic acid 2.5 and HS troponin 119. Patient placed on nonrebreather mask with improvement of oxygen saturation, started on antibiotics, and she is admitted for management of hospital-acquired pneumonia.       Past Medical History:     Past Medical History:   Diagnosis Date    Abnormal finding on imaging 8/12/2021    Anemia     [place] hernia     PER CT 7-2-21    Bowel obstruction ([address]     Bronchitis 02/19/2019    Frequent episodes of bronchitis, usually yearly with pneumonia    Chronic biliary pancreatitis ([address] 09/22/2016    Controlled type 2 diabetes mellitus without complication, without long-term current use of insulin ([address]     Cough 08/13/2021    has had cough for several months    DDD (degenerative disc disease)     Depression     Diverticulosis     Esophageal dysphagia     Fibromyalgia     GERD (gastroesophageal reflux disease)     Glaucoma     Hiatal hernia     Hyperlipidemia     Knee injuries 02/19/2019    Osteoarthritis of more than one site 04/09/2014    Pneumonia     gets pneumonia yearly    Right middle lobe pneumonia 03/24/2016    Seasonal allergies         Past SurgicalHistory:     Past Surgical History:   Procedure Laterality Date    BREAST BIOPSY Left 05/2016    CARPAL TUNNEL RELEASE Bilateral     x 2 each    COLONOSCOPY      ESOPHAGEAL MOTILITY STUDY N/A 1/21/2022    PES RN-ESOPHAGEAL MOTILITY STUDY performed by Dorota Wagner DO at [facility]    EYE SURGERY Left     stent for glaucoma    EYE SURGERY Left     cataract    EYE SURGERY Left     lower lid    FOOT SURGERY Right     HYSTERECTOMY      KNEE ARTHROSCOPY Right 12/04/2019    RIGHT KNEE ARTHROSCOPY WITH TOTAL MENISCECTOMY performed by Leslye Rodriguez DO at [facility]  10/03/2016    W[place] ARTHRS KNE SURG W/MENISCECTOMY MED/LAT W/SHVG Left 08/13/2018    KNEE ARTHROSCOPY FOR PARTIAL MEDIAL AND PARTIAL LATERAL MENISCECTOMY performed by Derek Haynes MD at [address] [place] [address] due to blockage    UPPER GASTROINTESTINAL ENDOSCOPY      EGD    UPPER GASTROINTESTINAL ENDOSCOPY N/A 8/17/2021    EGD BIOPSY performed by Dayna Cohen MD at [facility]        Medications Prior to Admission:     Prior to Admission medications    Medication Sig Start Date End Date Taking? Authorizing Provider   pantoprazole (PROTONIX) 40 MG tablet Take 1 tablet by mouth 2 times daily (before meals) 3/15/22 6/13/22  Marli Forrest Million, APRN - CNP   fluticasone-umeclidin-vilant (TRELEGY ELLIPTA) 517-95.4-05 MCG/INH AEPB Inhale 1 puff into the lungs daily 3/15/22 4/14/22  Marli Forrest Million, APRN - CNP   fluticasone-umeclidin-vilant (TRELEGY ELLIPTA) 100-62.5-25 MCG/INH AEPB Inhale 1 puff into the lungs daily for 14 days Lot ab5w exp 9/2023 3/15/22 3/29/22  Marli Forrest Million, APRN - CNP   ALPRAZolam Doralee Shahrzad) 0.5 MG tablet Take 1 tablet by mouth daily as needed for Sleep or Anxiety for up to 14 days.  3/15/22 3/29/22  Marli Forrest Million, APRN - CNP   omega-3 acid ethyl esters (LOVAZA) 1 g capsule Take 2 capsules by mouth 2 times daily 3/12/22 3/12/23  Marli Forrest Million, APRN - CNP   fenofibrate (TRIGLIDE) 160 MG tablet Take 1 tablet by mouth daily 3/12/22 3/12/23  Marli Forrest Million, APRN - CNP   QUEtiapine (SEROQUEL) 50 MG tablet TAKE 1 TO 2 TABLETS BY MOUTH AT BEDTIME AS NEEDED FOR SLEEP 3/2/22   Marli Forrest Million, APRN - CNP   vitamin B-12 (CYANOCOBALAMIN) 1000 MCG tablet TAKE 2 TABLETS BY MOUTH EVERY DAY 3/2/22 3/2/23  Marli Forrest Million, APRN - CNP   QUEtiapine (SEROQUEL) 200 MG tablet TAKE 1 TABLET DAILY 2/2/22   Marli Forrest Million, APRN - CNP   atorvastatin (LIPITOR) 80 MG tablet Take 1 tablet by mouth daily 11/30/21 5/29/22  Marli Forrest Million, APRN - CNP   dorzolamide-timolol (COSOPT) 22.3-6.8 MG/ML ophthalmic solution INSTILL 1 DROP IN [facility] EYE TWO TIMES A DAY 10/1/21   Historical Provider, MD   gabapentin (NEURONTIN) 800 MG tablet TAKE 1 TAB BY MOUTH AT BEDTIME FOR ONE WEEK THEN 2 TABS AT BEDTIME FOR ONE WEEK THEN 3 TABS AT BEDTIME THEREAFTER 21   Historical Provider, MD   Handicap Placard MISC by Does not apply route Exp 3/16/2026 3/16/21   Marli Thao Reno, APRN - CNP   oxyCODONE-acetaminophen (PERCOCET)  MG per tablet Take 1 tablet by mouth every 4 hours as needed for Pain. 19   Historical Provider, MD        Allergies:     Pcn [penicillins], Adhesive tape, Nubain [nalbuphine hcl], Seasonal, and Vistaril [hydroxyzine]    Social History:     Tobacco:    reports that she has never smoked. She has never used smokeless tobacco.  Alcohol:      reports no history of alcohol use. Drug Use:  reports no history of drug use. Family History:     Family History   Problem Relation Age of Onset    High Blood Pressure Mother     Stroke Mother     Heart Disease Father     Stroke Maternal Grandmother        Review of Systems:     Positive and Negative as described in HPI. Review of Systems   Constitutional: Positive for chills, fatigue and fever. HENT: Negative for rhinorrhea and sore throat. Eyes: Negative for discharge and redness. Respiratory: Positive for cough and shortness of breath. Cardiovascular: Negative for chest pain and palpitations. Gastrointestinal: Negative for abdominal pain and vomiting. Musculoskeletal: Positive for arthralgias. Neurological: Negative for dizziness and light-headedness. LOC   Psychiatric/Behavioral: Positive for confusion. Negative for behavioral problems. Memory disturbance       Physical Exam:   BP (!) 95/50   Pulse 116   Temp 99 °F (37.2 °C) (Axillary)   Resp 29   Ht 5' 4\" (1.626 m)   Wt 160 lb (72.6 kg)   SpO2 90%   BMI 27.46 kg/m²   Temp (24hrs), Av.7 °F (37.6 °C), Min:99 °F (37.2 °C), Max:101.3 °F (38.5 °C)    No results for input(s): POCGLU in the last 72 hours.     Intake/Output Summary (Last 24 hours) at 3/22/2022 0862  Last data filed at 3/22/2022 2239  Gross per 24 hour   Intake --   Output 2375 ml   Net -2375 ml     Vitals:    22 1800 22 1900 03/22/22 1953 03/22/22 2000   BP: 110/61   (!) 95/50   Pulse: 104 107  116   Resp: 25 21 28 29   Temp:    99 °F (37.2 °C)   TempSrc:    Axillary   SpO2: 97% 97% 92% 90%   Weight:       Height:             Physical Exam  Constitutional:       General: She is not in acute distress. HENT:      Head: Normocephalic. Nose: Nose normal.      Comments: NC in place  Eyes:      General:         Right eye: No discharge. Left eye: No discharge. Conjunctiva/sclera: Conjunctivae normal.   Cardiovascular:      Rate and Rhythm: Regular rhythm. Tachycardia present. Pulmonary:      Breath sounds: Rales present. Comments: SOB,  On O2 supplementation   Abdominal:      Palpations: Abdomen is soft. Tenderness: There is no abdominal tenderness. Musculoskeletal:         General: No swelling or tenderness. Skin:     General: Skin is warm and dry. Neurological:      Mental Status: She is alert and oriented to person, place, and time. Comments: Oriented to person, place, and time,  Knows medical history but endorsing amnesia of some of today's events,  Speech intelligible and appropriate,  Following commands   Psychiatric:         Behavior: Behavior normal.         Investigations:     Laboratory Testing:  Recent Results (from the past 24 hour(s))   Culture, Blood 1    Collection Time: 03/22/22  4:45 AM    Specimen: Blood   Result Value Ref Range    Specimen Description . BLOOD     Special Requests 8ML EACH RIGHT HAND     Culture NO GROWTH 12 HOURS    Blood Gas, Arterial    Collection Time: 03/22/22  4:51 AM   Result Value Ref Range    pH, Arterial 7.457 (H) 7.350 - 7.450    pCO2, Arterial 36.5 35.0 - 45.0 mmHg    pO2, Arterial 70.8 (L) 80.0 - 100.0 mmHg    HCO3, Arterial 25.8 22.0 - 26.0 mmol/L    Positive Base Excess, Art 1.9 0.0 - 2.0 mmol/L    O2 Sat, Arterial 94.3 (L) 95 - 98 %    Carboxyhemoglobin 1.2 0 - 5 %    Methemoglobin 0.6 0.0 - 1.9 %    Pt Temp 37     O2 Device/Flow/% BIPAP     Respiratory Rate 28     Bao Test PASS     Sample Site Left Radial Artery     Pt. Position SEMI-FOWLERS     Text for Respiratory RESULTS GIVEN TO DR. BANUELOS    EKG 12 Lead    Collection Time: 03/22/22  4:53 AM   Result Value Ref Range    Ventricular Rate 140 BPM    Atrial Rate 140 BPM    P-R Interval 126 ms    QRS Duration 66 ms    Q-T Interval 282 ms    QTc Calculation (Bazett) 430 ms    P Axis 41 degrees    R Axis 14 degrees    T Axis 31 degrees   CBC with Auto Differential    Collection Time: 03/22/22  5:13 AM   Result Value Ref Range    WBC 6.5 3.5 - 11.0 k/uL    RBC 4.31 4.0 - 5.2 m/uL    Hemoglobin 12.8 12.0 - 16.0 g/dL    Hematocrit 37.7 36 - 46 %    MCV 87.3 80 - 100 fL    MCH 29.7 26 - 34 pg    MCHC 34.0 31 - 37 g/dL    RDW 14.8 11.5 - 14.9 %    Platelets 625 (H) 930 - 450 k/uL    MPV 6.9 6.0 - 12.0 fL    Seg Neutrophils 87 (H) 36 - 66 %    Lymphocytes 6 (L) 24 - 44 %    Monocytes 5 1 - 7 %    Eosinophils % 1 0 - 4 %    Basophils 1 0 - 2 %    Segs Absolute 5.70 1.3 - 9.1 k/uL    Absolute Lymph # 0.40 (L) 1.0 - 4.8 k/uL    Absolute Mono # 0.40 0.1 - 1.3 k/uL    Absolute Eos # 0.00 0.0 - 0.4 k/uL    Basophils Absolute 0.00 0.0 - 0.2 k/uL   Basic Metabolic Panel    Collection Time: 03/22/22  5:13 AM   Result Value Ref Range    Glucose 111 (H) 70 - 99 mg/dL    BUN 15 8 - 23 mg/dL    CREATININE 0.88 0.50 - 0.90 mg/dL    Calcium 8.9 8.6 - 10.4 mg/dL    Sodium 139 135 - 144 mmol/L    Potassium 4.1 3.7 - 5.3 mmol/L    Chloride 103 98 - 107 mmol/L    CO2 23 20 - 31 mmol/L    Anion Gap 13 9 - 17 mmol/L    GFR Non-African American >60 >60 mL/min    GFR African American >60 >60 mL/min    GFR Comment         Magnesium    Collection Time: 03/22/22  5:13 AM   Result Value Ref Range    Magnesium 2.1 1.6 - 2.6 mg/dL   Brain Natriuretic Peptide    Collection Time: 03/22/22  5:13 AM   Result Value Ref Range    Pro- <300 pg/mL   APTT    Collection Time: 03/22/22  5:13 AM   Result Value Ref Range    PTT 31.0 24.0 - 36.0 sec Protime-INR    Collection Time: 03/22/22  5:13 AM   Result Value Ref Range    Protime 13.2 11.8 - 14.6 sec    INR 1.0    Lactate, Sepsis    Collection Time: 03/22/22  5:13 AM   Result Value Ref Range    Lactic Acid, Sepsis 2.5 (H) 0.5 - 1.9 mmol/L   Troponin    Collection Time: 03/22/22  5:13 AM   Result Value Ref Range    Troponin, High Sensitivity 119 (HH) 0 - 14 ng/L   Lipase    Collection Time: 03/22/22  5:13 AM   Result Value Ref Range    Lipase 34 13 - 60 U/L   Hepatic Function Panel    Collection Time: 03/22/22  5:13 AM   Result Value Ref Range    Albumin 3.9 3.5 - 5.2 g/dL    Alkaline Phosphatase 129 (H) 35 - 104 U/L    ALT 18 5 - 33 U/L    AST 27 <32 U/L    Total Bilirubin 0.41 0.3 - 1.2 mg/dL    Bilirubin, Direct 0.12 <0.31 mg/dL    Bilirubin, Indirect 0.29 0.00 - 1.00 mg/dL    Total Protein 7.1 6.4 - 8.3 g/dL   Culture, Blood 1    Collection Time: 03/22/22  5:15 AM    Specimen: Blood   Result Value Ref Range    Specimen Description . BLOOD     Special Requests 5ML EACH RIGHT WRIST     Culture NO GROWTH 12 HOURS    COVID-19 & Influenza Combo    Collection Time: 03/22/22  5:29 AM    Specimen: Nasopharyngeal Swab   Result Value Ref Range    Specimen Description . NASOPHARYNGEAL SWAB     Source . NASOPHARYNGEAL SWAB     SARS-CoV-2 RNA, RT PCR Not Detected Not Detected    INFLUENZA A Not Detected Not Detected    INFLUENZA B Not Detected Not Detected   Urinalysis    Collection Time: 03/22/22  5:30 AM   Result Value Ref Range    Color, UA Yellow Yellow    Turbidity UA Clear Clear    Glucose, Ur NEGATIVE NEGATIVE    Bilirubin Urine NEGATIVE NEGATIVE    Ketones, Urine NEGATIVE NEGATIVE    Specific Gravity, UA 1.012 1.000 - 1.030    Urine Hgb NEGATIVE NEGATIVE    pH, UA 6.0 5.0 - 8.0    Protein, UA NEGATIVE NEGATIVE    Urobilinogen, Urine Normal Normal    Nitrite, Urine NEGATIVE NEGATIVE    Leukocyte Esterase, Urine NEGATIVE NEGATIVE    Urinalysis Comments       Microscopic exam not performed based on chemical results unless requested in original order. Lactate, Sepsis    Collection Time: 03/22/22  8:10 AM   Result Value Ref Range    Lactic Acid, Sepsis 2.9 (H) 0.5 - 1.9 mmol/L   Troponin    Collection Time: 03/22/22  8:10 AM   Result Value Ref Range    Troponin, High Sensitivity 205 (HH) 0 - 14 ng/L   Procalcitonin    Collection Time: 03/22/22  9:25 AM   Result Value Ref Range    Procalcitonin 1.86 (H) <0.09 ng/mL   EKG 12 Lead    Collection Time: 03/22/22 12:11 PM   Result Value Ref Range    Ventricular Rate 102 BPM    Atrial Rate 102 BPM    P-R Interval 138 ms    QRS Duration 68 ms    Q-T Interval 362 ms    QTc Calculation (Bazett) 471 ms    P Axis 45 degrees    R Axis 18 degrees    T Axis 32 degrees   CBC    Collection Time: 03/22/22  1:54 PM   Result Value Ref Range    WBC 10.1 3.5 - 11.0 k/uL    RBC 3.89 (L) 4.0 - 5.2 m/uL    Hemoglobin 11.6 (L) 12.0 - 16.0 g/dL    Hematocrit 34.0 (L) 36 - 46 %    MCV 87.5 80 - 100 fL    MCH 29.9 26 - 34 pg    MCHC 34.2 31 - 37 g/dL    RDW 14.7 11.5 - 14.9 %    Platelets 002 413 - 896 k/uL    MPV 7.2 6.0 - 12.0 fL   APTT    Collection Time: 03/22/22  1:54 PM   Result Value Ref Range    PTT 43.1 (H) 24.0 - 36.0 sec   Protime-INR    Collection Time: 03/22/22  1:54 PM   Result Value Ref Range    Protime 14.1 11.8 - 14.6 sec    INR 1.1    Anti-Xa, Unfractionated Heparin    Collection Time: 03/22/22  8:53 PM   Result Value Ref Range    Anti-XA Unfrac Heparin 0.55 0.30 - 0.70 IU/L       Imaging/Diagnostics:  CT ABDOMEN PELVIS W IV CONTRAST Additional Contrast? None    Addendum Date: 3/22/2022    ADDENDUM: Impression 5:  Fluid distention of a few jejunal loops that could be seen with mild enteritis. Result Date: 3/22/2022  EXAMINATION: CT OF THE ABDOMEN AND PELVIS WITH CONTRAST 3/22/2022 5:50 am TECHNIQUE: CT of the abdomen and pelvis was performed with the administration of intravenous contrast. Multiplanar reformatted images are provided for review.  Dose modulation, iterative reconstruction, and/or weight based adjustment of the mA/kV was utilized to reduce the radiation dose to as low as reasonably achievable. COMPARISON: Chest, abdomen, and pelvis CT 07/02/2021; abdomen and pelvis CT 07/01/2016 HISTORY: ORDERING SYSTEM PROVIDED HISTORY: abdominal pain sepsis TECHNOLOGIST PROVIDED HISTORY: abdominal pain sepsis Decision Support Exception - unselect if not a suspected or confirmed emergency medical condition->Emergency Medical Condition (MA) Reason for Exam: sob, abd pain FINDINGS: Patient motion in some areas, partially limiting evaluation of those areas. LOWER CHEST:  Patchy consolidative opacities some adjacent groundglass in the right middle and bilateral lower lobes, most severe in the right lower lobe with associated air bronchograms. Mild cardiomegaly. Aortic valve annular calcification. No pleural nor pericardial effusions. A few nonenlarged left paraesophageal lymph nodes. ORGANS:  Hepatic and splenic granulomatous calcifications. Persistent mild intrahepatic and moderate extrahepatic biliary dilation with normal tapering of the distal common bile duct. Mild dilation of the main pancreatic duct in the pancreatic neck. Moderate pancreatic atrophy. Normal gallbladder, adrenal glands, and kidneys. GI/BOWEL:  Suggestion of circumferential wall thickening in the distal thoracic esophagus. Small sliding hiatal hernia. Otherwise normal course and caliber of the stomach, small bowel, colon, and rectum without obstruction. Fluid distention of some jejunal loops. Normal appendix with retrocecal location. Metallic foreign body appearing to be in the dependent portion of the ascending colon, potentially hemostasis clip. Mild to moderate stool in the right hemicolon with only minimal stool more distally. Mild colonic diverticulosis without findings of acute diverticulitis. PELVIS:  Surgically absent uterus and ovaries. Normal urinary bladder.  Laxity of the pelvic floor musculature. PERITONEUM/RETROPERITONEUM:  Minimal systemic atherosclerosis. Normal variant circumaortic left renal vein. No abdominal nor pelvic lymphadenopathy. No free intraperitoneal fluid nor gas. SOFT TISSUES/BONES:  Partially included bilateral retropectoral saline breast implants with unchanged collapse of the right implant. Healed midline infraumbilical laparotomy incision. Tiny fat containing umbilical hernia. Calcified injection granulomas in the subcutaneous tissues of the left lower back and left buttock. No inguinal lymphadenopathy. No acute fractures nor suspicious bony lesions. 1. Patchy consolidative and groundglass opacities the lung bases most prominent in the right lower lobe suspicious for pneumonia or aspiration. Please refer to the concurrent chest CT for additional detail. 2. Suggestion of circumferential wall thickening in the distal thoracic esophagus potentially due to reflux esophagitis given the presence of a small sliding hiatal hernia. No overtly suspicious features are present to suggest malignancy, although that is not excluded given the presence of some nonenlarged left paraesophageal lymph nodes. Consider endoscopy on a nonemergent basis. 3. Mild intrahepatic and moderate extrahepatic biliary dilation is not significantly changed since 07/01/2016. There is also unchanged mild dilation of the main pancreatic duct with no findings to suggest an obstructing ampullary or pancreatic mass. The findings are likely age-related with pancreatic parenchymal atrophy likely contributing to the appearance of the main pancreatic duct. Consider further evaluation with MRCP only if there are clinical findings of cholestasis. 4. A few additional incidental findings as above.      FL UGI W AIR CONTRAST    Result Date: 3/9/2022  EXAMINATION: DOUBLE CONTRAST UPPER GI SERIES 3/9/2022 TECHNIQUE: Double contrast upper GI series was performed with barium and air contrast. FLUOROSCOPY DOSE AND TYPE OR TIME AND EXPOSURES: 1 minute 29 seconds; D AP 1434 mGy m2 COMPARISON: CT 07/02/2021 HISTORY: ORDERING SYSTEM PROVIDED HISTORY: hiatal hernia + gerd TECHNOLOGIST PROVIDED HISTORY: hiatal hernia + gerd Reason for Exam: hiatal hernia, gerd Additional signs and symptoms: pt yhas pneumonia, unable to drink much barium FINDINGS: The exam is somewhat limited as the patient could only swallow small quantities of barium. The esophagus distends normally with occasional non propulsive tertiary contractions/spasm of the esophagus. There is a moderate to large sliding hiatal hernia involving the proximal 1/3 to 1/2 of the stomach. The remainder of the stomach is grossly unremarkable. On the static images obtained there is questionable narrowing at the GE junction although this is probably due to underdistention as contrast flows freely through the region. Endoscopy is recommended, if not done previously, to better evaluate the GE junction region. There is a small amount of reflux demonstrated under fluoroscopy. Duodenal bulb and sweep as visualized are grossly unremarkable. Small duodenal diverticulum. Patchy parenchymal opacities in the visualized lungs. Limited exam as the patient could only ingest small quantities of barium. Moderate to large hiatal hernia. XR CHEST PORTABLE    Result Date: 3/22/2022  EXAMINATION: ONE XRAY VIEW OF THE CHEST 3/22/2022 4:59 am COMPARISON: Chest portable March 8, 2022 HISTORY: ORDERING SYSTEM PROVIDED HISTORY: hypoxia TECHNOLOGIST PROVIDED HISTORY: hypoxia Reason for Exam: PT CO cough with SOB and hypoxia X 1 day. PT also CO Confusion at this time. FINDINGS: The heart is normal in size and configuration. The mediastinal contours are within normal limits. Multifocal bilateral lung ill-defined consolidation is worsened in comparison with the prior study. The pleural surfaces are normal and no evidence of a pleural effusion is seen. Bones and soft tissues are unremarkable. Interval worsening bilateral lung multifocal ill-defined consolidation consistent with pneumonia versus alveolar edema. XR CHEST PORTABLE    Result Date: 3/8/2022  EXAMINATION: ONE XRAY VIEW OF THE CHEST 3/8/2022 6:00 am COMPARISON: 03/10/2021 HISTORY: ORDERING SYSTEM PROVIDED HISTORY: sob, hypoxia FINDINGS: Stable size and configuration of the cardiomediastinal silhouette. Moderate hiatal hernia. Prominent perihilar and interstitial lung markings with patchy airspace opacities suggestive of diffuse pulmonary edema. No sizable pleural effusion or pneumothorax. No acute osseous findings. 1. Prominent perihilar and interstitial lung markings with scattered patchy airspace opacities. Findings are suggestive of diffuse pulmonary edema. Multifocal pneumonia in the appropriate clinical setting is possible. CT CHEST PULMONARY EMBOLISM W CONTRAST    Result Date: 3/22/2022  EXAMINATION: CTA OF THE CHEST 3/22/2022 5:49 am TECHNIQUE: CTA of the chest was performed after the administration of intravenous contrast.  Multiplanar reformatted images are provided for review. MIP images are provided for review. Dose modulation, iterative reconstruction, and/or weight based adjustment of the mA/kV was utilized to reduce the radiation dose to as low as reasonably achievable. COMPARISON: Portable chest obtained approximately 1 hour earlier. CT chest/abdomen/pelvis without contrast dated 07/02/2021. HISTORY: ORDERING SYSTEM PROVIDED HISTORY: hypoxia tachycardia TECHNOLOGIST PROVIDED HISTORY: hypoxia tachycardia Decision Support Exception - unselect if not a suspected or confirmed emergency medical condition->Emergency Medical Condition (MA) Reason for Exam: sob, abd pain FINDINGS: Pulmonary Arteries: Pulmonary arteries are adequately opacified for evaluation. No evidence of intraluminal filling defect to suggest pulmonary embolism. Main pulmonary artery is normal in caliber.  Mediastinum: There is mild subcarinal adenopathy. The heart and pericardium are unremarkable. There is a moderate-sized hiatal hernia. The aorta is normal in caliber. The central airways are clear. Lungs/pleura: Patchy bilateral consolidating infiltrate is present most pronounced at the posterior right base. No pneumothorax or pleural effusion. Upper Abdomen: Limited images of the upper abdomen are unremarkable. Soft Tissues/Bones: No acute bone or soft tissue abnormality. There is complete collapse of the right breast implant. No evidence of pulmonary embolus. Bilateral consolidating pneumonia, right worse than left. Associated mild mediastinal adenopathy. Moderate-sized hiatal hernia. Complete collapse of right breast implant.        Assessment :      Primary Problem  Hospital-acquired pneumonia    Active Hospital Problems    Diagnosis Date Noted    Hospital-acquired pneumonia [J18.9, Y95] 03/22/2022    NSTEMI (non-ST elevated myocardial infarction) ([facility] [address] [I21.4] 03/22/2022    History of degenerative disc disease [Z87.39] 03/22/2022    Hx of diabetes mellitus [Z86.39] 03/22/2022    Sepsis ([facility] [address] [A41.9] 03/22/2022    Hx of Esophageal dysmotility [K22.4] 03/22/2022    Acute respiratory failure with hypoxemia (HCC) [J96.01]     Recurrent aspiration pneumonia ([facility] [address] [J69.0] 11/12/2021    Hiatal hernia [K44.9] 08/12/2021    Gastroesophageal reflux disease [K21.9] 06/26/3409    Uncomplicated opioid dependence ([facility] [address] [F11.20] 10/24/2016    Primary osteoarthritis involving multiple joints [M89.49]        Plan:     Patient status Admit as inpatient in the  Medical ICU    Acute hypoxemic respiratory failure 2/2 Multifocal HAP with sepsis  · Shortness of breath, AMS, increased O2 demans (pt on RA at baseline)  · Recurrent hospital admissions for pneumonia, most recent on 3/8 for aspiration PNA in setting of hiatal hernia  · CT chest: b/l consolidative/groundglass opacities (R>L) suspicious for PNA or aspiration  · T 101.3, , Desaturation into the 80s on room air  · ABG: pH 7.4, PO2 70.8  · Lactic acid 2.5 & 2.9, pro-Julio 1.86, WBC wnl  · Negative for COVID-19 and influenza  · Check Blood cultures, Gram stain/respiratory cultures, MRSA nasal probe, strep pneumo antigen  · O2 supplementation, wean down as tolerated  · Pulmonology consulted  · IV cefepime, metronidazole, vancomycin  · Symbicort and Spiriva inhalers    NSTEMI, etiology to be determined, rule out ACS  · Shortness of breath  · Troponin 119, then 205  · EKG showing sinus tachycardia, no ST elevation  · Consult cardiology  · Heparin drip     Hiatal hernia with GERD and hx of esophageal dysmotility  · Endoscopy 8/2021: Spasmatic contractions, nonobstructing Schatzki rings with irregular SCJ esophagitis, 5 cm hiatal hernia  · Esophageal motility study 2/2022: Peristalsis also noted  · SLP bedside swallow study   · Twice daily Protonix    History of DDD/arthritis with long-term use of prescription opiates  · Continue home Percocet    Hyperlipidemia  · Atorvastatin 80 mg p.o. daily  · Fenofibrate 160 mg p.o. daily    Depression and Insomnia  · Quetiapine 200 mg p.o. daily 50 mg p.o. nightly    Hx of DM  · Hemoglobin A1c of 5.6 on 3/1  · Monitor BS    Code: Full  DVT prophylaxis: Heparin  GI prophylaxis: Pantoprazole 40 mg p.o. twice daily  Diet: ADULT DIET; Dysphagia - Soft and Bite Sized; 5 carb choices (75 gm/meal); Low Fat/Low Chol/High Fiber/2 gm Na  Activity: Up as tolerated  Dispo: Admit to medical ICU      Please note: Use of a speech recognition software was used in the creation of portions of this note and dictation errors, including those of syntax and sound alike word substitutions, may have escaped proofreading.      Consultations:   PHARMACY TO DOSE VANCOMYCIN  IP CONSULT TO PULMONOLOGY  IP CONSULT TO FAMILY MEDICINE  IP CONSULT TO CARDIOLOGY    Patient is admitted as inpatient status because of co-morbiditieslisted above, severity of signs and symptoms as outlined, requirement for current medical therapies and most importantly because of direct risk to patient if care not provided in a hospital setting. Bacilio Burns DO  3/22/2022  11:33 PM    Copy sent to DES Schafer - CNP      Attestation and add on       I have discussed the care of Robe Johns , including pertinent history and exam findings,      3/22/22    with the resident. I have seen and examined the patient and the key elements of all parts of the encounter have been performed by me . I agree with the assessment, plan and orders as documented by the resident. Principal Problem:    Hospital-acquired pneumonia  Active Problems:    Primary osteoarthritis involving multiple joints    Uncomplicated opioid dependence ([facility] [address]    Gastroesophageal reflux disease    Hiatal hernia    Recurrent aspiration pneumonia (HCC)    Acute respiratory failure with hypoxemia (HCC)    NSTEMI (non-ST elevated myocardial infarction) ([facility] [address]    History of degenerative disc disease    Hx of diabetes mellitus    Sepsis ([facility] [address]    Hx of Esophageal dysmotility  Resolved Problems:    Type 2 diabetes mellitus        ''''''''''       MD ROSE MARIE Sanchez [address], [address] [place] [address].    Phone [phone]   Fax: [phone]  Answering Service: [phone]

## 2022-03-22 NOTE — PLAN OF CARE
Pt was seen and examined. She is A&O x 3 but does not fully recall what happened today. VS stable. Abx and heparin drip started. Full note to follow.     Electronically signed by Malaika Astudillo DO on 3/22/2022 at 3:07 PM

## 2022-03-22 NOTE — PROGRESS NOTES
Vancomycin Dosing by Pharmacy - Initial Note   TODAY'S DATE:  3/22/2022  Patient name, age: Teresa Esquivel, 64 y.o. Indication: Sepsis of unknown origin, empiric  Additional antimicrobials:  flagyl, cefepime    Allergies:  Pcn [penicillins], Adhesive tape, Nubain [nalbuphine hcl], Seasonal, and Vistaril [hydroxyzine]   Actual Weight:    Wt Readings from Last 1 Encounters:   03/22/22 160 lb (72.6 kg)     Labs/Ancillary Data  Estimated Creatinine Clearance: 51 mL/min (based on SCr of 0.88 mg/dL). Recent Labs     03/22/22  0513   CREATININE 0.88   BUN 15   WBC 6.5     No results found for: PROCAL    Intake/Output Summary (Last 24 hours) at 3/22/2022 0640  Last data filed at 3/22/2022 0534  Gross per 24 hour   Intake --   Output 750 ml   Net -750 ml     Temp: 101.3    Recent vancomycin administrations                     vancomycin (VANCOCIN) 1,750 mg in dextrose 5 % 500 mL IVPB (mg) 1,750 mg New Bag 03/22/22 0552                  Culture Date / Source  /  Results  3/22    BC  X2    MRSA Nasal Swab: N/A. Non-respiratory infection. [facility] PLAN   Initial loading dose of 25mg/kg (max of 2,500 mg) = 1750  mg  x 1, then  vancomycin 1250 mg IV every 24 hours. Ensured BUN/sCr ordered at baseline and every 48 hours x at least 3 levels, then at least weekly. Vancomycin level will be obtained after the second dose. Vancomycin Target Concentration Parameters  Treatment  Population Target AUC/DUGLAS Target Trough   Invasive MRSA Infection (bacteremia, pneumonia, meningitis, endocarditis, osteomyelitis)  Sepsis (undifferentiated) 400-600 N/A   Infection due to non-MRSA pathogen  Empiric treatment of non-invasive MRSA infection  (SSTI, UTI) <500 10-15 mg/L   CrCl < 29 mL/min  Rapidly fluctuating serum creatinine   MARÍA N/A < 15 mg/L     Renal replacement therapy is dosed by levels, per hospital protocol. Abbreviations  * Pauc: probability that AUC is >400 (efficacy);  Pconc: probability that Ctrough is above 20 ?g/mL (toxicity); Tox: Probability of nephrotoxicity, based on Jessenia et al. Clin Infect Dis 2009. Loading dose: 1750 mg at 05:52 03/22/2022. Regimen: 1250 mg IV every 24 hours. Start time: 06:41 on 03/22/2022  Exposure target: AUC24 (range)400-600 mg/L.hr   AUC24,ss: 470 mg/L.hr  Probability of AUC24 > 400: 68 %  Ctrough,ss: 13.7 mg/L  Probability of Ctrough,ss > 20: 19 %  Probability of nephrotoxicity (Lodise GERALDINE 2009): 9 %      Thank you for the consult. Pharmacy will continue to follow.

## 2022-03-22 NOTE — FLOWSHEET NOTE
[facility]      03/22/22 7364   Encounter Summary   Services provided to: Patient   Referral/Consult From: Rounding   Continue Visiting   (3-22-22)   Complexity of Encounter Low   Length of Encounter 15 minutes   Spiritual/Shinto   Type Spiritual support   Assessment Sleeping   Intervention Prayer

## 2022-03-22 NOTE — PROGRESS NOTES
Speech Language Pathology  Facility/Department: [facility] ICU   CLINICAL BEDSIDE SWALLOW EVALUATION    NAME: Stephan Ibrahim  : 1945  MRN: [id number]    ADMISSION DATE: 3/22/2022  ADMITTING DIAGNOSIS: has Hyperlipidemia; Fibromyalgia; Osteopenia; Hx of bilateral breast implants; Vitamin D deficiency; Arthritis of shoulder region, right; Anxiety; Anemia, normocytic normochromic; Chronic pain associated with significant psychosocial dysfunction; Primary osteoarthritis involving multiple joints; Spondylosis 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; Vitamin B12 deficiency; Primary insomnia; Lumbar and sacral arthritis; Chronic biliary pancreatitis ([address] Moderate episode of recurrent major depressive disorder ([address] Uncomplicated opioid dependence ([address] Hyperuricemia; Primary open-angle glaucoma, right eye, mild stage; Primary open-angle glaucoma, left eye, mild stage; Gastroesophageal reflux disease; Abnormal finding on imaging; Esophageal dysphagia; Type 2 diabetes mellitus; Recurrent aspiration pneumonia ([address] Esophageal hiatus hernia; Multifocal pneumonia; Acute respiratory failure with hypoxia ([address] Simple chronic bronchitis ([address] and Pneumonia on their problem list.      Recent Chest Xray/CT of Chest:    - CXR-   Interval worsening bilateral lung multifocal ill-defined consolidation   consistent with pneumonia versus alveolar edema. Date of Eval: 3/22/2022  Evaluating Therapist: AYSHA Marroquin    Current Diet level:  Current Diet : Regular  Current Liquid Diet : Thin      Primary Complaint   Per ER H&P: This is a 68-year-old female comes in today with difficulty in breathing. Per EMS the patient was satting 80% on room air was placed on a nonrebreather.   History is unable to be obtained because the patient is altered    Pain:  Pt. denies    Reason for Referral  Stephan Ibrahim was referred for a bedside swallow evaluation to assess the efficiency of her swallow function, identify signs and symptoms of aspiration and make recommendations regarding safe dietary consistencies, effective compensatory strategies, and safe eating environment. Impression  Dysphagia Diagnosis: Swallow function appears grossly intact  Dysphagia Impression : No overt s/s aspiration demonstrated. Dysphagia Outcome Severity Scale: Level 7: Normal in all situations     Treatment Plan  Requires SLP Intervention: No             Recommended Diet and Intervention  Diet Solids Recommendation: Regular  Liquid Consistency Recommendation: Thin             Compensatory Swallowing Strategies  Compensatory Swallowing Strategies: Small bites/sips;Upright as possible for all oral intake;Eat/Feed slowly      General  Behavior/Cognition: Alert; Cooperative  Respiratory Status: O2 via nasual cannula  Breath Sounds: Clear  O2 Device: Nasal cannula  Communication Observation: Functional  Follows Directions: Complex  Dentition: Adequate  Patient Positioning: Upright in bed  Baseline Vocal Quality: Normal  Consistencies Administered: Reg solid;Pudding - teaspoon; Thin - straw           Vision/Hearing  Vision  Vision: Within Functional Limits  Hearing  Hearing: Within functional limits    Oral Motor Deficits  Oral/Motor  Oral Motor: Within functional limits    Oral Phase Dysfunction  Oral Phase  Oral Phase: WNL     Indicators of Pharyngeal Phase Dysfunction   Pharyngeal Phase  Pharyngeal Phase: WNL      Education  Patient Education Response: Verbalizes understanding             Therapy Time  SLP Individual Minutes  Time In: 5714  Time Out: 7526  Minutes: 8          Manju Ji A.CCC/SLP    3/22/2022 3:17 PM

## 2022-03-22 NOTE — ED NOTES
Mode of arrival (squad #, walk in, police, etc) : [facility]         Chief complaint(s): sob, confusion         Arrival Note (brief scenario, treatment PTA, etc). : pt was brought in for sob and confusion. Pt was seen and treated for pneumonia last week. Pt is alert but confused at this time. Pt c/o 10/10 abd pain. C= \"Have you ever felt that you should Cut down on your drinking? \"  No  A= \"Have people Annoyed you by criticizing your drinking? \"  No  G= \"Have you ever felt bad or Guilty about your drinking? \"  No  E= \"Have you ever had a drink as an Eye-opener first thing in the morning to steady your nerves or to help a hangover? \"  No      Deferred []      Reason for deferring: N/A    *If yes to two or more: probable alcohol abuse. Benny Graff RN  03/22/22 7961

## 2022-03-22 NOTE — PLAN OF CARE
Problem: Skin Integrity:  Goal: Will show no infection signs and symptoms  Description: Will show no infection signs and symptoms  Outcome: Ongoing     Problem: Skin Integrity:  Goal: Absence of new skin breakdown  Description: Absence of new skin breakdown  Outcome: Ongoing     Problem: Airway Clearance - Ineffective:  Goal: Ability to maintain a clear airway will improve  Description: Ability to maintain a clear airway will improve  Outcome: Ongoing     Problem: Anxiety/Stress:  Goal: Level of anxiety will decrease  Description: Level of anxiety will decrease  Outcome: Ongoing     Problem: Sleep Pattern Disturbance:  Goal: Appears well-rested  Description: Appears well-rested  Outcome: Ongoing     Problem: Tissue Perfusion, Altered:  Goal: Circulatory function within specified parameters  Description: Circulatory function within specified parameters  Outcome: Ongoing     Problem: Tissue Perfusion - Cardiopulmonary, Altered:  Goal: Absence of angina  Description: Absence of angina  Outcome: Ongoing

## 2022-03-23 ENCOUNTER — APPOINTMENT (OUTPATIENT)
Dept: NON INVASIVE DIAGNOSTICS | Age: 77
DRG: 871 | End: 2022-03-23
Payer: MEDICARE

## 2022-03-23 LAB
ANION GAP SERPL CALCULATED.3IONS-SCNC: 9 MMOL/L (ref 9–17)
ANTI-XA UNFRAC HEPARIN: 0.3 IU/L (ref 0.3–0.7)
ANTI-XA UNFRAC HEPARIN: 0.41 IU/L (ref 0.3–0.7)
BUN BLDV-MCNC: 12 MG/DL (ref 8–23)
CALCIUM SERPL-MCNC: 8.2 MG/DL (ref 8.6–10.4)
CHLORIDE BLD-SCNC: 105 MMOL/L (ref 98–107)
CO2: 22 MMOL/L (ref 20–31)
CREAT SERPL-MCNC: 0.64 MG/DL (ref 0.5–0.9)
EKG ATRIAL RATE: 102 BPM
EKG ATRIAL RATE: 140 BPM
EKG P AXIS: 41 DEGREES
EKG P AXIS: 45 DEGREES
EKG P-R INTERVAL: 126 MS
EKG P-R INTERVAL: 138 MS
EKG Q-T INTERVAL: 282 MS
EKG Q-T INTERVAL: 362 MS
EKG QRS DURATION: 66 MS
EKG QRS DURATION: 68 MS
EKG QTC CALCULATION (BAZETT): 430 MS
EKG QTC CALCULATION (BAZETT): 471 MS
EKG R AXIS: 14 DEGREES
EKG R AXIS: 18 DEGREES
EKG T AXIS: 31 DEGREES
EKG T AXIS: 32 DEGREES
EKG VENTRICULAR RATE: 102 BPM
EKG VENTRICULAR RATE: 140 BPM
GFR AFRICAN AMERICAN: >60 ML/MIN
GFR NON-AFRICAN AMERICAN: >60 ML/MIN
GFR SERPL CREATININE-BSD FRML MDRD: ABNORMAL ML/MIN/{1.73_M2}
GLUCOSE BLD-MCNC: 99 MG/DL (ref 70–99)
HCT VFR BLD CALC: 32 % (ref 36–46)
HEMOGLOBIN: 10.6 G/DL (ref 12–16)
LV EF: 55 %
LVEF MODALITY: NORMAL
MCH RBC QN AUTO: 29.3 PG (ref 26–34)
MCHC RBC AUTO-ENTMCNC: 33 G/DL (ref 31–37)
MCV RBC AUTO: 88.8 FL (ref 80–100)
PDW BLD-RTO: 15 % (ref 11.5–14.9)
PLATELET # BLD: 449 K/UL (ref 150–450)
PMV BLD AUTO: 7.8 FL (ref 6–12)
POTASSIUM SERPL-SCNC: 3.8 MMOL/L (ref 3.7–5.3)
RBC # BLD: 3.61 M/UL (ref 4–5.2)
SODIUM BLD-SCNC: 136 MMOL/L (ref 135–144)
SOURCE: NORMAL
STREP PNEUMONIAE ANTIGEN: NEGATIVE
TROPONIN, HIGH SENSITIVITY: 60 NG/L (ref 0–14)
WBC # BLD: 11.2 K/UL (ref 3.5–11)

## 2022-03-23 PROCEDURE — 93306 TTE W/DOPPLER COMPLETE: CPT

## 2022-03-23 PROCEDURE — 6370000000 HC RX 637 (ALT 250 FOR IP): Performed by: INTERNAL MEDICINE

## 2022-03-23 PROCEDURE — 93010 ELECTROCARDIOGRAM REPORT: CPT | Performed by: INTERNAL MEDICINE

## 2022-03-23 PROCEDURE — 6370000000 HC RX 637 (ALT 250 FOR IP)

## 2022-03-23 PROCEDURE — 2060000000 HC ICU INTERMEDIATE R&B

## 2022-03-23 PROCEDURE — 36415 COLL VENOUS BLD VENIPUNCTURE: CPT

## 2022-03-23 PROCEDURE — 6360000002 HC RX W HCPCS: Performed by: STUDENT IN AN ORGANIZED HEALTH CARE EDUCATION/TRAINING PROGRAM

## 2022-03-23 PROCEDURE — 94640 AIRWAY INHALATION TREATMENT: CPT

## 2022-03-23 PROCEDURE — 2580000003 HC RX 258: Performed by: INTERNAL MEDICINE

## 2022-03-23 PROCEDURE — 94761 N-INVAS EAR/PLS OXIMETRY MLT: CPT

## 2022-03-23 PROCEDURE — 2700000000 HC OXYGEN THERAPY PER DAY

## 2022-03-23 PROCEDURE — 6370000000 HC RX 637 (ALT 250 FOR IP): Performed by: EMERGENCY MEDICINE

## 2022-03-23 PROCEDURE — 2580000003 HC RX 258: Performed by: STUDENT IN AN ORGANIZED HEALTH CARE EDUCATION/TRAINING PROGRAM

## 2022-03-23 PROCEDURE — 99233 SBSQ HOSP IP/OBS HIGH 50: CPT | Performed by: INTERNAL MEDICINE

## 2022-03-23 PROCEDURE — 6360000002 HC RX W HCPCS: Performed by: INTERNAL MEDICINE

## 2022-03-23 PROCEDURE — 2580000003 HC RX 258: Performed by: EMERGENCY MEDICINE

## 2022-03-23 PROCEDURE — 87899 AGENT NOS ASSAY W/OPTIC: CPT

## 2022-03-23 PROCEDURE — 6370000000 HC RX 637 (ALT 250 FOR IP): Performed by: STUDENT IN AN ORGANIZED HEALTH CARE EDUCATION/TRAINING PROGRAM

## 2022-03-23 PROCEDURE — 97166 OT EVAL MOD COMPLEX 45 MIN: CPT

## 2022-03-23 PROCEDURE — 85520 HEPARIN ASSAY: CPT

## 2022-03-23 PROCEDURE — 97161 PT EVAL LOW COMPLEX 20 MIN: CPT

## 2022-03-23 PROCEDURE — 85027 COMPLETE CBC AUTOMATED: CPT

## 2022-03-23 PROCEDURE — 2500000003 HC RX 250 WO HCPCS: Performed by: INTERNAL MEDICINE

## 2022-03-23 PROCEDURE — 87641 MR-STAPH DNA AMP PROBE: CPT

## 2022-03-23 PROCEDURE — 6360000002 HC RX W HCPCS: Performed by: EMERGENCY MEDICINE

## 2022-03-23 PROCEDURE — 80048 BASIC METABOLIC PNL TOTAL CA: CPT

## 2022-03-23 PROCEDURE — 84484 ASSAY OF TROPONIN QUANT: CPT

## 2022-03-23 RX ORDER — OMEGA-3-ACID ETHYL ESTERS 1 G/1
2 CAPSULE, LIQUID FILLED ORAL DAILY
Status: DISCONTINUED | OUTPATIENT
Start: 2022-03-23 | End: 2022-03-25 | Stop reason: HOSPADM

## 2022-03-23 RX ORDER — BENZONATATE 200 MG/1
200 CAPSULE ORAL 3 TIMES DAILY PRN
Status: DISCONTINUED | OUTPATIENT
Start: 2022-03-23 | End: 2022-03-25 | Stop reason: HOSPADM

## 2022-03-23 RX ORDER — QUETIAPINE FUMARATE 200 MG/1
200 TABLET, FILM COATED ORAL NIGHTLY
Status: DISCONTINUED | OUTPATIENT
Start: 2022-03-23 | End: 2022-03-25 | Stop reason: HOSPADM

## 2022-03-23 RX ORDER — 0.9 % SODIUM CHLORIDE 0.9 %
1000 INTRAVENOUS SOLUTION INTRAVENOUS ONCE
Status: COMPLETED | OUTPATIENT
Start: 2022-03-23 | End: 2022-03-23

## 2022-03-23 RX ORDER — ASPIRIN 81 MG/1
81 TABLET, CHEWABLE ORAL DAILY
Status: DISCONTINUED | OUTPATIENT
Start: 2022-03-23 | End: 2022-03-25 | Stop reason: HOSPADM

## 2022-03-23 RX ADMIN — ASPIRIN 81 MG: 81 TABLET, CHEWABLE ORAL at 10:09

## 2022-03-23 RX ADMIN — CYANOCOBALAMIN TAB 1000 MCG 1000 MCG: 1000 TAB at 08:37

## 2022-03-23 RX ADMIN — SODIUM CHLORIDE, PRESERVATIVE FREE 10 ML: 5 INJECTION INTRAVENOUS at 21:48

## 2022-03-23 RX ADMIN — PANTOPRAZOLE SODIUM 40 MG: 40 TABLET, DELAYED RELEASE ORAL at 15:32

## 2022-03-23 RX ADMIN — ONDANSETRON 4 MG: 4 TABLET, ORALLY DISINTEGRATING ORAL at 07:31

## 2022-03-23 RX ADMIN — BUDESONIDE AND FORMOTEROL FUMARATE DIHYDRATE 2 PUFF: 160; 4.5 AEROSOL RESPIRATORY (INHALATION) at 19:45

## 2022-03-23 RX ADMIN — VANCOMYCIN HYDROCHLORIDE 1250 MG: 1.25 INJECTION, POWDER, LYOPHILIZED, FOR SOLUTION INTRAVENOUS at 05:43

## 2022-03-23 RX ADMIN — CEFEPIME HYDROCHLORIDE 2000 MG: 2 INJECTION, POWDER, FOR SOLUTION INTRAVENOUS at 05:24

## 2022-03-23 RX ADMIN — PANTOPRAZOLE SODIUM 40 MG: 40 TABLET, DELAYED RELEASE ORAL at 10:10

## 2022-03-23 RX ADMIN — METRONIDAZOLE 500 MG: 500 INJECTION, SOLUTION INTRAVENOUS at 15:34

## 2022-03-23 RX ADMIN — TIOTROPIUM BROMIDE INHALATION SPRAY 2 PUFF: 3.12 SPRAY, METERED RESPIRATORY (INHALATION) at 11:06

## 2022-03-23 RX ADMIN — OXYCODONE HYDROCHLORIDE AND ACETAMINOPHEN 1 TABLET: 5; 325 TABLET ORAL at 08:37

## 2022-03-23 RX ADMIN — BENZONATATE 200 MG: 200 CAPSULE ORAL at 22:47

## 2022-03-23 RX ADMIN — BUDESONIDE AND FORMOTEROL FUMARATE DIHYDRATE 2 PUFF: 160; 4.5 AEROSOL RESPIRATORY (INHALATION) at 11:06

## 2022-03-23 RX ADMIN — SODIUM CHLORIDE, PRESERVATIVE FREE 10 ML: 5 INJECTION INTRAVENOUS at 08:38

## 2022-03-23 RX ADMIN — OMEGA-3-ACID ETHYL ESTERS 2 G: 1 CAPSULE, LIQUID FILLED ORAL at 08:39

## 2022-03-23 RX ADMIN — METOPROLOL TARTRATE 12.5 MG: 25 TABLET, FILM COATED ORAL at 10:10

## 2022-03-23 RX ADMIN — METOPROLOL TARTRATE 12.5 MG: 25 TABLET, FILM COATED ORAL at 20:36

## 2022-03-23 RX ADMIN — FENOFIBRATE 160 MG: 160 TABLET ORAL at 08:37

## 2022-03-23 RX ADMIN — CEFEPIME HYDROCHLORIDE 2000 MG: 2 INJECTION, POWDER, FOR SOLUTION INTRAVENOUS at 16:43

## 2022-03-23 RX ADMIN — METRONIDAZOLE 500 MG: 500 INJECTION, SOLUTION INTRAVENOUS at 10:05

## 2022-03-23 RX ADMIN — SODIUM CHLORIDE 1000 ML: 9 INJECTION, SOLUTION INTRAVENOUS at 11:32

## 2022-03-23 RX ADMIN — BENZONATATE 200 MG: 200 CAPSULE ORAL at 15:36

## 2022-03-23 RX ADMIN — QUETIAPINE FUMARATE 200 MG: 200 TABLET ORAL at 20:36

## 2022-03-23 RX ADMIN — OXYCODONE HYDROCHLORIDE AND ACETAMINOPHEN 1 TABLET: 5; 325 TABLET ORAL at 15:36

## 2022-03-23 RX ADMIN — HEPARIN SODIUM 12 UNITS/KG/HR: 10000 INJECTION, SOLUTION INTRAVENOUS; SUBCUTANEOUS at 21:38

## 2022-03-23 RX ADMIN — METRONIDAZOLE 500 MG: 500 INJECTION, SOLUTION INTRAVENOUS at 22:43

## 2022-03-23 RX ADMIN — ATORVASTATIN CALCIUM 80 MG: 80 TABLET, FILM COATED ORAL at 08:37

## 2022-03-23 ASSESSMENT — ENCOUNTER SYMPTOMS
RHINORRHEA: 1
EYE DISCHARGE: 0
SHORTNESS OF BREATH: 1
ABDOMINAL PAIN: 0
NAUSEA: 1
COUGH: 1
BACK PAIN: 1
EYE REDNESS: 0
VOMITING: 0

## 2022-03-23 ASSESSMENT — PAIN SCALES - GENERAL
PAINLEVEL_OUTOF10: 0
PAINLEVEL_OUTOF10: 6
PAINLEVEL_OUTOF10: 0
PAINLEVEL_OUTOF10: 6
PAINLEVEL_OUTOF10: 6

## 2022-03-23 NOTE — PROGRESS NOTES
SpO2 97% on a 2L NC. Patient states she does not wear O2 at home. Patient placed on room air. Will continue to monitor.

## 2022-03-23 NOTE — PROGRESS NOTES
Vancomycin Dosing by Pharmacy - Daily Note   Vancomycin Therapy Day:  2  Indication: sepsis, pneumonia HAP    Allergies:  Pcn [penicillins], Adhesive tape, Nubain [nalbuphine hcl], Seasonal, and Vistaril [hydroxyzine]   Actual Weight:    Wt Readings from Last 1 Encounters:   03/22/22 160 lb (72.6 kg)       Labs/Ancillary Data  Estimated Creatinine Clearance: 71 mL/min (based on SCr of 0.64 mg/dL). Recent Labs     03/22/22  0513 03/22/22  1354 03/23/22  0340   CREATININE 0.88  --  0.64   BUN 15  --  12   WBC 6.5 10.1 11.2*     Procalcitonin   Date Value Ref Range Status   03/22/2022 1.86 (H) <0.09 ng/mL Final     Comment:           Suspected Sepsis:  <0.50 ng/mL     Low likelihood of sepsis. 0.50-2.00 ng/mL     Increased likelihood of sepsis. Antibiotics encouraged. >2.00 ng/mL     High risk of sepsis/shock. Antibiotics strongly encouraged. Suspected Lower Resp Tract Infections:  <0.24 ng/mL     Low likelihood of bacterial infection. >0.24 ng/mL     Increased likelihood of bacterial infection. Antibiotics encouraged. With successful antibiotic therapy, PCT levels should decrease rapidly. (Half-life of 24 to   36 hours.)        Procalcitonin values from samples collected within the first 6 hours of systemic infection   may still be low. Retesting may be indicated. Values from day 1 and day 4 can be entered into the Change in Procalcitonin Calculator   (www.[facility] [place] [place]) to determine the patient's Mortality Risk Prognosis        In healthy neonates, plasma Procalcitonin (PCT) concentrations increase gradually after   birth, reaching peak values at about 24 hours of age then decrease to normal values below   0.5 ng/mL by 48-72 hours of age.          Intake/Output Summary (Last 24 hours) at 3/23/2022 0909  Last data filed at 3/23/2022 0548  Gross per 24 hour   Intake 324.13 ml   Output 2275 ml   Net -1950.87 ml     Temp: 99.5 F    Culture Date / Source  /  Results  See micro  Recent vancomycin administrations                     vancomycin (VANCOCIN) 1,250 mg in dextrose 5 % 250 mL IVPB (ADDAVIAL) (mg) 1,250 mg New Bag 03/23/22 0543    vancomycin (VANCOCIN) 1,750 mg in dextrose 5 % 500 mL IVPB (mg) 1,750 mg New Bag 03/22/22 0552                    Vancomycin Concentrations:   TROUGH:  No results for input(s): VANCOTROUGH in the last 72 hours. RANDOM:  No results for input(s): VANCORANDOM in the last 72 hours. MRSA Nasal Swab: was ordered by provider, awaiting results. Skye Cold PLAN     Increase dose to 1500 mg q24h IV  Ensured BUN/sCr ordered at baseline and every 48 hours x at least 3 levels, then at least weekly. Repeat vancomycin concentration ordered for 03/26/22 @ 0900   Pharmacy will continue to monitor patient and adjust therapy as indicated      Vancomycin Target Concentration Parameters  Treatment  Population Target AUC/DUGLAS Target Trough   Invasive MRSA Infection (bacteremia, pneumonia, meningitis, endocarditis, osteomyelitis)  Sepsis (undifferentiated) 400-600 N/A   Infection due to non-MRSA pathogen  Empiric treatment of non-invasive MRSA infection  (SSTI, UTI) <500 10-15 mg/L   CrCl < 29 mL/min  Rapidly fluctuating serum creatinine   MARÍA N/A < 15 mg/L     Renal replacement therapy is dosed by levels, per hospital protocol. Abbreviations  * Pauc: probability that AUC is >400 (efficacy); Pconc: probability that Ctrough is above 20 ?g/mL (toxicity); Tox: Probability of nephrotoxicity, based on Lodkeeley et al. Clin Infect Dis 2009. Loading dose: N/A  Regimen: 1500 mg IV every 24 hours. Start time: 05:43 on 03/24/2022  Exposure target: AUC24 (range)400-600 mg/L.hr   AUC24,ss: 448 mg/L.hr  Probability of AUC24 > 400: 62 %  Ctrough,ss: 11.8 mg/L  Probability of Ctrough,ss > 20: 15 %  Probability of nephrotoxicity (Lodise GERALDINE 2009): 7 %    Thank you for the consult. Pharmacy will continue to follow. Carltios Morris. Ph.  3/23/2022  9:10 AM

## 2022-03-23 NOTE — PROGRESS NOTES
[Penicillins] Anaphylaxis     Patient states PCN allergy was 20 years ago, unsure if reaction still present    Adhesive Tape     Nubain [Nalbuphine Hcl]      Leg  Cramping  When mixed with vistaril    Seasonal     Vistaril [Hydroxyzine] Other (See Comments)     fainted       Current Meds:   Scheduled Meds:    vancomycin (VANCOCIN) intermittent dosing (placeholder)   Other RX Placeholder    vancomycin (VANCOCIN) 1250 mg in D5W 250 mL IVPB (ADDAVIAL)  1,250 mg IntraVENous Q24H    sodium chloride flush  5-40 mL IntraVENous 2 times per day    cefepime  2,000 mg IntraVENous Q12H    metroNIDAZOLE  500 mg IntraVENous Q8H    atorvastatin  80 mg Oral Daily    fenofibrate  160 mg Oral Daily    pantoprazole  40 mg Oral BID AC    QUEtiapine  200 mg Oral Daily    vitamin B-12  1,000 mcg Oral Daily    omega-3 acid ethyl esters  2 g Oral BID    budesonide-formoterol  2 puff Inhalation BID    tiotropium  2 puff Inhalation Daily     Continuous Infusions:    sodium chloride      heparin (PORCINE) Infusion 12 Units/kg/hr (03/22/22 1510)     PRN Meds: sodium chloride flush, sodium chloride flush, sodium chloride, ondansetron **OR** ondansetron, heparin (porcine), heparin (porcine), QUEtiapine, oxyCODONE-acetaminophen **AND** oxyCODONE, potassium chloride **OR** potassium alternative oral replacement **OR** potassium chloride, magnesium sulfate    Data:     Past Medical History:   has a past medical history of Abnormal finding on imaging, Anemia, [place] hernia, Bowel obstruction ([facility]), Bronchitis, Chronic biliary pancreatitis ([facility] Controlled type 2 diabetes mellitus without complication, without long-term current use of insulin ([facility]), Cough, DDD (degenerative disc disease), Depression, Diverticulosis, Esophageal dysphagia, Fibromyalgia, GERD (gastroesophageal reflux disease), Glaucoma, Hiatal hernia, Hyperlipidemia, Knee injuries, Osteoarthritis of more than one site, Pneumonia, Right middle lobe pneumonia, and Seasonal allergies. Social History:   reports that she has never smoked. She has never used smokeless tobacco. She reports that she does not drink alcohol and does not use drugs. Family History:   Family History   Problem Relation Age of Onset    High Blood Pressure Mother     Stroke Mother     Heart Disease Father     Stroke Maternal Grandmother        Vitals:  /74   Pulse 108   Temp 99 °F (37.2 °C) (Oral)   Resp 27   Ht 5' 4\" (1.626 m)   Wt 160 lb (72.6 kg)   SpO2 93%   BMI 27.46 kg/m²   Temp (24hrs), Av.2 °F (37.3 °C), Min:99 °F (37.2 °C), Max:99.5 °F (37.5 °C)    No results for input(s): POCGLU in the last 72 hours. Vitals:    22 0300 22 0400 22 0500 22 0600   BP:  129/76 139/64 127/74   Pulse: 117 109 114 108   Resp: (!) 32 28 (!) 32 27   Temp:  99 °F (37.2 °C)     TempSrc:  Oral     SpO2: (!) 89% 94% 90% 93%   Weight:       Height:           I/O(24Hr):     Intake/Output Summary (Last 24 hours) at 3/23/2022 0751  Last data filed at 3/23/2022 0548  Gross per 24 hour   Intake 324.13 ml   Output 2275 ml   Net -1950.87 ml       Labs:  Recent Results (from the past 24 hour(s))   Lactate, Sepsis    Collection Time: 22  8:10 AM   Result Value Ref Range    Lactic Acid, Sepsis 2.9 (H) 0.5 - 1.9 mmol/L   Troponin    Collection Time: 22  8:10 AM   Result Value Ref Range    Troponin, High Sensitivity 205 (HH) 0 - 14 ng/L   Procalcitonin    Collection Time: 22  9:25 AM   Result Value Ref Range    Procalcitonin 1.86 (H) <0.09 ng/mL   EKG 12 Lead    Collection Time: 22 12:11 PM   Result Value Ref Range    Ventricular Rate 102 BPM    Atrial Rate 102 BPM    P-R Interval 138 ms    QRS Duration 68 ms    Q-T Interval 362 ms    QTc Calculation (Bazett) 471 ms    P Axis 45 degrees    R Axis 18 degrees    T Axis 32 degrees   CBC    Collection Time: 22  1:54 PM   Result Value Ref Range    WBC 10.1 3.5 - 11.0 k/uL    RBC 3.89 (L) 4.0 - 5.2 m/uL Hemoglobin 11.6 (L) 12.0 - 16.0 g/dL    Hematocrit 34.0 (L) 36 - 46 %    MCV 87.5 80 - 100 fL    MCH 29.9 26 - 34 pg    MCHC 34.2 31 - 37 g/dL    RDW 14.7 11.5 - 14.9 %    Platelets 226 941 - 718 k/uL    MPV 7.2 6.0 - 12.0 fL   APTT    Collection Time: 03/22/22  1:54 PM   Result Value Ref Range    PTT 43.1 (H) 24.0 - 36.0 sec   Protime-INR    Collection Time: 03/22/22  1:54 PM   Result Value Ref Range    Protime 14.1 11.8 - 14.6 sec    INR 1.1    Anti-Xa, Unfractionated Heparin    Collection Time: 03/22/22  8:53 PM   Result Value Ref Range    Anti-XA Unfrac Heparin 0.55 0.30 - 0.70 IU/L   Anti-Xa, Unfractionated Heparin    Collection Time: 03/23/22  3:40 AM   Result Value Ref Range    Anti-XA Unfrac Heparin 0.41 0.30 - 0.70 IU/L   CBC    Collection Time: 03/23/22  3:40 AM   Result Value Ref Range    WBC 11.2 (H) 3.5 - 11.0 k/uL    RBC 3.61 (L) 4.0 - 5.2 m/uL    Hemoglobin 10.6 (L) 12.0 - 16.0 g/dL    Hematocrit 32.0 (L) 36 - 46 %    MCV 88.8 80 - 100 fL    MCH 29.3 26 - 34 pg    MCHC 33.0 31 - 37 g/dL    RDW 15.0 (H) 11.5 - 14.9 %    Platelets 192 860 - 458 k/uL    MPV 7.8 6.0 - 12.0 fL   Basic Metabolic Panel w/ Reflex to MG    Collection Time: 03/23/22  3:40 AM   Result Value Ref Range    Glucose 99 70 - 99 mg/dL    BUN 12 8 - 23 mg/dL    CREATININE 0.64 0.50 - 0.90 mg/dL    Calcium 8.2 (L) 8.6 - 10.4 mg/dL    Sodium 136 135 - 144 mmol/L    Potassium 3.8 3.7 - 5.3 mmol/L    Chloride 105 98 - 107 mmol/L    CO2 22 20 - 31 mmol/L    Anion Gap 9 9 - 17 mmol/L    GFR Non-African American >60 >60 mL/min    GFR African American >60 >60 mL/min    GFR Comment               Lab Results   Component Value Date/Time    SPECIAL 5ML EACH RIGHT WRIST 03/22/2022 05:15 AM     Lab Results   Component Value Date/Time    CULTURE NO GROWTH 12 HOURS 03/22/2022 05:15 AM         Radiology:    CT ABDOMEN PELVIS W IV CONTRAST Additional Contrast? None    Addendum Date: 3/22/2022    ADDENDUM: Impression 5:  Fluid distention of a few jejunal loops that could be seen with mild enteritis. Result Date: 3/22/2022  1. Patchy consolidative and groundglass opacities the lung bases most prominent in the right lower lobe suspicious for pneumonia or aspiration. Please refer to the concurrent chest CT for additional detail. 2. Suggestion of circumferential wall thickening in the distal thoracic esophagus potentially due to reflux esophagitis given the presence of a small sliding hiatal hernia. No overtly suspicious features are present to suggest malignancy, although that is not excluded given the presence of some nonenlarged left paraesophageal lymph nodes. Consider endoscopy on a nonemergent basis. 3. Mild intrahepatic and moderate extrahepatic biliary dilation is not significantly changed since 07/01/2016. There is also unchanged mild dilation of the main pancreatic duct with no findings to suggest an obstructing ampullary or pancreatic mass. The findings are likely age-related with pancreatic parenchymal atrophy likely contributing to the appearance of the main pancreatic duct. Consider further evaluation with MRCP only if there are clinical findings of cholestasis. 4. A few additional incidental findings as above. FL UGI W AIR CONTRAST    Result Date: 3/9/2022  Limited exam as the patient could only ingest small quantities of barium. Moderate to large hiatal hernia. XR CHEST PORTABLE    Result Date: 3/22/2022  Interval worsening bilateral lung multifocal ill-defined consolidation consistent with pneumonia versus alveolar edema. XR CHEST PORTABLE    Result Date: 3/8/2022  1. Prominent perihilar and interstitial lung markings with scattered patchy airspace opacities. Findings are suggestive of diffuse pulmonary edema. Multifocal pneumonia in the appropriate clinical setting is possible. CT CHEST PULMONARY EMBOLISM W CONTRAST    Result Date: 3/22/2022  No evidence of pulmonary embolus.  Bilateral consolidating pneumonia, right worse than left.  Associated mild mediastinal adenopathy. Moderate-sized hiatal hernia. Complete collapse of right breast implant. Physical Examination:        Physical Exam  Constitutional:       General: She is not in acute distress. Appearance: She is not ill-appearing. HENT:      Head: Normocephalic. Nose: Nose normal.      Comments: NC in place  Eyes:      General: No scleral icterus. Right eye: No discharge. Left eye: No discharge. Conjunctiva/sclera: Conjunctivae normal.   Cardiovascular:      Rate and Rhythm: Normal rate and regular rhythm. Pulmonary:      Effort: Pulmonary effort is normal. No respiratory distress. Breath sounds: Rales (bilateral diffuse crackles, decreased aeration) present. Abdominal:      General: There is no distension. Palpations: Abdomen is soft. Tenderness: There is abdominal tenderness. There is no guarding. Musculoskeletal:      Right lower leg: No edema. Left lower leg: No edema. Skin:     General: Skin is warm and dry. Neurological:      Mental Status: She is alert. Comments: Oriented,  Answering questions and following conversation appropriately.    Psychiatric:         Behavior: Behavior normal.         Assessment:        Primary Problem  Hospital-acquired pneumonia    Active Hospital Problems    Diagnosis Date Noted    Hospital-acquired pneumonia [J18.9, Y95] 03/22/2022    NSTEMI (non-ST elevated myocardial infarction) ([facility] [I21.4] 03/22/2022    History of degenerative disc disease [Z87.39] 03/22/2022    Hx of diabetes mellitus [Z86.39] 03/22/2022    Sepsis ([facility] [address] [A41.9] 03/22/2022    Hx of Esophageal dysmotility [K22.4] 03/22/2022    Acute respiratory failure with hypoxemia (HCC) [J96.01]     Recurrent aspiration pneumonia ([facility] [J69.0] 11/12/2021    Hiatal hernia [K44.9] 08/12/2021    Gastroesophageal reflux disease [K21.9] 68/49/5286    Uncomplicated opioid dependence ([facility] [F11.20] 10/24/2016    Primary osteoarthritis involving multiple joints [M89.49]        Plan:          Acute hypoxemic respiratory failure 2/2 Multifocal HAP with sepsis - improving  · Shortness of breath, AMS, increased O2 demans (pt on RA at baseline)  · Recurrent hospital admissions for pneumonia, most recent on 3/8 for aspiration PNA in setting of hiatal hernia  · CT chest: b/l consolidative/groundglass opacities (R>L) suspicious for PNA or aspiration  · T 101.3, , Desaturation into the 80s on room air on initial px  · ABG: pH 7.4, PO2 70.8  · Lactic acid 2.5 & 2.9, pro-Julio 1.86, WBC wnl  · Negative for COVID-19 and influenza  · Blood cultures NGTD  · Gram stain/respiratory cultures, MRSA nasal probe, strep pneumo antigen - pending  · O2 supplementation, wean down as tolerated  · Pulmonology consulted  · IV cefepime, metronidazole, vancomycin  · Symbicort and Spiriva inhalers  · Remains tachycardic; 1 L fluid bolus today  · AMS resolved     NSTEMI, etiology to be determined, rule out ACS  · Shortness of breath  · Troponin 119, then 205  · EKG showing sinus tachycardia, no ST elevation  · Cardiology following, recommend:    Start ASA, low dose lopressor, repeat trop, echo, c/w heparin gtt x48 hrs   Echo & plan for inpatient stress test if LVEF wnl or cardiac cath if LVEF low   · Heparin drip for 48 hrs     Hiatal hernia with GERD and hx of esophageal dysmotility  · Endoscopy 8/2021: Spasmatic contractions, nonobstructing Schatzki rings with irregular SCJ esophagitis, 5 cm hiatal hernia  · Esophageal motility study 2/2022: Peristalsis also noted  · SLP bedside swallow study passed  · Twice daily Protonix     History of DDD/arthritis with long-term use of prescription opiates  · Continue home Percocet     Hyperlipidemia  · Atorvastatin 80 mg p.o. daily  · Fenofibrate 160 mg p.o. daily     Depression and Insomnia  · Quetiapine 200 mg p.o. daily 50 mg p.o. nightly     Hx of DM  · Hemoglobin A1c of 5.6 on 3/1  · Monitor BS     Code: Full  DVT prophylaxis: Heparin  GI prophylaxis: Pantoprazole 40 mg p.o. twice daily  Diet: ADULT DIET; Dysphagia - Soft and Bite Sized; 5 carb choices (75 gm/meal); Low Fat/Low Chol/High Fiber/2 gm Na  Activity: Up as tolerated  Dispo: Admit to medical ICU      Please note: Use of a speech recognition software was used in the creation of portions of this note and dictation errors, including those of syntax and sound alike word substitutions, may have escaped proofreading. Attestation and add on       I have discussed the care of TripLingo , including pertinent history and exam findings,      3/23/22    with the resident. I have seen and examined the patient and the key elements of all parts of the encounter have been performed by me . I agree with the assessment, plan and orders as documented by the resident. Principal Problem:    Hospital-acquired pneumonia  Active Problems:    Primary osteoarthritis involving multiple joints    Uncomplicated opioid dependence ([facility]    Gastroesophageal reflux disease    Hiatal hernia    Recurrent aspiration pneumonia (HCC)    Acute respiratory failure with hypoxemia (HCC)    NSTEMI (non-ST elevated myocardial infarction) ([facility]    History of degenerative disc disease    Hx of diabetes mellitus    Sepsis (HCC)    Hx of Esophageal dysmotility  Resolved Problems:    Type 2 diabetes mellitus         --resp status improved . On O2 at 2 liter per minute   Cardiology input notded  -- ;     IMPRESSION:    - NSTEMI- likely type 2- due to resp failure and tachycardia  - Resp failure  - PNA (HCAP)  - ?  Sepsis  - Sinus tachycardia   - H/o recent PNA on 3/8/22  - AMS- resolved  - DL  - CT chest- no PE     RECOMMENDATIONS:  - Denies any CP whatsoever  - give 1 L IVF bolus due tachycardia  - start ASA  - continue statin  - add small dose lopressor   - repeat trop now  - check 2d echo  - continue heparin drip for 48 hours  - if LVEF is normal, then will need stress test prior to d/c once PNA improved. If LVEF is low, will need cardiac cath prior to d/c.           Derian Rivera MD      [address], [address] [place] [address].    Phone [phone]   Fax: [phone]  Answering Service: [phone]         Geeta Merritt,   3/23/2022  7:51 AM

## 2022-03-23 NOTE — PROGRESS NOTES
Pulmonary Progress Note  [facility]      Patient - Tiffany Angeles,  Age - 64 y.o.    - 1945      Room Number -    N -  [id number]   Acct # - [de-identified]  Date of Admission -  3/22/2022  4:49 AM    Consulting Service/Physician   Consulting - Radha Snyder MD  Primary Care Physician - DES Eric - CNP     SUBJECTIVE   Patient appears to be in good spirits. O2 saturations 97 to 99% on 2 L    OBJECTIVE   VITALS    height is 5' 4\" (1.626 m) and weight is 160 lb (72.6 kg). Her axillary temperature is 98.5 °F (36.9 °C). Her blood pressure is 127/70 and her pulse is 87. Her respiration is 17 and oxygen saturation is 99%. Body mass index is 27.46 kg/m². Temperature Range: Temp: 98.5 °F (36.9 °C) Temp  Av °F (37.2 °C)  Min: 98.5 °F (36.9 °C)  Max: 99.5 °F (37.5 °C)  BP Range:  Systolic (11MBA), JJM:111 , Min:85 , SIO:809     Diastolic (72JFG), TIE:27, Min:37, Max:76    Pulse Range: Pulse  Av.3  Min: 87  Max: 117  Respiration Range: Resp  Av.7  Min: 17  Max: 32  Current Pulse Ox[de-identified]  SpO2: 99 %  24HR Pulse Ox Range:  SpO2  Av.6 %  Min: 89 %  Max: 99 %  Oxygen Amount and Delivery: O2 Flow Rate (L/min): 2 L/min    Wt Readings from Last 3 Encounters:   22 160 lb (72.6 kg)   03/10/22 158 lb 1.1 oz (71.7 kg)   22 154 lb 6.4 oz (70 kg)       I/O (24 Hours)    Intake/Output Summary (Last 24 hours) at 3/23/2022 1218  Last data filed at 3/23/2022 1200  Gross per 24 hour   Intake 324.13 ml   Output 2250 ml   Net -1925.87 ml       EXAM     General Appearance  Awake, alert, oriented, in no acute distress  HEENT - normocephalic, atraumatic. Neck - Supple,  trachea midline   Lungs -coarse breath sounds no crackles rales or wheeze  Heart Exam:PMI normal. No lifts, heaves, or thrills. RRR. No murmurs, clicks, gallops, or rubs  Abdomen Exam: Abdomen soft, non-tender.  BS normal.   Extremity Exam: No signs of cyanosis    MEDS      omega-3 acid Labs     03/22/22  0513   AST 27   ALT 18   LIPASE 34   BILIDIR 0.12   BILITOT 0.41   ALKPHOS 129*     INR   Recent Labs     03/22/22  1354   INR 1.1     PTT   Lab Results   Component Value Date    APTT 43.1 (H) 03/22/2022         RADIOLOGY     (See actual reports for details)    ASSESSMENT/PLAN     Patient Active Problem List   Diagnosis    Hyperlipidemia    Fibromyalgia    Osteopenia    Hx of bilateral breast implants    Arthritis of shoulder region, right    Anxiety    Anemia, normocytic normochromic    Chronic pain associated with significant psychosocial dysfunction    Primary osteoarthritis involving multiple joints    Spondylosis 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    Lumbar and sacral arthritis    Chronic biliary pancreatitis (HCC)    Moderate episode of recurrent major depressive disorder (HCC)    Uncomplicated opioid dependence ([address]    Hyperuricemia    Primary open-angle glaucoma, right eye, mild stage    Primary open-angle glaucoma, left eye, mild stage    Gastroesophageal reflux disease    Esophageal dysphagia    Hiatal hernia    Recurrent aspiration pneumonia ([address]    [facility] ([facility]    Simple chronic bronchitis ([address]    Hospital-acquired pneumonia    NSTEMI (non-ST elevated myocardial infarction) ([address]    History of degenerative disc disease    Hx of diabetes mellitus    Sepsis ([address]    Hx of Esophageal dysmotility      IMPRESSION:  1. Acute respiratory failure with hypoxemia. 2.  Pneumonia most likely H-cap. 3.  Elevated troponin, possibly non-STEMI versus acute coronary  syndrome. 4.  Acute respiratory failure with hypoxemia. CT scan reveals evidence  of pneumonia in the right lower lobe and left lower lobe. I do not see  any pulmonary embolism. Patient's procalcitonin elevated 1.86  Blood cultures negative to date  MRSA nasal probe pending  Currently on broad-spectrum antibiotics.   If MRSA nasal swab negative, will DC vancomycin    Electronically signed by Bryce Aaron MD on 3/23/2022 at 12:18 PM

## 2022-03-23 NOTE — CARE COORDINATION
CASE MANAGEMENT NOTE:    Admission Date:  3/22/2022 Micha De La Vega is a 64 y.o.  female    Admitted for : Pneumonia [J18.9]  Septicemia ([facility] [A41.9]  Acute respiratory failure with hypoxia ([facility] [J96.01]  Pneumonia due to infectious organism, unspecified laterality, unspecified part of lung [J18.9]    Met with:  Patient    PCP:  DES Sandoval                                Insurance:  Brian Wall      Is patient alert and oriented at time of discussion:  Yes    Current Residence/ Living Arrangements:  independently at home             Current Services PTA:  No    Does patient go to outpatient dialysis: No  If yes, location and chair time:     Is patient agreeable to VNS: No    Freedom of choice provided:  NA    List of [address] provided: Juan Ramonne    VNS chosen:  No    DME:  none    Home Oxygen: No    Nebulizer: No    CPAP/BIPAP: No    Supplier: N/A    Potential Assistance Needed: On 02 right now, will follow for home 02    SNF needed: No    Freedom of choice and list provided: NA    Pharmacy:  [facility]       Does Patient want to use MEDS to BEDS? No    Is patient currently receiving oral anticoagulation therapy? No    Is the Patient an [facility] with Readmission Risk Score greater than 14%? Yes  If yes, pt needs a follow up appointment made within 7 days. Family Members/Caregivers that pt would like involved in their care:    Yes    If yes, list name here:  , Irish Devlin - Daughter    Transportation Provider:  Patient             Discharge Plan:  Pt is from [facility] level home with . Independent and drives. DME: none Denies need for VNS. Currently on 02 here at 2L NC SP02 96%, will follow for home 02. Pulm consulted.     On Heparin Gtt, IV Vanco              Electronically signed by: Georges Ibrahim RN on 3/23/2022 at 2:35 PM

## 2022-03-23 NOTE — PROGRESS NOTES
Physical Therapy    Facility/Department: [facility] ICU  Initial Assessment    NAME: Claudeen Roots  : 1945  MRN: [id number]    Date of Service: 3/23/2022    Discharge Recommendations:  Home with assist PRN   PT Equipment Recommendations  Equipment Needed: No    Assessment   Body structures, Functions, Activity limitations: Decreased balance;Decreased functional mobility ; Decreased endurance  Assessment: Impaired mobility due to decreased tolerance to activity  Decision Making: Low Complexity  History: Recent Pneumonia and admission  Exam: decreased endurance, balance  Clinical Presentation: evolving  REQUIRES PT FOLLOW UP: Yes  Activity Tolerance  Activity Tolerance: Patient limited by endurance       Patient Diagnosis(es): The primary encounter diagnosis was Acute respiratory failure with hypoxia ([facility] [address] Diagnoses of Pneumonia due to infectious organism, unspecified laterality, unspecified part of lung and Septicemia ([address] were also pertinent to this visit. has a past medical history of Abnormal finding on imaging, Anemia, Bochdalek hernia, Bowel obstruction (HCC), Bronchitis, Chronic biliary pancreatitis ([address] Controlled type 2 diabetes mellitus without complication, without long-term current use of insulin (HCC), Cough, DDD (degenerative disc disease), Depression, Diverticulosis, Esophageal dysphagia, Fibromyalgia, GERD (gastroesophageal reflux disease), Glaucoma, Hiatal hernia, Hyperlipidemia, Knee injuries, Osteoarthritis of more than one site, Pneumonia, Right middle lobe pneumonia, and Seasonal allergies. has a past surgical history that includes Hysterectomy; Foot surgery (Right); Small intestine surgery; Carpal tunnel release (Bilateral); Breast biopsy (Left, 2016); Pancreas Biopsy (10/03/2016); Colonoscopy;  Upper gastrointestinal endoscopy; pr arthrs kne surg w/meniscectomy med/lat w/shvg (Left, 2018); eye surgery (Left); eye surgery (Left); eye surgery (Left); Knee arthroscopy (Right, 12/04/2019); Upper gastrointestinal endoscopy (N/A, 8/17/2021); and esophageal motility study (N/A, 1/21/2022). Restrictions  Restrictions/Precautions  Restrictions/Precautions: Fall Risk  Required Braces or Orthoses?: No  Implants present? :  (denies)        Subjective  General  Patient assessed for rehabilitation services?: Yes  Family / Caregiver Present: No  Follows Commands: Within Functional Limits  General Comment  Comments: pt reports being tired due to up all night with diarrhea  Subjective  Subjective: pt pleasant and cooperative  Pain Screening  Patient Currently in Pain: Denies (\"I'm good right now\")          Orientation  Orientation  Overall Orientation Status: Within Functional Limits  Social/Functional History  Social/Functional History  Lives With: Spouse  Type of Home: House  Home Layout: Multi-level,Able to Live on Main level with bedroom/bathroom,Performs ADL's on one level  Home Access: Level entry  Entrance Stairs - Number of Steps: Level entry into home from garage; 6 steps with B HR to upper level (bed/bath)  Entrance Stairs - Rails: Both  Bathroom Shower/Tub: Tub/Shower unit,Walk-in shower,Shower chair with back  Bathroom Toilet: Standard  Bathroom Equipment: Grab bars in shower  Bathroom Accessibility: Walker accessible  Home Equipment: 4 wheeled walker  ADL Assistance: [address]: Independent  Homemaking Responsibilities: Yes  Ambulation Assistance: Independent  Transfer Assistance: Independent  Active : Yes  Mode of Transportation: Car  Occupation: Retired  Type of occupation: [address] work  IADL Comments: sleeps in an adjustable bed  Additional Comments: Pt reported that she was doing fine once discharged, independent with ADLs/mobility, then sudden onset of feeling ill. Spouse continues to work at [employer], and 5 children live nearby and able to assist as needed.      Objective       AROM RLE (degrees)  RLE AROM: WNL  AROM LLE (degrees)  LLE AROM : WNL  Strength RLE  Comment: grossly 3+/4/5  Strength LLE  Comment: grossly 3+/4/5        Bed mobility  Rolling to Left: Stand by assistance  Supine to Sit: Stand by assistance  Sit to Supine: Stand by assistance  Scooting: Stand by assistance  Comment: head of bed slightly elevated  and use of rail  Transfers  Sit to Stand: Contact guard assistance  Stand to sit: Contact guard assistance  Comment: pt stood bedside and took two side-steps up toward head of bed        Balance  Sitting - Static: Good  Sitting - Dynamic: Good  Standing - Static: Fair;+ (SBA)  Standing - Dynamic: Fair (CGA)        Plan   Plan  Times per week: 5-6x/wk  Current Treatment Recommendations: Endurance Training,Functional Mobility Training,Gait Training,Transfer Training,Stair training,Balance Training  Safety Devices  Type of devices: Call light within reach,Left in bed,Patient at risk for falls      Goals  Short term goals  Time Frame for Short term goals: 5-7 days  Short term goal 1: mod-I bed mobility  Short term goal 2: mod-I transfers  Short term goal 3: mod-I gait x 150ft  Short term goal 4: negotiate 4-5 steps with SBA       Therapy Time   Individual Concurrent Group Co-treatment   Time In 0915         Time Out 0936         Minutes [address], [address] [facility]

## 2022-03-23 NOTE — CONSULTS
[address] [facility]                 [address] [facility], [address]                                  CONSULTATION    PATIENT NAME: Adri Hylton                   :        1945  MED REC NO:   [id number]                              ROOM:         ACCOUNT NO:   [de-identified]                           ADMIT DATE: 2022  PROVIDER:     Christiano Quinonez    CONSULT DATE:  2022    REASON FOR CONSULTATION:  Pneumonia, hypoxemia. HISTORY OF PRESENT ILLNESS:  The patient is a 20-year-old female. She  presented to the ER because of problems with difficulty breathing for  the past two to three days. She actually mentions some chills but no  fever, no sweats. She did have a fever; however of 101.3 earlier this  morning. The patient underwent cultures and was started on Maxipime,  Flagyl, and vancomycin. The patient according to her was feeling a bit  better but not much better. Her heart rate was still elevated in the  100s. She was on a salter high flow at 15 L. O2 saturations 99% on room air. We  have been asked to help and assist the patient's evaluation at this  time. PAST MEDICAL HISTORY:  Notable for a diagnosis of anemia, history of  Bochdalek hernia, history of type 2 diabetes, history of degenerative  joint disease, history of depression, diverticulosis as well. PAST SURGICAL HISTORY:  History of breast biopsy in 2016, history of  pancreas biopsy in 2016, history of left knee surgery 2018    SOCIAL HISTORY:  Reportedly nonsmoker. CURRENT MEDICATIONS:  Per chart. Reportedly she is on Trelegy. ALLERGIES:  PENICILLIN-reportedly anaphylaxis but the PENICILLIN allergy  was 20 years ago and the action still present. ADHESIVE TAPE, SEASONAL,  VISTARIL, and NUBIAN-all non specified. REVIEW OF SYSTEMS:  Not obtainable at this time.     PHYSICAL EXAMINATION:  GENERAL:  The patient is an ill-appearing 20-year-old female, resting  quietly. VITAL SIGNS:  Blood pressure 115/61, heart rate 100s to 110s,  respirations 20 to 25, temperature max 101.8, O2 saturations 91% on 15  L. HEENT:  No supraclavicular lymph node enlargement. LUNGS:  Coarse breath sounds. CARDIOVASCULAR:  Regular rhythm. ABDOMEN:  Soft. EXTREMITIES:  No edema. LABORATORY DATA:  BUN and creatinine 15/0. 88. Lactic acid is 2.9,  procalcitonin was added on. It was 1.86. White count 10.1. There is a  troponin 119. The patient's COVID test and influenza test negative. IMPRESSION:  1. Acute respiratory failure with hypoxemia. 2.  Pneumonia most likely H-cap. 3.  Elevated troponin, possibly non-STEMI versus acute coronary  syndrome. 4.  Acute respiratory failure with hypoxemia. CT scan reveals evidence  of pneumonia in the right lower lobe and left lower lobe. I do not see  any pulmonary embolism. PLAN:  1. Continue the patient on broad spectrum antibiotics. 2.  Await cultures. 3.  Right now on heparin drip. 4.  Follow up for any signs of respiratory decline. Thank you Dr. Negin Gross for the consult. Total time of 30 minutes critical time was spent with the patient.         Arcelia Le    D: 03/22/2022 15:34:30       T: 03/22/2022 20:29:49     [email or web address]  Job#: [id number]     Doc#: [id number]    CC:

## 2022-03-23 NOTE — CONSULTS
[facility]  In Patient Cardiology Consult             Date:   3/23/2022  Patient name: Vishal Aiken  Date of admission:  3/22/2022  4:49 AM  MRN:   [id number]  YOB: 1945    Reason for Admission: Shortness of breath    CHIEF COMPLAINT: Shortness of breath    History Obtained From: Patient and chart    HISTORY OF PRESENT ILLNESS:    This is a 68-year-old female who presented due to altered mental status and shortness of breath. Apparently she was at home. She was not feeling well. She was having some chills.  called EMS. She was found to be febrile. She came into the emergency room. She was diagnosed with hospital-acquired pneumonia. She is currently in the ICU. She is on O2 via nasal cannula. She does have some shortness of breath. She denies any chest pains whatsoever. Her troponins were found to be elevated compared to her prior admission on 3/8/2010 in which she was admitted to the hospital with a possible aspiration pneumonia. She is currently stable on a heparin drip.     Past Medical History:    Past Medical History:   Diagnosis Date    Abnormal finding on imaging 8/12/2021    Anemia     Bochdalek hernia     PER CT 7-2-21    Bowel obstruction (HCC)     Bronchitis 02/19/2019    Frequent episodes of bronchitis, usually yearly with pneumonia    Chronic biliary pancreatitis ([address] 09/22/2016    Controlled type 2 diabetes mellitus without complication, without long-term current use of insulin ([address]     Cough 08/13/2021    has had cough for several months    DDD (degenerative disc disease)     Depression     Diverticulosis     Esophageal dysphagia     Fibromyalgia     GERD (gastroesophageal reflux disease)     Glaucoma     Hiatal hernia     Hyperlipidemia     Knee injuries 02/19/2019    Osteoarthritis of more than one site 04/09/2014    Pneumonia     gets pneumonia yearly    Right middle lobe pneumonia 03/24/2016    Seasonal allergies Past Surgical History:    Past Surgical History:   Procedure Laterality Date    BREAST BIOPSY Left 05/2016    CARPAL TUNNEL RELEASE Bilateral     x 2 each    COLONOSCOPY      ESOPHAGEAL MOTILITY STUDY N/A 1/21/2022    PES RN-ESOPHAGEAL MOTILITY STUDY performed by Miguel Auguste DO at [facility]    EYE SURGERY Left     stent for glaucoma    EYE SURGERY Left     cataract    EYE SURGERY Left     lower lid    FOOT SURGERY Right     HYSTERECTOMY      KNEE ARTHROSCOPY Right 12/04/2019    RIGHT KNEE ARTHROSCOPY WITH TOTAL MENISCECTOMY performed by Jerry Guevara DO at [address]  10/03/2016    MN ARTHRS KNE SURG W/MENISCECTOMY MED/LAT W/SHVG Left 08/13/2018    KNEE ARTHROSCOPY FOR PARTIAL MEDIAL AND PARTIAL LATERAL MENISCECTOMY performed by Sherron Cormier MD at [address] [place] [address]      due to blockage    UPPER GASTROINTESTINAL ENDOSCOPY      EGD    UPPER GASTROINTESTINAL ENDOSCOPY N/A 8/17/2021    EGD BIOPSY performed by Denisse Hernandez MD at [facility] Medications:    No outpatient medications have been marked as taking for the 3/22/22 encounter [facility] Encounter).         Allergies:  Pcn [penicillins], Adhesive tape, Nubain [nalbuphine hcl], Seasonal, and Vistaril [hydroxyzine]    Social History:    Social History     Socioeconomic History    Marital status:      Spouse name: Not on file    Number of children: Not on file    Years of education: Not on file    Highest education level: Not on file   Occupational History    Occupation: NOT EMPLOYEED   Tobacco Use    Smoking status: Never Smoker    Smokeless tobacco: Never Used   Vaping Use    Vaping Use: Never used   Substance and Sexual Activity    Alcohol use: No     Alcohol/week: 0.0 standard drinks    Drug use: No    Sexual activity: Not Currently   Other Topics Concern    Not on file   Social History Narrative    Not on file     Social Determinants of Health     Financial Resource Strain: Low Risk     Difficulty of Paying Living Expenses: Not hard at all   Food Insecurity: No Food Insecurity    Worried About Running Out of Food in the Last Year: Never true    Maile of Food in the Last Year: Never true   Transportation Needs: No Transportation Needs    Lack of Transportation (Medical): No    Lack of Transportation (Non-Medical): No   Physical Activity: Insufficiently Active    Days of Exercise per Week: 3 days    Minutes of Exercise per Session: 20 min   Stress: No Stress Concern Present    Feeling of Stress : Only a little   Social Connections: Moderately Integrated    Frequency of Communication with Friends and Family: More than three times a week    Frequency of Social Gatherings with Friends and Family: More than three times a week    Attends [employer] Services: 1 to 4 times per year    Active Member of [address] [place] [address] [employer] or Organizations: No    Attends Club or Organization Meetings: Never    Marital Status:    Intimate Partner Violence:     Fear of Current or Ex-Partner: Not on file    Emotionally Abused: Not on file    Physically Abused: Not on file    Sexually Abused: Not on file   Housing Stability: [address] Unable to Pay for Housing in the Last Year: No    Number of [place] in the Last Year: 1    Unstable Housing in the Last Year: No        Family History:   Family History   Problem Relation Age of Onset    High Blood Pressure Mother     Stroke Mother     Heart Disease Father     Stroke Maternal Grandmother        REVIEW OF SYSTEMS:    · Constitutional: there has been no unanticipated weight loss. There's been No change in energy level, No change in activity level. · Eyes: No visual changes or diplopia. No scleral icterus. · ENT: No Headaches, hearing loss or vertigo. No mouth sores or sore throat. · Cardiovascular: As HPI  · Respiratory: As HPI  · Gastrointestinal: No abdominal pain, appetite loss, blood in stools.  No change in bowel or bladder habits. · Genitourinary: No dysuria, trouble voiding, or hematuria. · Musculoskeletal:  No gait disturbance, No weakness or joint complaints. · Integumentary: No rash or pruritis. · Neurological: No headache, diplopia, change in muscle strength, numbness or tingling. No change in gait, balance, coordination, mood, affect, memory, mentation, behavior. · Psychiatric: No anxiety, or depression. · Endocrine: No temperature intolerance. No excessive thirst, fluid intake, or urination. No tremor. · Hematologic/Lymphatic: No abnormal bruising or bleeding, blood clots or swollen lymph nodes. · Allergic/Immunologic: No nasal congestion or hives. PHYSICAL EXAM:    Physical Examination:    /74   Pulse 108   Temp 99 °F (37.2 °C) (Oral)   Resp 27   Ht 5' 4\" (1.626 m)   Wt 160 lb (72.6 kg)   SpO2 93%   BMI 27.46 kg/m²    Constitutional and General Appearance: alert, cooperative, no distress and appears stated age  HEENT: PERRL, no cervical lymphadenopathy. No masses palpable. Normal oral mucosa  Respiratory:  · Normal excursion and expansion without use of accessory muscles  · Resp Auscultation: Good respiratory effort. No for increased work of breathing. On auscultation: reduced  Cardiovascular:  · Heart tones are crisp and normal. regular S1 and S2. Murmurs: none  · Jugular venous pulsation Normal  Abdomen:  · No masses or tenderness  · Bowel sounds present  Extremities:  ·  No Cyanosis or Clubbing  ·  Lower extremity edema: none  ·  Skin: Warm and dry  Neurological:  · Alert and oriented.   · Moves all extremities well  · No abnormalities of mood, affect, memory, mentation, or behavior are noted    DATA:    Diagnostics:      EKG:   Results for orders placed or performed during the hospital encounter of 03/22/22   EKG 12 Lead   Result Value Ref Range    Ventricular Rate 102 BPM    Atrial Rate 102 BPM    P-R Interval 138 ms    QRS Duration 68 ms    Q-T Interval 362 ms    QTc Calculation (Bazett) 471 ms    P Axis 45 degrees    R Axis 18 degrees    T Axis 32 degrees    Narrative    Sinus tachycardia  Low voltage QRS  Borderline ECG  When compared with ECG of 22-MAR-2022 04:53, (unconfirmed)  No significant change was found       Echo:  Results for orders placed or performed during the hospital encounter of 12/10/14   Echocardiogram Complete 2D w Doppler w Color    Estimated ejection fraction is 55-60%. Evidence of mild diastolic dysfunction. Labs:     CBC:   Recent Labs     03/22/22  1354 03/23/22  0340   WBC 10.1 11.2*   HGB 11.6* 10.6*   HCT 34.0* 32.0*    449     BMP:   Recent Labs     03/22/22  0513 03/23/22  0340    136   K 4.1 3.8   CO2 23 22   BUN 15 12   CREATININE 0.88 0.64   LABGLOM >60 >60   GLUCOSE 111* 99     BNP: No results for input(s): BNP in the last 72 hours. PT/INR:   Recent Labs     03/22/22  0513 03/22/22  1354   PROTIME 13.2 14.1   INR 1.0 1.1     APTT:  Recent Labs     03/22/22  0513 03/22/22  1354   APTT 31.0 43.1*     CARDIAC ENZYMES:  Recent Labs     03/22/22  0513 03/22/22  0810   TROPHS 119* 205*     FASTING LIPID PANEL:  Lab Results   Component Value Date    HDL 41 03/01/2022    LDLDIRECT 204 03/01/2022    LDLCALC 109 11/13/2014    TRIG 489 03/01/2022     LIVER PROFILE:  Recent Labs     03/22/22  0513   AST 27   ALT 18   LABALBU 3.9         IMPRESSION:    - NSTEMI- likely type 2- due to resp failure and tachycardia  - Resp failure  - PNA (HCAP)  - ? Sepsis  - Sinus tachycardia   - H/o recent PNA on 3/8/22  - AMS- resolved  - DL  - CT chest- no PE    RECOMMENDATIONS:  - Denies any CP whatsoever  - give 1 L IVF bolus due tachycardia  - start ASA  - continue statin  - add small dose lopressor   - repeat trop now  - check 2d echo  - continue heparin drip for 48 hours  - if LVEF is normal, then will need stress test prior to d/c once PNA improved. If LVEF is low, will need cardiac cath prior to d/c. Discussed with patient and nursing.     Thank you for allowing me to participate in the care of this patient, please do not hesitate to call if you have any questions. Francisco Crespo DO, [facility], [address], [address], [address] [facility]  [facility]  [phone]

## 2022-03-23 NOTE — PLAN OF CARE
Problem: Skin Integrity:  Goal: Will show no infection signs and symptoms  Description: Will show no infection signs and symptoms  Outcome: Ongoing     Problem: Skin Integrity:  Goal: Absence of new skin breakdown  Description: Absence of new skin breakdown  Outcome: Ongoing     Problem: Discharge Planning:  Goal: Participates in care planning  Description: Participates in care planning  Outcome: Ongoing     Problem: Nutrition Deficit:  Goal: Ability to achieve adequate nutritional intake will improve  Description: Ability to achieve adequate nutritional intake will improve  Outcome: Ongoing     Problem: Mental Status - Impaired:  Goal: Mental status will be restored to baseline  Description: Mental status will be restored to baseline  Outcome: Ongoing

## 2022-03-23 NOTE — PROGRESS NOTES
[facility]zia Zaragoza, [facility]. Pneumonia [J18.9]  Septicemia ([facility] [address] [A41.9]  Acute respiratory failure with hypoxia ([facility] [address] [J96.01]  Pneumonia due to infectious organism, unspecified laterality, unspecified part of lung [J18.9] . Vitals:    03/23/22 0200   BP: (!) 95/57   Pulse:    Resp:    Temp:    SpO2:    . Patients home meds are   Prior to Admission medications    Medication Sig Start Date End Date Taking? Authorizing Provider   pantoprazole (PROTONIX) 40 MG tablet Take 1 tablet by mouth 2 times daily (before meals) 3/15/22 6/13/22  Amy Johnella Koyanagi, APRN - [place]   fluticasone-umeclidin-vilant (TRELEGY ELLIPTA) 877-17.6-61 MCG/INH AEPB Inhale 1 puff into the lungs daily 3/15/22 4/14/22  Amy Johnella Koyanagi, APRN - [place]   fluticasone-umeclidin-vilant (TRELEGY ELLIPTA) 100-62.5-25 MCG/INH AEPB Inhale 1 puff into the lungs daily for 14 days Lot ab5w exp 9/2023 3/15/22 3/29/22  Amy Johnella Koyanagi, APRN - [place]   ALPRAZolam [place]) 0.5 MG tablet Take 1 tablet by mouth daily as needed for Sleep or Anxiety for up to 14 days.  3/15/22 3/29/22  Amy Johnella Koyanagi, APRN - CNP   omega-3 acid ethyl esters (LOVAZA) 1 g capsule Take 2 capsules by mouth 2 times daily 3/12/22 3/12/23  Amy Johnella Koyanagi, APRN - CNP   fenofibrate (TRIGLIDE) 160 MG tablet Take 1 tablet by mouth daily 3/12/22 3/12/23  Amy Johnella Koyanagi, APRN - CNP   QUEtiapine (SEROQUEL) 50 MG tablet TAKE 1 TO 2 TABLETS BY MOUTH AT BEDTIME AS NEEDED FOR SLEEP 3/2/22   Amy Johnella Koyanagi, APRN - CNP   vitamin B-12 (CYANOCOBALAMIN) 1000 MCG tablet TAKE 2 TABLETS BY MOUTH EVERY DAY 3/2/22 3/2/23  Amy Johnella Koyanagi, APRN - CNP   QUEtiapine (SEROQUEL) 200 MG tablet TAKE 1 TABLET DAILY 2/2/22   Amy Johnella Koyanagi, APRN - CNP   atorvastatin (LIPITOR) 80 MG tablet Take 1 tablet by mouth daily 11/30/21 5/29/22  Amy Johnella Koyanagi, APRN - CNP   dorzolamide-timolol (COSOPT) 22.3-6.8 MG/ML ophthalmic solution INSTILL 1 DROP IN [facility] EYE TWO TIMES A DAY 10/1/21   Historical Provider, MD   gabapentin (NEURONTIN) 800 MG tablet TAKE 1 TAB BY MOUTH AT BEDTIME FOR ONE WEEK THEN 2 TABS AT BEDTIME FOR ONE WEEK THEN 3 TABS AT BEDTIME THEREAFTER 9/7/21   Historical Provider, MD   Handmellisa Ernandezard MISC by Does not apply route Exp 3/16/2026 3/16/21   DES Benz - [place]   oxyCODONE-acetaminophen (PERCOCET)  MG per tablet Take 1 tablet by mouth every 4 hours as needed for Pain. 7/19/19   Historical Provider, MD   .  Assessment   patient alert and oriented. Pt denies any pulmonary history. Pt states that she does not wear O2 or BIPAP at home. Patient currently on 2 L NC O2 Saturation 93% ,RR 18 , breath sounds clear. PT denies any SOB at this time. PT states that she take TRELEGY ELLIPTA  One puff as needed. Pt refused BIPAP at this time. No complication no signs of distress at this time. Will continue to monitor. Respiratory Care evaluation done.      RR 18   Breath Sounds: clear       · Bronchodilator assessment at level  1  · Hyperinflation assessment at level   · Secretion Management assessment at level    ·   · [x]    Bronchodilator Assessment  BRONCHODILATOR ASSESSMENT SCORE  Score 0 1 2 3 4 5   Breath Sounds   []  Patient Baseline [x]  No Wheeze good aeration []  Faint, scattered wheezing, good aeration []  Expiratory Wheezing and or moderately diminished []  Insp/Exp wheeze and/or very diminished []  Insp/Exp and/ or marked distress   Respiratory Rate   []  Patient Baseline [x]  Less than 20 []  Less than 20 []  20-25 []  Greater than 25 []  Greater than 25   Peak flow % of Pred or PB [x]  NA   []  Greater than 90%  []  81-90% []  71-80% []  Less than or equal to 70%  or unable to perform []  Unable due to Respiratory Distress   Dyspnea re []  Patient Baseline [x]  No SOB []  No SOB []  SOB on exertion []  SOB min activity []  At rest/acute   e FEV% Predicted       [x]  NA []  Above 69%  []  Unable []  Above 60-69%  []  Unable []  Above 50-59%  []  Unable []  Above 35-49%  []  Unable []  Less than 35%  []  Unable

## 2022-03-23 NOTE — PROGRESS NOTES
[address]   Occupational Therapy Evaluation  Date: 3/23/22  Patient Name: Beni Valadez       Room:   MRN: [id number]  Account: [de-identified]   : 1945  (64 y.o.) Gender: female     Discharge Recommendations: The patient's needs are being met with no further Occupational Therapy recommended at discharge. Equipment Needed: No    Referring Practitioner: Donovan Huynh DO  Diagnosis: Hospital-acquired pneumonia  Additional Pertinent Hx: 20-year-old female with past medical history of chronic biliary pancreatitis, type 2 diabetes mellitus without long-term insulin use, arthritis and degenerative disc disease on chronic pain meds, depression, hyperlipidemia, and recurrent pneumonias presents to the ED on 3/22 with shortness of breath and altered mental status. Patient reports she had been cleaning the bathroom this a.m. and feeling some chills but does not recall subsequent events other than waking up in the hospital.    Treatment Diagnosis: Impaired self-care status  Past Medical History:  has a past medical history of Abnormal finding on imaging, Anemia, Bochdalek hernia, Bowel obstruction (HCC), Bronchitis, Chronic biliary pancreatitis ([address] Controlled type 2 diabetes mellitus without complication, without long-term current use of insulin (HCC), Cough, DDD (degenerative disc disease), Depression, Diverticulosis, Esophageal dysphagia, Fibromyalgia, GERD (gastroesophageal reflux disease), Glaucoma, Hiatal hernia, Hyperlipidemia, Knee injuries, Osteoarthritis of more than one site, Pneumonia, Right middle lobe pneumonia, and Seasonal allergies. Past Surgical History:   has a past surgical history that includes Hysterectomy; Foot surgery (Right); Small intestine surgery; Carpal tunnel release (Bilateral); Breast biopsy (Left, 2016); Pancreas Biopsy (10/03/2016); Colonoscopy;  Upper gastrointestinal endoscopy; pr arthrs kne surg w/meniscectomy med/lat w/shvg (Left, 08/13/2018); eye surgery (Left); eye surgery (Left); eye surgery (Left); Knee arthroscopy (Right, 12/04/2019); Upper gastrointestinal endoscopy (N/A, 8/17/2021); and esophageal motility study (N/A, 1/21/2022). Restrictions  Restrictions/Precautions: Fall Risk  Implants present? :  (denies)  Required Braces or Orthoses?: No     Vitals  Temp: 98.5 °F (36.9 °C)  Pulse: 87  Resp: 17  BP: 127/70  Height: 5' 4\" (162.6 cm)  Weight: 160 lb (72.6 kg)  BMI (Calculated): 27.5  Oxygen Therapy  SpO2: 99 %  Pulse Oximeter Device Mode: Continuous  Pulse Oximeter Device Location: Finger  O2 Device: Nasal cannula  Skin Assessment: Clean, dry, & intact  Skin Protection for O2 Device: No  FiO2 : 55 %  O2 Flow Rate (L/min): 2 L/min  Blood Gas  Performed?: Yes  Bao's Test #1: Collateral flow confirmed  Site #1: Left Radial  Site Prepped #1: Yes  Number of Attempts #1: 2  Pressure Held #1: Yes  Complications #1: None  Post-procedure #1: Standard  Specimen Status #1: Point of care  How Tolerated?: Tolerated well  Level of Consciousness: Alert (0)    Subjective  Subjective: \"I'm just so sweaty. I think it's because I haven't taken my pain meds. \"  Comments: Okay for OT/PT per RN Autumn. Pt is pleasant and agreeable for session.   Overall Orientation Status: Within Functional Limits  Vision  Vision: Within Functional Limits (had cataract surgery)  Hearing  Hearing: Within functional limits  Social/Functional History  Lives With: Spouse  Type of Home: House  Home Layout: Multi-level,Able to Live on Main level with bedroom/bathroom,Performs ADL's on one level  Home Access: Level entry  Entrance Stairs - Number of Steps: Level entry into home from garage; 6 steps with B HR to upper level (bed/bath)  Entrance Stairs - Rails: Both  Bathroom Shower/Tub: Tub/Shower unit,Walk-in shower,Shower chair with back  Bathroom Toilet: Standard  Bathroom Equipment: Grab bars in shower  Bathroom Accessibility: Walker accessible  Home Equipment: 4 wheeled walker  ADL Assistance: Independent  Homemaking Assistance: Independent  Homemaking Responsibilities: Yes  Ambulation Assistance: Independent  Transfer Assistance: Independent  Active : Yes  Mode of Transportation: Car  Occupation: Retired  Type of occupation: [address] [employer]  IADL Comments: sleeps in an adjustable bed  Additional Comments: Pt reported that she was doing fine once discharged, independent with ADLs/mobility, then sudden onset of feeling ill. Spouse continues to work at [employer], and 5 children live nearby and able to assist as needed. Objective      Cognition  Overall Cognitive Status: WFL   Sensation  Overall Sensation Status: WFL   ADL  Feeding: Setup  Grooming: Setup  UE Bathing: Contact guard assistance  LE Bathing: Contact guard assistance  UE Dressing: Stand by assistance  LE Dressing: Stand by assistance  Toileting: Stand by assistance  Additional Comments: ADL scores based on skilled observation and clinical reasoning, unless otherwise noted. UE Function           LUE Strength  LUE Strength Comment: MMT not assessed due to IV placement     LUE Tone: Normotonic     LUE AROM (degrees)  LUE AROM : WFL     Left Hand AROM (degrees)  Left Hand AROM: WFL  RUE Strength  R Hand General: 4/5  RUE Strength Comment: Overall 4/5      RUE Tone: Normotonic     RUE AROM (degrees)  RUE AROM : WFL     Right Hand AROM (degrees)  Right Hand AROM: WFL    Fine Motor Skills  Coordination  Movements Are Fluid And Coordinated:  Yes                           Mobility  Supine to Sit: Stand by assistance  Sit to Supine: Stand by assistance       Balance  Sitting Balance: Stand by assistance  Standing Balance: Contact guard assistance  Standing Balance  Time: 1-2 minutes  Activity: functional transfers, functional mobility  Comment: no device  Functional Mobility  Functional - Mobility Device: No device  Activity:  (Small steps near EOB)  Assist Level: Contact guard assistance  Functional Mobility Comments: No complaints of lightheadedness/dizzness. Bed mobility  Rolling to Left: Stand by assistance  Supine to Sit: Stand by assistance  Sit to Supine: Stand by assistance  Scooting: Stand by assistance  Comment: HOB slightly elevated. Assist line mgmt     Transfers  Sit to stand: Stand by assistance  Stand to sit: Stand by assistance  Functional Activity Tolerance  Functional Activity Tolerance:  Tolerates 30 min exercise with multiple rests     Assessment  Assessment  Performance deficits / Impairments: Decreased functional mobility ,Decreased ADL status,Decreased strength,Decreased endurance,Decreased balance,Decreased high-level IADLs  Treatment Diagnosis: Impaired self-care status  Prognosis: Good  Decision Making: Medium Complexity  REQUIRES OT FOLLOW UP: Yes  Discharge Recommendations: Home with assist PRN  Activity Tolerance: Patient Tolerated treatment well         Functional Outcome Measures  AM-PAC Daily Activity Inpatient   How much help for putting on and taking off regular lower body clothing?: A Little  How much help for Bathing?: A Little  How much help for Toileting?: A Little  How much help for putting on and taking off regular upper body clothing?: A Little  How much help for taking care of personal grooming?: A Little  How much help for eating meals?: A Little  [facility] Inpatient Daily Activity Raw Score: 18  AM-PAC Inpatient ADL T-Scale Score : 38.66  ADL Inpatient CMS 0-100% Score: 46.65  ADL Inpatient CMS G-Code Modifier : CK       Goals  Short term goals  Time Frame for Short term goals: By discharge  Short term goal 1: Pt will perform BADLs independently with Good safety  Short term goal 2: Pt will perform transfers/functional mobility independently with Good safety  Short term goal 3: Pt will V/D EC/WS techniques to increase activity tolerance in daily routine  Short term goal 4: Pt will actively participate in 15+ minutes of therapeutic exercise/functional activity to promote safety and independence with self care and mobility    Plan        Plan  Times per week: 4-5  Current Treatment Recommendations: Strengthening,Balance Training,Functional Mobility Training,Endurance Training,Safety Education & Training,Patient/Caregiver Education & Training,Self-Care / ADL       Equipment Recommendations  Equipment Needed: No  OT Individual Minutes  Time In: 6980  Time Out: 0940  Minutes: 21    Electronically signed by Smita Smith OT on 3/23/22 at 12:41 PM EDT       03/23/22 1241   OT Individual Minutes   Time In 0919   Time Out 0940   Minutes 21

## 2022-03-24 VITALS
HEART RATE: 98 BPM | OXYGEN SATURATION: 97 % | WEIGHT: 149.91 LBS | DIASTOLIC BLOOD PRESSURE: 83 MMHG | RESPIRATION RATE: 18 BRPM | BODY MASS INDEX: 25.59 KG/M2 | HEIGHT: 64 IN | TEMPERATURE: 97.9 F | SYSTOLIC BLOOD PRESSURE: 131 MMHG

## 2022-03-24 LAB
ABSOLUTE RETIC #: 0.05 M/UL (ref 0.02–0.1)
ANTI-XA UNFRAC HEPARIN: 0.12 IU/L (ref 0.3–0.7)
HCT VFR BLD CALC: 32.5 % (ref 36–46)
HEMOGLOBIN: 10.7 G/DL (ref 12–16)
MCH RBC QN AUTO: 29 PG (ref 26–34)
MCHC RBC AUTO-ENTMCNC: 32.9 G/DL (ref 31–37)
MCV RBC AUTO: 88 FL (ref 80–100)
MRSA, DNA, NASAL: NEGATIVE
PDW BLD-RTO: 14.7 % (ref 11.5–14.9)
PLATELET # BLD: 410 K/UL (ref 150–450)
PMV BLD AUTO: 7.7 FL (ref 6–12)
RBC # BLD: 3.69 M/UL (ref 4–5.2)
RETIC %: 1.4 % (ref 0.5–2)
SPECIMEN DESCRIPTION: NORMAL
WBC # BLD: 6.3 K/UL (ref 3.5–11)

## 2022-03-24 PROCEDURE — 85520 HEPARIN ASSAY: CPT

## 2022-03-24 PROCEDURE — 2060000000 HC ICU INTERMEDIATE R&B

## 2022-03-24 PROCEDURE — 6360000002 HC RX W HCPCS: Performed by: INTERNAL MEDICINE

## 2022-03-24 PROCEDURE — 85027 COMPLETE CBC AUTOMATED: CPT

## 2022-03-24 PROCEDURE — 6370000000 HC RX 637 (ALT 250 FOR IP): Performed by: STUDENT IN AN ORGANIZED HEALTH CARE EDUCATION/TRAINING PROGRAM

## 2022-03-24 PROCEDURE — 36415 COLL VENOUS BLD VENIPUNCTURE: CPT

## 2022-03-24 PROCEDURE — 94640 AIRWAY INHALATION TREATMENT: CPT

## 2022-03-24 PROCEDURE — 99232 SBSQ HOSP IP/OBS MODERATE 35: CPT | Performed by: INTERNAL MEDICINE

## 2022-03-24 PROCEDURE — 2580000003 HC RX 258: Performed by: EMERGENCY MEDICINE

## 2022-03-24 PROCEDURE — 85045 AUTOMATED RETICULOCYTE COUNT: CPT

## 2022-03-24 PROCEDURE — 2580000003 HC RX 258: Performed by: INTERNAL MEDICINE

## 2022-03-24 PROCEDURE — 6370000000 HC RX 637 (ALT 250 FOR IP)

## 2022-03-24 PROCEDURE — 6360000002 HC RX W HCPCS: Performed by: STUDENT IN AN ORGANIZED HEALTH CARE EDUCATION/TRAINING PROGRAM

## 2022-03-24 PROCEDURE — 94761 N-INVAS EAR/PLS OXIMETRY MLT: CPT

## 2022-03-24 PROCEDURE — 94660 CPAP INITIATION&MGMT: CPT

## 2022-03-24 PROCEDURE — 2500000003 HC RX 250 WO HCPCS: Performed by: INTERNAL MEDICINE

## 2022-03-24 PROCEDURE — 6370000000 HC RX 637 (ALT 250 FOR IP): Performed by: INTERNAL MEDICINE

## 2022-03-24 RX ORDER — SODIUM CHLORIDE 9 MG/ML
500 INJECTION, SOLUTION INTRAVENOUS CONTINUOUS PRN
Status: CANCELLED | OUTPATIENT
Start: 2022-03-24 | End: 2022-03-24

## 2022-03-24 RX ORDER — SODIUM CHLORIDE 0.9 % (FLUSH) 0.9 %
10 SYRINGE (ML) INJECTION PRN
Status: CANCELLED | OUTPATIENT
Start: 2022-03-24 | End: 2022-03-24

## 2022-03-24 RX ORDER — ASPIRIN 81 MG/1
81 TABLET, CHEWABLE ORAL DAILY
Qty: 30 TABLET | Refills: 3 | Status: SHIPPED | OUTPATIENT
Start: 2022-03-25

## 2022-03-24 RX ORDER — AMINOPHYLLINE DIHYDRATE 25 MG/ML
50 INJECTION, SOLUTION INTRAVENOUS PRN
Status: CANCELLED | OUTPATIENT
Start: 2022-03-24 | End: 2022-03-24

## 2022-03-24 RX ORDER — LEVOFLOXACIN 750 MG/1
750 TABLET ORAL DAILY
Qty: 7 TABLET | Refills: 0 | Status: SHIPPED | OUTPATIENT
Start: 2022-03-24 | End: 2022-03-31

## 2022-03-24 RX ORDER — METOPROLOL TARTRATE 5 MG/5ML
5 INJECTION INTRAVENOUS EVERY 5 MIN PRN
Status: CANCELLED | OUTPATIENT
Start: 2022-03-24 | End: 2022-03-24

## 2022-03-24 RX ORDER — ATROPINE SULFATE 0.1 MG/ML
0.5 INJECTION INTRAVENOUS EVERY 5 MIN PRN
Status: CANCELLED | OUTPATIENT
Start: 2022-03-24 | End: 2022-03-24

## 2022-03-24 RX ORDER — NITROGLYCERIN 0.4 MG/1
0.4 TABLET SUBLINGUAL EVERY 5 MIN PRN
Status: CANCELLED | OUTPATIENT
Start: 2022-03-24 | End: 2022-03-24

## 2022-03-24 RX ORDER — ALBUTEROL SULFATE 90 UG/1
2 AEROSOL, METERED RESPIRATORY (INHALATION) PRN
Status: CANCELLED | OUTPATIENT
Start: 2022-03-24 | End: 2022-03-24

## 2022-03-24 RX ADMIN — TIOTROPIUM BROMIDE INHALATION SPRAY 2 PUFF: 3.12 SPRAY, METERED RESPIRATORY (INHALATION) at 07:42

## 2022-03-24 RX ADMIN — HEPARIN SODIUM 4000 UNITS: 1000 INJECTION, SOLUTION INTRAVENOUS; SUBCUTANEOUS at 12:40

## 2022-03-24 RX ADMIN — ATORVASTATIN CALCIUM 80 MG: 80 TABLET, FILM COATED ORAL at 10:01

## 2022-03-24 RX ADMIN — BUDESONIDE AND FORMOTEROL FUMARATE DIHYDRATE 2 PUFF: 160; 4.5 AEROSOL RESPIRATORY (INHALATION) at 07:42

## 2022-03-24 RX ADMIN — FENOFIBRATE 160 MG: 160 TABLET ORAL at 10:01

## 2022-03-24 RX ADMIN — OMEGA-3-ACID ETHYL ESTERS 2 G: 1 CAPSULE, LIQUID FILLED ORAL at 10:01

## 2022-03-24 RX ADMIN — CEFEPIME HYDROCHLORIDE 2000 MG: 2 INJECTION, POWDER, FOR SOLUTION INTRAVENOUS at 05:05

## 2022-03-24 RX ADMIN — METOPROLOL TARTRATE 12.5 MG: 25 TABLET, FILM COATED ORAL at 10:02

## 2022-03-24 RX ADMIN — PANTOPRAZOLE SODIUM 40 MG: 40 TABLET, DELAYED RELEASE ORAL at 10:01

## 2022-03-24 RX ADMIN — CEFEPIME HYDROCHLORIDE 2000 MG: 2 INJECTION, POWDER, FOR SOLUTION INTRAVENOUS at 17:28

## 2022-03-24 RX ADMIN — SODIUM CHLORIDE, PRESERVATIVE FREE 10 ML: 5 INJECTION INTRAVENOUS at 10:02

## 2022-03-24 RX ADMIN — OXYCODONE HYDROCHLORIDE 5 MG: 5 TABLET ORAL at 14:56

## 2022-03-24 RX ADMIN — METRONIDAZOLE 500 MG: 500 INJECTION, SOLUTION INTRAVENOUS at 14:58

## 2022-03-24 RX ADMIN — PANTOPRAZOLE SODIUM 40 MG: 40 TABLET, DELAYED RELEASE ORAL at 14:56

## 2022-03-24 RX ADMIN — BENZONATATE 200 MG: 200 CAPSULE ORAL at 15:35

## 2022-03-24 RX ADMIN — VANCOMYCIN HYDROCHLORIDE 1500 MG: 1.5 INJECTION, POWDER, LYOPHILIZED, FOR SOLUTION INTRAVENOUS at 02:22

## 2022-03-24 RX ADMIN — ASPIRIN 81 MG: 81 TABLET, CHEWABLE ORAL at 10:00

## 2022-03-24 RX ADMIN — CYANOCOBALAMIN TAB 1000 MCG 1000 MCG: 1000 TAB at 10:01

## 2022-03-24 RX ADMIN — BENZONATATE 200 MG: 200 CAPSULE ORAL at 08:56

## 2022-03-24 RX ADMIN — METRONIDAZOLE 500 MG: 500 INJECTION, SOLUTION INTRAVENOUS at 10:07

## 2022-03-24 RX ADMIN — OXYCODONE HYDROCHLORIDE AND ACETAMINOPHEN 1 TABLET: 5; 325 TABLET ORAL at 03:19

## 2022-03-24 ASSESSMENT — PAIN SCALES - GENERAL
PAINLEVEL_OUTOF10: 7
PAINLEVEL_OUTOF10: 8
PAINLEVEL_OUTOF10: 8
PAINLEVEL_OUTOF10: 9

## 2022-03-24 NOTE — PLAN OF CARE
[facility] PULMONARY, CRITICAL CARE, & SLEEP  Sana Landrum MD/Melchor Mcknight MD/ Dr Vero RODRIGUEZ-BC, NP-C  Carlene NUNES NP-C  Yazmin NUNES NP-C    Attempted to see patient, currently in shower. Will check later this morning.

## 2022-03-24 NOTE — PLAN OF CARE
Pt seen and examined. Overall clinical picture improving. Transferred out of ICU to PCU yesterday. No further episodes of AMS since admission. Tachycardia resolved status post fluid bolus yesterday. Echo unremarkable; will order stress test.  MRSA swab negative; will dc vanc. Full note to follow.     Electronically signed by Mary Carter DO on 3/24/2022 at 10:20 AM

## 2022-03-24 NOTE — CARE COORDINATION
ONGOING DISCHARGE PLAN:    Patient is alert and oriented x4. Spoke with patient regarding discharge plan and patient confirms that plan is still to return to home w/. Denies VNS. Currently sating 95% on RA, CXR rebecca, Pulm following, Will follow if Home O2 is needed. Remains on Heparin GTT, follow for POAC. Remains on IV Cefepime/Flagyl. Stress test, Cardio following. Will continue to follow for additional discharge needs.     Electronically signed by Maile Saunders RN on 3/24/2022 at 2:01 PM

## 2022-03-24 NOTE — PLAN OF CARE
Cardio had recommended total 48 hrs of heparin gtt,  Per MAR, heparin gtt started 3/22 at 15:10,  It is now 15:30,  So recommended dose complete; Will dc heparin drip.      Electronically signed by Kayden Mcdonald DO on 3/24/2022 at 3:32 PM

## 2022-03-24 NOTE — PLAN OF CARE
Problem: Skin Integrity:  Goal: Absence of new skin breakdown  Description: Absence of new skin breakdown  Outcome: Ongoing     Problem: Airway Clearance - Ineffective:  Goal: Ability to maintain a clear airway will improve  Description: Ability to maintain a clear airway will improve  Outcome: Ongoing     Problem: Anxiety/Stress:  Goal: Level of anxiety will decrease  Description: Level of anxiety will decrease  Outcome: Ongoing

## 2022-03-24 NOTE — PROGRESS NOTES
[facility]   Progress Note                   Date:   3/24/2022  Patient name: Arsenio Aiken  Date of admission:  3/22/2022  4:49 AM  MRN:   [id number]  YOB: 1945  PCP: DES Alvarenga CNP    Reason for Admission:      Subjective:       Clinical Changes / Abnormalities:  Patient awake alert denies any chest pain breathing is much improved  Patient  is also in the room    Medications:   Scheduled Meds:   omega-3 acid ethyl esters  2 g Oral Daily    QUEtiapine  200 mg Oral Nightly    vancomycin (VANCOCIN) 1250 mg in D5W 250 mL IVPB (ADDAVIAL)  1,500 mg IntraVENous Q24H    aspirin  81 mg Oral Daily    metoprolol tartrate  12.5 mg Oral BID    vancomycin (VANCOCIN) intermittent dosing (placeholder)   Other RX Placeholder    sodium chloride flush  5-40 mL IntraVENous 2 times per day    cefepime  2,000 mg IntraVENous Q12H    metroNIDAZOLE  500 mg IntraVENous Q8H    atorvastatin  80 mg Oral Daily    fenofibrate  160 mg Oral Daily    pantoprazole  40 mg Oral BID AC    vitamin B-12  1,000 mcg Oral Daily    budesonide-formoterol  2 puff Inhalation BID    tiotropium  2 puff Inhalation Daily     Continuous Infusions:   sodium chloride      heparin (PORCINE) Infusion 12 Units/kg/hr (03/23/22 2138)     CBC:   Recent Labs     03/22/22  0513 03/22/22  1354 03/23/22  0340   WBC 6.5 10.1 11.2*   HGB 12.8 11.6* 10.6*   * 449 449     BMP:    Recent Labs     03/22/22  0513 03/23/22  0340    136   K 4.1 3.8    105   CO2 23 22   BUN 15 12   CREATININE 0.88 0.64   GLUCOSE 111* 99     Hepatic:   Recent Labs     03/22/22  0513   AST 27   ALT 18   BILITOT 0.41   ALKPHOS 129*     Troponin: No results for input(s): TROPONINI in the last 72 hours. BNP: No results for input(s): BNP in the last 72 hours. Lipids: No results for input(s): CHOL, HDL in the last 72 hours.     Invalid input(s): LDLCALCU  INR:   Recent Labs     03/22/22  0513 03/22/22  1354   INR 1.0 1.1 55%.  Mild mitral regurgitation. Mild tricuspid regurgitation.  Estimated right ventricular systolic pressure  is 19HYKJ.                 Assessment / Acute Cardiac Problems:       Patient Active Problem List:     Hyperlipidemia     Fibromyalgia     Osteopenia     Hx of bilateral breast implants     Arthritis of shoulder region, right     Anxiety     Anemia, normocytic normochromic     Chronic pain associated with significant psychosocial dysfunction     Primary osteoarthritis involving multiple joints     Spondylosis 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     Lumbar and sacral arthritis     Chronic biliary pancreatitis (HCC)     Moderate episode of recurrent major depressive disorder (HCC)     Uncomplicated opioid dependence (HCC)     Hyperuricemia     Primary open-angle glaucoma, right eye, mild stage     Primary open-angle glaucoma, left eye, mild stage     Gastroesophageal reflux disease     Esophageal dysphagia     Hiatal hernia     Recurrent aspiration pneumonia (HCC)     Acute respiratory failure with hypoxemia (HCC)     Simple chronic bronchitis (HCC)     Hospital-acquired pneumonia     NSTEMI (non-ST elevated myocardial infarction) ([facility] [address]     History of degenerative disc disease     Hx of diabetes mellitus     Sepsis (HCC)     Hx of Esophageal dysmotility      Plan of Treatment:     Elevated tropes normal EKG normal echocardiogram type B  Hypertension fairly controlled  No dysrhythmias  Pneumonia being treated by primary team  From cardiac point of view no further cardiac work-up will sign out  Lonnie Balderas MD, MD  [facility]  [phone]

## 2022-03-24 NOTE — PROGRESS NOTES
[id number] [employer]    PROGRESS NOTE             3/24/2022    8:35 AM    Name:   Nadia Bravo  MRN:     [id number]     Acct:      [de-identified]   Room:   2104/2104-01  IP Day:  2  Admit Date:  3/22/2022  4:49 AM    PCP:  DES Sheriff CNP  Code Status:  Full Code    Subjective:     C/C:   Chief Complaint   Patient presents with    Shortness of Breath    Altered Mental Status   This is a delayed entry note and reflect's the patient's condition and management plan at time of service. Interval History Status: improved. Pt seen and examined. Overall clinical picture improving. Transferred out of ICU to PCU yesterday. No further episodes of AMS since admission. Tachycardia resolved status post fluid bolus yesterday. Echo unremarkable; will order stress test.  MRSA swab negative; will dc vanc. Will dc heparin drip in PM after total of 48 hours complete. Review of Systems:     Review of Systems   Constitutional: Negative for chills and fever. Eyes: Negative for discharge and redness. Respiratory: Negative for shortness of breath and wheezing. Cardiovascular: Negative for chest pain and leg swelling. Gastrointestinal: Negative for abdominal pain and vomiting. Musculoskeletal: Positive for arthralgias (chronic) and myalgias (chronic). Neurological: Negative for dizziness and light-headedness. Psychiatric/Behavioral: Negative for agitation and confusion. Medications: Allergies:     Allergies   Allergen Reactions    Pcn [Penicillins] Anaphylaxis     Patient states PCN allergy was 20 years ago, unsure if reaction still present    Adhesive Tape     Nubain [Nalbuphine Hcl]      Leg  Cramping  When mixed with vistaril    Seasonal     Vistaril [Hydroxyzine] Other (See Comments)     fainted       Current Meds:   Scheduled Meds:    omega-3 acid ethyl esters  2 g Oral Daily    QUEtiapine  200 mg Oral Nightly Stroke Maternal Grandmother        Vitals:  /70   Pulse 94   Temp 98.9 °F (37.2 °C) (Oral)   Resp 18   Ht 5' 4\" (1.626 m)   Wt 149 lb 14.6 oz (68 kg)   SpO2 92%   BMI 25.73 kg/m²   Temp (24hrs), Av.1 °F (37.3 °C), Min:98.6 °F (37 °C), Max:99.8 °F (37.7 °C)    No results for input(s): POCGLU in the last 72 hours. Vitals:    22 2330 22 0215 22 0700 22 0743   BP: 120/75  123/70    Pulse: 95  94    Resp: 16  18 18   Temp: 99.8 °F (37.7 °C)  98.9 °F (37.2 °C)    TempSrc: Axillary  Oral    SpO2: 91%  91% 92%   Weight:  149 lb 14.6 oz (68 kg)     Height:           I/O(24Hr): Intake/Output Summary (Last 24 hours) at 3/24/2022 0835  Last data filed at 3/24/2022 3564  Gross per 24 hour   Intake 3655.08 ml   Output 650 ml   Net 3005.08 ml       Labs:  Recent Results (from the past 24 hour(s))   Anti-Xa, Unfractionated Heparin    Collection Time: 22  9:10 AM   Result Value Ref Range    Anti-XA Unfrac Heparin 0.30 0.30 - 0.70 IU/L   Troponin    Collection Time: 22  9:10 AM   Result Value Ref Range    Troponin, High Sensitivity 60 (HH) 0 - 14 ng/L   STREP PNEUMONIAE ANTIGEN    Collection Time: 22 10:15 AM    Specimen: Urine, clean catch   Result Value Ref Range    Source . CLEAN CATCH URINE     Strep pneumo Ag NEGATIVE          Lab Results   Component Value Date/Time    SPECIAL 5ML EACH RIGHT WRIST 2022 05:15 AM     Lab Results   Component Value Date/Time    CULTURE NO GROWTH 2 DAYS 2022 05:15 AM         Radiology:    CT ABDOMEN PELVIS W IV CONTRAST Additional Contrast? None    Addendum Date: 3/22/2022    ADDENDUM: Impression 5:  Fluid distention of a few jejunal loops that could be seen with mild enteritis. Result Date: 3/22/2022  1. Patchy consolidative and groundglass opacities the lung bases most prominent in the right lower lobe suspicious for pneumonia or aspiration. Please refer to the concurrent chest CT for additional detail.  2. Suggestion of circumferential wall thickening in the distal thoracic esophagus potentially due to reflux esophagitis given the presence of a small sliding hiatal hernia. No overtly suspicious features are present to suggest malignancy, although that is not excluded given the presence of some nonenlarged left paraesophageal lymph nodes. Consider endoscopy on a nonemergent basis. 3. Mild intrahepatic and moderate extrahepatic biliary dilation is not significantly changed since 07/01/2016. There is also unchanged mild dilation of the main pancreatic duct with no findings to suggest an obstructing ampullary or pancreatic mass. The findings are likely age-related with pancreatic parenchymal atrophy likely contributing to the appearance of the main pancreatic duct. Consider further evaluation with MRCP only if there are clinical findings of cholestasis. 4. A few additional incidental findings as above. FL UGI W AIR CONTRAST    Result Date: 3/9/2022  Limited exam as the patient could only ingest small quantities of barium. Moderate to large hiatal hernia. XR CHEST PORTABLE    Result Date: 3/22/2022  Interval worsening bilateral lung multifocal ill-defined consolidation consistent with pneumonia versus alveolar edema. XR CHEST PORTABLE    Result Date: 3/8/2022  1. Prominent perihilar and interstitial lung markings with scattered patchy airspace opacities. Findings are suggestive of diffuse pulmonary edema. Multifocal pneumonia in the appropriate clinical setting is possible. CT CHEST PULMONARY EMBOLISM W CONTRAST    Result Date: 3/22/2022  No evidence of pulmonary embolus. Bilateral consolidating pneumonia, right worse than left. Associated mild mediastinal adenopathy. Moderate-sized hiatal hernia. Complete collapse of right breast implant. Physical Examination:        Physical Exam  Constitutional:       General: She is not in acute distress. Appearance: Normal appearance.  She is not ill-appearing, toxic-appearing or diaphoretic. HENT:      Head: Normocephalic. Nose: Nose normal.   Eyes:      General: No scleral icterus. Conjunctiva/sclera: Conjunctivae normal.   Cardiovascular:      Rate and Rhythm: Normal rate and regular rhythm. Pulmonary:      Effort: Pulmonary effort is normal. No respiratory distress. Breath sounds: No wheezing. Comments: Saturating in 90s on room air  Abdominal:      General: There is no distension. Palpations: Abdomen is soft. Tenderness: There is no abdominal tenderness. Musculoskeletal:      Right lower leg: No edema. Left lower leg: No edema. Skin:     Coloration: Skin is not jaundiced. Findings: No erythema. Neurological:      Mental Status: She is alert. Mental status is at baseline.    Psychiatric:         Mood and Affect: Mood normal.         Behavior: Behavior normal.         Assessment:        Primary Problem  Hospital-acquired pneumonia    Active Hospital Problems    Diagnosis Date Noted    Hospital-acquired pneumonia [J18.9, Y95] 03/22/2022    NSTEMI (non-ST elevated myocardial infarction) ([facility] [I21.4] 03/22/2022    History of degenerative disc disease [Z87.39] 03/22/2022    Hx of diabetes mellitus [Z86.39] 03/22/2022    Sepsis ([address] [A41.9] 03/22/2022    Hx of Esophageal dysmotility [K22.4] 03/22/2022    Acute respiratory failure with hypoxemia (HCC) [J96.01]     Recurrent aspiration pneumonia ([facility] [J69.0] 11/12/2021    Hiatal hernia [K44.9] 08/12/2021    Gastroesophageal reflux disease [K21.9] 55/78/3817    Uncomplicated opioid dependence ([facility] [address] [F11.20] 10/24/2016    Primary osteoarthritis involving multiple joints [M89.49]        Plan:        Acute hypoxemic respiratory failure 2/2 Multifocal HAP with sepsis - sig improved  Negative for COVID-19 and influenza  Blood cultures NGTD  MRSA negative => dc vancomycin  Strep pneumo negative  Gram stain/respiratory cultures - pending  Weaned off O2 supplementaion; saturating in 90s on RA  Pulmonology consulted  IV cefepime, metronidazole, vancomycin  Symbicort and Spiriva inhalers  AMS resolved     NSTEMI, likely type B per Cardiology  Echo unremarkable  Initial plan was to order stress test, but after further discussion with Cardiologist; Dr. Juanita Kemp believes pt can get stress test as OP  Tropes trending down now; Complete 48 hrs of heparin gtt then dc  Cardiology following, recommend:                   ASA, low dose lopressor, repeat trop, echo, c/w heparin gtt x48 hrs     Ok to dc from cardiologist     Hiatal hernia with GERD and hx of esophageal dysmotility  Endoscopy 8/2021: Spasmatic contractions, nonobstructing Schatzki rings with irregular SCJ esophagitis, 5 cm hiatal hernia  Esophageal motility study 2/2022: Peristalsis also noted  SLP bedside swallow study passed  Twice daily Protonix     History of DDD/arthritis with long-term use of prescription opiates  Continue home Percocet     Hyperlipidemia  Atorvastatin 80 mg p.o. daily  Fenofibrate 160 mg p.o. daily     Depression and Insomnia  Quetiapine 200 mg p.o. daily 50 mg p.o. nightly     Hx of DM  Hemoglobin A1c of 5.6 on 3/1  Monitor BS     Code: Full  DVT prophylaxis: Heparin  GI prophylaxis: Pantoprazole 40 mg p.o. twice daily  Diet: ADULT DIET; Dysphagia - Soft and Bite Sized; 5 carb choices (75 gm/meal); Low Fat/Low Chol/High Fiber/2 gm Na  Activity: Up as tolerated  Dispo: anticipate possible dc later today      Please note: Use of a speech recognition software was used in the creation of portions of this note and dictation errors, including those of syntax and sound alike word substitutions, may have escaped proofreading. Amadeo Cortez DO  3/24/2022    Attestation and add on       I have discussed the care of Robe 1732 , including pertinent history and exam findings,      3/24/22    with the resident.   I have seen and examined the patient and the key elements of all parts of the encounter have been performed by me . I agree with the assessment, plan and orders as documented by the resident. Principal Problem:    Hospital-acquired pneumonia  Active Problems:    Primary osteoarthritis involving multiple joints    Uncomplicated opioid dependence ([facility]    Gastroesophageal reflux disease    Hiatal hernia    Recurrent aspiration pneumonia (HCC)    Acute respiratory failure with hypoxemia (HCC)    NSTEMI (non-ST elevated [facility] infarction) [facility]    History of degenerative disc disease    Hx of diabetes mellitus    Sepsis ([facility]    Hx of Esophageal dysmotility  Resolved Problems:    Type 2 diabetes mellitus        ''''''''''       MD ROSE MARIE [address], [address] [facility].    Phone [phone]   Fax: [phone]  Answering Service: [phone]

## 2022-03-24 NOTE — PROGRESS NOTES
Kuldeep Underwood MD/Melchro Carballo MD/ Keenan Riedel MD/Dr Rhoda NUNES AGATORITO-BC, NP-C      Abbie NUNES NP-C     Belem Mosher APRN NP-C                                           Pulmonary Progress Note    Patient - Yumiko Gonzalez   Age - 64 y.o.   - 1945  MRN - [id number]  Acct # - [de-identified]  Date of Admission - 3/22/2022  4:49 AM    Consulting Service/Physician:       Primary Care Physician: Yared Irvin, APRN - CNP    SUBJECTIVE:     Chief Complaint:   Chief Complaint   Patient presents with    Shortness of Breath    Altered Mental Status     Subjective:    She is doing relatively well, she tells me that perfumes and  seems to affect her breathing. She denies any significant cough. She has been walking with staff to the bathroom without any shortness of breath. MRSA was negative, vancomycin was discontinued. VITALS  /70   Pulse 94   Temp 98.9 °F (37.2 °C) (Oral)   Resp 18   Ht 5' 4\" (1.626 m)   Wt 149 lb 14.6 oz (68 kg)   SpO2 92%   BMI 25.73 kg/m²   Wt Readings from Last 3 Encounters:   22 149 lb 14.6 oz (68 kg)   03/10/22 158 lb 1.1 oz (71.7 kg)   22 154 lb 6.4 oz (70 kg)     I/O (24 Hours)    Intake/Output Summary (Last 24 hours) at 3/24/2022 1046  Last data filed at 3/24/2022 0814  Gross per 24 hour   Intake 3655.08 ml   Output 650 ml   Net 3005.08 ml     Ventilator:   Settings  FiO2 : 55 %  Insp Rise Time (%): 2 %  Exam:   Physical Exam   Constitutional:  Oriented to person, place, and time. Appears well-developed and well-nourished. Lying in bed no apparent distress  HENT: Unremarkable  Head: Normocephalic and atraumatic. Eyes: EOM are normal. Pupils are equal, round, and reactive to light. Neck: Neck supple. Cardiovascular:  Regular rate and rhythm. Normal heart tones. No JVD.     Pulmonary/Chest: Effort normal and breath sounds diminished but clear, respirations easy nonlabored at rest on room air  Abdominal: Soft. Bowel sounds are normal. There is no tenderness. Musculoskeletal: Normal range of motion. Neurological:patient is alert and oriented to person, place, and time. Skin: Skin is warm and dry.    Extremities: No edema   Infusions:      sodium chloride      heparin (PORCINE) Infusion 12 Units/kg/hr (03/23/22 2138)     Meds:     Current Facility-Administered Medications:     omega-3 acid ethyl esters (LOVAZA) capsule 2 g, 2 g, Oral, Daily, Radha Singh MD, 2 g at 03/24/22 1001    QUEtiapine (SEROQUEL) tablet 200 mg, 200 mg, Oral, Nightly, Radha Singh MD, 200 mg at 03/23/22 2036    aspirin chewable tablet 81 mg, 81 mg, Oral, Daily, Edwar Karan, DO, 81 mg at 03/24/22 1000    metoprolol tartrate (LOPRESSOR) tablet 12.5 mg, 12.5 mg, Oral, BID, Edwar Karan, DO, 12.5 mg at 03/24/22 1002    benzonatate (TESSALON) capsule 200 mg, 200 mg, Oral, TID PRN, Radha Singh MD, 200 mg at 03/24/22 0856    sodium chloride flush 0.9 % injection 10 mL, 10 mL, IntraVENous, PRN, Brunilda Hernandez MD, 10 mL at 03/24/22 1002    sodium chloride flush 0.9 % injection 5-40 mL, 5-40 mL, IntraVENous, 2 times per day, Brunilda Hernandez MD, 10 mL at 03/24/22 1002    sodium chloride flush 0.9 % injection 5-40 mL, 5-40 mL, IntraVENous, PRN, Brunilda Hernandez MD    0.9 % sodium chloride infusion, 25 mL, IntraVENous, PRN, Brunilda Hernandez MD    ondansetron (ZOFRAN-ODT) disintegrating tablet 4 mg, 4 mg, Oral, Q8H PRN, 4 mg at 03/23/22 0731 **OR** ondansetron (ZOFRAN) injection 4 mg, 4 mg, IntraVENous, Q6H PRN, Brunilda Hernandez MD    cefepime (MAXIPIME) 2000 mg IVPB minibag, 2,000 mg, IntraVENous, Q12H, Joel Underwood MD, Stopped at 03/24/22 1008    metronidazole (FLAGYL) 500 mg in NaCl 100 mL IVPB premix, 500 mg, IntraVENous, Q8H, Joel Underwood MD, Last Rate: 100 mL/hr at 03/24/22 1007, 500 mg at 03/24/22 1007    heparin (porcine) injection 4,000 Units, 4,000 Units, IntraVENous, PRN, Travon Youngblood MD    heparin (porcine) injection 2,000 Units, 2,000 Units, IntraVENous, PRN, Travon Youngblood MD    heparin (porcine) 25,000 Units in dextrose 5 % 250 mL infusion, 5-30 Units/kg/hr, IntraVENous, Continuous, Travon Youngblood MD, Last Rate: 8.7 mL/hr at 03/23/22 2138, 12 Units/kg/hr at 03/23/22 2138    atorvastatin (LIPITOR) tablet 80 mg, 80 mg, Oral, Daily, Sharonda Beckham DO, 80 mg at 03/24/22 1001    fenofibrate (TRIGLIDE) tablet 160 mg, 160 mg, Oral, Daily, Sharonda Beckham DO, 160 mg at 03/24/22 1001    pantoprazole (PROTONIX) tablet 40 mg, 40 mg, Oral, BID AC, Sharonda Beckham DO, 40 mg at 03/24/22 1001    QUEtiapine (SEROQUEL) tablet 50 mg, 50 mg, Oral, Nightly PRN, Madisyn Joiner DO, 50 mg at 03/22/22 2040    vitamin B-12 (CYANOCOBALAMIN) tablet 1,000 mcg, 1,000 mcg, Oral, Daily, Travon Youngblood MD, 1,000 mcg at 03/24/22 1001    budesonide-formoterol (SYMBICORT) 160-4.5 MCG/ACT inhaler 2 puff, 2 puff, Inhalation, BID, Sharonda Beckham DO, 2 puff at 03/24/22 0742    tiotropium (SPIRIVA RESPIMAT) 2.5 MCG/ACT inhaler 2 puff, 2 puff, Inhalation, Daily, Sharonda Beckham DO, 2 puff at 03/24/22 0742    oxyCODONE-acetaminophen (PERCOCET) 5-325 MG per tablet 1 tablet, 1 tablet, Oral, Q4H PRN, 1 tablet at 03/24/22 0319 **AND** oxyCODONE (ROXICODONE) immediate release tablet 5 mg, 5 mg, Oral, Q4H PRN, Madisyn Joiner DO, 5 mg at 03/22/22 1719    magnesium sulfate 1000 mg in dextrose 5% 100 mL IVPB, 1,000 mg, IntraVENous, PRN, Madisyn Joiner DO    Lab Results:     Lab Results   Component Value Date    WBC 6.3 03/24/2022    HGB 10.7 (L) 03/24/2022    HCT 32.5 (L) 03/24/2022    MCV 88.0 03/24/2022     03/24/2022     Lab Results   Component Value Date    CALCIUM 8.2 (L) 03/23/2022     03/23/2022    K 3.8 03/23/2022    CO2 22 03/23/2022     03/23/2022    BUN 12 03/23/2022    CREATININE 0.64 03/23/2022       Lab Results   Component Value Date INR 1.1 03/22/2022    PROTIME 14.1 03/22/2022       Radiology:   No new chest imaging    ASSESSMENT:       1. Healthcare acquired pneumonia-MRSA negative   2. Acute hypoxic respiratory insufficiency-initially requiring high flow nasal cannula, now on room air  3. Elevated troponin/NSTEMI  4. Altered mental status-resolved  5. Hiatal hernia/GERD  6. Diabetes mellitus type 2  7. Full code  PLAN:   1. Continue empiric antibiotics, agree with discontinuing IV vancomycin  2. Eventual discharge home on oral Ceftin, not opposed to discharge if ok with others later today, would continue home with 5 days of ceftin  3. Increase activity as tolerated  4. Monitor off oxygen  5. Follow-up chest x-ray in a.m.  6. Can f/u in our office in 4-6 weeks, [phone]      Electronically signed by DES Caputo CNP on 03/24/22     This progress note was completed using a voice transcription system. Every effort was made to ensure accuracy. However, inadvertent computerized transcription errors may be present.     HANY NUNES AGAKATHIEP-BC, NP-C  [facility]

## 2022-03-24 NOTE — PROGRESS NOTES
Physician Progress Note      Romario Packer  [facility] #:                  [id number]  :                       1945  ADMIT DATE:       3/22/2022 4:49 AM  DISCH DATE:  RESPONDING  PROVIDER #:        CONOR NEAL DO          QUERY TEXT:    Patient admitted with sepsis. Noted documentation of NSTEMI type II MI in IM   progress notes and cardiology progress notes. In order to support the   diagnosis of type II MI, please refer to 4th universal definition of MI below   and include additional clinical indicators in your documentation. Or please   document if the diagnosis of type II MI has been ruled out after study. The medical record reflects the following:  Risk Factors: Hx DM HLD  Clinical Indicators: per Cardio consult--> denies any CP whatsoever; per   H/P--> negative for chest pain; troponin 119-->205-->60; EKG--> Sinus   tachycardia, nonspecific ST abnormality; Echo--> Normal left ventricle size,   wall thickness and function with an estimated EF > 55%; per cardio consult-->   NSTEMI- likely type 2- due to resp failure and tachycardia  Treatment: cardio consult, echo, EKG x2, troponin levels x3, IV heparin gtt,   monitoring, hospital admission    Fourth Universal Definition of Myocardial Infarction: Clearly separates MI   from myocardial injury. Patients with elevated blood troponin levels but   without clinical evidence of ischemia are said to have had a myocardial   injury.   To have a myocardial infarction requires both an elevated troponin   blood test along with at least one of the following:  - Symptoms of acute myocardial ischemia (Types 1 - 5 MI)  - Clinical evidence of ischemia, as evidenced in an electrocardiogram (EKG)   showing new ischemic changes (Type 1, Type 2, Type 3, or Type 4a MI)  - Development of pathological Q waves (Types 1 - 5 MI)  - Imaging evidence of new loss of viable myocardium or new regional wall   motion abnormality in a pattern consistent with an ischemic etiology (Types 1   - 5 MI)  Options provided:  -- NSTEMI type II due to tachycardia and acute respiratory failure present as   evidenced by, Please document evidence.   -- NSTEMI type II ruled out after study and demand ischemia due to tachycardia   and acute respiratory failure confirmed  -- NSTEMI type II ruled out after study and non-ischemic myocardial injury due   to tachycardia and acute respiratory failure confirmed  -- Other - I will add my own diagnosis  -- Disagree - Not applicable / Not valid  -- Disagree - Clinically unable to determine / Unknown  -- Refer to Clinical Documentation Reviewer    PROVIDER RESPONSE TEXT:    This patient has a type II NSTEMI due to tachycardia and acute respiratory   failure as evidenced by    Query created by: Raisa De La Vega on 3/24/2022 10:54 AM      Electronically signed by:  Francisco Keith DO 3/24/2022 12:46 PM

## 2022-03-24 NOTE — PROGRESS NOTES
CLINICAL PHARMACY NOTE: MEDS TO BEDS    Total # of Prescriptions Filled: 3   The following medications were delivered to the patient:  · Aspirin  · Levofloxacin  · Metoprolol    Additional Documentation:  copay collected

## 2022-03-25 ENCOUNTER — TELEPHONE (OUTPATIENT)
Dept: FAMILY MEDICINE CLINIC | Age: 77
End: 2022-03-25

## 2022-03-25 ENCOUNTER — CARE COORDINATION (OUTPATIENT)
Dept: CASE MANAGEMENT | Age: 77
End: 2022-03-25

## 2022-03-25 DIAGNOSIS — J96.01 ACUTE RESPIRATORY FAILURE WITH HYPOXEMIA (HCC): Primary | ICD-10-CM

## 2022-03-25 PROCEDURE — 1111F DSCHRG MED/CURRENT MED MERGE: CPT | Performed by: NURSE PRACTITIONER

## 2022-03-25 ASSESSMENT — ENCOUNTER SYMPTOMS
EYE REDNESS: 0
SHORTNESS OF BREATH: 0
ABDOMINAL PAIN: 0
EYE DISCHARGE: 0
VOMITING: 0
WHEEZING: 0

## 2022-03-25 NOTE — TELEPHONE ENCOUNTER
[facility] Initial Follow Up Call    Outreach made within 2 business days of discharge: Yes    Patient: Yumiko Gonzalez Patient : 1945   MRN: [id number]  Reason for Admission: Pneumonia Discharge Date: 3/24/22       Spoke with: Laura Carrillo    Discharge department/facility: [facility] Interactive Patient Contact:  Was patient able to fill all prescriptions: Yes  Was patient instructed to bring all medications to the follow-up visit: Yes  Is patient taking all medications as directed in the discharge summary?  Yes  Does patient understand their discharge instructions: Yes  Does patient have questions or concerns that need addressed prior to 7-14 day follow up office visit: no    Scheduled appointment with PCP within 7-14 days    Follow Up  Future Appointments   Date Time Provider Glenroy Falcon   2022  1:45 PM DES Sawyer CNP [id number]   2022 11:00 AM Micki Ballesteros MD [address]

## 2022-03-25 NOTE — PROGRESS NOTES
Patient discharged to home. Discharge instructions read with patient, patient verbalized understanding. Wheelchair assist to meet  at ED entrance.

## 2022-03-25 NOTE — CARE COORDINATION
[facility] Trans[facility] Initial Follow Up Call    Call within 2 business days of discharge: Yes    Patient: Julissa Aiken Patient : 1945   MRN: <[id number]>  Reason for Admission: Hospital-acquired pneumonia    Discharge Date: 3/24/22 RARS: Readmission Risk Score: 14 ( )      Last Discharge [facility]       Complaint Diagnosis Description Type Department Provider    3/22/22 Shortness of Breath; Altered Mental Status Acute respiratory failure with hypoxia (HCC) . .. ED to Hosp-Admission (Discharged) (ADMITTED) Deon Leon MD; [address] .. Spoke with: Transitions of Care Initial Call: spoke to Arnoldo Banks. Explained the role of Care Transition Nurse and the Transition program, patient is agreeable to follow up calls Post discharge from the Hospital.  Pt is oriented. No confusion noted at this time. She states she had a bad night with leg pain. Pt takes percocet PRN with effect. She denies shortness of breath, fever or chills. She has a slight productive cough. Denies thick phlegm. Appetite is poor. She states her  is buying her \"protien drinks\" today. Bowel and bladder WNL. She ambulates independently. 1111F medication reconciliation completed. Pt has new medications and is taking as directed. Educated on completing [address] Verbalized understanding. Pt states she has no HHC but is requesting it now. Message routed to PCP. Discussed need to f/u with PCP, Pulmonolgist and Cardiology. Pt states she will call today to schedule.  to [facility]. Will continue to follow. Patient is aware of when to contact MD with any new or worsening symptoms. Advised to contact PCP  with any health concerns for early outpatient intervention in an effort to avoid hospitalization. Report any worsening symptoms to PCP and/or Call 911 and/or GO TO EMERGENCY ROOM if symptoms are severe. Expresses understanding.   Transitions of Care Initial Call    Was this an external facility discharge? No Discharge Facility: n/a    Challenges to be reviewed by the provider   Additional needs identified to be addressed with provider: Yes  home health care-pt requests referral             Method of communication with provider : chart routing      Advance Care Planning:   Does patient have an Advance Directive: reviewed and current. Was this a readmission? Yes  Patient stated reason for admission: shortness of breath  Patients top risk factors for readmission: medical condition-pneumonia  altered mental status    Care Transition Nurse (CTN) contacted the patient by telephone to perform post hospital discharge assessment. Verified name and  with patient as identifiers. Provided introduction to self, and explanation of the CTN role. CTN reviewed discharge instructions, medical action plan and red flags with patient who verbalized understanding. Patient given an opportunity to ask questions and does not have any further questions or concerns at this time. Were discharge instructions available to patient? Yes. Reviewed appropriate site of care based on symptoms and resources available to patient including: PCP  Specialist  When to call [address] The patient agrees to contact the PCP office for questions related to their healthcare. Medication reconciliation was performed with patient, who verbalizes understanding of administration of home medications. Advised obtaining a 90-day supply of all daily and as-needed medications. Covid Risk Education     Educated patient about risk for severe COVID-19 due to risk factors according to CDC guidelines. LPN CC reviewed discharge instructions, medical action plan and red flag symptoms with the patient who verbalized understanding. Discussed COVID vaccination status: Yes. Education provided on COVID-19 vaccination as appropriate. Discussed exposure protocols and quarantine with CDC Guidelines.  Patient was given an opportunity to

## 2022-03-27 LAB
CULTURE: NORMAL
CULTURE: NORMAL
Lab: NORMAL
Lab: NORMAL
SPECIMEN DESCRIPTION: NORMAL
SPECIMEN DESCRIPTION: NORMAL

## 2022-03-29 NOTE — PROGRESS NOTES
Physician Progress Note      PATIENT:               Crystal Foreman  CSN #:                  [id number]  :                       1945  ADMIT DATE:       3/22/2022 4:49 AM  DISCH DATE:        3/24/2022 10:00 PM  RESPONDING  PROVIDER #:        Maddison NEAL DO          QUERY TEXT:    Patient admitted with sepsis. Noted documentation of NSTEMI type II MI in IM   progress notes and cardiology progress notes. In order to support the   diagnosis of type II MI, please refer to 4th universal definition of MI below   and include additional clinical indicators in your documentation. Or please   document if the diagnosis of type II MI has been ruled out after study. The medical record reflects the following:  Risk Factors: Hx DM HLD  Clinical Indicators: per Cardio consult--> denies any CP whatsoever; per   H/P--> negative for chest pain; troponin 119-->205-->60; EKG--> Sinus   tachycardia, nonspecific ST abnormality; Echo--> Normal left ventricle size,   wall thickness and function with an estimated EF > 55%; per cardio consult-->   NSTEMI- likely type 2- due to resp failure and tachycardia  Treatment: cardio consult, echo, EKG x2, troponin levels x3, IV heparin gtt,   monitoring, hospital admission    Fourth Universal Definition of Myocardial Infarction: Clearly separates MI   from myocardial injury. Patients with elevated blood troponin levels but   without clinical evidence of ischemia are said to have had a myocardial   injury.   To have a myocardial infarction requires both an elevated troponin   blood test along with at least one of the following:  - Symptoms of acute myocardial ischemia (Types 1 - 5 MI)  - Clinical evidence of ischemia, as evidenced in an electrocardiogram (EKG)   showing new ischemic changes (Type 1, Type 2, Type 3, or Type 4a MI)  - Development of pathological Q waves (Types 1 - 5 MI)  - Imaging evidence of new loss of viable myocardium or new regional wall   motion abnormality in a pattern consistent with an ischemic etiology (Types 1   - 5 MI)  Options provided:  -- NSTEMI type II due to tachycardia and acute respiratory failure present as   evidenced by, Please document evidence. -- NSTEMI type II ruled out after study and demand ischemia due to tachycardia   and acute respiratory failure confirmed  -- NSTEMI type II ruled out after study and non-ischemic myocardial injury due   to tachycardia and acute respiratory failure confirmed  -- Other - I will add my own diagnosis  -- Disagree - Not applicable / Not valid  -- Disagree - Clinically unable to determine / Unknown  -- Refer to Clinical Documentation Reviewer    PROVIDER RESPONSE TEXT:    Type II NSTEMI was ruled out after study and demand ischemia due to   tachycardia and acute respiratory failure confirmed.     Query created by: Jamarcus Mora on 3/25/2022 12:28 PM      Electronically signed by:  Dana Prater DO 3/28/2022 8:15 PM

## 2022-03-31 ENCOUNTER — CARE COORDINATION (OUTPATIENT)
Dept: CASE MANAGEMENT | Age: 77
End: 2022-03-31

## 2022-03-31 NOTE — CARE COORDINATION
[facility] Follow Up Call    3/31/2022    Patient: Arsenio Aiken  Patient : 1945   MRN: <[id number]>  Reason for Admission: pneumonia  Discharge Date: 3/24/22 RARS: Readmission Risk Score: 14 ( )         Spoke with: Subsequent transitional call. Spoke to :  Spoke to Srikanth Madrid. Srikanth Madrid states she continue to have a cough and dyspnea. Cough is productive. Uses cough syrup and inhalerts  Appetite is fair. Drinks Ensure daily. Pt states she had a fall yesterday and hit her head. She did not lose consciousness. She states she had a fever of 102. She took tylenol and now has no fever. Has no HHC at this time but would like to speak to PCP about getting a referral  Pt has an appt on 22 with PCP. Message routed to provider. Pt states she also has an appt with Pulmonologist on  and with Cardiologist on . Will continue to follow. Patient is aware of when to contact MD with any new or worsening symptoms. Advised to contact PCP  with any health concerns for early outpatient intervention in an effort to avoid hospitalization. Report any worsening symptoms to PCP and/or Call 911 and/or GO TO EMERGENCY ROOM if symptoms are severe. Expresses understanding. Care Transitions Follow Up Call    Needs to be reviewed by the provider   Additional needs identified to be addressed with provider: Yes  home health care-would like a referral             Method of communication with provider : chart routing      Care Transition Nurse (CTN) contacted the patient by telephone to follow up after admission on 3/22/22. Verified name and  with patient as identifiers. Addressed changes since last contact: none  Discussed follow-up appointments. If no appointment was previously scheduled, appointment scheduling offered: No.   Is follow up appointment scheduled within 7 days of discharge? No.    Advance Care Planning:   Does patient have an Advance Directive: reviewed and current.      CTN reviewed discharge instructions, medical action plan and red flags with patient and discussed any barriers to care and/or understanding of plan of care after discharge. Discussed appropriate site of care based on symptoms and resources available to patient including: PCP  Specialist  When to call [address] The patient agrees to contact the PCP office for questions related to their healthcare. Patients top risk factors for readmission: medical condition-pneumonia  Interventions to address risk factors: Obtained and reviewed discharge summary and/or continuity of care documents      Non-[facility] follow up appointment(s): n/a    CTN provided contact information for future needs. Plan for follow-up call in 7-10 days based on severity of symptoms and risk factors. Plan for next call: symptom management-cough  dyspnea  follow up appointment-with PCP  referrals-to [facility] Transitions Subsequent and Final Call    Subsequent and Final Calls  Care Transitions Interventions  Other Interventions:            Follow Up  Future Appointments   Date Time Provider Glenroy Falcon   4/6/2022 11:00 AM DES Shea CNP [facility]   4/27/2022  1:45 PM DES Shea CNP [facility]   5/13/2022 11:00 AM Sinan Morelos MD [address], [address] [place] [address] Care Transitions Nurse   [phone]

## 2022-04-01 NOTE — DISCHARGE SUMMARY
[address] [facility]    Discharge Summary     Patient ID: Tahira Garces  :  1945   MRN: [id number]     ACCOUNT:  [de-identified]   Patient's PCP: DES Bella CNP  Admit Date: 3/22/2022   Discharge Date: 2022    Length of Stay: 2  Code Status:  Prior  Admitting Physician: Carly Montero MD  Discharge Physician: Debbi Alejandro DO     Active Discharge Diagnoses:       Primary Problem  Hospital-acquired pneumonia      Hospital Problems  Active Hospital Problems    Diagnosis Date Noted    Hospital-acquired pneumonia [J18.9, Y95] 2022    NSTEMI (non-ST elevated myocardial infarction) ([address] [I21.4] 2022    History of degenerative disc disease [Z87.39] 2022    Hx of diabetes mellitus [Z86.39] 2022    Sepsis ([address] [A41.9] 2022    Hx of Esophageal dysmotility [K22.4] 2022    Acute respiratory failure with hypoxemia ([address] [J96.01]     Recurrent aspiration pneumonia ([address] [J69.0] 2021    Hiatal hernia [K44.9] 2021    Gastroesophageal reflux disease [K21.9]     Uncomplicated opioid dependence ([address] [F11.20] 10/24/2016    Primary osteoarthritis involving multiple joints [M89.49]        Admission Condition:  poor     Discharged Condition: good    Hospital Stay:       Hospital Course: Aure Aiken is a 68-year-old female with past medical history of chronic biliary pancreatitis, type 2 diabetes mellitus without long-term insulin use, hiatal hernia, arthritis and degenerative disc disease on chronic pain meds, depression, hyperlipidemia, and recurrent pneumonias who presented on 3/22 with shortness of breath and altered mental status and was admitted for management of hospital-acquired pneumonia with sepsis and acute hypoxemic resp failure. Pt also found to have NSTEMI on admission and started on heparin drip which is given for total of 48 hours. .   Pulmonology and cardiology consulted. Patient well managed with IV antibiotics. AMS resolved, VS stabilized, and patient returned to baseline O2 needs of room air. NSTEMI determined to be secondary to demand ischemia.   Patient had significant improvement overall and is medically stable for discharge    Significant therapeutic interventions: Broad-spectrum IV antibiotics, Symbicort and Spiriva inhalers, Lopressor, aspirin, continuation of appropriate home medications    Significant Diagnostic Studies:   Labs / Micro:  CBC:   Lab Results   Component Value Date    WBC 6.3 03/24/2022    RBC 3.69 03/24/2022    RBC 4.55 05/09/2012    HGB 10.7 03/24/2022    HCT 32.5 03/24/2022    MCV 88.0 03/24/2022    MCH 29.0 03/24/2022    MCHC 32.9 03/24/2022    RDW 14.7 03/24/2022     03/24/2022     05/09/2012     BMP:    Lab Results   Component Value Date    GLUCOSE 99 03/23/2022    GLUCOSE 87 05/09/2012     03/23/2022    K 3.8 03/23/2022     03/23/2022    CO2 22 03/23/2022    ANIONGAP 9 03/23/2022    BUN 12 03/23/2022    CREATININE 0.64 03/23/2022    BUNCRER NOT REPORTED 06/24/2021    CALCIUM 8.2 03/23/2022    LABGLOM >60 03/23/2022    GFRAA >60 03/23/2022    GFR      03/23/2022     HFP:    Lab Results   Component Value Date    PROT 7.1 03/22/2022     CMP:    Lab Results   Component Value Date    GLUCOSE 99 03/23/2022    GLUCOSE 87 05/09/2012     03/23/2022    K 3.8 03/23/2022     03/23/2022    CO2 22 03/23/2022    BUN 12 03/23/2022    CREATININE 0.64 03/23/2022    ANIONGAP 9 03/23/2022    ALKPHOS 129 03/22/2022    ALT 18 03/22/2022    AST 27 03/22/2022    BILITOT 0.41 03/22/2022    LABALBU 3.9 03/22/2022    LABALBU 4.4 05/09/2012    ALBUMIN 1.5 03/01/2022    LABGLOM >60 03/23/2022    GFRAA >60 03/23/2022    GFR      03/23/2022    PROT 7.1 03/22/2022    CALCIUM 8.2 03/23/2022     PT/INR:    Lab Results   Component Value Date    PROTIME 14.1 03/22/2022    INR 1.1 03/22/2022     PTT:   Lab Results   Component kg/m^2 Rhythm: Within normal limits HR: 88 bpm BP: 137/70 mmHg CONCLUSIONS Summary Normal left ventricle size, wall thickness and function with an estimated EF > 55%. Mild mitral regurgitation. Mild tricuspid regurgitation. Estimated right ventricular systolic pressure is 63BZNI. Signature ----------------------------------------------------------------------------  Electronically signed by Eliane Masters(Sonographer) on 03/23/2022 02:57  PM ---------------------------------------------------------------------------- ----------------------------------------------------------------------------  Electronically signed by Edwar Russo(Interpreting physician) on 03/23/2022  09:54 PM ---------------------------------------------------------------------------- FINDINGS Left Atrium Left atrium is normal in size. Left Ventricle Normal left ventricle size, wall thickness and function with an estimated EF > 55%. No segmental wall motion abnormalities seen. Right Atrium Right atrium is normal in size. Right Ventricle Normal right ventricular size and function. Mitral Valve No obvious valvular abnormality seen. Mild mitral regurgitation. Aortic Valve No obvious valvular abnormality seen. No evidence of aortic insufficiency or stenosis. Tricuspid Valve No obvious valvular abnormality. Mild tricuspid regurgitation. No pulmonary hypertension. Estimated right ventricular systolic pressure is 26TLHH. Pulmonic Valve Pulmonic valve was not well visualized. No evidence of pulmonic insufficiency or stenosis. Pericardial Effusion No significant pericardial effusion is seen. Pleural Effusion No pleural effusion seen. Miscellaneous Normal aortic root dimension. E/E' average = 8.7.  M-mode / 2D Measurements & Calculations:   LVIDd:3.99 cm(3.7 - 5.6 cm)      Diastolic PAXFVS:81.6 ml  IKLMP:0.08 cm(2.2 - 4.0 cm)      Systolic ZRLUWE:71.4 ml  ZDYY:0.5 cm(0.6 - 1.1 cm)        Aortic Root:2.9 cm(2.0 - 3.7 cm)  LVPWd:0.74 cm(0.6 - 1.1 cm)      LA Dimension: 3.6 cm(1.9 - 4.0 cm)  Fractional Shortenin.07 %    LA volume/Index: 59.5 ml /33m^2  Calculated LVEF (%): 64.25 %     LVOT:1.9 cm   Mitral:                                 Aortic   Peak E-Wave: 0.78 m/s                   Peak Velocity: 1.42 m/s  Peak A-Wave: 0.97 m/s                   Mean Velocity: 0.99 m/s  E/A Ratio: 0.8                          Peak Gradient: 8.07 mmHg  Peak Gradient: 2.41 mmHg                Mean Gradient: 4 mmHg  Deceleration Time: 183 msec                                           Area (continuity): 2.6 cm^2                                          AV VTI: 27.5 cm   Tricuspid:                              Pulmonic:   Estimated RVSP: 29 mmHg  Peak TR Velocity: 2.59 m/s  Peak TR Gradient: 26.8324 mmHg  Estimated RA Pressure: 3 mmHg                                          Estimated PASP: 29.83 mmHg  Septal Wall E' velocity:0.08 m/s Lateral Wall E' velocity:0.10 m/s    CT ABDOMEN PELVIS W IV CONTRAST Additional Contrast? None    Addendum Date: 3/22/2022    ADDENDUM: Impression 5:  Fluid distention of a few jejunal loops that could be seen with mild enteritis. Result Date: 3/22/2022  EXAMINATION: CT OF THE ABDOMEN AND PELVIS WITH CONTRAST 3/22/2022 5:50 am TECHNIQUE: CT of the abdomen and pelvis was performed with the administration of intravenous contrast. Multiplanar reformatted images are provided for review. Dose modulation, iterative reconstruction, and/or weight based adjustment of the mA/kV was utilized to reduce the radiation dose to as low as reasonably achievable.  COMPARISON: Chest, abdomen, and pelvis CT 2021; abdomen and pelvis CT 2016 HISTORY: ORDERING SYSTEM PROVIDED HISTORY: abdominal pain sepsis TECHNOLOGIST PROVIDED HISTORY: abdominal pain sepsis Decision Support Exception - unselect if not a suspected or confirmed emergency medical condition->Emergency Medical Condition (MA) Reason for Exam: sob, abd pain FINDINGS: Patient motion in some areas, lymphadenopathy. No acute fractures nor suspicious bony lesions. 1. Patchy consolidative and groundglass opacities the lung bases most prominent in the right lower lobe suspicious for pneumonia or aspiration. Please refer to the concurrent chest CT for additional detail. 2. Suggestion of circumferential wall thickening in the distal thoracic esophagus potentially due to reflux esophagitis given the presence of a small sliding hiatal hernia. No overtly suspicious features are present to suggest malignancy, although that is not excluded given the presence of some nonenlarged left paraesophageal lymph nodes. Consider endoscopy on a nonemergent basis. 3. Mild intrahepatic and moderate extrahepatic biliary dilation is not significantly changed since 07/01/2016. There is also unchanged mild dilation of the main pancreatic duct with no findings to suggest an obstructing ampullary or pancreatic mass. The findings are likely age-related with pancreatic parenchymal atrophy likely contributing to the appearance of the main pancreatic duct. Consider further evaluation with MRCP only if there are clinical findings of cholestasis. 4. A few additional incidental findings as above. FL UGI W AIR CONTRAST    Result Date: 3/9/2022  EXAMINATION: DOUBLE CONTRAST UPPER GI SERIES 3/9/2022 TECHNIQUE: Double contrast upper GI series was performed with barium and air contrast. FLUOROSCOPY DOSE AND TYPE OR TIME AND EXPOSURES: 1 minute 29 seconds; D AP 1434 mGy m2 COMPARISON: CT 07/02/2021 HISTORY: ORDERING SYSTEM PROVIDED HISTORY: hiatal hernia + gerd TECHNOLOGIST PROVIDED HISTORY: hiatal hernia + gerd Reason for Exam: hiatal hernia, gerd Additional signs and symptoms: pt yhas pneumonia, unable to drink much barium FINDINGS: The exam is somewhat limited as the patient could only swallow small quantities of barium. The esophagus distends normally with occasional non propulsive tertiary contractions/spasm of the esophagus.   There is a moderate to large sliding hiatal hernia involving the proximal 1/3 to 1/2 of the stomach. The remainder of the stomach is grossly unremarkable. On the static images obtained there is questionable narrowing at the GE junction although this is probably due to underdistention as contrast flows freely through the region. Endoscopy is recommended, if not done previously, to better evaluate the GE junction region. There is a small amount of reflux demonstrated under fluoroscopy. Duodenal bulb and sweep as visualized are grossly unremarkable. Small duodenal diverticulum. Patchy parenchymal opacities in the visualized lungs. Limited exam as the patient could only ingest small quantities of barium. Moderate to large hiatal hernia. XR CHEST PORTABLE    Result Date: 3/22/2022  EXAMINATION: ONE XRAY VIEW OF THE CHEST 3/22/2022 4:59 am COMPARISON: Chest portable March 8, 2022 HISTORY: ORDERING SYSTEM PROVIDED HISTORY: hypoxia TECHNOLOGIST PROVIDED HISTORY: hypoxia Reason for Exam: PT CO cough with SOB and hypoxia X 1 day. PT also CO Confusion at this time. FINDINGS: The heart is normal in size and configuration. The mediastinal contours are within normal limits. Multifocal bilateral lung ill-defined consolidation is worsened in comparison with the prior study. The pleural surfaces are normal and no evidence of a pleural effusion is seen. Bones and soft tissues are unremarkable. Interval worsening bilateral lung multifocal ill-defined consolidation consistent with pneumonia versus alveolar edema. XR CHEST PORTABLE    Result Date: 3/8/2022  EXAMINATION: ONE XRAY VIEW OF THE CHEST 3/8/2022 6:00 am COMPARISON: 03/10/2021 HISTORY: ORDERING SYSTEM PROVIDED HISTORY: sob, hypoxia FINDINGS: Stable size and configuration of the cardiomediastinal silhouette. Moderate hiatal hernia. Prominent perihilar and interstitial lung markings with patchy airspace opacities suggestive of diffuse pulmonary edema.   No sizable pleural effusion or pneumothorax. No acute osseous findings. 1. Prominent perihilar and interstitial lung markings with scattered patchy airspace opacities. Findings are suggestive of diffuse pulmonary edema. Multifocal pneumonia in the appropriate clinical setting is possible. CT CHEST PULMONARY EMBOLISM W CONTRAST    Result Date: 3/22/2022  EXAMINATION: CTA OF THE CHEST 3/22/2022 5:49 am TECHNIQUE: CTA of the chest was performed after the administration of intravenous contrast.  Multiplanar reformatted images are provided for review. MIP images are provided for review. Dose modulation, iterative reconstruction, and/or weight based adjustment of the mA/kV was utilized to reduce the radiation dose to as low as reasonably achievable. COMPARISON: Portable chest obtained approximately 1 hour earlier. CT chest/abdomen/pelvis without contrast dated 07/02/2021. HISTORY: ORDERING SYSTEM PROVIDED HISTORY: hypoxia tachycardia TECHNOLOGIST PROVIDED HISTORY: hypoxia tachycardia Decision Support Exception - unselect if not a suspected or confirmed emergency medical condition->Emergency Medical Condition (MA) Reason for Exam: sob, abd pain FINDINGS: Pulmonary Arteries: Pulmonary arteries are adequately opacified for evaluation. No evidence of intraluminal filling defect to suggest pulmonary embolism. Main pulmonary artery is normal in caliber. Mediastinum: There is mild subcarinal adenopathy. The heart and pericardium are unremarkable. There is a moderate-sized hiatal hernia. The aorta is normal in caliber. The central airways are clear. Lungs/pleura: Patchy bilateral consolidating infiltrate is present most pronounced at the posterior right base. No pneumothorax or pleural effusion. Upper Abdomen: Limited images of the upper abdomen are unremarkable. Soft Tissues/Bones: No acute bone or soft tissue abnormality. There is complete collapse of the right breast implant.      No evidence of pulmonary embolus. Bilateral consolidating pneumonia, right worse than left. Associated mild mediastinal adenopathy. Moderate-sized hiatal hernia. Complete collapse of right breast implant. Consultations:    Consults:     Final Specialist Recommendations/Findings:   PHARMACY TO DOSE VANCOMYCIN  IP CONSULT TO PULMONOLOGY  IP CONSULT TO FAMILY MEDICINE  IP CONSULT TO CARDIOLOGY      The patient was seen and examined on day of discharge and this discharge summary is in conjunction with any daily progress note from day of discharge. Discharge plan:       Disposition: Home    Physician Follow Up:     Parish Mcclure, APRN - CNP  [address] [facility] [address] [place] [address]  [phone]    In 1 week  Hospital follow up    Amadeo Jones MD  [facility] ([place], [place]) [address] [facility] [address] [facility]  [phone]    In 1 week      Elias Espinoitlyn [address] [facility]  [phone]             Requiring Further Evaluation/Follow Up POST HOSPITALIZATION/Incidental Findings: recurrent pneumonias, hiatal hernia    Diet: low fat, low cholesterol diet    Activity: As tolerated    Instructions to Patient:   Take all medications as prescribed. If symptoms worsen or recur, please return to the ED.   Follow-up with PCP, pulmonologist, and cardiologist.      Discharge Medications:      Medication List      START taking these medications    aspirin 81 MG chewable tablet  Take 1 tablet by mouth daily     metoprolol tartrate 25 MG tablet  Commonly known as: LOPRESSOR  Take 0.5 tablets by mouth 2 times daily        CONTINUE taking these medications    atorvastatin 80 MG tablet  Commonly known as: LIPITOR  Take 1 tablet by mouth daily     dorzolamide-timolol 22.3-6.8 MG/ML ophthalmic solution  Commonly known as: COSOPT     fenofibrate 160 MG tablet  Commonly known as: TRIGLIDE  Take 1 tablet by mouth daily     gabapentin 800 MG tablet  Commonly known as: NEURONTIN     Handicap Placard Misc  by Does not apply route Exp 3/16/2026 omega-3 acid ethyl esters 1 g capsule  Commonly known as: LOVAZA  Take 2 capsules by mouth 2 times daily     oxyCODONE-acetaminophen  MG per tablet  Commonly known as: PERCOCET     pantoprazole 40 MG tablet  Commonly known as: PROTONIX  Take 1 tablet by mouth 2 times daily (before meals)     * QUEtiapine 200 MG tablet  Commonly known as: SEROQUEL  TAKE 1 TABLET DAILY     * QUEtiapine 50 MG tablet  Commonly known as: SEROQUEL  TAKE 1 TO 2 TABLETS BY MOUTH AT BEDTIME AS NEEDED FOR SLEEP     * Trelegy Ellipta 100-62.5-25 MCG/INH Aepb  Generic drug: fluticasone-umeclidin-vilant  Inhale 1 puff into the lungs daily     vitamin B-12 1000 MCG tablet  Commonly known as: CYANOCOBALAMIN  TAKE 2 TABLETS BY MOUTH EVERY DAY         * This list has 3 medication(s) that are the same as other medications prescribed for you. Read the directions carefully, and ask your doctor or other care provider to review them with you. ASK your doctor about these medications    ALPRAZolam 0.5 MG tablet  Commonly known as: XANAX  Take 1 tablet by mouth daily as needed for Sleep or Anxiety for up to 14 days. Ask about: Should I take this medication?     levoFLOXacin 750 MG tablet  Commonly known as: Levaquin  Take 1 tablet by mouth daily for 7 days  Ask about: Should I take this medication? * fluticasone-umeclidin-vilant 100-62.5-25 MCG/INH Aepb  Commonly known as: TRELEGY ELLIPTA  Inhale 1 puff into the lungs daily for 14 days [facility] ab5w exp 9/2023  Ask about: Should I take this medication? * This list has 1 medication(s) that are the same as other medications prescribed for you. Read the directions carefully, and ask your doctor or other care provider to review them with you.                Where to Get Your Medications      These medications were sent to [address] [place] [address] [address]   [address], [address] [facility] [address]    Phone: [phone] · aspirin 81 MG chewable tablet  · levoFLOXacin 750 MG tablet  · metoprolol tartrate 25 MG tablet           Electronically signed by   Berna Davey DO  4/1/2022  2:02 PM      Thank you DES Saul - TORITO for the opportunity to be involved in this patient's care.

## 2022-04-01 NOTE — CARE COORDINATION
Please call and check on her and check in   Admitted 3/22-3/24  She was not discharged with home care. Why not? Did not qualify? I am more than happy to order, but need reason why I am??? If it is just because she is sick, it will not be covered. They did not feel any PT or OT was needed? No medications for skilled nursing? Need something for them to go. .. if she is still sick with fevers then she may need to go back. Is she taking new prescription for the Levaquin 750 mg daily for 7 days?   If that is not working she may need IV antibiotics (inpatient)

## 2022-04-06 ENCOUNTER — OFFICE VISIT (OUTPATIENT)
Dept: FAMILY MEDICINE CLINIC | Age: 77
End: 2022-04-06
Payer: MEDICARE

## 2022-04-06 VITALS
SYSTOLIC BLOOD PRESSURE: 120 MMHG | DIASTOLIC BLOOD PRESSURE: 84 MMHG | HEART RATE: 88 BPM | BODY MASS INDEX: 24.55 KG/M2 | TEMPERATURE: 97.7 F | WEIGHT: 143 LBS | RESPIRATION RATE: 22 BRPM | OXYGEN SATURATION: 94 %

## 2022-04-06 DIAGNOSIS — F41.9 ANXIETY: Chronic | ICD-10-CM

## 2022-04-06 DIAGNOSIS — J69.0 RECURRENT ASPIRATION PNEUMONIA (HCC): ICD-10-CM

## 2022-04-06 DIAGNOSIS — J18.9 HOSPITAL-ACQUIRED PNEUMONIA: ICD-10-CM

## 2022-04-06 DIAGNOSIS — R13.19 ESOPHAGEAL DYSPHAGIA: ICD-10-CM

## 2022-04-06 DIAGNOSIS — Z09 HOSPITAL DISCHARGE FOLLOW-UP: ICD-10-CM

## 2022-04-06 DIAGNOSIS — I21.4 NSTEMI (NON-ST ELEVATED MYOCARDIAL INFARCTION) (HCC): Primary | ICD-10-CM
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
## 2022-04-06 DIAGNOSIS — J96.01 ACUTE RESPIRATORY FAILURE WITH HYPOXEMIA (HCC): ICD-10-CM

## 2022-04-06 PROBLEM — E11.9 TYPE 2 DIABETES MELLITUS (HCC): Status: ACTIVE | Noted: 2022-04-06

## 2022-04-06 PROCEDURE — 1111F DSCHRG MED/CURRENT MED MERGE: CPT | Performed by: NURSE PRACTITIONER

## 2022-04-06 PROCEDURE — 99495 TRANSJ CARE MGMT MOD F2F 14D: CPT | Performed by: NURSE PRACTITIONER

## 2022-04-06 RX ORDER — DULOXETIN HYDROCHLORIDE 30 MG/1
CAPSULE, DELAYED RELEASE ORAL
COMMUNITY
Start: 2022-03-23 | End: 2022-06-27 | Stop reason: ALTCHOICE

## 2022-04-06 ASSESSMENT — PATIENT HEALTH QUESTIONNAIRE - PHQ9
4. FEELING TIRED OR HAVING LITTLE ENERGY: 3
10. IF YOU CHECKED OFF ANY PROBLEMS, HOW DIFFICULT HAVE THESE PROBLEMS MADE IT FOR YOU TO DO YOUR WORK, TAKE CARE OF THINGS AT HOME, OR GET ALONG WITH OTHER PEOPLE: 1
5. POOR APPETITE OR OVEREATING: 2
SUM OF ALL RESPONSES TO PHQ QUESTIONS 1-9: 11
2. FEELING DOWN, DEPRESSED OR HOPELESS: 2
SUM OF ALL RESPONSES TO PHQ QUESTIONS 1-9: 11
8. MOVING OR SPEAKING SO SLOWLY THAT OTHER PEOPLE COULD HAVE NOTICED. OR THE OPPOSITE, BEING SO FIGETY OR RESTLESS THAT YOU HAVE BEEN MOVING AROUND A LOT MORE THAN USUAL: 0
SUM OF ALL RESPONSES TO PHQ QUESTIONS 1-9: 11
6. FEELING BAD ABOUT YOURSELF - OR THAT YOU ARE A FAILURE OR HAVE LET YOURSELF OR YOUR FAMILY DOWN: 1
7. TROUBLE CONCENTRATING ON THINGS, SUCH AS READING THE NEWSPAPER OR WATCHING TELEVISION: 0
1. LITTLE INTEREST OR PLEASURE IN DOING THINGS: 2
SUM OF ALL RESPONSES TO PHQ9 QUESTIONS 1 & 2: 4
SUM OF ALL RESPONSES TO PHQ QUESTIONS 1-9: 11
9. THOUGHTS THAT YOU WOULD BE BETTER OFF DEAD, OR OF HURTING YOURSELF: 0
3. TROUBLE FALLING OR STAYING ASLEEP: 1

## 2022-04-06 NOTE — PROGRESS NOTES
[address] [facility]  [phone]    4/6/22     Patient ID  Myron Potter is a 64 y.o. female  Established patient    Chief Complaint  Myron Potter presents today for Follow-Up from Hospital      ASSESSMENT/PLAN  1. NSTEMI (non-ST elevated myocardial infarction) [facility])  -     [facility]  2. Recurrent aspiration pneumonia ([address]  3. Acute respiratory failure with hypoxemia [facility])  -     [facility]  4. Hospital-acquired pneumonia  5. Esophageal dysphagia  6. Anxiety     Pulmonology scheduled 4/25  Schedule with cardiology     Return in about 4 weeks (around 5/4/2022). Patient Care Team:  DES Barfield CNP as PCP - General (Nurse Practitioner Family)  DES Barfield CNP as PCP - [facility] Empaneled Provider  Damian Castellon MD (Anesthesiology)  Melany Polanco DO as Surgeon (Orthopedic Surgery)  Manny Rosas MD as Consulting Physician (Gastroenterology)  Sandeep Jaramillo DO as Consulting Physician (Bariatric Surgery)  Joana Reyes LPN as Care Transitions Nurse    SUBJECTIVE/OBJECTIVE  History of Present illness / Visit Summary   HPI    Admission -   Hospital Course:       Aure Aiken is a 68-year-old female with past medical history of chronic biliary pancreatitis, type 2 diabetes mellitus without long-term insulin use, hiatal hernia, arthritis and degenerative disc disease on chronic pain meds, depression, hyperlipidemia, and recurrent pneumonias who presented on 3/22 with shortness of breath and altered mental status and was admitted for management of hospital-acquired pneumonia with sepsis and acute hypoxemic resp failure. Pt also found to have NSTEMI on admission and started on heparin drip which is given for total of 48 hours. .   Pulmonology and cardiology consulted. Patient well managed with IV antibiotics.   AMS resolved, VS stabilized, and patient returned to baseline O2 needs of room air.  NSTEMI determined to be secondary to demand ischemia. Patient had significant improvement overall and is medically stable for discharge    Start Metoprolol BID and aspirin daily   Continue Trelegy inhaler     Review of Systems  Review of Systems   Constitutional: Positive for fatigue. Negative for activity change, appetite change (poor), chills, fever and unexpected weight change. HENT: Positive for congestion, postnasal drip, rhinorrhea and trouble swallowing. Negative for ear pain and sore throat. Respiratory: Positive for cough (productive), chest tightness (w/cough ) and shortness of breath (w/exertion). Negative for wheezing. Chronic pneumonia, bronchitis. Aspiration? ?   Cardiovascular: Negative for chest pain and palpitations. Gastrointestinal: Positive for abdominal distention. Negative for abdominal pain, diarrhea, nausea and vomiting. Following with GI  Recent EGD and colonoscopy. Large hiatal hernia. May be rationale for ongoing pneumonia? Aspiration pneumonia? Musculoskeletal: Positive for arthralgias (right knee), gait problem, joint swelling (right knee) and myalgias. Following with pain management and orthopedic    Skin: Negative for pallor and rash. Allergic/Immunologic: Positive for environmental allergies. Neurological: Negative for dizziness, weakness, light-headedness and headaches. Hematological: Negative for adenopathy. Psychiatric/Behavioral: Positive for agitation, dysphoric mood and sleep disturbance. The patient is nervous/anxious. Cares for ill son - has partial custody of grandson        Physical exam   Physical Exam  Vitals and nursing note reviewed. Constitutional:       General: She is not in acute distress. Appearance: Normal appearance. She is well-developed, well-groomed and overweight. She is not ill-appearing or toxic-appearing. Cardiovascular:      Rate and Rhythm: Normal rate and regular rhythm.   No extrasystoles are present. Heart sounds: Normal heart sounds, S1 normal and S2 normal. No murmur heard. Pulmonary:      Effort: Pulmonary effort is normal. No prolonged expiration or respiratory distress. Breath sounds: Normal breath sounds. Decreased air movement present. No wheezing, rhonchi or rales. Musculoskeletal:      Lumbar back: Spasms and tenderness present. Decreased range of motion. Right knee: Decreased range of motion. Tenderness present. Left knee: Decreased range of motion. Tenderness present. Right lower leg: No edema. Left lower leg: No edema. Skin:     General: Skin is warm and dry. Coloration: Skin is not ashen, cyanotic, jaundiced or pale. Neurological:      Mental Status: She is alert and oriented to person, place, and time. Motor: Motor function is intact. Gait: Gait abnormal.   Psychiatric:         Attention and Perception: Attention and perception normal.         Mood and Affect: Mood is anxious. Mood is not depressed. Affect is not flat. Speech: Speech normal.         Behavior: Behavior is not hyperactive. Behavior is cooperative. Thought Content: Thought content normal. Thought content does not include suicidal ideation. Thought content does not include suicidal plan. Cognition and Memory: Cognition and memory normal.         Judgment: Judgment normal.           Electronically signed by Shahrzad Luther, APRN - CNP, APRN-CNP on 4/24/2022 at 8:30 PM    Please note that this chart was generated using voice recognition Dragon dictation software. Although every effort was made to ensure the accuracy of this automated transcription, some errors in transcription may have occurred.

## 2022-04-07 ENCOUNTER — CARE COORDINATION (OUTPATIENT)
Dept: CASE MANAGEMENT | Age: 77
End: 2022-04-07

## 2022-04-07 NOTE — CARE COORDINATION
[facility] Trans[facility] Follow Up Call    2022    Patient: Mirian Aiken  Patient : 1945   MRN: <[id number]>  Reason for Admission: Acute resp faailure with hypoxia, PNA    Discharge Date: 3/24/22 RARS: Readmission Risk Score: 14 ( )         Spoke with: \"Shilpa\"    [facility] stated she went to PCP yesterday, does not qualify for [facility] at this time. Stated her PCP is going to send referral for Cardiologist that can get her in the soonest. Stated PCP office will call and update her. Denies worsening cough, SOB, CP/pressure. Stated appetite is OK, no immediate needs or concerns. Needs to be reviewed by the provider   Additional needs identified to be addressed with provider: No  none             Method of communication with provider : none      Care Transition Nurse (CTN) contacted the patient by telephone to follow up after admission on 3/22/22. Verified name and  with patient as identifiers. Addressed changes since last contact: not qualified for [facility], PCP will refer to Cardiology  Discussed follow-up appointments. CTN reviewed discharge instructions, medical action plan and red flags with patient and discussed any barriers to care and/or understanding of plan of care after discharge. Discussed appropriate site of care based on symptoms and resources available to patient including: PCP  Specialist  When to call [address] The patient agrees to contact the PCP office for questions related to their healthcare. Patients top risk factors for readmission: falls  utilization of services  Interventions to address risk factors: Obtained and reviewed discharge summary and/or continuity of care documents      CTN provided contact information for future needs. Plan for follow-up call in 5-7 days based on severity of symptoms and risk factors.   Plan for next call: referrals-Cardiology from PCP office      Care Transitions Subsequent and Final Call    Subsequent and Final Calls  Do you have any ongoing symptoms?: No  Have your medications changed?: No  Do you have any questions related to your medications?: No  Do you currently have any active services?: No  Do you have any needs or concerns that I can assist you with?: No  Identified Barriers: None  Care Transitions Interventions  Other Interventions:            Follow Up  Future Appointments   Date Time Provider Glenroy Falcon   4/27/2022  1:45 PM DES Mosley - CNP [facility]   5/13/2022 11:00 AM Rob Lee MD [facility] Onel Saleh RN

## 2022-04-09 DIAGNOSIS — J30.89 NON-SEASONAL ALLERGIC RHINITIS DUE TO OTHER ALLERGIC TRIGGER: ICD-10-CM

## 2022-04-12 NOTE — TELEPHONE ENCOUNTER
LOV 4/6/22  RTO F/U scheduled  LRF 11/30/21    Health Maintenance   Topic Date Due    Hepatitis C screen  Never done    DTaP/Tdap/Td vaccine (1 - Tdap) Never done    Shingles Vaccine (1 of 2) Never done    Lipid screen  03/01/2023    Depression Monitoring  04/06/2023    DEXA (modify frequency per FRAX score)  Completed    Flu vaccine  Completed    Pneumococcal 65+ years Vaccine  Completed    COVID-19 Vaccine  Completed    Hepatitis A vaccine  Aged Out    Hib vaccine  Aged Out    Meningococcal (ACWY) vaccine  Aged Out             (applicable per patient's age: Cancer Screenings, Depression Screening, Fall Risk Screening, Immunizations)    Hemoglobin A1C (%)   Date Value   03/01/2022 5.6   08/12/2020 5.6   02/19/2019 5.5     Microalb/Crt.  Ratio (mcg/mg creat)   Date Value   05/12/2016 7     LDL Cholesterol (mg/dL)   Date Value   03/01/2022          LDL Calculated (mg/dL)   Date Value   11/13/2014 109     AST (U/L)   Date Value   03/22/2022 27     ALT (U/L)   Date Value   03/22/2022 18     BUN (mg/dL)   Date Value   03/23/2022 12      (goal A1C is < 7)   (goal LDL is <100) need 30-50% reduction from baseline     BP Readings from Last 3 Encounters:   04/06/22 120/84   03/24/22 131/83   03/15/22 124/84    (goal /80)      All Future Testing planned in [facility]:  Lab Frequency Next Occurrence   EGD Once 11/11/2021       Next Visit Date:  Future Appointments   Date Time Provider Glenroy Falcon   4/27/2022  1:45 PM Marli Navarro APRN - CNP [facility]   5/3/2022  1:30 PM Theron Mijares APRN - CNP [facility]   5/13/2022 11:00 AM [facility]            Patient Active Problem List:     Hyperlipidemia     Fibromyalgia     Osteopenia     Hx of bilateral breast implants     Arthritis of shoulder region, right     Anxiety     Anemia, normocytic normochromic     Chronic pain associated with significant psychosocial dysfunction     Primary osteoarthritis involving multiple joints     Spondylosis 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     Lumbar and sacral arthritis     Chronic biliary pancreatitis (HCC)     Moderate episode of recurrent major depressive disorder (HCC)     Uncomplicated opioid dependence (HCC)     Hyperuricemia     Primary open-angle glaucoma, right eye, mild stage     Primary open-angle glaucoma, left eye, mild stage     Gastroesophageal reflux disease     Esophageal dysphagia     Hiatal hernia     Recurrent aspiration pneumonia (HCC)     Acute respiratory failure with hypoxemia (HCC)     Simple chronic bronchitis ([facility] [address]     Hospital-acquired pneumonia     NSTEMI (non-ST elevated myocardial infarction) ([address]     History of degenerative disc disease     Hx of diabetes mellitus     Sepsis ([address]     Hx of Esophageal dysmotility     Type 2 diabetes mellitus

## 2022-04-13 ENCOUNTER — CARE COORDINATION (OUTPATIENT)
Dept: CASE MANAGEMENT | Age: 77
End: 2022-04-13

## 2022-04-13 ENCOUNTER — TELEPHONE (OUTPATIENT)
Dept: FAMILY MEDICINE CLINIC | Age: 77
End: 2022-04-13

## 2022-04-13 RX ORDER — MONTELUKAST SODIUM 10 MG/1
TABLET ORAL
Qty: 90 TABLET | Refills: 1 | Status: SHIPPED | OUTPATIENT
Start: 2022-04-13 | End: 2022-06-27 | Stop reason: SDUPTHER

## 2022-04-13 NOTE — CARE COORDINATION
[facility] Follow Up Call    2022    Patient: Christ Aiken  Patient : 1945   MRN: <[id number]>  Reason for Admission: Hospital-acquired pneumonia   Discharge Date: 3/24/22 RARS: Readmission Risk Score: 14 ( )         Spoke with: Subsequent transitional call. Spoke to : kevin. Pt states she is feeling fine. \" I'm just tired\" pt denies cough. She is short of breath on exertion. Uses nebulizer and Trilogy. She has a cardiology appt at [facility] in May. She was seen by her PCP on  and has another visit on 22. pt states she does not qualify for [facility]. No new issues or concerns. Final call. Patient is aware of when to contact MD with any new or worsening symptoms. Advised to contact PCP  with any health concerns for early outpatient intervention in an effort to avoid hospitalization. Report any worsening symptoms to PCP and/or Call 911 and/or GO TO EMERGENCY ROOM if symptoms are severe. Expresses understanding. Care Transitions Follow Up Call    Needs to be reviewed by the provider   Additional needs identified to be addressed with provider: No  none             Method of communication with provider : none      Care Transition Nurse (CTN) contacted the patient by telephone to follow up after admission on 3/8/33 Verified name and  with patient as identifiers. Addressed changes since last contact: none  Discussed follow-up appointments. If no appointment was previously scheduled, appointment scheduling offered: No.   Is follow up appointment scheduled within 7 days of discharge? Yes. Advance Care Planning:   Does patient have an Advance Directive: reviewed and current. CTN reviewed discharge instructions, medical action plan and red flags with patient and discussed any barriers to care and/or understanding of plan of care after discharge.  Discussed appropriate site of care based on symptoms and resources available to patient including: PCP  Specialist  When to call [address] The patient agrees to contact the PCP office for questions related to their healthcare. Patients top risk factors for readmission: medical condition-pneumonia  Interventions to address risk factors: Obtained and reviewed discharge summary and/or continuity of care documents      Non-[facility] follow up appointment(s):n/a    CTN provided contact information for future needs. No further follow-up call indicated based on severity of symptoms and risk factors. Plan for next call: n/a          Care Transitions Subsequent and Final Call    Subsequent and Final Calls  Care Transitions Interventions  Other Interventions:            Follow Up  Future Appointments   Date Time Provider Glenroy Falcon   4/27/2022  1:45 PM DES Breaux Cea, CNP [facility]   5/3/2022  1:30 PM DES Jimenez CNP [facility]   5/13/2022 11:00 AM Remy Logan MD [facility] [address] Care Transitions Nurse   [phone]

## 2022-04-13 NOTE — TELEPHONE ENCOUNTER
Pt was recently in hospital for chest pain and pneumonia and wanted to know if you recommend her getting a second flu vaccine booster that are out now. please advise

## 2022-04-24 ASSESSMENT — ENCOUNTER SYMPTOMS
NAUSEA: 0
CHEST TIGHTNESS: 1
WHEEZING: 0
SHORTNESS OF BREATH: 1
TROUBLE SWALLOWING: 1
ABDOMINAL DISTENTION: 1
RHINORRHEA: 1
COUGH: 1
ABDOMINAL PAIN: 0
SORE THROAT: 0
VOMITING: 0
DIARRHEA: 0

## 2022-04-27 ENCOUNTER — OFFICE VISIT (OUTPATIENT)
Dept: FAMILY MEDICINE CLINIC | Age: 77
End: 2022-04-27
Payer: MEDICARE

## 2022-04-27 VITALS
TEMPERATURE: 97.7 F | HEART RATE: 87 BPM | RESPIRATION RATE: 22 BRPM | DIASTOLIC BLOOD PRESSURE: 78 MMHG | WEIGHT: 145 LBS | SYSTOLIC BLOOD PRESSURE: 110 MMHG | BODY MASS INDEX: 24.89 KG/M2 | OXYGEN SATURATION: 95 %

## 2022-04-27 DIAGNOSIS — J41.0 SIMPLE CHRONIC BRONCHITIS (HCC): Chronic | ICD-10-CM

## 2022-04-27 DIAGNOSIS — M15.9 PRIMARY OSTEOARTHRITIS INVOLVING MULTIPLE JOINTS: ICD-10-CM

## 2022-04-27 DIAGNOSIS — J69.0 RECURRENT ASPIRATION PNEUMONIA (HCC): ICD-10-CM

## 2022-04-27 DIAGNOSIS — F33.1 MODERATE EPISODE OF RECURRENT MAJOR DEPRESSIVE DISORDER (HCC): ICD-10-CM

## 2022-04-27 DIAGNOSIS — F41.9 ANXIETY: Primary | ICD-10-CM

## 2022-04-27 DIAGNOSIS — K21.9 GASTROESOPHAGEAL REFLUX DISEASE, UNSPECIFIED WHETHER ESOPHAGITIS PRESENT: ICD-10-CM

## 2022-04-27 DIAGNOSIS — E53.8 VITAMIN B12 DEFICIENCY: ICD-10-CM

## 2022-04-27 DIAGNOSIS — M47.816 SPONDYLOSIS OF LUMBAR REGION WITHOUT MYELOPATHY OR RADICULOPATHY: ICD-10-CM

## 2022-04-27 DIAGNOSIS — R13.19 ESOPHAGEAL DYSPHAGIA: ICD-10-CM

## 2022-04-27 PROCEDURE — 99215 OFFICE O/P EST HI 40 MIN: CPT | Performed by: NURSE PRACTITIONER

## 2022-04-27 RX ORDER — BENZONATATE 200 MG/1
200 CAPSULE ORAL 3 TIMES DAILY PRN
Qty: 30 CAPSULE | Refills: 0 | Status: SHIPPED | OUTPATIENT
Start: 2022-04-27 | End: 2022-05-04

## 2022-04-27 RX ORDER — LEVOFLOXACIN 750 MG/1
TABLET ORAL
COMMUNITY
Start: 2022-04-25 | End: 2022-06-27 | Stop reason: ALTCHOICE

## 2022-04-27 RX ORDER — PREDNISONE 10 MG/1
TABLET ORAL
COMMUNITY
Start: 2022-04-25 | End: 2022-06-27 | Stop reason: ALTCHOICE

## 2022-04-27 ASSESSMENT — ENCOUNTER SYMPTOMS
VOMITING: 0
CHEST TIGHTNESS: 1
DIARRHEA: 0
SHORTNESS OF BREATH: 1
NAUSEA: 0
TROUBLE SWALLOWING: 1
RHINORRHEA: 1
SORE THROAT: 0
COUGH: 1
ABDOMINAL DISTENTION: 1
ABDOMINAL PAIN: 0
WHEEZING: 0

## 2022-04-27 NOTE — PROGRESS NOTES
[address]  [phone]    4/27/22     Patient ID  Sergo Blackmon is a 64 y.o. female  Established patient    Chief Complaint  Sergo Blackmon presents today for Pre-op Exam (Hernia Surg Clearance- Postponed)      ASSESSMENT/PLAN  1. Anxiety  2. Moderate episode of recurrent major depressive disorder [facility])  Assessment & Plan:   Borderline controlled, continue current medications and continue current treatment plan  3. Recurrent aspiration pneumonia ([address]  -     Fluticasone-Umeclidin-Vilant 200-62.5-25 MCG/INH AEPB; Inhale 1 puff into the lungs daily Lot ct7c exp 10/2023, Disp-1 each, R-0Sample  -     DME Order for [facility]) as OP  4. Simple chronic bronchitis (HCC)  -     benzonatate (TESSALON) 200 MG capsule; Take 1 capsule by mouth 3 times daily as needed for Cough, Disp-30 capsule, R-0Normal  5. Esophageal dysphagia  6. Gastroesophageal reflux disease, unspecified whether esophagitis present  7. Primary osteoarthritis involving multiple joints  8. Spondylosis 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  9. Vitamin B12 deficiency     Correlate care with pulmonology. May benefit from bronchoscopy? Washing? Needs stress test still? Or cardiac cath with current symptoms? Will need follow up imaging for adenopathy noted     Return in about 2 months (around 6/27/2022). On this date 4/27/2022 I have spent 45+ minutes reviewing previous notes, test results and face to face with the patient discussing the diagnosis and importance of compliance with the treatment plan as well as documenting on the day of the visit.     Patient Care Team:  Eugune Dancer, APRN - CNP as PCP - General (Nurse Practitioner Family)  Eugune Dancer, APRN - CNP as PCP - [facility] Empaneled Provider  Gonzalez Matthews MD (Anesthesiology)  Shirin Jordan DO as Surgeon (Orthopedic Surgery)  Omaira Thompson MD as Consulting Physician (Gastroenterology)  Logan Sanchez DO as Consulting Physician (Bariatric Surgery)  DES Gutierrez CNP (Family Medicine)    SUBJECTIVE/OBJECTIVE  History of Present illness / Visit Summary   Patient presents for follow up   Reviewed health maintenance and any recent labs/imaging    Reviewed pulmonology notes. Needs bronchoscpy ? ??  CT chest from 3/22/22 indicates mild adenopathy? Need to follow . .. Doing better with breathing with initiation of Trelegy. Will need full pulmonary work up after cardiac work up  Noted stress test ordered during admission, never completed? Told to follow outpatient? Review of Systems  Review of Systems   Constitutional: Positive for appetite change (poor), chills and fatigue. Negative for activity change, fever and unexpected weight change. HENT: Positive for congestion, postnasal drip, rhinorrhea and trouble swallowing. Negative for ear pain and sore throat. Respiratory: Positive for cough (productive), chest tightness (w/cough ) and shortness of breath (w/exertion). Negative for wheezing. Chronic pneumonia, bronchitis. Aspiration? ? Recently saw pulmonology    Cardiovascular: Negative for chest pain and palpitations. Scheduled with cardiology    Gastrointestinal: Positive for abdominal distention. Negative for abdominal pain, diarrhea, nausea and vomiting. Following with GI  Recent EGD and colonoscopy. Large hiatal hernia. May be rationale for ongoing pneumonia? Aspiration pneumonia? Genitourinary: Positive for decreased urine volume. Musculoskeletal: Positive for arthralgias (right knee), gait problem, joint swelling (right knee) and myalgias. Following with pain management and orthopedic    Skin: Negative for pallor and rash. Allergic/Immunologic: Positive for environmental allergies. Neurological: Positive for light-headedness and headaches. Negative for dizziness and weakness. Hematological: Negative for adenopathy.    Psychiatric/Behavioral: Positive for agitation, dysphoric mood and sleep disturbance. The patient is nervous/anxious. Cares for ill son - has partial custody of grandson        Physical exam   Physical Exam  Vitals and nursing note reviewed. Constitutional:       General: She is not in acute distress. Appearance: Normal appearance. She is well-developed, well-groomed and overweight. She is not ill-appearing or toxic-appearing. Cardiovascular:      Rate and Rhythm: Normal rate and regular rhythm. No extrasystoles are present. Heart sounds: Normal heart sounds, S1 normal and S2 normal. No murmur heard. Pulmonary:      Effort: Pulmonary effort is normal. No prolonged expiration or respiratory distress. Breath sounds: Normal breath sounds. Decreased air movement present. No wheezing, rhonchi or rales. Musculoskeletal:      Lumbar back: Spasms and tenderness present. Decreased range of motion. Right knee: Decreased range of motion. Tenderness present. Left knee: Decreased range of motion. Tenderness present. Right lower leg: No edema. Left lower leg: No edema. Skin:     General: Skin is warm and dry. Coloration: Skin is not ashen, cyanotic, jaundiced or pale. Neurological:      Mental Status: She is alert and oriented to person, place, and time. Motor: Motor function is intact. Gait: Gait abnormal.   Psychiatric:         Attention and Perception: Attention and perception normal.         Mood and Affect: Mood is anxious. Mood is not depressed. Affect is not flat. Speech: Speech normal.         Behavior: Behavior is not hyperactive. Behavior is cooperative. Thought Content: Thought content normal. Thought content does not include suicidal ideation. Thought content does not include suicidal plan.          Cognition and Memory: Cognition and memory normal.         Judgment: Judgment normal.           Electronically signed by DES Nolan CNP, APRN-CNP on 5/2/2022 at 7:49 AM    Please note that this chart was generated using voice recognition Dragon dictation software. Although every effort was made to ensure the accuracy of this automated transcription, some errors in transcription may have occurred.

## 2022-05-02 ENCOUNTER — CARE COORDINATION (OUTPATIENT)
Dept: CARE COORDINATION | Age: 77
End: 2022-05-02

## 2022-05-02 NOTE — CARE COORDINATION
Ambulatory Care Coordination Note  5/2/2022  CM Risk Score: 5  Charlson 10 Year Mortality Risk Score: 100%     ACC: Jacobo Cordero RN    Summary Note: spoke with pt who said she is doing ok at home. She did see pulmonologist who put her on Levoquin again. She is to see him again in 4 weeks (around may 23) will need PFT as she has not had these done prior per pulm note. She does have an apt with cardiology for may 3 for follow up. She has an apt with GI for May 13 but says she is not going to that apt she does not want to see them again. She statess he feels she only needs to see her pcp pulm and surgery. She does not have another apt with Dr Ema Palomo at this time she states her pcp is supposed to talk to him. She was not sure when or why she would need this apt.   1) acp   2) sdoh done   3) med review done. 4) cardiology May 3  5) pulm fu around may 23  6) surgery needs an apt      Ambulatory Care Coordination Assessment    Care Coordination Protocol  Referral from Primary Care Provider: No  Week 1 - Initial Assessment     Do you have all of your prescriptions and are they filled?: Yes  Barriers to medication adherence: None  Are you able to afford your medications?: Yes  How often do you have trouble taking your medications the way you have been told to take them?: I always take them as prescribed.      Do you have Home O2 Therapy?: No      Is patient able to live independently?: Yes     Current Housing: Private Residence        Per the Fall Risk Screening, did the patient have 2 or more falls or 1 fall with injury in the past year?: No     Frequent urination at night?: No  Do you use rails/bars?: No  Do you have a non-slip tub mat?: Yes        Thinking about your patient's physical health needs, are there any symptoms or problems (risk indicators) you are unsure about that require further investigation?: Mild vague physical symptoms or problems; but do not impact on daily life or are not of concern to patient   Are the patients physical health problems impacting on their mental well-being?: Mild impact on mental well-being e.g. \"\"feeling fed-up\"\", \"\"reduced enjoyment\"\"   Are there any problems with your patients lifestyle behaviors (alcohol, drugs, diet, exercise) that are impacting on physical or mental well-being?: No identified areas of concern   Do you have any other concerns about your patients mental well-being? How would you rate their severity and impact on the patient?: Mild problems - don't interfere with function   How would you rate their home environment in terms of safety and stability (including domestic violence, insecure housing, neighbor harassment)?: Consistently safe, supportive, stable, no identified problems   How do daily activities impact on the patient's well-being? (include current or anticipated unemployment, work, caregiving, access to transportation or other): Some general dissatisfaction but no concern   How would you rate their social network (family, work, friends)?: Good participation with social networks   How would you rate their financial resources (including ability to afford all required medical care)?: Financially secure, resources adequate, no identified problems   How wells does the patient now understand their health and well-being (symptoms, signs or risk factors) and what they need to do to manage their health?: Reasonable to good understanding and already engages in managing health or is willing to undertake better management   How well do you think your patient can engage in healthcare discussions? (Barriers include language, deafness, aphasia, alcohol or drug problems, learning difficulties, concentration): Clear and open communication, no identified barriers   Do other services need to be involved to help this patient?: Other care/services not required at this time   Are current services involved with this patient well-coordinated?  (Include coordination with other services you are now recommendation): All required care/services in place and well-coordinated   Suggested Interventions and Community Resources  No Identified Needs         Set up/Review Goals, Set up/Review an Education Plan              Prior to Admission medications    Medication Sig Start Date End Date Taking?  Authorizing Provider   levoFLOXacin (LEVAQUIN) 750 MG tablet 1 tablet 4/25/22   Historical Provider, MD   predniSONE 10 MG (21) TBPK 4 tablets for 5 days and 2 tablets for 5 days 4/25/22   Historical Provider, MD   benzonatate (TESSALON) 200 MG capsule Take 1 capsule by mouth 3 times daily as needed for Cough 4/27/22 5/4/22  Marli Briceno Cea APRN - CNP   Fluticasone-Umeclidin-Vilant 200-62.5-25 MCG/INH AEPB Inhale 1 puff into the lungs daily Lot ct7c exp 10/2023 4/27/22   Marli Briceno Cea APRN - CNP   montelukast (SINGULAIR) 10 MG tablet TAKE 1 TABLET NIGHTLY 4/13/22 4/13/23  Marli Briceno Cea APRN - CNP   DULoxetine (CYMBALTA) 30 MG extended release capsule TAKE 1 CAPSULE BY MOUTH AT BEDTIME 3/23/22   Historical Provider, MD   aspirin 81 MG chewable tablet Take 1 tablet by mouth daily 3/25/22   Mike Corbett,    metoprolol tartrate (LOPRESSOR) 25 MG tablet Take 0.5 tablets by mouth 2 times daily 3/24/22   Mike Georges, DO   pantoprazole (PROTONIX) 40 MG tablet Take 1 tablet by mouth 2 times daily (before meals) 3/15/22 6/13/22  Marli Briceno Cea APRN - CNP   omega-3 acid ethyl esters (LOVAZA) 1 g capsule Take 2 capsules by mouth 2 times daily 3/12/22 3/12/23  Marli Briceno Cea APRN - CNP   fenofibrate (TRIGLIDE) 160 MG tablet Take 1 tablet by mouth daily 3/12/22 3/12/23  Marli Briceno Cea APRN - CNP   QUEtiapine (SEROQUEL) 50 MG tablet TAKE 1 TO 2 TABLETS BY MOUTH AT BEDTIME AS NEEDED FOR SLEEP 3/2/22   Marli Briceno Cea APRN - CNP   vitamin B-12 (CYANOCOBALAMIN) 1000 MCG tablet TAKE 2 TABLETS BY MOUTH EVERY DAY 3/2/22 3/2/23  Marli Briceno Cea, APRN - CNP   QUEtiapine (SEROQUEL) 200 MG tablet TAKE 1 TABLET DAILY 2/2/22 DES Zambrano CNP   atorvastatin (LIPITOR) 80 MG tablet Take 1 tablet by mouth daily 11/30/21 5/29/22  DES Zambrano CNP   dorzolamide-timolol (COSOPT) 22.3-6.8 MG/ML ophthalmic solution INSTILL 1 DROP IN [facility] TWO TIMES A DAY 10/1/21   Historical Provider, MD   gabapentin (NEURONTIN) 800 MG tablet TAKE 1 TAB BY MOUTH AT BEDTIME FOR ONE WEEK THEN 2 TABS AT BEDTIME FOR ONE WEEK THEN 3 TABS AT BEDTIME THEREAFTER 9/7/21   Historical Provider, MD   Handicap Placard MISC by Does not apply route Exp 3/16/2026 3/16/21   DES Zambrano - CNP   oxyCODONE-acetaminophen (PERCOCET)  MG per tablet Take 1 tablet by mouth every 4 hours as needed for Pain.   7/19/19   Historical Provider, MD       Future Appointments   Date Time Provider Glenroy Falcon   5/3/2022  1:30 PM Jacquelene Deion, APRN - CNP [facility]   5/13/2022 11:00 AM Nancy Winston MD [facility]   6/27/2022  1:00 PM DES Zambrano CNP

## 2022-05-03 ENCOUNTER — CARE COORDINATION (OUTPATIENT)
Dept: CARE COORDINATION | Age: 77
End: 2022-05-03

## 2022-05-03 ENCOUNTER — OFFICE VISIT (OUTPATIENT)
Dept: CARDIOLOGY CLINIC | Age: 77
End: 2022-05-03
Payer: MEDICARE

## 2022-05-03 VITALS
OXYGEN SATURATION: 98 % | BODY MASS INDEX: 25.58 KG/M2 | DIASTOLIC BLOOD PRESSURE: 62 MMHG | TEMPERATURE: 98 F | HEART RATE: 68 BPM | WEIGHT: 149 LBS | SYSTOLIC BLOOD PRESSURE: 108 MMHG

## 2022-05-03 DIAGNOSIS — R07.9 CHEST PAIN, UNSPECIFIED TYPE: ICD-10-CM

## 2022-05-03 PROCEDURE — 99213 OFFICE O/P EST LOW 20 MIN: CPT | Performed by: NURSE PRACTITIONER

## 2022-05-03 PROCEDURE — 93000 ELECTROCARDIOGRAM COMPLETE: CPT | Performed by: NURSE PRACTITIONER

## 2022-05-03 NOTE — PROGRESS NOTES
Cardiology Hospital follow up   [facility]      05/03/22      CC:  Patient is here for follow up     HPI:  Rudy Pedraza  is doing well from a cardiac standpoint after being in the hospital for PNA. She denies any CP. She states that she has chronic bronchitis and PNA. She has a cough today. Denies any cardiac hx.       Past Medical History:   Diagnosis Date    Abnormal finding on imaging 8/12/2021    Anemia     [place] hernia     PER CT 7-2-21    Bowel obstruction ([address]     Bronchitis 02/19/2019    Frequent episodes of bronchitis, usually yearly with pneumonia    Chronic biliary pancreatitis ([address] 09/22/2016    Controlled type 2 diabetes mellitus without complication, without long-term current use of insulin ([address]     Cough 08/13/2021    has had cough for several months    DDD (degenerative disc disease)     Depression     Diverticulosis     Esophageal dysphagia     Fibromyalgia     GERD (gastroesophageal reflux disease)     Glaucoma     Hiatal hernia     Hyperlipidemia     Knee injuries 02/19/2019    Osteoarthritis of more than one site 04/09/2014    Pneumonia     gets pneumonia yearly    Right middle lobe pneumonia 03/24/2016    Seasonal allergies        Past Surgical History:   Procedure Laterality Date    BREAST BIOPSY Left 05/2016    CARPAL TUNNEL RELEASE Bilateral     x 2 each    COLONOSCOPY      ESOPHAGEAL MOTILITY STUDY N/A 1/21/2022    PES RN-ESOPHAGEAL MOTILITY STUDY performed by Hector Newsome DO at [facility]    EYE SURGERY Left     stent for glaucoma    EYE SURGERY Left     cataract    EYE SURGERY Left     lower lid    FOOT SURGERY Right     HYSTERECTOMY      KNEE ARTHROSCOPY Right 12/04/2019    RIGHT KNEE ARTHROSCOPY WITH TOTAL MENISCECTOMY performed by Francisco Dougherty DO at [facility] [address]  10/03/2016    RI ARTHRS KNE SURG W/MENISCECTOMY MED/LAT W/SHVG Left 08/13/2018    KNEE ARTHROSCOPY FOR PARTIAL MEDIAL AND PARTIAL LATERAL MENISCECTOMY performed by Brigette Bolden MD at [address] [place] [address]      due to blockage    UPPER GASTROINTESTINAL ENDOSCOPY      EGD    UPPER GASTROINTESTINAL ENDOSCOPY N/A 8/17/2021    EGD BIOPSY performed by Nancy Winston MD at [facility]       Family History   Problem Relation Age of Onset    High Blood Pressure Mother     Stroke Mother     Heart Disease Father     Stroke Maternal Grandmother        Social History     Socioeconomic History    Marital status:      Spouse name: Not on file    Number of children: Not on file    Years of education: Not on file    Highest education level: Not on file   Occupational History    Occupation: NOT EMPLOYEED   Tobacco Use    Smoking status: Never Smoker    Smokeless tobacco: Never Used   Vaping Use    Vaping Use: Never used   Substance and Sexual Activity    Alcohol use: No     Alcohol/week: 0.0 standard drinks    Drug use: No    Sexual activity: Not Currently   Other Topics Concern    Not on file   Social History Narrative    Not on file     Social Determinants of Health     Financial Resource Strain: Low Risk     Difficulty of Paying Living Expenses: Not hard at all   Food Insecurity: No Food Insecurity    Worried About [address] in the Last Year: Never true    [facility] in the Last Year: Never true   Transportation Needs: No Transportation Needs    Lack of Transportation (Medical): No    Lack of Transportation (Non-Medical): No   Physical Activity: Insufficiently Active    Days of Exercise per Week: 3 days    Minutes of Exercise per Session: 20 min   Stress: No Stress Concern Present    Feeling of Stress : Only a little   Social Connections:  Moderately Integrated    Frequency of Communication with Friends and Family: More than three times a week    Frequency of Social Gatherings with Friends and Family: More than three times a week    Attends [employer] Services: 1 to 4 times per year    Active Member of Clubs or Organizations: No    Attends Club or Organization Meetings: Never    Marital Status:    Intimate Partner Violence:     Fear of Current or Ex-Partner: Not on file    Emotionally Abused: Not on file    Physically Abused: Not on file    Sexually Abused: Not on file   Housing Stability: [address] Unable to Pay for Housing in the Last Year: No    Number of [place] in the Last Year: 1    Unstable Housing in the Last Year: No        Allergies   Allergen Reactions    Pcn [Penicillins] Anaphylaxis     Patient states PCN allergy was 20 years ago, unsure if reaction still present    Adhesive Tape     Nubain [Nalbuphine Hcl]      Leg  Cramping  When mixed with vistaril    Seasonal     Vistaril [Hydroxyzine] Other (See Comments)     fainted       Current Outpatient Medications   Medication Sig Dispense Refill    levoFLOXacin (LEVAQUIN) 750 MG tablet 1 tablet      predniSONE 10 MG (21) TBPK 4 tablets for 5 days and 2 tablets for 5 days      benzonatate (TESSALON) 200 MG capsule Take 1 capsule by mouth 3 times daily as needed for Cough 30 capsule 0    Fluticasone-Umeclidin-Vilant 200-62.5-25 MCG/INH AEPB Inhale 1 puff into the lungs daily Lot ct7c exp 10/2023 1 each 0    montelukast (SINGULAIR) 10 MG tablet TAKE 1 TABLET NIGHTLY 90 tablet 1    DULoxetine (CYMBALTA) 30 MG extended release capsule TAKE 1 CAPSULE BY MOUTH AT BEDTIME      aspirin 81 MG chewable tablet Take 1 tablet by mouth daily 30 tablet 3    metoprolol tartrate (LOPRESSOR) 25 MG tablet Take 0.5 tablets by mouth 2 times daily 60 tablet 3    pantoprazole (PROTONIX) 40 MG tablet Take 1 tablet by mouth 2 times daily (before meals) 60 tablet 2    omega-3 acid ethyl esters (LOVAZA) 1 g capsule Take 2 capsules by mouth 2 times daily 120 capsule 5    fenofibrate (TRIGLIDE) 160 MG tablet Take 1 tablet by mouth daily 30 tablet 5    QUEtiapine (SEROQUEL) 50 MG tablet TAKE 1 TO 2 TABLETS BY MOUTH AT BEDTIME AS NEEDED FOR SLEEP 60 tablet 5    vitamin B-12 (CYANOCOBALAMIN) 1000 MCG tablet TAKE 2 TABLETS BY MOUTH EVERY DAY 60 tablet 5    QUEtiapine (SEROQUEL) 200 MG tablet TAKE 1 TABLET DAILY 90 tablet 3    atorvastatin (LIPITOR) 80 MG tablet Take 1 tablet by mouth daily 90 tablet 1    dorzolamide-timolol (COSOPT) 22.3-6.8 MG/ML ophthalmic solution INSTILL 1 DROP IN EACH EYE TWO TIMES A DAY      gabapentin (NEURONTIN) 800 MG tablet TAKE 1 TAB BY MOUTH AT BEDTIME FOR ONE WEEK THEN 2 TABS AT BEDTIME FOR ONE WEEK THEN 3 TABS AT BEDTIME THEREAFTER      Handicap Placard MISC by Does not apply route Exp 3/16/2026 1 each 0    oxyCODONE-acetaminophen (PERCOCET)  MG per tablet Take 1 tablet by mouth every 4 hours as needed for Pain.   0     No current facility-administered medications for this visit.         Patient Active Problem List   Diagnosis    Hyperlipidemia    Fibromyalgia    Osteopenia    Hx of bilateral breast implants    Arthritis of shoulder region, right    Anxiety    Anemia, normocytic normochromic    Chronic pain associated with significant psychosocial dysfunction    Primary osteoarthritis involving multiple joints    Spondylosis 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    Lumbar and sacral arthritis    Chronic biliary pancreatitis (HCC)    Moderate episode of recurrent major depressive disorder ([address]    Uncomplicated opioid dependence ([address]    Hyperuricemia    Primary open-angle glaucoma, right eye, mild stage    Primary open-angle glaucoma, left eye, mild stage    Gastroesophageal reflux disease    Esophageal dysphagia    Hiatal hernia    Recurrent aspiration pneumonia ([address]    Acute respiratory failure with hypoxemia (HCC)    Simple chronic bronchitis ([address]    Hospital-acquired pneumonia    NSTEMI (non-ST elevated myocardial infarction) ([address]    History of degenerative disc disease    Hx of diabetes mellitus    Sepsis ([address]    Hx of Esophageal dysmotility    Type 2 diabetes mellitus           REVIEW OF SYSTEMS:    · Constitutional: there has been no unanticipated weight loss. There's been No change in energy level, No change in activity level. · Eyes: No visual changes or diplopia. No scleral icterus. · ENT: No Headaches, hearing loss or vertigo. No mouth sores or sore throat. · Cardiovascular: per hpi  · Respiratory: per hpi  · Gastrointestinal: No abdominal pain, appetite loss, blood in stools. No change in bowel or bladder habits. · Genitourinary: No dysuria, trouble voiding, or hematuria. · Musculoskeletal:  No gait disturbance, No weakness or joint complaints. · Integumentary: No rash or pruritis. · Neurological: No headache, diplopia, change in muscle strength, numbness or tingling. No change in gait, balance, coordination, mood, affect, memory, mentation, behavior. · Psychiatric: No new anxiety or depression. · Endocrine: No temperature intolerance. No excessive thirst, fluid intake, or urination. No tremor. · Hematologic/Lymphatic: No abnormal bruising or bleeding, blood clots or swollen lymph nodes. · Allergic/Immunologic: No nasal congestion or hives. Physical Exam:   Vitals: There were no vitals taken for this visit. General appearance: alert and cooperative with exam  HEENT: Head: Normocephalic, no lesions, without obvious abnormality. Eyes: PERRL, EOMI  Ears: Not obvious deformations or lack of hearing  Neck: no carotid bruit, no JVD  Lungs: clear to auscultation bilaterally  Heart: regular rate and rhythm, S1, S2 normal, no murmur, click, rub or gallop  Abdomen: soft, non-tender; bowel sounds normal; no masses,  no organomegaly  Extremities: extremities normal, atraumatic, no cyanosis or edema  Neurologic: Mental status: Alert, oriented, thought content appropriate  Skin: WNL for age and condition, no obvious rashes or leasions      EKG: Normal Sinus Rhythm with no ischemic changes.   Reviewed today's ECG along serial ECGs    ECHO: 3/23/22  Summary  Normal left ventricle size, wall thickness and function with an estimated EF  > 55%. Mild mitral regurgitation. Mild tricuspid regurgitation. Estimated right ventricular systolic pressure  is 99WLDS. LAST STRESS: none     LAST CATH: none       Past Medical and Surgical History, Problem List, Allergies, Medications, Labs, Imaging, all reviewed extensively in EMR and with the patient. Assessment and Plan:    1. Echo reviewed from hospital.  No WMA   2. HTN-  Continue BB   3. Continue Asa daily   4. Hyperlipidemia- Chronic. As above. LDL goal < 70. Optimize therapy. Continue statin   5. Type 2 Diabetes. Chronic. LDL goal < 70 and BP goal <130/80. Cont to optimize therapy. 6. Low risk for surgery from cardiology standpoint       Thank you for allowing me to participate in the care of this patient, please do not hesitate to call if you have any question  Stephenie Campo, APRN - [id number]  [facility]. [facility]  [phone]

## 2022-05-03 NOTE — CARE COORDINATION
I agree with the Care Coordinator's Plan of Care     Waiting for follow up with Robina Reddy until respiratory/cardiac stable.

## 2022-05-05 DIAGNOSIS — F41.9 ANXIETY: ICD-10-CM

## 2022-05-05 NOTE — TELEPHONE ENCOUNTER
Last visit: 04/27/22  Last Med refill: 01/18/22  Does patient have enough medication for 72 hours: Yes    Next Visit Date:  Future Appointments   Date Time Provider Glenroy Falcon   5/13/2022 11:00 AM Master Mckeon MD [facility]   6/27/2022  1:00 PM Marli Will, APRN - CNP [facility] [address] Maintenance   Topic Date Due    Hepatitis C screen  Never done    DTaP/Tdap/Td vaccine (1 - Tdap) Never done    Shingles vaccine (1 of 2) Never done    Lipids  03/01/2023    Depression Monitoring  04/06/2023    DEXA (modify frequency per FRAX score)  Completed    Flu vaccine  Completed    Pneumococcal 65+ years Vaccine  Completed    COVID-19 Vaccine  Completed    Hepatitis A vaccine  Aged Out    Hib vaccine  Aged Out    Meningococcal (ACWY) vaccine  Aged Out       Hemoglobin A1C (%)   Date Value   03/01/2022 5.6   08/12/2020 5.6   02/19/2019 5.5             ( goal A1C is < 7)   Microalb/Crt.  Ratio (mcg/mg creat)   Date Value   05/12/2016 7     LDL Cholesterol (mg/dL)   Date Value   03/01/2022 08/12/2020 179 (H)     LDL Calculated (mg/dL)   Date Value   11/13/2014 109       (goal LDL is <100)   AST (U/L)   Date Value   03/22/2022 27     ALT (U/L)   Date Value   03/22/2022 18     BUN (mg/dL)   Date Value   03/23/2022 12     BP Readings from Last 3 Encounters:   05/03/22 108/62   04/27/22 110/78   04/06/22 120/84          (goal 120/80)    All Future Testing planned in [facility]  Lab Frequency Next Occurrence   EGD Once 11/11/2021               Patient Active Problem List:     Hyperlipidemia     Fibromyalgia     Osteopenia     Hx of bilateral breast implants     Arthritis of shoulder region, right     Anxiety     Anemia, normocytic normochromic     Chronic pain associated with significant psychosocial dysfunction     Primary osteoarthritis involving multiple joints     Spondylosis 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     Lumbar and sacral arthritis     Chronic biliary pancreatitis (HCC)     Moderate episode of recurrent major depressive disorder ([facility]     Uncomplicated opioid dependence ([facility]     Hyperuricemia     Primary open-angle glaucoma, right eye, mild stage     Primary open-angle glaucoma, left eye, mild stage     Gastroesophageal reflux disease     Esophageal dysphagia     Hiatal hernia     Recurrent aspiration pneumonia (HCC)     Acute respiratory failure with hypoxemia (HCC)     Simple chronic bronchitis ([facility]     Hospital-acquired pneumonia     NSTEMI (non-ST elevated myocardial infarction) ([facility]     History of degenerative disc disease     Hx of diabetes mellitus     Sepsis ([facility]     Hx of Esophageal dysmotility     Type 2 diabetes mellitus

## 2022-05-09 ENCOUNTER — CARE COORDINATION (OUTPATIENT)
Dept: CARE COORDINATION | Age: 77
End: 2022-05-09

## 2022-05-09 RX ORDER — ALPRAZOLAM 0.5 MG/1
TABLET ORAL
Qty: 14 TABLET | Refills: 0 | Status: SHIPPED | OUTPATIENT
Start: 2022-05-09 | End: 2022-06-27 | Stop reason: SDUPTHER

## 2022-05-09 NOTE — CARE COORDINATION
Ambulatory Care Coordination Note  5/9/2022  CM Risk Score: 5  Charlson 10 Year Mortality Risk Score: 100%     ACC: Nic Wan RN    Summary Note: spoke with pt who did see cardiology who feel her heart is stable. She will follow up with them in one year. She said her pcp was to talk to the pulmonologist about \"cleaning out my lungs\" per pcp note I believe pt means bronchial washing or bronchoscopy. Will check with pcp if this is the plan or if she was able to talk to pulmonology    1) acp   2) sdoh done   3) med review done. 4) cardiology May 3 done fu may 2023  5) pulm fu around may 23  6) surgery needs an apt           Care Coordination Interventions    Referral from Primary Care Provider: No  Suggested Interventions and Community Resources  No Identified Needs         Goals Addressed                 This Visit's Progress     Conditions and Symptoms   On track     I will schedule office visits, as directed by my provider. I will keep my appointment or reschedule if I have to cancel. I will notify my provider of any barriers to my plan of care. Barriers: overwhelmed by complexity of regimen  Plan for overcoming my barriers: care coordination   Confidence: 9/10  Anticipated Goal Completion Date: 10/2/22              Prior to Admission medications    Medication Sig Start Date End Date Taking?  Authorizing Provider   levoFLOXacin (LEVAQUIN) 750 MG tablet 1 tablet 4/25/22   Historical Provider, MD   predniSONE 10 MG (21) TBPK 4 tablets for 5 days and 2 tablets for 5 days 4/25/22   Historical Provider, MD   Fluticasone-Umeclidin-Vilant 200-62.5-25 MCG/INH AEPB Inhale 1 puff into the lungs daily Lot ct7c exp 10/2023 4/27/22   DES Welch - CNP   montelukast (SINGULAIR) 10 MG tablet TAKE 1 TABLET NIGHTLY  Patient not taking: Reported on 5/3/2022 4/13/22 4/13/23  DES Welch CNP   DULoxetine (CYMBALTA) 30 MG extended release capsule TAKE 1 CAPSULE BY MOUTH AT BEDTIME 3/23/22   Historical Provider, MD   aspirin 81 MG chewable tablet Take 1 tablet by mouth daily 3/25/22   Adri Salinas DO   metoprolol tartrate (LOPRESSOR) 25 MG tablet Take 0.5 tablets by mouth 2 times daily 3/24/22   Adri Salinas DO   pantoprazole (PROTONIX) 40 MG tablet Take 1 tablet by mouth 2 times daily (before meals) 3/15/22 6/13/22  DES Sow CNP   omega-3 acid ethyl esters (LOVAZA) 1 g capsule Take 2 capsules by mouth 2 times daily 3/12/22 3/12/23  DES Sow CNP   fenofibrate (TRIGLIDE) 160 MG tablet Take 1 tablet by mouth daily  Patient not taking: Reported on 5/3/2022 3/12/22 3/12/23  DES Sow CNP   QUEtiapine (SEROQUEL) 50 MG tablet TAKE 1 TO 2 TABLETS BY MOUTH AT BEDTIME AS NEEDED FOR SLEEP 3/2/22   DES Sow CNP   vitamin B-12 (CYANOCOBALAMIN) 1000 MCG tablet TAKE 2 TABLETS BY MOUTH EVERY DAY 3/2/22 3/2/23  DES Sow CNP   QUEtiapine (SEROQUEL) 200 MG tablet TAKE 1 TABLET DAILY 2/2/22   DES Sow CNP   atorvastatin (LIPITOR) 80 MG tablet Take 1 tablet by mouth daily 11/30/21 5/29/22  DES Sow CNP   dorzolamide-timolol (COSOPT) 22.3-6.8 MG/ML ophthalmic solution INSTILL 1 DROP IN [facility] EYE TWO TIMES A DAY 10/1/21   Historical Provider, MD   gabapentin (NEURONTIN) 800 MG tablet TAKE 1 TAB BY MOUTH AT BEDTIME FOR ONE WEEK THEN 2 TABS AT BEDTIME FOR ONE WEEK THEN 3 TABS AT BEDTIME THEREAFTER 9/7/21   Historical Provider, MD   Handicap Placard MISC by Does not apply route Exp 3/16/2026 3/16/21   DES Sow CNP   oxyCODONE-acetaminophen (PERCOCET)  MG per tablet Take 1 tablet by mouth every 4 hours as needed for Pain.   7/19/19   Historical Provider, MD       Future Appointments   Date Time Provider Glenroy Navai   5/13/2022 11:00 AM [facility]   6/27/2022  1:00 PM DES Sow CNP [facility]     ,   Diabetes Assessment    Medic Alert ID: No  Meal Planning: None How often do you test your blood sugar?: No Testing   Do you have barriers with adherence to non-pharmacologic self-management interventions?  (Nutrition/Exercise/Self-Monitoring): No   Have you ever had to go to the ED for symptoms of low blood sugar?: No       No patient-reported symptoms       and   COPD Assessment              Symptoms:

## 2022-05-11 ENCOUNTER — TELEPHONE (OUTPATIENT)
Dept: FAMILY MEDICINE CLINIC | Age: 77
End: 2022-05-11

## 2022-05-11 NOTE — CARE COORDINATION
We have tried to call pulmonology office. I also sent my OV note to them. We will call today to touch base with her.

## 2022-05-11 NOTE — TELEPHONE ENCOUNTER
Patient states she is still coughing, fatigued, difficulty sleeping and having mood swings. Taking medications as prescribed. Attempted to contacted pulmonology but office closed. To F/U on possible need for bronchial washing. Refaxed last OV note and testing marked urgent.

## 2022-05-11 NOTE — TELEPHONE ENCOUNTER
Please call to touch base today. How is she? Still coughing? Feeling ill? Let her know that we did send my OV note to pulmonology. We have had no response back. At this point she is to follow with pulmonology per their plan of care.

## 2022-05-12 NOTE — TELEPHONE ENCOUNTER
Per pulmonary office patient to contact the office for appointment on Monday with Fely Cid. Patient notified and verbalized understanding.

## 2022-05-16 ENCOUNTER — CARE COORDINATION (OUTPATIENT)
Dept: CARE COORDINATION | Age: 77
End: 2022-05-16

## 2022-05-16 NOTE — ACP (ADVANCE CARE PLANNING)
Advance Care Planning   Healthcare Decision Maker:       Click here to complete Healthcare Decision Makers including selection of the Healthcare Decision Maker Relationship (ie \"Primary\"). Today we documented Decision Maker(s) consistent with Legal Next of Kin hierarchy.

## 2022-05-16 NOTE — CARE COORDINATION
Ambulatory Care Coordination Note  5/16/2022  CM Risk Score: 13  Charlson 10 Year Mortality Risk Score: 100%     ACC: Valarie Cassidy RN    Summary Note: spoke with pt who said she did not understand she was to call pulm for an apt did review with her again the pulm office had told joe to please have Pascale Castro call and make an apt with Annabella Russell. Pt states she will hang up with acm and call right now for an apt.    1) acp done   2) sdoh done   3) med review done. 4) cardiology May 3 done fu may 2023  5) pulm fu around may 23  6) surgery needs an apt        Care Coordination Interventions    Referral from Primary Care Provider: No  Suggested Interventions and Community Resources  No Identified Needs         Goals Addressed                 This Visit's Progress     Conditions and Symptoms   On track     I will schedule office visits, as directed by my provider. I will keep my appointment or reschedule if I have to cancel. I will notify my provider of any barriers to my plan of care. Barriers: overwhelmed by complexity of regimen  Plan for overcoming my barriers: care coordination   Confidence: 9/10  Anticipated Goal Completion Date: 10/2/22              Prior to Admission medications    Medication Sig Start Date End Date Taking?  Authorizing Provider   ALPRAZolam Landis Pies) 0.5 MG tablet TAKE 1 TABLET BY MOUTH DAILY AS NEEED FOR SLEEP OR ANXIETY FOR UP TO 14 DAYS. 5/9/22 6/8/22  DES Hendricks CNP   levoFLOXacin (LEVAQUIN) 750 MG tablet 1 tablet 4/25/22   Historical Provider, MD   predniSONE 10 MG (21) TBPK 4 tablets for 5 days and 2 tablets for 5 days 4/25/22   Historical Provider, MD   Fluticasone-Umeclidin-Vilant 200-62.5-25 MCG/INH AEPB Inhale 1 puff into the lungs daily Lot ct7c exp 10/2023 4/27/22   DES Hendricks CNP   montelukast (SINGULAIR) 10 MG tablet TAKE 1 TABLET NIGHTLY  Patient not taking: Reported on 5/3/2022 4/13/22 4/13/23  DES Hendricks CNP   DULoxetine (CYMBALTA) 30 MG extended release capsule TAKE 1 CAPSULE BY MOUTH AT BEDTIME 3/23/22   Historical Provider, MD   aspirin 81 MG chewable tablet Take 1 tablet by mouth daily 3/25/22   Maribel Kidney, DO   metoprolol tartrate (LOPRESSOR) 25 MG tablet Take 0.5 tablets by mouth 2 times daily 3/24/22   Maribel Kidney, DO   pantoprazole (PROTONIX) 40 MG tablet Take 1 tablet by mouth 2 times daily (before meals) 3/15/22 6/13/22  DES Ordonez CNP   omega-3 acid ethyl esters (LOVAZA) 1 g capsule Take 2 capsules by mouth 2 times daily 3/12/22 3/12/23  DES Ordonez CNP   fenofibrate (TRIGLIDE) 160 MG tablet Take 1 tablet by mouth daily  Patient not taking: Reported on 5/3/2022 3/12/22 3/12/23  DES Ordonez CNP   QUEtiapine (SEROQUEL) 50 MG tablet TAKE 1 TO 2 TABLETS BY MOUTH AT BEDTIME AS NEEDED FOR SLEEP 3/2/22   DES Ordonez CNP   vitamin B-12 (CYANOCOBALAMIN) 1000 MCG tablet TAKE 2 TABLETS BY MOUTH EVERY DAY 3/2/22 3/2/23  DES Ordonez CNP   QUEtiapine (SEROQUEL) 200 MG tablet TAKE 1 TABLET DAILY 2/2/22   DES Ordonez CNP   atorvastatin (LIPITOR) 80 MG tablet Take 1 tablet by mouth daily 11/30/21 5/29/22  DES Ordonez CNP   dorzolamide-timolol (COSOPT) 22.3-6.8 MG/ML ophthalmic solution INSTILL 1 DROP IN [facility] EYE TWO TIMES A DAY 10/1/21   Historical Provider, MD   gabapentin (NEURONTIN) 800 MG tablet TAKE 1 TAB BY MOUTH AT BEDTIME FOR ONE WEEK THEN 2 TABS AT BEDTIME FOR ONE WEEK THEN 3 TABS AT BEDTIME THEREAFTER 9/7/21   Historical Provider, MD   Handicap Placard MISC by Does not apply route Exp 3/16/2026 3/16/21   DES Ordonez CNP   oxyCODONE-acetaminophen (PERCOCET)  MG per tablet Take 1 tablet by mouth every 4 hours as needed for Pain.   7/19/19   Historical Provider, MD       Future Appointments   Date Time Provider Glenroy Falcon   6/27/2022  1:00 PM Marli Aguilar, APRN - CNP [facility]

## 2022-05-17 ENCOUNTER — TELEPHONE (OUTPATIENT)
Dept: GASTROENTEROLOGY | Age: 77
End: 2022-05-17

## 2022-05-17 NOTE — TELEPHONE ENCOUNTER
Pt had a no show appt on 05/13/22. Called pt and left message to call the office and sirena,    No show letter sent.

## 2022-05-23 ENCOUNTER — HOSPITAL ENCOUNTER (OUTPATIENT)
Age: 77
Discharge: HOME | End: 2022-05-23
Payer: MEDICARE

## 2022-05-23 ENCOUNTER — CARE COORDINATION (OUTPATIENT)
Dept: CARE COORDINATION | Age: 77
End: 2022-05-23

## 2022-05-23 ENCOUNTER — HOSPITAL ENCOUNTER (OUTPATIENT)
Dept: GENERAL RADIOLOGY | Age: 77
Discharge: HOME | End: 2022-05-25
Payer: MEDICARE

## 2022-05-23 ENCOUNTER — HOSPITAL ENCOUNTER (OUTPATIENT)
Age: 77
Discharge: HOME | End: 2022-05-25
Payer: MEDICARE

## 2022-05-23 DIAGNOSIS — J18.9 PNEUMONIA, PRIMARY ATYPICAL: ICD-10-CM

## 2022-05-23 PROCEDURE — 87205 SMEAR GRAM STAIN: CPT

## 2022-05-23 PROCEDURE — 71046 X-RAY EXAM CHEST 2 VIEWS: CPT

## 2022-05-23 PROCEDURE — 87070 CULTURE OTHR SPECIMN AEROBIC: CPT

## 2022-05-23 NOTE — CARE COORDINATION
Ambulatory Care Coordination Note  5/23/2022  CM Risk Score: 13  Charlson 10 Year Mortality Risk Score: 100%     ACC: Mary Thomas RN    Summary Note: spoke with pt who said she is doing about the same at home. She is going to have a cxr done today and will see pulm next Tuesday may 31. She denies nay needs at this time   1) acp done   2) sdoh done   3) med review done. 4) cardiology May 3 done fu may 2023  5) pulm fu around may 31  6) surgery needs an apt    Lab Results     None          Care Coordination Interventions    Referral from Primary Care Provider: No  Suggested Interventions and Community Resources  No Identified Needs         Goals Addressed                 This Visit's Progress     Conditions and Symptoms   On track     I will schedule office visits, as directed by my provider. I will keep my appointment or reschedule if I have to cancel. I will notify my provider of any barriers to my plan of care. Barriers: overwhelmed by complexity of regimen  Plan for overcoming my barriers: care coordination   Confidence: 9/10  Anticipated Goal Completion Date: 10/2/22              Prior to Admission medications    Medication Sig Start Date End Date Taking?  Authorizing Provider   ALPRAZolam Chryl Partida) 0.5 MG tablet TAKE 1 TABLET BY MOUTH DAILY AS NEEED FOR SLEEP OR ANXIETY FOR UP TO 14 DAYS. 5/9/22 6/8/22  DES Barone CNP   levoFLOXacin (LEVAQUIN) 750 MG tablet 1 tablet 4/25/22   Historical Provider, MD   predniSONE 10 MG (21) TBPK 4 tablets for 5 days and 2 tablets for 5 days 4/25/22   Historical Provider, MD   Fluticasone-Umeclidin-Vilant 200-62.5-25 MCG/INH AEPB Inhale 1 puff into the lungs daily Lot ct7c exp 10/2023 4/27/22   DES Barone CNP   montelukast (SINGULAIR) 10 MG tablet TAKE 1 TABLET NIGHTLY  Patient not taking: Reported on 5/3/2022 4/13/22 4/13/23  DES Barone CNP   DULoxetine (CYMBALTA) 30 MG extended release capsule TAKE 1 CAPSULE BY MOUTH AT BEDTIME 3/23/22 Historical Provider, MD   aspirin 81 MG chewable tablet Take 1 tablet by mouth daily 3/25/22   Chung Looney DO   metoprolol tartrate (LOPRESSOR) 25 MG tablet Take 0.5 tablets by mouth 2 times daily 3/24/22   Chung Looney DO   pantoprazole (PROTONIX) 40 MG tablet Take 1 tablet by mouth 2 times daily (before meals) 3/15/22 6/13/22  DES Fishman CNP   omega-3 acid ethyl esters (LOVAZA) 1 g capsule Take 2 capsules by mouth 2 times daily 3/12/22 3/12/23  DES Fishman CNP   fenofibrate (TRIGLIDE) 160 MG tablet Take 1 tablet by mouth daily  Patient not taking: Reported on 5/3/2022 3/12/22 3/12/23  DES Fishman CNP   QUEtiapine (SEROQUEL) 50 MG tablet TAKE 1 TO 2 TABLETS BY MOUTH AT BEDTIME AS NEEDED FOR SLEEP 3/2/22   DES Fishman CNP   vitamin B-12 (CYANOCOBALAMIN) 1000 MCG tablet TAKE 2 TABLETS BY MOUTH EVERY DAY 3/2/22 3/2/23  DES Fishman CNP   QUEtiapine (SEROQUEL) 200 MG tablet TAKE 1 TABLET DAILY 2/2/22   DES Fishman CNP   atorvastatin (LIPITOR) 80 MG tablet Take 1 tablet by mouth daily 11/30/21 5/29/22  DES Fishman CNP   dorzolamide-timolol (COSOPT) 22.3-6.8 MG/ML ophthalmic solution INSTILL 1 DROP IN [facility] EYE TWO TIMES A DAY 10/1/21   Historical Provider, MD   gabapentin (NEURONTIN) 800 MG tablet TAKE 1 TAB BY MOUTH AT BEDTIME FOR ONE WEEK THEN 2 TABS AT BEDTIME FOR ONE WEEK THEN 3 TABS AT BEDTIME THEREAFTER 9/7/21   Historical Provider, MD   Handicap Placard MISC by Does not apply route Exp 3/16/2026 3/16/21   DES Fishman CNP   oxyCODONE-acetaminophen (PERCOCET)  MG per tablet Take 1 tablet by mouth every 4 hours as needed for Pain.   7/19/19   Historical Provider, MD       Future Appointments   Date Time Provider Glenroy Terrie   6/27/2022  1:00 PM Marli Bettencourt, APRN - CNP [facility]

## 2022-05-24 NOTE — RESULT ENCOUNTER NOTE
Reviewed. Ordered (to be addressed/treated) per ordering provider. Pulmonology. Available if needed for correlation of care.

## 2022-05-25 LAB
CULTURE: ABNORMAL
DIRECT EXAM: ABNORMAL
SPECIMEN DESCRIPTION: ABNORMAL

## 2022-05-26 NOTE — RESULT ENCOUNTER NOTE
Reviewed. Ordered (to be addressed/treated) per ordering provider. Pulmonology. Available if needed for correlation of care.       Please request OV notes

## 2022-05-30 DIAGNOSIS — E78.00 PURE HYPERCHOLESTEROLEMIA: Chronic | ICD-10-CM

## 2022-05-31 RX ORDER — ATORVASTATIN CALCIUM 80 MG/1
80 TABLET, FILM COATED ORAL DAILY
Qty: 90 TABLET | Refills: 1 | Status: SHIPPED | OUTPATIENT
Start: 2022-05-31

## 2022-05-31 NOTE — TELEPHONE ENCOUNTER
Moderate episode of recurrent major depressive disorder (HCC)     Uncomplicated opioid dependence (HCC)     Hyperuricemia     Primary open-angle glaucoma, right eye, mild stage     Primary open-angle glaucoma, left eye, mild stage     Gastroesophageal reflux disease     Esophageal dysphagia     Hiatal hernia     Recurrent aspiration pneumonia (HCC)     Acute respiratory failure with hypoxemia (HCC)     Simple chronic bronchitis ([facility]     Hospital-acquired pneumonia     NSTEMI (non-ST elevated myocardial infarction) ([facility]     History of degenerative disc disease     Hx of diabetes mellitus     Sepsis ([facility]     Hx of Esophageal dysmotility     Type 2 diabetes mellitus

## 2022-06-06 ENCOUNTER — CARE COORDINATION (OUTPATIENT)
Dept: CARE COORDINATION | Age: 77
End: 2022-06-06

## 2022-06-07 ENCOUNTER — CARE COORDINATION (OUTPATIENT)
Dept: CARE COORDINATION | Age: 77
End: 2022-06-07

## 2022-06-07 NOTE — CARE COORDINATION
Ambulatory Care Coordination Note  6/7/2022  CM Risk Score: 5  Charlson 10 Year Mortality Risk Score: 100%     ACC: Valarie Cassidy RN    Summary Note: spoke with pt who said she did see pulmonology who do not want to do a bronch. She was given a rescue inhaler. pulm did tell her she still has pneumonia. She is using Trelegy daily. Did encourage her to get an incentive spirometer and use this 10 times an hour and/or cough and deep breath. She will see her pcp June 27th to decide what the next steps are.   1) acp done   2) sdoh done   3) med review done. 4) cardiology May 3 done fu may 2023  5) pulm fu around may 31  6) surgery needs an apt    Lab Results     None          Care Coordination Interventions    Referral from Primary Care Provider: No  Suggested Interventions and Community Resources  No Identified Needs  Pulmonary Rehab: Not Started         Goals Addressed                 This Visit's Progress     Conditions and Symptoms   On track     I will schedule office visits, as directed by my provider. I will keep my appointment or reschedule if I have to cancel. I will notify my provider of any barriers to my plan of care. Barriers: overwhelmed by complexity of regimen  Plan for overcoming my barriers: care coordination   Confidence: 9/10  Anticipated Goal Completion Date: 10/2/22              Prior to Admission medications    Medication Sig Start Date End Date Taking?  Authorizing Provider   atorvastatin (LIPITOR) 80 MG tablet Take 1 tablet by mouth daily 5/31/22   DES Hendricks - CNP   ALPRAZolam [place]) 0.5 MG tablet TAKE 1 TABLET BY MOUTH DAILY AS NEEED FOR SLEEP OR ANXIETY FOR UP TO 14 DAYS. 5/9/22 6/8/22  DES Hendricks CNP   levoFLOXacin (LEVAQUIN) 750 MG tablet 1 tablet 4/25/22   Historical Provider, MD   predniSONE 10 MG (21) TBPK 4 tablets for 5 days and 2 tablets for 5 days 4/25/22   Historical Provider, MD   Fluticasone-Umeclidin-Vilant 200-62.5-25 MCG/INH AEPB Inhale 1 puff into the lungs daily Lot ct7c exp 10/2023 4/27/22   DES Goode CNP   montelukast (SINGULAIR) 10 MG tablet TAKE 1 TABLET NIGHTLY  Patient not taking: Reported on 5/3/2022 4/13/22 4/13/23  DES Herrera CNP   DULoxetine (CYMBALTA) 30 MG extended release capsule TAKE 1 CAPSULE BY MOUTH AT BEDTIME 3/23/22   Historical Provider, MD   aspirin 81 MG chewable tablet Take 1 tablet by mouth daily 3/25/22   Preet Hedrick,    metoprolol tartrate (LOPRESSOR) 25 MG tablet Take 0.5 tablets by mouth 2 times daily 3/24/22   Preet Hedrick,    pantoprazole (PROTONIX) 40 MG tablet Take 1 tablet by mouth 2 times daily (before meals) 3/15/22 6/13/22  DES Herrera CNP   omega-3 acid ethyl esters (LOVAZA) 1 g capsule Take 2 capsules by mouth 2 times daily 3/12/22 3/12/23  DES Herrera CNP   fenofibrate (TRIGLIDE) 160 MG tablet Take 1 tablet by mouth daily  Patient not taking: Reported on 5/3/2022 3/12/22 3/12/23  DES Herrera CNP   QUEtiapine (SEROQUEL) 50 MG tablet TAKE 1 TO 2 TABLETS BY MOUTH AT BEDTIME AS NEEDED FOR SLEEP 3/2/22   DSE Herrera CNP   vitamin B-12 (CYANOCOBALAMIN) 1000 MCG tablet TAKE 2 TABLETS BY MOUTH EVERY DAY 3/2/22 3/2/23  DES Herrera CNP   QUEtiapine (SEROQUEL) 200 MG tablet TAKE 1 TABLET DAILY 2/2/22   DES Herrera CNP   dorzolamide-timolol (COSOPT) 22.3-6.8 MG/ML ophthalmic solution INSTILL 1 DROP IN [facility] EYE TWO TIMES A DAY 10/1/21   Historical Provider, MD   gabapentin (NEURONTIN) 800 MG tablet TAKE 1 TAB BY MOUTH AT BEDTIME FOR ONE WEEK THEN 2 TABS AT BEDTIME FOR ONE WEEK THEN 3 TABS AT BEDTIME THEREAFTER 9/7/21   Historical Provider, MD   Handicap Placard MISC by Does not apply route Exp 3/16/2026 3/16/21   DES Herrera - CNP   oxyCODONE-acetaminophen (PERCOCET)  MG per tablet Take 1 tablet by mouth every 4 hours as needed for Pain.   7/19/19   Historical Provider, MD       Future Appointments   Date Time Provider Glenroy Falcon   6/27/2022  1:00 PM Marli Lopez, APRN - CNP [id number]      and   Diabetes Assessment    Medic Alert ID: No  Meal Planning: None   How often do you test your blood sugar?: No Testing   Do you have barriers with adherence to non-pharmacologic self-management interventions?  (Nutrition/Exercise/Self-Monitoring): No   Have you ever had to go to the ED for symptoms of low blood sugar?: No       No patient-reported symptoms

## 2022-06-08 NOTE — CARE COORDINATION
Called [facility] to see if they carry this and if this would be covered under her insurance   [facility] luisa called back they do not carry accapella also called [facility] they too do not carry this   Called [facility] on [address] the also do not carry the pharmacist said they can order this for home delivery but would be an out of pocket cost

## 2022-06-08 NOTE — CARE COORDINATION
This might possibly be helpful. Is this something that might be covered? And how would we go about getting something like this for her?

## 2022-06-14 ENCOUNTER — CARE COORDINATION (OUTPATIENT)
Dept: CARE COORDINATION | Age: 77
End: 2022-06-14

## 2022-06-14 SDOH — ECONOMIC STABILITY: HOUSING INSECURITY: IN THE LAST 12 MONTHS, HOW MANY PLACES HAVE YOU LIVED?: 1

## 2022-06-14 SDOH — HEALTH STABILITY: PHYSICAL HEALTH: ON AVERAGE, HOW MANY DAYS PER WEEK DO YOU ENGAGE IN MODERATE TO STRENUOUS EXERCISE (LIKE A BRISK WALK)?: 0 DAYS

## 2022-06-14 SDOH — HEALTH STABILITY: PHYSICAL HEALTH: ON AVERAGE, HOW MANY MINUTES DO YOU ENGAGE IN EXERCISE AT THIS LEVEL?: 0 MIN

## 2022-06-14 SDOH — ECONOMIC STABILITY: FOOD INSECURITY: WITHIN THE PAST 12 MONTHS, THE FOOD YOU BOUGHT JUST DIDN'T LAST AND YOU DIDN'T HAVE MONEY TO GET MORE.: NEVER TRUE

## 2022-06-14 SDOH — ECONOMIC STABILITY: FOOD INSECURITY: WITHIN THE PAST 12 MONTHS, YOU WORRIED THAT YOUR FOOD WOULD RUN OUT BEFORE YOU GOT MONEY TO BUY MORE.: NEVER TRUE

## 2022-06-14 SDOH — ECONOMIC STABILITY: INCOME INSECURITY: IN THE LAST 12 MONTHS, WAS THERE A TIME WHEN YOU WERE NOT ABLE TO PAY THE MORTGAGE OR RENT ON TIME?: NO

## 2022-06-14 ASSESSMENT — SOCIAL DETERMINANTS OF HEALTH (SDOH)
HOW OFTEN DO YOU ATTEND CHURCH OR RELIGIOUS SERVICES?: 1 TO 4 TIMES PER YEAR
DO YOU BELONG TO ANY CLUBS OR ORGANIZATIONS SUCH AS CHURCH GROUPS UNIONS, FRATERNAL OR ATHLETIC GROUPS, OR SCHOOL GROUPS?: NO
IN A TYPICAL WEEK, HOW MANY TIMES DO YOU TALK ON THE PHONE WITH FAMILY, FRIENDS, OR NEIGHBORS?: MORE THAN THREE TIMES A WEEK
HOW OFTEN DO YOU ATTENT MEETINGS OF THE CLUB OR ORGANIZATION YOU BELONG TO?: NEVER
HOW HARD IS IT FOR YOU TO PAY FOR THE VERY BASICS LIKE FOOD, HOUSING, MEDICAL CARE, AND HEATING?: NOT HARD AT ALL
HOW OFTEN DO YOU GET TOGETHER WITH FRIENDS OR RELATIVES?: MORE THAN THREE TIMES A WEEK

## 2022-06-14 ASSESSMENT — LIFESTYLE VARIABLES
HOW OFTEN DO YOU HAVE A DRINK CONTAINING ALCOHOL: NEVER
HOW MANY STANDARD DRINKS CONTAINING ALCOHOL DO YOU HAVE ON A TYPICAL DAY: 1 OR 2

## 2022-06-14 NOTE — CARE COORDINATION
Ambulatory Care Coordination Note  6/14/2022  CM Risk Score: 13  Charlson 10 Year Mortality Risk Score: 100%     ACC: Cooper Lazcano RN    Summary Note: spoke with pt who said she is having the CT done this Friday she has a follow up apt June 27 with her pcp. She is not sure when she is supposed to follow up with pulm. She said at first she was told the pulm was not going to do the bronch but now her pulm is thinking about doing one. Will call pulm office next week after CT done to see what the plan is. 1) acp done   2) sdoh done   3) med review done. 4) cardiology May 3 done fu may 2023  5) pulm fu around may 31  6) surgery needs an apt    Lab Results     None          Care Coordination Interventions    Referral from Primary Care Provider: No  Suggested Interventions and Community Resources  No Identified Needs  Pulmonary Rehab: Not Started         Goals Addressed                 This Visit's Progress     Conditions and Symptoms   On track     I will schedule office visits, as directed by my provider. I will keep my appointment or reschedule if I have to cancel. I will notify my provider of any barriers to my plan of care. Barriers: overwhelmed by complexity of regimen  Plan for overcoming my barriers: care coordination   Confidence: 9/10  Anticipated Goal Completion Date: 10/2/22              Prior to Admission medications    Medication Sig Start Date End Date Taking?  Authorizing Provider   atorvastatin (LIPITOR) 80 MG tablet Take 1 tablet by mouth daily 5/31/22   DES Man - CNP   levoFLOXacin (LEVAQUIN) 750 MG tablet 1 tablet 4/25/22   Historical Provider, MD   predniSONE 10 MG (21) TBPK 4 tablets for 5 days and 2 tablets for 5 days 4/25/22   Historical Provider, MD   Fluticasone-Umeclidin-Vilant 200-62.5-25 MCG/INH AEPB Inhale 1 puff into the lungs daily Lot ct7c exp 10/2023 4/27/22   DES Man CNP   montelukast (SINGULAIR) 10 MG tablet TAKE 1 TABLET NIGHTLY  Patient not taking: Reported on 5/3/2022 4/13/22 4/13/23  DES Mercado CNP   DULoxetine (CYMBALTA) 30 MG extended release capsule TAKE 1 CAPSULE BY MOUTH AT BEDTIME 3/23/22   Historical Provider, MD   aspirin 81 MG chewable tablet Take 1 tablet by mouth daily 3/25/22   Courtney Krishnan DO   metoprolol tartrate (LOPRESSOR) 25 MG tablet Take 0.5 tablets by mouth 2 times daily 3/24/22   Courtney Krishnan DO   pantoprazole (PROTONIX) 40 MG tablet Take 1 tablet by mouth 2 times daily (before meals) 3/15/22 6/13/22  DES Mercado CNP   omega-3 acid ethyl esters (LOVAZA) 1 g capsule Take 2 capsules by mouth 2 times daily 3/12/22 3/12/23  DES Mercado CNP   fenofibrate (TRIGLIDE) 160 MG tablet Take 1 tablet by mouth daily  Patient not taking: Reported on 5/3/2022 3/12/22 3/12/23  DES Mercado CNP   QUEtiapine (SEROQUEL) 50 MG tablet TAKE 1 TO 2 TABLETS BY MOUTH AT BEDTIME AS NEEDED FOR SLEEP 3/2/22   DES Mercado CNP   vitamin B-12 (CYANOCOBALAMIN) 1000 MCG tablet TAKE 2 TABLETS BY MOUTH EVERY DAY 3/2/22 3/2/23  DES Mercado CNP   QUEtiapine (SEROQUEL) 200 MG tablet TAKE 1 TABLET DAILY 2/2/22   DES Mercado CNP   dorzolamide-timolol (COSOPT) 22.3-6.8 MG/ML ophthalmic solution INSTILL 1 DROP IN [facility] EYE TWO TIMES A DAY 10/1/21   Historical Provider, MD   gabapentin (NEURONTIN) 800 MG tablet TAKE 1 TAB BY MOUTH AT BEDTIME FOR ONE WEEK THEN 2 TABS AT BEDTIME FOR ONE WEEK THEN 3 TABS AT BEDTIME THEREAFTER 9/7/21   Historical Provider, MD   Handicap Placard MISC by Does not apply route Exp 3/16/2026 3/16/21   DES Mercado CNP   oxyCODONE-acetaminophen (PERCOCET)  MG per tablet Take 1 tablet by mouth every 4 hours as needed for Pain.   7/19/19   Historical Provider, MD       Future Appointments   Date Time Provider Glenroy Terrie   6/17/2022  2:15 PM [facility] CT RM 1 STCZ CT SCAN [facility] Radiolog   6/27/2022  1:00 PM DES Mercado - [facility] and   Diabetes Assessment    Medic Alert ID: No  Meal Planning: None   How often do you test your blood sugar?: No Testing   Do you have barriers with adherence to non-pharmacologic self-management interventions?  (Nutrition/Exercise/Self-Monitoring): No   Have you ever had to go to the ED for symptoms of low blood sugar?: No       No patient-reported symptoms

## 2022-06-17 ENCOUNTER — HOSPITAL ENCOUNTER (OUTPATIENT)
Dept: CT IMAGING | Age: 77
Discharge: HOME | End: 2022-06-19
Payer: MEDICARE

## 2022-06-17 DIAGNOSIS — J18.9 PNEUMONIA DUE TO INFECTIOUS ORGANISM, UNSPECIFIED LATERALITY, UNSPECIFIED PART OF LUNG: ICD-10-CM

## 2022-06-17 PROCEDURE — 71250 CT THORAX DX C-: CPT

## 2022-06-21 ENCOUNTER — CARE COORDINATION (OUTPATIENT)
Dept: CARE COORDINATION | Age: 77
End: 2022-06-21

## 2022-06-21 NOTE — CARE COORDINATION
Ambulatory Care Coordination Note  6/21/2022  CM Risk Score: 13  Charlson 10 Year Mortality Risk Score: 100%     ACC: Venessa Strauss, RN    Summary Note: spoke with pt who did have her CT done she will follow up with her pcp Monday for next steps. She also said Dr Sheri Bowman is going to remove her bakers cyst soon     1) acp done   2) sdoh done   3) med review done. 4) cardiology May 3 done fu may 2023  5) pulm fu around may 31  6) surgery needs an apt    Lab Results     None          Care Coordination Interventions    Referral from Primary Care Provider: No  Suggested Interventions and Community Resources  No Identified Needs  Pulmonary Rehab: Not Started         Goals Addressed    None         Prior to Admission medications    Medication Sig Start Date End Date Taking?  Authorizing Provider   atorvastatin (LIPITOR) 80 MG tablet Take 1 tablet by mouth daily 5/31/22   DES Garrison CNP   levoFLOXacin (LEVAQUIN) 750 MG tablet 1 tablet 4/25/22   Historical Provider, MD   predniSONE 10 MG (21) TBPK 4 tablets for 5 days and 2 tablets for 5 days 4/25/22   Historical Provider, MD   Fluticasone-Umeclidin-Vilant 200-62.5-25 MCG/INH AEPB Inhale 1 puff into the lungs daily Lot ct7c exp 10/2023 4/27/22   DES Garrison CNP   montelukast (SINGULAIR) 10 MG tablet TAKE 1 TABLET NIGHTLY  Patient not taking: Reported on 5/3/2022 4/13/22 4/13/23  DES Garrison CNP   DULoxetine (CYMBALTA) 30 MG extended release capsule TAKE 1 CAPSULE BY MOUTH AT BEDTIME 3/23/22   Historical Provider, MD   aspirin 81 MG chewable tablet Take 1 tablet by mouth daily 3/25/22   Lynravi Fuchs DO   metoprolol tartrate (LOPRESSOR) 25 MG tablet Take 0.5 tablets by mouth 2 times daily 3/24/22   Lyndia Shila, DO   pantoprazole (PROTONIX) 40 MG tablet Take 1 tablet by mouth 2 times daily (before meals) 3/15/22 6/13/22  DES Garrison CNP   omega-3 acid ethyl esters (LOVAZA) 1 g capsule Take 2 capsules by mouth 2 times daily 3/12/22 3/12/23  DES Breaux Cea, CNP   fenofibrate (TRIGLIDE) 160 MG tablet Take 1 tablet by mouth daily  Patient not taking: Reported on 5/3/2022 3/12/22 3/12/23  DES Cardenas CNP   QUEtiapine (SEROQUEL) 50 MG tablet TAKE 1 TO 2 TABLETS BY MOUTH AT BEDTIME AS NEEDED FOR SLEEP 3/2/22   DES Breaux Cea, CNP   vitamin B-12 (CYANOCOBALAMIN) 1000 MCG tablet TAKE 2 TABLETS BY MOUTH EVERY DAY 3/2/22 3/2/23  DES Breaux Cea, CNP   QUEtiapine (SEROQUEL) 200 MG tablet TAKE 1 TABLET DAILY 2/2/22   DES Breaux Cea, CNP   dorzolamide-timolol (COSOPT) 22.3-6.8 MG/ML ophthalmic solution INSTILL 1 DROP IN [facility] EYE TWO TIMES A DAY 10/1/21   Historical Provider, MD   gabapentin (NEURONTIN) 800 MG tablet TAKE 1 TAB BY MOUTH AT BEDTIME FOR ONE WEEK THEN 2 TABS AT BEDTIME FOR ONE WEEK THEN 3 TABS AT BEDTIME THEREAFTER 9/7/21   Historical Provider, MD   Handicap Placard MISC by Does not apply route Exp 3/16/2026 3/16/21   DES Breaux Cea, CNP   oxyCODONE-acetaminophen (PERCOCET)  MG per tablet Take 1 tablet by mouth every 4 hours as needed for Pain.   7/19/19   Historical Provider, MD       Future Appointments   Date Time Provider Glenroy Falcon   6/27/2022  1:00 PM DES Breaux Cea, CNP [place]  [address] [facility]

## 2022-06-27 ENCOUNTER — OFFICE VISIT (OUTPATIENT)
Dept: FAMILY MEDICINE CLINIC | Age: 77
End: 2022-06-27
Payer: MEDICARE

## 2022-06-27 VITALS
WEIGHT: 144 LBS | DIASTOLIC BLOOD PRESSURE: 76 MMHG | OXYGEN SATURATION: 96 % | SYSTOLIC BLOOD PRESSURE: 130 MMHG | TEMPERATURE: 98.2 F | BODY MASS INDEX: 24.72 KG/M2 | RESPIRATION RATE: 20 BRPM | HEART RATE: 99 BPM

## 2022-06-27 DIAGNOSIS — F33.1 MODERATE EPISODE OF RECURRENT MAJOR DEPRESSIVE DISORDER (HCC): ICD-10-CM

## 2022-06-27 DIAGNOSIS — F41.9 ANXIETY: Primary | ICD-10-CM

## 2022-06-27 DIAGNOSIS — J69.0 RECURRENT ASPIRATION PNEUMONIA (HCC): ICD-10-CM

## 2022-06-27 DIAGNOSIS — I21.4 NSTEMI (NON-ST ELEVATED MYOCARDIAL INFARCTION) (HCC): ICD-10-CM

## 2022-06-27 DIAGNOSIS — J30.89 NON-SEASONAL ALLERGIC RHINITIS DUE TO OTHER ALLERGIC TRIGGER: ICD-10-CM

## 2022-06-27 DIAGNOSIS — J41.0 SIMPLE CHRONIC BRONCHITIS (HCC): ICD-10-CM

## 2022-06-27 DIAGNOSIS — R13.19 ESOPHAGEAL DYSPHAGIA: ICD-10-CM

## 2022-06-27 DIAGNOSIS — K21.9 GASTROESOPHAGEAL REFLUX DISEASE, UNSPECIFIED WHETHER ESOPHAGITIS PRESENT: ICD-10-CM

## 2022-06-27 DIAGNOSIS — R06.09 DYSPNEA ON EXERTION: ICD-10-CM

## 2022-06-27 PROCEDURE — 99215 OFFICE O/P EST HI 40 MIN: CPT | Performed by: NURSE PRACTITIONER

## 2022-06-27 PROCEDURE — 1123F ACP DISCUSS/DSCN MKR DOCD: CPT | Performed by: NURSE PRACTITIONER

## 2022-06-27 RX ORDER — LORATADINE 10 MG/1
10 TABLET ORAL DAILY
Qty: 90 TABLET | Refills: 3 | Status: SHIPPED | OUTPATIENT
Start: 2022-06-27 | End: 2022-10-10 | Stop reason: ALTCHOICE

## 2022-06-27 RX ORDER — ALPRAZOLAM 0.5 MG/1
0.5 TABLET ORAL DAILY PRN
Qty: 30 TABLET | Refills: 0 | Status: SHIPPED | OUTPATIENT
Start: 2022-06-27 | End: 2022-07-29 | Stop reason: SDUPTHER

## 2022-06-27 RX ORDER — MONTELUKAST SODIUM 10 MG/1
10 TABLET ORAL NIGHTLY
Qty: 90 TABLET | Refills: 3 | Status: SHIPPED | OUTPATIENT
Start: 2022-06-27 | End: 2022-10-10 | Stop reason: ALTCHOICE

## 2022-06-27 RX ORDER — FLUTICASONE FUROATE, UMECLIDINIUM BROMIDE AND VILANTEROL TRIFENATATE 100; 62.5; 25 UG/1; UG/1; UG/1
POWDER RESPIRATORY (INHALATION)
COMMUNITY
End: 2022-06-27 | Stop reason: DRUGHIGH

## 2022-06-27 NOTE — PROGRESS NOTES
[address]  [phone]    6/27/22     Patient ID  Jose Antonio Lugo is a 76 y.o. female  Established patient    Chief Complaint  Jose Antonio Lugo presents today for Hypertension, Anxiety (Increase in anxiety. High anxiety recently alot going on. ), Depression, and Shortness of Breath (Chronic bronchitis/pneumonia)      ASSESSMENT/PLAN  1. Anxiety  -     ALPRAZolam (XANAX) 0.5 MG tablet; Take 1 tablet by mouth daily as needed for Sleep or Anxiety for up to 30 days. , Disp-30 tablet, R-0Normal  2. Moderate episode of recurrent major depressive disorder [facility])  Assessment & Plan:   Borderline controlled, continue current medications and continue current treatment plan  3. Dyspnea on exertion  -     Cardiac Stress Test - w/Pharm; Future  4. Recurrent aspiration pneumonia ([facility]  Assessment & Plan:   Monitored by specialist- no acute findings meriting change in the plan  5. Simple chronic bronchitis ([facility]  Assessment & Plan:   Monitored by specialist- no acute findings meriting change in the plan  6. NSTEMI (non-ST elevated myocardial infarction) [facility])  -     Cardiac Stress Test - w/Pharm; Future  7. Non-seasonal allergic rhinitis due to other allergic trigger  -     montelukast (SINGULAIR) 10 MG tablet; Take 1 tablet by mouth nightly, Disp-90 tablet, R-3Normal  -     loratadine (CLARITIN) 10 MG tablet; Take 1 tablet by mouth daily, Disp-90 tablet, R-3Normal  8. Gastroesophageal reflux disease, unspecified whether esophagitis present  9. Esophageal dysphagia     Correlate with surgery (hiatal hernia) and pulmonology (recurring aspiration pneumonia)   Refills sent to pharmacy   Correlate with cardiology - stress test ordered as never completed with last hospital admission, was told to follow outpt? Return in about 3 months (around 9/27/2022).   On this date 6/27/2022 I have spent 40+ minutes reviewing previous notes, test results and face to face with the patient discussing the diagnosis and importance of compliance with the treatment plan as well as documenting on the day of the visit. Patient Care Team:  DES Narayan CNP as PCP - General (Nurse Practitioner Family)  DES Narayan CNP as PCP - [facility] Empaneled Provider  Angel Bennett MD (Anesthesiology)  Grisel Monreal DO as Surgeon (Orthopedic Surgery)  Gia Edmonds MD as Consulting Physician (Gastroenterology)  Zahida Espana DO as Consulting Physician (Bariatric Surgery)  DES Spain CNP (Family Medicine)  Lois Russell RN as Ambulatory Care Manager    SUBJECTIVE/OBJECTIVE  History of Present illness / Visit Summary   Patient presents today for follow up   Reviewed health maintenance and any recent labs/imaging        Review of Systems  Review of Systems   Constitutional:  Positive for fatigue. Negative for activity change, appetite change, chills, fever and unexpected weight change. HENT:  Positive for postnasal drip and trouble swallowing. Negative for congestion, ear pain, rhinorrhea, sore throat and voice change. Respiratory:  Positive for cough (productive), chest tightness (w/cough ) and shortness of breath (w/exertion). Negative for wheezing. Chronic pneumonia, bronchitis. Aspiration? ? Benefit from bronchoscopy? Following with pulmonology    Cardiovascular:  Negative for chest pain and palpitations. Following with cardiology    Gastrointestinal:  Positive for abdominal distention. Negative for abdominal pain, diarrhea, nausea and vomiting. Following with GI  Recent EGD and colonoscopy. Large hiatal hernia. May be rationale for ongoing pneumonia? Aspiration pneumonia? Genitourinary:  Negative for decreased urine volume, difficulty urinating and dysuria. Musculoskeletal:  Positive for arthralgias (right knee), gait problem, joint swelling (right knee) and myalgias.         Following with pain management and orthopedic    Skin: Negative for pallor and rash. Allergic/Immunologic: Positive for environmental allergies. Neurological:  Negative for dizziness, weakness, light-headedness and headaches. Hematological:  Negative for adenopathy. Psychiatric/Behavioral:  Positive for agitation, dysphoric mood and sleep disturbance. Negative for self-injury and suicidal ideas. The patient is nervous/anxious. Cares for ill son - has partial custody of grandson      Physical exam   Physical Exam  Vitals and nursing note reviewed. Constitutional:       General: She is not in acute distress. Appearance: Normal appearance. She is well-developed and well-groomed. She is obese. She is not ill-appearing or toxic-appearing. Cardiovascular:      Rate and Rhythm: Normal rate and regular rhythm. No extrasystoles are present. Heart sounds: Normal heart sounds, S1 normal and S2 normal. No murmur heard. Pulmonary:      Effort: Pulmonary effort is normal. No prolonged expiration or respiratory distress. Breath sounds: Normal breath sounds and air entry. Musculoskeletal:      Right lower leg: No edema. Left lower leg: No edema. Skin:     General: Skin is warm and dry. Coloration: Skin is not ashen, cyanotic, jaundiced or pale. Neurological:      Mental Status: She is alert and oriented to person, place, and time. Motor: Motor function is intact. Gait: Gait is intact. Psychiatric:         Attention and Perception: Attention and perception normal.         Mood and Affect: Affect normal. Mood is anxious. Speech: Speech normal.         Behavior: Behavior normal. Behavior is cooperative. Thought Content: Thought content normal. Thought content does not include suicidal ideation. Thought content does not include suicidal plan.          Cognition and Memory: Cognition and memory normal.         Judgment: Judgment normal.       Electronically signed by DES Smith CNP, MIANCNP on 7/17/2022 at 8:58 PM    Please note that this chart was generated using voice recognition Dragon dictation software. Although every effort was made to ensure the accuracy of this automated transcription, some errors in transcription may have occurred.

## 2022-06-28 ENCOUNTER — CARE COORDINATION (OUTPATIENT)
Dept: CARE COORDINATION | Age: 77
End: 2022-06-28

## 2022-06-29 ENCOUNTER — CARE COORDINATION (OUTPATIENT)
Dept: CARE COORDINATION | Age: 77
End: 2022-06-29

## 2022-06-29 NOTE — CARE COORDINATION
Ambulatory Care Coordination Note  6/29/2022  CM Risk Score: 13  Charlson 10 Year Mortality Risk Score: 100%     ACC: Louann Alpers, RN    Summary Note: spoke with pt who said she did get her stress test scheduled at [facility] for tomorrow. She did see cardiology in may the recommendation was for her to return in one year. She may need sooner apt depending on stress test. She will see pulmonology on July 11th   1) acp done   2) sdoh done   3) med review done. 4) cardiology fu may 2023  5) pulm fu July 11   6) surgery needs an apt  7) stress test 6/30     Lab Results     None          Care Coordination Interventions    Referral from Primary Care Provider: No  Suggested Interventions and Community Resources  No Identified Needs  Pulmonary Rehab: Not Started         Goals Addressed                 This Visit's Progress     Conditions and Symptoms   On track     I will schedule office visits, as directed by my provider. I will keep my appointment or reschedule if I have to cancel. I will notify my provider of any barriers to my plan of care. Barriers: overwhelmed by complexity of regimen  Plan for overcoming my barriers: care coordination   Confidence: 9/10  Anticipated Goal Completion Date: 10/2/22              Prior to Admission medications    Medication Sig Start Date End Date Taking? Authorizing Provider   VENTOLIN  (90 Base) MCG/ACT inhaler INHALE 1 PUFF BY MOUTH EVERY 4 HOURS AS NEEDED 6/1/22   Historical Provider, MD   montelukast (SINGULAIR) 10 MG tablet Take 1 tablet by mouth nightly 6/27/22 12/24/22  DES Guthrie - CNP   ALPRAZolam [facility]) 0.5 MG tablet Take 1 tablet by mouth daily as needed for Sleep or Anxiety for up to 30 days.  6/27/22 7/27/22  DES Guthrie CNP   loratadine (CLARITIN) 10 MG tablet Take 1 tablet by mouth daily 6/27/22 6/27/23  DES Guthrie CNP   atorvastatin (LIPITOR) 80 MG tablet Take 1 tablet by mouth daily 5/31/22   DES Guthrie CNP Fluticasone-Umeclidin-Vilant 200-62.5-25 MCG/INH AEPB Inhale 1 puff into the lungs daily Lot ct7c exp 10/2023 4/27/22   DES Montalvo CNP   aspirin 81 MG chewable tablet Take 1 tablet by mouth daily 3/25/22   Brenna Borden, DO   pantoprazole (PROTONIX) 40 MG tablet Take 1 tablet by mouth 2 times daily (before meals) 3/15/22 6/27/23  Marli Mullen APRN - CNP   QUEtiapine (SEROQUEL) 50 MG tablet TAKE 1 TO 2 TABLETS BY MOUTH AT BEDTIME AS NEEDED FOR SLEEP 3/2/22   DES Montalvo - CNP   vitamin B-12 (CYANOCOBALAMIN) 1000 MCG tablet TAKE 2 TABLETS BY MOUTH EVERY DAY 3/2/22 3/2/23  DES Montalvo - CNP   QUEtiapine (SEROQUEL) 200 MG tablet TAKE 1 TABLET DAILY 2/2/22   DES Montalvo CNP   dorzolamide-timolol (COSOPT) 22.3-6.8 MG/ML ophthalmic solution INSTILL 1 DROP IN [facility] EYE TWO TIMES A DAY 10/1/21   Historical Provider, MD   gabapentin (NEURONTIN) 800 MG tablet TAKE 1 TAB BY MOUTH AT BEDTIME FOR ONE WEEK THEN 2 TABS AT BEDTIME FOR ONE WEEK THEN 3 TABS AT BEDTIME THEREAFTER 9/7/21   Historical Provider, MD   Handicap Placard MISC by Does not apply route Exp 3/16/2026 3/16/21   DES Montalvo CNP   oxyCODONE-acetaminophen (PERCOCET)  MG per tablet Take 1 tablet by mouth every 4 hours as needed for Pain.   7/19/19   Historical Provider, MD       Future Appointments   Date Time Provider Glenroy Falcon   6/30/2022  7:00 AM STC NUC MED WHOLE BODY INJ RM STCZ NUC MED STC Radiolog   6/30/2022  7:30 AM STC NUC MED PORTABLE TC RM STCZ NUC MED STC Radiolog   6/30/2022  9:30 AM STC STRESS RM B STCZ STRESS [place]   6/30/2022 11:00 AM STC NUC MED PORTABLE TC RM STCZ NUC MED STC Radiolog   9/7/2022  2:00 PM DES Montalvo - CNP [place]  [id number] [facility]

## 2022-06-30 ENCOUNTER — HOSPITAL ENCOUNTER (OUTPATIENT)
Dept: NUCLEAR MEDICINE | Age: 77
Discharge: HOME | End: 2022-07-02
Payer: MEDICARE

## 2022-06-30 ENCOUNTER — HOSPITAL ENCOUNTER (OUTPATIENT)
Dept: NON INVASIVE DIAGNOSTICS | Age: 77
Discharge: HOME | End: 2022-06-30
Payer: MEDICARE

## 2022-06-30 DIAGNOSIS — I21.4 NSTEMI (NON-ST ELEVATED MYOCARDIAL INFARCTION) (HCC): ICD-10-CM

## 2022-06-30 DIAGNOSIS — R06.09 DYSPNEA ON EXERTION: ICD-10-CM

## 2022-06-30 LAB
LV EF: 70 %
LVEF MODALITY: NORMAL

## 2022-06-30 PROCEDURE — 3430000000 HC RX DIAGNOSTIC RADIOPHARMACEUTICAL: Performed by: NURSE PRACTITIONER

## 2022-06-30 PROCEDURE — 2580000003 HC RX 258: Performed by: NURSE PRACTITIONER

## 2022-06-30 PROCEDURE — A9500 TC99M SESTAMIBI: HCPCS | Performed by: NURSE PRACTITIONER

## 2022-06-30 PROCEDURE — 93017 CV STRESS TEST TRACING ONLY: CPT

## 2022-06-30 PROCEDURE — 6360000002 HC RX W HCPCS: Performed by: NURSE PRACTITIONER

## 2022-06-30 PROCEDURE — 78452 HT MUSCLE IMAGE SPECT MULT: CPT

## 2022-06-30 RX ORDER — AMINOPHYLLINE DIHYDRATE 25 MG/ML
50 INJECTION, SOLUTION INTRAVENOUS PRN
Status: DISCONTINUED | OUTPATIENT
Start: 2022-06-30 | End: 2022-06-30 | Stop reason: HOSPADM

## 2022-06-30 RX ORDER — ALBUTEROL SULFATE 90 UG/1
2 AEROSOL, METERED RESPIRATORY (INHALATION) PRN
Status: DISCONTINUED | OUTPATIENT
Start: 2022-06-30 | End: 2022-06-30 | Stop reason: HOSPADM

## 2022-06-30 RX ORDER — METOPROLOL TARTRATE 5 MG/5ML
5 INJECTION INTRAVENOUS EVERY 5 MIN PRN
Status: DISCONTINUED | OUTPATIENT
Start: 2022-06-30 | End: 2022-06-30 | Stop reason: HOSPADM

## 2022-06-30 RX ORDER — SODIUM CHLORIDE 0.9 % (FLUSH) 0.9 %
5-40 SYRINGE (ML) INJECTION PRN
Status: DISCONTINUED | OUTPATIENT
Start: 2022-06-30 | End: 2022-06-30 | Stop reason: HOSPADM

## 2022-06-30 RX ORDER — ATROPINE SULFATE 0.1 MG/ML
0.5 INJECTION INTRAVENOUS EVERY 5 MIN PRN
Status: DISCONTINUED | OUTPATIENT
Start: 2022-06-30 | End: 2022-06-30 | Stop reason: HOSPADM

## 2022-06-30 RX ORDER — SODIUM CHLORIDE 9 MG/ML
500 INJECTION, SOLUTION INTRAVENOUS CONTINUOUS PRN
Status: DISCONTINUED | OUTPATIENT
Start: 2022-06-30 | End: 2022-06-30 | Stop reason: HOSPADM

## 2022-06-30 RX ORDER — NITROGLYCERIN 0.4 MG/1
0.4 TABLET SUBLINGUAL EVERY 5 MIN PRN
Status: DISCONTINUED | OUTPATIENT
Start: 2022-06-30 | End: 2022-06-30 | Stop reason: HOSPADM

## 2022-06-30 RX ORDER — SODIUM CHLORIDE 0.9 % (FLUSH) 0.9 %
10 SYRINGE (ML) INJECTION PRN
Status: DISCONTINUED | OUTPATIENT
Start: 2022-06-30 | End: 2022-07-03 | Stop reason: HOSPADM

## 2022-06-30 RX ADMIN — TETRAKIS(2-METHOXYISOBUTYLISOCYANIDE)COPPER(I) TETRAFLUOROBORATE 10 MILLICURIE: 1 INJECTION, POWDER, LYOPHILIZED, FOR SOLUTION INTRAVENOUS at 07:15

## 2022-06-30 RX ADMIN — TETRAKIS(2-METHOXYISOBUTYLISOCYANIDE)COPPER(I) TETRAFLUOROBORATE 34.1 MILLICURIE: 1 INJECTION, POWDER, LYOPHILIZED, FOR SOLUTION INTRAVENOUS at 08:51

## 2022-06-30 RX ADMIN — REGADENOSON 0.4 MG: 0.08 INJECTION, SOLUTION INTRAVENOUS at 08:51

## 2022-06-30 RX ADMIN — Medication 10 ML: at 07:15

## 2022-06-30 NOTE — PROGRESS NOTES
CST Lexiscan. Stress Tech performs patient preparation of physical comfort, review test procedures, pre-stress EKG. Lung Sounds clear t/o. Consent verified by pt. .  Educated patient on test procedure and possible side effects of Lexiscan as well as s/s to report. Cardiologist reviewed pre-test EKG and is present for test.  Patient tolerated test well. EKG portion of testing is completed and  nuc. med. portion is still pending.

## 2022-06-30 NOTE — PROCEDURES
[address] [facility] [address]                              CARDIAC STRESS TEST    PATIENT NAME: Aleah RIVERO                   :        1945  MED REC NO:   [id number]                              ROOM:  ACCOUNT NO:   [de-identified]                           ADMIT DATE: 2022  PROVIDER:     Rudene Merlin    DATE OF STUDY:  2022    ORDERING PROVIDER:  ELOISE TERRELL    PRIMARY CARE PROVIDER:  ELOISE TERRELL    INTERPRETING PHYSICIAN:  Ayse Don MD    _____ STRESS TESTING    TEST TYPE: LEXISCAN CARDIOLYTE STRESS TEST  INDICATION: NSTEMI, SHORTNESS OF BREATH  REFERRING PHYSICIAN: ELOISE TERRELL    RESTING HEART RATE: 76 BEATS PER MINUTE  RESTING BLOOD PRESSURE: 133/78    MEDICATION(S) GIVEN: 0.4MG IV LEXISCAN  REASON FOR TERMINATION: MEDICATION INFUSION COMPLETE    RESTING EKG: NORMAL  STRESS HEART RESPONSE: NORMAL RESPONSE  BLOOD PRESSURE RESPONSE: APPROPRIATE  STRESS EKGs: NORMAL  CHEST DISCOMFORT: NO PAIN DURING STRESS  ISCHEMIC EKG CHANGES: NONE    EKG IMPRESSION: ELECTROCARDIOGRAPHICALLY NEGATIVE LEXISCAN STRESS TEST. RADIOISOTOPE RESULTS TO FOLLOW FROM THE DEPARTMENT OF NUCLEAR MEDICINE.     Dominik Massey    D: 2022 9:39:04       T: 2022 9:40:49     ROCCO/KERI  Job#: [id number]     Doc#: Unknown    CC:    (Retain this field even if not dictated or not decipherable)

## 2022-07-05 NOTE — RESULT ENCOUNTER NOTE
Patient does not use MyChart. Stress test reviewed. Normal findings.  Low risk for any cardiac events

## 2022-07-06 ENCOUNTER — CARE COORDINATION (OUTPATIENT)
Dept: CARE COORDINATION | Age: 77
End: 2022-07-06

## 2022-07-07 ENCOUNTER — CARE COORDINATION (OUTPATIENT)
Dept: CARE COORDINATION | Age: 77
End: 2022-07-07

## 2022-07-07 NOTE — CARE COORDINATION
Ambulatory Care Coordination Note  7/7/2022  CM Risk Score: 13  Charlson 10 Year Mortality Risk Score: 100%     ACC: Meghan Gant RN    Summary Note: spoke with pt who said she is going to see Dr Haresh Marshall on Monday she is doing pretty good she was doing yard work at the time of the call. She had her stress test that was WNL. 1) acp done   2) sdoh done   3) med review done. 4) cardiology fu may 2023  5) pulm fu July 11   6) surgery needs an apt  7) stress test 6/30     Lab Results     None          Care Coordination Interventions    Referral from Primary Care Provider: No  Suggested Interventions and Community Resources  Disease Specific Clinic: Completed  Pulmonary Rehab: Not Started  Zone Management Tools: Completed         Goals Addressed                 This Visit's Progress     Conditions and Symptoms   On track     I will schedule office visits, as directed by my provider. I will keep my appointment or reschedule if I have to cancel. I will notify my provider of any barriers to my plan of care. Barriers: overwhelmed by complexity of regimen  Plan for overcoming my barriers: care coordination   Confidence: 9/10  Anticipated Goal Completion Date: 10/2/22              Prior to Admission medications    Medication Sig Start Date End Date Taking? Authorizing Provider   VENTOLIN  (90 Base) MCG/ACT inhaler INHALE 1 PUFF BY MOUTH EVERY 4 HOURS AS NEEDED 6/1/22   Historical Provider, MD   montelukast (SINGULAIR) 10 MG tablet Take 1 tablet by mouth nightly 6/27/22 12/24/22  DES Steele CNP   ALPRAZolam Latrelle Bible) 0.5 MG tablet Take 1 tablet by mouth daily as needed for Sleep or Anxiety for up to 30 days.  6/27/22 7/27/22  DES Steele CNP   loratadine (CLARITIN) 10 MG tablet Take 1 tablet by mouth daily 6/27/22 6/27/23  DES Steele CNP   atorvastatin (LIPITOR) 80 MG tablet Take 1 tablet by mouth daily 5/31/22   DES Steele CNP   Fluticasone-Umeclidin-Vilant

## 2022-07-17 ASSESSMENT — ENCOUNTER SYMPTOMS
ABDOMINAL DISTENTION: 1
WHEEZING: 0
COUGH: 1
VOMITING: 0
SHORTNESS OF BREATH: 1
DIARRHEA: 0
RHINORRHEA: 0
NAUSEA: 0
ABDOMINAL PAIN: 0
VOICE CHANGE: 0
TROUBLE SWALLOWING: 1
SORE THROAT: 0
CHEST TIGHTNESS: 1

## 2022-07-21 ENCOUNTER — CARE COORDINATION (OUTPATIENT)
Dept: CARE COORDINATION | Age: 77
End: 2022-07-21

## 2022-07-22 ENCOUNTER — TELEPHONE (OUTPATIENT)
Dept: BARIATRICS/WEIGHT MGMT | Age: 77
End: 2022-07-22

## 2022-07-22 ENCOUNTER — CARE COORDINATION (OUTPATIENT)
Dept: CARE COORDINATION | Age: 77
End: 2022-07-22

## 2022-07-22 NOTE — TELEPHONE ENCOUNTER
Called patient to make appointment per Dr. Mahi Dos Santos last office note.   Per gentleman who answered phone she is not available and to call back after 1:30 pm

## 2022-07-22 NOTE — TELEPHONE ENCOUNTER
Pt was seen by Dr. Ed Whitaker for hernia and needed surgery; in the meantime got ill with pneumonia; her pulmonary doctor told her she is now cleared for surgery; pt is asking what she needs to do to get scheduled for surgery
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
## 2022-07-28 ENCOUNTER — OFFICE VISIT (OUTPATIENT)
Dept: BARIATRICS/WEIGHT MGMT | Age: 77
End: 2022-07-28
Payer: MEDICARE

## 2022-07-28 VITALS
RESPIRATION RATE: 20 BRPM | DIASTOLIC BLOOD PRESSURE: 72 MMHG | WEIGHT: 143 LBS | SYSTOLIC BLOOD PRESSURE: 130 MMHG | BODY MASS INDEX: 24.41 KG/M2 | HEART RATE: 70 BPM | HEIGHT: 64 IN

## 2022-07-28 DIAGNOSIS — K44.9 HIATAL HERNIA WITH GERD: Primary | ICD-10-CM

## 2022-07-28 DIAGNOSIS — K21.9 HIATAL HERNIA WITH GERD: Primary | ICD-10-CM

## 2022-07-28 PROCEDURE — 99214 OFFICE O/P EST MOD 30 MIN: CPT | Performed by: SURGERY

## 2022-07-28 PROCEDURE — 1123F ACP DISCUSS/DSCN MKR DOCD: CPT | Performed by: SURGERY

## 2022-07-28 NOTE — PROGRESS NOTES
[address] [facility] MIN INVASIVE BARIATRIC SURG  [address] [place] [address] [place]  [address] [facility] [address]  Dept: [phone]    ROBOTIC & MINIMALLY INVASIVE SURGERY  PROGRESS NOTE INITIAL EVALUATION     Patient: Litzy Wang        Service Date: 7/28/2022     HPI:     Chief Complaint   Patient presents with    Hiatal Hernia       The patient is a pleasant 76y.o. year old female  with long standing GERD and a hiatal hernia, who stands Height: 5' 4\" (162.6 cm) tall with a weight of Weight: 143 lb (64.9 kg) , resulting in a BMI of Body mass index is 24.55 kg/m². She reports recurrent pneumonia and this is suspected to be related to aspiration. She underwent EGD with Dr. Susana Raymundo showing a large hiatal hernia with signs of reflux. She does have on and off trouble swallowing more solid foods. She denies melena, hematochezia or hematemesis. She has been on PPI, but symptoms persist.    The patient denies  a history of myocardial infarction, deep vein thrombosis, pulmonary embolism, renal failure, hepatic failure and stroke. Overall, patient is doing well. She develops some discomfort and dysphagia with certain foods. Patient has completed all pre-operative testing included: stress test, esophageal manometry, pulmonary evaluation. She wishes to proceed with operative repair of hiatal hernia.        Medical History:  Past Medical History:   Diagnosis Date    Abnormal finding on imaging 8/12/2021    Anemia     [place] hernia     PER CT 7-2-21    Bowel obstruction (HCC)     Bronchitis 02/19/2019    Frequent episodes of bronchitis, usually yearly with pneumonia    Chronic biliary pancreatitis ([address] 09/22/2016    Controlled type 2 diabetes mellitus without complication, without long-term current use of insulin (HCC)     Cough 08/13/2021    has had cough for several months    DDD (degenerative disc disease)     Depression     Diverticulosis     Esophageal dysphagia     Fibromyalgia GERD (gastroesophageal reflux disease)     Glaucoma     Hiatal hernia     Hyperlipidemia     Knee injuries 02/19/2019    Osteoarthritis of more than one site 04/09/2014    Pneumonia     gets pneumonia yearly    Right middle lobe pneumonia 03/24/2016    Seasonal allergies        Surgical History:  Past Surgical History:   Procedure Laterality Date    BREAST BIOPSY Left 05/2016    CARPAL TUNNEL RELEASE Bilateral     x 2 each    COLONOSCOPY      ESOPHAGEAL MOTILITY STUDY N/A 1/21/2022    PES RN-ESOPHAGEAL MOTILITY STUDY performed by Alyssa Wall DO at [address] Left     stent for glaucoma    EYE SURGERY Left     cataract    EYE SURGERY Left     lower lid    FOOT SURGERY Right     HYSTERECTOMY (CERVIX STATUS UNKNOWN)      KNEE ARTHROSCOPY Right 12/04/2019    RIGHT KNEE ARTHROSCOPY WITH TOTAL MENISCECTOMY performed by René Smith DO at [address]  10/03/2016    MT ARTHRS KNE SURG W/MENISCECTOMY MED/LAT W/SHVG Left 08/13/2018    KNEE ARTHROSCOPY FOR PARTIAL MEDIAL AND PARTIAL LATERAL MENISCECTOMY performed by Nancy Bone MD at [place] [address]      due to blockage    UPPER GASTROINTESTINAL ENDOSCOPY      EGD    UPPER GASTROINTESTINAL ENDOSCOPY N/A 8/17/2021    EGD BIOPSY performed by Bryce Valderrama MD at [facility]       Family History:      Problem Relation Age of Onset    High Blood Pressure Mother     Stroke Mother     Heart Disease Father     Stroke Maternal Grandmother        Social History:   Social History     Tobacco Use    Smoking status: Never    Smokeless tobacco: Never   Vaping Use    Vaping Use: Never used   Substance Use Topics    Alcohol use: No     Alcohol/week: 0.0 standard drinks    Drug use: No       Current Med List:  Current Outpatient Medications   Medication Sig Dispense Refill    VENTOLIN  (90 Base) MCG/ACT inhaler INHALE 1 PUFF BY MOUTH EVERY 4 HOURS AS NEEDED      montelukast (SINGULAIR) 10 MG tablet Take 1 tablet by mouth nightly 90 tablet 3    atorvastatin (LIPITOR) 80 MG tablet Take 1 tablet by mouth daily 90 tablet 1    Fluticasone-Umeclidin-Vilant 200-62.5-25 MCG/INH AEPB Inhale 1 puff into the lungs daily Lot ct7c exp 10/2023 1 each 0    aspirin 81 MG chewable tablet Take 1 tablet by mouth daily 30 tablet 3    pantoprazole (PROTONIX) 40 MG tablet Take 1 tablet by mouth 2 times daily (before meals) 60 tablet 2    QUEtiapine (SEROQUEL) 50 MG tablet TAKE 1 TO 2 TABLETS BY MOUTH AT BEDTIME AS NEEDED FOR SLEEP 60 tablet 5    vitamin B-12 (CYANOCOBALAMIN) 1000 MCG tablet TAKE 2 TABLETS BY MOUTH EVERY DAY 60 tablet 5    QUEtiapine (SEROQUEL) 200 MG tablet TAKE 1 TABLET DAILY 90 tablet 3    dorzolamide-timolol (COSOPT) 22.3-6.8 MG/ML ophthalmic solution INSTILL 1 DROP IN EACH EYE TWO TIMES A DAY      gabapentin (NEURONTIN) 800 MG tablet TAKE 1 TAB BY MOUTH AT BEDTIME FOR ONE WEEK THEN 2 TABS AT BEDTIME FOR ONE WEEK THEN 3 TABS AT BEDTIME THEREAFTER      Handicap Placard MISC by Does not apply route Exp 3/16/2026 1 each 0    oxyCODONE-acetaminophen (PERCOCET)  MG per tablet Take 1 tablet by mouth every 4 hours as needed for Pain.   0    loratadine (CLARITIN) 10 MG tablet Take 1 tablet by mouth daily (Patient not taking: Reported on 7/28/2022) 90 tablet 3     No current facility-administered medications for this visit. Allergies   Allergen Reactions    Pcn [Penicillins] Anaphylaxis     Patient states PCN allergy was 20 years ago, unsure if reaction still present    Adhesive Tape     Nubain [Nalbuphine Hcl]      Leg  Cramping  When mixed with vistaril    Seasonal     Vistaril [Hydroxyzine] Other (See Comments)     fainted       SOCIAL:      This patient is alone for the evaluation today.       [] HIV Risk Factors (i.e.) intravenous drug abuser; at risk sexual behavior; received blood products    [] TB Risk Factors (i.e.) Medically underserved, institutional care, foreign born, endemic area; exposure to active case    [] Hepatitis B&C Risk Factors (i.e.) Received blood transfusion prior to 1992; recreational drug use; high risk sexual behaviors; tattoos or body piercings; contact with blood or needle sticks in the workplace    Comprehension    Ability to grasp concepts and respond to questions:   [x] High   [] Medium   [] Low    Motivation    [x] Asks Questions; eager to learn   [] Needs education   [] Extreme anxiety    [] uncooperative   [] Denies need for education    English Speaking Ability    [x] Speaks English well   [x] Reads English well   [x] Understands spoken english    [x] Understands written English   [] No need for interpretive support      [] Might benefit from interpretive support   []  required for all services     REVIEW OF SYSTEMS: (Negative unless marked otherwise)     See review of Systems scanned into media    PRESENT ILLNESS:     Weight Parameters  Weight 143 lb (64.9 kg)   Height 5' 4\" (1.626 m)   BMI Body mass index is 24.55 kg/m².    IBW     EBW               IMMUNIZATION STATUS  Immunization History   Administered Date(s) Administered    COVID-19, MODERNA BLUE border, Primary or Immunocompromised, (age 12y+), IM, 100 mcg/0.5mL 03/09/2021, 04/07/2021, 10/28/2021    Influenza 09/23/2013    Influenza Vaccine, unspecified formulation 09/23/2013    Influenza Virus Vaccine 09/23/2013, 10/05/2015, 10/16/2018    Influenza, Ramon Staple, IM, (6 mo and older Fluzone, Flulaval, Fluarix and 3 yrs and older Afluria) 09/13/2016, 10/23/2017, 10/16/2018    Influenza, Quadv, IM, PF (6 mo and older Fluzone, Flulaval, Fluarix, and 3 yrs and older Afluria) 10/25/2019    Influenza, Quadv, adjuvanted, 65 yrs +, IM, PF (Fluad) 11/05/2020, 11/30/2021    Pneumococcal Conjugate 13-valent ([id number]) 10/05/2015    Pneumococcal Polysaccharide ([id number]) 10/17/2016       FALLS ASSESSMENT    [x] LOW RISK FOR FALLS    [] MODERATE RISK FOR FALLS    [] Difficulty walking/selfcare    [] Falls in the past 2 months    [] Suspicion of Clinician    [] Other:      SMOKING CESSATION     [x] Not needed     [] Instructed to stop smoking    [] Pamphlet community resources given     VTE SCREEN    [] Family hx DVT/PE  /   [] Personal hx of DVT/PE    [x] Denies any family or personal hx of DVT/PE    Physician Review    [x] Past medical, family, & social history reviewed and discussed with patient. Review of surgery and post-surgical changes (by surgeon for surgical patients only)    [x] Lifelong diet expectations reviewed with patient    [x] Need for lifelong vitamin supplementation reviewed with patient    PHYSICAL EXAMINATION:      /72 (Site: Right Upper Arm, Position: Sitting, Cuff Size: Medium Adult)   Pulse 70   Resp 20   Ht 5' 4\" (1.626 m)   Wt 143 lb (64.9 kg)   BMI 24.55 kg/m²     Constitutional:  Vital signs are normal. The patient appears well-developed   HEENT:      Head: Normocephalic. Atraumatic     Eyes: pupils are equal and reactive. EMOI     Neck: trachea midline  Cardiovascular: Normal rate, regular rhythm. Bilateral pulses present. Pulmonary/Chest: Effort normal and breath sounds normal. No retractions. Abdominal: Soft. No tenderness. There is no rigidity, no rebound, no guarding and no Shine's sign. Multiple surgical scars correlate to surgical history  Musculoskeletal:      Right lower leg: Normal. No tenderness and no edema. Left lower leg: Normal. No tenderness and no edema. Neurological: The patient is alert and oriented. Moving all four extremities equally, sensation grossly intact bilateral.  Skin: Skin is warm, dry and intact. Psychiatric: The patient has a normal mood and affect.  Speech is normal and behavior is normal. Judgment and thought content normal. Cognition and memory are normal.     RECOMMENDATIONS:     We spent a great deal of time discussing the risks and benefits of  Robotic/Laparoscopic Paraesophageal hernia repair with mesh, possible open, fundoplication , including but not limited to injury to intra-abdominal organs, breakdown of the gastric or esophageal wall or perforation, pneumothorax, pleural effusion, the need for re-operative therapy, prolonged hospitalization, mechanical ventilation and death. We discussed the possibility of bleeding, the need for blood transfusions, blood clots, hospital-acquired and intra-abdominal infection, stricture, and worsening GERD or dysphagia. We discussed the need for post-operative visit compliance, behavior modifications and diet changes. PLAN:       Diagnosis Orders   1. Hiatal hernia with GERD               Plan to proceed with robotic assisted laparoscopic hiatal hernia repair. Continue PPI  Sleep with head of bed elevated  Avoid alcohol and nicotine  No meals 3 hours prior to bedtime  All questions answered  Patient completed all pre-operative risk stratification assessements   Decision for surgery        Electronically signed by Farhan Macario MD on 7/28/2022 at 4:11 PM    I have discussed the care of the patient, including pertinent history and exam findings, with the resident. I have seen and examined the patient and the key elements of all parts of the encounter have been performed by me. I agree with the assessment, plan and orders as documented by the resident.

## 2022-07-29 ENCOUNTER — CARE COORDINATION (OUTPATIENT)
Dept: CARE COORDINATION | Age: 77
End: 2022-07-29

## 2022-07-29 DIAGNOSIS — F41.9 ANXIETY: ICD-10-CM

## 2022-07-29 RX ORDER — ALPRAZOLAM 0.5 MG/1
0.5 TABLET ORAL DAILY PRN
Qty: 30 TABLET | Refills: 0 | Status: SHIPPED | OUTPATIENT
Start: 2022-07-29 | End: 2022-09-13

## 2022-07-29 NOTE — CARE COORDINATION
Ambulatory Care Coordination Note  7/29/2022    ACC: Leah Wayne, RN    Summary Note: spoke with pt who said she still did not get her pain meds she said the pharmacy did call her pain doctor twice and did not get an order for a refill. She did have withdrawal symptoms. She did see surgery who is going to set her up for surgery. Likely in October   1) acp done   2) sdoh done  3) med review done. 4) cardiology fu may 2023  5) pulm fu July 11   6) surgery needs an apt  7) stress test 6/30     Lab Results       None            Care Coordination Interventions    Referral from Primary Care Provider: No  Suggested Interventions and Community Resources  Disease Specific Clinic: Completed  Pulmonary Rehab: Not Started  Zone Management Tools: Completed          Goals Addressed                   This Visit's Progress     Conditions and Symptoms   On track     I will schedule office visits, as directed by my provider. I will keep my appointment or reschedule if I have to cancel. I will notify my provider of any barriers to my plan of care. Barriers: overwhelmed by complexity of regimen  Plan for overcoming my barriers: care coordination   Confidence: 9/10  Anticipated Goal Completion Date: 10/2/22                Prior to Admission medications    Medication Sig Start Date End Date Taking?  Authorizing Provider   VENTOLIN  (90 Base) MCG/ACT inhaler INHALE 1 PUFF BY MOUTH EVERY 4 HOURS AS NEEDED 6/1/22   Historical Provider, MD   montelukast (SINGULAIR) 10 MG tablet Take 1 tablet by mouth nightly 6/27/22 12/24/22  DES Wood CNP   loratadine (CLARITIN) 10 MG tablet Take 1 tablet by mouth daily  Patient not taking: Reported on 7/28/2022 6/27/22 6/27/23  DES Saha CNP   atorvastatin (LIPITOR) 80 MG tablet Take 1 tablet by mouth daily 5/31/22   DES Saha CNP   Fluticasone-Umeclidin-Vilant 200-62.5-25 MCG/INH AEPB Inhale 1 puff into the lungs daily Lot ct7c exp 10/2023 4/27/22   Marli FULTON Melecio Maharaj APRN - CNP   aspirin 81 MG chewable tablet Take 1 tablet by mouth daily 3/25/22   Marley Massey DO   pantoprazole (PROTONIX) 40 MG tablet Take 1 tablet by mouth 2 times daily (before meals) 3/15/22 6/27/23  Marli Sánchez, APRN - CNP   QUEtiapine (SEROQUEL) 50 MG tablet TAKE 1 TO 2 TABLETS BY MOUTH AT BEDTIME AS NEEDED FOR SLEEP 3/2/22   Marli Roblerol Princess, APRN - CNP   vitamin B-12 (CYANOCOBALAMIN) 1000 MCG tablet TAKE 2 TABLETS BY MOUTH EVERY DAY 3/2/22 3/2/23  Marli Roblerol Princess, APRN - CNP   QUEtiapine (SEROQUEL) 200 MG tablet TAKE 1 TABLET DAILY 2/2/22   Marli Sánchez, APRN - CNP   dorzolamide-timolol (COSOPT) 22.3-6.8 MG/ML ophthalmic solution INSTILL 1 DROP IN [facility] EYE TWO TIMES A DAY 10/1/21   Historical Provider, MD   gabapentin (NEURONTIN) 800 MG tablet TAKE 1 TAB BY MOUTH AT BEDTIME FOR ONE WEEK THEN 2 TABS AT BEDTIME FOR ONE WEEK THEN 3 TABS AT BEDTIME THEREAFTER 9/7/21   Historical Provider, MD   Handicap Placard MISC by Does not apply route Exp 3/16/2026 3/16/21   Marli Sánchez APRN - CNP   oxyCODONE-acetaminophen (PERCOCET)  MG per tablet Take 1 tablet by mouth every 4 hours as needed for Pain.   7/19/19   Historical Provider, MD       Future Appointments   Date Time Provider Glenroy Falcon   9/7/2022  2:00 PM Marli Sánchez APRN - CNP [facility]

## 2022-08-05 ENCOUNTER — CARE COORDINATION (OUTPATIENT)
Dept: CARE COORDINATION | Age: 77
End: 2022-08-05

## 2022-08-19 ENCOUNTER — CARE COORDINATION (OUTPATIENT)
Dept: CARE COORDINATION | Age: 77
End: 2022-08-19

## 2022-08-19 NOTE — CARE COORDINATION
Ambulatory Care Coordination Note  8/19/2022    ACC: Deric Hughes, RN    Summary Note: spoke with pt who said she is doing ok. She did see pain mgt who was able to now send her meds to the pharmacy. She did get set up for her surgery in October. She will see her pcp sept 7th   1) acp done   2) sdoh done  3) med review done. 4) cardiology fu may 2023  5) pulm fu July 11   6) surgery needs an apt  7) stress test 6/30    Lab Results       None            Care Coordination Interventions    Referral from Primary Care Provider: No  Suggested Interventions and Community Resources  Disease Specific Clinic: Completed  Pulmonary Rehab: Not Started  Zone Management Tools: Completed          Goals Addressed                   This Visit's Progress     Conditions and Symptoms   On track     I will schedule office visits, as directed by my provider. I will keep my appointment or reschedule if I have to cancel. I will notify my provider of any barriers to my plan of care. Barriers: overwhelmed by complexity of regimen  Plan for overcoming my barriers: care coordination   Confidence: 9/10  Anticipated Goal Completion Date: 10/2/22                Prior to Admission medications    Medication Sig Start Date End Date Taking? Authorizing Provider   ALPRAZolam Davis Chavis) 0.5 MG tablet Take 1 tablet by mouth daily as needed for Sleep or Anxiety for up to 30 days.  7/29/22 8/28/22  DES Zuñiga CNP   VENTOLIN  (90 Base) MCG/ACT inhaler INHALE 1 PUFF BY MOUTH EVERY 4 HOURS AS NEEDED 6/1/22   Historical Provider, MD   montelukast (SINGULAIR) 10 MG tablet Take 1 tablet by mouth nightly 6/27/22 12/24/22  EDS Zuñiga CNP   loratadine (CLARITIN) 10 MG tablet Take 1 tablet by mouth daily  Patient not taking: Reported on 7/28/2022 6/27/22 6/27/23  DES Meraz CNP   atorvastatin (LIPITOR) 80 MG tablet Take 1 tablet by mouth daily 5/31/22   DES Meraz CNP   Fluticasone-Umeclidin-Vilant 179-73.2-21 MCG/INH AEPB Inhale 1 puff into the lungs daily Lot ct7c exp 10/2023 4/27/22   Marli Gabriela Herculess, APRN - CNP   aspirin 81 MG chewable tablet Take 1 tablet by mouth daily 3/25/22   Augusta Rice DO   pantoprazole (PROTONIX) 40 MG tablet Take 1 tablet by mouth 2 times daily (before meals) 3/15/22 6/27/23  Marli Gabriela MinersDES - CNP   QUEtiapine (SEROQUEL) 50 MG tablet TAKE 1 TO 2 TABLETS BY MOUTH AT BEDTIME AS NEEDED FOR SLEEP 3/2/22   Marli HerculessDES - CNP   vitamin B-12 (CYANOCOBALAMIN) 1000 MCG tablet TAKE 2 TABLETS BY MOUTH EVERY DAY 3/2/22 3/2/23  Marli Gabriela HerculessDES - CNP   QUEtiapine (SEROQUEL) 200 MG tablet TAKE 1 TABLET DAILY 2/2/22   Marli Keysmila HerculesDES lemus CNP   dorzolamide-timolol (COSOPT) 22.3-6.8 MG/ML ophthalmic solution INSTILL 1 DROP IN [facility] EYE TWO TIMES A DAY 10/1/21   Historical Provider, MD   gabapentin (NEURONTIN) 800 MG tablet TAKE 1 TAB BY MOUTH AT BEDTIME FOR ONE WEEK THEN 2 TABS AT BEDTIME FOR ONE WEEK THEN 3 TABS AT BEDTIME THEREAFTER 9/7/21   Historical Provider, MD   Handicap Placard MISC by Does not apply route Exp 3/16/2026 3/16/21   Marli BorjasDES CNP   oxyCODONE-acetaminophen (PERCOCET)  MG per tablet Take 1 tablet by mouth every 4 hours as needed for Pain. 7/19/19   Historical Provider, MD       Future Appointments   Date Time Provider Glenroy Falcon   9/7/2022  2:00 PM Marli Borjas, APRN - CNP [facility]   10/3/2022  1:30 PM [facility] PAT RM 1 [facility]   11/4/2022  2:00 PM Mary Kate Camarena DO bariatric jay [facility]    and   Diabetes Assessment    Medic Alert ID: No  Meal Planning: None   How often do you test your blood sugar?: No Testing   Do you have barriers with adherence to non-pharmacologic self-management interventions?  (Nutrition/Exercise/Self-Monitoring): No   Have you ever had to go to the ED for symptoms of low blood sugar?: No

## 2022-09-02 ENCOUNTER — TELEPHONE (OUTPATIENT)
Dept: FAMILY MEDICINE CLINIC | Age: 77
End: 2022-09-02

## 2022-09-02 ENCOUNTER — CARE COORDINATION (OUTPATIENT)
Dept: CARE COORDINATION | Age: 77
End: 2022-09-02

## 2022-09-02 NOTE — TELEPHONE ENCOUNTER
Patient has her annual Pneumonia and Bronchitis sx's. She is requesting medication to be sent to Rx:  [facility] [address]. She would like to head off the illness before it gets too bad as it has in the past.  She is scheduled for hernia surgery on the 19th of Oct.  This had been postponed last year d/t her being ill and in the hospital with  pneumonia and bronchitis.

## 2022-09-02 NOTE — CARE COORDINATION
Ambulatory Care Coordination Note  9/2/2022    ACC: Seth Lamb, RN    Summary Note: spoke with pt who said she has been having increase in cough increase in sob, she does have sputum \"sometimes\" which is yellow in color she has been using her nebulizer these do seem to help some. But she feels she needs abx sent before this gets worse she does have an apt for the 7th to see her pcp     1) acp done   2) sdoh done  3) med review done. 4) cardiology fu may 2023  5) pulm fu July 11   6) surgery needs an apt  7) stress test 6/30    Lab Results       None            Care Coordination Interventions    Referral from Primary Care Provider: No  Suggested Interventions and Community Resources  Disease Specific Clinic: Completed  Pulmonary Rehab: Not Started  Zone Management Tools: Completed          Goals Addressed                   This Visit's Progress     Conditions and Symptoms   On track     I will schedule office visits, as directed by my provider. I will keep my appointment or reschedule if I have to cancel. I will notify my provider of any barriers to my plan of care. Barriers: overwhelmed by complexity of regimen  Plan for overcoming my barriers: care coordination   Confidence: 9/10  Anticipated Goal Completion Date: 10/2/22                Prior to Admission medications    Medication Sig Start Date End Date Taking?  Authorizing Provider   VENTOLIN  (90 Base) MCG/ACT inhaler INHALE 1 PUFF BY MOUTH EVERY 4 HOURS AS NEEDED 6/1/22   Historical Provider, MD   montelukast (SINGULAIR) 10 MG tablet Take 1 tablet by mouth nightly 6/27/22 12/24/22  DES Winn CNP   loratadine (CLARITIN) 10 MG tablet Take 1 tablet by mouth daily  Patient not taking: Reported on 7/28/2022 6/27/22 6/27/23  DES Winn - CNP   atorvastatin (LIPITOR) 80 MG tablet Take 1 tablet by mouth daily 5/31/22   DES Blancas - TORITO   Fluticasone-Umeclidin-Vilant 399-33.9-25 MCG/INH AEPB Inhale 1 puff into the lungs daily Lot ct7c exp 10/2023 4/27/22   Amy Abundio Felty, APRN - CNP   aspirin 81 MG chewable tablet Take 1 tablet by mouth daily 3/25/22   Lia Thomson DO   pantoprazole (PROTONIX) 40 MG tablet Take 1 tablet by mouth 2 times daily (before meals) 3/15/22 6/27/23  Amy Abundio Felty, APRN - CNP   QUEtiapine (SEROQUEL) 50 MG tablet TAKE 1 TO 2 TABLETS BY MOUTH AT BEDTIME AS NEEDED FOR SLEEP 3/2/22   Amy Abundio Felty, APRN - CNP   vitamin B-12 (CYANOCOBALAMIN) 1000 MCG tablet TAKE 2 TABLETS BY MOUTH EVERY DAY 3/2/22 3/2/23  Amy Abundio Felty, APRN - CNP   QUEtiapine (SEROQUEL) 200 MG tablet TAKE 1 TABLET DAILY 2/2/22   Amy Abundio Felty, APRN - CNP   dorzolamide-timolol (COSOPT) 22.3-6.8 MG/ML ophthalmic solution INSTILL 1 DROP IN [facility] EYE TWO TIMES A DAY 10/1/21   Historical Provider, MD   gabapentin (NEURONTIN) 800 MG tablet TAKE 1 TAB BY MOUTH AT BEDTIME FOR ONE WEEK THEN 2 TABS AT BEDTIME FOR ONE WEEK THEN 3 TABS AT BEDTIME THEREAFTER 9/7/21   Historical Provider, MD   Handicap Placard MISC by Does not apply route Exp 3/16/2026 3/16/21   Amy Abundio Felty, APRN - CNP   oxyCODONE-acetaminophen (PERCOCET)  MG per tablet Take 1 tablet by mouth every 4 hours as needed for Pain.   7/19/19   Historical Provider, MD       Future Appointments   Date Time Provider Glenroy Falcon   9/7/2022  2:00 PM Amy Abundio Felty, APRN - CNP [facility]   10/3/2022  1:30 PM [facility] PAT RM 1 [facility]   11/4/2022  2:00 PM Kelly West DO bariatric [facility]    and   COPD Assessment    Does the patient understand envrionmental exposure?: Yes  Is the patient able to verbalize Rescue vs. Long Acting medications?: Yes  Does the patient have a nebulizer?: No  Does the patient use a space with inhaled medications?: No     Shortness of breath (worse than baseline), Increase in cough         Symptoms:     Have you had a recent diagnosis of pneumonia either by PCP or at a hospital?: No

## 2022-09-03 DIAGNOSIS — F51.01 PRIMARY INSOMNIA: ICD-10-CM

## 2022-09-06 ENCOUNTER — CARE COORDINATION (OUTPATIENT)
Dept: CARE COORDINATION | Age: 77
End: 2022-09-06

## 2022-09-06 RX ORDER — QUETIAPINE FUMARATE 50 MG/1
TABLET, FILM COATED ORAL
Qty: 60 TABLET | Refills: 5 | Status: SHIPPED | OUTPATIENT
Start: 2022-09-06 | End: 2023-09-06

## 2022-09-06 NOTE — CARE COORDINATION
Ambulatory Care Coordination Note  9/6/2022    ACC: David John, RN    Summary Note: spoke with pt who said she did do a covid test it was neg she decided not to go to the walk in clinic because she will see her pcp tomorrow     1) acp done   2) sdoh done  3) med review done. 4) cardiology fu may 2023  5) pulm fu July 11   6) surgery needs an apt  7) stress test 6/30    Lab Results       None            Care Coordination Interventions    Referral from Primary Care Provider: No  Suggested Interventions and Community Resources  Disease Specific Clinic: Completed  Pulmonary Rehab: Not Started  Zone Management Tools: Completed          Goals Addressed    None         Prior to Admission medications    Medication Sig Start Date End Date Taking?  Authorizing Provider   VENTOLIN  (90 Base) MCG/ACT inhaler INHALE 1 PUFF BY MOUTH EVERY 4 HOURS AS NEEDED 6/1/22   Historical Provider, MD   montelukast (SINGULAIR) 10 MG tablet Take 1 tablet by mouth nightly 6/27/22 12/24/22  DES Willson CNP   loratadine (CLARITIN) 10 MG tablet Take 1 tablet by mouth daily  Patient not taking: Reported on 7/28/2022 6/27/22 6/27/23  DES Willson CNP   atorvastatin (LIPITOR) 80 MG tablet Take 1 tablet by mouth daily 5/31/22   DES Medrano CNP   Fluticasone-Umeclidin-Vilant 200-62.5-25 MCG/INH AEPB Inhale 1 puff into the lungs daily Lot ct7c exp 10/2023 4/27/22   DES Willson CNP   aspirin 81 MG chewable tablet Take 1 tablet by mouth daily 3/25/22   Jodie Rivero DO   pantoprazole (PROTONIX) 40 MG tablet Take 1 tablet by mouth 2 times daily (before meals) 3/15/22 6/27/23  DES Willson CNP   QUEtiapine (SEROQUEL) 50 MG tablet TAKE 1 TO 2 TABLETS BY MOUTH AT BEDTIME AS NEEDED FOR SLEEP 3/2/22   DES Willson CNP   vitamin B-12 (CYANOCOBALAMIN) 1000 MCG tablet TAKE 2 TABLETS BY MOUTH EVERY DAY 3/2/22 3/2/23  Marli Moni Elver, APRN - CNP   QUEtiapine (SEROQUEL) 200 MG tablet TAKE 1 TABLET DAILY 2/2/22   DES Mantilla CNP   dorzolamide-timolol (COSOPT) 22.3-6.8 MG/ML ophthalmic solution INSTILL 1 DROP IN [facility] EYE TWO TIMES A DAY 10/1/21   Historical Provider, MD   gabapentin (NEURONTIN) 800 MG tablet TAKE 1 TAB BY MOUTH AT BEDTIME FOR ONE WEEK THEN 2 TABS AT BEDTIME FOR ONE WEEK THEN 3 TABS AT BEDTIME THEREAFTER 9/7/21   Historical Provider, MD   Handicap Placard MISC by Does not apply route Exp 3/16/2026 3/16/21   DES Mantilla CNP   oxyCODONE-acetaminophen (PERCOCET)  MG per tablet Take 1 tablet by mouth every 4 hours as needed for Pain.   7/19/19   Historical Provider, MD       Future Appointments   Date Time Provider Glenroy Falcon   9/7/2022  2:00 PM [facility]   10/3/2022  1:30 PM [facility] PAT RM 1 [facility]   11/4/2022  2:00 PM Mazariegos Drown, DO bariatric misty [facility]

## 2022-09-06 NOTE — CARE COORDINATION
Please call and follow through. I do not see any documentation from [facility] that she was seen?

## 2022-09-06 NOTE — TELEPHONE ENCOUNTER
Documented in another encounter. Patient did not go to [facility] in as advised. She will retest for COVID before tomorrows appointment.

## 2022-09-06 NOTE — TELEPHONE ENCOUNTER
LOV 6/27/22  RTO 3 months; F/U scheduled  [facility] 3/2/22    Health Maintenance   Topic Date Due    Hepatitis C screen  Never done    DTaP/Tdap/Td vaccine (1 - Tdap) Never done    Shingles vaccine (1 of 2) Never done    COVID-19 Vaccine (4 - Booster for Moderna series) 02/28/2022    Flu vaccine (1) 09/01/2022    Lipids  03/01/2023    Depression Monitoring  04/06/2023    DEXA (modify frequency per FRAX score)  Completed    Pneumococcal 65+ years Vaccine  Completed    Hepatitis A vaccine  Aged Out    Hib vaccine  Aged Out    Meningococcal (ACWY) vaccine  Aged Out             (applicable per patient's age: Cancer Screenings, Depression Screening, Fall Risk Screening, Immunizations)    Hemoglobin A1C (%)   Date Value   03/01/2022 5.6   08/12/2020 5.6   02/19/2019 5.5     Microalb/Crt.  Ratio (mcg/mg creat)   Date Value   05/12/2016 7     LDL Cholesterol (mg/dL)   Date Value   03/01/2022          LDL Calculated (mg/dL)   Date Value   11/13/2014 109     AST (U/L)   Date Value   03/22/2022 27     ALT (U/L)   Date Value   03/22/2022 18     BUN (mg/dL)   Date Value   03/23/2022 12      (goal A1C is < 7)   (goal LDL is <100) need 30-50% reduction from baseline     BP Readings from Last 3 Encounters:   07/28/22 130/72   06/27/22 130/76   05/03/22 108/62    (goal /80)      All Future Testing planned in [facility]:  Lab Frequency Next Occurrence       Next Visit Date:  Future Appointments   Date Time Provider Glenroy Falcon   9/7/2022  2:00 PM Marli Millan Most, APRN - CNP [facility]   10/3/2022  1:30 PM [facility] PAT RM 1 [facility]   11/4/2022  2:00 PM Eugune Moots, DO bariatric jay [facility]            Patient Active Problem List:     Hyperlipidemia     Fibromyalgia     Osteopenia     Hx of bilateral breast implants     Arthritis of shoulder region, right     Anxiety     Anemia, normocytic normochromic     Chronic pain associated with significant psychosocial dysfunction     Primary osteoarthritis involving multiple joints     Spondylosis 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     Lumbar and sacral arthritis     Chronic biliary pancreatitis (HCC)     Moderate episode of recurrent major depressive disorder (HCC)     Uncomplicated opioid dependence (HCC)     Hyperuricemia     Primary open-angle glaucoma, right eye, mild stage     Primary open-angle glaucoma, left eye, mild stage     Gastroesophageal reflux disease     Esophageal dysphagia     Hiatal hernia     Recurrent aspiration pneumonia (HCC)     Acute respiratory failure with hypoxemia (HCC)     Simple chronic bronchitis ([facility] [address]     Hospital-acquired pneumonia     NSTEMI (non-ST elevated myocardial infarction) ([address]     History of degenerative disc disease     Hx of diabetes mellitus     Sepsis ([address]     Hx of Esophageal dysmotility     Type 2 diabetes mellitus

## 2022-09-07 ENCOUNTER — OFFICE VISIT (OUTPATIENT)
Dept: FAMILY MEDICINE CLINIC | Age: 77
End: 2022-09-07
Payer: MEDICARE

## 2022-09-07 VITALS
DIASTOLIC BLOOD PRESSURE: 80 MMHG | HEART RATE: 86 BPM | BODY MASS INDEX: 28.78 KG/M2 | TEMPERATURE: 97.6 F | WEIGHT: 146.6 LBS | SYSTOLIC BLOOD PRESSURE: 130 MMHG | HEIGHT: 60 IN | OXYGEN SATURATION: 96 % | RESPIRATION RATE: 18 BRPM

## 2022-09-07 DIAGNOSIS — F33.1 MODERATE EPISODE OF RECURRENT MAJOR DEPRESSIVE DISORDER (HCC): ICD-10-CM

## 2022-09-07 DIAGNOSIS — Z00.00 MEDICARE ANNUAL WELLNESS VISIT, SUBSEQUENT: Primary | ICD-10-CM

## 2022-09-07 DIAGNOSIS — J21.9 ACUTE BRONCHIOLITIS DUE TO UNSPECIFIED ORGANISM: ICD-10-CM

## 2022-09-07 DIAGNOSIS — K44.9 HIATAL HERNIA: ICD-10-CM

## 2022-09-07 DIAGNOSIS — R13.19 ESOPHAGEAL DYSPHAGIA: ICD-10-CM

## 2022-09-07 DIAGNOSIS — J69.0 RECURRENT ASPIRATION PNEUMONIA (HCC): ICD-10-CM

## 2022-09-07 DIAGNOSIS — F41.9 ANXIETY: Chronic | ICD-10-CM

## 2022-09-07 PROCEDURE — 99214 OFFICE O/P EST MOD 30 MIN: CPT | Performed by: NURSE PRACTITIONER

## 2022-09-07 PROCEDURE — G0439 PPPS, SUBSEQ VISIT: HCPCS | Performed by: NURSE PRACTITIONER

## 2022-09-07 PROCEDURE — 1123F ACP DISCUSS/DSCN MKR DOCD: CPT | Performed by: NURSE PRACTITIONER

## 2022-09-07 RX ORDER — AZITHROMYCIN 500 MG/1
500 TABLET, FILM COATED ORAL DAILY
Qty: 3 TABLET | Refills: 0 | Status: SHIPPED | OUTPATIENT
Start: 2022-09-07 | End: 2022-09-10

## 2022-09-07 RX ORDER — GUAIFENESIN 600 MG/1
600 TABLET, EXTENDED RELEASE ORAL 2 TIMES DAILY
Qty: 30 TABLET | Refills: 0 | Status: SHIPPED | OUTPATIENT
Start: 2022-09-07 | End: 2022-09-22

## 2022-09-07 ASSESSMENT — PATIENT HEALTH QUESTIONNAIRE - PHQ9
2. FEELING DOWN, DEPRESSED OR HOPELESS: 0
10. IF YOU CHECKED OFF ANY PROBLEMS, HOW DIFFICULT HAVE THESE PROBLEMS MADE IT FOR YOU TO DO YOUR WORK, TAKE CARE OF THINGS AT HOME, OR GET ALONG WITH OTHER PEOPLE: 1
SUM OF ALL RESPONSES TO PHQ QUESTIONS 1-9: 4
1. LITTLE INTEREST OR PLEASURE IN DOING THINGS: 0
9. THOUGHTS THAT YOU WOULD BE BETTER OFF DEAD, OR OF HURTING YOURSELF: 0
5. POOR APPETITE OR OVEREATING: 2
6. FEELING BAD ABOUT YOURSELF - OR THAT YOU ARE A FAILURE OR HAVE LET YOURSELF OR YOUR FAMILY DOWN: 0
SUM OF ALL RESPONSES TO PHQ9 QUESTIONS 1 & 2: 0
SUM OF ALL RESPONSES TO PHQ QUESTIONS 1-9: 4
SUM OF ALL RESPONSES TO PHQ QUESTIONS 1-9: 4
3. TROUBLE FALLING OR STAYING ASLEEP: 0
7. TROUBLE CONCENTRATING ON THINGS, SUCH AS READING THE NEWSPAPER OR WATCHING TELEVISION: 0
8. MOVING OR SPEAKING SO SLOWLY THAT OTHER PEOPLE COULD HAVE NOTICED. OR THE OPPOSITE, BEING SO FIGETY OR RESTLESS THAT YOU HAVE BEEN MOVING AROUND A LOT MORE THAN USUAL: 0
SUM OF ALL RESPONSES TO PHQ QUESTIONS 1-9: 4
4. FEELING TIRED OR HAVING LITTLE ENERGY: 2

## 2022-09-07 ASSESSMENT — ENCOUNTER SYMPTOMS
RHINORRHEA: 0
TROUBLE SWALLOWING: 1
SORE THROAT: 0
SHORTNESS OF BREATH: 1
NAUSEA: 0
ABDOMINAL PAIN: 0
ABDOMINAL DISTENTION: 1
CHEST TIGHTNESS: 1
CONSTIPATION: 1
VOICE CHANGE: 0
WHEEZING: 1
VOMITING: 0
COUGH: 1
DIARRHEA: 0

## 2022-09-07 NOTE — PATIENT INSTRUCTIONS
Personalized Preventive Plan for Adarsh Aiken - 9/7/2022  Medicare offers a range of preventive health benefits. Some of the tests and screenings are paid in full while other may be subject to a deductible, co-insurance, and/or copay. Some of these benefits include a comprehensive review of your medical history including lifestyle, illnesses that may run in your family, and various assessments and screenings as appropriate. After reviewing your medical record and screening and assessments performed today your provider may have ordered immunizations, labs, imaging, and/or referrals for you. A list of these orders (if applicable) as well as your Preventive Care list are included within your After Visit Summary for your review. Other Preventive Recommendations:    A preventive eye exam performed by an eye specialist is recommended every 1-2 years to screen for glaucoma; cataracts, macular degeneration, and other eye disorders. A preventive dental visit is recommended every 6 months. Try to get at least 150 minutes of exercise per week or 10,000 steps per day on a pedometer . Order or download the FREE \"Exercise & Physical Activity: Your Everyday Guide\" from [employer]. Call 2-208.811.3277 or search [employer] online. You need 2566-9003 mg of calcium and 9366-2779 IU of vitamin D per day. It is possible to meet your calcium requirement with diet alone, but a vitamin D supplement is usually necessary to meet this goal.  When exposed to the sun, use a sunscreen that protects against both UVA and UVB radiation with an SPF of 30 or greater. Reapply every 2 to 3 hours or after sweating, drying off with a towel, or swimming. Always wear a seat belt when traveling in a car. Always wear a helmet when riding a bicycle or motorcycle.

## 2022-09-07 NOTE — PROGRESS NOTES
[address]  [phone]    9/7/22     Patient ID  Laurence Sapp is a 76 y.o. female  Established patient    Chief Complaint  Laurence Sapp presents today for Medicare AWV and Cough    Have you seen any other physician or provider since your last visit? Yes - Records Obtained General Surgeon- Dr. Brielle Luz scheduled 10/19/22  Have you had any other diagnostic tests since your last visit? Yes - Records Obtained Blood work, Stress Test   Have you been seen in the emergency room and/or had an admission to a hospital since we last saw you? No     ASSESSMENT/PLAN  1. Medicare annual wellness visit, subsequent  2. Acute bronchiolitis due to unspecified organism  -     guaiFENesin (MUCINEX) 600 MG extended release tablet; Take 1 tablet by mouth 2 times daily for 15 days, Disp-30 tablet, R-0Normal  -     azithromycin (ZITHROMAX) 500 MG tablet; Take 1 tablet by mouth daily for 3 days, Disp-3 tablet, R-0Normal  3. Recurrent aspiration pneumonia ([facility] [address]  4. Moderate episode of recurrent major depressive disorder ([facility] [address]  5. Anxiety  6. Hiatal hernia  7. Esophageal dysphagia     Schedule preoperative visit  Scheduled for hiatal hernia surgery   Treat for bronchitis     Return for Medicare Annual Wellness Visit in 1 year.       Patient Care Team:  DES Blancas CNP as PCP - General (Nurse Practitioner Family)  DES Blancas CNP as PCP - [facility] Empaneled Provider  Donna Laws MD (Anesthesiology)  Enoc Ibrahim DO as Surgeon (Orthopedic Surgery)  Boy Munguia MD as Consulting Physician (Gastroenterology)  Diane Wright DO as Consulting Physician (Bariatric Surgery)  DES Cadet CNP (Family Medicine)  Seth Lamb RN as Ambulatory Care Manager    SUBJECTIVE/OBJECTIVE  History of Present illness / Visit Summary   Patient presents today for follow up and AWV   Reviewed health maintenance and any recent labs/imaging      7/28/2022 -   Plan to proceed with robotic assisted laparoscopic hiatal hernia repair. Continue PPI  Sleep with head of bed elevated  Avoid alcohol and nicotine  No meals 3 hours prior to bedtime  All questions answered  Patient completed all pre-operative risk stratification assessements   Decision for surgery      Review of Systems  Review of Systems   Constitutional:  Positive for fatigue. Negative for activity change, appetite change, chills, fever and unexpected weight change. HENT:  Positive for postnasal drip and trouble swallowing. Negative for congestion, ear pain, rhinorrhea, sore throat and voice change. Respiratory:  Positive for cough (productive), chest tightness (w/cough ), shortness of breath (w/exertion) and wheezing (started a few weeks ago). Chronic pneumonia, bronchitis. Aspiration? ? Benefit from bronchoscopy? Following with pulmonology    Cardiovascular:  Negative for chest pain and palpitations. Following with cardiology    Gastrointestinal:  Positive for abdominal distention and constipation (nightly stool softener). Negative for abdominal pain, diarrhea, nausea and vomiting. Following with GI  Recent EGD and colonoscopy. Large hiatal hernia. May be rationale for ongoing pneumonia? Aspiration pneumonia? Genitourinary:  Negative for decreased urine volume, difficulty urinating and dysuria. Musculoskeletal:  Positive for arthralgias (right knee), gait problem, joint swelling (right knee) and myalgias. Following with pain management and orthopedic    Skin:  Negative for pallor and rash. Allergic/Immunologic: Positive for environmental allergies. Neurological:  Negative for dizziness, weakness, light-headedness and headaches. Hematological:  Negative for adenopathy. Psychiatric/Behavioral:  Positive for agitation, dysphoric mood and sleep disturbance. Negative for self-injury and suicidal ideas. The patient is nervous/anxious.          Cares for ill son - has partial custody of grandson      Physical exam   Physical Exam  Vitals and nursing note reviewed. Constitutional:       General: She is not in acute distress. Appearance: Normal appearance. She is well-developed and well-groomed. She is obese. She is not ill-appearing or toxic-appearing. Cardiovascular:      Rate and Rhythm: Normal rate and regular rhythm. No extrasystoles are present. Heart sounds: Normal heart sounds, S1 normal and S2 normal. No murmur heard. Pulmonary:      Effort: Pulmonary effort is normal. No prolonged expiration or respiratory distress. Breath sounds: Decreased air movement present. Decreased breath sounds and wheezing (scattered) present. Musculoskeletal:      Right lower leg: No edema. Left lower leg: No edema. Skin:     General: Skin is warm and dry. Coloration: Skin is not ashen, cyanotic, jaundiced or pale. Neurological:      Mental Status: She is alert and oriented to person, place, and time. Motor: Motor function is intact. Gait: Gait is intact. Psychiatric:         Attention and Perception: Attention and perception normal.         Mood and Affect: Mood is anxious. Affect is flat. Speech: Speech normal.         Behavior: Behavior normal. Behavior is cooperative. Thought Content: Thought content normal. Thought content does not include suicidal ideation. Thought content does not include suicidal plan. Cognition and Memory: Cognition and memory normal.         Judgment: Judgment normal.         Electronically signed by DES Sorto CNP, APRN-CNP on 9/26/2022 at 8:46 PM    Please note that this chart was generated using voice recognition Dragon dictation software. Although every effort was made to ensure the accuracy of this automated transcription, some errors in transcription may have occurred.

## 2022-09-07 NOTE — PROGRESS NOTES
Medicare Annual Wellness Visit    Reuben Hawkins is here for Medicare AWV and Cough    Assessment & Plan   Medicare annual wellness visit, subsequent  Acute bronchiolitis due to unspecified organism  -     guaiFENesin (MUCINEX) 600 MG extended release tablet; Take 1 tablet by mouth 2 times daily for 15 days, Disp-30 tablet, R-0Normal  -     azithromycin (ZITHROMAX) 500 MG tablet; Take 1 tablet by mouth daily for 3 days, Disp-3 tablet, R-0Normal  Recurrent aspiration pneumonia (HCC)  Assessment & Plan:   Borderline controlled, continue current medications and continue current treatment plan  Moderate episode of recurrent major depressive disorder ([facility] [address]  Assessment & Plan:   Borderline controlled, continue current medications and continue current treatment plan  Anxiety  Hiatal hernia  Esophageal dysphagia    Recommendations for Preventive Services Due: see orders and patient instructions/AVS.  Recommended screening schedule for the next 5-10 years is provided to the patient in written form: see Patient Instructions/AVS.     Return for Medicare Annual Wellness Visit in 1 year. Subjective   The following acute and/or chronic problems were also addressed today: see additional OV note     Patient's complete Health Risk Assessment and screening values have been reviewed and are found in Flowsheets. The following problems were reviewed today and where indicated follow up appointments were made and/or referrals ordered. Positive Risk Factor Screenings with Interventions:             General Health and ACP:  General  In general, how would you say your health is?: Good (Pneumonia/Bronchitis flare ups)  In the past 7 days, have you experienced any of the following: New or Increased Pain, New or Increased Fatigue, Loneliness, Social Isolation, Stress or Anger?: (!) Yes  Select all that apply: (!) New or Increased Fatigue (Feels tired due to sickness.  Resting.)  Do you get the social and emotional support that you need?: Yes  Do you have a Living Will?: Yes    Advance Directives       Power of  Living Will ACP-Advance Directive ACP-Power of     Not on File Not on File Not on File Not on File          General Health Risk Interventions:  Fatigue: patient declines any further evaluation/treatment for this issue  Stress: relaxation techniques discussed, patient declines any further evaluation/treatment for this issue  Anger: patient declines any further evaluation/treatment for this issue  Ongoing illness - surgery schedule. This is causing increase stress and anxiety   Follows routinely with psychiatry and therapy   Has living will               Objective   Vitals:    09/07/22 1417   BP: 130/80   Site: Left Upper Arm   Position: Sitting   Cuff Size: Medium Adult   Pulse: 86   Resp: 18   Temp: 97.6 °F (36.4 °C)   TempSrc: Temporal   SpO2: 96%   Weight: 146 lb 9.6 oz (66.5 kg)   Height: 5' (1.524 m)      Body mass index is 28.63 kg/m². Allergies   Allergen Reactions    Pcn [Penicillins] Anaphylaxis     Patient states PCN allergy was 20 years ago, unsure if reaction still present    Adhesive Tape     Nubain [Nalbuphine Hcl]      Leg  Cramping  When mixed with vistaril    Seasonal     Vistaril [Hydroxyzine] Other (See Comments)     fainted     Prior to Visit Medications    Medication Sig Taking?  Authorizing Provider   QUEtiapine (SEROQUEL) 50 MG tablet TAKE 1 OR 2 TABLETS BY MOUTH AT BEDTIME AS NEEDED FOR SLEEP Yes DES Anderson CNP   VENTOLIN  (90 Base) MCG/ACT inhaler INHALE 1 PUFF BY MOUTH EVERY 4 HOURS AS NEEDED Yes Historical Provider, MD   montelukast (SINGULAIR) 10 MG tablet Take 1 tablet by mouth nightly Yes DES Anderson CNP   loratadine (CLARITIN) 10 MG tablet Take 1 tablet by mouth daily Yes DES Anderson CNP   atorvastatin (LIPITOR) 80 MG tablet Take 1 tablet by mouth daily Yes DES Anderson CNP   Fluticasone-Umeclidin-Vilant 776-03.6-13 MCG/INH AEPB Inhale 1 puff into the lungs daily Lot ct7c exp 10/2023 Yes DES Siddiqi CNP   aspirin 81 MG chewable tablet Take 1 tablet by mouth daily Yes Sharonda Beckham DO   pantoprazole (PROTONIX) 40 MG tablet Take 1 tablet by mouth 2 times daily (before meals) Yes DES Siddiqi CNP   vitamin B-12 (CYANOCOBALAMIN) 1000 MCG tablet TAKE 2 TABLETS BY MOUTH EVERY DAY Yes DES Siddiqi CNP   QUEtiapine (SEROQUEL) 200 MG tablet TAKE 1 TABLET DAILY Yes DES Siddiqi CNP   dorzolamide-timolol (COSOPT) 22.3-6.8 MG/ML ophthalmic solution INSTILL 1 DROP IN [facility] EYE TWO TIMES A DAY Yes Historical Provider, MD   gabapentin (NEURONTIN) 800 MG tablet TAKE 1 TAB BY MOUTH AT BEDTIME FOR ONE WEEK THEN 2 TABS AT BEDTIME FOR ONE WEEK THEN 3 TABS AT BEDTIME THEREAFTER Yes Historical Provider, MD   Handicap Placard MISC by Does not apply route Exp 3/16/2026 Yes EDS Siddiqi CNP   oxyCODONE-acetaminophen (PERCOCET)  MG per tablet Take 1 tablet by mouth every 4 hours as needed for Pain.   Yes Historical Provider, MD   ALPRAZolam Bebeto Auguste) 0.5 MG tablet TAKE 1 TABLET BY MOUTH EVERY DAY AS NEEDED FOR SLEEP OR ANXIETY FOR UP TO 30 DAYS  DES Siddiqi CNP       CareTeam (Including outside providers/suppliers regularly involved in providing care):   Patient Care Team:  DES Larsen CNP as PCP - General (Nurse Practitioner Family)  DES Siddiqi CNP as PCP - [facility]  Renaldo Walker MD (Anesthesiology)  Archana Valdez DO as Surgeon (Orthopedic Surgery)  Michelle Sharif MD as Consulting Physician (Gastroenterology)  Bri Donis DO as Consulting Physician (Bariatric Surgery)  DES Moreira CNP (Family Medicine)  Jesus Judd RN as Ambulatory Care Manager     Reviewed and updated this visit:  Tobacco  Allergies  Meds  Med Hx  Surg Hx  Soc Hx  Fam Hx

## 2022-09-12 DIAGNOSIS — F41.9 ANXIETY: ICD-10-CM

## 2022-09-12 NOTE — TELEPHONE ENCOUNTER
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     Lumbar and sacral arthritis     Chronic biliary pancreatitis (HCC)     Moderate episode of recurrent major depressive disorder (HCC)     Uncomplicated opioid dependence (HCC)     Hyperuricemia     Primary open-angle glaucoma, right eye, mild stage     Primary open-angle glaucoma, left eye, mild stage     Gastroesophageal reflux disease     Esophageal dysphagia     Hiatal hernia     Recurrent aspiration pneumonia (HCC)     Acute respiratory failure with hypoxemia (HCC)     Simple chronic bronchitis ([facility] [address]     Hospital-acquired pneumonia     NSTEMI (non-ST elevated myocardial infarction) ([facility] [address]     History of degenerative disc disease     Hx of diabetes mellitus     Sepsis ([facility] [address]     Hx of Esophageal dysmotility     Type 2 diabetes mellitus

## 2022-09-13 RX ORDER — ALPRAZOLAM 0.5 MG/1
TABLET ORAL
Qty: 30 TABLET | Refills: 0 | Status: SHIPPED | OUTPATIENT
Start: 2022-09-13 | End: 2022-09-29 | Stop reason: SDUPTHER

## 2022-09-20 ENCOUNTER — CARE COORDINATION (OUTPATIENT)
Dept: CARE COORDINATION | Age: 77
End: 2022-09-20

## 2022-09-20 NOTE — CARE COORDINATION
Ambulatory Care Coordination Note  9/20/2022    ACC: Seth Lamb, RN    Summary Note: spoke with pt who said she she did see her pcp who gave her zpack for 3 days this helped her for a few days but now she is back to feeling how she was. She feels like she has \"bronchitis\" she has a cough sometimes she can get sputum up when she does her sputum is pale yellow. She was not sure if she was running a fever or not but said she does not really feel like she is she does have body aches but states she always does. She does have surgery scheudled for oct 19th she will go to her preop visit at [facility] oct 3rd she has a pre op with her pcp for oct 10 of it is neccessary if not she will cancel it. 1) acp done   2) sdoh done  3) med review done. 4) cardiology fu may 2023  5) pulm fu July 11   6) surgery needs an apt  7) stress test 6/30    Lab Results       None            Care Coordination Interventions    Referral from Primary Care Provider: No  Suggested Interventions and Community Resources  Disease Specific Clinic: Completed  Pulmonary Rehab: Not Started  Zone Management Tools: Completed          Goals Addressed                   This Visit's Progress     Conditions and Symptoms   On track     I will schedule office visits, as directed by my provider. I will keep my appointment or reschedule if I have to cancel. I will notify my provider of any barriers to my plan of care. Barriers: overwhelmed by complexity of regimen  Plan for overcoming my barriers: care coordination   Confidence: 9/10  Anticipated Goal Completion Date: 10/2/22                Prior to Admission medications    Medication Sig Start Date End Date Taking?  Authorizing Provider   ALPRAZolam Larnell Satchel) 0.5 MG tablet TAKE 1 TABLET BY MOUTH EVERY DAY AS NEEDED FOR SLEEP OR ANXIETY FOR UP TO 30 DAYS 9/13/22 10/11/22  Marli Pardo, APRN - CNP   guaiFENesin (MUCINEX) 600 MG extended release tablet Take 1 tablet by mouth 2 times daily for 15 days 9/7/22 9/22/22  DES Owens CNP   QUEtiapine (SEROQUEL) 50 MG tablet TAKE 1 OR 2 TABLETS BY MOUTH AT BEDTIME AS NEEDED FOR SLEEP 9/6/22 9/6/23  DES Owens CNP   VENTOLIN  (90 Base) MCG/ACT inhaler INHALE 1 PUFF BY MOUTH EVERY 4 HOURS AS NEEDED 6/1/22   Historical Provider, MD   montelukast (SINGULAIR) 10 MG tablet Take 1 tablet by mouth nightly 6/27/22 12/24/22  DES Owens CNP   loratadine (CLARITIN) 10 MG tablet Take 1 tablet by mouth daily 6/27/22 6/27/23  DES Owens CNP   atorvastatin (LIPITOR) 80 MG tablet Take 1 tablet by mouth daily 5/31/22   DES Flores CNP   Fluticasone-Umeclidin-Vilant 200-62.5-25 MCG/INH AEPB Inhale 1 puff into the lungs daily Lot ct7c exp 10/2023 4/27/22   DES Owens CNP   aspirin 81 MG chewable tablet Take 1 tablet by mouth daily 3/25/22   Amada Grayson DO   pantoprazole (PROTONIX) 40 MG tablet Take 1 tablet by mouth 2 times daily (before meals) 3/15/22 6/27/23  DES Owens CNP   vitamin B-12 (CYANOCOBALAMIN) 1000 MCG tablet TAKE 2 TABLETS BY MOUTH EVERY DAY 3/2/22 3/2/23  DES Owens CNP   QUEtiapine (SEROQUEL) 200 MG tablet TAKE 1 TABLET DAILY 2/2/22   DES Owens CNP   dorzolamide-timolol (COSOPT) 22.3-6.8 MG/ML ophthalmic solution INSTILL 1 DROP IN [facility] EYE TWO TIMES A DAY 10/1/21   Historical Provider, MD   gabapentin (NEURONTIN) 800 MG tablet TAKE 1 TAB BY MOUTH AT BEDTIME FOR ONE WEEK THEN 2 TABS AT BEDTIME FOR ONE WEEK THEN 3 TABS AT BEDTIME THEREAFTER 9/7/21   Historical Provider, MD   Handicap Placard MISC by Does not apply route Exp 3/16/2026 3/16/21   DES Owens - CNP   oxyCODONE-acetaminophen (PERCOCET)  MG per tablet Take 1 tablet by mouth every 4 hours as needed for Pain.   7/19/19   Historical Provider, MD       Future Appointments   Date Time Provider Glenroy Falcon   10/3/2022  1:30 PM JABIER TRAYLOR RM 1 [facility]   10/10/2022  9:00 AM Marli Shayna Pardo, APRN - CNP [id number]   11/4/2022  2:00 PM Diane Wright, DO bariatric jay [facility]    and   COPD Assessment    Does the patient understand envrionmental exposure?: Yes  Is the patient able to verbalize Rescue vs. Long Acting medications?: Yes  Does the patient have a nebulizer?: No  Does the patient use a space with inhaled medications?: No     No patient-reported symptoms         Symptoms:     Have you had a recent diagnosis of pneumonia either by PCP or at a hospital?: No

## 2022-09-20 NOTE — CARE COORDINATION
Is she taking the Mucinex? She may cough for awhile, had bronchitis. I do not want to give her anything that will suppress the cough. I want her to cough it out.

## 2022-09-27 ENCOUNTER — CARE COORDINATION (OUTPATIENT)
Dept: CARE COORDINATION | Age: 77
End: 2022-09-27

## 2022-09-28 ENCOUNTER — CARE COORDINATION (OUTPATIENT)
Dept: CARE COORDINATION | Age: 77
End: 2022-09-28

## 2022-09-28 RX ORDER — SODIUM CHLORIDE, SODIUM LACTATE, POTASSIUM CHLORIDE, CALCIUM CHLORIDE 600; 310; 30; 20 MG/100ML; MG/100ML; MG/100ML; MG/100ML
1000 INJECTION, SOLUTION INTRAVENOUS CONTINUOUS
Status: CANCELLED | OUTPATIENT
Start: 2022-09-28

## 2022-09-28 NOTE — DISCHARGE INSTRUCTIONS
Preoperative Instructions:    Stop eating solid foods at midnight the night prior to surgery. Stop drinking clear liquids at midnight the night prior to surgery. Arrive at the surgery center (Entrance B) by 9:30 on 10/19/2022  (or as directed by your surgeon's office). If you have been given a blood band, you must bring it with you the day of surgery. Please stop any blood thinning medications as directed by your surgeon or prescribing physician. Failure to stop certain medications may interfere with your scheduled surgery. These may include:  Aspirin, Warfarin (Coumadin), Clopidogrel (Plavix), Ibuprofen (Motrin, Advil), Naproxen (Aleve), Meloxicam (Mobic), Celecoxib (Celebrex), Eliquis, Pradaxa, Xarelto, Effient, Fish Oil, Herbal supplements. You may continue the rest of your medications through the night before surgery unless instructed otherwise. Please take only the following medication(s) the day of surgery with a small sip of water:  Protonix, gabapentin/neurontin    Please use and bring inhalers the day of surgery. Please bring CPAP the day of surgery. ____________________________  ____________________________  Signature (Patient)           Signature/date(Provider)      REMINDERS:  ** If you are going home the day of your procedure, you will need a friend or family member to drive you home after your procedure. Your  must be 25years of age or older and able to sign off on your discharge instructions. Taxi cabs or any form of public transportation is not acceptable. ** It is preferable that the friend or family member stay at the hospital throughout your procedure. ** If you are going home the same day as your procedure, someone must remain with you for the first 24 hours after your surgery if you receive anesthesia or sedation. If you do not have someone to stay with you, your procedure may be cancelled.   **  Please do not wear any jewelry or body piercings the day of surgery. PREPARING FOR YOUR SURGERY:     Before surgery, you can play an important role in your own health. Because skin is not sterile, we need to be sure that your skin is as free of germs as possible before surgery by carefully washing before surgery. Preparing or prepping skin before surgery can reduce the risk of a surgical site infection.   Do not shave the area of your body where your surgery will be performed unless you received specific permission from your physician. You will need to shower at home the night before surgery and the morning of surgery with a special soap called chlorhexidine gluconate (CHG*). *Not to be used by people allergic to Chlorhexidine Gluconate (CHG). Following these instructions will help you be sure that your skin is clean before surgery. Instructions on cleaning your skin before surgery: The night before your surgery: You will need to shower with warm water (not hot) and the CHG soap. Use a clean wash cloth and a clean towel. Have clean clothes available to put on after the shower. First wash your hair with regular shampoo. Rinse your hair and body thoroughly to remove the shampoo. Wash your face with your regular soap or water only. Thoroughly rinse your body with warm water from the neck down. Turn water off to prevent rinsing the soap off too soon. With a clean wet washcloth and half of the CHG soap in the bottle, lather your entire body from the neck down. Do not use CHG soap near your eyes or ears to avoid injury to those areas. Wash thoroughly, paying special attention to the area where your surgery will be performed. Wash your body gently for five (5) minutes. Avoid scrubbing your skin too hard. Turn the water back on and rinse your body thoroughly. Pat yourself dry with a clean, soft towel. Do not apply lotion, cream or powder. Dress with clean freshly washed clothes.     The morning of surgery:    Repeat shower following steps above  - using remaining half of CHG soap in bottle. If you have any questions, call the Pre-Admission Testing Unit at [phone]. Day of Surgery/Procedure    As a patient at [facility] you can expect quality medical and nursing care that is centered on your individual needs. Our goal is to make your surgical experience as comfortable as possible  . Directions to the [address] [place] [address] is located at [address] [facility], [address]. Please pull into the Emergency [facility] parking lot (Entrance B) and park in that lot. We also have additional parking across the street. You will enter the facility following the [address] sign. Please stop at the reception desk where you will be checked in by the staff. If you have any questions please call [phone]. Transportation after your procedure. You will need a friend or family member to drive you home after your procedure. Your  must be 25years of age or older and able to sign off on your discharge instructions. Taxi cabs or any form of public transportation is not acceptable. It is preferable that the friend or family member stay at the hospital throughout your procedure. Someone must remain at home with you for the first 24 hours after your surgery if you receive anesthesia or sedation. If you do not have someone to stay with you, your procedure may be cancelled. Patient Instructions    If you are having any type of anesthesia you are to have nothing to eat or drink after midnight the night before your surgery. This includes gum, mints, water or smoking or chewing tobacco.  The only exception to this is a small sip of water to take with any morning dose of heart, blood pressure, or seizure medications. Bring a list of all medications you take, along with the dose of the medications and how often you take it.   If more convenient bring the pharmacy bottles in a zip lock bag. Please shower the night before and the morning of surgery with an antibacterial soap. Please use the wipes given to you the night before your surgery after your shower. Unless otherwise told by your physician, please do not shave legs or any part of your body below your neck the night before or day of your surgery. You may shave your face or neck. Brush your teeth but do not swallow water. Bring your inhaler if you are currently using one. Bring your eyeglasses and case with you. No contacts are to be worn the day of surgery. You also may bring your hearing aids. Bring your blood band if one has been given to you. Please do not close the clasp. If you are on C-PAP or Bi-PAP at home and plan on staying in the hospital overnight for your surgery please bring the machine with you. Do not wear any jewelry or body piercings day of surgery. Also, NO lotion, perfume or deodorant to be used the day of surgery. Do not bring any valuables, such as jewelry, cash or credit cards. If you are staying overnight with us, please bring a SMALL bag of personal items. We cannot accommodate large items, like suitcases. Please wear loose, comfortable clothing. If you are potentially going to have a cast or brace bring clothing that will fit over them. In case of illness - If you have cold or flu like symptoms (high fever, runny nose, sore throat, cough, etc.) rash, nausea, vomiting, loose stools, and/or recent contact with someone who has a contagious disease (chicken pox, measles, etc.) Please call your doctor before coming to the hospital.      If your child is having surgery please make arrangements for any other children to be cared for at home on the day of surgery.   Other children are not permitted in recovery room and we want you to be able to spend time with the patient. If other arrangements are not available then we suggest that you have a second adult to stay in the waiting room.       If you have any other questions regarding your procedure or the day of surgery, please call [phone], or [phone]

## 2022-09-29 ENCOUNTER — TELEPHONE (OUTPATIENT)
Dept: FAMILY MEDICINE CLINIC | Age: 77
End: 2022-09-29

## 2022-09-29 ENCOUNTER — TELEPHONE (OUTPATIENT)
Dept: BARIATRICS/WEIGHT MGMT | Age: 77
End: 2022-09-29

## 2022-09-29 DIAGNOSIS — F41.9 ANXIETY: ICD-10-CM

## 2022-09-29 RX ORDER — ALPRAZOLAM 0.5 MG/1
0.5 TABLET ORAL 3 TIMES DAILY PRN
Qty: 42 TABLET | Refills: 0 | Status: SHIPPED | OUTPATIENT
Start: 2022-09-29 | End: 2022-10-11 | Stop reason: SDUPTHER

## 2022-09-29 NOTE — TELEPHONE ENCOUNTER
Patient's daughter called requesting a sleep and or anxiety medication for patient. Patient's spouse was found  2022. Daughter states patient is having a very difficult time. She has not been able to sleep, she is very anxious. Please contact daughter if increased Xanax is approved for a temporary length of time.   Rx:  [facility] and [place], [place]

## 2022-09-29 NOTE — TELEPHONE ENCOUNTER
Patient scheduled for surgery on 10/19 left vm to return office call and ask for Luis Alberto FrystownTiffanie Forde or Lolita Mcmahanate regarding status of wanting to keep surgery date or rescheduling.

## 2022-09-30 NOTE — TELEPHONE ENCOUNTER
Pt daughter called and said pharmacy refusing to fill Rx for xanax until office calls to discuss please call today pt is very distraught due to loss of

## 2022-10-03 ENCOUNTER — HOSPITAL ENCOUNTER (OUTPATIENT)
Dept: GENERAL RADIOLOGY | Age: 77
Discharge: HOME | End: 2022-10-05
Payer: MEDICARE

## 2022-10-03 ENCOUNTER — HOSPITAL ENCOUNTER (OUTPATIENT)
Dept: PREADMISSION TESTING | Age: 77
Discharge: HOME | End: 2022-10-07
Payer: MEDICARE

## 2022-10-03 ENCOUNTER — TELEPHONE (OUTPATIENT)
Dept: BARIATRICS/WEIGHT MGMT | Age: 77
End: 2022-10-03

## 2022-10-03 VITALS
HEART RATE: 103 BPM | RESPIRATION RATE: 18 BRPM | HEIGHT: 60 IN | WEIGHT: 147 LBS | DIASTOLIC BLOOD PRESSURE: 90 MMHG | BODY MASS INDEX: 28.86 KG/M2 | OXYGEN SATURATION: 96 % | TEMPERATURE: 97.9 F | SYSTOLIC BLOOD PRESSURE: 154 MMHG

## 2022-10-03 LAB
ANION GAP SERPL CALCULATED.3IONS-SCNC: 14 MMOL/L (ref 9–17)
BUN BLDV-MCNC: 12 MG/DL (ref 8–23)
CALCIUM SERPL-MCNC: 9.1 MG/DL (ref 8.6–10.4)
CHLORIDE BLD-SCNC: 105 MMOL/L (ref 98–107)
CO2: 23 MMOL/L (ref 20–31)
CREAT SERPL-MCNC: 0.85 MG/DL (ref 0.5–0.9)
GFR SERPL CREATININE-BSD FRML MDRD: >60 ML/MIN/1.73M2
GFR SERPL CREATININE-BSD FRML MDRD: ABNORMAL ML/MIN/{1.73_M2}
GLUCOSE BLD-MCNC: 102 MG/DL (ref 70–99)
HCT VFR BLD CALC: 40.2 % (ref 36.3–47.1)
HEMOGLOBIN: 12.9 G/DL (ref 11.9–15.1)
INR BLD: 0.9
MCH RBC QN AUTO: 29.6 PG (ref 25.2–33.5)
MCHC RBC AUTO-ENTMCNC: 32.1 G/DL (ref 28.4–34.8)
MCV RBC AUTO: 92.2 FL (ref 82.6–102.9)
NRBC AUTOMATED: 0 PER 100 WBC
PDW BLD-RTO: 14.7 % (ref 11.8–14.4)
PLATELET # BLD: 296 K/UL (ref 138–453)
PMV BLD AUTO: 10.2 FL (ref 8.1–13.5)
POTASSIUM SERPL-SCNC: 3.7 MMOL/L (ref 3.7–5.3)
PROTHROMBIN TIME: 9.6 SEC (ref 9.1–12.3)
RBC # BLD: 4.36 M/UL (ref 3.95–5.11)
SODIUM BLD-SCNC: 142 MMOL/L (ref 135–144)
WBC # BLD: 8.4 K/UL (ref 3.5–11.3)

## 2022-10-03 PROCEDURE — 85027 COMPLETE CBC AUTOMATED: CPT

## 2022-10-03 PROCEDURE — 71046 X-RAY EXAM CHEST 2 VIEWS: CPT

## 2022-10-03 PROCEDURE — 85610 PROTHROMBIN TIME: CPT

## 2022-10-03 PROCEDURE — 80048 BASIC METABOLIC PNL TOTAL CA: CPT

## 2022-10-03 PROCEDURE — 36415 COLL VENOUS BLD VENIPUNCTURE: CPT

## 2022-10-03 NOTE — PROGRESS NOTES
Anesthesia Focused Assessment    Has patient ever tested positive for COVID? No    STOP-BANG Sleep Apnea Questionnaire    SNORE loudly (heard through closed doors)? No  TIRED, fatigued, sleepy during daytime? No  OBSERVED stopping breathing during sleep? No  High blood PRESSURE being treated? No    BMI over 35? No  AGE over 48? Yes  NECK circumference over 16\"? No  GENDER (male)? No             Total 1  High risk 5-8  Intermediate risk 3-4  Low risk 0-2    Obstructive Sleep Apnea: denies  If YES, machine used: no     Type 1 DM:   no  T2DM:  no    Coronary Artery Disease:  no, had recent stress and echo, results in epic  Hypertension:  denies    Active smoker:  no  Drinks Alcohol:  no    Dentition: benign    Defib / AICD / Pacemaker: no      Renal Failure/dialysis:  no    Patient was evaluated in PAT & anesthesia guidelines were applied. NPO guidelines, medication instructions and scheduled arrival time were reviewed with patient. I advised patient to please contact the surgeon's office, ahead of time if possible, if any new signs or symptoms of illness, infection, rash, etc    Hx of anesthesia complications:  no  Family hx of anesthesia complications:  no                                                                                                                     Anesthesia contacted:     Medical or cardiac clearance ordered: cardiology clearance in paper chart from [facility], also recent cardiac testing, \"low risk. \"    Patient was treated for bronchitis with a Tika Hooker, has follow up with PCP this week. Will inform surgeon's office of recent illness, CXR done today. Yoel Keller PA-C  10/3/22  1:19 PM    Marli received call from surgeon's office regarding recently diagnosed pneumonia. CXR 10/3 WNL at PAT appointment, had been treated for bronchitis, surgeon's office was informed that date.   She was seen by PCP 10/10 with increased symptoms and for clearance, CXR then showed pneumonia. Patient was prescribed doxycycline, which was started 10/11. Case discussed with Dr. Tracy Santoyo. Patient's surgery will need to be postponed until 4 weeks from resolution of symptoms.       Shantell Reardon PA-C  10/12/22  10:49 AM

## 2022-10-03 NOTE — TELEPHONE ENCOUNTER
Lashell from [facility] called to let us know pt. Has bronchitis again and has been treated by pcp prior with antibiotics and pt. Had cxr done 10/3/22 and has upcoming appt.  With pcp on 10/10

## 2022-10-03 NOTE — H&P
History and Physical    Pt Name: Clement Leon  MRN: [id number]  YOB: 1945  Date of evaluation: 10/3/2022    SUBJECTIVE:   History of Chief Complaint:    Patient presents for PAT appointment, reports a large hiatal/paraesophageal hernia. Patient says that she has GERD symptoms, mostly intermittent. She has dysphagia as well at times, has to vomit to relieve her symptoms. She says that she has been going through a lot recently due to the loss of her , diet has been poor, so symptoms have increased. She has been scheduled for hernia repair. She says that the surgery has been postponed several times due to bronchitis/pneumonia. Past Medical History    has a past medical history of Abnormal finding on imaging, Anemia, Bochdalek hernia, Bowel obstruction (HCC), Bronchitis, Chronic biliary pancreatitis ([address] Cough, DDD (degenerative disc disease), Depression, Diverticulosis, Esophageal dysphagia, Fibromyalgia, Frequent headaches, GERD (gastroesophageal reflux disease), Glaucoma, Hiatal hernia, History of blood transfusion, Hyperlipidemia, Knee injuries, Osteoarthritis of more than one site, Pneumonia, Seasonal allergies, Under care of service provider, Under care of service provider, and Under care of service provider. Past Surgical History   has a past surgical history that includes Hysterectomy; Foot surgery (Right); Small intestine surgery; Carpal tunnel release (Bilateral); Breast biopsy (Left, 05/2016); Pancreas Biopsy (10/03/2016); Colonoscopy; Upper gastrointestinal endoscopy; pr arthrs kne surg w/meniscectomy med/lat w/shvg (Left, 08/13/2018); eye surgery (Left); Cataract extraction (Left); blepharoplasty (Left); Knee arthroscopy (Right, 12/04/2019); Upper gastrointestinal endoscopy (N/A, 08/17/2021); and esophageal motility study (N/A, 01/21/2022). Medications  Prior to Admission medications    Medication Sig Start Date End Date Taking?  Authorizing Provider   ALPRAZolam Rashaun Boyer) 0.5 MG tablet Take 1 tablet by mouth 3 times daily as needed for Sleep for up to 14 days.  9/29/22 10/13/22  DES Lackey CNP   QUEtiapine (SEROQUEL) 50 MG tablet TAKE 1 OR 2 TABLETS BY MOUTH AT BEDTIME AS NEEDED FOR SLEEP 9/6/22 9/6/23  DES Lackey CNP   VENTOLIN  (90 Base) MCG/ACT inhaler INHALE 1 PUFF BY MOUTH EVERY 4 HOURS AS NEEDED 6/1/22   Historical Provider, MD   montelukast (SINGULAIR) 10 MG tablet Take 1 tablet by mouth nightly  Patient not taking: Reported on 10/3/2022 6/27/22 12/24/22  DES Lackey CNP   loratadine (CLARITIN) 10 MG tablet Take 1 tablet by mouth daily  Patient not taking: Reported on 10/3/2022 6/27/22 6/27/23  DES Lackey CNP   atorvastatin (LIPITOR) 80 MG tablet Take 1 tablet by mouth daily 5/31/22   DES Mehta CNP   Fluticasone-Umeclidin-Vilant 200-62.5-25 MCG/INH AEPB Inhale 1 puff into the lungs daily Lot ct7c exp 10/2023 4/27/22   DES Lackey CNP   aspirin 81 MG chewable tablet Take 1 tablet by mouth daily 3/25/22   Sindy Ayala DO   pantoprazole (PROTONIX) 40 MG tablet Take 1 tablet by mouth 2 times daily (before meals)  Patient not taking: Reported on 10/3/2022 3/15/22 6/27/23  DES Lackey CNP   vitamin B-12 (CYANOCOBALAMIN) 1000 MCG tablet TAKE 2 TABLETS BY MOUTH EVERY DAY 3/2/22 3/2/23  DES Lackey CNP   QUEtiapine (SEROQUEL) 200 MG tablet TAKE 1 TABLET DAILY 2/2/22   DES Lackey CNP   dorzolamide-timolol (COSOPT) 22.3-6.8 MG/ML ophthalmic solution INSTILL 1 DROP IN [facility] EYE TWO TIMES A DAY  Patient not taking: Reported on 10/3/2022 10/1/21   Historical Provider, MD   gabapentin (NEURONTIN) 800 MG tablet TAKE 1 TAB BY MOUTH AT BEDTIME FOR ONE WEEK THEN 2 TABS AT BEDTIME FOR ONE WEEK THEN 3 TABS AT BEDTIME THEREAFTER 9/7/21   Historical Provider, MD   Handicap Placard MISC by Does not apply route Exp 3/16/2026 3/16/21   Marli Murray, APRN - CNP   oxyCODONE-acetaminophen (PERCOCET)  MG per tablet Take 1 tablet by mouth every 4 hours as needed for Pain. 7/19/19   Historical Provider, MD     Allergies  is allergic to pcn [penicillins], nubain [nalbuphine hcl], vistaril [hydroxyzine], seasonal, and adhesive tape. Family History  family history includes Heart Disease in her father; High Blood Pressure in her mother; Stroke in her maternal grandmother and mother. Social History   reports that she has never smoked. She has never used smokeless tobacco.   reports no history of alcohol use. reports no history of drug use. Marital Status   Occupation retired from [employer]:  CONSTITUTIONAL:   negative for fevers, chills, fatigue and malaise    EYES:   negative for double vision, blurred vision and photophobia    HEENT:   negative for tinnitus, epistaxis and sore throat     RESPIRATORY:   Recent bronchitis, treated with antibiotics per pcp   CARDIOVASCULAR:   negative for chest pain, palpitations, syncope, edema     GASTROINTESTINAL:   negative for nausea, vomiting     GENITOURINARY:   negative for incontinence     MUSCULOSKELETAL:   negative for neck or back pain     NEUROLOGICAL:   Negative for weakness and tingling  negative for headaches and dizziness     PSYCHIATRIC:   negative for anxiety         OBJECTIVE:   VITALS:  height is 5' (1.524 m) and weight is 147 lb (66.7 kg). Her temporal temperature is 97.9 °F (36.6 °C). Her blood pressure is 154/90 (abnormal) and her pulse is 103 (abnormal). Her respiration is 18 and oxygen saturation is 96%. CONSTITUTIONAL:alert & cooperative, no acute distress. Present with family member. SKIN:  Warm and dry, no rashes on exposed areas of skin. HEAD:  Normocephalic, atraumatic. EYES: EOMs intact. EARS:  Hearing grossly WNL. NOSE:  Nares patent. No rhinorrhea   MOUTH/THROAT:  benign  NECK:good ROM   LUNGS:  Prolonged expiration but good air exchange.   Scattered mild rhonchi that clear with cough.  CARDIOVASCULAR: Heart sounds are normal.  Regular rate and rhythm without murmur. ABDOMEN: soft, non tender, non distended. EXTREMITIES: no edema bilateral lower extremities. Testing:   EKG: in paper chart  Labs pending: drawn 10/3/2022     IMPRESSIONS:   Paraesophageal hernia   has a past medical history of Abnormal finding on imaging (08/12/2021), Anemia, Bochdalek hernia, Bowel obstruction ([facility] [address] Bronchitis (02/19/2019), Chronic biliary pancreatitis ([facility] (09/22/2016), Cough (08/13/2021), DDD (degenerative disc disease), Depression, Diverticulosis, Esophageal dysphagia, Fibromyalgia, Frequent headaches, GERD (gastroesophageal reflux disease), Glaucoma, Hiatal hernia, History of blood transfusion, Hyperlipidemia, Knee injuries (02/19/2019), Osteoarthritis of more than one site (04/09/2014), Pneumonia, Seasonal allergies, Under care of service provider, Under care of service provider (10/03/2022), and Under care of service provider (10/03/2022).    PLANS:   Paraesophageal hernia repair, fundoplication    IZABELLA Maciel PA-C  Electronically signed 10/3/2022 at 2:09 PM

## 2022-10-04 ENCOUNTER — CARE COORDINATION (OUTPATIENT)
Dept: CARE COORDINATION | Age: 77
End: 2022-10-04

## 2022-10-04 RX ORDER — HEPARIN SODIUM 5000 [USP'U]/ML
5000 INJECTION, SOLUTION INTRAVENOUS; SUBCUTANEOUS ONCE
Status: CANCELLED | OUTPATIENT
Start: 2022-10-04 | End: 2022-10-04

## 2022-10-04 NOTE — TELEPHONE ENCOUNTER
I spoke with patient, she states she does still want to have surgery. She has appointment with pcp on 10/10/22. I let her know because she has been treated for Bronchitis she will need to be cleared by pcp for her surgery.

## 2022-10-04 NOTE — TELEPHONE ENCOUNTER
I spoke with Praful Rogel in Pre Op, patient understands that she has appointment with pcp on 10/10/22 and will need clearance from her to have surgery on 10/19/22.

## 2022-10-04 NOTE — CARE COORDINATION
Ambulatory Care Coordination Note  10/4/2022    ACC: Torrey Alfaro, RN    Spoke with pt who said she is doing ok her  passed away last week. She still plans to have surgery she did go to her preop apt yesterday. Her children are staying with her and supporting her. She will keep her apt with her pcp on Monday. Did offer her grief counseling she declines this offer   1) acp done   2) sdoh done  3) med review done. 4) cardiology fu may 2023  5) pulm fu July 11   6) surgery oct 19  7) pcp oct 10       Offered patient enrollment in the Remote Patient Monitoring (RPM) program for in-home monitoring: Patient declined. Lab Results       None            Care Coordination Interventions    Referral from Primary Care Provider: No  Suggested Interventions and Community Resources  Disease Specific Clinic: Completed  Pulmonary Rehab: Not Started  Zone Management Tools: Completed          Goals Addressed                   This Visit's Progress     Conditions and Symptoms   On track     I will schedule office visits, as directed by my provider. I will keep my appointment or reschedule if I have to cancel. I will notify my provider of any barriers to my plan of care. Barriers: overwhelmed by complexity of regimen  Plan for overcoming my barriers: care coordination   Confidence: 9/10  Anticipated Goal Completion Date: 10/2/22                Prior to Admission medications    Medication Sig Start Date End Date Taking? Authorizing Provider   ALPRAZolam Cecilia Huddleston) 0.5 MG tablet Take 1 tablet by mouth 3 times daily as needed for Sleep for up to 14 days.  9/29/22 10/13/22  DES Lackey - CNP   QUEtiapine (SEROQUEL) 50 MG tablet TAKE 1 OR 2 TABLETS BY MOUTH AT BEDTIME AS NEEDED FOR SLEEP 9/6/22 9/6/23  DES Lackey - CNP   VENTOLIN  (90 Base) MCG/ACT inhaler INHALE 1 PUFF BY MOUTH EVERY 4 HOURS AS NEEDED 6/1/22   Historical Provider, MD   montelukast (SINGULAIR) 10 MG tablet Take 1 tablet by mouth nightly  Patient not taking: Reported on 10/3/2022 6/27/22 12/24/22  Vearl CesarDES CNP   loratadine (CLARITIN) 10 MG tablet Take 1 tablet by mouth daily  Patient not taking: Reported on 10/3/2022 6/27/22 6/27/23  Vearl CesarDES CNP   atorvastatin (LIPITOR) 80 MG tablet Take 1 tablet by mouth daily 5/31/22   Vearl CesarDES CNP   Fluticasone-Umeclidin-Vilant 107-81.5-23 MCG/INH AEPB Inhale 1 puff into the lungs daily Lot ct7c exp 10/2023 4/27/22   DES Scales CNP   aspirin 81 MG chewable tablet Take 1 tablet by mouth daily 3/25/22   Grisel Moulding,    pantoprazole (PROTONIX) 40 MG tablet Take 1 tablet by mouth 2 times daily (before meals)  Patient not taking: Reported on 10/3/2022 3/15/22 6/27/23  DES Scales CNP   vitamin B-12 (CYANOCOBALAMIN) 1000 MCG tablet TAKE 2 TABLETS BY MOUTH EVERY DAY 3/2/22 3/2/23  DES Scales CNP   QUEtiapine (SEROQUEL) 200 MG tablet TAKE 1 TABLET DAILY 2/2/22   DES Scales CNP   dorzolamide-timolol (COSOPT) 22.3-6.8 MG/ML ophthalmic solution INSTILL 1 DROP IN [facility] EYE TWO TIMES A DAY  Patient not taking: Reported on 10/3/2022 10/1/21   Historical Provider, MD   gabapentin (NEURONTIN) 800 MG tablet TAKE 1 TAB BY MOUTH AT BEDTIME FOR ONE WEEK THEN 2 TABS AT BEDTIME FOR ONE WEEK THEN 3 TABS AT BEDTIME THEREAFTER 9/7/21   Historical Provider, MD   Handicap Placard MISC by Does not apply route Exp 3/16/2026 3/16/21   DES Scales CNP   oxyCODONE-acetaminophen (PERCOCET)  MG per tablet Take 1 tablet by mouth every 4 hours as needed for Pain.   7/19/19   Historical Provider, MD       Future Appointments   Date Time Provider Glenroy Falcon   10/10/2022  9:00 AM DES Scales - CNP [facility]   11/4/2022  2:00 PM Katja Jones DO bariatric jay [facility]

## 2022-10-10 ENCOUNTER — HOSPITAL ENCOUNTER (OUTPATIENT)
Age: 77
Setting detail: SPECIMEN
Discharge: HOME | End: 2022-10-10

## 2022-10-10 ENCOUNTER — HOSPITAL ENCOUNTER (OUTPATIENT)
Dept: GENERAL RADIOLOGY | Age: 77
Discharge: HOME | End: 2022-10-12
Payer: MEDICARE

## 2022-10-10 ENCOUNTER — OFFICE VISIT (OUTPATIENT)
Dept: FAMILY MEDICINE CLINIC | Age: 77
End: 2022-10-10
Payer: MEDICARE

## 2022-10-10 ENCOUNTER — TELEPHONE (OUTPATIENT)
Dept: PRIMARY CARE CLINIC | Age: 77
End: 2022-10-10

## 2022-10-10 ENCOUNTER — HOSPITAL ENCOUNTER (OUTPATIENT)
Age: 77
Discharge: HOME | End: 2022-10-12
Payer: MEDICARE

## 2022-10-10 VITALS
BODY MASS INDEX: 30.44 KG/M2 | OXYGEN SATURATION: 96 % | SYSTOLIC BLOOD PRESSURE: 112 MMHG | RESPIRATION RATE: 22 BRPM | WEIGHT: 145 LBS | DIASTOLIC BLOOD PRESSURE: 74 MMHG | HEIGHT: 58 IN | TEMPERATURE: 97.3 F | HEART RATE: 86 BPM

## 2022-10-10 DIAGNOSIS — R13.19 ESOPHAGEAL DYSPHAGIA: ICD-10-CM

## 2022-10-10 DIAGNOSIS — R82.90 ABNORMAL URINALYSIS: ICD-10-CM

## 2022-10-10 DIAGNOSIS — K21.9 GASTROESOPHAGEAL REFLUX DISEASE, UNSPECIFIED WHETHER ESOPHAGITIS PRESENT: ICD-10-CM

## 2022-10-10 DIAGNOSIS — J69.0 ASPIRATION PNEUMONIA OF LEFT LOWER LOBE, UNSPECIFIED ASPIRATION PNEUMONIA TYPE (HCC): ICD-10-CM

## 2022-10-10 DIAGNOSIS — Z01.810 PREOP CARDIOVASCULAR EXAM: ICD-10-CM

## 2022-10-10 DIAGNOSIS — Z01.810 PREOP CARDIOVASCULAR EXAM: Primary | ICD-10-CM

## 2022-10-10 DIAGNOSIS — E53.8 VITAMIN B12 DEFICIENCY: ICD-10-CM

## 2022-10-10 DIAGNOSIS — K44.9 HIATAL HERNIA: ICD-10-CM

## 2022-10-10 DIAGNOSIS — F41.9 ANXIETY: Chronic | ICD-10-CM

## 2022-10-10 LAB
BILIRUBIN, POC: NEGATIVE
BLOOD URINE, POC: ABNORMAL
CLARITY, POC: CLEAR
COLOR, POC: YELLOW
GLUCOSE URINE, POC: NEGATIVE
KETONES, POC: NEGATIVE
LEUKOCYTE EST, POC: NEGATIVE
NITRITE, POC: NEGATIVE
PH, POC: 6
PROTEIN, POC: ABNORMAL
SPECIFIC GRAVITY, POC: 1.01
UROBILINOGEN, POC: 0.2

## 2022-10-10 PROCEDURE — 99203 OFFICE O/P NEW LOW 30 MIN: CPT | Performed by: NURSE PRACTITIONER

## 2022-10-10 PROCEDURE — 93000 ELECTROCARDIOGRAM COMPLETE: CPT | Performed by: NURSE PRACTITIONER

## 2022-10-10 PROCEDURE — 71046 X-RAY EXAM CHEST 2 VIEWS: CPT

## 2022-10-10 PROCEDURE — 81003 URINALYSIS AUTO W/O SCOPE: CPT | Performed by: NURSE PRACTITIONER

## 2022-10-10 RX ORDER — DOXYCYCLINE HYCLATE 100 MG
100 TABLET ORAL 2 TIMES DAILY
Qty: 14 TABLET | Refills: 0 | Status: SHIPPED | OUTPATIENT
Start: 2022-10-10 | End: 2022-10-17

## 2022-10-10 RX ORDER — DIPHENHYDRAMINE HCL 25 MG
25 CAPSULE ORAL EVERY 6 HOURS PRN
COMMUNITY

## 2022-10-10 RX ORDER — LANOLIN ALCOHOL/MO/W.PET/CERES
CREAM (GRAM) TOPICAL
Qty: 60 TABLET | Refills: 5 | Status: SHIPPED | OUTPATIENT
Start: 2022-10-10 | End: 2023-10-10

## 2022-10-10 NOTE — TELEPHONE ENCOUNTER
Spoke with pt to let her know that antibiotic has been sent to her pharm. I called the pharm to make sure they will have it ready for her tonight.

## 2022-10-10 NOTE — PROGRESS NOTES
[address]  [phone]    10/10/22     Patient ID  Katherine Hyde is a 76 y.o. female  Established patient    Chief Complaint  Katherine Hyde presents today for Pre-op Exam (10/19/22- Dr. Jose Melo) and Cough (Productive cough with vomiting, mild nausea at times. Keeping her up at night. Using her recliner at night. Does not feel like enough antibiotics were prescribed. )    Have you seen any other physician or provider since your last visit? Yes - Records Obtained  Have you had any other diagnostic tests since your last visit? Yes - Records Obtained Pre-op testing 10/03/22  Have you been seen in the emergency room and/or had an admission to a hospital since we last saw you? No     ASSESSMENT/PLAN  1. Preop cardiovascular exam  -     EKG 12 lead; Future  -     POCT Urinalysis No Micro (Auto)  -     XR CHEST STANDARD (2 VW); Future  2. Hiatal hernia  3. Aspiration pneumonia of left lower lobe, unspecified aspiration pneumonia type ([address]  -     doxycycline hyclate (VIBRA-TABS) 100 MG tablet; Take 1 tablet by mouth 2 times daily for 7 days, Disp-14 tablet, R-0Normal  4. Gastroesophageal reflux disease, unspecified whether esophagitis present  5. Esophageal dysphagia  6. Anxiety  7. Abnormal urinalysis  -     Culture, Urine; Future  8. Vitamin B12 deficiency  -     vitamin B-12 (CYANOCOBALAMIN) 1000 MCG tablet; TAKE 2 TABLETS BY MOUTH EVERY DAY, Disp-60 tablet, R-5Normal     Patient is moderate cardiopulmonary risk for surgery  Repeat CXR ordered - if any concerns will place on antibiotic   Urine culture sent to as well - will treat if abnormalities. EKG in office with T wave abnormality, lead placement? Cleared previously per cardiology and pulmonology     No follow-ups on file.       Patient Care Team:  DES Dave CNP as PCP - General (Nurse Practitioner Family)  DES Henry CNP as PCP - [facility] Empaneled Provider  Fabby Mejia MD (Anesthesiology)  Neel Park DO as Surgeon (Orthopedic Surgery)  Ragini Bravo MD as Consulting Physician (Gastroenterology)  Melissa Redman DO as Consulting Physician (Bariatric Surgery)  DES Murrell - CNP (Family Medicine)  Gera Merritt RN as [address]  History of Present illness / Visit Summary   Regina Talbot presents today for preoperative heather. Sister is present  Recently lost spouse, sudden  She is very tearful and anxious  Concern not sleeping, poor self care  Still wants to move forward with surgery as chronic symptoms       5/3/2022 cardiology -   Assessment and Plan:     Echo reviewed from hospital.  No WMA   HTN-  Continue BB   Continue Asa daily   Hyperlipidemia- Chronic. As above. LDL goal < 70. Optimize therapy. Continue statin   Type 2 Diabetes. Chronic. LDL goal < 70 and BP goal <130/80. Cont to optimize therapy. Low risk for surgery from cardiology standpoint     Review of Systems  Review of Systems   Constitutional:  Positive for activity change, appetite change and fatigue. Negative for chills and fever. HENT:  Positive for congestion, postnasal drip, sinus pressure and trouble swallowing. Negative for ear pain, rhinorrhea, sneezing and sore throat. Respiratory:  Positive for cough, chest tightness (with congestion and anxiety) and shortness of breath (w/exertion). Negative for wheezing. Follows with pulmonology. Cardiovascular:  Negative for chest pain, palpitations and leg swelling. Follows with cardiology - surgical clearance given    Gastrointestinal:  Negative for abdominal pain, blood in stool, constipation, diarrhea and nausea. Genitourinary:  Negative for difficulty urinating, dysuria, frequency, hematuria and urgency. Musculoskeletal:  Positive for arthralgias and back pain. Negative for gait problem, joint swelling and myalgias. Follows with pain management.  Chronic back and knee pain Skin:  Negative for rash and wound. Allergic/Immunologic: Negative for environmental allergies. Neurological:  Negative for dizziness, syncope, light-headedness, numbness and headaches. Hematological:  Does not bruise/bleed easily. Psychiatric/Behavioral:  Positive for agitation, decreased concentration, dysphoric mood and sleep disturbance. Negative for self-injury and suicidal ideas. The patient is nervous/anxious. Follows with psychology   Cares for ill son in home  Recent loss of      Physical exam   Physical Exam  Vitals and nursing note reviewed. Constitutional:       General: She is not in acute distress. Appearance: Normal appearance. She is well-developed, well-groomed and overweight. She is not ill-appearing or toxic-appearing. HENT:      Head: Normocephalic. Right Ear: Tympanic membrane, ear canal and external ear normal.      Left Ear: Tympanic membrane and external ear normal.      Nose: Mucosal edema present. Mouth/Throat:      Lips: Pink. Pharynx: Uvula midline. Posterior oropharyngeal erythema present. Cardiovascular:      Rate and Rhythm: Normal rate and regular rhythm. No extrasystoles are present. Heart sounds: Normal heart sounds, S1 normal and S2 normal. No murmur heard. Pulmonary:      Effort: Pulmonary effort is normal. No prolonged expiration or respiratory distress. Breath sounds: Normal breath sounds. Decreased air movement present. Comments: Minimal congestion to bilateral lower lobes   Musculoskeletal:      Right lower leg: No edema. Left lower leg: No edema. Skin:     General: Skin is warm and dry. Coloration: Skin is not ashen, cyanotic, jaundiced or pale. Neurological:      Mental Status: She is alert and oriented to person, place, and time. Motor: Motor function is intact. Gait: Gait is intact.    Psychiatric:         Attention and Perception: Attention and perception normal.         Mood and Affect: Mood is anxious and depressed. Affect is flat and tearful. Speech: Speech normal.         Behavior: Behavior is withdrawn. Behavior is cooperative. Thought Content: Thought content normal. Thought content does not include suicidal ideation. Thought content does not include suicidal plan. Cognition and Memory: Cognition and memory normal.         Judgment: Judgment normal.         Electronically signed by DES Dukes CNP, APRN-CNP on 10/12/2022 at 7:44 AM    Please note that this chart was generated using voice recognition Dragon dictation software. Although every effort was made to ensure the accuracy of this automated transcription, some errors in transcription may have occurred.

## 2022-10-11 ENCOUNTER — CARE COORDINATION (OUTPATIENT)
Dept: CARE COORDINATION | Age: 77
End: 2022-10-11

## 2022-10-11 DIAGNOSIS — R82.90 ABNORMAL URINALYSIS: ICD-10-CM

## 2022-10-11 DIAGNOSIS — F41.9 ANXIETY: ICD-10-CM

## 2022-10-11 LAB
CULTURE: NO GROWTH
SPECIMEN DESCRIPTION: NORMAL

## 2022-10-11 RX ORDER — ALPRAZOLAM 0.5 MG/1
0.5 TABLET ORAL 3 TIMES DAILY PRN
Qty: 42 TABLET | Refills: 0 | Status: SHIPPED | OUTPATIENT
Start: 2022-10-13 | End: 2022-10-27

## 2022-10-11 NOTE — CARE COORDINATION
Ambulatory Care Coordination Note  10/11/2022    ACC: Gera Merritt, RN    Spoke with pt who said she did get the abx last night. She was very tearful on the phone she has been having a hard time with the death of her . She said she has no energy. She would like a refill on her xanax. She did get 42 tab on sept 29 she has 2 left. Will ask her pcp if she can refill this or recommend something else. Did off her grief counseling she declines again   1) acp done   2) sdoh done  3) med review done. 4) cardiology fu may 2023  5) pulm fu July 11   6) surgery oct 19  7) pcp December     Offered patient enrollment in the Remote Patient Monitoring (RPM) program for in-home monitoring: Patient declined. Lab Results       None            Care Coordination Interventions    Referral from Primary Care Provider: No  Suggested Interventions and Community Resources  Disease Specific Clinic: Completed  Pulmonary Rehab: Not Started  Zone Management Tools: Completed          Goals Addressed                   This Visit's Progress     Conditions and Symptoms   On track     I will schedule office visits, as directed by my provider. I will keep my appointment or reschedule if I have to cancel. I will notify my provider of any barriers to my plan of care. Barriers: overwhelmed by complexity of regimen  Plan for overcoming my barriers: care coordination   Confidence: 9/10  Anticipated Goal Completion Date: 10/2/22                Prior to Admission medications    Medication Sig Start Date End Date Taking?  Authorizing Provider   diphenhydrAMINE (BENADRYL) 25 MG capsule Take 25 mg by mouth every 6 hours as needed for Itching    Historical Provider, MD   vitamin B-12 (CYANOCOBALAMIN) 1000 MCG tablet TAKE 2 TABLETS BY MOUTH EVERY DAY 10/10/22 10/10/23  DES Osborne - CNP   doxycycline hyclate (VIBRA-TABS) 100 MG tablet Take 1 tablet by mouth 2 times daily for 7 days 10/10/22 10/17/22  DES Osborne - TORITO   ALPRAZolam Prudence Sole) 0.5 MG tablet Take 1 tablet by mouth 3 times daily as needed for Sleep for up to 14 days. 9/29/22 10/13/22  DES Scales CNP   QUEtiapine (SEROQUEL) 50 MG tablet TAKE 1 OR 2 TABLETS BY MOUTH AT BEDTIME AS NEEDED FOR SLEEP 9/6/22 9/6/23  DES Scales CNP   VENTOLIN  (90 Base) MCG/ACT inhaler INHALE 1 PUFF BY MOUTH EVERY 4 HOURS AS NEEDED 6/1/22   Historical Provider, MD   atorvastatin (LIPITOR) 80 MG tablet Take 1 tablet by mouth daily 5/31/22   DES Duran CNP   Fluticasone-Umeclidin-Vilant 200-62.5-25 MCG/INH AEPB Inhale 1 puff into the lungs daily Lot ct7c exp 10/2023 4/27/22   DES Scales CNP   aspirin 81 MG chewable tablet Take 1 tablet by mouth daily 3/25/22   Grisel Moulding,    pantoprazole (PROTONIX) 40 MG tablet Take 1 tablet by mouth 2 times daily (before meals) 3/15/22 6/27/23  DES Scales CNP   QUEtiapine (SEROQUEL) 200 MG tablet TAKE 1 TABLET DAILY 2/2/22   DES Scales CNP   dorzolamide-timolol (COSOPT) 22.3-6.8 MG/ML ophthalmic solution INSTILL 1 DROP IN [facility] EYE TWO TIMES A DAY  Patient not taking: No sig reported 10/1/21   Historical Provider, MD   gabapentin (NEURONTIN) 800 MG tablet TAKE 1 TAB BY MOUTH AT BEDTIME FOR ONE WEEK THEN 2 TABS AT BEDTIME FOR ONE WEEK THEN 3 TABS AT BEDTIME THEREAFTER 9/7/21   Historical Provider, MD   Handicap Placard MISC by Does not apply route Exp 3/16/2026 3/16/21   DES Scales CNP   oxyCODONE-acetaminophen (PERCOCET)  MG per tablet Take 1 tablet by mouth every 4 hours as needed for Pain. 7/19/19   Historical Provider, MD       Future Appointments   Date Time Provider Glenroy Falcon   11/4/2022  2:00 PM Katja Jones, DO bariatric [id number]    and   Diabetes Assessment    Medic Alert ID: No  Meal Planning: None   How often do you test your blood sugar?: No Testing   Do you have barriers with adherence to non-pharmacologic self-management interventions? (Nutrition/Exercise/Self-Monitoring): No   Have you ever had to go to the ED for symptoms of low blood sugar?: No       No patient-reported symptoms

## 2022-10-11 NOTE — PROGRESS NOTES
Refill sent for Xanax. Last sent 9/29 for 14 days. Should have enough until Thursday when due if taking as prescribed, and not more often.

## 2022-10-12 ASSESSMENT — ENCOUNTER SYMPTOMS
NAUSEA: 0
WHEEZING: 0
ABDOMINAL PAIN: 0
BACK PAIN: 1
DIARRHEA: 0
BLOOD IN STOOL: 0
CHEST TIGHTNESS: 1
SINUS PRESSURE: 1
TROUBLE SWALLOWING: 1
SORE THROAT: 0
COUGH: 1
RHINORRHEA: 0
SHORTNESS OF BREATH: 1
CONSTIPATION: 0

## 2022-10-12 NOTE — RESULT ENCOUNTER NOTE
Please call. Patient does not use MyChart. Urine with no infection. Clearance sent. How does she feel?

## 2022-10-12 NOTE — TELEPHONE ENCOUNTER
Spoke with patient and let her know surgery is cancelled and to let me know when she is symptom free and we will reschedule her surgery.

## 2022-10-12 NOTE — TELEPHONE ENCOUNTER
Dr Daniela Miranda- CXR showed patient has pneumonia and was started on Doxycycline 10/11/22. PCP cleared patient with moderate risk for surgery, is this acceptable? I spoke with PAT and they put a note on patients chart for anesthesia to review day of surgery 10/19/22, to see if they will clear patient for surgery.

## 2022-10-12 NOTE — TELEPHONE ENCOUNTER
I am out of office for the rest of the week, access from conference. The office planned to call in another day to check on her and have her repeat CXR at latest Monday. Plan was to keep your office updated to make final say. Please keep me posted. I will assist wherever possible.

## 2022-10-17 ENCOUNTER — CARE COORDINATION (OUTPATIENT)
Dept: CARE COORDINATION | Age: 77
End: 2022-10-17

## 2022-10-21 ENCOUNTER — CARE COORDINATION (OUTPATIENT)
Dept: CARE COORDINATION | Age: 77
End: 2022-10-21

## 2022-10-21 DIAGNOSIS — J18.9 PNEUMONIA DUE TO INFECTIOUS ORGANISM, UNSPECIFIED LATERALITY, UNSPECIFIED PART OF LUNG: Primary | ICD-10-CM

## 2022-10-21 NOTE — CARE COORDINATION
Ambulatory Care Coordination Note  10/21/2022    ACC: Adán Rashid, RN    Spoke with pt who said she still has a cough it is better she is coughing up phlegm light yellow in color, she denies fever, she denies chills but is having night sweats almost every night but not every night the sweats cause her to have to change her sheets. She is feeling sob, she is checking her pulse ox, her pulse ox is 92-93% on room air she was not sure what her heart rate has been. She is complaining of extreme fatigue. She is using her nebulizer and inhalers. She did complete the antibiotic sent on the 10th of October abx completed on 10/18/22. She is worried about this pneumonia not going away because of how sick she got last year. 1) acp done   2) sdoh done  3) med review done. 4) cardiology fu may 2023  5) pulm fu Jan   6) surgery oct 19 cancelled   7) pcp December     Offered patient enrollment in the Remote Patient Monitoring (RPM) program for in-home monitoring: Patient declined. Lab Results       None            Care Coordination Interventions    Referral from Primary Care Provider: No  Suggested Interventions and Community Resources  Disease Specific Clinic: Completed  Pulmonary Rehab: Not Started  Zone Management Tools: Completed          Goals Addressed                   This Visit's Progress     Conditions and Symptoms   On track     I will schedule office visits, as directed by my provider. I will keep my appointment or reschedule if I have to cancel. I will notify my provider of any barriers to my plan of care. Barriers: overwhelmed by complexity of regimen  Plan for overcoming my barriers: care coordination   Confidence: 9/10  Anticipated Goal Completion Date: 10/2/22                Prior to Admission medications    Medication Sig Start Date End Date Taking? Authorizing Provider   ALPRAZokellie Ceron) 0.5 MG tablet Take 1 tablet by mouth 3 times daily as needed for Sleep for up to 14 days.  10/13/22 10/27/22  Marli DES Ramires - CNP   diphenhydrAMINE (BENADRYL) 25 MG capsule Take 25 mg by mouth every 6 hours as needed for Itching    Historical Provider, MD   vitamin B-12 (CYANOCOBALAMIN) 1000 MCG tablet TAKE 2 TABLETS BY MOUTH EVERY DAY 10/10/22 10/10/23  DES Valdovinos - CNP   QUEtiapine (SEROQUEL) 50 MG tablet TAKE 1 OR 2 TABLETS BY MOUTH AT BEDTIME AS NEEDED FOR SLEEP 9/6/22 9/6/23  DES Valdovinos CNP   VENTOLIN  (90 Base) MCG/ACT inhaler INHALE 1 PUFF BY MOUTH EVERY 4 HOURS AS NEEDED 6/1/22   Historical Provider, MD   atorvastatin (LIPITOR) 80 MG tablet Take 1 tablet by mouth daily 5/31/22   DES Aguilera - CNP   Fluticasone-Umeclidin-Vilant 200-62.5-25 MCG/INH AEPB Inhale 1 puff into the lungs daily Lot ct7c exp 10/2023 4/27/22   DES Valdovinos - CNP   aspirin 81 MG chewable tablet Take 1 tablet by mouth daily 3/25/22   David Colon,    pantoprazole (PROTONIX) 40 MG tablet Take 1 tablet by mouth 2 times daily (before meals) 3/15/22 6/27/23  DES Valdovinos CNP   QUEtiapine (SEROQUEL) 200 MG tablet TAKE 1 TABLET DAILY 2/2/22   DES Valdovinos - TORITO   dorzolamide-timolol (COSOPT) 22.3-6.8 MG/ML ophthalmic solution INSTILL 1 DROP IN [facility] EYE TWO TIMES A DAY  Patient not taking: No sig reported 10/1/21   Historical Provider, MD   gabapentin (NEURONTIN) 800 MG tablet TAKE 1 TAB BY MOUTH AT BEDTIME FOR ONE WEEK THEN 2 TABS AT BEDTIME FOR ONE WEEK THEN 3 TABS AT BEDTIME THEREAFTER 9/7/21   Historical Provider, MD   Handicap Placard MISC by Does not apply route Exp 3/16/2026 3/16/21   Marli Vicente APRN - TORITO   oxyCODONE-acetaminophen (PERCOCET)  MG per tablet Take 1 tablet by mouth every 4 hours as needed for Pain.   7/19/19   Historical Provider, MD       Future Appointments   Date Time Provider Glenroy Falcon   11/4/2022  2:00 PM Maribell Colon DO bariatric [facility]

## 2022-10-24 ENCOUNTER — HOSPITAL ENCOUNTER (OUTPATIENT)
Dept: GENERAL RADIOLOGY | Age: 77
Discharge: HOME | End: 2022-10-26
Payer: MEDICARE

## 2022-10-24 ENCOUNTER — HOSPITAL ENCOUNTER (OUTPATIENT)
Age: 77
Discharge: HOME | End: 2022-10-26
Payer: MEDICARE

## 2022-10-24 DIAGNOSIS — J18.9 PNEUMONIA DUE TO INFECTIOUS ORGANISM, UNSPECIFIED LATERALITY, UNSPECIFIED PART OF LUNG: ICD-10-CM

## 2022-10-24 PROCEDURE — 71046 X-RAY EXAM CHEST 2 VIEWS: CPT

## 2022-10-25 ENCOUNTER — CARE COORDINATION (OUTPATIENT)
Dept: CARE COORDINATION | Age: 77
End: 2022-10-25

## 2022-10-26 ENCOUNTER — CARE COORDINATION (OUTPATIENT)
Dept: CARE COORDINATION | Age: 77
End: 2022-10-26

## 2022-10-26 NOTE — RESULT ENCOUNTER NOTE
I highly encourage she schedule with established pulmonologist. We need plan for remainder of season, or until able to proceed with surgery. Can you help assist her in scheduling?

## 2022-10-26 NOTE — CARE COORDINATION
Called her pulmonologist and was able to get her an apt at the [facility] office for Monday oct 31 at 1:30.  Did fax them both of her recent cxr results was able to contact Keven Maria and let her know she did repeat and write down the apt date time and address

## 2022-11-02 ENCOUNTER — CARE COORDINATION (OUTPATIENT)
Dept: CARE COORDINATION | Age: 77
End: 2022-11-02

## 2022-11-03 ENCOUNTER — CARE COORDINATION (OUTPATIENT)
Dept: CARE COORDINATION | Age: 77
End: 2022-11-03

## 2022-11-09 ENCOUNTER — TELEPHONE (OUTPATIENT)
Dept: BARIATRICS/WEIGHT MGMT | Age: 77
End: 2022-11-09

## 2022-11-09 ENCOUNTER — CARE COORDINATION (OUTPATIENT)
Dept: CARE COORDINATION | Age: 77
End: 2022-11-09

## 2022-11-09 NOTE — CARE COORDINATION
Ambulatory Care Coordination Note  11/9/2022    ACC: Ruth Hills, RN  Spoke with pt who said her cough is better. She said she is still having a hard time with the lost of her  her kids have been very supportive coming in and checking on her daily. Did offer [facility] again she declines the office. She has not heard back from pulmonology yet. 1) acp done   2) sdoh done  3) med review done. 4) cardiology fu may 2023  5) pulm fu Jan   6) surgery oct 19 cancelled   7) pcp December     Offered patient enrollment in the Remote Patient Monitoring (RPM) program for in-home monitoring: Patient declined. Lab Results       None            Care Coordination Interventions    Referral from Primary Care Provider: No  Suggested Interventions and Community Resources  Disease Specific Clinic: Completed  Pulmonary Rehab: Not Started  Zone Management Tools: Completed          Goals Addressed                   This Visit's Progress     Conditions and Symptoms   On track     I will schedule office visits, as directed by my provider. I will keep my appointment or reschedule if I have to cancel. I will notify my provider of any barriers to my plan of care. Barriers: overwhelmed by complexity of regimen  Plan for overcoming my barriers: care coordination   Confidence: 9/10  Anticipated Goal Completion Date: 10/2/22                Prior to Admission medications    Medication Sig Start Date End Date Taking?  Authorizing Provider   diphenhydrAMINE (BENADRYL) 25 MG capsule Take 25 mg by mouth every 6 hours as needed for Itching    Historical Provider, MD   vitamin B-12 (CYANOCOBALAMIN) 1000 MCG tablet TAKE 2 TABLETS BY MOUTH EVERY DAY 10/10/22 10/10/23  DES Ojeda - CNP   QUEtiapine (SEROQUEL) 50 MG tablet TAKE 1 OR 2 TABLETS BY MOUTH AT BEDTIME AS NEEDED FOR SLEEP 9/6/22 9/6/23  DES Ojeda CNP   VENTOLIN  (90 Base) MCG/ACT inhaler INHALE 1 PUFF BY MOUTH EVERY 4 HOURS AS NEEDED 6/1/22   Historical Provider, MD   atorvastatin (LIPITOR) 80 MG tablet Take 1 tablet by mouth daily 5/31/22   DES Landrum CNP   Fluticasone-Umeclidin-Vilant 200-62.5-25 MCG/INH AEPB Inhale 1 puff into the lungs daily Lot ct7c exp 10/2023 4/27/22   Amy Lorre Hatchet, APRN - CNP   aspirin 81 MG chewable tablet Take 1 tablet by mouth daily 3/25/22   David Milner DO   pantoprazole (PROTONIX) 40 MG tablet Take 1 tablet by mouth 2 times daily (before meals) 3/15/22 6/27/23  Amy Lorre Hatchet, APRN - CNP   QUEtiapine (SEROQUEL) 200 MG tablet TAKE 1 TABLET DAILY 2/2/22   Amy Lorre Hatchet, APRN - CNP   dorzolamide-timolol (COSOPT) 22.3-6.8 MG/ML ophthalmic solution INSTILL 1 DROP IN [facility] EYE TWO TIMES A DAY  Patient not taking: No sig reported 10/1/21   Historical Provider, MD   gabapentin (NEURONTIN) 800 MG tablet TAKE 1 TAB BY MOUTH AT BEDTIME FOR ONE WEEK THEN 2 TABS AT BEDTIME FOR ONE WEEK THEN 3 TABS AT BEDTIME THEREAFTER 9/7/21   Historical Provider, MD   Handicap Placard MISC by Does not apply route Exp 3/16/2026 3/16/21   Amy Lorre Hatchet, APRN - CNP   oxyCODONE-acetaminophen (PERCOCET)  MG per tablet Take 1 tablet by mouth every 4 hours as needed for Pain. 7/19/19   Historical Provider, MD       No future appointments.  and   COPD Assessment    Does the patient understand envrionmental exposure?: Yes  Is the patient able to verbalize Rescue vs. Long Acting medications?: Yes  Does the patient have a nebulizer?: No  Does the patient use a space with inhaled medications?: No            Symptoms:

## 2022-11-09 NOTE — CARE COORDINATION
Did call pulm office who said they did call her on 11/3 to let her know she can reschedule surgery and they can ask for clearance once scheduled. Did call Dr Kelly Cedeno office who said they were getting ready to call her to reschedule.  They will give her a call

## 2022-11-09 NOTE — TELEPHONE ENCOUNTER
Received phone call from Carol Bishop office that patient saw CNP in Dr Etienne Keita office and they will clear patient for surgery, we just need to send clearance. I left message for patient to call me back so I can reschedule her surgery.

## 2022-11-11 NOTE — TELEPHONE ENCOUNTER
I spoke with Paula Borja at Dr Raya Calvo office. Patient will need CXR and a surgical clearance appointment with Dr Raya Calvo before clearance can be given.

## 2022-11-16 ENCOUNTER — CARE COORDINATION (OUTPATIENT)
Dept: CARE COORDINATION | Age: 77
End: 2022-11-16

## 2022-11-16 NOTE — TELEPHONE ENCOUNTER
I spoke with Dr Siomara Holm office, patient had CXR done 11/14/22 at [facility] and has appointment on 11/23/22 for surgery clearance appointment.

## 2022-11-17 ENCOUNTER — CARE COORDINATION (OUTPATIENT)
Dept: CARE COORDINATION | Age: 77
End: 2022-11-17

## 2022-11-17 NOTE — CARE COORDINATION
I do recommend. However, I feel more important to see cardiology and pulmonology. If Dr Jeanne La does not need anything signed, then only schedule if Patsy Wallaec would like. If nothing needs signed, then technically I do not need to see.

## 2022-11-17 NOTE — CARE COORDINATION
Ambulatory Care Coordination Note  11/17/2022    ACC: Anselmo Borrego, RN    Spoke with pt who said she did get her surgery rescheduled she will go to PAT next week. She will let pcp office know if she needs to be seen for preop in the office     1) acp done   2) sdoh done  3) med review done. 4) cardiology fu may 2023  5) pulm fu Jan   6) surgery oct 19 cancelled   7) pcp December     Offered patient enrollment in the Remote Patient Monitoring (RPM) program for in-home monitoring: Patient declined. Lab Results       None            Care Coordination Interventions    Referral from Primary Care Provider: No  Suggested Interventions and Community Resources  Disease Specific Clinic: Completed  Pulmonary Rehab: Not Started  Zone Management Tools: Completed          Goals Addressed                   This Visit's Progress     Conditions and Symptoms   On track     I will schedule office visits, as directed by my provider. I will keep my appointment or reschedule if I have to cancel. I will notify my provider of any barriers to my plan of care. Barriers: overwhelmed by complexity of regimen  Plan for overcoming my barriers: care coordination   Confidence: 9/10  Anticipated Goal Completion Date: 10/2/22                Prior to Admission medications    Medication Sig Start Date End Date Taking?  Authorizing Provider   diphenhydrAMINE (BENADRYL) 25 MG capsule Take 25 mg by mouth every 6 hours as needed for Itching    Historical Provider, MD   vitamin B-12 (CYANOCOBALAMIN) 1000 MCG tablet TAKE 2 TABLETS BY MOUTH EVERY DAY 10/10/22 10/10/23  DES Schulz CNP   QUEtiapine (SEROQUEL) 50 MG tablet TAKE 1 OR 2 TABLETS BY MOUTH AT BEDTIME AS NEEDED FOR SLEEP 9/6/22 9/6/23  DES Schulz CNP   VENTOLIN  (90 Base) MCG/ACT inhaler INHALE 1 PUFF BY MOUTH EVERY 4 HOURS AS NEEDED 6/1/22   Historical Provider, MD   atorvastatin (LIPITOR) 80 MG tablet Take 1 tablet by mouth daily 5/31/22   DES Schulz - CNP   Fluticasone-Umeclidin-Vilant 200-62.5-25 MCG/INH AEPB Inhale 1 puff into the lungs daily Lot ct7c exp 10/2023 4/27/22   DES Metcalf CNP   aspirin 81 MG chewable tablet Take 1 tablet by mouth daily 3/25/22   Jarod Kemp DO   pantoprazole (PROTONIX) 40 MG tablet Take 1 tablet by mouth 2 times daily (before meals) 3/15/22 6/27/23  DES Metcalf CNP   QUEtiapine (SEROQUEL) 200 MG tablet TAKE 1 TABLET DAILY 2/2/22   DES Metcalf CNP   dorzolamide-timolol (COSOPT) 22.3-6.8 MG/ML ophthalmic solution INSTILL 1 DROP IN [facility] EYE TWO TIMES A DAY  Patient not taking: No sig reported 10/1/21   Historical Provider, MD   gabapentin (NEURONTIN) 800 MG tablet TAKE 1 TAB BY MOUTH AT BEDTIME FOR ONE WEEK THEN 2 TABS AT BEDTIME FOR ONE WEEK THEN 3 TABS AT BEDTIME THEREAFTER 9/7/21   Historical Provider, MD   Handicap Placard MISC by Does not apply route Exp 3/16/2026 3/16/21   DES Metcalf CNP   oxyCODONE-acetaminophen (PERCOCET)  MG per tablet Take 1 tablet by mouth every 4 hours as needed for Pain.   7/19/19   Historical Provider, MD       Future Appointments   Date Time Provider Glenroy Falcon   11/29/2022  9:00 AM JABIER TRAYLOR  1 JABIER Luciano

## 2022-11-22 RX ORDER — SODIUM CHLORIDE, SODIUM LACTATE, POTASSIUM CHLORIDE, CALCIUM CHLORIDE 600; 310; 30; 20 MG/100ML; MG/100ML; MG/100ML; MG/100ML
1000 INJECTION, SOLUTION INTRAVENOUS CONTINUOUS
OUTPATIENT
Start: 2022-11-22

## 2022-11-22 NOTE — DISCHARGE INSTRUCTIONS
Preoperative Instructions:    Stop eating solid foods at midnight the night prior to surgery. Stop drinking clear liquids at midnight the night prior to surgery. Arrive at the surgery center (Entrance B) by 10:30 on 12/7/2022  (or as directed by your surgeon's office). Please stop any blood thinning medications as directed by your surgeon or prescribing physician. Failure to stop certain medications may interfere with your scheduled surgery. These may include:  Aspirin, Warfarin (Coumadin), Clopidogrel (Plavix), Ibuprofen (Motrin, Advil), Naproxen (Aleve), Meloxicam (Mobic), Celecoxib (Celebrex), Eliquis, Pradaxa, Xarelto, Effient, Fish Oil, Herbal supplements. You may continue the rest of your medications through the night before surgery unless instructed otherwise. Please take only the following medication(s) the day of surgery with a small sip of water:  Gabapentin/neurontin, seroquel, protonix, percocet if available and needed    Please use and bring inhalers the day of surgery. Please bring CPAP the day of surgery. ____________________________  ____________________________  Signature (Patient)           Signature/date(Provider)      REMINDERS:  ** If you are going home the day of your procedure, you will need a friend or family member to drive you home after your procedure. Your  must be 25years of age or older and able to sign off on your discharge instructions. Taxi cabs or any form of public transportation is not acceptable. ** It is preferable that the friend or family member stay at the hospital throughout your procedure. ** If you are going home the same day as your procedure, someone must remain with you for the first 24 hours after your surgery if you receive anesthesia or sedation. If you do not have someone to stay with you, your procedure may be cancelled. **  Please do not wear any jewelry or body piercings the day of surgery.     PREPARING FOR YOUR SURGERY: Before surgery, you can play an important role in your own health. Because skin is not sterile, we need to be sure that your skin is as free of germs as possible before surgery by carefully washing before surgery. Preparing or prepping skin before surgery can reduce the risk of a surgical site infection.   Do not shave the area of your body where your surgery will be performed unless you received specific permission from your physician. You will need to shower at home the night before surgery and the morning of surgery with a special soap called chlorhexidine gluconate (CHG*). *Not to be used by people allergic to Chlorhexidine Gluconate (CHG). Following these instructions will help you be sure that your skin is clean before surgery. Instructions on cleaning your skin before surgery: The night before your surgery: You will need to shower with warm water (not hot) and the CHG soap. Use a clean wash cloth and a clean towel. Have clean clothes available to put on after the shower. First wash your hair with regular shampoo. Rinse your hair and body thoroughly to remove the shampoo. Wash your face with your regular soap or water only. Thoroughly rinse your body with warm water from the neck down. Turn water off to prevent rinsing the soap off too soon. With a clean wet washcloth and half of the CHG soap in the bottle, lather your entire body from the neck down. Do not use CHG soap near your eyes or ears to avoid injury to those areas. Wash thoroughly, paying special attention to the area where your surgery will be performed. Wash your body gently for five (5) minutes. Avoid scrubbing your skin too hard. Turn the water back on and rinse your body thoroughly. Pat yourself dry with a clean, soft towel. Do not apply lotion, cream or powder. Dress with clean freshly washed clothes.     The morning of surgery:    Repeat shower following steps above  - using remaining half of CHG soap in bottle. If you have any questions, call the Pre-Admission Testing Unit at [phone]. Day of Surgery/Procedure    As a patient at [facility] you can expect quality medical and nursing care that is centered on your individual needs. Our goal is to make your surgical experience as comfortable as possible  . Directions to the [address] [place] [address] is located at [address] [facility], [address] Please pull into the Emergency [facility] parking lot (Entrance B) and park in that lot. We also have additional parking across the street. You will enter the facility following the [address] sign. Please stop at the reception desk where you will be checked in by the staff. If you have any questions please call [phone]. Transportation after your procedure. You will need a friend or family member to drive you home after your procedure. Your  must be 25years of age or older and able to sign off on your discharge instructions. Taxi cabs or any form of public transportation is not acceptable. It is preferable that the friend or family member stay at the hospital throughout your procedure. Someone must remain at home with you for the first 24 hours after your surgery if you receive anesthesia or sedation. If you do not have someone to stay with you, your procedure may be cancelled. Patient Instructions    If you are having any type of anesthesia you are to have nothing to eat or drink after midnight the night before your surgery. This includes gum, mints, water or smoking or chewing tobacco.  The only exception to this is a small sip of water to take with any morning dose of heart, blood pressure, or seizure medications. Bring a list of all medications you take, along with the dose of the medications and how often you take it.   If more convenient bring the pharmacy bottles in a zip lock bag. Please shower the night before and the morning of surgery with an antibacterial soap. Please use the wipes given to you the night before your surgery after your shower. Unless otherwise told by your physician, please do not shave legs or any part of your body below your neck the night before or day of your surgery. You may shave your face or neck. Brush your teeth but do not swallow water. Bring your inhaler if you are currently using one. Bring your eyeglasses and case with you. No contacts are to be worn the day of surgery. You also may bring your hearing aids. Bring your blood band if one has been given to you. Please do not close the clasp. If you are on C-PAP or Bi-PAP at home and plan on staying in the hospital overnight for your surgery please bring the machine with you. Do not wear any jewelry or body piercings day of surgery. Also, NO lotion, perfume or deodorant to be used the day of surgery. Do not bring any valuables, such as jewelry, cash or credit cards. If you are staying overnight with us, please bring a SMALL bag of personal items. We cannot accommodate large items, like suitcases. Please wear loose, comfortable clothing. If you are potentially going to have a cast or brace bring clothing that will fit over them. In case of illness - If you have cold or flu like symptoms (high fever, runny nose, sore throat, cough, etc.) rash, nausea, vomiting, loose stools, and/or recent contact with someone who has a contagious disease (chicken pox, measles, etc.) Please call your doctor before coming to the hospital.      If your child is having surgery please make arrangements for any other children to be cared for at home on the day of surgery.   Other children are not permitted in recovery room and we want you to be able to spend time with the patient. If other arrangements are not available then we suggest that you have a second adult to stay in the waiting room.       If you have any other questions regarding your procedure or the day of surgery, please call [phone], or [phone]

## 2022-11-29 ENCOUNTER — HOSPITAL ENCOUNTER (OUTPATIENT)
Dept: PREADMISSION TESTING | Age: 77
Discharge: HOME | End: 2022-12-03
Payer: MEDICARE

## 2022-11-29 ENCOUNTER — HOSPITAL ENCOUNTER (OUTPATIENT)
Dept: GENERAL RADIOLOGY | Age: 77
Discharge: HOME | End: 2022-12-01
Payer: MEDICARE

## 2022-11-29 ENCOUNTER — TELEPHONE (OUTPATIENT)
Dept: FAMILY MEDICINE CLINIC | Age: 77
End: 2022-11-29

## 2022-11-29 VITALS
OXYGEN SATURATION: 96 % | HEART RATE: 92 BPM | RESPIRATION RATE: 20 BRPM | WEIGHT: 145 LBS | SYSTOLIC BLOOD PRESSURE: 107 MMHG | HEIGHT: 60 IN | DIASTOLIC BLOOD PRESSURE: 75 MMHG | TEMPERATURE: 97.7 F | BODY MASS INDEX: 28.47 KG/M2

## 2022-11-29 LAB
ANION GAP SERPL CALCULATED.3IONS-SCNC: 12 MMOL/L (ref 9–17)
BUN BLDV-MCNC: 14 MG/DL (ref 8–23)
CALCIUM SERPL-MCNC: 8.9 MG/DL (ref 8.6–10.4)
CHLORIDE BLD-SCNC: 104 MMOL/L (ref 98–107)
CO2: 25 MMOL/L (ref 20–31)
CREAT SERPL-MCNC: 0.76 MG/DL (ref 0.5–0.9)
GFR SERPL CREATININE-BSD FRML MDRD: >60 ML/MIN/1.73M2
GLUCOSE BLD-MCNC: 80 MG/DL (ref 70–99)
HCT VFR BLD CALC: 39.1 % (ref 36.3–47.1)
HEMOGLOBIN: 12.6 G/DL (ref 11.9–15.1)
INR BLD: 1
MCH RBC QN AUTO: 29.5 PG (ref 25.2–33.5)
MCHC RBC AUTO-ENTMCNC: 32.2 G/DL (ref 28.4–34.8)
MCV RBC AUTO: 91.6 FL (ref 82.6–102.9)
NRBC AUTOMATED: 0 PER 100 WBC
PDW BLD-RTO: 14.5 % (ref 11.8–14.4)
PLATELET # BLD: 354 K/UL (ref 138–453)
PMV BLD AUTO: 9.8 FL (ref 8.1–13.5)
POTASSIUM SERPL-SCNC: 4.3 MMOL/L (ref 3.7–5.3)
PROTHROMBIN TIME: 10.5 SEC (ref 9.1–12.3)
RBC # BLD: 4.27 M/UL (ref 3.95–5.11)
SODIUM BLD-SCNC: 141 MMOL/L (ref 135–144)
WBC # BLD: 4.9 K/UL (ref 3.5–11.3)

## 2022-11-29 PROCEDURE — 85610 PROTHROMBIN TIME: CPT

## 2022-11-29 PROCEDURE — 71046 X-RAY EXAM CHEST 2 VIEWS: CPT

## 2022-11-29 PROCEDURE — 80048 BASIC METABOLIC PNL TOTAL CA: CPT

## 2022-11-29 PROCEDURE — 36415 COLL VENOUS BLD VENIPUNCTURE: CPT

## 2022-11-29 PROCEDURE — 85027 COMPLETE CBC AUTOMATED: CPT

## 2022-11-29 RX ORDER — GABAPENTIN 800 MG/1
800 TABLET ORAL NIGHTLY
COMMUNITY

## 2022-11-29 RX ORDER — ROPINIROLE 0.5 MG/1
TABLET, FILM COATED ORAL
COMMUNITY
Start: 2022-11-08

## 2022-11-29 RX ORDER — ALPRAZOLAM 0.5 MG/1
TABLET ORAL
COMMUNITY
Start: 2022-11-02 | End: 2022-12-04 | Stop reason: SDUPTHER

## 2022-11-29 NOTE — TELEPHONE ENCOUNTER
----- Message from Benny  sent at 11/29/2022 11:58 AM EST -----  Subject: Message to Provider    QUESTIONS  Information for Provider? pulmonology is clearing the patient for surgery   and Freddy Martinez just wanting to let the clinical staff know that she is   cleared and will send over the information.  ---------------------------------------------------------------------------  --------------  [facility]  [phone]; OK to leave message on voicemail  ---------------------------------------------------------------------------  --------------  SCRIPT ANSWERS  Relationship to Patient? Third Party  Third Party Type? Other  Other Third Party Type? [facility]   Representative Name?  Nicki

## 2022-11-29 NOTE — PROGRESS NOTES
Anesthesia Focused Assessment    Has patient ever tested positive for COVID? No    STOP-BANG Sleep Apnea Questionnaire    SNORE loudly (heard through closed doors)? No  TIRED, fatigued, sleepy during daytime? No  OBSERVED stopping breathing during sleep? No  High blood PRESSURE being treated? No    BMI over 35? No  AGE over 48? Yes  NECK circumference over 16\"? No  GENDER (male)? No             Total 1  High risk 5-8  Intermediate risk 3-4  Low risk 0-2    Obstructive Sleep Apnea: denies  If YES, machine used: no     Type 1 DM:   no  T2DM:  no    Coronary Artery Disease:  no, had recent stress and echo, results in paper chart/epic. Cardiac office note and clearance from [facility] in chart, 5/2022. Hypertension:  no    Active smoker:  no  Drinks Alcohol:  no    Dentition: benign    Defib / AICD / Pacemaker: no      Renal Failure/dialysis:  no    Patient was evaluated in PAT & anesthesia guidelines were applied. NPO guidelines, medication instructions and scheduled arrival time were reviewed with patient. I advised patient to please contact the surgeon's office, ahead of time if possible, if any new signs or symptoms of illness, infection, rash, etc    Hx of anesthesia complications:  no  Family hx of anesthesia complications:  no                                                                                                                     Anesthesia contacted:   Patient was sent for post-PAT anesthesia interview due to respiratory status, \"chronic pneumonia\", recently treated pneumonia. Medical or cardiac clearance ordered: cardiology clearance in paper chart from [facility], as well as recent cardiac testing, \"low risk. \"  UPDATED MEDICAL CLEARANCE WILL BE REQUESTED DUE TO PNEUMONIA DIAGNOSIS AND TREATMENT AFTER THAT CLEARANCE APPOINTMENT. PATIENT HAS \"CHRONIC PNEUMONIA. \" SHE WAS TREATED FOR PNEUMONIA DUE TO CXR FOR PCP CLEARANCE 10/10, DOXYCYCLINE STARTED 10/11.   SHE HAS ALSO FINISHED A COURSE OF LEVAQUIN AND PREDNISONE. AT THE TIME OF INITIAL PNEUMONIA DIAGNOSIS, DR. Mary Vega WAS CONSULTED AND PATIENT'S SURGERY NEEDED TO BE POSTPONED UNTIL 4 WEEKS FROM RESOLUTION OF SYMPTOMS. PATIENT HAS SINCE SEEN DR. Owen Donis (PULMONOLOGY) AND HAD REPEAT CXR (11/14) AND CLEARANCE APPOINTMENT AS WELL. HE ALSO ORDERED CXR TODAY, WHICH HAS BEEN DONE. SHE OVERALL FEELS BETTER, LESS SHORTNESS OF BREATH, MORE ACTIVE. [facility]  11/29/22  8:58 AM    Patient was evaluated by Dr. Link Santoyo after PAT visit, no further requests/orders.

## 2022-11-29 NOTE — H&P
History and Physical    Pt Name: Anaya Rajan  MRN: [id number]  YOB: 1945  Date of evaluation: 11/29/2022    SUBJECTIVE:   History of Chief Complaint:    Patient presents for PAT appointment. She has been scheduled for paraesophageal hernia repair, Nissen fundoplication. Patient reports large hiatal/paraesophageal hernia. She has GERD symptoms, intermittent, occasional dysphagia as well. She has had to vomit in the past to alleviate symptoms. Patient says that she has also \"chronic pneumonia\" and has been rescheduled several times for this procedure due to \"flare ups. \"  Past Medical History    has a past medical history of Abnormal finding on imaging, Anemia, Bochdalek hernia, Bowel obstruction (HCC), Bronchitis, Chronic biliary pancreatitis ([address] Chronic pneumonia, Cough, DDD (degenerative disc disease), Depression, Diverticulosis, Esophageal dysphagia, Fibromyalgia, Frequent headaches, GERD (gastroesophageal reflux disease), Glaucoma, Hiatal hernia, Hyperlipidemia, Knee injuries, Osteoarthritis of more than one site, Pneumonia, Seasonal allergies, Under care of service provider, Under care of service provider, and Wellness examination. Past Surgical History   has a past surgical history that includes Hysterectomy; Foot surgery (Right); Small intestine surgery; Carpal tunnel release (Bilateral); Breast biopsy (Left, 05/2016); Pancreas Biopsy (10/03/2016); Colonoscopy; Upper gastrointestinal endoscopy; pr arthrs kne surg w/meniscectomy med/lat w/shvg (Left, 08/13/2018); eye surgery (Left); Cataract extraction (Left); blepharoplasty (Left); Knee arthroscopy (Right, 12/04/2019); Upper gastrointestinal endoscopy (N/A, 08/17/2021); esophageal motility study (N/A, 01/21/2022); and Knee arthroscopy (Left). Medications  Prior to Admission medications    Medication Sig Start Date End Date Taking?  Authorizing Provider   ALBUTEROL IN Inhale 1 puff into the lungs as needed   Yes Historical Provider, MD gabapentin (NEURONTIN) 800 MG tablet Take 800 mg by mouth at bedtime. Takes 2 tablets at night   Yes Historical Provider, MD   Magnesium Oxide, Laxative, 500 MG TABS Take by mouth Webb lax   Yes Historical Provider, MD   ALPRAZolam (XANAX) 0.5 MG tablet TAKE 1 TABLET BY MOUTH THREE TIMES A DAY AS NEEDED FOR SLEEP 11/2/22   Historical Provider, MD   rOPINIRole (REQUIP) 0.5 MG tablet TAKE 1 TABLET BY MOUTH AT BEDTIME 11/8/22   Historical Provider, MD   diphenhydrAMINE (BENADRYL) 25 MG capsule Take 25 mg by mouth every 6 hours as needed for Itching    Historical Provider, MD   vitamin B-12 (CYANOCOBALAMIN) 1000 MCG tablet TAKE 2 TABLETS BY MOUTH EVERY DAY 10/10/22 10/10/23  DES Ojeda CNP   QUEtiapine (SEROQUEL) 50 MG tablet TAKE 1 OR 2 TABLETS BY MOUTH AT BEDTIME AS NEEDED FOR SLEEP 9/6/22 9/6/23  DES Ojeda CNP   VENTOLIN  (90 Base) MCG/ACT inhaler INHALE 1 PUFF BY MOUTH EVERY 4 HOURS AS NEEDED 6/1/22   Historical Provider, MD   atorvastatin (LIPITOR) 80 MG tablet Take 1 tablet by mouth daily  Patient taking differently: Take 80 mg by mouth at bedtime 5/31/22   DES Ojeda CNP   pantoprazole (PROTONIX) 40 MG tablet Take 1 tablet by mouth 2 times daily (before meals) 3/15/22 6/27/23  DES Ojeda CNP   QUEtiapine (SEROQUEL) 200 MG tablet TAKE 1 TABLET DAILY 2/2/22   DES Ojeda CNP   Handicap Placard MISC by Does not apply route Exp 3/16/2026 3/16/21   DES Ojeda CNP   oxyCODONE-acetaminophen (PERCOCET)  MG per tablet Take 1 tablet by mouth every 4 hours as needed for Pain. 7/19/19   Historical Provider, MD     Allergies  is allergic to pcn [penicillins], nubain [nalbuphine hcl], vistaril [hydroxyzine], adhesive tape, and seasonal.  Family History  family history includes Heart Disease in her father; High Blood Pressure in her mother; Stroke in her maternal grandmother and mother.   Social History   reports that she has never smoked. She has never used smokeless tobacco.   reports no history of alcohol use. reports no history of drug use. Marital Status   Occupation retired from [employer]:  CONSTITUTIONAL:   negative for fevers, chills, fatigue and malaise    EYES:   negative for double vision, blurred vision and photophobia    HEENT:   negative for tinnitus, epistaxis and sore throat     RESPIRATORY:   Positive for chronic pneumonia, follows with Dr. Helene Ray. Treated for pneumonia with doxycycline, followed by levaquin and prednisone   CARDIOVASCULAR:   negative for chest pain, palpitations, syncope, edema     GASTROINTESTINAL:   See HPI     GENITOURINARY:   negative for incontinence     MUSCULOSKELETAL:   negative for neck or back pain     NEUROLOGICAL:   Negative for weakness and tingling  negative for headaches and dizziness     PSYCHIATRIC:   negative for anxiety         OBJECTIVE:   VITALS:  height is 5' (1.524 m) and weight is 145 lb (65.8 kg). Her temporal temperature is 97.7 °F (36.5 °C). Her blood pressure is 107/75 and her pulse is 92. Her respiration is 20 and oxygen saturation is 96%. CONSTITUTIONAL:alert & cooperative, no acute distress. Talkative and friendly. SKIN:  Warm and dry, no rashes on exposed areas of skin   HEAD:  Normocephalic, atraumatic   EYES: EOMs intact. EARS:  Hearing grossly WNL. NOSE:  Nares patent. No rhinorrhea   MOUTH/THROAT:  benign  NECK:good ROM   LUNGS: overall decreased breath sounds, no rhonchi or coughing noted today that had been noted at previous visit in October. CARDIOVASCULAR: Heart sounds are normal.  Regular rate and rhythm without murmur. ABDOMEN: soft, non tender, non distended. EXTREMITIES: no edema bilateral lower extremities.     Testing:   EKG: in paper chart  Labs pending: drawn 11/29/2022   Chest XRay:  11/29/22    IMPRESSIONS:   Paraesophageal hernia   has a past medical history of Abnormal finding on imaging (08/12/2021), Anemia, Bochdalek hernia, Bowel obstruction ([facility] [address] Bronchitis (02/19/2019), Chronic biliary pancreatitis ([facility] (09/22/2016), Chronic pneumonia, Cough (08/13/2021), DDD (degenerative disc disease), Depression, Diverticulosis, Esophageal dysphagia, Fibromyalgia, Frequent headaches, GERD (gastroesophageal reflux disease), Glaucoma, Hiatal hernia, Hyperlipidemia, Knee injuries (02/19/2019), Osteoarthritis of more than one site (04/09/2014), Pneumonia, Seasonal allergies, Under care of service provider, Under care of service provider (10/03/2022), and Wellness examination.    PLANS:   XI ROBOTIC LAPAROSCOPIC PARAESOPHAGEAL HERNIA REPAIR, NISSEN FUNDOPLICATION, MESH, EGD, POSSIBLE OPEN    IZABELLA Isabel Maciel PA-C  Electronically signed 11/29/2022 at 10:04 AM

## 2022-11-29 NOTE — H&P (VIEW-ONLY)
History and Physical    Pt Name: Earlene Hoff  MRN: [id number]  YOB: 1945  Date of evaluation: 11/29/2022    SUBJECTIVE:   History of Chief Complaint:    Patient presents for PAT appointment. She has been scheduled for paraesophageal hernia repair, Nissen fundoplication. Patient reports large hiatal/paraesophageal hernia. She has GERD symptoms, intermittent, occasional dysphagia as well. She has had to vomit in the past to alleviate symptoms. Patient says that she has also \"chronic pneumonia\" and has been rescheduled several times for this procedure due to \"flare ups. \"  Past Medical History    has a past medical history of Abnormal finding on imaging, Anemia, Bochdalek hernia, Bowel obstruction (HCC), Bronchitis, Chronic biliary pancreatitis ([address] Chronic pneumonia, Cough, DDD (degenerative disc disease), Depression, Diverticulosis, Esophageal dysphagia, Fibromyalgia, Frequent headaches, GERD (gastroesophageal reflux disease), Glaucoma, Hiatal hernia, Hyperlipidemia, Knee injuries, Osteoarthritis of more than one site, Pneumonia, Seasonal allergies, Under care of service provider, Under care of service provider, and Wellness examination. Past Surgical History   has a past surgical history that includes Hysterectomy; Foot surgery (Right); Small intestine surgery; Carpal tunnel release (Bilateral); Breast biopsy (Left, 05/2016); Pancreas Biopsy (10/03/2016); Colonoscopy; Upper gastrointestinal endoscopy; pr arthrs kne surg w/meniscectomy med/lat w/shvg (Left, 08/13/2018); eye surgery (Left); Cataract extraction (Left); blepharoplasty (Left); Knee arthroscopy (Right, 12/04/2019); Upper gastrointestinal endoscopy (N/A, 08/17/2021); esophageal motility study (N/A, 01/21/2022); and Knee arthroscopy (Left). Medications  Prior to Admission medications    Medication Sig Start Date End Date Taking?  Authorizing Provider   ALBUTEROL IN Inhale 1 puff into the lungs as needed   Yes Historical Provider, MD gabapentin (NEURONTIN) 800 MG tablet Take 800 mg by mouth at bedtime. Takes 2 tablets at night   Yes Historical Provider, MD   Magnesium Oxide, Laxative, 500 MG TABS Take by mouth Webb lax   Yes Historical Provider, MD   ALPRAZolam (XANAX) 0.5 MG tablet TAKE 1 TABLET BY MOUTH THREE TIMES A DAY AS NEEDED FOR SLEEP 11/2/22   Historical Provider, MD   rOPINIRole (REQUIP) 0.5 MG tablet TAKE 1 TABLET BY MOUTH AT BEDTIME 11/8/22   Historical Provider, MD   diphenhydrAMINE (BENADRYL) 25 MG capsule Take 25 mg by mouth every 6 hours as needed for Itching    Historical Provider, MD   vitamin B-12 (CYANOCOBALAMIN) 1000 MCG tablet TAKE 2 TABLETS BY MOUTH EVERY DAY 10/10/22 10/10/23  DES Ojeda CNP   QUEtiapine (SEROQUEL) 50 MG tablet TAKE 1 OR 2 TABLETS BY MOUTH AT BEDTIME AS NEEDED FOR SLEEP 9/6/22 9/6/23  DES Ojeda CNP   VENTOLIN  (90 Base) MCG/ACT inhaler INHALE 1 PUFF BY MOUTH EVERY 4 HOURS AS NEEDED 6/1/22   Historical Provider, MD   atorvastatin (LIPITOR) 80 MG tablet Take 1 tablet by mouth daily  Patient taking differently: Take 80 mg by mouth at bedtime 5/31/22   DES Ojeda CNP   pantoprazole (PROTONIX) 40 MG tablet Take 1 tablet by mouth 2 times daily (before meals) 3/15/22 6/27/23  DES Ojeda CNP   QUEtiapine (SEROQUEL) 200 MG tablet TAKE 1 TABLET DAILY 2/2/22   DES Ojeda CNP   Handicap Placard MISC by Does not apply route Exp 3/16/2026 3/16/21   DES Ojeda CNP   oxyCODONE-acetaminophen (PERCOCET)  MG per tablet Take 1 tablet by mouth every 4 hours as needed for Pain. 7/19/19   Historical Provider, MD     Allergies  is allergic to pcn [penicillins], nubain [nalbuphine hcl], vistaril [hydroxyzine], adhesive tape, and seasonal.  Family History  family history includes Heart Disease in her father; High Blood Pressure in her mother; Stroke in her maternal grandmother and mother.   Social History   reports that she has never smoked. She has never used smokeless tobacco.   reports no history of alcohol use. reports no history of drug use. Marital Status   Occupation retired from [employer]:  CONSTITUTIONAL:   negative for fevers, chills, fatigue and malaise    EYES:   negative for double vision, blurred vision and photophobia    HEENT:   negative for tinnitus, epistaxis and sore throat     RESPIRATORY:   Positive for chronic pneumonia, follows with Dr. Chidi Zavala. Treated for pneumonia with doxycycline, followed by levaquin and prednisone   CARDIOVASCULAR:   negative for chest pain, palpitations, syncope, edema     GASTROINTESTINAL:   See HPI     GENITOURINARY:   negative for incontinence     MUSCULOSKELETAL:   negative for neck or back pain     NEUROLOGICAL:   Negative for weakness and tingling  negative for headaches and dizziness     PSYCHIATRIC:   negative for anxiety         OBJECTIVE:   VITALS:  height is 5' (1.524 m) and weight is 145 lb (65.8 kg). Her temporal temperature is 97.7 °F (36.5 °C). Her blood pressure is 107/75 and her pulse is 92. Her respiration is 20 and oxygen saturation is 96%. CONSTITUTIONAL:alert & cooperative, no acute distress. Talkative and friendly. SKIN:  Warm and dry, no rashes on exposed areas of skin   HEAD:  Normocephalic, atraumatic   EYES: EOMs intact. EARS:  Hearing grossly WNL. NOSE:  Nares patent. No rhinorrhea   MOUTH/THROAT:  benign  NECK:good ROM   LUNGS: overall decreased breath sounds, no rhonchi or coughing noted today that had been noted at previous visit in October. CARDIOVASCULAR: Heart sounds are normal.  Regular rate and rhythm without murmur. ABDOMEN: soft, non tender, non distended. EXTREMITIES: no edema bilateral lower extremities.     Testing:   EKG: in paper chart  Labs pending: drawn 11/29/2022   Chest XRay:  11/29/22    IMPRESSIONS:   Paraesophageal hernia   has a past medical history of Abnormal finding on imaging (08/12/2021), Anemia, Bochdalek hernia, Bowel obstruction ([facility] Bronchitis (02/19/2019), Chronic biliary pancreatitis ([facility] (09/22/2016), Chronic pneumonia, Cough (08/13/2021), DDD (degenerative disc disease), Depression, Diverticulosis, Esophageal dysphagia, Fibromyalgia, Frequent headaches, GERD (gastroesophageal reflux disease), Glaucoma, Hiatal hernia, Hyperlipidemia, Knee injuries (02/19/2019), Osteoarthritis of more than one site (04/09/2014), Pneumonia, Seasonal allergies, Under care of service provider, Under care of service provider (10/03/2022), and Wellness examination.    PLANS:   XI ROBOTIC LAPAROSCOPIC PARAESOPHAGEAL HERNIA REPAIR, NISSEN FUNDOPLICATION, MESH, EGD, POSSIBLE OPEN    IZABELLA Jenise Gillis PA-C  Electronically signed 11/29/2022 at 10:04 AM

## 2022-12-01 ENCOUNTER — CARE COORDINATION (OUTPATIENT)
Dept: CARE COORDINATION | Age: 77
End: 2022-12-01

## 2022-12-01 NOTE — TELEPHONE ENCOUNTER
Do they need another clearance from us? We did originally and surgery postponed due to pneumonia. Otherwise I am aware it is scheduled. I will do what is best for patient and preferred per surgeon.

## 2022-12-02 ENCOUNTER — CARE COORDINATION (OUTPATIENT)
Dept: CARE COORDINATION | Age: 77
End: 2022-12-02

## 2022-12-02 DIAGNOSIS — F41.9 ANXIETY: ICD-10-CM

## 2022-12-02 RX ORDER — ALPRAZOLAM 0.5 MG/1
TABLET ORAL
Qty: 30 TABLET | Refills: 0 | Status: CANCELLED | OUTPATIENT
Start: 2022-12-02 | End: 2023-01-02

## 2022-12-02 NOTE — CARE COORDINATION
Ambulatory Care Coordination Note  12/2/2022    ACC: Avila Packer, RN    Spoke with pt who said she is scheduled for surgery next week as far as she knows she is good to go for surgery she is feeling well she would like a refill of her xanax. Will send a request for refill     1) acp done   2) sdoh done  3) med review done. 4) cardiology fu may 2023  5) pulm fu Jan   6) surgery oct 19 cancelled   7) pcp December     Offered patient enrollment in the Remote Patient Monitoring (RPM) program for in-home monitoring: Patient declined. Lab Results       None            Care Coordination Interventions    Referral from Primary Care Provider: No  Suggested Interventions and Community Resources  Disease Specific Clinic: Completed  Pulmonary Rehab: Not Started  Zone Management Tools: Completed          Goals Addressed                   This Visit's Progress     Conditions and Symptoms   On track     I will schedule office visits, as directed by my provider. I will keep my appointment or reschedule if I have to cancel. I will notify my provider of any barriers to my plan of care. Barriers: overwhelmed by complexity of regimen  Plan for overcoming my barriers: care coordination   Confidence: 9/10  Anticipated Goal Completion Date: 10/2/22                Prior to Admission medications    Medication Sig Start Date End Date Taking? Authorizing Provider   ALBUTEROL IN Inhale 1 puff into the lungs as needed    Historical Provider, MD   gabapentin (NEURONTIN) 800 MG tablet Take 800 mg by mouth at bedtime.  Takes 2 tablets at night    Historical Provider, MD   ALPRAZolam (XANAX) 0.5 MG tablet TAKE 1 TABLET BY MOUTH THREE TIMES A DAY AS NEEDED FOR SLEEP 11/2/22   Historical Provider, MD   rOPINIRole (REQUIP) 0.5 MG tablet TAKE 1 TABLET BY MOUTH AT BEDTIME 11/8/22   Historical Provider, MD   Magnesium Oxide, Laxative, 500 MG TABS Take by mouth Webb lax    Historical Provider, MD   diphenhydrAMINE (BENADRYL) 25 MG capsule Take 25 mg by mouth every 6 hours as needed for Itching    Historical Provider, MD   vitamin B-12 (CYANOCOBALAMIN) 1000 MCG tablet TAKE 2 TABLETS BY MOUTH EVERY DAY 10/10/22 10/10/23  DES Valdovinos CNP   QUEtiapine (SEROQUEL) 50 MG tablet TAKE 1 OR 2 TABLETS BY MOUTH AT BEDTIME AS NEEDED FOR SLEEP 9/6/22 9/6/23  DES Valdovinos CNP   VENTOLIN  (90 Base) MCG/ACT inhaler INHALE 1 PUFF BY MOUTH EVERY 4 HOURS AS NEEDED 6/1/22   Historical Provider, MD   atorvastatin (LIPITOR) 80 MG tablet Take 1 tablet by mouth daily  Patient taking differently: Take 80 mg by mouth at bedtime 5/31/22   DES Valdovinos CNP   pantoprazole (PROTONIX) 40 MG tablet Take 1 tablet by mouth 2 times daily (before meals) 3/15/22 6/27/23  DES Valdovinos CNP   QUEtiapine (SEROQUEL) 200 MG tablet TAKE 1 TABLET DAILY 2/2/22   DES Valdovinos CNP   Handicap Placard MISC by Does not apply route Exp 3/16/2026 3/16/21   DES Valdovinos CNP   oxyCODONE-acetaminophen (PERCOCET)  MG per tablet Take 1 tablet by mouth every 4 hours as needed for Pain. 7/19/19   Historical Provider, MD       No future appointments.

## 2022-12-04 RX ORDER — ALPRAZOLAM 0.5 MG/1
0.5 TABLET ORAL 3 TIMES DAILY PRN
Qty: 42 TABLET | Refills: 0 | Status: SHIPPED | OUTPATIENT
Start: 2022-12-04 | End: 2023-01-03

## 2022-12-05 NOTE — TELEPHONE ENCOUNTER
Tanvir Calles at [facility]. Charlie[facility] they are requesting an updated clearance from you as well.     Thank you,

## 2022-12-07 ENCOUNTER — ANESTHESIA (OUTPATIENT)
Dept: OPERATING ROOM | Age: 77
DRG: 328 | End: 2022-12-07
Payer: MEDICARE

## 2022-12-07 ENCOUNTER — HOSPITAL ENCOUNTER (INPATIENT)
Age: 77
LOS: 1 days | Discharge: HOME | DRG: 328 | End: 2022-12-08
Attending: SURGERY | Admitting: SURGERY
Payer: MEDICARE

## 2022-12-07 ENCOUNTER — ANESTHESIA EVENT (OUTPATIENT)
Dept: OPERATING ROOM | Age: 77
DRG: 328 | End: 2022-12-07
Payer: MEDICARE

## 2022-12-07 DIAGNOSIS — Z87.19 S/P REPAIR OF PARAESOPHAGEAL HERNIA: Primary | ICD-10-CM

## 2022-12-07 DIAGNOSIS — Z98.890 S/P REPAIR OF PARAESOPHAGEAL HERNIA: Primary | ICD-10-CM

## 2022-12-07 LAB
ANION GAP SERPL CALCULATED.3IONS-SCNC: 11 MMOL/L (ref 9–17)
BUN BLDV-MCNC: 14 MG/DL (ref 8–23)
CALCIUM SERPL-MCNC: 8.4 MG/DL (ref 8.6–10.4)
CHLORIDE BLD-SCNC: 106 MMOL/L (ref 98–107)
CO2: 22 MMOL/L (ref 20–31)
CREAT SERPL-MCNC: 0.7 MG/DL (ref 0.5–0.9)
GFR SERPL CREATININE-BSD FRML MDRD: >60 ML/MIN/1.73M2
GLUCOSE BLD-MCNC: 107 MG/DL (ref 65–105)
GLUCOSE BLD-MCNC: 165 MG/DL (ref 65–105)
GLUCOSE BLD-MCNC: 167 MG/DL (ref 70–99)
HCT VFR BLD CALC: 39.7 % (ref 36.3–47.1)
HEMOGLOBIN: 12.4 G/DL (ref 11.9–15.1)
MCH RBC QN AUTO: 29.5 PG (ref 25.2–33.5)
MCHC RBC AUTO-ENTMCNC: 31.2 G/DL (ref 28.4–34.8)
MCV RBC AUTO: 94.5 FL (ref 82.6–102.9)
NRBC AUTOMATED: 0 PER 100 WBC
PDW BLD-RTO: 14.4 % (ref 11.8–14.4)
PLATELET # BLD: 284 K/UL (ref 138–453)
PMV BLD AUTO: 9.5 FL (ref 8.1–13.5)
POTASSIUM SERPL-SCNC: 4 MMOL/L (ref 3.7–5.3)
RBC # BLD: 4.2 M/UL (ref 3.95–5.11)
SODIUM BLD-SCNC: 139 MMOL/L (ref 135–144)
WBC # BLD: 9.6 K/UL (ref 3.5–11.3)

## 2022-12-07 PROCEDURE — 6360000002 HC RX W HCPCS: Performed by: ANESTHESIOLOGY

## 2022-12-07 PROCEDURE — 7100000001 HC PACU RECOVERY - ADDTL 15 MIN: Performed by: SURGERY

## 2022-12-07 PROCEDURE — 8E0W4CZ ROBOTIC ASSISTED PROCEDURE OF TRUNK REGION, PERCUTANEOUS ENDOSCOPIC APPROACH: ICD-10-PCS | Performed by: SURGERY

## 2022-12-07 PROCEDURE — 3600000019 HC SURGERY ROBOT ADDTL 15MIN: Performed by: SURGERY

## 2022-12-07 PROCEDURE — 6360000002 HC RX W HCPCS: Performed by: SURGERY

## 2022-12-07 PROCEDURE — 85027 COMPLETE CBC AUTOMATED: CPT

## 2022-12-07 PROCEDURE — 80048 BASIC METABOLIC PNL TOTAL CA: CPT

## 2022-12-07 PROCEDURE — 6370000000 HC RX 637 (ALT 250 FOR IP): Performed by: STUDENT IN AN ORGANIZED HEALTH CARE EDUCATION/TRAINING PROGRAM

## 2022-12-07 PROCEDURE — S2900 ROBOTIC SURGICAL SYSTEM: HCPCS | Performed by: SURGERY

## 2022-12-07 PROCEDURE — 82947 ASSAY GLUCOSE BLOOD QUANT: CPT

## 2022-12-07 PROCEDURE — 2580000003 HC RX 258: Performed by: NURSE ANESTHETIST, CERTIFIED REGISTERED

## 2022-12-07 PROCEDURE — 3600000009 HC SURGERY ROBOT BASE: Performed by: SURGERY

## 2022-12-07 PROCEDURE — 2720000010 HC SURG SUPPLY STERILE: Performed by: SURGERY

## 2022-12-07 PROCEDURE — G0378 HOSPITAL OBSERVATION PER HR: HCPCS

## 2022-12-07 PROCEDURE — 0BUT4JZ SUPPLEMENT DIAPHRAGM WITH SYNTHETIC SUBSTITUTE, PERCUTANEOUS ENDOSCOPIC APPROACH: ICD-10-PCS | Performed by: SURGERY

## 2022-12-07 PROCEDURE — 2500000003 HC RX 250 WO HCPCS: Performed by: SURGERY

## 2022-12-07 PROCEDURE — 6360000002 HC RX W HCPCS

## 2022-12-07 PROCEDURE — 2580000003 HC RX 258: Performed by: SURGERY

## 2022-12-07 PROCEDURE — 6370000000 HC RX 637 (ALT 250 FOR IP): Performed by: SURGERY

## 2022-12-07 PROCEDURE — 6360000002 HC RX W HCPCS: Performed by: NURSE ANESTHETIST, CERTIFIED REGISTERED

## 2022-12-07 PROCEDURE — 3700000001 HC ADD 15 MINUTES (ANESTHESIA): Performed by: SURGERY

## 2022-12-07 PROCEDURE — C1781 MESH (IMPLANTABLE): HCPCS | Performed by: SURGERY

## 2022-12-07 PROCEDURE — 7100000000 HC PACU RECOVERY - FIRST 15 MIN: Performed by: SURGERY

## 2022-12-07 PROCEDURE — 1200000000 HC SEMI PRIVATE

## 2022-12-07 PROCEDURE — 3700000000 HC ANESTHESIA ATTENDED CARE: Performed by: SURGERY

## 2022-12-07 PROCEDURE — 6370000000 HC RX 637 (ALT 250 FOR IP): Performed by: ANESTHESIOLOGY

## 2022-12-07 PROCEDURE — 2500000003 HC RX 250 WO HCPCS: Performed by: NURSE ANESTHETIST, CERTIFIED REGISTERED

## 2022-12-07 PROCEDURE — 6370000000 HC RX 637 (ALT 250 FOR IP)

## 2022-12-07 PROCEDURE — 2709999900 HC NON-CHARGEABLE SUPPLY: Performed by: SURGERY

## 2022-12-07 DEVICE — MESH HERN RECTANGULAR 10X10 CM PREPERITONEAL ENFORM: Type: IMPLANTABLE DEVICE | Site: ESOPHAGUS | Status: FUNCTIONAL

## 2022-12-07 RX ORDER — SODIUM CHLORIDE 9 MG/ML
INJECTION, SOLUTION INTRAVENOUS PRN
Status: DISCONTINUED | OUTPATIENT
Start: 2022-12-07 | End: 2022-12-08 | Stop reason: HOSPADM

## 2022-12-07 RX ORDER — SODIUM CHLORIDE 0.9 % (FLUSH) 0.9 %
5-40 SYRINGE (ML) INJECTION PRN
Status: DISCONTINUED | OUTPATIENT
Start: 2022-12-07 | End: 2022-12-07 | Stop reason: HOSPADM

## 2022-12-07 RX ORDER — ONDANSETRON 2 MG/ML
4 INJECTION INTRAMUSCULAR; INTRAVENOUS EVERY 6 HOURS PRN
Status: DISCONTINUED | OUTPATIENT
Start: 2022-12-07 | End: 2022-12-08 | Stop reason: HOSPADM

## 2022-12-07 RX ORDER — SODIUM CHLORIDE, SODIUM LACTATE, POTASSIUM CHLORIDE, CALCIUM CHLORIDE 600; 310; 30; 20 MG/100ML; MG/100ML; MG/100ML; MG/100ML
INJECTION, SOLUTION INTRAVENOUS CONTINUOUS
Status: DISCONTINUED | OUTPATIENT
Start: 2022-12-07 | End: 2022-12-08 | Stop reason: HOSPADM

## 2022-12-07 RX ORDER — MIDAZOLAM HYDROCHLORIDE 2 MG/2ML
2 INJECTION, SOLUTION INTRAMUSCULAR; INTRAVENOUS ONCE
Status: COMPLETED | OUTPATIENT
Start: 2022-12-07 | End: 2022-12-07

## 2022-12-07 RX ORDER — PROPOFOL 10 MG/ML
INJECTION, EMULSION INTRAVENOUS PRN
Status: DISCONTINUED | OUTPATIENT
Start: 2022-12-07 | End: 2022-12-07 | Stop reason: SDUPTHER

## 2022-12-07 RX ORDER — SODIUM CHLORIDE, SODIUM LACTATE, POTASSIUM CHLORIDE, CALCIUM CHLORIDE 600; 310; 30; 20 MG/100ML; MG/100ML; MG/100ML; MG/100ML
1000 INJECTION, SOLUTION INTRAVENOUS CONTINUOUS
Status: DISCONTINUED | OUTPATIENT
Start: 2022-12-07 | End: 2022-12-07 | Stop reason: HOSPADM

## 2022-12-07 RX ORDER — LANOLIN ALCOHOL/MO/W.PET/CERES
6 CREAM (GRAM) TOPICAL NIGHTLY
Status: DISCONTINUED | OUTPATIENT
Start: 2022-12-07 | End: 2022-12-08 | Stop reason: HOSPADM

## 2022-12-07 RX ORDER — GABAPENTIN 800 MG/1
800 TABLET ORAL NIGHTLY
Status: DISCONTINUED | OUTPATIENT
Start: 2022-12-07 | End: 2022-12-08 | Stop reason: HOSPADM

## 2022-12-07 RX ORDER — HEPARIN SODIUM 5000 [USP'U]/ML
5000 INJECTION, SOLUTION INTRAVENOUS; SUBCUTANEOUS
Status: DISCONTINUED | OUTPATIENT
Start: 2022-12-07 | End: 2022-12-07 | Stop reason: HOSPADM

## 2022-12-07 RX ORDER — FENTANYL CITRATE 50 UG/ML
INJECTION, SOLUTION INTRAMUSCULAR; INTRAVENOUS PRN
Status: DISCONTINUED | OUTPATIENT
Start: 2022-12-07 | End: 2022-12-07 | Stop reason: SDUPTHER

## 2022-12-07 RX ORDER — SODIUM CHLORIDE, SODIUM LACTATE, POTASSIUM CHLORIDE, CALCIUM CHLORIDE 600; 310; 30; 20 MG/100ML; MG/100ML; MG/100ML; MG/100ML
INJECTION, SOLUTION INTRAVENOUS CONTINUOUS PRN
Status: DISCONTINUED | OUTPATIENT
Start: 2022-12-07 | End: 2022-12-07 | Stop reason: SDUPTHER

## 2022-12-07 RX ORDER — ONDANSETRON 2 MG/ML
4 INJECTION INTRAMUSCULAR; INTRAVENOUS
Status: DISCONTINUED | OUTPATIENT
Start: 2022-12-07 | End: 2022-12-07 | Stop reason: HOSPADM

## 2022-12-07 RX ORDER — PANTOPRAZOLE SODIUM 40 MG/1
40 TABLET, DELAYED RELEASE ORAL DAILY
Status: DISCONTINUED | OUTPATIENT
Start: 2022-12-07 | End: 2022-12-08 | Stop reason: HOSPADM

## 2022-12-07 RX ORDER — SODIUM CHLORIDE 9 MG/ML
INJECTION, SOLUTION INTRAVENOUS PRN
Status: DISCONTINUED | OUTPATIENT
Start: 2022-12-07 | End: 2022-12-07 | Stop reason: HOSPADM

## 2022-12-07 RX ORDER — FENTANYL CITRATE 50 UG/ML
25 INJECTION, SOLUTION INTRAMUSCULAR; INTRAVENOUS EVERY 5 MIN PRN
Status: COMPLETED | OUTPATIENT
Start: 2022-12-07 | End: 2022-12-07

## 2022-12-07 RX ORDER — GLYCOPYRROLATE 0.2 MG/ML
INJECTION INTRAMUSCULAR; INTRAVENOUS PRN
Status: DISCONTINUED | OUTPATIENT
Start: 2022-12-07 | End: 2022-12-07 | Stop reason: SDUPTHER

## 2022-12-07 RX ORDER — PHENYLEPHRINE HCL IN 0.9% NACL 0.5 MG/5ML
SYRINGE (ML) INTRAVENOUS PRN
Status: DISCONTINUED | OUTPATIENT
Start: 2022-12-07 | End: 2022-12-07 | Stop reason: SDUPTHER

## 2022-12-07 RX ORDER — SODIUM CHLORIDE 0.9 % (FLUSH) 0.9 %
5-40 SYRINGE (ML) INJECTION PRN
Status: DISCONTINUED | OUTPATIENT
Start: 2022-12-07 | End: 2022-12-08 | Stop reason: HOSPADM

## 2022-12-07 RX ORDER — FENTANYL CITRATE 50 UG/ML
50 INJECTION, SOLUTION INTRAMUSCULAR; INTRAVENOUS EVERY 5 MIN PRN
Status: COMPLETED | OUTPATIENT
Start: 2022-12-07 | End: 2022-12-07

## 2022-12-07 RX ORDER — LABETALOL HYDROCHLORIDE 5 MG/ML
INJECTION, SOLUTION INTRAVENOUS PRN
Status: DISCONTINUED | OUTPATIENT
Start: 2022-12-07 | End: 2022-12-07 | Stop reason: SDUPTHER

## 2022-12-07 RX ORDER — QUETIAPINE FUMARATE 200 MG/1
200 TABLET, FILM COATED ORAL DAILY
Status: DISCONTINUED | OUTPATIENT
Start: 2022-12-07 | End: 2022-12-08 | Stop reason: HOSPADM

## 2022-12-07 RX ORDER — OXYCODONE HYDROCHLORIDE AND ACETAMINOPHEN 5; 325 MG/1; MG/1
2 TABLET ORAL EVERY 4 HOURS PRN
Status: DISCONTINUED | OUTPATIENT
Start: 2022-12-07 | End: 2022-12-08 | Stop reason: HOSPADM

## 2022-12-07 RX ORDER — SODIUM CHLORIDE 0.9 % (FLUSH) 0.9 %
5-40 SYRINGE (ML) INJECTION EVERY 12 HOURS SCHEDULED
Status: DISCONTINUED | OUTPATIENT
Start: 2022-12-07 | End: 2022-12-07 | Stop reason: HOSPADM

## 2022-12-07 RX ORDER — LIDOCAINE HYDROCHLORIDE 10 MG/ML
INJECTION, SOLUTION EPIDURAL; INFILTRATION; INTRACAUDAL; PERINEURAL PRN
Status: DISCONTINUED | OUTPATIENT
Start: 2022-12-07 | End: 2022-12-07 | Stop reason: SDUPTHER

## 2022-12-07 RX ORDER — BUPIVACAINE HYDROCHLORIDE 5 MG/ML
INJECTION, SOLUTION PERINEURAL PRN
Status: DISCONTINUED | OUTPATIENT
Start: 2022-12-07 | End: 2022-12-07 | Stop reason: HOSPADM

## 2022-12-07 RX ORDER — ROCURONIUM BROMIDE 10 MG/ML
INJECTION, SOLUTION INTRAVENOUS PRN
Status: DISCONTINUED | OUTPATIENT
Start: 2022-12-07 | End: 2022-12-07 | Stop reason: SDUPTHER

## 2022-12-07 RX ORDER — DEXAMETHASONE SODIUM PHOSPHATE 10 MG/ML
INJECTION INTRAMUSCULAR; INTRAVENOUS PRN
Status: DISCONTINUED | OUTPATIENT
Start: 2022-12-07 | End: 2022-12-07 | Stop reason: SDUPTHER

## 2022-12-07 RX ORDER — SODIUM CHLORIDE 0.9 % (FLUSH) 0.9 %
5-40 SYRINGE (ML) INJECTION EVERY 12 HOURS SCHEDULED
Status: DISCONTINUED | OUTPATIENT
Start: 2022-12-07 | End: 2022-12-08 | Stop reason: HOSPADM

## 2022-12-07 RX ORDER — NEOSTIGMINE METHYLSULFATE 5 MG/5 ML
SYRINGE (ML) INTRAVENOUS PRN
Status: DISCONTINUED | OUTPATIENT
Start: 2022-12-07 | End: 2022-12-07 | Stop reason: SDUPTHER

## 2022-12-07 RX ORDER — MIDAZOLAM HYDROCHLORIDE 1 MG/ML
INJECTION INTRAMUSCULAR; INTRAVENOUS
Status: COMPLETED
Start: 2022-12-07 | End: 2022-12-07

## 2022-12-07 RX ORDER — OXYCODONE HYDROCHLORIDE 5 MG/1
10 TABLET ORAL ONCE
Status: COMPLETED | OUTPATIENT
Start: 2022-12-07 | End: 2022-12-07

## 2022-12-07 RX ORDER — IPRATROPIUM BROMIDE AND ALBUTEROL SULFATE 2.5; .5 MG/3ML; MG/3ML
1 SOLUTION RESPIRATORY (INHALATION)
Status: DISCONTINUED | OUTPATIENT
Start: 2022-12-07 | End: 2022-12-08 | Stop reason: HOSPADM

## 2022-12-07 RX ORDER — ONDANSETRON 2 MG/ML
INJECTION INTRAMUSCULAR; INTRAVENOUS PRN
Status: DISCONTINUED | OUTPATIENT
Start: 2022-12-07 | End: 2022-12-07 | Stop reason: SDUPTHER

## 2022-12-07 RX ORDER — OXYCODONE HYDROCHLORIDE AND ACETAMINOPHEN 5; 325 MG/1; MG/1
1 TABLET ORAL EVERY 4 HOURS PRN
Status: DISCONTINUED | OUTPATIENT
Start: 2022-12-07 | End: 2022-12-08 | Stop reason: HOSPADM

## 2022-12-07 RX ADMIN — FENTANYL CITRATE 25 MCG: 50 INJECTION, SOLUTION INTRAMUSCULAR; INTRAVENOUS at 15:44

## 2022-12-07 RX ADMIN — SODIUM CHLORIDE, POTASSIUM CHLORIDE, SODIUM LACTATE AND CALCIUM CHLORIDE: 600; 310; 30; 20 INJECTION, SOLUTION INTRAVENOUS at 17:32

## 2022-12-07 RX ADMIN — LIDOCAINE HYDROCHLORIDE 50 MG: 10 INJECTION, SOLUTION EPIDURAL; INFILTRATION; INTRACAUDAL; PERINEURAL at 12:49

## 2022-12-07 RX ADMIN — FENTANYL CITRATE 25 MCG: 50 INJECTION, SOLUTION INTRAMUSCULAR; INTRAVENOUS at 15:39

## 2022-12-07 RX ADMIN — FENTANYL CITRATE 25 MCG: 50 INJECTION, SOLUTION INTRAMUSCULAR; INTRAVENOUS at 15:01

## 2022-12-07 RX ADMIN — ROCURONIUM BROMIDE 10 MG: 10 INJECTION, SOLUTION INTRAVENOUS at 13:35

## 2022-12-07 RX ADMIN — FENTANYL CITRATE 50 MCG: 50 INJECTION, SOLUTION INTRAMUSCULAR; INTRAVENOUS at 13:28

## 2022-12-07 RX ADMIN — FENTANYL CITRATE 50 MCG: 50 INJECTION, SOLUTION INTRAMUSCULAR; INTRAVENOUS at 15:22

## 2022-12-07 RX ADMIN — FENTANYL CITRATE 50 MCG: 50 INJECTION, SOLUTION INTRAMUSCULAR; INTRAVENOUS at 13:15

## 2022-12-07 RX ADMIN — Medication 1000 MG: at 13:09

## 2022-12-07 RX ADMIN — Medication 100 MCG: at 13:12

## 2022-12-07 RX ADMIN — HYDROMORPHONE HYDROCHLORIDE 1 MG: 1 INJECTION, SOLUTION INTRAMUSCULAR; INTRAVENOUS; SUBCUTANEOUS at 18:42

## 2022-12-07 RX ADMIN — Medication 5 MG: at 14:49

## 2022-12-07 RX ADMIN — FENTANYL CITRATE 50 MCG: 50 INJECTION, SOLUTION INTRAMUSCULAR; INTRAVENOUS at 14:23

## 2022-12-07 RX ADMIN — MIDAZOLAM HYDROCHLORIDE 2 MG: 2 INJECTION, SOLUTION INTRAMUSCULAR; INTRAVENOUS at 15:30

## 2022-12-07 RX ADMIN — FENTANYL CITRATE 50 MCG: 50 INJECTION, SOLUTION INTRAMUSCULAR; INTRAVENOUS at 15:16

## 2022-12-07 RX ADMIN — GLYCOPYRROLATE 0.8 MG: 0.2 INJECTION INTRAMUSCULAR; INTRAVENOUS at 14:49

## 2022-12-07 RX ADMIN — MIDAZOLAM HYDROCHLORIDE 2 MG: 1 INJECTION, SOLUTION INTRAMUSCULAR; INTRAVENOUS at 16:04

## 2022-12-07 RX ADMIN — ROCURONIUM BROMIDE 50 MG: 10 INJECTION, SOLUTION INTRAVENOUS at 12:49

## 2022-12-07 RX ADMIN — PROPOFOL 140 MG: 10 INJECTION, EMULSION INTRAVENOUS at 12:49

## 2022-12-07 RX ADMIN — QUETIAPINE FUMARATE 200 MG: 200 TABLET ORAL at 23:28

## 2022-12-07 RX ADMIN — Medication 6 MG: at 21:07

## 2022-12-07 RX ADMIN — Medication 100 MCG: at 13:10

## 2022-12-07 RX ADMIN — OXYCODONE HYDROCHLORIDE AND ACETAMINOPHEN 2 TABLET: 5; 325 TABLET ORAL at 21:07

## 2022-12-07 RX ADMIN — DEXAMETHASONE SODIUM PHOSPHATE 4 MG: 10 INJECTION INTRAMUSCULAR; INTRAVENOUS at 13:05

## 2022-12-07 RX ADMIN — SODIUM CHLORIDE, POTASSIUM CHLORIDE, SODIUM LACTATE AND CALCIUM CHLORIDE: 600; 310; 30; 20 INJECTION, SOLUTION INTRAVENOUS at 12:45

## 2022-12-07 RX ADMIN — FENTANYL CITRATE 50 MCG: 50 INJECTION, SOLUTION INTRAMUSCULAR; INTRAVENOUS at 12:49

## 2022-12-07 RX ADMIN — Medication 0.5 MG: at 15:29

## 2022-12-07 RX ADMIN — Medication 0.5 MG: at 15:34

## 2022-12-07 RX ADMIN — MIDAZOLAM HYDROCHLORIDE 2 MG: 1 INJECTION, SOLUTION INTRAMUSCULAR; INTRAVENOUS at 15:30

## 2022-12-07 RX ADMIN — HYDROMORPHONE HYDROCHLORIDE 0.5 MG: 1 INJECTION, SOLUTION INTRAMUSCULAR; INTRAVENOUS; SUBCUTANEOUS at 15:34

## 2022-12-07 RX ADMIN — PANTOPRAZOLE SODIUM 40 MG: 40 TABLET, DELAYED RELEASE ORAL at 18:42

## 2022-12-07 RX ADMIN — ONDANSETRON 4 MG: 2 INJECTION INTRAMUSCULAR; INTRAVENOUS at 14:46

## 2022-12-07 RX ADMIN — ROCURONIUM BROMIDE 10 MG: 10 INJECTION, SOLUTION INTRAVENOUS at 14:11

## 2022-12-07 RX ADMIN — FENTANYL CITRATE 50 MCG: 50 INJECTION, SOLUTION INTRAMUSCULAR; INTRAVENOUS at 15:05

## 2022-12-07 RX ADMIN — HYDROMORPHONE HYDROCHLORIDE 0.5 MG: 1 INJECTION, SOLUTION INTRAMUSCULAR; INTRAVENOUS; SUBCUTANEOUS at 15:29

## 2022-12-07 RX ADMIN — OXYCODONE 10 MG: 5 TABLET ORAL at 16:59

## 2022-12-07 RX ADMIN — HYDROMORPHONE HYDROCHLORIDE 1 MG: 1 INJECTION, SOLUTION INTRAMUSCULAR; INTRAVENOUS; SUBCUTANEOUS at 21:57

## 2022-12-07 RX ADMIN — FENTANYL CITRATE 25 MCG: 50 INJECTION, SOLUTION INTRAMUSCULAR; INTRAVENOUS at 14:57

## 2022-12-07 RX ADMIN — Medication 5 MG: at 13:32

## 2022-12-07 RX ADMIN — GABAPENTIN 800 MG: 800 TABLET ORAL at 21:07

## 2022-12-07 ASSESSMENT — PAIN SCALES - GENERAL
PAINLEVEL_OUTOF10: 10
PAINLEVEL_OUTOF10: 6
PAINLEVEL_OUTOF10: 9
PAINLEVEL_OUTOF10: 10
PAINLEVEL_OUTOF10: 9
PAINLEVEL_OUTOF10: 10
PAINLEVEL_OUTOF10: 8
PAINLEVEL_OUTOF10: 9

## 2022-12-07 ASSESSMENT — PAIN DESCRIPTION - LOCATION
LOCATION: ABDOMEN
LOCATION: ABDOMEN

## 2022-12-07 ASSESSMENT — PAIN - FUNCTIONAL ASSESSMENT: PAIN_FUNCTIONAL_ASSESSMENT: NONE - DENIES PAIN

## 2022-12-07 ASSESSMENT — PAIN DESCRIPTION - DESCRIPTORS: DESCRIPTORS: SHARP;SHOOTING;SORE

## 2022-12-07 NOTE — BRIEF OP NOTE
Brief Postoperative Note      Patient: Merry Aiken  YOB: 1945  MRN: [id number]    Date of Procedure: 12/7/2022    Pre-Op Diagnosis: PARAESOPHAGEAL HERNIA    Post-Op Diagnosis: Same       Procedure(s):  XI ROBOTIC LAPAROSCOPIC PARAESOPHAGEAL HERNIA REPAIR WITH MESH    Surgeon(s):  Reanna Mahmood DO    Assistant:  * No surgical staff found *    Anesthesia: General    Estimated Blood Loss (mL): Minimal    Complications: None    Specimens:   * No specimens in log *    Implants:  Implant Name Type Inv.  Item Serial No.  Lot No. LRB No. Used Action   MESH GERMAIN RECTANGULAR 10X10 CM PREPERITONEAL ENFORM - [id number]  MESH GERMAIN RECTANGULAR 10X10 CM PREPERITONEAL ENFORM [id number]  [employer]-  N/A 1 Implanted         Drains: * No LDAs found *    Findings: see note    Electronically signed by Reanna Mahmood DO on 12/7/2022 at 2:51 PM 29-Sep-2019 22:54

## 2022-12-07 NOTE — ANESTHESIA POSTPROCEDURE EVALUATION
Department of Anesthesiology  Postprocedure Note    Patient: Johanna Mares  MRN: [id number]  YOB: 1945  Date of evaluation: 12/7/2022      Procedure Summary     Date: 12/07/22 Room / Location: [address]    Anesthesia Start: 5029 Anesthesia Stop: 6165    Procedure: XI ROBOTIC LAPAROSCOPIC PARAESOPHAGEAL HERNIA REPAIR WITH MESH Diagnosis:       Paraesophageal hernia      (PARAESOPHAGEAL HERNIA)    Surgeons: Shauna Hassan DO Responsible Provider: Gene Borrego MD    Anesthesia Type: general ASA Status: 3          Anesthesia Type: No value filed.     Trena Phase I: Trena Score: 9    Trena Phase II:        Anesthesia Post Evaluation    Patient location during evaluation: PACU  Patient participation: complete - patient participated  Level of consciousness: awake and alert  Pain score: 2  Airway patency: patent  Nausea & Vomiting: no nausea and no vomiting  Complications: no  Cardiovascular status: hemodynamically stable  Respiratory status: acceptable  Hydration status: euvolemic

## 2022-12-07 NOTE — PROGRESS NOTES
Called waiting room to update family,  said no family members were out there at the time. Proceeded to take pt to room on floor.

## 2022-12-07 NOTE — ANESTHESIA PRE PROCEDURE
Department of Anesthesiology  Preprocedure Note       Name:  Fátima Lemus   Age:  76 y.o.  :  1945                                          MRN:  [id number]         Date:  2022      Surgeon: Cristin Diggs):  Malika Melissa DO    Procedure: Procedure(s):  XI ROBOTIC LAPAROSCOPIC PARAESOPHAGEAL HERNIA REPAIR, NISSEN FUNDOPLICATION, MESH, EGD, POSSIBLE OPEN - GI SCHEDULED    Medications prior to admission:   Prior to Admission medications    Medication Sig Start Date End Date Taking? Authorizing Provider   ALPRAZolam [place]) 0.5 MG tablet Take 1 tablet by mouth 3 times daily as needed for Sleep or Anxiety for up to 30 days. 12/4/22 1/3/23  DES Yi CNP   ALBUTEROL IN Inhale 1 puff into the lungs as needed    Historical Provider, MD   gabapentin (NEURONTIN) 800 MG tablet Take 800 mg by mouth at bedtime.  Takes 2 tablets at night    Historical Provider, MD   rOPINIRole (REQUIP) 0.5 MG tablet TAKE 1 TABLET BY MOUTH AT BEDTIME 22   Historical Provider, MD   Magnesium Oxide, Laxative, 500 MG TABS Take by mouth Webb lax    Historical Provider, MD   diphenhydrAMINE (BENADRYL) 25 MG capsule Take 25 mg by mouth every 6 hours as needed for Itching    Historical Provider, MD   vitamin B-12 (CYANOCOBALAMIN) 1000 MCG tablet TAKE 2 TABLETS BY MOUTH EVERY DAY 10/10/22 10/10/23  DES Yi CNP   QUEtiapine (SEROQUEL) 50 MG tablet TAKE 1 OR 2 TABLETS BY MOUTH AT BEDTIME AS NEEDED FOR SLEEP 22  DES Yi CNP   VENTOLIN  (90 Base) MCG/ACT inhaler INHALE 1 PUFF BY MOUTH EVERY 4 HOURS AS NEEDED 22   Historical Provider, MD   atorvastatin (LIPITOR) 80 MG tablet Take 1 tablet by mouth daily  Patient taking differently: Take 80 mg by mouth at bedtime 22   DES Yi CNP   pantoprazole (PROTONIX) 40 MG tablet Take 1 tablet by mouth 2 times daily (before meals) 3/15/22 6/27/23  DES Yi CNP   QUEtiapine (SEROQUEL) 200 MG tablet TAKE 1 TABLET DAILY 2/2/22   DES Rosales CNP   Handicap Placard MISC by Does not apply route Exp 3/16/2026 3/16/21   DES Rosales CNP   oxyCODONE-acetaminophen (PERCOCET)  MG per tablet Take 1 tablet by mouth every 4 hours as needed for Pain. 7/19/19   Historical Provider, MD       Current medications:    Current Facility-Administered Medications   Medication Dose Route Frequency Provider Last Rate Last Admin    heparin (porcine) injection 5,000 Units  5,000 Units [facility]ous [facility]        vancomycin (VANCOCIN) 1000 mg in sodium chloride 0.9% 250 mL IVPB  1,000 mg IntraVENous On Call to [facility] 11, DO        lactated ringers infusion 1,000 mL  1,000 mL IntraVENous Continuous Abbi Moise MD           Allergies:     Allergies   Allergen Reactions    Pcn [Penicillins] Anaphylaxis     Patient states PCN allergy was 20 years ago, unsure if reaction still present    Nubain [Nalbuphine Hcl]      Leg  Cramping  When mixed with vistaril    Vistaril [Hydroxyzine] Other (See Comments)     Fainted and leg cramping    Adhesive Tape Other (See Comments)     Pulls skin off    Seasonal        Problem List:    Patient Active Problem List   Diagnosis Code    Hyperlipidemia E78.5    Fibromyalgia M79.7    Osteopenia M85.80    Hx of bilateral breast implants Z98.82    Arthritis of shoulder region, right M19.011    Anxiety F41.9    Anemia, normocytic normochromic D64.9    Chronic pain associated with significant psychosocial dysfunction G89.4    Primary osteoarthritis involving multiple joints M15.9    Spondylosis 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 M47.816    Lumbar and sacral arthritis M47.817    Chronic biliary pancreatitis (HCC) K86.1    Moderate episode of recurrent major depressive disorder ([address] L99.6    Uncomplicated opioid dependence ([address] F11.20    Hyperuricemia E79.0    Primary open-angle glaucoma, right eye, mild stage H40.1111    Primary open-angle glaucoma, left eye, mild stage H40.1121    Gastroesophageal reflux disease K21.9    Esophageal dysphagia R13.19    Hiatal hernia K44.9    Recurrent aspiration pneumonia ([facility]) J69.0    Acute respiratory failure with hypoxemia ([facility]) J96.01    Simple chronic bronchitis ([facility]) J41.0    Hospital-acquired pneumonia J18.9, Y95    NSTEMI (non-ST elevated myocardial infarction) ([facility]) I21.4    History of degenerative disc disease Z87.39    Hx of diabetes mellitus Z86.39    Sepsis ([facility]) A41.9    Hx of Esophageal dysmotility K22.4    Type 2 diabetes mellitus E11.9       Past Medical History:        Diagnosis Date    Abnormal finding on imaging 08/12/2021    Anemia     Bochdalek hernia     PER CT 7-2-21    Bowel obstruction ([facility])     Bronchitis 02/19/2019    Frequent episodes of bronchitis, usually yearly with pneumonia    Chronic biliary pancreatitis ([facility] 09/22/2016    pt denies-states x1 when in hospital with bowel obstruction    Chronic pneumonia     follows with Dr. Joselin Whyte Cough 08/13/2021    has had cough for several months    DDD (degenerative disc disease)     Depression     pt denies    Diverticulosis     Esophageal dysphagia     Fibromyalgia     Frequent headaches     GERD (gastroesophageal reflux disease)     Glaucoma     Hiatal hernia     Hyperlipidemia     Knee injuries 02/19/2019    Osteoarthritis of more than one site 04/09/2014    Pneumonia     gets pneumonia yearly    Seasonal allergies     Under care of service provider     lainlttnqv-mpgxzwmb-nvxjzc-last visit    Under care of service provider 10/03/2022    cardiology-sees at [facility] office-last visit sep 2022    Wellness examination     Shanelle Miles, CNP, PCP       Past Surgical History:        Procedure Laterality Date    BLEPHAROPLASTY Left     lower lid    BREAST BIOPSY Left 05/2016    CARPAL TUNNEL RELEASE Bilateral     x 2 each    CATARACT EXTRACTION Left 4.27 11/29/2022 09:45 AM    RBC 4.55 05/09/2012 09:45 AM    HGB 12.6 11/29/2022 09:45 AM    HCT 39.1 11/29/2022 09:45 AM    MCV 91.6 11/29/2022 09:45 AM    RDW 14.5 11/29/2022 09:45 AM     11/29/2022 09:45 AM     05/09/2012 09:45 AM       CMP:   Lab Results   Component Value Date/Time     11/29/2022 09:45 AM    K 4.3 11/29/2022 09:45 AM     11/29/2022 09:45 AM    CO2 25 11/29/2022 09:45 AM    BUN 14 11/29/2022 09:45 AM    CREATININE 0.76 11/29/2022 09:45 AM    GFRAA >60 03/23/2022 03:40 AM    LABGLOM >60 11/29/2022 09:45 AM    GLUCOSE 80 11/29/2022 09:45 AM    GLUCOSE 87 05/09/2012 09:45 AM    PROT 7.1 03/22/2022 05:13 AM    CALCIUM 8.9 11/29/2022 09:45 AM    BILITOT 0.41 03/22/2022 05:13 AM    ALKPHOS 129 03/22/2022 05:13 AM    AST 27 03/22/2022 05:13 AM    ALT 18 03/22/2022 05:13 AM       POC Tests:   Recent Labs     12/07/22  1108   POCGLU 107*       Coags:   Lab Results   Component Value Date/Time    PROTIME 10.5 11/29/2022 09:45 AM    INR 1.0 11/29/2022 09:45 AM    APTT 43.1 03/22/2022 01:54 PM       HCG (If Applicable): No results found for: PREGTESTUR, PREGSERUM, HCG, HCGQUANT     ABGs:   Lab Results   Component Value Date/Time    PHART 7.457 03/22/2022 04:51 AM    PO2ART 70.8 03/22/2022 04:51 AM    FHA4PYI 36.5 03/22/2022 04:51 AM    VYQ1INP 25.8 03/22/2022 04:51 AM    V3ZDXOUL 94.3 03/22/2022 04:51 AM        Type & Screen (If Applicable):  No results found for: LABABO, LABRH    Drug/Infectious Status (If Applicable):  No results found for: HIV, HEPCAB    COVID-19 Screening (If Applicable):   Lab Results   Component Value Date/Time    COVID19 Not Detected 03/22/2022 05:29 AM       EKG 10/2022  NSR with inverted T waves     TTE 3/2022  Normal left ventricle size, wall thickness and function with an estimated EF  > 55%. Mild mitral regurgitation. Mild tricuspid regurgitation. Estimated right ventricular systolic pressure  is 62ZBQP.       Cardiac stress test 6/2022  Perfusion:  No significant perfusion defects at stress or rest.       Function:  Normal       Risk stratification: LOW         Anesthesia Evaluation  Patient summary reviewed and Nursing notes reviewed no history of anesthetic complications:   Airway: Mallampati: III  TM distance: >3 FB   Neck ROM: full  Mouth opening: < 3 FB   Dental:    (+) other  Comment: Many missing    Pulmonary:normal exam  breath sounds clear to auscultation  (+) pneumonia: resolved,                             Cardiovascular:Negative CV ROS    (+) past MI:, hyperlipidemia      ECG reviewed  Rhythm: regular  Rate: normal  Echocardiogram reviewed  Stress test reviewed                Neuro/Psych:   (+) neuromuscular disease:, headaches:, psychiatric history:            GI/Hepatic/Renal:   (+) hiatal hernia, GERD:,           Endo/Other:    (+) DiabetesType II DM, , : arthritis: OA., .                 Abdominal:             Vascular: negative vascular ROS. Other Findings:             Anesthesia Plan      general     ASA 3     (GA )  Induction: intravenous. MIPS: Postoperative opioids intended and Prophylactic antiemetics administered. Anesthetic plan and risks discussed with patient. Use of blood products discussed with patient whom consented to blood products. Plan discussed with CRNA.                     Mode Shearer MD   12/7/2022

## 2022-12-07 NOTE — INTERVAL H&P NOTE
Pt Name: Lorne Keen  MRN: [id number]  YOB: 1945  Date of evaluation: 12/7/2022    I have reviewed the patient's history and physical examination completed in pre-admission testing on 11/29/22. No changes to history or on examination today, unless noted below. Patient reports she had her CXR completed at PAT visit 11/29/22:  Impression   Mild left basilar airspace disease. This could represent atelectasis or mild   pneumonia in the appropriate clinical setting. Pulmonary office note/clearance is on file from 11/23/22, from [facility]. Paper copy in physical pre-op folder. PCP clearance on file from 12/5/22, scanned into media. Lungs overall diminished today, no wheezing. States she has been staying home and limiting visitors to prevent illness/delays.      LINDA Yang, DES - CNP  12/7/22  11:04 AM

## 2022-12-08 ENCOUNTER — APPOINTMENT (OUTPATIENT)
Dept: GENERAL RADIOLOGY | Age: 77
DRG: 328 | End: 2022-12-08
Attending: SURGERY
Payer: MEDICARE

## 2022-12-08 VITALS
TEMPERATURE: 98.2 F | RESPIRATION RATE: 16 BRPM | HEIGHT: 60 IN | BODY MASS INDEX: 28.47 KG/M2 | SYSTOLIC BLOOD PRESSURE: 106 MMHG | DIASTOLIC BLOOD PRESSURE: 74 MMHG | OXYGEN SATURATION: 95 % | WEIGHT: 145 LBS | HEART RATE: 84 BPM

## 2022-12-08 LAB
ANION GAP SERPL CALCULATED.3IONS-SCNC: 9 MMOL/L (ref 9–17)
BUN BLDV-MCNC: 12 MG/DL (ref 8–23)
CALCIUM SERPL-MCNC: 8.2 MG/DL (ref 8.6–10.4)
CHLORIDE BLD-SCNC: 105 MMOL/L (ref 98–107)
CO2: 24 MMOL/L (ref 20–31)
CREAT SERPL-MCNC: 0.57 MG/DL (ref 0.5–0.9)
GFR SERPL CREATININE-BSD FRML MDRD: >60 ML/MIN/1.73M2
GLUCOSE BLD-MCNC: 96 MG/DL (ref 70–99)
HCT VFR BLD CALC: 36.1 % (ref 36.3–47.1)
HEMOGLOBIN: 11.1 G/DL (ref 11.9–15.1)
MCH RBC QN AUTO: 29.5 PG (ref 25.2–33.5)
MCHC RBC AUTO-ENTMCNC: 30.7 G/DL (ref 28.4–34.8)
MCV RBC AUTO: 96 FL (ref 82.6–102.9)
NRBC AUTOMATED: 0 PER 100 WBC
PDW BLD-RTO: 14.4 % (ref 11.8–14.4)
PLATELET # BLD: 275 K/UL (ref 138–453)
PMV BLD AUTO: 9.7 FL (ref 8.1–13.5)
POTASSIUM SERPL-SCNC: 4.4 MMOL/L (ref 3.7–5.3)
RBC # BLD: 3.76 M/UL (ref 3.95–5.11)
SODIUM BLD-SCNC: 138 MMOL/L (ref 135–144)
WBC # BLD: 7.3 K/UL (ref 3.5–11.3)

## 2022-12-08 PROCEDURE — 6360000002 HC RX W HCPCS: Performed by: SURGERY

## 2022-12-08 PROCEDURE — 96374 THER/PROPH/DIAG INJ IV PUSH: CPT

## 2022-12-08 PROCEDURE — 2580000003 HC RX 258: Performed by: SURGERY

## 2022-12-08 PROCEDURE — G0378 HOSPITAL OBSERVATION PER HR: HCPCS

## 2022-12-08 PROCEDURE — 74220 X-RAY XM ESOPHAGUS 1CNTRST: CPT

## 2022-12-08 PROCEDURE — 36415 COLL VENOUS BLD VENIPUNCTURE: CPT

## 2022-12-08 PROCEDURE — 85027 COMPLETE CBC AUTOMATED: CPT

## 2022-12-08 PROCEDURE — 6370000000 HC RX 637 (ALT 250 FOR IP): Performed by: SURGERY

## 2022-12-08 PROCEDURE — 80048 BASIC METABOLIC PNL TOTAL CA: CPT

## 2022-12-08 PROCEDURE — 6360000004 HC RX CONTRAST MEDICATION: Performed by: SURGERY

## 2022-12-08 PROCEDURE — 94640 AIRWAY INHALATION TREATMENT: CPT

## 2022-12-08 PROCEDURE — BD11YZZ FLUOROSCOPY OF ESOPHAGUS USING OTHER CONTRAST: ICD-10-PCS | Performed by: RADIOLOGY

## 2022-12-08 PROCEDURE — 94761 N-INVAS EAR/PLS OXIMETRY MLT: CPT

## 2022-12-08 PROCEDURE — 6370000000 HC RX 637 (ALT 250 FOR IP): Performed by: STUDENT IN AN ORGANIZED HEALTH CARE EDUCATION/TRAINING PROGRAM

## 2022-12-08 RX ORDER — OXYCODONE HYDROCHLORIDE AND ACETAMINOPHEN 5; 325 MG/1; MG/1
1 TABLET ORAL EVERY 4 HOURS PRN
Qty: 28 TABLET | Refills: 0 | Status: SHIPPED | OUTPATIENT
Start: 2022-12-08 | End: 2022-12-08 | Stop reason: HOSPADM

## 2022-12-08 RX ADMIN — OXYCODONE HYDROCHLORIDE AND ACETAMINOPHEN 2 TABLET: 5; 325 TABLET ORAL at 08:29

## 2022-12-08 RX ADMIN — OXYCODONE HYDROCHLORIDE AND ACETAMINOPHEN 2 TABLET: 5; 325 TABLET ORAL at 12:36

## 2022-12-08 RX ADMIN — DIATRIZOATE MEGLUMINE AND DIATRIZOATE SODIUM 120 ML: 660; 100 LIQUID ORAL; RECTAL at 09:16

## 2022-12-08 RX ADMIN — OXYCODONE HYDROCHLORIDE AND ACETAMINOPHEN 2 TABLET: 5; 325 TABLET ORAL at 16:23

## 2022-12-08 RX ADMIN — PANTOPRAZOLE SODIUM 40 MG: 40 TABLET, DELAYED RELEASE ORAL at 08:29

## 2022-12-08 RX ADMIN — OXYCODONE HYDROCHLORIDE AND ACETAMINOPHEN 2 TABLET: 5; 325 TABLET ORAL at 04:07

## 2022-12-08 RX ADMIN — SODIUM CHLORIDE, POTASSIUM CHLORIDE, SODIUM LACTATE AND CALCIUM CHLORIDE: 600; 310; 30; 20 INJECTION, SOLUTION INTRAVENOUS at 01:18

## 2022-12-08 RX ADMIN — HYDROMORPHONE HYDROCHLORIDE 1 MG: 1 INJECTION, SOLUTION INTRAMUSCULAR; INTRAVENOUS; SUBCUTANEOUS at 01:19

## 2022-12-08 RX ADMIN — IPRATROPIUM BROMIDE AND ALBUTEROL SULFATE 1 AMPULE: .5; 2.5 SOLUTION RESPIRATORY (INHALATION) at 08:32

## 2022-12-08 RX ADMIN — SODIUM CHLORIDE, PRESERVATIVE FREE 10 ML: 5 INJECTION INTRAVENOUS at 09:00

## 2022-12-08 ASSESSMENT — PAIN SCALES - GENERAL
PAINLEVEL_OUTOF10: 8
PAINLEVEL_OUTOF10: 7
PAINLEVEL_OUTOF10: 7

## 2022-12-08 NOTE — PROGRESS NOTES
[facility] [address]  PROGRESS NOTE    Shift date: .Deborrrita Monson    Shift day: Thursday   Shift # 2    Room # 5773/5570-19   Name: Millie Barber                  Referral: Routine Visit    Admit Date & Time: 12/7/2022 10:13 AM    Assessment:  Millie Barber is a 76 y.o. female in the hospital. Fouzia Soriano responded to patient's room while rounding, was welcome by patient and a visitor(dago Dias), and observed patient to be calm and coping well. Through conversation  heard feelings of anxiety, frustration, and hopefulness. Intervention:  Writer introduced self and title as  Writer offered space for the patient and her visitor  to express feelings, needs, and concerns and provided a ministry presence.  explored feelings of anxiety and frustration and learned that the patient has been awaiting and update regarding her discharge for most of the day, and is hopeful the doctor will visit soon and release her to return home tonight.  explored sources of hopefulness, and learned that in addition to feeling eager to return home, the patient is excited to see her dog, Baby.  acknowledged feelings shared, extended hospitality, and offered a blessing of peace. Outcome:  Patient and her guest expressed gratitude for  visit. Plan:  Chaplains will remain available to offer spiritual and emotional support as needed. 12/08/22 1522   Encounter Summary   Service Provided For: Patient; Family   Referral/Consult From: [employer]   Support System Children   Last Encounter  12/08/22   Complexity of Encounter Low   Begin Time 1450   End Time  1500   Total Time Calculated 10 min   Spiritual/Emotional needs   Type Spiritual Support   Assessment/Intervention/Outcome   Assessment Anxious; Hopeful   Intervention Active listening;Sustaining Presence/Ministry of presence;Prayer (assurance of)/[place]   Outcome Expressed Gratitude       Electronically signed by Cheng Alfaro Chaplain Zeferino Resident, on 12/8/2022 at 3:23 PM.  Martin Medrano  [phone]

## 2022-12-08 NOTE — PROGRESS NOTES
Bariatric Surgery Progress Note            PATIENT NAME: Tamara Aiken     TODAY'S DATE: 12/8/2022, 6:46 AM      SUBJECTIVE:    Pt seen and examined at bedside. No overnight events. Pain much improved on current pain regimen. Tolerating bariatric CLD. Ambulating. Denies N/V/F/C. Voiding without difficulty. No other complaints noted. VSS, afebrile. Is requesting to go home today. OBJECTIVE:   VITALS:  BP (!) 104/59   Pulse 82   Temp 98.2 °F (36.8 °C) (Oral)   Resp 18   Ht 5' (1.524 m)   Wt 145 lb (65.8 kg)   SpO2 93%   BMI 28.32 kg/m²      INTAKE/OUTPUT:      Intake/Output Summary (Last 24 hours) at 12/8/2022 0646  Last data filed at 12/7/2022 1858  Gross per 24 hour   Intake 700 ml   Output 600 ml   Net 100 ml       CONSTITUTIONAL:  NAD, A&O x 3  LUNGS:  normal effort with symmetric rise and fall of chest wall  CARDIOVASCULAR:  regular rate and rhythm  ABDOMEN: Soft, nttp, nd, wounds clean, dry and intact with dermabond; no erythema induration or exudate; abdominal binder in place.  Dressings with minimal strikethrough  EXTREMITIES:  Warm, no cyanosis or edema      Data:  CBC with Differential:    Lab Results   Component Value Date/Time    WBC 7.3 12/08/2022 05:38 AM    RBC 3.76 12/08/2022 05:38 AM    RBC 4.55 05/09/2012 09:45 AM    HGB 11.1 12/08/2022 05:38 AM    HCT 36.1 12/08/2022 05:38 AM     12/08/2022 05:38 AM     05/09/2012 09:45 AM    MCV 96.0 12/08/2022 05:38 AM    MCH 29.5 12/08/2022 05:38 AM    MCHC 30.7 12/08/2022 05:38 AM    RDW 14.4 12/08/2022 05:38 AM    LYMPHOPCT 6 03/22/2022 05:13 AM    MONOPCT 5 03/22/2022 05:13 AM    BASOPCT 1 03/22/2022 05:13 AM    MONOSABS 0.40 03/22/2022 05:13 AM    LYMPHSABS 0.40 03/22/2022 05:13 AM    EOSABS 0.00 03/22/2022 05:13 AM    BASOSABS 0.00 03/22/2022 05:13 AM    DIFFTYPE NOT REPORTED 06/24/2021 05:06 PM     BMP:    Lab Results   Component Value Date/Time     12/08/2022 05:38 AM    K 4.4 12/08/2022 05:38 AM     12/08/2022 05:38 AM    CO2 24 12/08/2022 05:38 AM    BUN 12 12/08/2022 05:38 AM    LABALBU 3.9 03/22/2022 05:13 AM    LABALBU 4.4 05/09/2012 09:45 AM    CREATININE 0.57 12/08/2022 05:38 AM    CALCIUM 8.2 12/08/2022 05:38 AM    GFRAA >60 03/23/2022 03:40 AM    LABGLOM >60 12/08/2022 05:38 AM    GLUCOSE 96 12/08/2022 05:38 AM    GLUCOSE 87 05/09/2012 09:45 AM         ASSESSMENT   Patient Active Problem List   Diagnosis    Hyperlipidemia    Fibromyalgia    Osteopenia    Hx of bilateral breast implants    Arthritis of shoulder region, right    Anxiety    Anemia, normocytic normochromic    Chronic pain associated with significant psychosocial dysfunction    Primary osteoarthritis involving multiple joints    Spondylosis 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    Lumbar and sacral arthritis    Chronic biliary pancreatitis (HCC)    Moderate episode of recurrent major depressive disorder (HCC)    Uncomplicated opioid dependence (HCC)    Hyperuricemia    Primary open-angle glaucoma, right eye, mild stage    Primary open-angle glaucoma, left eye, mild stage    Gastroesophageal reflux disease    Esophageal dysphagia    Hiatal hernia    Recurrent aspiration pneumonia (HCC)    Acute respiratory failure with hypoxemia (HCC)    Simple chronic bronchitis ([address]    Hospital-acquired pneumonia    NSTEMI (non-ST elevated myocardial infarction) ([address]    History of degenerative disc disease    Hx of diabetes mellitus    Sepsis (HCC)    Hx of Esophageal dysmotility    Type 2 diabetes mellitus    S/P repair of paraesophageal hernia       76 y.o. F POD#1 s/p robotic paraesophageal hernia repair with mesh      Plan  Continue bariatric CLD. ADAT  IVF at 125 ml/hr with LR  F/u AM labs  Pain control with Percocet and Dilaudid PRN. Nausea control with Zofran PRN  Encourage ambulation and IS usages. OOB and onto chair  GI prophylaxis with Protonix. Dispo:  Anticipate home this day, this afternoon      Electronically signed by Harlan Rich DO  on 12/8/2022 at 6:46 AM

## 2022-12-08 NOTE — CARE COORDINATION
12/08/22 1650   Service Assessment   Patient Orientation Alert and Oriented   Cognition Alert   History Provided By Patient   Primary Caregiver Self   Support Systems Family Members   PCP Verified by CM Yes   Last Visit to PCP Within last 3 months   Prior Functional Level Independent in ADLs/IADLs   Current Functional Level Independent in ADLs/IADLs   Can patient return to prior living arrangement Yes   Ability to make needs known: Good   Family able to assist with home care needs: Yes   Financial Resources Medicare   Social/Functional History   Lives With Alone   Type of [address] to enter with rails   Entrance Stairs - Number of Steps 7 wide steps able to put walker on each step   Bathroom Shower/Tub Tub/Shower unit   [employer]   (they have not installed them yet)   Home Equipment Walker, rolling  (shower chair aerosol machine)   Receives Help From Family   ADL Assistance Independent   Homemaking Assistance Independent   Ambulation Assistance Independent   Transfer Assistance Independent   Active  Yes   Mode of Transportation Car   Discharge Planning   Type of [facility] Prior To Admission None   Type of [address] None   Patient expects to be discharged to: [place]  (states dtr Faith Green willbe staying with her)   Condition of Participation: Discharge [address] for Transition of Care is related to the following treatment goals: decrease pain ambulate home dtr will stay with her   Freedom of Choice list was provided with basic dialogue that supports the patient's individualized plan of care/goals, treatment preferences, and shares the quality data associated with the providers?   Yes   Pt lives alone Dtr Faith Green will stay with her for recovery    Case Management Assessment  Initial Evaluation    Date/Time of Evaluation: 12/8/2022 4:57 PM  Assessment Completed by: Julien Damian RN    If patient is discharged prior to next notation, then this note serves as note for discharge by case management. Patient Name: Corbin Mendez                   YOB: 1945  Diagnosis: Paraesophageal hernia [K44.9]  S/P repair of paraesophageal hernia [Z98.890, Z87.19]                   Date / Time: 12/7/2022 10:13 AM    Patient Admission Status: Inpatient   Readmission Risk (Low < 19, Mod (19-27), High > 27): Readmission Risk Score: 14    Current PCP: DES Salguero CNP  PCP verified by CM? (P) Yes    Chart Reviewed: Yes      History Provided by: (P) Patient  Patient Orientation: (P) Alert and Oriented    Patient Cognition: (P) Alert    Hospitalization in the last 30 days (Readmission):  No    If yes, Readmission Assessment in CM Navigator will be completed. Advance Directives:      Code Status: Full Code   Patient's Primary Decision Maker is:        Discharge Planning:    Patient lives with: (P) Alone Type of Home: (P) House  Primary Care Giver: (P) Self  Patient Support Systems include: (P) Family Members   Current Financial resources: (P) Medicare  Current community resources:    Current services prior to admission: (P) None            Current DME:              Type of Home Care services:  (P) None    ADLS  Prior functional level: (P) Independent in ADLs/IADLs  Current functional level: (P) Independent in ADLs/IADLs    PT AM-PAC:   /24  OT AM-PAC:   /24    Family can provide assistance at DC: (P) Yes  Would you like Case Management to discuss the discharge plan with any other family members/significant others, and if so, who?     Plans to Return to Present Housing: (P) Yes  Other Identified Issues/Barriers to RETURNING to current housing:   Potential Assistance needed at discharge: N/A            Potential DME:    Patient expects to discharge to: (P) House (states dtr [address] staying with her)  Plan for transportation at discharge:      Financial    Payor: Marcelina Phillips / Plan: Johan Rizvi PPO / Product Type: Medicare /     Does insurance require precert for SNF: Yes    Potential assistance Purchasing Medications:    Meds-to-Beds request: Yes      [address]  [address] [facility] [address] [facility]  Phone: [phone] Fax: [phone]    [employer]  [facility], [facility] [address] [phone] - f [phone]  [facility] [address]  Phone: [phone] Fax: [phone] [facility], [address] [phone] - f [phone]  [facility] [address]  Phone: [phone] Fax: [phone]      Notes:    Factors facilitating achievement of predicted outcomes: Family support, Motivated, Cooperative, and Pleasant    Barriers to discharge: Pain    Additional Case Management Notes: plan home lives alone dtr Camacho Barba will be staying with her    The Plan for Transition of Care is related to the following treatment goals of Paraesophageal hernia [K44.9]  S/P repair of paraesophageal hernia [Z98.890, I15.26]    IF APPLICABLE: The Patient and/or patient representative Ca Potter and her family were provided with a choice of provider and agrees with the discharge plan. Freedom of choice list with basic dialogue that supports the patient's individualized plan of care/goals and shares the quality data associated with the providers was provided to:     Patient Representative Name:       The Patient and/or Patient Representative Agree with the Discharge Plan?       Liat Lemus RN  Case Management Department  Ph: [phone] Fax: [phone]

## 2022-12-08 NOTE — OP NOTE
Operative Note      Patient: Chauncey Aiken  YOB: 1945  MRN: [id number]    Date of Procedure: 12/7/2022    Pre-Op Diagnosis: PARAESOPHAGEAL HERNIA    Post-Op Diagnosis: Same       Procedure(s):  XI ROBOTIC LAPAROSCOPIC PARAESOPHAGEAL HERNIA REPAIR WITH MESH    Surgeon(s):  Katja Jones DO    Assistant:   * No surgical staff found *    Anesthesia: General    Estimated Blood Loss (mL): Minimal    Complications: None    Specimens:   * No specimens in log *    Implants:  Implant Name Type Inv. Item Serial No.  Lot No. LRB No. Used Action   MESH GERMAIN RECTANGULAR 10X10 CM PREPERITONEAL ENFORM - [id number]  MESH GERMAIN RECTANGULAR 10X10 CM PREPERITONEAL ENFORM [id number]  [employer]-  N/A 1 Implanted         Drains: * No LDAs found *    Findings:   10 cm hernia with stomach twisting at GE junction  Lichenification of the distal esophagus  Negative leak test      Detailed Description of Procedure:   Findings: See below     Consent:  The risks, options and benefits of the procedure were explained in detail to the patient. The risk of bleeding, infection, need for further surgery, recurrence or slipped wrap, gastric or esophageal perforation, need for feeding tube, stricture, need for ventilator support, recurrence, dysphagia were all explained. He consented to the procedure. Medications: Ancef, SCD's, Heparin SQ      Operative Narrative: The patient was taken to the operative suite and placed in supine position. Anesthesia administered. SCD's and gil placed and all bony prominences padded. The abdomen prepared and draped in normal sterile fashion. Time out called and patient and procedure confirmed. An orogastric tube placed for decompression      Incision made at Monk's point and using a 12 mm optical trocar access to the peritoneal cavity gained under direct laparoscopic visualization. Pneumoperitoneum established without complication.   We then placed four 8 mm ports for robotic assisted repair of the paraesophageal hernia under direct visualization. The [facility] Liver retractor placed through a small 5 mm sub xyphoid incision. The robot docked. The orogastric tube used to decompress the stomach. The phrenoesophageal ligament was divided using hook electrocautery exposing the left florencio of the diaphragm. We continued our dissection along the anterior crural arch from left to right. This allowed entry into the avascular plane and further dissection facilitated with the pneumoperitoneum. We mobilized the pars flaccida until the right florencio was noted and we could begin our dissection. The vena cava identified. Using a combination of blunt and sharp dissection as well as electrocautery a window was created along the right florencio along the esophagus. The avascular plane was entered again and this facilitated our dissection. Careful dissection circumferentially around the esophagus to expose the left lforencio. The right and left vagus nerves were identified and preserved. Eventually this allowed us to reduce the hernia and stomach. The esophagus was reduced and we continued to work into the chest  all the attachments until we had high circumferential mobilization 5 cm into the thorax and reduced approximately 5 cm of intra-abdominal esophageal length. We then mobilized the fundus by taking down the short gastrics with the vessel sealer. Hemostasis noted      Upon completion of full dissection around the esophagus we then used 0 ethibond suture to reapproximate the anterior and posterior florencio. Using the 0 ethibond we then closed the the hiatal hernia defect around the bougie. A blunt probe could easily be passed between the crural defect closure and the esophagus. There was trouble with NG placement and attempt at bougie placement, hence, for safety, bougie not placed. Satisfactory repair noted.   I then placed a Tutor Key Enform mesh in

## 2022-12-08 NOTE — DISCHARGE INSTRUCTIONS
rest  Pain, redness and/or swelling in your feet or legs    Sudden onset of severe left shoulder pain or severe abdominal pain  Any symptoms that are causing you concern   In case of an emergency, call 911 immediately.

## 2022-12-08 NOTE — PROGRESS NOTES
Patient's Left eye is swollen. Pt's family states her face is bigger than normal, pt's cheeks are also emiliano in color. Doctor notified and will round shortly.

## 2022-12-08 NOTE — PLAN OF CARE
Problem: Discharge Planning  Goal: Discharge to home or other facility with appropriate resources  12/8/2022 0645 by Clelia Carrel, RN  Outcome: Progressing  12/8/2022 0638 by Clelia Carrel, RN  Outcome: Progressing     Problem: Pain  Goal: Verbalizes/displays adequate comfort level or baseline comfort level  12/8/2022 0645 by Clelia Carrel, RN  Outcome: Progressing  12/8/2022 0638 by Clelia Carrel, RN  Outcome: Progressing     Problem: Safety - Adult  Goal: Free from fall injury  12/8/2022 0645 by Clelia Carrel, RN  Outcome: Progressing  12/8/2022 0638 by Clelia Carrel, RN  Outcome: Progressing     Problem: ABCDS Injury Assessment  Goal: Absence of physical injury  12/8/2022 0645 by Clelia Carrel, RN  Outcome: Progressing  12/8/2022 0638 by Clelia Carrel, RN  Outcome: Progressing

## 2022-12-09 ENCOUNTER — CARE COORDINATION (OUTPATIENT)
Dept: CASE MANAGEMENT | Age: 77
End: 2022-12-09

## 2022-12-09 ENCOUNTER — CARE COORDINATION (OUTPATIENT)
Dept: CARE COORDINATION | Age: 77
End: 2022-12-09

## 2022-12-09 NOTE — CARE COORDINATION
Ambulatory Care Coordination Note  12/9/2022    ACC: Carolina Beck, RN    Spoke with pt who said she did have her surgery done and was discharged yesterday no new meds. She said she is on a clear liquid diet at this time. Yesterday when she got home she started to get chest congestion, she has an increase in phlegm and increase in cough. She is not able to bring any phlegm up. She denies sob, fever, chills. She was not sent home with an incentive spirometer. Did encourage her to use a pillow to splint her abd and make sure she is cough and deep breathing. Will ask her pcp if there is any additional instructions as she has a long history of pneumonia   1) acp done   2) sdoh done  3) med review done. 4) cardiology fu may 2023  5) pulm fu Jan   6) surgery fu 12/16   7) pcp    Offered patient enrollment in the Remote Patient Monitoring (RPM) program for in-home monitoring: Patient declined. Lab Results       None            Care Coordination Interventions    Referral from Primary Care Provider: No  Suggested Interventions and Community Resources  Disease Specific Clinic: Completed  Pulmonary Rehab: Not Started  Zone Management Tools: Completed          Goals Addressed                   This Visit's Progress     Conditions and Symptoms   On track     I will schedule office visits, as directed by my provider. I will keep my appointment or reschedule if I have to cancel. I will notify my provider of any barriers to my plan of care. Barriers: overwhelmed by complexity of regimen  Plan for overcoming my barriers: care coordination   Confidence: 9/10  Anticipated Goal Completion Date: 10/2/22                Prior to Admission medications    Medication Sig Start Date End Date Taking? Authorizing Provider   ALPRAZolam Lionel Noel) 0.5 MG tablet Take 1 tablet by mouth 3 times daily as needed for Sleep or Anxiety for up to 30 days.  12/4/22 1/3/23  Marli Low, APRN - CNP   ALBUTEROL IN Inhale 1 puff into the lungs as needed Historical Provider, MD   gabapentin (NEURONTIN) 800 MG tablet Take 800 mg by mouth at bedtime.  Takes 2 tablets at night    Historical Provider, MD   rOPINIRole (REQUIP) 0.5 MG tablet TAKE 1 TABLET BY MOUTH AT BEDTIME 11/8/22   Historical Provider, MD   Magnesium Oxide, Laxative, 500 MG TABS Take by mouth Webb lax    Historical Provider, MD   diphenhydrAMINE (BENADRYL) 25 MG capsule Take 25 mg by mouth every 6 hours as needed for Itching    Historical Provider, MD   vitamin B-12 (CYANOCOBALAMIN) 1000 MCG tablet TAKE 2 TABLETS BY MOUTH EVERY DAY 10/10/22 10/10/23  Marli Glorious Genre, APRN - CNP   QUEtiapine (SEROQUEL) 50 MG tablet TAKE 1 OR 2 TABLETS BY MOUTH AT BEDTIME AS NEEDED FOR SLEEP 9/6/22 9/6/23  Marli Glorious Genre, APRN - CNP   VENTOLIN  (90 Base) MCG/ACT inhaler INHALE 1 PUFF BY MOUTH EVERY 4 HOURS AS NEEDED 6/1/22   Historical Provider, MD   atorvastatin (LIPITOR) 80 MG tablet Take 1 tablet by mouth daily  Patient taking differently: Take 80 mg by mouth at bedtime 5/31/22   Marli Glorious Genre, APRN - CNP   pantoprazole (PROTONIX) 40 MG tablet Take 1 tablet by mouth 2 times daily (before meals) 3/15/22 6/27/23  Marli Glorious Genre, APRN - CNP   QUEtiapine (SEROQUEL) 200 MG tablet TAKE 1 TABLET DAILY 2/2/22   Marli Glorious Genre, APRN - CNP   Handicap Placard MISC by Does not apply route Exp 3/16/2026 3/16/21   Marli Glorious Genre, APRN - CNP       Future Appointments   Date Time Provider Glenroy Falcon   12/16/2022  3:30 PM Debby Gonzalez, DO adeline Cespedes

## 2022-12-15 ENCOUNTER — CARE COORDINATION (OUTPATIENT)
Dept: CARE COORDINATION | Age: 77
End: 2022-12-15

## 2022-12-15 NOTE — CARE COORDINATION
Ambulatory Care Coordination Note  12/15/2022    ACC: Yoselin Mehta, RN  Spoke with pt who said she is doing better as far as post op recovery goes. She said her breathing is still not great she will see surgery tomorrow. She did not call pulm did offer to call them for her but she declines this at this time she will see what surgery says. 1) acp done   2) sdoh done  3) med review done. 4) cardiology fu may 2023  5) pulm fu Jan   6) surgery fu 12/16   7) pcp      Offered patient enrollment in the Remote Patient Monitoring (RPM) program for in-home monitoring: Patient declined. Lab Results       None            Care Coordination Interventions    Referral from Primary Care Provider: No  Suggested Interventions and Community Resources  Disease Specific Clinic: Completed  Pulmonary Rehab: Not Started  Zone Management Tools: Completed          Goals Addressed                   This Visit's Progress     Conditions and Symptoms   On track     I will schedule office visits, as directed by my provider. I will keep my appointment or reschedule if I have to cancel. I will notify my provider of any barriers to my plan of care. Barriers: overwhelmed by complexity of regimen  Plan for overcoming my barriers: care coordination   Confidence: 9/10  Anticipated Goal Completion Date: 10/2/22                Prior to Admission medications    Medication Sig Start Date End Date Taking? Authorizing Provider   ALPRAZolam Phyllistine Drape) 0.5 MG tablet Take 1 tablet by mouth 3 times daily as needed for Sleep or Anxiety for up to 30 days. 12/4/22 1/3/23  Marli Jmienez, APRN - CNP   ALBUTEROL IN Inhale 1 puff into the lungs as needed    Historical Provider, MD   gabapentin (NEURONTIN) 800 MG tablet Take 800 mg by mouth at bedtime.  Takes 2 tablets at night    Historical Provider, MD   rOPINIRole (REQUIP) 0.5 MG tablet TAKE 1 TABLET BY MOUTH AT BEDTIME 11/8/22   Historical Provider, MD   Magnesium Oxide, Laxative, 500 MG TABS Take by mouth Jon quigley    Historical Provider, MD   diphenhydrAMINE (BENADRYL) 25 MG capsule Take 25 mg by mouth every 6 hours as needed for Itching    Historical Provider, MD   vitamin B-12 (CYANOCOBALAMIN) 1000 MCG tablet TAKE 2 TABLETS BY MOUTH EVERY DAY 10/10/22 10/10/23  DES Borden CNP   QUEtiapine (SEROQUEL) 50 MG tablet TAKE 1 OR 2 TABLETS BY MOUTH AT BEDTIME AS NEEDED FOR SLEEP 9/6/22 9/6/23  DES Borden CNP   VENTOLIN  (90 Base) MCG/ACT inhaler INHALE 1 PUFF BY MOUTH EVERY 4 HOURS AS NEEDED 6/1/22   Historical Provider, MD   atorvastatin (LIPITOR) 80 MG tablet Take 1 tablet by mouth daily  Patient taking differently: Take 80 mg by mouth at bedtime 5/31/22   DES Borden CNP   pantoprazole (PROTONIX) 40 MG tablet Take 1 tablet by mouth 2 times daily (before meals) 3/15/22 6/27/23  DES Borden CNP   QUEtiapine (SEROQUEL) 200 MG tablet TAKE 1 TABLET DAILY 2/2/22   DES Borden CNP   Handicap Placard MISC by Does not apply route Exp 3/16/2026 3/16/21   DES Borden CNP       Future Appointments   Date Time Provider Glenroy Falcon   12/16/2022  3:30 PM Krista Singh DO bariatric misty Brantley Tong

## 2022-12-16 ENCOUNTER — OFFICE VISIT (OUTPATIENT)
Dept: BARIATRICS/WEIGHT MGMT | Age: 77
End: 2022-12-16

## 2022-12-16 VITALS
DIASTOLIC BLOOD PRESSURE: 88 MMHG | SYSTOLIC BLOOD PRESSURE: 138 MMHG | TEMPERATURE: 97.9 F | BODY MASS INDEX: 29.64 KG/M2 | WEIGHT: 151 LBS | HEIGHT: 60 IN | HEART RATE: 95 BPM

## 2022-12-16 DIAGNOSIS — Z87.19 S/P REPAIR OF PARAESOPHAGEAL HERNIA: Primary | ICD-10-CM

## 2022-12-16 DIAGNOSIS — Z98.890 S/P REPAIR OF PARAESOPHAGEAL HERNIA: Primary | ICD-10-CM

## 2022-12-16 DIAGNOSIS — J30.89 NON-SEASONAL ALLERGIC RHINITIS DUE TO OTHER ALLERGIC TRIGGER: ICD-10-CM

## 2022-12-16 PROCEDURE — 99024 POSTOP FOLLOW-UP VISIT: CPT | Performed by: SURGERY

## 2022-12-16 RX ORDER — MONTELUKAST SODIUM 10 MG/1
TABLET ORAL
Qty: 90 TABLET | Refills: 3 | OUTPATIENT
Start: 2022-12-16 | End: 2023-12-16

## 2022-12-16 RX ORDER — LEVOFLOXACIN 500 MG/1
500 TABLET, FILM COATED ORAL DAILY
Qty: 5 TABLET | Refills: 0 | Status: SHIPPED | OUTPATIENT
Start: 2022-12-16 | End: 2022-12-21

## 2022-12-16 NOTE — PROGRESS NOTES
[address] [facility] MIN INVASIVE BARIATRIC SURG  [address] [place] [address] Blvd [facility] [address]  [address]  [address]  Dept: [phone]    ROBOTIC AND MINIMALLY INVASIVE SURGERY  PROGRESS NOTE     CC: General Surgery Follow Up    Patient: Laci Patrick      Service Date: 12/16/2022  Visit:   1 week    Medical Record #: [id number]  Date of Surgery:   12/07/2022    History: 76 y.o. female who presents today for a post op paraesophageal hernia repair follow up. Patient reports regular bowel movements. She reports she is having a flair up of pneumonia, and she inquired about antibiotics. She states she has not followed up with her pulmonologist. She denies nausea, vomiting, fever, and chills.      Pathology:    [] NA    [] No Gross path   [x] Discussed w/ pt   [] Referred to GI      Review of Systems:     General  Negative for: [] Weight Change   [x] Fatigue   [x] Fevers & Chills    [] Appetite change [] Other:    Positive for: [x] Weight Change   [] Fatigue   [] Fevers & Chills    [] Appetite change [] Other:   Cardiac  Negative for: [x] Chest Pain   [] Difficulty Breathing   [] Leg Cramps [x] Edema of Lower Extremeties    [] Left   [] Right      Positive for: [] Chest Pain   [] Difficulty Breathing   [] Leg Cramps [] Edema of Lower Extremeties    [] Left   [] Right   Pulmonary  Negative for: [x] Shortness of Breath [] Wheeze [x] Cough  [x] Calf Pain     Positive for: [] Shortness of Breath [] Wheeze [] Cough  [] Calf Pain   Gastro-Intestinal Negative for: [] Heartburn   [] Reflux   [] Dysphagia   [] Melena   [] BRBPR  [x] Vomiting   [x] Abdominal Pain   [] Diarrhea     [] Constipation  [] Other:     Positive for: [] Heartburn   [] Reflux   [] Dysphagia   [] Melena   [] BRBPR  [] Vomiting   [] Abdominal Pain   [] Diarrhea     [] Constipation  [] Other:    Muskuloskeletal Negative for: [] Joint pain   [] Back pain   [] Knee Pain   [] Muscle weakness [x] Hernia   [x] Edema [] Other:     Positive for: [] Joint pain   [] Back pain   [] Knee Pain   [] Muscle weakness [] Hernia   [] Edema [] Other:    Neurologic Negative for: [x] Syncope   [] Insomnia   [x] Being treated for depression  [] Other:     Positive for: [] Syncope   [] Insomnia   [] Being treated for depression  [] Other:    Skin Negative for: [x] Wound:   [] Open   [] Draining   [] Incisional     [x] Rash   [] Hair Loss  [] Other:     Positive for: [] Wound:   [] Open   [] Draining    [] Incisional  [] Rash   [] Hair Loss  [] Other:          Physical Assessment:     /88 (Site: Left Upper Arm, Position: Sitting, Cuff Size: Large Adult)   Pulse 95   Temp 97.9 °F (36.6 °C)   Ht 5' (1.524 m)   Wt 151 lb (68.5 kg)   BMI 29.49 kg/m²   General Negative for:  [x] Lapses in memory   [x] Unusual Stress   [x] Diffic Concen [] Unable to sleep   [] Eating in response to stress   [] Other:    Positive for:  [] Lapses in memory   [] Unusual Stress   [] Diffic Concen [] Unable to sleep   [] Eating in response to stress   [] Other:   Cardio-Pulmonary   [x] Heart RRR   [x] No murmurs   [] Pulses nl x4 extrem    [x] Good inspiratory effort   [x] No wheezing     [x] Lungs clear to auscultation bilaterally    [] Other:    Gastro-Intestinal   [x] Abd S/NT/ND/Benign   [x] No abdominal mass/hernia    [x] No Splenomegaly    [] Other:    Muskuloskeletal   [x] Good muscle strength x4 extremities   [x] nl gait and ambul    [x] Nl ROM x4 extremities    [] Other:    Neurologic   [x] Alert and oriented x3    [] Other:   Skin   [x] Intact w/ no open wounds   [x] Incisions C/D/I    [] Steri strips removed   [x] No drainage or Infection    [] Other:      Assessment & Plan:      1. S/P repair of paraesophageal hernia       Recommended to follow up with her pulmonologist to manage her pneumonia.  I will start her on 5 days of Levaquin as she is having trouble getting appointment  Diet progression discussed  Doing well   No lifting over 15 lbs  Ambulation stressed to patient  Overall improvement    Follow up: 1 month    Orders placed this encounter:   No orders of the defined types were placed in this encounter. New Prescriptions:   Orders Placed This Encounter   Medications    levoFLOXacin (LEVAQUIN) 500 MG tablet     Sig: Take 1 tablet by mouth daily for 5 days     Dispense:  5 tablet     Refill:  0       Scribe Attestation:  charlie Bairdibed on behalf of Zehra rCuz DO. Electronically signed by Zehra Cruz DO on 12/16/2022 at 4:08 PM    Please note that this chart was generated using voice recognition Dragon dictation software. Although every effort was made to ensure the accuracy of this automated transcription, some errors in transcription may have occurred. Autumn Cook DO DO, personally performed the services described in this documentation. All medical record entries made by the scribe were at my direction and in my presence. I have reviewed the chart and discharge instructions (if applicable) and agree that the record reflects my personal performance and is accurate and complete.     Electronically Signed: Zehra Cruz DO. 12/21/22. 3:17 PM.

## 2022-12-19 NOTE — DISCHARGE SUMMARY
Surgery Discharge Summary     Patient Identification  Geena Vidal is a 76 y.o. female. :  1945  Admit Date:  2022    Discharge date:   2022  6:43 PM                                   Disposition: home    Discharge Diagnoses:   Patient Active Problem List   Diagnosis    Hyperlipidemia    Fibromyalgia    Osteopenia    Hx of bilateral breast implants    Arthritis of shoulder region, right    Anxiety    Anemia, normocytic normochromic    Chronic pain associated with significant psychosocial dysfunction    Primary osteoarthritis involving multiple joints    Spondylosis 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    Lumbar and sacral arthritis    Chronic biliary pancreatitis (HCC)    Moderate episode of recurrent major depressive disorder (HCC)    Uncomplicated opioid dependence (HCC)    Hyperuricemia    Primary open-angle glaucoma, right eye, mild stage    Primary open-angle glaucoma, left eye, mild stage    Gastroesophageal reflux disease    Esophageal dysphagia    Hiatal hernia    Recurrent aspiration pneumonia (HCC)    Acute respiratory failure with hypoxemia (HCC)    Simple chronic bronchitis ([facility] [address]    Hospital-acquired pneumonia    NSTEMI (non-ST elevated myocardial infarction) ([facility] [address]    History of degenerative disc disease    Hx of diabetes mellitus    Sepsis (HCC)    Hx of Esophageal dysmotility    Type 2 diabetes mellitus    S/P repair of paraesophageal hernia       Condition on discharge: Good    Consults: None    Surgery: Paraesophageal hernia repair with mesh    Patient Instructions: Activity: no heavy lifting, pushing, pulling for 6 weeks, no driving for 2 weeks or while on analgesics  Diet: As tolerated  Follow-up with Dr. Pura Lyon in 2 weeks. See pre-printed instructions in chart and given to patient upon discharge.     Discharge Medications:        Medication List        CHANGE how you take these medications      atorvastatin 80 MG tablet  Commonly known as: LIPITOR  Take 1 tablet by mouth daily  What changed: when to take this            CONTINUE taking these medications      ALBUTEROL IN     ALPRAZolam 0.5 MG tablet  Commonly known as: XANAX  Take 1 tablet by mouth 3 times daily as needed for Sleep or Anxiety for up to 30 days. diphenhydrAMINE 25 MG capsule  Commonly known as: BENADRYL     gabapentin 800 MG tablet  Commonly known as: NEURONTIN     Handicap Placard Misc  by Does not apply route Exp 3/16/2026     Magnesium Oxide (Laxative) 500 MG Tabs     pantoprazole 40 MG tablet  Commonly known as: PROTONIX  Take 1 tablet by mouth 2 times daily (before meals)     * QUEtiapine 200 MG tablet  Commonly known as: SEROQUEL  TAKE 1 TABLET DAILY     * QUEtiapine 50 MG tablet  Commonly known as: SEROQUEL  TAKE 1 OR 2 TABLETS BY MOUTH AT BEDTIME AS NEEDED FOR SLEEP     rOPINIRole 0.5 MG tablet  Commonly known as: REQUIP     Ventolin  (90 Base) MCG/ACT inhaler  Generic drug: albuterol sulfate HFA     vitamin B-12 1000 MCG tablet  Commonly known as: CYANOCOBALAMIN  TAKE 2 TABLETS BY MOUTH EVERY DAY           * This list has 2 medication(s) that are the same as other medications prescribed for you. Read the directions carefully, and ask your doctor or other care provider to review them with you. STOP taking these medications      oxyCODONE-acetaminophen  MG per tablet  Commonly known as: PERCOCET               HPI and Hospital Course:   76 y.o. female presented on 12/7/2022 for an elective paraesophageal hernia repair with mesh. Patient tolerated the procedure well without complication. Hospital course was unremarkable. On day of discharge pt was tolerating bariatric clear liquid diet, pain controlled with oral medications and ambulating without difficulty.       Electronically signed by Joon Segura DO on 12/19/2022 at 8:00 AM

## 2022-12-22 ENCOUNTER — CARE COORDINATION (OUTPATIENT)
Dept: CARE COORDINATION | Age: 77
End: 2022-12-22

## 2022-12-22 NOTE — CARE COORDINATION
Ambulatory Care Coordination Note  12/22/2022    ACC: Elbert Cardozo, RN  Spoke with pt who said she is still feeling like she has pneumonia her surgeon did give her abx for 5 days she does have an apt with pulm for dec 28th. She does have a nebulizer encouraged her to use this and stay indoors until seen by pulm she agrees to this. 1) acp done   2) sdoh done  3) med review done. 4) cardiology fu may 2023  5) pulm 12/28  6) surgery fu 12/16   7) pcp    Offered patient enrollment in the Remote Patient Monitoring (RPM) program for in-home monitoring: Patient declined. Lab Results       None            Care Coordination Interventions    Referral from Primary Care Provider: No  Suggested Interventions and Community Resources  Disease Specific Clinic: Completed  Pulmonary Rehab: Not Started  Zone Management Tools: Completed          Goals Addressed                   This Visit's Progress     Conditions and Symptoms   On track     I will schedule office visits, as directed by my provider. I will keep my appointment or reschedule if I have to cancel. I will notify my provider of any barriers to my plan of care. Barriers: overwhelmed by complexity of regimen  Plan for overcoming my barriers: care coordination   Confidence: 9/10  Anticipated Goal Completion Date: 10/2/22                Prior to Admission medications    Medication Sig Start Date End Date Taking? Authorizing Provider   ALPRAZolam Sharad Solan) 0.5 MG tablet Take 1 tablet by mouth 3 times daily as needed for Sleep or Anxiety for up to 30 days. 12/4/22 1/3/23  Marli Merino, APRN - CNP   ALBUTEROL IN Inhale 1 puff into the lungs as needed    Historical Provider, MD   gabapentin (NEURONTIN) 800 MG tablet Take 800 mg by mouth at bedtime.  Takes 2 tablets at night    Historical Provider, MD   rOPINIRole (REQUIP) 0.5 MG tablet TAKE 1 TABLET BY MOUTH AT BEDTIME 11/8/22   Historical Provider, MD   Magnesium Oxide, Laxative, 500 MG TABS Take by mouth Webb lax Historical Provider, MD   diphenhydrAMINE (BENADRYL) 25 MG capsule Take 25 mg by mouth every 6 hours as needed for Itching    Historical Provider, MD   vitamin B-12 (CYANOCOBALAMIN) 1000 MCG tablet TAKE 2 TABLETS BY MOUTH EVERY DAY 10/10/22 10/10/23  DES Guerin CNP   QUEtiapine (SEROQUEL) 50 MG tablet TAKE 1 OR 2 TABLETS BY MOUTH AT BEDTIME AS NEEDED FOR SLEEP 9/6/22 9/6/23  DES Guerin CNP   VENTOLIN  (90 Base) MCG/ACT inhaler INHALE 1 PUFF BY MOUTH EVERY 4 HOURS AS NEEDED 6/1/22   Historical Provider, MD   atorvastatin (LIPITOR) 80 MG tablet Take 1 tablet by mouth daily  Patient taking differently: Take 80 mg by mouth at bedtime 5/31/22   DES Guerin CNP   pantoprazole (PROTONIX) 40 MG tablet Take 1 tablet by mouth 2 times daily (before meals) 3/15/22 6/27/23  DES Guerin CNP   QUEtiapine (SEROQUEL) 200 MG tablet TAKE 1 TABLET DAILY 2/2/22   DES Guerin CNP   Handicap Placard MISC by Does not apply route Exp 3/16/2026 3/16/21   DES Guerin CNP       Future Appointments   Date Time Provider Glenroy Falcon   1/25/2023  9:45 AM Rose Marie Portillo DO bariatric [facility] [address] [facility]

## 2022-12-29 ENCOUNTER — CARE COORDINATION (OUTPATIENT)
Dept: CARE COORDINATION | Age: 77
End: 2022-12-29

## 2022-12-30 ENCOUNTER — CARE COORDINATION (OUTPATIENT)
Dept: CARE COORDINATION | Age: 77
End: 2022-12-30

## 2023-01-05 ENCOUNTER — CARE COORDINATION (OUTPATIENT)
Dept: CARE COORDINATION | Age: 78
End: 2023-01-05

## 2023-01-06 ENCOUNTER — CARE COORDINATION (OUTPATIENT)
Dept: CARE COORDINATION | Age: 78
End: 2023-01-06

## 2023-01-12 ENCOUNTER — CARE COORDINATION (OUTPATIENT)
Dept: CARE COORDINATION | Age: 78
End: 2023-01-12

## 2023-01-13 ENCOUNTER — CARE COORDINATION (OUTPATIENT)
Dept: CARE COORDINATION | Age: 78
End: 2023-01-13

## 2023-01-19 ENCOUNTER — CARE COORDINATION (OUTPATIENT)
Dept: CARE COORDINATION | Age: 78
End: 2023-01-19

## 2023-01-19 DIAGNOSIS — F41.9 ANXIETY: Chronic | ICD-10-CM

## 2023-01-19 DIAGNOSIS — F33.1 MAJOR DEPRESSIVE DISORDER, RECURRENT EPISODE, MODERATE (HCC): ICD-10-CM

## 2023-01-19 NOTE — CARE COORDINATION
Ambulatory Care Coordination Note  1/19/2023    ACC: Marily Leonard, RN  Spoke with pt who said she did follow up with pulm jan 9th but it was not a good experience for her she ended up walking out after waiting an hour and a half. She will call and reschedule. She is going to see the dentist tomorrow. She said her dentist is asking if her pcp can call the dentist to say she can go. Pt said because she just ws cleared for surgery last month having a dental procedure should not be an issue. Will ask her pcp to call her dentist believe dental [facility] [phone]. She is feeling better from surgery and is eating and drinking well. She follows up with surgery again jand 25th   Will graduate from care coordination at this time           Offered patient enrollment in the Remote Patient Monitoring (RPM) program for in-home monitoring: Patient declined. Lab Results       None            Care Coordination Interventions    Referral from Primary Care Provider: No  Suggested Interventions and Community Resources  Disease Specific Clinic: Completed  Pulmonary Rehab: Not Started  Zone Management Tools: Completed          Goals Addressed                   This Visit's Progress     Conditions and Symptoms   On track     I will schedule office visits, as directed by my provider. I will keep my appointment or reschedule if I have to cancel. I will notify my provider of any barriers to my plan of care. Barriers: overwhelmed by complexity of regimen  Plan for overcoming my barriers: care coordination   Confidence: 9/10  Anticipated Goal Completion Date: 10/2/22                Prior to Admission medications    Medication Sig Start Date End Date Taking? Authorizing Provider   ALBUTEROL IN Inhale 1 puff into the lungs as needed    Historical Provider, MD   gabapentin (NEURONTIN) 800 MG tablet Take 800 mg by mouth at bedtime.  Takes 2 tablets at night    Historical Provider, MD   rOPINIRole (REQUIP) 0.5 MG tablet TAKE 1 TABLET BY MOUTH AT BEDTIME 11/8/22   Historical Provider, MD   Magnesium Oxide, Laxative, 500 MG TABS Take by mouth Webb lax    Historical Provider, MD   diphenhydrAMINE (BENADRYL) 25 MG capsule Take 25 mg by mouth every 6 hours as needed for Itching    Historical Provider, MD   vitamin B-12 (CYANOCOBALAMIN) 1000 MCG tablet TAKE 2 TABLETS BY MOUTH EVERY DAY 10/10/22 10/10/23  DES De Guzman CNP   QUEtiapine (SEROQUEL) 50 MG tablet TAKE 1 OR 2 TABLETS BY MOUTH AT BEDTIME AS NEEDED FOR SLEEP 9/6/22 9/6/23  DES De Guzman CNP   VENTOLIN  (90 Base) MCG/ACT inhaler INHALE 1 PUFF BY MOUTH EVERY 4 HOURS AS NEEDED 6/1/22   Historical Provider, MD   atorvastatin (LIPITOR) 80 MG tablet Take 1 tablet by mouth daily  Patient taking differently: Take 80 mg by mouth at bedtime 5/31/22   DES De Guzman CNP   pantoprazole (PROTONIX) 40 MG tablet Take 1 tablet by mouth 2 times daily (before meals) 3/15/22 6/27/23  DES De Guzman CNP   QUEtiapine (SEROQUEL) 200 MG tablet TAKE 1 TABLET DAILY 2/2/22   DES De Guzman CNP   Handicap Placard MISC by Does not apply route Exp 3/16/2026 3/16/21   DES De Guzman CNP       Future Appointments   Date Time Provider Glenroy Falcon   1/25/2023  9:45 AM Felipe Lee, DO bariatric [place]

## 2023-01-20 RX ORDER — QUETIAPINE FUMARATE 200 MG/1
TABLET, FILM COATED ORAL
Qty: 90 TABLET | Refills: 3 | Status: SHIPPED | OUTPATIENT
Start: 2023-01-20

## 2023-01-20 RX ORDER — ALPRAZOLAM 0.5 MG/1
0.5 TABLET ORAL 3 TIMES DAILY PRN
Qty: 42 TABLET | Refills: 0 | Status: SHIPPED | OUTPATIENT
Start: 2023-01-20 | End: 2023-02-19

## 2023-01-22 NOTE — CARE COORDINATION
I was out of office ill. If clearance was needed then she should of had appointment as she is postoperative surgery.

## 2023-01-25 ENCOUNTER — TELEPHONE (OUTPATIENT)
Dept: BARIATRICS/WEIGHT MGMT | Age: 78
End: 2023-01-25

## 2023-01-31 ENCOUNTER — TELEPHONE (OUTPATIENT)
Dept: FAMILY MEDICINE CLINIC | Age: 78
End: 2023-01-31

## 2023-01-31 NOTE — TELEPHONE ENCOUNTER
Patient requesting a script for Duloxetine to be sent to [facility] in [place]. Pain Mgmt, Dr. Deric Oakes advising patient to begin this medication d/y depression. Patient told Dr. Deric Oakes she spends her days crying. He has also give her referrals for aquatic therapy and exercises to assist her.

## 2023-02-01 NOTE — TELEPHONE ENCOUNTER
Prior to office sending \"new\" medication she needs OV. If Dr Harrel Schwab feels appropriate he should prescribe - also I have received no notes or communication from him.

## 2023-02-02 ENCOUNTER — HOSPITAL ENCOUNTER (OUTPATIENT)
Dept: GENERAL RADIOLOGY | Age: 78
Discharge: HOME | End: 2023-02-04
Payer: OTHER MISCELLANEOUS

## 2023-02-02 ENCOUNTER — HOSPITAL ENCOUNTER (OUTPATIENT)
Age: 78
Discharge: HOME | End: 2023-02-04
Payer: OTHER MISCELLANEOUS

## 2023-02-02 DIAGNOSIS — M47.812 CERVICAL ARTHRITIS: ICD-10-CM

## 2023-02-02 DIAGNOSIS — M51.26 LOW BACK PAIN DUE TO DISPLACEMENT OF INTERVERTEBRAL DISC: ICD-10-CM

## 2023-02-02 DIAGNOSIS — M51.34 DDD (DEGENERATIVE DISC DISEASE), THORACIC: ICD-10-CM

## 2023-02-02 PROCEDURE — 72114 X-RAY EXAM L-S SPINE BENDING: CPT

## 2023-02-02 PROCEDURE — 72050 X-RAY EXAM NECK SPINE 4/5VWS: CPT

## 2023-02-02 PROCEDURE — 72072 X-RAY EXAM THORAC SPINE 3VWS: CPT

## 2023-02-10 ENCOUNTER — TELEPHONE (OUTPATIENT)
Dept: FAMILY MEDICINE CLINIC | Age: 78
End: 2023-02-10

## 2023-02-10 NOTE — TELEPHONE ENCOUNTER
Patient calling to ask if she could do a phone visit instead of a VV. Bre MUÑOZ stated that patient requested a VV. Patient could do a VV or a OV, that [facility] does not do phone visits. Patient stated that we do not have her e-mail. Patient was informed that AD would send her by text a link to open visit, that this is not done through e-mail. Patient stated that she has messenger? Patient was informed that the visit is through a normal text message on her phone. Patient asked how will you know the number? Writer verified the number and the patient stated the number on file is a land line and she gave her mobile number [phone]. This was added to her contact numbers. Patient states she will try VV but if it does not work then she will just have to call and make a OV.

## 2023-02-13 ENCOUNTER — TELEMEDICINE (OUTPATIENT)
Dept: FAMILY MEDICINE CLINIC | Age: 78
End: 2023-02-13

## 2023-02-13 DIAGNOSIS — F41.9 ANXIETY: Chronic | ICD-10-CM

## 2023-02-13 DIAGNOSIS — F33.1 MODERATE EPISODE OF RECURRENT MAJOR DEPRESSIVE DISORDER (HCC): Primary | ICD-10-CM

## 2023-02-13 RX ORDER — OXYCODONE AND ACETAMINOPHEN 10; 325 MG/1; MG/1
TABLET ORAL
COMMUNITY
Start: 2023-01-31

## 2023-02-13 RX ORDER — ALPRAZOLAM 0.5 MG/1
0.5 TABLET ORAL 3 TIMES DAILY PRN
Qty: 42 TABLET | Refills: 0 | Status: SHIPPED | OUTPATIENT
Start: 2023-02-13 | End: 2023-03-15

## 2023-02-13 RX ORDER — DULOXETIN HYDROCHLORIDE 20 MG/1
20 CAPSULE, DELAYED RELEASE ORAL DAILY
Qty: 30 CAPSULE | Refills: 0 | Status: SHIPPED | OUTPATIENT
Start: 2023-02-13 | End: 2023-04-14

## 2023-02-13 ASSESSMENT — ENCOUNTER SYMPTOMS
ABDOMINAL PAIN: 0
ABDOMINAL DISTENTION: 1
BACK PAIN: 1
WHEEZING: 1
CHEST TIGHTNESS: 1
TROUBLE SWALLOWING: 1
CONSTIPATION: 0
SHORTNESS OF BREATH: 0
COUGH: 1
DIARRHEA: 0
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
## 2023-02-22 RX ORDER — BENZONATATE 200 MG/1
CAPSULE ORAL
Qty: 30 CAPSULE | Refills: 0 | Status: SHIPPED | OUTPATIENT
Start: 2023-02-22 | End: 2023-02-23 | Stop reason: SDUPTHER

## 2023-02-23 ENCOUNTER — TELEPHONE (OUTPATIENT)
Dept: FAMILY MEDICINE CLINIC | Age: 78
End: 2023-02-23

## 2023-02-23 ENCOUNTER — OFFICE VISIT (OUTPATIENT)
Dept: FAMILY MEDICINE CLINIC | Age: 78
End: 2023-02-23
Payer: MEDICARE

## 2023-02-23 VITALS
OXYGEN SATURATION: 97 % | WEIGHT: 156.2 LBS | RESPIRATION RATE: 22 BRPM | DIASTOLIC BLOOD PRESSURE: 78 MMHG | HEART RATE: 80 BPM | SYSTOLIC BLOOD PRESSURE: 122 MMHG | TEMPERATURE: 97.8 F | BODY MASS INDEX: 30.51 KG/M2

## 2023-02-23 DIAGNOSIS — J18.9 PNEUMONIA OF BOTH LUNGS DUE TO INFECTIOUS ORGANISM, UNSPECIFIED PART OF LUNG: Primary | ICD-10-CM

## 2023-02-23 DIAGNOSIS — R05.1 ACUTE COUGH: ICD-10-CM

## 2023-02-23 PROCEDURE — 1123F ACP DISCUSS/DSCN MKR DOCD: CPT | Performed by: NURSE PRACTITIONER

## 2023-02-23 PROCEDURE — 99214 OFFICE O/P EST MOD 30 MIN: CPT | Performed by: NURSE PRACTITIONER

## 2023-02-23 PROCEDURE — 1036F TOBACCO NON-USER: CPT | Performed by: NURSE PRACTITIONER

## 2023-02-23 PROCEDURE — G8417 CALC BMI ABV UP PARAM F/U: HCPCS | Performed by: NURSE PRACTITIONER

## 2023-02-23 PROCEDURE — G8427 DOCREV CUR MEDS BY ELIG CLIN: HCPCS | Performed by: NURSE PRACTITIONER

## 2023-02-23 PROCEDURE — G8399 PT W/DXA RESULTS DOCUMENT: HCPCS | Performed by: NURSE PRACTITIONER

## 2023-02-23 PROCEDURE — 1090F PRES/ABSN URINE INCON ASSESS: CPT | Performed by: NURSE PRACTITIONER

## 2023-02-23 PROCEDURE — G8484 FLU IMMUNIZE NO ADMIN: HCPCS | Performed by: NURSE PRACTITIONER

## 2023-02-23 RX ORDER — GUAIFENESIN 600 MG/1
600 TABLET, EXTENDED RELEASE ORAL 2 TIMES DAILY
Qty: 30 TABLET | Refills: 0 | Status: SHIPPED | OUTPATIENT
Start: 2023-02-23 | End: 2023-03-10

## 2023-02-23 RX ORDER — CHLORHEXIDINE GLUCONATE 0.12 MG/ML
RINSE ORAL PRN
COMMUNITY
Start: 2023-02-17

## 2023-02-23 RX ORDER — PREDNISONE 10 MG/1
10 TABLET ORAL DAILY
Qty: 5 TABLET | Refills: 0 | Status: SHIPPED | OUTPATIENT
Start: 2023-02-23 | End: 2023-02-28

## 2023-02-23 RX ORDER — BENZONATATE 200 MG/1
200 CAPSULE ORAL 3 TIMES DAILY PRN
Qty: 90 CAPSULE | Refills: 0 | Status: SHIPPED | OUTPATIENT
Start: 2023-02-23

## 2023-02-23 RX ORDER — IPRATROPIUM BROMIDE AND ALBUTEROL SULFATE 2.5; .5 MG/3ML; MG/3ML
1 SOLUTION RESPIRATORY (INHALATION) 2 TIMES DAILY PRN
Qty: 60 ML | Refills: 0 | Status: SHIPPED | OUTPATIENT
Start: 2023-02-23 | End: 2023-03-25

## 2023-02-23 RX ORDER — LEVOFLOXACIN 750 MG/1
750 TABLET ORAL DAILY
Qty: 7 TABLET | Refills: 0 | Status: SHIPPED | OUTPATIENT
Start: 2023-02-23 | End: 2023-03-02

## 2023-02-23 ASSESSMENT — ENCOUNTER SYMPTOMS
SHORTNESS OF BREATH: 0
ABDOMINAL PAIN: 0
CHEST TIGHTNESS: 1
COUGH: 1
DIARRHEA: 0
BACK PAIN: 1
CONSTIPATION: 0
WHEEZING: 1
TROUBLE SWALLOWING: 1
ABDOMINAL DISTENTION: 1

## 2023-02-23 NOTE — TELEPHONE ENCOUNTER
[facility] pharmacy called stating the DuoNeb is on back order. Is provider okay with separate vials of Albuterol and Ipratropium patient mix vials together. Writer spoke with provider Kusum Amezquita, provider is okay with separate vials but please explained to patient the directions. Writer informed pharmacy of information. Pharmacy is dispensing 50 vials of albuterol & 50 vials of Ipratropium and give patient instructions.
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
## 2023-02-23 NOTE — PATIENT INSTRUCTIONS
STOP Clindamycin   Start Levaquin 750 mg tomorrow for 1 week  Mucinex 600 mg twice a day  Can take Tessalon Perles for cough as needed   Duoneb (Albuterol and Atrovent) nebulizer breathing treatments twice a day   Continue Trelegy inhaler daily

## 2023-02-28 ASSESSMENT — PATIENT HEALTH QUESTIONNAIRE - PHQ9
5. POOR APPETITE OR OVEREATING: 2
SUM OF ALL RESPONSES TO PHQ QUESTIONS 1-9: 14
1. LITTLE INTEREST OR PLEASURE IN DOING THINGS: 2
8. MOVING OR SPEAKING SO SLOWLY THAT OTHER PEOPLE COULD HAVE NOTICED. OR THE OPPOSITE, BEING SO FIGETY OR RESTLESS THAT YOU HAVE BEEN MOVING AROUND A LOT MORE THAN USUAL: 2
2. FEELING DOWN, DEPRESSED OR HOPELESS: 2
10. IF YOU CHECKED OFF ANY PROBLEMS, HOW DIFFICULT HAVE THESE PROBLEMS MADE IT FOR YOU TO DO YOUR WORK, TAKE CARE OF THINGS AT HOME, OR GET ALONG WITH OTHER PEOPLE: 2
SUM OF ALL RESPONSES TO PHQ9 QUESTIONS 1 & 2: 4
7. TROUBLE CONCENTRATING ON THINGS, SUCH AS READING THE NEWSPAPER OR WATCHING TELEVISION: 2
SUM OF ALL RESPONSES TO PHQ QUESTIONS 1-9: 14
9. THOUGHTS THAT YOU WOULD BE BETTER OFF DEAD, OR OF HURTING YOURSELF: 0
SUM OF ALL RESPONSES TO PHQ QUESTIONS 1-9: 14
6. FEELING BAD ABOUT YOURSELF - OR THAT YOU ARE A FAILURE OR HAVE LET YOURSELF OR YOUR FAMILY DOWN: 0
4. FEELING TIRED OR HAVING LITTLE ENERGY: 2
SUM OF ALL RESPONSES TO PHQ QUESTIONS 1-9: 14
3. TROUBLE FALLING OR STAYING ASLEEP: 2

## 2023-02-28 ASSESSMENT — ANXIETY QUESTIONNAIRES
2. NOT BEING ABLE TO STOP OR CONTROL WORRYING: 3
6. BECOMING EASILY ANNOYED OR IRRITABLE: 3
GAD7 TOTAL SCORE: 21
4. TROUBLE RELAXING: 3
5. BEING SO RESTLESS THAT IT IS HARD TO SIT STILL: 3
1. FEELING NERVOUS, ANXIOUS, OR ON EDGE: 3
3. WORRYING TOO MUCH ABOUT DIFFERENT THINGS: 3
IF YOU CHECKED OFF ANY PROBLEMS ON THIS QUESTIONNAIRE, HOW DIFFICULT HAVE THESE PROBLEMS MADE IT FOR YOU TO DO YOUR WORK, TAKE CARE OF THINGS AT HOME, OR GET ALONG WITH OTHER PEOPLE: VERY DIFFICULT
7. FEELING AFRAID AS IF SOMETHING AWFUL MIGHT HAPPEN: 3

## 2023-03-01 DIAGNOSIS — F41.9 ANXIETY: Chronic | ICD-10-CM

## 2023-03-01 DIAGNOSIS — F51.01 PRIMARY INSOMNIA: ICD-10-CM

## 2023-03-02 ENCOUNTER — TELEPHONE (OUTPATIENT)
Dept: FAMILY MEDICINE CLINIC | Age: 78
End: 2023-03-02

## 2023-03-02 DIAGNOSIS — F51.01 PRIMARY INSOMNIA: ICD-10-CM

## 2023-03-02 DIAGNOSIS — F41.9 ANXIETY: Chronic | ICD-10-CM

## 2023-03-02 DIAGNOSIS — F33.1 MAJOR DEPRESSIVE DISORDER, RECURRENT EPISODE, MODERATE (HCC): ICD-10-CM

## 2023-03-02 RX ORDER — ALPRAZOLAM 0.5 MG/1
0.5 TABLET ORAL 3 TIMES DAILY PRN
Qty: 90 TABLET | Refills: 0 | Status: SHIPPED | OUTPATIENT
Start: 2023-03-02 | End: 2023-04-01

## 2023-03-02 RX ORDER — QUETIAPINE FUMARATE 50 MG/1
TABLET, FILM COATED ORAL
Qty: 90 TABLET | Refills: 2 | OUTPATIENT
Start: 2023-03-02 | End: 2024-03-02

## 2023-03-02 NOTE — TELEPHONE ENCOUNTER
LOV 2/23/23  RTO 3 months; F/U scheduled  LRF 2/13/23  CSM 3/12/21    Health Maintenance   Topic Date Due    Hepatitis C screen  Never done    DTaP/Tdap/Td vaccine (1 - Tdap) Never done    Shingles vaccine (1 of 2) Never done    Flu vaccine (1) 08/01/2022    COVID-19 Vaccine (5 - Booster for Moderna series) 09/15/2022    Lipids  03/01/2023    Depression Monitoring  02/28/2024    DEXA (modify frequency per FRAX score)  Completed    Pneumococcal 65+ years Vaccine  Completed    Hepatitis A vaccine  Aged Out    Hib vaccine  Aged Out    Meningococcal (ACWY) vaccine  Aged Out             (applicable per patient's age: Cancer Screenings, Depression Screening, Fall Risk Screening, Immunizations)    Hemoglobin A1C (%)   Date Value   03/01/2022 5.6   08/12/2020 5.6   02/19/2019 5.5     Microalb/Crt.  Ratio (mcg/mg creat)   Date Value   05/12/2016 7     LDL Cholesterol (mg/dL)   Date Value   03/01/2022          LDL Calculated (mg/dL)   Date Value   11/13/2014 109     AST (U/L)   Date Value   03/22/2022 27     ALT (U/L)   Date Value   03/22/2022 18     BUN (mg/dL)   Date Value   12/08/2022 12      (goal A1C is < 7)   (goal LDL is <100) need 30-50% reduction from baseline     BP Readings from Last 3 Encounters:   02/23/23 122/78   12/16/22 138/88   12/08/22 106/74    (goal /80)      All Future Testing planned in [facility]:  Lab Frequency Next Occurrence       Next Visit Date:  Future Appointments   Date Time Provider Glenroy Falcon   5/25/2023 12:00 PM Marli Grimm, APRN - CNP [facility]            Patient Active Problem List:     Hyperlipidemia     Fibromyalgia     Osteopenia     Hx of bilateral breast implants     Arthritis of shoulder region, right     Anxiety     Anemia, normocytic normochromic     Chronic pain associated with significant psychosocial dysfunction     Primary osteoarthritis involving multiple joints     Spondylosis 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     Lumbar and sacral arthritis     Chronic biliary pancreatitis (HCC)     Moderate episode of recurrent major depressive disorder (HCC)     Uncomplicated opioid dependence ([address]     Hyperuricemia     Primary open-angle glaucoma, right eye, mild stage     Primary open-angle glaucoma, left eye, mild stage     Gastroesophageal reflux disease     Esophageal dysphagia     Paraesophageal hernia     Recurrent aspiration pneumonia (HCC)     Acute respiratory failure with hypoxemia (HCC)     Simple chronic bronchitis ([address]     Hospital-acquired pneumonia     NSTEMI (non-ST elevated myocardial infarction) ([facility]     History of degenerative disc disease     Hx of diabetes mellitus     Sepsis (HCC)     Hx of Esophageal dysmotility     Type 2 diabetes mellitus     S/P repair of paraesophageal hernia

## 2023-03-03 RX ORDER — QUETIAPINE FUMARATE 50 MG/1
TABLET, FILM COATED ORAL
Qty: 60 TABLET | Refills: 5 | Status: SHIPPED | OUTPATIENT
Start: 2023-03-03 | End: 2024-03-02

## 2023-03-03 RX ORDER — QUETIAPINE FUMARATE 200 MG/1
TABLET, FILM COATED ORAL
Qty: 90 TABLET | Refills: 1 | Status: SHIPPED | OUTPATIENT
Start: 2023-03-03

## 2023-04-21 DIAGNOSIS — F41.9 ANXIETY: Chronic | ICD-10-CM

## 2023-04-24 RX ORDER — ALPRAZOLAM 0.5 MG/1
0.5 TABLET ORAL 3 TIMES DAILY PRN
Qty: 90 TABLET | Refills: 0 | Status: SHIPPED | OUTPATIENT
Start: 2023-04-24 | End: 2023-05-24

## 2023-04-24 NOTE — TELEPHONE ENCOUNTER
LOV 2/23/23  RTO 3 months; F/U scheduled  LRF 3/2/23  CSM 3/12/21    Health Maintenance   Topic Date Due    Hepatitis C screen  Never done    DTaP/Tdap/Td vaccine (1 - Tdap) Never done    Shingles vaccine (1 of 2) Never done    COVID-19 Vaccine (5 - Booster for Moderna series) 09/15/2022    Lipids  03/01/2023    Flu vaccine (Season Ended) 08/01/2023    Depression Monitoring  02/28/2024    DEXA (modify frequency per FRAX score)  Completed    Pneumococcal 65+ years Vaccine  Completed    Hepatitis A vaccine  Aged Out    Hib vaccine  Aged Out    Meningococcal (ACWY) vaccine  Aged Out             (applicable per patient's age: Cancer Screenings, Depression Screening, Fall Risk Screening, Immunizations)    Hemoglobin A1C (%)   Date Value   03/01/2022 5.6   08/12/2020 5.6   02/19/2019 5.5     Microalb/Crt.  Ratio (mcg/mg creat)   Date Value   05/12/2016 7     LDL Cholesterol (mg/dL)   Date Value   03/01/2022          LDL Calculated (mg/dL)   Date Value   11/13/2014 109     AST (U/L)   Date Value   03/22/2022 27     ALT (U/L)   Date Value   03/22/2022 18     BUN (mg/dL)   Date Value   12/08/2022 12      (goal A1C is < 7)   (goal LDL is <100) need 30-50% reduction from baseline     BP Readings from Last 3 Encounters:   02/23/23 122/78   12/16/22 138/88   12/08/22 106/74    (goal /80)      All Future Testing planned in [facility]:  Lab Frequency Next Occurrence       Next Visit Date:  Future Appointments   Date Time Provider Glenroy Falcon   5/3/2023  3:45 PM [facility]  [facility]   5/25/2023 12:00 PM Marli Garcia Asp, APRN - CNP [facility]            Patient Active Problem List:     Hyperlipidemia     Fibromyalgia     Osteopenia     Hx of bilateral breast implants     Arthritis of shoulder region, right     Anxiety     Anemia, normocytic normochromic     Chronic pain associated with significant psychosocial dysfunction     Primary osteoarthritis involving multiple joints     Spondylosis of

## 2023-05-01 NOTE — PLAN OF CARE
Problem: Pain:  Goal: Pain level will decrease  Description: Pain level will decrease  Outcome: Ongoing  Note: No pain at this time. 0/10 pain scale. Problem: Infection:  Goal: Will remain free from infection  Description: Will remain free from infection  Outcome: Ongoing     Problem: Safety:  Goal: Free from accidental physical injury  Description: Free from accidental physical injury  Outcome: Ongoing  Note: No falls noted this shift. Patient ambulates with x1 staff assistance without difficulty. Bed kept in low position. Safe environment maintained. Bedside table & call light in reach. Uses call light appropriately when needing assistance. Additional Notes: Quoted $50 (for up to 5) and up. Price depends on quantity and complexity. Detail Level: Simple Render Risk Assessment In Note?: no

## 2023-05-02 RX ORDER — ALBUTEROL SULFATE 2.5 MG/3ML
2.5 SOLUTION RESPIRATORY (INHALATION) ONCE
Status: DISCONTINUED | OUTPATIENT
Start: 2023-05-04 | End: 2023-05-06 | Stop reason: HOSPADM

## 2023-05-03 ENCOUNTER — HOSPITAL ENCOUNTER (OUTPATIENT)
Dept: PULMONOLOGY | Age: 78
Discharge: HOME | End: 2023-05-03

## 2023-06-09 ENCOUNTER — HOSPITAL ENCOUNTER (OUTPATIENT)
Dept: PULMONOLOGY | Age: 78
Discharge: HOME | End: 2023-06-09
Payer: MEDICARE

## 2023-06-09 VITALS — OXYGEN SATURATION: 95 %

## 2023-06-09 DIAGNOSIS — R06.02 SOB (SHORTNESS OF BREATH): ICD-10-CM

## 2023-06-09 LAB
EXPIRATORY TIME-POST: NORMAL
EXPIRATORY TIME: NORMAL
FEF 25-75% %CHNG: NORMAL
FEF 25-75% %PRED-POST: NORMAL
FEF 25-75% %PRED-PRE: NORMAL
FEF 25-75% PRED: NORMAL
FEF 25-75%-POST: NORMAL
FEF 25-75%-PRE: NORMAL
FEV1 %PRED-POST: NORMAL
FEV1 %PRED-PRE: NORMAL
FEV1 PRED: NORMAL
FEV1-POST: NORMAL
FEV1-PRE: NORMAL
FEV1/FVC %PRED-POST: NORMAL
FEV1/FVC %PRED-PRE: NORMAL
FEV1/FVC PRED: NORMAL
FEV1/FVC-POST: NORMAL
FEV1/FVC-PRE: NORMAL
FVC %PRED-POST: NORMAL
FVC %PRED-PRE: NORMAL
FVC PRED: NORMAL
FVC-POST: NORMAL
FVC-PRE: NORMAL
PEF %PRED-POST: NORMAL
PEF %PRED-PRE: NORMAL
PEF PRED: NORMAL
PEF%CHNG: NORMAL
PEF-POST: NORMAL
PEF-PRE: NORMAL

## 2023-06-09 PROCEDURE — 94070 EVALUATION OF WHEEZING: CPT

## 2023-06-09 PROCEDURE — 6360000002 HC RX W HCPCS: Performed by: INTERNAL MEDICINE

## 2023-06-09 PROCEDURE — 94060 EVALUATION OF WHEEZING: CPT

## 2023-06-09 PROCEDURE — 94375 RESPIRATORY FLOW VOLUME LOOP: CPT

## 2023-06-09 RX ORDER — ALBUTEROL SULFATE 2.5 MG/3ML
2.5 SOLUTION RESPIRATORY (INHALATION) ONCE
Status: COMPLETED | OUTPATIENT
Start: 2023-06-09 | End: 2023-06-09

## 2023-06-09 RX ADMIN — ALBUTEROL SULFATE 2.5 MG: 2.5 SOLUTION RESPIRATORY (INHALATION) at 16:31

## 2023-06-09 RX ADMIN — METHACHOLINE CHLORIDE 100 MG: 100 POWDER, FOR SOLUTION RESPIRATORY (INHALATION) at 16:27

## 2023-06-26 ENCOUNTER — HOSPITAL ENCOUNTER (OUTPATIENT)
Age: 78
Discharge: HOME | End: 2023-06-28
Payer: MEDICARE

## 2023-06-26 ENCOUNTER — HOSPITAL ENCOUNTER (OUTPATIENT)
Dept: GENERAL RADIOLOGY | Age: 78
Discharge: HOME | End: 2023-06-28
Payer: MEDICARE

## 2023-06-26 DIAGNOSIS — R06.02 SOB (SHORTNESS OF BREATH): ICD-10-CM

## 2023-06-26 PROCEDURE — 71046 X-RAY EXAM CHEST 2 VIEWS: CPT

## 2023-06-27 DIAGNOSIS — E78.00 PURE HYPERCHOLESTEROLEMIA: Chronic | ICD-10-CM

## 2023-06-28 ENCOUNTER — HOSPITAL ENCOUNTER (OUTPATIENT)
Age: 78
Setting detail: SPECIMEN
Discharge: HOME | End: 2023-06-28

## 2023-06-28 ENCOUNTER — OFFICE VISIT (OUTPATIENT)
Dept: FAMILY MEDICINE CLINIC | Age: 78
End: 2023-06-28

## 2023-06-28 VITALS
RESPIRATION RATE: 26 BRPM | SYSTOLIC BLOOD PRESSURE: 122 MMHG | BODY MASS INDEX: 30.62 KG/M2 | TEMPERATURE: 97.8 F | WEIGHT: 156.8 LBS | DIASTOLIC BLOOD PRESSURE: 80 MMHG | OXYGEN SATURATION: 94 % | HEART RATE: 96 BPM

## 2023-06-28 DIAGNOSIS — R06.09 DYSPNEA ON EXERTION: ICD-10-CM

## 2023-06-28 DIAGNOSIS — R05.1 ACUTE COUGH: ICD-10-CM

## 2023-06-28 DIAGNOSIS — F41.9 ANXIETY: Chronic | ICD-10-CM

## 2023-06-28 DIAGNOSIS — D64.9 ANEMIA, NORMOCYTIC NORMOCHROMIC: ICD-10-CM

## 2023-06-28 DIAGNOSIS — J69.0 RECURRENT ASPIRATION PNEUMONIA (HCC): Primary | ICD-10-CM

## 2023-06-28 DIAGNOSIS — J41.0 SIMPLE CHRONIC BRONCHITIS (HCC): ICD-10-CM

## 2023-06-28 DIAGNOSIS — R13.19 ESOPHAGEAL DYSPHAGIA: ICD-10-CM

## 2023-06-28 DIAGNOSIS — J69.0 RECURRENT ASPIRATION PNEUMONIA (HCC): ICD-10-CM

## 2023-06-28 LAB
BASOPHILS # BLD: 0.06 K/UL (ref 0–0.2)
BASOPHILS NFR BLD: 1 % (ref 0–2)
EOSINOPHIL # BLD: 0.31 K/UL (ref 0–0.44)
EOSINOPHILS RELATIVE PERCENT: 6 % (ref 1–4)
ERYTHROCYTE [DISTWIDTH] IN BLOOD BY AUTOMATED COUNT: 13.9 % (ref 11.8–14.4)
HCT VFR BLD AUTO: 45.6 % (ref 36.3–47.1)
HGB BLD-MCNC: 14.4 G/DL (ref 11.9–15.1)
IMM GRANULOCYTES # BLD AUTO: <0.03 K/UL (ref 0–0.3)
IMM GRANULOCYTES NFR BLD: 0 %
LYMPHOCYTES # BLD: 47 % (ref 24–43)
LYMPHOCYTES NFR BLD: 2.43 K/UL (ref 1.1–3.7)
MCH RBC QN AUTO: 30.1 PG (ref 25.2–33.5)
MCHC RBC AUTO-ENTMCNC: 31.6 G/DL (ref 28.4–34.8)
MCV RBC AUTO: 95.4 FL (ref 82.6–102.9)
MONOCYTES NFR BLD: 0.52 K/UL (ref 0.1–1.2)
MONOCYTES NFR BLD: 10 % (ref 3–12)
NEUTROPHILS NFR BLD: 36 % (ref 36–65)
NEUTS SEG NFR BLD: 1.84 K/UL (ref 1.5–8.1)
NRBC BLD-RTO: 0 PER 100 WBC
PLATELET # BLD AUTO: 315 K/UL (ref 138–453)
PMV BLD AUTO: 10.4 FL (ref 8.1–13.5)
RBC # BLD AUTO: 4.78 M/UL (ref 3.95–5.11)
WBC OTHER # BLD: 5.2 K/UL (ref 3.5–11.3)

## 2023-06-28 RX ORDER — ATORVASTATIN CALCIUM 80 MG/1
TABLET, FILM COATED ORAL
Qty: 90 TABLET | Refills: 3 | Status: SHIPPED | OUTPATIENT
Start: 2023-06-28 | End: 2024-06-28

## 2023-06-28 RX ORDER — ALBUTEROL SULFATE 90 UG/1
AEROSOL, METERED RESPIRATORY (INHALATION)
Qty: 18 G | Refills: 1 | Status: SHIPPED | OUTPATIENT
Start: 2023-06-28

## 2023-06-28 RX ORDER — ALPRAZOLAM 0.5 MG/1
0.5 TABLET ORAL 3 TIMES DAILY PRN
Qty: 90 TABLET | Refills: 0 | Status: SHIPPED | OUTPATIENT
Start: 2023-06-28 | End: 2023-07-28

## 2023-06-28 ASSESSMENT — ENCOUNTER SYMPTOMS
COUGH: 1
ABDOMINAL DISTENTION: 1
BACK PAIN: 1
SHORTNESS OF BREATH: 0
ABDOMINAL PAIN: 0
TROUBLE SWALLOWING: 1
CHEST TIGHTNESS: 1
WHEEZING: 1
DIARRHEA: 0
CONSTIPATION: 0

## 2023-06-29 LAB
ANION GAP SERPL CALCULATED.3IONS-SCNC: 16 MMOL/L (ref 9–17)
BNP SERPL-MCNC: 65 PG/ML
BUN SERPL-MCNC: 10 MG/DL (ref 8–23)
CALCIUM SERPL-MCNC: 9.6 MG/DL (ref 8.6–10.4)
CHLORIDE SERPL-SCNC: 104 MMOL/L (ref 98–107)
CO2 SERPL-SCNC: 20 MMOL/L (ref 20–31)
CREAT SERPL-MCNC: 0.76 MG/DL (ref 0.5–0.9)
FERRITIN SERPL-MCNC: 43 NG/ML (ref 13–150)
GFR SERPL CREATININE-BSD FRML MDRD: >60 ML/MIN/1.73M2
GLUCOSE SERPL-MCNC: 74 MG/DL (ref 70–99)
IRON SATN MFR SERPL: 18 % (ref 20–55)
IRON SERPL-MCNC: 66 UG/DL (ref 37–145)
POTASSIUM SERPL-SCNC: 4.6 MMOL/L (ref 3.7–5.3)
SODIUM SERPL-SCNC: 140 MMOL/L (ref 135–144)
TIBC SERPL-MCNC: 368 UG/DL (ref 250–450)
UNSATURATED IRON BINDING CAPACITY: 302 UG/DL (ref 112–347)

## 2023-07-10 DIAGNOSIS — J41.0 SIMPLE CHRONIC BRONCHITIS (HCC): ICD-10-CM

## 2023-08-01 ENCOUNTER — HOSPITAL ENCOUNTER (OUTPATIENT)
Dept: GENERAL RADIOLOGY | Age: 78
Discharge: HOME | End: 2023-08-03
Payer: MEDICARE

## 2023-08-01 ENCOUNTER — HOSPITAL ENCOUNTER (OUTPATIENT)
Age: 78
Discharge: HOME | End: 2023-08-03
Payer: MEDICARE

## 2023-08-01 DIAGNOSIS — R06.02 SHORTNESS OF BREATH: ICD-10-CM

## 2023-08-01 PROCEDURE — 71046 X-RAY EXAM CHEST 2 VIEWS: CPT

## 2023-08-11 DIAGNOSIS — F41.9 ANXIETY: Chronic | ICD-10-CM

## 2023-08-11 NOTE — TELEPHONE ENCOUNTER
LOV 6/28/23   RTO 10/2/23  LRF 6/28/23          Controlled Substance Monitoring:    Acute and Chronic Pain Monitoring:   RX Monitoring 7/17/2017   Attestation The Prescription Monitoring Report for this patient was reviewed today. Periodic Controlled Substance Monitoring Possible medication side effects, risk of tolerance and/or dependence, and alternative treatments discussed; No signs of potential drug abuse or diversion identified.

## 2023-08-13 RX ORDER — ALPRAZOLAM 0.5 MG/1
TABLET ORAL
Qty: 30 TABLET | Refills: 0 | Status: SHIPPED | OUTPATIENT
Start: 2023-08-13 | End: 2023-09-12

## 2023-09-12 DIAGNOSIS — F51.01 PRIMARY INSOMNIA: ICD-10-CM

## 2023-09-12 NOTE — TELEPHONE ENCOUNTER
LOV 6/28/2023     RTO 10/2/2023  LRF 3/3/2023          Controlled Substance Monitoring:    Acute and Chronic Pain Monitoring:   RX Monitoring Attestation Periodic Controlled Substance Monitoring   7/17/2017   8:05 AM The Prescription Monitoring Report for this patient was reviewed today. Possible medication side effects, risk of tolerance and/or dependence, and alternative treatments discussed; No signs of potential drug abuse or diversion identified.

## 2023-09-14 RX ORDER — QUETIAPINE FUMARATE 50 MG/1
TABLET, FILM COATED ORAL
Qty: 60 TABLET | Refills: 2 | Status: SHIPPED | OUTPATIENT
Start: 2023-09-14

## 2023-09-20 ENCOUNTER — HOSPITAL ENCOUNTER (OUTPATIENT)
Age: 78
Discharge: HOME | End: 2023-09-22
Payer: MEDICARE

## 2023-09-20 ENCOUNTER — HOSPITAL ENCOUNTER (OUTPATIENT)
Dept: GENERAL RADIOLOGY | Age: 78
Discharge: HOME | End: 2023-09-22
Payer: MEDICARE

## 2023-09-20 DIAGNOSIS — S63.501A SPRAIN OF RIGHT WRIST, INITIAL ENCOUNTER: ICD-10-CM

## 2023-09-20 DIAGNOSIS — M17.0 ARTHRITIS OF BOTH KNEES: ICD-10-CM

## 2023-09-20 DIAGNOSIS — M47.812 CERVICAL ARTHRITIS: ICD-10-CM

## 2023-09-20 PROCEDURE — 73120 X-RAY EXAM OF HAND: CPT

## 2023-09-20 PROCEDURE — 73560 X-RAY EXAM OF KNEE 1 OR 2: CPT

## 2023-09-20 PROCEDURE — 72050 X-RAY EXAM NECK SPINE 4/5VWS: CPT

## 2023-09-26 DIAGNOSIS — F41.9 ANXIETY: Chronic | ICD-10-CM

## 2023-09-26 NOTE — TELEPHONE ENCOUNTER
LOV 6/28/2023   RTO 10/2/2023  LRF 8/13/2023          Controlled Substance Monitoring:    Acute and Chronic Pain Monitoring:   RX Monitoring Attestation Periodic Controlled Substance Monitoring   7/17/2017   8:05 AM The Prescription Monitoring Report for this patient was reviewed today. Possible medication side effects, risk of tolerance and/or dependence, and alternative treatments discussed; No signs of potential drug abuse or diversion identified.

## 2023-09-27 RX ORDER — ALPRAZOLAM 0.5 MG/1
TABLET ORAL
Qty: 30 TABLET | Refills: 0 | Status: SHIPPED | OUTPATIENT
Start: 2023-09-27 | End: 2023-10-27

## 2023-10-02 ENCOUNTER — OFFICE VISIT (OUTPATIENT)
Dept: FAMILY MEDICINE CLINIC | Age: 78
End: 2023-10-02
Payer: MEDICARE

## 2023-10-02 VITALS
SYSTOLIC BLOOD PRESSURE: 120 MMHG | RESPIRATION RATE: 22 BRPM | OXYGEN SATURATION: 96 % | HEART RATE: 78 BPM | WEIGHT: 153 LBS | BODY MASS INDEX: 29.88 KG/M2 | DIASTOLIC BLOOD PRESSURE: 88 MMHG | TEMPERATURE: 97.5 F

## 2023-10-02 DIAGNOSIS — F43.21 GRIEVING: ICD-10-CM

## 2023-10-02 DIAGNOSIS — F41.9 ANXIETY: Chronic | ICD-10-CM

## 2023-10-02 DIAGNOSIS — E78.00 PURE HYPERCHOLESTEROLEMIA: Chronic | ICD-10-CM

## 2023-10-02 DIAGNOSIS — F33.1 MODERATE EPISODE OF RECURRENT MAJOR DEPRESSIVE DISORDER (HCC): Primary | ICD-10-CM

## 2023-10-02 DIAGNOSIS — M79.7 FIBROMYALGIA: ICD-10-CM

## 2023-10-02 DIAGNOSIS — E11.9 TYPE 2 DIABETES MELLITUS WITHOUT COMPLICATION, WITHOUT LONG-TERM CURRENT USE OF INSULIN (HCC): ICD-10-CM

## 2023-10-02 DIAGNOSIS — J69.0 RECURRENT ASPIRATION PNEUMONIA (HCC): ICD-10-CM

## 2023-10-02 DIAGNOSIS — K21.9 GASTROESOPHAGEAL REFLUX DISEASE, UNSPECIFIED WHETHER ESOPHAGITIS PRESENT: ICD-10-CM

## 2023-10-02 DIAGNOSIS — K86.1 CHRONIC BILIARY PANCREATITIS (HCC): ICD-10-CM
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
## 2023-10-02 PROBLEM — J18.9 HOSPITAL-ACQUIRED PNEUMONIA: Status: RESOLVED | Noted: 2022-03-22 | Resolved: 2023-10-02

## 2023-10-02 PROBLEM — I21.4 NSTEMI (NON-ST ELEVATED MYOCARDIAL INFARCTION) (HCC): Status: RESOLVED | Noted: 2022-03-22 | Resolved: 2023-10-02

## 2023-10-02 PROCEDURE — 1123F ACP DISCUSS/DSCN MKR DOCD: CPT | Performed by: NURSE PRACTITIONER

## 2023-10-02 PROCEDURE — 99214 OFFICE O/P EST MOD 30 MIN: CPT | Performed by: NURSE PRACTITIONER

## 2023-10-02 RX ORDER — TOPIRAMATE 100 MG/1
200 TABLET, FILM COATED ORAL NIGHTLY
COMMUNITY
Start: 2023-09-09 | End: 2023-10-02 | Stop reason: ALTCHOICE

## 2023-10-02 RX ORDER — FLUTICASONE FUROATE, UMECLIDINIUM BROMIDE AND VILANTEROL TRIFENATATE 200; 62.5; 25 UG/1; UG/1; UG/1
1 POWDER RESPIRATORY (INHALATION) DAILY
COMMUNITY
Start: 2023-08-01

## 2023-10-02 RX ORDER — GABAPENTIN 600 MG/1
600 TABLET ORAL 4 TIMES DAILY
COMMUNITY
Start: 2023-09-21

## 2023-10-02 ASSESSMENT — ENCOUNTER SYMPTOMS
ABDOMINAL PAIN: 0
BACK PAIN: 1
TROUBLE SWALLOWING: 0
ABDOMINAL DISTENTION: 0
COUGH: 0
SHORTNESS OF BREATH: 0
DIARRHEA: 0
CONSTIPATION: 0
CHEST TIGHTNESS: 1
WHEEZING: 0

## 2023-10-02 NOTE — PROGRESS NOTES
[address]   [address]  [phone]    10/2/23     Patient ID  Jake Bravo is a 78 y.o. female  Established patient    Chief Complaint  Jake Bravo presents today for Pneumonia, Diabetes, Anxiety, and Depression        ASSESSMENT/PLAN  1. Moderate episode of recurrent major depressive disorder [facility])  Assessment & Plan:   Borderline controlled, continue current medications and continue current treatment plan  2. Grieving  3. Anxiety  4. Recurrent aspiration pneumonia ([address])  -     CBC with Auto Differential; Future  5. Chronic biliary pancreatitis [facility])  Assessment & Plan:   At goal, continue current treatment plan  6. Gastroesophageal reflux disease, unspecified whether esophagitis present  7. Type 2 diabetes mellitus without complication, without long-term current use of insulin ([facility])  -     Comprehensive Metabolic Panel, Fasting; Future  -     Urinalysis with Reflex to Culture; Future  8. Pure hypercholesterolemia  -     Lipid Panel; Future  9. Fibromyalgia     Routine labs   Wants to discuss seasonal vaccines with pulmonology tomorrow at her appt    Continues to refuse therapy after loss of spouse - open to considering       Return in about 4 months (around 2/2/2024) for med check, controlled.       Patient Care Team:  DES Segal CNP as PCP - General (Nurse Practitioner Family)  DES Segal CNP as PCP - Empaneled Provider  Balta Garcia MD (Anesthesiology)  Maddy Mejia DO as Surgeon (Orthopedic Surgery)  Brendan Martin MD as Consulting Physician (Gastroenterology)  Siva Hartmann DO as Consulting Physician (Bariatric Surgery)  DES Drew CNP (Family Medicine)    SUBJECTIVE/OBJECTIVE  History of Present illness / Visit Summary   Patient presents today for follow up   Reviewed health maintenance and any recent labs/imaging    Continues to do own house and yard work   Large home and 1 acre land, [facility]

## 2023-10-13 DIAGNOSIS — F41.9 ANXIETY: Chronic | ICD-10-CM

## 2023-10-13 NOTE — TELEPHONE ENCOUNTER
LOV 10/2/23   RTO 2/2/24  LRF 9/27/23     Tried to call patient to see if she has used up 30 tabs in a matter of 2 weeks. LVM           Controlled Substance Monitoring:    Acute and Chronic Pain Monitoring:   RX Monitoring Attestation Periodic Controlled Substance Monitoring   7/17/2017   8:05 AM The Prescription Monitoring Report for this patient was reviewed today. Possible medication side effects, risk of tolerance and/or dependence, and alternative treatments discussed; No signs of potential drug abuse or diversion identified.

## 2023-10-14 RX ORDER — ALPRAZOLAM 0.5 MG/1
TABLET ORAL
Qty: 60 TABLET | Refills: 0 | Status: SHIPPED | OUTPATIENT
Start: 2023-10-14 | End: 2023-12-13

## 2023-11-06 DIAGNOSIS — F41.9 ANXIETY: Chronic | ICD-10-CM

## 2023-11-06 NOTE — TELEPHONE ENCOUNTER
LOV 10/2/23   RTO 2/2/24  LRF 10/14/23          Controlled Substance Monitoring:    Acute and Chronic Pain Monitoring:   RX Monitoring Attestation Periodic Controlled Substance Monitoring   7/17/2017   8:05 AM The Prescription Monitoring Report for this patient was reviewed today. Possible medication side effects, risk of tolerance and/or dependence, and alternative treatments discussed; No signs of potential drug abuse or diversion identified.

## 2023-11-08 RX ORDER — ALPRAZOLAM 0.5 MG/1
0.5 TABLET ORAL 3 TIMES DAILY PRN
Qty: 60 TABLET | Refills: 0 | Status: SHIPPED | OUTPATIENT
Start: 2023-11-11 | End: 2023-12-11

## 2023-12-03 DIAGNOSIS — F41.9 ANXIETY: Chronic | ICD-10-CM

## 2023-12-03 DIAGNOSIS — F51.01 PRIMARY INSOMNIA: ICD-10-CM

## 2023-12-04 RX ORDER — ALPRAZOLAM 0.5 MG/1
TABLET ORAL
Qty: 60 TABLET | Refills: 0 | Status: SHIPPED | OUTPATIENT
Start: 2023-12-08 | End: 2024-01-07

## 2023-12-04 RX ORDER — QUETIAPINE FUMARATE 50 MG/1
TABLET, FILM COATED ORAL
Qty: 60 TABLET | Refills: 2 | Status: SHIPPED | OUTPATIENT
Start: 2023-12-04 | End: 2024-01-01

## 2023-12-04 NOTE — TELEPHONE ENCOUNTER
LOV 10/02/23  RTO 4 months; F/U scheduled  LRF 11/11/23 and 9/14/23    Health Maintenance   Topic Date Due    DTaP/Tdap/Td vaccine (1 - Tdap) Never done    Shingles vaccine (1 of 2) Never done    Hepatitis B vaccine (1 of 3 - Risk 3-dose series) Never done    Respiratory Syncytial Virus (RSV) age 61 yrs+ (1 - 1-dose 60+ series) Never done    Lipids  03/01/2023    Flu vaccine (1) 08/01/2023    COVID-19 Vaccine (5 - 2023-24 season) 09/01/2023    Annual Wellness Visit (AWV)  09/08/2023    Depression Monitoring  02/28/2024    DEXA (modify frequency per FRAX score)  Completed    Pneumococcal 65+ years Vaccine  Completed    Hepatitis A vaccine  Aged Out    Hib vaccine  Aged Out    Meningococcal (ACWY) vaccine  Aged Out             (applicable per patient's age: Cancer Screenings, Depression Screening, Fall Risk Screening, Immunizations)    Hemoglobin A1C (%)   Date Value   03/01/2022 5.6   08/12/2020 5.6   02/19/2019 5.5     LDL Cholesterol (mg/dL)   Date Value   03/01/2022          LDL Calculated (mg/dL)   Date Value   11/13/2014 109     AST (U/L)   Date Value   03/22/2022 27     ALT (U/L)   Date Value   03/22/2022 18     BUN (mg/dL)   Date Value   06/28/2023 10      (goal A1C is < 7)   (goal LDL is <100) need 30-50% reduction from baseline     BP Readings from Last 3 Encounters:   10/02/23 120/88   06/28/23 122/80   02/23/23 122/78    (goal /80)      All Future Testing planned in [facility]:  Lab Frequency Next Occurrence   CBC with Auto Differential Once 10/02/2023   Comprehensive Metabolic Panel, Fasting Once 10/02/2023   Lipid Panel Once 10/02/2023   Urinalysis with Reflex to Culture Once 10/02/2023       Next Visit Date:  Future Appointments   Date Time Provider [address]   12/28/2023  2:30 PM DES Baltazar CNP [facility]   2/2/2024 10:30 AM DES Baltazar CNP [facility]            Patient Active Problem List:     Hyperlipidemia     Fibromyalgia     Osteopenia     Hx of

## 2023-12-11 ENCOUNTER — HOSPITAL ENCOUNTER (OUTPATIENT)
Age: 78
Discharge: HOME | End: 2023-12-11
Payer: MEDICARE

## 2023-12-11 LAB
ALBUMIN SERPL-MCNC: 3.9 G/DL (ref 3.5–5.2)
ALP SERPL-CCNC: 97 U/L (ref 35–104)
ALT SERPL-CCNC: 13 U/L (ref 5–33)
ANION GAP SERPL CALCULATED.3IONS-SCNC: 11 MMOL/L (ref 9–17)
AST SERPL-CCNC: 16 U/L
BACTERIA URNS QL MICRO: ABNORMAL
BASOPHILS # BLD: 0 K/UL (ref 0–0.2)
BASOPHILS NFR BLD: 1 % (ref 0–2)
BILIRUB SERPL-MCNC: 0.2 MG/DL (ref 0.3–1.2)
BILIRUB UR QL STRIP: NEGATIVE
BUN SERPL-MCNC: 16 MG/DL (ref 8–23)
CALCIUM SERPL-MCNC: 9.3 MG/DL (ref 8.6–10.4)
CASTS #/AREA URNS LPF: ABNORMAL /LPF
CHLORIDE SERPL-SCNC: 107 MMOL/L (ref 98–107)
CHOLEST SERPL-MCNC: 279 MG/DL
CHOLESTEROL/HDL RATIO: 5
CLARITY UR: CLEAR
CO2 SERPL-SCNC: 21 MMOL/L (ref 20–31)
COLOR UR: YELLOW
CREAT SERPL-MCNC: 0.9 MG/DL (ref 0.5–0.9)
EOSINOPHIL # BLD: 0.3 K/UL (ref 0–0.4)
EOSINOPHILS RELATIVE PERCENT: 6 % (ref 0–4)
EPI CELLS #/AREA URNS HPF: ABNORMAL /HPF
ERYTHROCYTE [DISTWIDTH] IN BLOOD BY AUTOMATED COUNT: 14.2 % (ref 11.5–14.9)
GFR SERPL CREATININE-BSD FRML MDRD: >60 ML/MIN/1.73M2
GLUCOSE SERPL-MCNC: 98 MG/DL (ref 70–99)
GLUCOSE UR STRIP-MCNC: NEGATIVE MG/DL
HCT VFR BLD AUTO: 43.5 % (ref 36–46)
HDLC SERPL-MCNC: 56 MG/DL
HGB BLD-MCNC: 14.5 G/DL (ref 12–16)
HGB UR QL STRIP.AUTO: NEGATIVE
KETONES UR STRIP-MCNC: NEGATIVE MG/DL
LDLC SERPL CALC-MCNC: 192 MG/DL (ref 0–130)
LEUKOCYTE ESTERASE UR QL STRIP: ABNORMAL
LYMPHOCYTES NFR BLD: 1.7 K/UL (ref 1–4.8)
LYMPHOCYTES RELATIVE PERCENT: 35 % (ref 24–44)
MCH RBC QN AUTO: 31.3 PG (ref 26–34)
MCHC RBC AUTO-ENTMCNC: 33.2 G/DL (ref 31–37)
MCV RBC AUTO: 94.2 FL (ref 80–100)
MONOCYTES NFR BLD: 0.4 K/UL (ref 0.1–1.3)
MONOCYTES NFR BLD: 9 % (ref 1–7)
NEUTROPHILS NFR BLD: 49 % (ref 36–66)
NEUTS SEG NFR BLD: 2.4 K/UL (ref 1.3–9.1)
NITRITE UR QL STRIP: NEGATIVE
PH UR STRIP: 7 [PH] (ref 5–8)
PLATELET # BLD AUTO: 311 K/UL (ref 150–450)
PMV BLD AUTO: 7 FL (ref 6–12)
POTASSIUM SERPL-SCNC: 4.2 MMOL/L (ref 3.7–5.3)
PROT SERPL-MCNC: 6.9 G/DL (ref 6.4–8.3)
PROT UR STRIP-MCNC: NEGATIVE MG/DL
RBC # BLD AUTO: 4.62 M/UL (ref 4–5.2)
RBC #/AREA URNS HPF: ABNORMAL /HPF
SODIUM SERPL-SCNC: 139 MMOL/L (ref 135–144)
SP GR UR STRIP: 1.02 (ref 1–1.03)
TRIGL SERPL-MCNC: 153 MG/DL
UROBILINOGEN UR STRIP-ACNC: NORMAL EU/DL (ref 0–1)
WBC #/AREA URNS HPF: ABNORMAL /HPF
WBC OTHER # BLD: 4.8 K/UL (ref 3.5–11)

## 2023-12-11 PROCEDURE — 81001 URINALYSIS AUTO W/SCOPE: CPT

## 2023-12-11 PROCEDURE — 80053 COMPREHEN METABOLIC PANEL: CPT

## 2023-12-11 PROCEDURE — 80061 LIPID PANEL: CPT

## 2023-12-11 PROCEDURE — 36415 COLL VENOUS BLD VENIPUNCTURE: CPT

## 2023-12-11 PROCEDURE — 85025 COMPLETE CBC W/AUTO DIFF WBC: CPT

## 2023-12-28 ENCOUNTER — OFFICE VISIT (OUTPATIENT)
Dept: FAMILY MEDICINE CLINIC | Age: 78
End: 2023-12-28

## 2023-12-28 VITALS
TEMPERATURE: 96.9 F | WEIGHT: 151.2 LBS | SYSTOLIC BLOOD PRESSURE: 136 MMHG | HEART RATE: 97 BPM | OXYGEN SATURATION: 97 % | DIASTOLIC BLOOD PRESSURE: 86 MMHG | HEIGHT: 60 IN | BODY MASS INDEX: 29.68 KG/M2 | RESPIRATION RATE: 20 BRPM

## 2023-12-28 DIAGNOSIS — K22.4 ESOPHAGEAL DYSMOTILITY: ICD-10-CM

## 2023-12-28 DIAGNOSIS — F33.1 MODERATE EPISODE OF RECURRENT MAJOR DEPRESSIVE DISORDER (HCC): ICD-10-CM

## 2023-12-28 DIAGNOSIS — F41.9 ANXIETY: Chronic | ICD-10-CM

## 2023-12-28 DIAGNOSIS — M15.9 PRIMARY OSTEOARTHRITIS INVOLVING MULTIPLE JOINTS: ICD-10-CM

## 2023-12-28 DIAGNOSIS — K21.9 GASTROESOPHAGEAL REFLUX DISEASE, UNSPECIFIED WHETHER ESOPHAGITIS PRESENT: ICD-10-CM

## 2023-12-28 DIAGNOSIS — M79.7 FIBROMYALGIA: ICD-10-CM

## 2023-12-28 DIAGNOSIS — Z00.00 MEDICARE ANNUAL WELLNESS VISIT, SUBSEQUENT: Primary | ICD-10-CM

## 2023-12-28 DIAGNOSIS — F11.20 UNCOMPLICATED OPIOID DEPENDENCE (HCC): ICD-10-CM

## 2023-12-28 DIAGNOSIS — M47.816 SPONDYLOSIS OF LUMBAR REGION WITHOUT MYELOPATHY OR RADICULOPATHY: ICD-10-CM

## 2023-12-28 RX ORDER — TOPIRAMATE 50 MG/1
100 TABLET, FILM COATED ORAL NIGHTLY
COMMUNITY
Start: 2023-12-07

## 2023-12-28 NOTE — PROGRESS NOTES
DAY AS NEEDED FOR SLEEP FOR UP TO 30 DAYS Yes DES Garcia CNP   fluticasone-umeclidin-vilant (TRELEGY ELLIPTA) 200-62.5-25 MCG/ACT AEPB inhaler Take 1 puff by mouth daily Yes Lacy Gutierrez MD   atorvastatin (LIPITOR) 80 MG tablet TAKE 1 TABLET BY MOUTH EVERY DAY Yes Marli Cabrales APRN - CNP   VENTOLIN  (90 Base) MCG/ACT inhaler INHALE 1 PUFF BY MOUTH EVERY 4 HOURS AS NEEDED Yes Marli Cabrales APRN - CNP   QUEtiapine (SEROQUEL) 200 MG tablet TAKE 1 TABLET DAILY Yes Marli Cabrales APRN - CNP   chlorhexidine (PERIDEX) 0.12 % solution as needed Yes Lacy Gutierrez MD   oxyCODONE-acetaminophen (PERCOCET)  MG per tablet take 1 tablet by mouth four times a day if needed Yes Lacy Gutierrez MD   Handicap Placard MISC by Does not apply route Exp 3/16/2026 Yes Marli Cabrales APRN - CNP   gabapentin (NEURONTIN) 600 MG tablet Take 1 tablet by mouth 4 times daily.   Patient not taking: Reported on 12/28/2023  Lacy Gutierrez MD   benzonatate (TESSALON) 200 MG capsule Take 1 capsule by mouth 3 times daily as needed for Cough  Patient not taking: Reported on 10/2/2023  DES Garcia CNP   ipratropium-albuterol (DUONEB) 0.5-2.5 (3) MG/3ML SOLN nebulizer solution Inhale 3 mLs into the lungs 2 times daily as needed for Shortness of Breath or Wheezing  Patient not taking: Reported on 6/28/2023  DES Garcia CNP   ALBUTEROL IN Inhale 1 puff into the lungs as needed  Patient not taking: Reported on 10/2/2023  Lacy Gutierrez MD CareTeam (Including outside providers/suppliers regularly involved in providing care):   Patient Care Team:  DES Garcia CNP as PCP - General (Nurse Practitioner Family)  DES Garcia CNP as PCP - Empaneled Provider  Nilsa Russell MD (Anesthesiology)  Saranya Keen DO as Surgeon (Orthopedic Surgery)  Bernard Banuelos MD as Consulting Physician (Gastroenterology)  Leti Forbes DO as
12/31/2023 at 9:40 PM    Please note that this chart was generated using voice recognition Dragon dictation software. Although every effort was made to ensure the accuracy of this automated transcription, some errors in transcription may have occurred.

## 2024-01-05 DIAGNOSIS — F41.9 ANXIETY: Chronic | ICD-10-CM

## 2024-01-05 DIAGNOSIS — F33.1 MAJOR DEPRESSIVE DISORDER, RECURRENT EPISODE, MODERATE (HCC): ICD-10-CM

## 2024-01-05 RX ORDER — QUETIAPINE FUMARATE 200 MG/1
TABLET, FILM COATED ORAL
Qty: 90 TABLET | Refills: 1 | Status: SHIPPED | OUTPATIENT
Start: 2024-01-05

## 2024-01-05 NOTE — TELEPHONE ENCOUNTER
LOV 12/28/2023   RTO 2/2/24  LRF 3/3/2023              Controlled Substance Monitoring:    Acute and Chronic Pain Monitoring:   RX Monitoring Attestation Periodic Controlled Substance Monitoring   7/17/2017   8:05 AM The Prescription Monitoring Report for this patient was reviewed today. Possible medication side effects, risk of tolerance and/or dependence, and alternative treatments discussed;No signs of potential drug abuse or diversion identified.

## 2024-01-16 DIAGNOSIS — F41.9 ANXIETY: Chronic | ICD-10-CM

## 2024-01-17 RX ORDER — ALPRAZOLAM 0.5 MG/1
0.5 TABLET ORAL 2 TIMES DAILY PRN
Qty: 60 TABLET | Refills: 0 | Status: SHIPPED | OUTPATIENT
Start: 2024-01-17 | End: 2024-02-16

## 2024-01-17 NOTE — TELEPHONE ENCOUNTER
LOV 12/28/24   RTO 2/2/24  LRF 12/23/23          Controlled Substance Monitoring:    Acute and Chronic Pain Monitoring:   RX Monitoring Attestation Periodic Controlled Substance Monitoring   7/17/2017   8:05 AM The Prescription Monitoring Report for this patient was reviewed today. Possible medication side effects, risk of tolerance and/or dependence, and alternative treatments discussed;No signs of potential drug abuse or diversion identified.

## 2024-02-03 DIAGNOSIS — J18.9 PNEUMONIA OF BOTH LUNGS DUE TO INFECTIOUS ORGANISM, UNSPECIFIED PART OF LUNG: ICD-10-CM

## 2024-02-03 DIAGNOSIS — F41.9 ANXIETY: Chronic | ICD-10-CM

## 2024-02-03 DIAGNOSIS — R05.1 ACUTE COUGH: ICD-10-CM

## 2024-02-05 RX ORDER — ALPRAZOLAM 0.5 MG/1
TABLET ORAL
Qty: 60 TABLET | Refills: 0 | OUTPATIENT
Start: 2024-02-05 | End: 2024-03-04

## 2024-02-05 RX ORDER — BENZONATATE 200 MG/1
200 CAPSULE ORAL 3 TIMES DAILY PRN
Qty: 90 CAPSULE | Refills: 0 | Status: SHIPPED | OUTPATIENT
Start: 2024-02-05

## 2024-02-05 NOTE — TELEPHONE ENCOUNTER
LOV 12/28/23  RTO 3 months  LRF 1/17/24 and 1/23/23  CSM 3/12/21    Health Maintenance   Topic Date Due    DTaP/Tdap/Td vaccine (1 - Tdap) Never done    Shingles vaccine (1 of 2) Never done    Respiratory Syncytial Virus (RSV) Pregnant or age 60 yrs+ (1 - 1-dose 60+ series) Never done    Flu vaccine (1) 08/01/2023    COVID-19 Vaccine (5 - 2023-24 season) 09/01/2023    Annual Wellness Visit (Medicare Advantage)  01/01/2024    Lipids  12/11/2024    Depression Monitoring  12/28/2024    DEXA (modify frequency per FRAX score)  Completed    Pneumococcal 65+ years Vaccine  Completed    Hepatitis A vaccine  Aged Out    Hepatitis B vaccine  Aged Out    Hib vaccine  Aged Out    Polio vaccine  Aged Out    Meningococcal (ACWY) vaccine  Aged Out             (applicable per patient's age: Cancer Screenings, Depression Screening, Fall Risk Screening, Immunizations)    Hemoglobin A1C (%)   Date Value   03/01/2022 5.6   08/12/2020 5.6   02/19/2019 5.5     LDL Cholesterol (mg/dL)   Date Value   12/11/2023 192 (H)     LDL Calculated (mg/dL)   Date Value   11/13/2014 109     AST (U/L)   Date Value   12/11/2023 16     ALT (U/L)   Date Value   12/11/2023 13     BUN (mg/dL)   Date Value   12/11/2023 16      (goal A1C is < 7)   (goal LDL is <100) need 30-50% reduction from baseline     BP Readings from Last 3 Encounters:   12/28/23 136/86   10/02/23 120/88   06/28/23 122/80    (goal /80)      All Future Testing planned in [facility]:  Lab Frequency Next Occurrence   CBC with Auto Differential Once 10/02/2023   Comprehensive Metabolic Panel, Fasting Once 10/02/2023   Lipid Panel Once 10/02/2023   Urinalysis with Reflex to Culture Once 10/02/2023       Next Visit Date:  No future appointments.         Patient Active Problem List:     Hyperlipidemia     Fibromyalgia     Osteopenia     Hx of bilateral breast implants     Arthritis of shoulder region, right     Anxiety     Anemia, normocytic normochromic     Chronic pain associated with

## 2024-02-15 DIAGNOSIS — F41.9 ANXIETY: Chronic | ICD-10-CM

## 2024-02-15 NOTE — TELEPHONE ENCOUNTER
LOV 12/28/23  RTO 3 months  LRF 1/17/24  CSM 3/12/21    Health Maintenance   Topic Date Due    DTaP/Tdap/Td vaccine (1 - Tdap) Never done    Shingles vaccine (1 of 2) Never done    Respiratory Syncytial Virus (RSV) Pregnant or age 60 yrs+ (1 - 1-dose 60+ series) Never done    Flu vaccine (1) 08/01/2023    COVID-19 Vaccine (5 - 2023-24 season) 09/01/2023    Annual Wellness Visit (Medicare Advantage)  01/01/2024    Lipids  12/11/2024    Depression Monitoring  12/28/2024    DEXA (modify frequency per FRAX score)  Completed    Pneumococcal 65+ years Vaccine  Completed    Hepatitis A vaccine  Aged Out    Hepatitis B vaccine  Aged Out    Hib vaccine  Aged Out    Polio vaccine  Aged Out    Meningococcal (ACWY) vaccine  Aged Out             (applicable per patient's age: Cancer Screenings, Depression Screening, Fall Risk Screening, Immunizations)    Hemoglobin A1C (%)   Date Value   03/01/2022 5.6   08/12/2020 5.6   02/19/2019 5.5     LDL Cholesterol (mg/dL)   Date Value   12/11/2023 192 (H)     LDL Calculated (mg/dL)   Date Value   11/13/2014 109     AST (U/L)   Date Value   12/11/2023 16     ALT (U/L)   Date Value   12/11/2023 13     BUN (mg/dL)   Date Value   12/11/2023 16      (goal A1C is < 7)   (goal LDL is <100) need 30-50% reduction from baseline     BP Readings from Last 3 Encounters:   12/28/23 136/86   10/02/23 120/88   06/28/23 122/80    (goal /80)      All Future Testing planned in [facility]:  Lab Frequency Next Occurrence   CBC with Auto Differential Once 10/02/2023   Comprehensive Metabolic Panel, Fasting Once 10/02/2023   Lipid Panel Once 10/02/2023   Urinalysis with Reflex to Culture Once 10/02/2023       Next Visit Date:  No future appointments.         Patient Active Problem List:     Hyperlipidemia     Fibromyalgia     Osteopenia     Hx of bilateral breast implants     Arthritis of shoulder region, right     Anxiety     Anemia, normocytic normochromic     Chronic pain associated with significant

## 2024-02-16 RX ORDER — ALPRAZOLAM 0.5 MG/1
TABLET ORAL
Qty: 60 TABLET | Refills: 0 | Status: SHIPPED | OUTPATIENT
Start: 2024-02-16 | End: 2024-03-15

## 2024-03-01 DIAGNOSIS — F51.01 PRIMARY INSOMNIA: ICD-10-CM

## 2024-03-01 NOTE — TELEPHONE ENCOUNTER
LOV 12/28/23   RTO in April  LRF 12/4/23          Controlled Substance Monitoring:    Acute and Chronic Pain Monitoring:   RX Monitoring Attestation Periodic Controlled Substance Monitoring   7/17/2017   8:05 AM The Prescription Monitoring Report for this patient was reviewed today. Possible medication side effects, risk of tolerance and/or dependence, and alternative treatments discussed;No signs of potential drug abuse or diversion identified.

## 2024-03-02 RX ORDER — QUETIAPINE FUMARATE 50 MG/1
TABLET, FILM COATED ORAL
Qty: 60 TABLET | Refills: 0 | Status: SHIPPED | OUTPATIENT
Start: 2024-03-02

## 2024-03-04 ENCOUNTER — HOSPITAL ENCOUNTER (OUTPATIENT)
Dept: GENERAL RADIOLOGY | Age: 79
Discharge: HOME | End: 2024-03-06
Payer: MEDICARE

## 2024-03-04 ENCOUNTER — HOSPITAL ENCOUNTER (OUTPATIENT)
Age: 79
Discharge: HOME | End: 2024-03-06
Payer: MEDICARE

## 2024-03-04 DIAGNOSIS — M47.812 CERVICAL SPONDYLOSIS WITHOUT MYELOPATHY: ICD-10-CM

## 2024-03-04 DIAGNOSIS — R52 PAIN: ICD-10-CM

## 2024-03-04 PROCEDURE — 72114 X-RAY EXAM L-S SPINE BENDING: CPT

## 2024-03-04 PROCEDURE — 73562 X-RAY EXAM OF KNEE 3: CPT

## 2024-03-04 PROCEDURE — 72050 X-RAY EXAM NECK SPINE 4/5VWS: CPT

## 2024-03-04 PROCEDURE — 72070 X-RAY EXAM THORAC SPINE 2VWS: CPT

## 2024-03-18 DIAGNOSIS — F41.9 ANXIETY: Chronic | ICD-10-CM

## 2024-03-20 RX ORDER — ALPRAZOLAM 0.5 MG/1
TABLET ORAL
Qty: 60 TABLET | Refills: 0 | Status: SHIPPED | OUTPATIENT
Start: 2024-03-20 | End: 2024-04-17

## 2024-03-20 NOTE — TELEPHONE ENCOUNTER
LOV 12/28/23  RTO 4 months  LRF 2/16/24  CSM 3/12/21    Health Maintenance   Topic Date Due    DTaP/Tdap/Td vaccine (1 - Tdap) Never done    Shingles vaccine (1 of 2) Never done    Respiratory Syncytial Virus (RSV) Pregnant or age 60 yrs+ (1 - 1-dose 60+ series) Never done    Flu vaccine (1) 08/01/2023    COVID-19 Vaccine (5 - 2023-24 season) 09/01/2023    Annual Wellness Visit (Medicare Advantage)  01/01/2024    Lipids  12/11/2024    Depression Monitoring  12/28/2024    DEXA (modify frequency per FRAX score)  Completed    Pneumococcal 65+ years Vaccine  Completed    Hepatitis A vaccine  Aged Out    Hepatitis B vaccine  Aged Out    Hib vaccine  Aged Out    Polio vaccine  Aged Out    Meningococcal (ACWY) vaccine  Aged Out             (applicable per patient's age: Cancer Screenings, Depression Screening, Fall Risk Screening, Immunizations)    Hemoglobin A1C (%)   Date Value   03/01/2022 5.6   08/12/2020 5.6   02/19/2019 5.5     LDL Cholesterol (mg/dL)   Date Value   12/11/2023 192 (H)     LDL Calculated (mg/dL)   Date Value   11/13/2014 109     AST (U/L)   Date Value   12/11/2023 16     ALT (U/L)   Date Value   12/11/2023 13     BUN (mg/dL)   Date Value   12/11/2023 16      (goal A1C is < 7)   (goal LDL is <100) need 30-50% reduction from baseline     BP Readings from Last 3 Encounters:   12/28/23 136/86   10/02/23 120/88   06/28/23 122/80    (goal /80)      All Future Testing planned in [facility]:  Lab Frequency Next Occurrence   CBC with Auto Differential Once 10/02/2023   Comprehensive Metabolic Panel, Fasting Once 10/02/2023   Lipid Panel Once 10/02/2023   Urinalysis with Reflex to Culture Once 10/02/2023       Next Visit Date:  No future appointments.         Patient Active Problem List:     Hyperlipidemia     Fibromyalgia     Osteopenia     Hx of bilateral breast implants     Arthritis of shoulder region, right     Anxiety     Anemia, normocytic normochromic     Chronic pain associated with significant

## 2024-04-01 DIAGNOSIS — F51.01 PRIMARY INSOMNIA: ICD-10-CM

## 2024-04-01 NOTE — TELEPHONE ENCOUNTER
LOV 12/28/24  RTO 4 months; F/U scheduled  LRF 4/28/24    Health Maintenance   Topic Date Due    DTaP/Tdap/Td vaccine (1 - Tdap) Never done    Shingles vaccine (1 of 2) Never done    Respiratory Syncytial Virus (RSV) Pregnant or age 60 yrs+ (1 - 1-dose 60+ series) Never done    COVID-19 Vaccine (5 - 2023-24 season) 09/01/2023    Annual Wellness Visit (Medicare Advantage)  01/01/2024    Flu vaccine (Season Ended) 08/01/2024    Lipids  12/11/2024    Depression Monitoring  12/28/2024    DEXA (modify frequency per FRAX score)  Completed    Pneumococcal 65+ years Vaccine  Completed    Hepatitis A vaccine  Aged Out    Hepatitis B vaccine  Aged Out    Hib vaccine  Aged Out    Polio vaccine  Aged Out    Meningococcal (ACWY) vaccine  Aged Out             (applicable per patient's age: Cancer Screenings, Depression Screening, Fall Risk Screening, Immunizations)    Hemoglobin A1C (%)   Date Value   03/01/2022 5.6   08/12/2020 5.6   02/19/2019 5.5     LDL Cholesterol (mg/dL)   Date Value   12/11/2023 192 (H)     LDL Calculated (mg/dL)   Date Value   11/13/2014 109     AST (U/L)   Date Value   12/11/2023 16     ALT (U/L)   Date Value   12/11/2023 13     BUN (mg/dL)   Date Value   12/11/2023 16      (goal A1C is < 7)   (goal LDL is <100) need 30-50% reduction from baseline     BP Readings from Last 3 Encounters:   12/28/23 136/86   10/02/23 120/88   06/28/23 122/80    (goal /80)      All Future Testing planned in [facility]:  Lab Frequency Next Occurrence   CBC with Auto Differential Once 10/02/2023   Comprehensive Metabolic Panel, Fasting Once 10/02/2023   Lipid Panel Once 10/02/2023   Urinalysis with Reflex to Culture Once 10/02/2023       Next Visit Date:  Future Appointments   Date Time Provider Department Center   4/30/2024  1:00 PM Marli Pace, APRN - CNP [facility]            Patient Active Problem List:     Hyperlipidemia     Fibromyalgia     Osteopenia     Hx of bilateral breast implants     Arthritis of

## 2024-04-02 RX ORDER — QUETIAPINE FUMARATE 50 MG/1
TABLET, FILM COATED ORAL
Qty: 60 TABLET | Refills: 0 | Status: SHIPPED | OUTPATIENT
Start: 2024-04-02

## 2024-04-03 DIAGNOSIS — F51.01 PRIMARY INSOMNIA: ICD-10-CM

## 2024-04-03 RX ORDER — QUETIAPINE FUMARATE 50 MG/1
TABLET, FILM COATED ORAL
Qty: 60 TABLET | Refills: 0 | OUTPATIENT
Start: 2024-04-03

## 2024-04-19 DIAGNOSIS — F41.9 ANXIETY: Chronic | ICD-10-CM

## 2024-04-19 NOTE — TELEPHONE ENCOUNTER
LOV 12/28/23  RTO 4 months; F/U scheudled  LRF 3/20/24  CSM 3/12/21    Health Maintenance   Topic Date Due    DTaP/Tdap/Td vaccine (1 - Tdap) Never done    Shingles vaccine (1 of 2) Never done    Respiratory Syncytial Virus (RSV) Pregnant or age 60 yrs+ (1 - 1-dose 60+ series) Never done    COVID-19 Vaccine (5 - 2023-24 season) 09/01/2023    Annual Wellness Visit (Medicare Advantage)  01/01/2024    Flu vaccine (Season Ended) 08/01/2024    Lipids  12/11/2024    Depression Monitoring  12/28/2024    DEXA (modify frequency per FRAX score)  Completed    Pneumococcal 65+ years Vaccine  Completed    Hepatitis A vaccine  Aged Out    Hepatitis B vaccine  Aged Out    Hib vaccine  Aged Out    Polio vaccine  Aged Out    Meningococcal (ACWY) vaccine  Aged Out             (applicable per patient's age: Cancer Screenings, Depression Screening, Fall Risk Screening, Immunizations)    Hemoglobin A1C (%)   Date Value   03/01/2022 5.6   08/12/2020 5.6   02/19/2019 5.5     LDL Cholesterol (mg/dL)   Date Value   12/11/2023 192 (H)     LDL Calculated (mg/dL)   Date Value   11/13/2014 109     AST (U/L)   Date Value   12/11/2023 16     ALT (U/L)   Date Value   12/11/2023 13     BUN (mg/dL)   Date Value   12/11/2023 16      (goal A1C is < 7)   (goal LDL is <100) need 30-50% reduction from baseline     BP Readings from Last 3 Encounters:   12/28/23 136/86   10/02/23 120/88   06/28/23 122/80    (goal /80)      All Future Testing planned in [facility]:  Lab Frequency Next Occurrence   CBC with Auto Differential Once 10/02/2023   Comprehensive Metabolic Panel, Fasting Once 10/02/2023   Lipid Panel Once 10/02/2023   Urinalysis with Reflex to Culture Once 10/02/2023       Next Visit Date:  Future Appointments   Date Time Provider Department Center   4/30/2024  1:00 PM Marli Pace, APRN - CNP [facility]            Patient Active Problem List:     Hyperlipidemia     Fibromyalgia     Osteopenia     Hx of bilateral breast implants

## 2024-04-22 DIAGNOSIS — F41.9 ANXIETY: Chronic | ICD-10-CM

## 2024-04-22 RX ORDER — ALPRAZOLAM 0.5 MG/1
TABLET ORAL
Qty: 60 TABLET | Refills: 0 | Status: SHIPPED | OUTPATIENT
Start: 2024-04-22 | End: 2024-05-20

## 2024-04-22 RX ORDER — ALPRAZOLAM 0.5 MG/1
TABLET ORAL
Qty: 60 TABLET | Refills: 0 | OUTPATIENT
Start: 2024-04-22 | End: 2024-05-20

## 2024-04-24 ENCOUNTER — OFFICE VISIT (OUTPATIENT)
Dept: FAMILY MEDICINE CLINIC | Age: 79
End: 2024-04-24
Payer: MEDICARE

## 2024-04-24 VITALS
DIASTOLIC BLOOD PRESSURE: 80 MMHG | SYSTOLIC BLOOD PRESSURE: 126 MMHG | RESPIRATION RATE: 22 BRPM | HEART RATE: 75 BPM | TEMPERATURE: 97.2 F | WEIGHT: 148.8 LBS | OXYGEN SATURATION: 98 % | BODY MASS INDEX: 29.06 KG/M2

## 2024-04-24 DIAGNOSIS — Z87.19 S/P REPAIR OF PARAESOPHAGEAL HERNIA: ICD-10-CM

## 2024-04-24 DIAGNOSIS — E11.9 TYPE 2 DIABETES MELLITUS WITHOUT COMPLICATION, WITHOUT LONG-TERM CURRENT USE OF INSULIN (HCC): ICD-10-CM

## 2024-04-24 DIAGNOSIS — Z13.31 SCREENING FOR DEPRESSION: ICD-10-CM

## 2024-04-24 DIAGNOSIS — J45.41 MODERATE PERSISTENT ASTHMATIC BRONCHITIS WITH ACUTE EXACERBATION: ICD-10-CM

## 2024-04-24 DIAGNOSIS — Z98.890 S/P REPAIR OF PARAESOPHAGEAL HERNIA: ICD-10-CM

## 2024-04-24 DIAGNOSIS — M79.7 FIBROMYALGIA: ICD-10-CM

## 2024-04-24 DIAGNOSIS — F41.9 ANXIETY: Primary | ICD-10-CM

## 2024-04-24 DIAGNOSIS — F33.1 MODERATE EPISODE OF RECURRENT MAJOR DEPRESSIVE DISORDER (HCC): ICD-10-CM

## 2024-04-24 DIAGNOSIS — J69.0 RECURRENT ASPIRATION PNEUMONIA (HCC): ICD-10-CM

## 2024-04-24 DIAGNOSIS — R05.1 ACUTE COUGH: ICD-10-CM

## 2024-04-24 PROBLEM — A41.9 SEPSIS (HCC): Status: RESOLVED | Noted: 2022-03-22 | Resolved: 2024-04-24

## 2024-04-24 PROCEDURE — G2211 COMPLEX E/M VISIT ADD ON: HCPCS | Performed by: NURSE PRACTITIONER

## 2024-04-24 PROCEDURE — 1123F ACP DISCUSS/DSCN MKR DOCD: CPT | Performed by: NURSE PRACTITIONER

## 2024-04-24 PROCEDURE — 99214 OFFICE O/P EST MOD 30 MIN: CPT | Performed by: NURSE PRACTITIONER

## 2024-04-24 RX ORDER — GUAIFENESIN 600 MG/1
600 TABLET, EXTENDED RELEASE ORAL 2 TIMES DAILY
Qty: 60 TABLET | Refills: 1 | Status: SHIPPED | OUTPATIENT
Start: 2024-04-24 | End: 2024-07-23

## 2024-04-24 RX ORDER — BENZONATATE 200 MG/1
200 CAPSULE ORAL 3 TIMES DAILY PRN
Qty: 90 CAPSULE | Refills: 0 | Status: SHIPPED | OUTPATIENT
Start: 2024-04-24

## 2024-04-24 ASSESSMENT — PATIENT HEALTH QUESTIONNAIRE - PHQ9
3. TROUBLE FALLING OR STAYING ASLEEP: SEVERAL DAYS
4. FEELING TIRED OR HAVING LITTLE ENERGY: SEVERAL DAYS
SUM OF ALL RESPONSES TO PHQ QUESTIONS 1-9: 5
9. THOUGHTS THAT YOU WOULD BE BETTER OFF DEAD, OR OF HURTING YOURSELF: NOT AT ALL
1. LITTLE INTEREST OR PLEASURE IN DOING THINGS: SEVERAL DAYS
SUM OF ALL RESPONSES TO PHQ QUESTIONS 1-9: 5
SUM OF ALL RESPONSES TO PHQ9 QUESTIONS 1 & 2: 2
8. MOVING OR SPEAKING SO SLOWLY THAT OTHER PEOPLE COULD HAVE NOTICED. OR THE OPPOSITE, BEING SO FIGETY OR RESTLESS THAT YOU HAVE BEEN MOVING AROUND A LOT MORE THAN USUAL: NOT AT ALL
SUM OF ALL RESPONSES TO PHQ QUESTIONS 1-9: 5
SUM OF ALL RESPONSES TO PHQ QUESTIONS 1-9: 5
6. FEELING BAD ABOUT YOURSELF - OR THAT YOU ARE A FAILURE OR HAVE LET YOURSELF OR YOUR FAMILY DOWN: NOT AT ALL
10. IF YOU CHECKED OFF ANY PROBLEMS, HOW DIFFICULT HAVE THESE PROBLEMS MADE IT FOR YOU TO DO YOUR WORK, TAKE CARE OF THINGS AT HOME, OR GET ALONG WITH OTHER PEOPLE: NOT DIFFICULT AT ALL
5. POOR APPETITE OR OVEREATING: SEVERAL DAYS
2. FEELING DOWN, DEPRESSED OR HOPELESS: SEVERAL DAYS

## 2024-04-24 ASSESSMENT — ANXIETY QUESTIONNAIRES
3. WORRYING TOO MUCH ABOUT DIFFERENT THINGS: NOT AT ALL
1. FEELING NERVOUS, ANXIOUS, OR ON EDGE: NOT AT ALL
2. NOT BEING ABLE TO STOP OR CONTROL WORRYING: NOT AT ALL
GAD7 TOTAL SCORE: 0
IF YOU CHECKED OFF ANY PROBLEMS ON THIS QUESTIONNAIRE, HOW DIFFICULT HAVE THESE PROBLEMS MADE IT FOR YOU TO DO YOUR WORK, TAKE CARE OF THINGS AT HOME, OR GET ALONG WITH OTHER PEOPLE: NOT DIFFICULT AT ALL
6. BECOMING EASILY ANNOYED OR IRRITABLE: NOT AT ALL
4. TROUBLE RELAXING: NOT AT ALL
5. BEING SO RESTLESS THAT IT IS HARD TO SIT STILL: NOT AT ALL

## 2024-04-24 NOTE — PROGRESS NOTES
[facility]   [address] [facility] [address]  [phone]    4/24/24     Patient ID  Aure Aiken is a 78 y.o. female  Established patient    Chief Complaint  Aure Aiken presents today for Diabetes, Depression, Sleep Problem (Grief), Anxiety, Shoulder Pain (RT side. Onset 1 week ago. ), and Cough      ASSESSMENT/PLAN  1. Anxiety  2. Moderate episode of recurrent major depressive disorder (HCC)  Assessment & Plan:   Borderline controlled, continue current medications and continue current treatment plan  Refusing  referral   3. Acute cough  -     benzonatate (TESSALON) 200 MG capsule; Take 1 capsule by mouth 3 times daily as needed for Cough, Disp-90 capsule, R-0Normal  -     guaiFENesin (MUCINEX) 600 MG extended release tablet; Take 1 tablet by mouth 2 times daily, Disp-60 tablet, R-1Normal  4. Recurrent aspiration pneumonia (HCC)  Assessment & Plan:   Monitored by specialist- no acute findings meriting change in the plan  5. Moderate persistent asthmatic bronchitis with acute exacerbation  6. Type 2 diabetes mellitus without complication, without long-term current use of insulin (HCC)  7. Fibromyalgia  8. S/P repair of paraesophageal hernia  9. Screening for depression     Complete prescribed antibiotics per pulmonology    Dr aggarwal tomorrow   Topamax for RLS - stopped gabepenitn     Med contract current     Return in about 3 months (around 7/24/2024) for controlled.      Patient Care Team:  Marli Pace APRN - CNP as PCP - General (Nurse Practitioner Family)  Marli Pace APRN - CNP as PCP - Empaneled Provider  William Aggarwal MD (Anesthesiology)  Harlan Gipson DO as Surgeon (Orthopedic Surgery)  Nik Guaman MD as Consulting Physician (Gastroenterology)  Chapin Aviles DO as Consulting Physician (Bariatric Surgery)  Caren Almazan APRN - CNP (Family Medicine)    SUBJECTIVE/OBJECTIVE  History of Present illness / Visit Summary   Patient presents

## 2024-05-02 DIAGNOSIS — F51.01 PRIMARY INSOMNIA: ICD-10-CM

## 2024-05-02 RX ORDER — QUETIAPINE FUMARATE 50 MG/1
TABLET, FILM COATED ORAL
Qty: 60 TABLET | Refills: 2 | Status: SHIPPED | OUTPATIENT
Start: 2024-05-02

## 2024-05-02 NOTE — TELEPHONE ENCOUNTER
LOV 4/24/24   RTO n/a  LRF 4/2/24          Controlled Substance Monitoring:    Acute and Chronic Pain Monitoring:   RX Monitoring Periodic Controlled Substance Monitoring   4/24/2024  12:35 PM Assessed functional status (ability to engage in work or other purposeful activities, the pain intensity and its interference with activities of daily living, quality of family life and social activities, and the physical activity);Obtaining appropriate analgesic effect of treatment.

## 2024-05-12 PROBLEM — J45.909 ASTHMATIC BRONCHITIS: Status: ACTIVE | Noted: 2024-05-12

## 2024-05-24 DIAGNOSIS — F41.9 ANXIETY: Chronic | ICD-10-CM

## 2024-05-24 NOTE — TELEPHONE ENCOUNTER
LOV 4/24/24   RTO 3 months  LRF 4/22/24          Controlled Substance Monitoring:    Acute and Chronic Pain Monitoring:   RX Monitoring Periodic Controlled Substance Monitoring   4/24/2024  12:35 PM Assessed functional status (ability to engage in work or other purposeful activities, the pain intensity and its interference with activities of daily living, quality of family life and social activities, and the physical activity);Obtaining appropriate analgesic effect of treatment.

## 2024-05-25 RX ORDER — ALPRAZOLAM 0.5 MG/1
0.5 TABLET ORAL 2 TIMES DAILY PRN
Qty: 60 TABLET | Refills: 0 | Status: SHIPPED | OUTPATIENT
Start: 2024-05-25 | End: 2024-06-22

## 2024-06-04 NOTE — PROGRESS NOTES
Recommendation from [place] recommending iron deficiency testing related to Restless Legs Syndrome.   Patient would like orders mailed to her home No assistance needed

## 2024-06-19 DIAGNOSIS — F33.1 MAJOR DEPRESSIVE DISORDER, RECURRENT EPISODE, MODERATE (HCC): ICD-10-CM

## 2024-06-19 DIAGNOSIS — F41.9 ANXIETY: Chronic | ICD-10-CM

## 2024-06-19 NOTE — TELEPHONE ENCOUNTER
LOV 4/24/24   RTO n/a  LRF XANAX 5/25/24   SEROQUEL 5/2/24          Controlled Substance Monitoring:    Acute and Chronic Pain Monitoring:   RX Monitoring Periodic Controlled Substance Monitoring   4/24/2024  12:35 PM Assessed functional status (ability to engage in work or other purposeful activities, the pain intensity and its interference with activities of daily living, quality of family life and social activities, and the physical activity);Obtaining appropriate analgesic effect of treatment.

## 2024-06-21 RX ORDER — ALPRAZOLAM 0.5 MG/1
0.5 TABLET ORAL 2 TIMES DAILY
Qty: 60 TABLET | Refills: 0 | Status: SHIPPED | OUTPATIENT
Start: 2024-06-21 | End: 2024-07-21

## 2024-06-21 RX ORDER — QUETIAPINE FUMARATE 200 MG/1
TABLET, FILM COATED ORAL
Qty: 90 TABLET | Refills: 0 | Status: SHIPPED | OUTPATIENT
Start: 2024-06-21

## 2024-07-18 DIAGNOSIS — F51.01 PRIMARY INSOMNIA: ICD-10-CM

## 2024-07-18 DIAGNOSIS — F41.9 ANXIETY: Chronic | ICD-10-CM

## 2024-07-18 RX ORDER — ALPRAZOLAM 0.5 MG/1
0.5 TABLET ORAL 2 TIMES DAILY PRN
Qty: 60 TABLET | Refills: 0 | Status: SHIPPED | OUTPATIENT
Start: 2024-07-18 | End: 2024-08-17

## 2024-07-18 RX ORDER — QUETIAPINE FUMARATE 50 MG/1
TABLET, FILM COATED ORAL
Qty: 60 TABLET | Refills: 0 | Status: SHIPPED | OUTPATIENT
Start: 2024-07-18 | End: 2025-07-18

## 2024-07-18 NOTE — TELEPHONE ENCOUNTER
LOV 4-24-24   RTO   LRF 5-2-24          Controlled Substance Monitoring:    Acute and Chronic Pain Monitoring:   RX Monitoring Periodic Controlled Substance Monitoring   6/21/2024   7:32 AM No signs of potential drug abuse or diversion identified.

## 2024-07-18 NOTE — TELEPHONE ENCOUNTER
LOV 4/24/24   RTO n/a  LRF 6/21/24          Controlled Substance Monitoring:    Acute and Chronic Pain Monitoring:   RX Monitoring Periodic Controlled Substance Monitoring   6/21/2024   7:32 AM No signs of potential drug abuse or diversion identified.            Last Appointment:  10/25/2023  Future Appointments   Date Time Provider [address]   12/13/2023  2:00 PM Bhavna Ruiz MD [facility]   1/31/2024  8:30 AM MD AARTI Humphrey [facility]     Daughter Juan Hollis calling to refill Dorota hydrocortisone 10 mg sent to [place] in [address]

## 2024-08-12 DIAGNOSIS — F41.9 ANXIETY: Chronic | ICD-10-CM

## 2024-08-12 NOTE — TELEPHONE ENCOUNTER
LOV 5/12/24  RTO   LRF 7/18/24          Controlled Substance Monitoring:    Acute and Chronic Pain Monitoring:   RX Monitoring Periodic Controlled Substance Monitoring   6/21/2024   7:32 AM No signs of potential drug abuse or diversion identified.

## 2024-08-13 RX ORDER — ALPRAZOLAM 0.5 MG/1
0.5 TABLET ORAL 2 TIMES DAILY PRN
Qty: 60 TABLET | Refills: 0 | Status: SHIPPED | OUTPATIENT
Start: 2024-08-13 | End: 2024-09-12

## 2024-08-13 NOTE — TELEPHONE ENCOUNTER
Please call patient and schedule office visit.  It is required that she is seen every 3 months due to the prescription.  She must have an appointment scheduled for me to give any additional refills

## 2024-08-14 DIAGNOSIS — F51.01 PRIMARY INSOMNIA: ICD-10-CM

## 2024-08-14 NOTE — TELEPHONE ENCOUNTER
psychosocial dysfunction     Primary osteoarthritis involving multiple joints     Spondylosis 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     Lumbar and sacral arthritis     Chronic biliary pancreatitis (HCC)     Moderate episode of recurrent major depressive disorder (HCC)     Uncomplicated opioid dependence (HCC)     Hyperuricemia     Primary open-angle glaucoma, right eye, mild stage     Primary open-angle glaucoma, left eye, mild stage     Gastroesophageal reflux disease     Esophageal dysphagia     Paraesophageal hernia     Recurrent aspiration pneumonia (HCC)     Simple chronic bronchitis (HCC)     History of degenerative disc disease     Hx of diabetes mellitus     Hx of Esophageal dysmotility     Type 2 diabetes mellitus     S/P repair of paraesophageal hernia     Acute cough     Asthmatic bronchitis

## 2024-08-16 RX ORDER — QUETIAPINE FUMARATE 50 MG/1
TABLET, FILM COATED ORAL
Qty: 60 TABLET | Refills: 2 | Status: SHIPPED | OUTPATIENT
Start: 2024-08-16 | End: 2025-08-14

## 2024-08-27 ENCOUNTER — OFFICE VISIT (OUTPATIENT)
Dept: FAMILY MEDICINE CLINIC | Age: 79
End: 2024-08-27
Payer: MEDICARE

## 2024-08-27 VITALS
BODY MASS INDEX: 27.22 KG/M2 | HEART RATE: 75 BPM | RESPIRATION RATE: 16 BRPM | TEMPERATURE: 97.3 F | OXYGEN SATURATION: 97 % | DIASTOLIC BLOOD PRESSURE: 80 MMHG | WEIGHT: 139.4 LBS | SYSTOLIC BLOOD PRESSURE: 112 MMHG

## 2024-08-27 DIAGNOSIS — F41.9 ANXIETY: Chronic | ICD-10-CM

## 2024-08-27 DIAGNOSIS — F51.01 PRIMARY INSOMNIA: ICD-10-CM

## 2024-08-27 DIAGNOSIS — M79.7 FIBROMYALGIA: ICD-10-CM

## 2024-08-27 DIAGNOSIS — F33.1 MODERATE EPISODE OF RECURRENT MAJOR DEPRESSIVE DISORDER (HCC): ICD-10-CM

## 2024-08-27 DIAGNOSIS — F43.21 GRIEVING: Primary | ICD-10-CM

## 2024-08-27 DIAGNOSIS — F33.1 MAJOR DEPRESSIVE DISORDER, RECURRENT EPISODE, MODERATE (HCC): ICD-10-CM

## 2024-08-27 PROCEDURE — 99214 OFFICE O/P EST MOD 30 MIN: CPT | Performed by: NURSE PRACTITIONER

## 2024-08-27 PROCEDURE — 1123F ACP DISCUSS/DSCN MKR DOCD: CPT | Performed by: NURSE PRACTITIONER

## 2024-08-27 RX ORDER — SERTRALINE HYDROCHLORIDE 25 MG/1
25 TABLET, FILM COATED ORAL DAILY
Qty: 90 TABLET | Refills: 1 | Status: SHIPPED | OUTPATIENT
Start: 2024-08-27 | End: 2025-02-23

## 2024-08-27 RX ORDER — QUETIAPINE FUMARATE 200 MG/1
TABLET, FILM COATED ORAL
Qty: 90 TABLET | Refills: 1 | Status: SHIPPED | OUTPATIENT
Start: 2024-08-27 | End: 2024-08-27 | Stop reason: CLARIF

## 2024-08-27 RX ORDER — ROPINIROLE 0.5 MG/1
0.5 TABLET, FILM COATED ORAL NIGHTLY
COMMUNITY
Start: 2024-08-17

## 2024-08-27 RX ORDER — DIAZEPAM 10 MG
10 TABLET ORAL PRN
COMMUNITY
Start: 2024-05-23

## 2024-08-27 RX ORDER — QUETIAPINE FUMARATE 50 MG/1
TABLET, FILM COATED ORAL
Qty: 180 TABLET | Refills: 1 | Status: SHIPPED | OUTPATIENT
Start: 2024-08-27 | End: 2025-08-25

## 2024-08-27 RX ORDER — SERTRALINE HYDROCHLORIDE 25 MG/1
25 TABLET, FILM COATED ORAL DAILY
Qty: 90 TABLET | Refills: 1 | Status: SHIPPED | OUTPATIENT
Start: 2024-08-27 | End: 2024-08-27 | Stop reason: CLARIF

## 2024-08-27 RX ORDER — QUETIAPINE FUMARATE 50 MG/1
TABLET, FILM COATED ORAL
Qty: 180 TABLET | Refills: 1 | Status: SHIPPED | OUTPATIENT
Start: 2024-08-27 | End: 2024-08-27 | Stop reason: CLARIF

## 2024-08-27 RX ORDER — QUETIAPINE FUMARATE 200 MG/1
TABLET, FILM COATED ORAL
Qty: 90 TABLET | Refills: 1 | Status: SHIPPED | OUTPATIENT
Start: 2024-08-27

## 2024-08-27 ASSESSMENT — ENCOUNTER SYMPTOMS
SHORTNESS OF BREATH: 1
CHEST TIGHTNESS: 1
WHEEZING: 0
ABDOMINAL PAIN: 0
ABDOMINAL DISTENTION: 0
BACK PAIN: 1
CONSTIPATION: 0
TROUBLE SWALLOWING: 0
DIARRHEA: 0
COUGH: 1

## 2024-08-27 NOTE — PROGRESS NOTES
[facility]   [address] [facility] [address]  [phone]    8/27/24     Patient ID  Aure Aiken is a 79 y.o. female  Established patient    Chief Complaint  Aure Aiken presents today for Anxiety, Alopecia (Coming out from the root, denies breakage. ), Insomnia, and Chronic Pain (Possible fibro flare up )    Have you seen any other physician or provider since your last visit? No  Have you had any other diagnostic tests since your last visit? No  Have you been seen in the emergency room and/or had an admission to a hospital since we last saw you? No     ASSESSMENT/PLAN  1. Grieving  2. Moderate episode of recurrent major depressive disorder (HCC)  3. Anxiety  -     QUEtiapine (SEROQUEL) 200 MG tablet; TAKE 1 TABLET BY MOUTH EVERY DAY, Disp-90 tablet, R-1Normal  4. Primary insomnia  -     QUEtiapine (SEROQUEL) 50 MG tablet; TAKE 1 OR 2 TABLETS BY MOUTH AT BEDTIME AS NEEDED FOR SLEEP, Disp-180 tablet, R-1Normal  5. Fibromyalgia  6. Major depressive disorder, recurrent episode, moderate (HCC)  -     QUEtiapine (SEROQUEL) 200 MG tablet; TAKE 1 TABLET BY MOUTH EVERY DAY, Disp-90 tablet, R-1Normal  -     sertraline (ZOLOFT) 25 MG tablet; Take 1 tablet by mouth daily, Disp-90 tablet, R-1Normal     Medications refilled.  Start zoloft   Patient to follow-up in 2 months, and may titrate and adjust medication doses at that time  Patient refusing behavioral health referral  Contract current    I am strongly suspicious that her ongoing hair loss is due to poor sleep habits, diet choices, lack of appetite, and worsening depression due to recent loss of her spouse.      Return in about 6 weeks (around 10/8/2024) for med check, mental health.      Patient Care Team:  Marli Pace APRN - CNP as PCP - General (Nurse Practitioner Family)  Marli Pace APRN - CNP as PCP - Empaneled Provider  William Hurtado MD (Anesthesiology)  Harlan Gipson DO as Surgeon (Orthopedic Surgery)  Deniz  rhythm. No extrasystoles are present.     Heart sounds: Normal heart sounds, S1 normal and S2 normal. No murmur heard.  Pulmonary:      Effort: Pulmonary effort is normal. No prolonged expiration or respiratory distress.      Breath sounds: Normal breath sounds and air entry.   Musculoskeletal:      Right lower leg: No edema.      Left lower leg: No edema.   Skin:     General: Skin is warm and dry.      Coloration: Skin is not ashen, cyanotic, jaundiced or pale.   Neurological:      Mental Status: She is alert and oriented to person, place, and time.      Motor: Motor function is intact.      Gait: Gait is intact.   Psychiatric:         Attention and Perception: Attention and perception normal.         Mood and Affect: Mood is anxious and depressed. Affect is tearful.         Speech: Speech normal.         Behavior: Behavior normal. Behavior is cooperative.         Thought Content: Thought content normal. Thought content does not include suicidal ideation. Thought content does not include suicidal plan.         Cognition and Memory: Cognition and memory normal.         Judgment: Judgment normal.           Electronically signed by DES Crowe CNP, APRN-CNP on 8/29/2024 at 1:00 PM    Please note that this chart was generated using voice recognition Dragon dictation software.  Although every effort was made to ensure the accuracy of this automated transcription, some errors in transcription may have occurred.

## 2024-08-28 ENCOUNTER — TELEPHONE (OUTPATIENT)
Dept: FAMILY MEDICINE CLINIC | Age: 79
End: 2024-08-28

## 2024-08-28 DIAGNOSIS — F51.01 PRIMARY INSOMNIA: ICD-10-CM

## 2024-08-28 DIAGNOSIS — F33.1 MAJOR DEPRESSIVE DISORDER, RECURRENT EPISODE, MODERATE (HCC): ICD-10-CM

## 2024-08-28 DIAGNOSIS — J45.41 MODERATE PERSISTENT ASTHMATIC BRONCHITIS WITH ACUTE EXACERBATION: Primary | ICD-10-CM

## 2024-08-28 DIAGNOSIS — F41.9 ANXIETY: Chronic | ICD-10-CM

## 2024-08-28 RX ORDER — SERTRALINE HYDROCHLORIDE 25 MG/1
25 TABLET, FILM COATED ORAL DAILY
Qty: 90 TABLET | Refills: 1 | OUTPATIENT
Start: 2024-08-28 | End: 2025-02-24

## 2024-08-28 RX ORDER — QUETIAPINE FUMARATE 200 MG/1
TABLET, FILM COATED ORAL
Qty: 90 TABLET | Refills: 1 | OUTPATIENT
Start: 2024-08-28

## 2024-08-28 RX ORDER — FLUTICASONE FUROATE, UMECLIDINIUM BROMIDE AND VILANTEROL TRIFENATATE 200; 62.5; 25 UG/1; UG/1; UG/1
1 POWDER RESPIRATORY (INHALATION) DAILY
Qty: 3 EACH | Refills: 3 | Status: SHIPPED | OUTPATIENT
Start: 2024-08-28 | End: 2025-08-28

## 2024-08-28 RX ORDER — QUETIAPINE FUMARATE 50 MG/1
TABLET, FILM COATED ORAL
Qty: 180 TABLET | Refills: 1 | OUTPATIENT
Start: 2024-08-28 | End: 2025-08-26

## 2024-08-28 NOTE — TELEPHONE ENCOUNTER
Patient calls in today and states she does not use the Mail In [facility] anymore.  The following medications need to go/resend to [facility] on [address]. Chart corrected.    LOV 5/1/24  RTO 11/14/24  LRF [id number]          Controlled Substance Monitoring:    Acute and Chronic Pain Monitoring:   RX Monitoring Periodic Controlled Substance Monitoring   6/21/2024   7:32 AM No signs of potential drug abuse or diversion identified.

## 2024-08-28 NOTE — TELEPHONE ENCOUNTER
Disp Refills Start End    QUEtiapine (SEROQUEL) 200 MG tablet 90 tablet 1 8/27/2024 --    Sig: TAKE 1 TABLET BY MOUTH EVERY DAY    Sent to pharmacy as: QUEtiapine Fumarate 200 MG Oral Tablet (SEROQUEL)    E-Prescribing Status: Receipt confirmed by pharmacy (8/27/2024  1:48 PM EDT)        QUEtiapine (SEROQUEL) 50 MG tablet 180 tablet 1 8/27/2024 8/25/2025    Sig: TAKE 1 OR 2 TABLETS BY MOUTH AT BEDTIME AS NEEDED FOR SLEEP    Sent to pharmacy as: QUEtiapine Fumarate 50 MG Oral Tablet (SEROQUEL)    E-Prescribing Status: Receipt confirmed by pharmacy (8/27/2024  1:48 PM EDT)      sertraline (ZOLOFT) 25 MG tablet 90 tablet 1 8/27/2024 2/23/2025    Sig - Route: Take 1 tablet by mouth daily - Oral    Sent to pharmacy as: Sertraline HCl 25 MG Oral Tablet (ZOLOFT)    Earliest Fill Date: 8/27/2024    E-Prescribing Status: Receipt confirmed by pharmacy (8/27/2024  1:48 PM EDT)

## 2024-09-13 DIAGNOSIS — F41.9 ANXIETY: Chronic | ICD-10-CM

## 2024-09-16 DIAGNOSIS — F41.9 ANXIETY: Chronic | ICD-10-CM

## 2024-09-16 RX ORDER — ALPRAZOLAM 0.5 MG
0.5 TABLET ORAL 2 TIMES DAILY PRN
Qty: 60 TABLET | Refills: 0 | Status: SHIPPED | OUTPATIENT
Start: 2024-09-16 | End: 2024-10-16

## 2024-09-16 RX ORDER — ALPRAZOLAM 0.5 MG
TABLET ORAL
Qty: 60 TABLET | Refills: 0 | OUTPATIENT
Start: 2024-09-16

## 2024-10-08 ENCOUNTER — OFFICE VISIT (OUTPATIENT)
Dept: FAMILY MEDICINE CLINIC | Age: 79
End: 2024-10-08

## 2024-10-08 VITALS
OXYGEN SATURATION: 96 % | DIASTOLIC BLOOD PRESSURE: 80 MMHG | WEIGHT: 138.4 LBS | SYSTOLIC BLOOD PRESSURE: 104 MMHG | BODY MASS INDEX: 27.03 KG/M2 | TEMPERATURE: 97.3 F | HEART RATE: 60 BPM | RESPIRATION RATE: 16 BRPM

## 2024-10-08 DIAGNOSIS — J45.40 MODERATE PERSISTENT ASTHMATIC BRONCHITIS WITHOUT COMPLICATION: ICD-10-CM

## 2024-10-08 DIAGNOSIS — H91.93 HEARING DECREASED, BILATERAL: ICD-10-CM

## 2024-10-08 DIAGNOSIS — E11.9 TYPE 2 DIABETES MELLITUS WITHOUT COMPLICATION, WITHOUT LONG-TERM CURRENT USE OF INSULIN (HCC): ICD-10-CM

## 2024-10-08 DIAGNOSIS — F41.9 ANXIETY: Chronic | ICD-10-CM

## 2024-10-08 DIAGNOSIS — Z00.00 ENCOUNTER FOR SUBSEQUENT ANNUAL WELLNESS VISIT (AWV) IN MEDICARE PATIENT: Primary | ICD-10-CM

## 2024-10-08 DIAGNOSIS — Z13.31 SCREENING FOR DEPRESSION: ICD-10-CM

## 2024-10-08 DIAGNOSIS — M15.0 PRIMARY OSTEOARTHRITIS INVOLVING MULTIPLE JOINTS: ICD-10-CM

## 2024-10-08 DIAGNOSIS — Z00.00 MEDICARE ANNUAL WELLNESS VISIT, SUBSEQUENT: Primary | ICD-10-CM

## 2024-10-08 DIAGNOSIS — F33.1 MODERATE EPISODE OF RECURRENT MAJOR DEPRESSIVE DISORDER (HCC): ICD-10-CM

## 2024-10-08 DIAGNOSIS — M79.7 FIBROMYALGIA: ICD-10-CM

## 2024-10-08 DIAGNOSIS — F33.1 MAJOR DEPRESSIVE DISORDER, RECURRENT EPISODE, MODERATE (HCC): ICD-10-CM

## 2024-10-08 DIAGNOSIS — K21.9 GASTROESOPHAGEAL REFLUX DISEASE, UNSPECIFIED WHETHER ESOPHAGITIS PRESENT: ICD-10-CM

## 2024-10-08 RX ORDER — ALPRAZOLAM 0.5 MG
0.5 TABLET ORAL 2 TIMES DAILY PRN
Qty: 60 TABLET | Refills: 0 | Status: SHIPPED | OUTPATIENT
Start: 2024-10-14 | End: 2024-11-13

## 2024-10-08 ASSESSMENT — PATIENT HEALTH QUESTIONNAIRE - PHQ9
SUM OF ALL RESPONSES TO PHQ QUESTIONS 1-9: 16
4. FEELING TIRED OR HAVING LITTLE ENERGY: MORE THAN HALF THE DAYS
SUM OF ALL RESPONSES TO PHQ QUESTIONS 1-9: 14
8. MOVING OR SPEAKING SO SLOWLY THAT OTHER PEOPLE COULD HAVE NOTICED. OR THE OPPOSITE, BEING SO FIGETY OR RESTLESS THAT YOU HAVE BEEN MOVING AROUND A LOT MORE THAN USUAL: MORE THAN HALF THE DAYS
10. IF YOU CHECKED OFF ANY PROBLEMS, HOW DIFFICULT HAVE THESE PROBLEMS MADE IT FOR YOU TO DO YOUR WORK, TAKE CARE OF THINGS AT HOME, OR GET ALONG WITH OTHER PEOPLE: SOMEWHAT DIFFICULT
SUM OF ALL RESPONSES TO PHQ9 QUESTIONS 1 & 2: 4
9. THOUGHTS THAT YOU WOULD BE BETTER OFF DEAD, OR OF HURTING YOURSELF: MORE THAN HALF THE DAYS
5. POOR APPETITE OR OVEREATING: MORE THAN HALF THE DAYS
SUM OF ALL RESPONSES TO PHQ QUESTIONS 1-9: 16
3. TROUBLE FALLING OR STAYING ASLEEP: NOT AT ALL
2. FEELING DOWN, DEPRESSED OR HOPELESS: MORE THAN HALF THE DAYS
1. LITTLE INTEREST OR PLEASURE IN DOING THINGS: MORE THAN HALF THE DAYS
7. TROUBLE CONCENTRATING ON THINGS, SUCH AS READING THE NEWSPAPER OR WATCHING TELEVISION: MORE THAN HALF THE DAYS
6. FEELING BAD ABOUT YOURSELF - OR THAT YOU ARE A FAILURE OR HAVE LET YOURSELF OR YOUR FAMILY DOWN: MORE THAN HALF THE DAYS
SUM OF ALL RESPONSES TO PHQ QUESTIONS 1-9: 16

## 2024-10-08 ASSESSMENT — COLUMBIA-SUICIDE SEVERITY RATING SCALE - C-SSRS
6. HAVE YOU EVER DONE ANYTHING, STARTED TO DO ANYTHING, OR PREPARED TO DO ANYTHING TO END YOUR LIFE?: NO
2. HAVE YOU ACTUALLY HAD ANY THOUGHTS OF KILLING YOURSELF?: NO
1. WITHIN THE PAST MONTH, HAVE YOU WISHED YOU WERE DEAD OR WISHED YOU COULD GO TO SLEEP AND NOT WAKE UP?: NO

## 2024-10-08 ASSESSMENT — LIFESTYLE VARIABLES
HOW MANY STANDARD DRINKS CONTAINING ALCOHOL DO YOU HAVE ON A TYPICAL DAY: 1 OR 2
HOW OFTEN DO YOU HAVE A DRINK CONTAINING ALCOHOL: MONTHLY OR LESS

## 2024-10-08 NOTE — PROGRESS NOTES
[facility]   [address] [facility] [address]  [phone]    10/8/24     Patient ID  Aure Aiken is a 79 y.o. female  Established patient    Chief Complaint  Aure Aiken presents today for Medicare AWV, Anxiety, and Sleep Problem    Have you seen any other physician or provider since your last visit? No  Have you had any other diagnostic tests since your last visit? No  Have you been seen in the emergency room and/or had an admission to a hospital since we last saw you? No     ASSESSMENT/PLAN  1. Medicare annual wellness visit, subsequent  2. Hearing decreased, bilateral  -     Maximus Talamantes MD, Otolaryngology, [place]  3. Moderate persistent asthmatic bronchitis without complication  4. Gastroesophageal reflux disease, unspecified whether esophagitis present  5. Type 2 diabetes mellitus without complication, without long-term current use of insulin (HCC)  6. Primary osteoarthritis involving multiple joints  7. Anxiety  -     ALPRAZolam (XANAX) 0.5 MG tablet; Take 1 tablet by mouth 2 times daily as needed for Anxiety for up to 30 days. Max Daily Amount: 1 mg, Disp-60 tablet, R-0Normal  8. Fibromyalgia  9. Moderate episode of recurrent major depressive disorder (HCC)  10. Major depressive disorder, recurrent episode, moderate (HCC)  11. Screening for depression     No labs needed  Contract and UDS current   Medications refilled.  No changes in pharmaology.       Return in about 3 months (around 1/8/2025) for med check, controlled.      Patient Care Team:  Marli Pace APRN - CNP as PCP - General (Nurse Practitioner Family)  Marli Pace APRN - CNP as PCP - Empaneled Provider  William Hurtado MD (Anesthesiology)  Harlan Gipson DO as Surgeon (Orthopedic Surgery)  Nik Guaman MD as Consulting Physician (Gastroenterology)  Chapin Aviles DO as Consulting Physician (Bariatric Surgery)  Caren Almazan APRN - CNP (Family 
Medicare Annual Wellness Visit    Aure Aiken is here for Medicare AWV, Anxiety, and Sleep Problem    Assessment & Plan   Medicare annual wellness visit, subsequent  Hearing decreased, bilateral  -     Maximus Talamantes MD, Otolaryngology, [place]  Moderate persistent asthmatic bronchitis without complication  Gastroesophageal reflux disease, unspecified whether esophagitis present  Type 2 diabetes mellitus without complication, without long-term current use of insulin (HCC)  Primary osteoarthritis involving multiple joints  Anxiety  -     ALPRAZolam (XANAX) 0.5 MG tablet; Take 1 tablet by mouth 2 times daily as needed for Anxiety for up to 30 days. Max Daily Amount: 1 mg, Disp-60 tablet, R-0Normal  Fibromyalgia  Moderate episode of recurrent major depressive disorder (HCC)  Major depressive disorder, recurrent episode, moderate (HCC)  Screening for depression  Recommendations for Preventive Services Due: see orders and patient instructions/AVS.  Recommended screening schedule for the next 5-10 years is provided to the patient in written form: see Patient Instructions/AVS.     Return in about 3 months (around 1/8/2025) for med check, controlled.     Subjective   The following acute and/or chronic problems were also addressed today:  See additional OV note.     Patient's complete Health Risk Assessment and screening values have been reviewed and are found in Flowsheets. The following problems were reviewed today and where indicated follow up appointments were made and/or referrals ordered.    Positive Risk Factor Screenings with Interventions:        Depression:  PHQ-2 Score: 4  PHQ-9 Total Score: 16  Total Score Interpretation: 15-19 = moderately severe depression       Controlled Medication Review:    Today's Pain Level: No data recorded   Opioid Risk: (Low risk score <55) Opioid risk score: 50    Patient is low risk for opioid use disorder or overdose.    Last PDMP Sourav as Reviewed:  Review User Review 
see MD note

## 2024-11-14 DIAGNOSIS — F41.9 ANXIETY: Chronic | ICD-10-CM

## 2024-11-15 NOTE — TELEPHONE ENCOUNTER
LOV 10/8/24   RTO FREDA  LRF 10/14/24          Controlled Substance Monitoring:    Acute and Chronic Pain Monitoring:   RX Monitoring Periodic Controlled Substance Monitoring   6/21/2024   7:32 AM No signs of potential drug abuse or diversion identified.

## 2024-11-18 DIAGNOSIS — F41.9 ANXIETY: Chronic | ICD-10-CM

## 2024-11-18 RX ORDER — ALPRAZOLAM 0.5 MG
TABLET ORAL
Qty: 60 TABLET | Refills: 0 | Status: SHIPPED | OUTPATIENT
Start: 2024-11-18 | End: 2024-12-18

## 2024-11-18 RX ORDER — ALPRAZOLAM 0.5 MG
TABLET ORAL
Qty: 60 TABLET | Refills: 0 | OUTPATIENT
Start: 2024-11-18 | End: 2024-12-18

## 2024-12-15 DIAGNOSIS — Z51.81 ENCOUNTER FOR THERAPEUTIC DRUG LEVEL MONITORING: Primary | ICD-10-CM

## 2024-12-15 DIAGNOSIS — F41.9 ANXIETY: Chronic | ICD-10-CM

## 2024-12-16 NOTE — TELEPHONE ENCOUNTER
LOV 10/08/24  RTO 3 months; [employer] ([employer]) message sent to patient for F/U   LRF 11/18/24  CSM 6/21/24    Health Maintenance   Topic Date Due    DTaP/Tdap/Td vaccine (1 - Tdap) Never done    Shingles vaccine (1 of 2) Never done    Respiratory Syncytial Virus (RSV) Pregnant or age 60 yrs+ (1 - 1-dose 75+ series) Never done    Diabetic Alb to Cr ratio (uACR) test  10/16/2019    COVID-19 Vaccine (5 - 2023-24 season) 09/01/2024    Lipids  12/11/2024    GFR test (Diabetes, CKD 3-4, OR last GFR 15-59)  12/11/2024    Flu vaccine (1) 10/08/2025 (Originally 8/1/2024)    Depression Monitoring  10/08/2025    DEXA (modify frequency per FRAX score)  Completed    Annual Wellness Visit (Medicare Advantage)  Completed    Pneumococcal 65+ years Vaccine  Completed    Hepatitis A vaccine  Aged Out    Hepatitis B vaccine  Aged Out    Hib vaccine  Aged Out    Polio vaccine  Aged Out    Meningococcal (ACWY) vaccine  Aged Out             (applicable per patient's age: Cancer Screenings, Depression Screening, Fall Risk Screening, Immunizations)    Hemoglobin A1C (%)   Date Value   03/01/2022 5.6   08/12/2020 5.6   02/19/2019 5.5     AST (U/L)   Date Value   12/11/2023 16     ALT (U/L)   Date Value   12/11/2023 13     BUN (mg/dL)   Date Value   12/11/2023 16      (goal A1C is < 7)   (goal LDL is <100) need 30-50% reduction from baseline     BP Readings from Last 3 Encounters:   10/08/24 104/80   08/27/24 112/80   04/24/24 126/80    (goal /80)      All Future Testing planned in [facility]:  Lab Frequency Next Occurrence       Next Visit Date:  No future appointments.         Patient Active Problem List:     Hyperlipidemia     Fibromyalgia     Osteopenia     Hx of bilateral breast implants     Arthritis of shoulder region, right     Anxiety     Anemia, normocytic normochromic     Chronic pain associated with significant psychosocial dysfunction     Primary osteoarthritis involving multiple joints     Spondylosis of lumbar region without  Yes

## 2024-12-17 RX ORDER — ALPRAZOLAM 0.5 MG
TABLET ORAL
Qty: 60 TABLET | Refills: 0 | OUTPATIENT
Start: 2024-12-17 | End: 2025-01-14

## 2024-12-17 NOTE — TELEPHONE ENCOUNTER
Patient has no UDS on file.  Patient may stop in to complete and I can sent in refill.  She is due for her 3 month follow up in a couple of weeks. This needs scheduled. Refills can be sent once UDS is completed in office until her controlled medication visit is completed (needs scheduled).

## 2024-12-25 ENCOUNTER — APPOINTMENT (OUTPATIENT)
Dept: CT IMAGING | Age: 79
End: 2024-12-25
Payer: MEDICARE

## 2024-12-25 ENCOUNTER — APPOINTMENT (OUTPATIENT)
Dept: GENERAL RADIOLOGY | Age: 79
End: 2024-12-25
Payer: MEDICARE

## 2024-12-25 ENCOUNTER — HOSPITAL ENCOUNTER (INPATIENT)
Age: 79
LOS: 2 days | Discharge: HOME | End: 2024-12-27
Attending: EMERGENCY MEDICINE | Admitting: SURGERY
Payer: MEDICARE

## 2024-12-25 DIAGNOSIS — R11.0 NAUSEA: ICD-10-CM

## 2024-12-25 DIAGNOSIS — R10.84 GENERALIZED ABDOMINAL PAIN: Primary | ICD-10-CM

## 2024-12-25 DIAGNOSIS — K56.609 SMALL BOWEL OBSTRUCTION (HCC): ICD-10-CM

## 2024-12-25 LAB
ALBUMIN SERPL-MCNC: 4.2 G/DL (ref 3.5–5.2)
ALBUMIN/GLOB SERPL: 1.4 {RATIO} (ref 1–2.5)
ALP SERPL-CCNC: 93 U/L (ref 35–104)
ALT SERPL-CCNC: 17 U/L (ref 10–35)
ANION GAP SERPL CALCULATED.3IONS-SCNC: 14 MMOL/L (ref 9–16)
AST SERPL-CCNC: 27 U/L (ref 10–35)
BACTERIA URNS QL MICRO: ABNORMAL
BASOPHILS # BLD: 0.04 K/UL (ref 0–0.2)
BASOPHILS NFR BLD: 1 % (ref 0–2)
BILIRUB SERPL-MCNC: 0.3 MG/DL (ref 0–1.2)
BILIRUB UR QL STRIP: NEGATIVE
BUN SERPL-MCNC: 14 MG/DL (ref 8–23)
CALCIUM SERPL-MCNC: 9.6 MG/DL (ref 8.6–10.4)
CASTS #/AREA URNS LPF: ABNORMAL /LPF (ref 0–8)
CHLORIDE SERPL-SCNC: 105 MMOL/L (ref 98–107)
CLARITY UR: CLEAR
CO2 SERPL-SCNC: 20 MMOL/L (ref 20–31)
COLOR UR: YELLOW
CREAT SERPL-MCNC: 1 MG/DL (ref 0.6–0.9)
EOSINOPHIL # BLD: 0.21 K/UL (ref 0–0.44)
EOSINOPHILS RELATIVE PERCENT: 3 % (ref 1–4)
EPI CELLS #/AREA URNS HPF: ABNORMAL /HPF (ref 0–5)
ERYTHROCYTE [DISTWIDTH] IN BLOOD BY AUTOMATED COUNT: 13.5 % (ref 11.8–14.4)
GFR, ESTIMATED: 57 ML/MIN/1.73M2
GLUCOSE SERPL-MCNC: 113 MG/DL (ref 74–99)
GLUCOSE UR STRIP-MCNC: NEGATIVE MG/DL
HCT VFR BLD AUTO: 44.8 % (ref 36.3–47.1)
HGB BLD-MCNC: 15 G/DL (ref 11.9–15.1)
HGB UR QL STRIP.AUTO: NEGATIVE
IMM GRANULOCYTES # BLD AUTO: 0.03 K/UL (ref 0–0.3)
IMM GRANULOCYTES NFR BLD: 0 %
KETONES UR STRIP-MCNC: NEGATIVE MG/DL
LACTIC ACID, WHOLE BLOOD: 2.1 MMOL/L (ref 0.7–2.1)
LEUKOCYTE ESTERASE UR QL STRIP: NEGATIVE
LIPASE SERPL-CCNC: 27 U/L (ref 13–60)
LYMPHOCYTES NFR BLD: 1.8 K/UL (ref 1.1–3.7)
LYMPHOCYTES RELATIVE PERCENT: 24 % (ref 24–43)
MCH RBC QN AUTO: 30.9 PG (ref 25.2–33.5)
MCHC RBC AUTO-ENTMCNC: 33.5 G/DL (ref 28.4–34.8)
MCV RBC AUTO: 92.4 FL (ref 82.6–102.9)
MONOCYTES NFR BLD: 0.52 K/UL (ref 0.1–1.2)
MONOCYTES NFR BLD: 7 % (ref 3–12)
NEUTROPHILS NFR BLD: 65 % (ref 36–65)
NEUTS SEG NFR BLD: 5.02 K/UL (ref 1.5–8.1)
NITRITE UR QL STRIP: NEGATIVE
NRBC BLD-RTO: 0 PER 100 WBC
PH UR STRIP: 8 [PH] (ref 5–8)
PLATELET # BLD AUTO: 292 K/UL (ref 138–453)
PMV BLD AUTO: 9.1 FL (ref 8.1–13.5)
POTASSIUM SERPL-SCNC: 3.8 MMOL/L (ref 3.7–5.3)
PROT SERPL-MCNC: 7.3 G/DL (ref 6.6–8.7)
PROT UR STRIP-MCNC: NEGATIVE MG/DL
RBC # BLD AUTO: 4.85 M/UL (ref 3.95–5.11)
RBC #/AREA URNS HPF: ABNORMAL /HPF (ref 0–4)
SODIUM SERPL-SCNC: 139 MMOL/L (ref 136–145)
SP GR UR STRIP: 1.04 (ref 1–1.03)
TROPONIN I SERPL HS-MCNC: 11 NG/L (ref 0–14)
TROPONIN I SERPL HS-MCNC: 12 NG/L (ref 0–14)
UROBILINOGEN UR STRIP-ACNC: NORMAL EU/DL (ref 0–1)
WBC #/AREA URNS HPF: ABNORMAL /HPF (ref 0–5)
WBC OTHER # BLD: 7.6 K/UL (ref 3.5–11.3)

## 2024-12-25 PROCEDURE — 2500000003 HC RX 250 WO HCPCS: Performed by: STUDENT IN AN ORGANIZED HEALTH CARE EDUCATION/TRAINING PROGRAM

## 2024-12-25 PROCEDURE — 96374 THER/PROPH/DIAG INJ IV PUSH: CPT

## 2024-12-25 PROCEDURE — 6360000002 HC RX W HCPCS

## 2024-12-25 PROCEDURE — 99285 EMERGENCY DEPT VISIT HI MDM: CPT

## 2024-12-25 PROCEDURE — 2060000002 HC BURN ICU INTERMEDIATE R&B

## 2024-12-25 PROCEDURE — 2580000003 HC RX 258: Performed by: SURGERY

## 2024-12-25 PROCEDURE — 99222 1ST HOSP IP/OBS MODERATE 55: CPT | Performed by: SURGERY

## 2024-12-25 PROCEDURE — 96375 TX/PRO/DX INJ NEW DRUG ADDON: CPT

## 2024-12-25 PROCEDURE — 96376 TX/PRO/DX INJ SAME DRUG ADON: CPT

## 2024-12-25 PROCEDURE — 83690 ASSAY OF LIPASE: CPT

## 2024-12-25 PROCEDURE — 6360000002 HC RX W HCPCS: Performed by: STUDENT IN AN ORGANIZED HEALTH CARE EDUCATION/TRAINING PROGRAM

## 2024-12-25 PROCEDURE — 6360000004 HC RX CONTRAST MEDICATION: Performed by: STUDENT IN AN ORGANIZED HEALTH CARE EDUCATION/TRAINING PROGRAM

## 2024-12-25 PROCEDURE — 2580000003 HC RX 258

## 2024-12-25 PROCEDURE — 87086 URINE CULTURE/COLONY COUNT: CPT

## 2024-12-25 PROCEDURE — 71045 X-RAY EXAM CHEST 1 VIEW: CPT

## 2024-12-25 PROCEDURE — 84484 ASSAY OF TROPONIN QUANT: CPT

## 2024-12-25 PROCEDURE — 81001 URINALYSIS AUTO W/SCOPE: CPT

## 2024-12-25 PROCEDURE — 93005 ELECTROCARDIOGRAM TRACING: CPT | Performed by: STUDENT IN AN ORGANIZED HEALTH CARE EDUCATION/TRAINING PROGRAM

## 2024-12-25 PROCEDURE — 80053 COMPREHEN METABOLIC PANEL: CPT

## 2024-12-25 PROCEDURE — 83605 ASSAY OF LACTIC ACID: CPT

## 2024-12-25 PROCEDURE — 74177 CT ABD & PELVIS W/CONTRAST: CPT

## 2024-12-25 PROCEDURE — 85025 COMPLETE CBC W/AUTO DIFF WBC: CPT

## 2024-12-25 RX ORDER — IPRATROPIUM BROMIDE AND ALBUTEROL SULFATE 2.5; .5 MG/3ML; MG/3ML
1 SOLUTION RESPIRATORY (INHALATION) 2 TIMES DAILY PRN
Status: DISCONTINUED | OUTPATIENT
Start: 2024-12-25 | End: 2024-12-27 | Stop reason: HOSPADM

## 2024-12-25 RX ORDER — SODIUM CHLORIDE, SODIUM LACTATE, POTASSIUM CHLORIDE, AND CALCIUM CHLORIDE .6; .31; .03; .02 G/100ML; G/100ML; G/100ML; G/100ML
1000 INJECTION, SOLUTION INTRAVENOUS ONCE
Status: COMPLETED | OUTPATIENT
Start: 2024-12-25 | End: 2024-12-26

## 2024-12-25 RX ORDER — ROPINIROLE 0.25 MG/1
0.5 TABLET, FILM COATED ORAL NIGHTLY
Status: DISCONTINUED | OUTPATIENT
Start: 2024-12-25 | End: 2024-12-27 | Stop reason: HOSPADM

## 2024-12-25 RX ORDER — ONDANSETRON 2 MG/ML
4 INJECTION INTRAMUSCULAR; INTRAVENOUS ONCE
Status: COMPLETED | OUTPATIENT
Start: 2024-12-25 | End: 2024-12-25

## 2024-12-25 RX ORDER — MIDAZOLAM HYDROCHLORIDE 2 MG/2ML
1 INJECTION, SOLUTION INTRAMUSCULAR; INTRAVENOUS ONCE
Status: DISCONTINUED | OUTPATIENT
Start: 2024-12-25 | End: 2024-12-27 | Stop reason: HOSPADM

## 2024-12-25 RX ORDER — QUETIAPINE FUMARATE 25 MG/1
50 TABLET, FILM COATED ORAL NIGHTLY
Status: DISCONTINUED | OUTPATIENT
Start: 2024-12-25 | End: 2024-12-27 | Stop reason: HOSPADM

## 2024-12-25 RX ORDER — SODIUM CHLORIDE, SODIUM LACTATE, POTASSIUM CHLORIDE, CALCIUM CHLORIDE 600; 310; 30; 20 MG/100ML; MG/100ML; MG/100ML; MG/100ML
INJECTION, SOLUTION INTRAVENOUS CONTINUOUS
Status: DISCONTINUED | OUTPATIENT
Start: 2024-12-25 | End: 2024-12-25 | Stop reason: SDUPTHER

## 2024-12-25 RX ORDER — SODIUM CHLORIDE 0.9 % (FLUSH) 0.9 %
5-40 SYRINGE (ML) INJECTION PRN
Status: DISCONTINUED | OUTPATIENT
Start: 2024-12-25 | End: 2024-12-27 | Stop reason: HOSPADM

## 2024-12-25 RX ORDER — ENOXAPARIN SODIUM 100 MG/ML
40 INJECTION SUBCUTANEOUS DAILY
Status: DISCONTINUED | OUTPATIENT
Start: 2024-12-26 | End: 2024-12-26

## 2024-12-25 RX ORDER — ONDANSETRON 4 MG/1
4 TABLET, ORALLY DISINTEGRATING ORAL EVERY 8 HOURS PRN
Status: DISCONTINUED | OUTPATIENT
Start: 2024-12-25 | End: 2024-12-27 | Stop reason: HOSPADM

## 2024-12-25 RX ORDER — SODIUM CHLORIDE 0.9 % (FLUSH) 0.9 %
5-40 SYRINGE (ML) INJECTION EVERY 12 HOURS SCHEDULED
Status: DISCONTINUED | OUTPATIENT
Start: 2024-12-25 | End: 2024-12-27 | Stop reason: HOSPADM

## 2024-12-25 RX ORDER — 0.9 % SODIUM CHLORIDE 0.9 %
1000 INTRAVENOUS SOLUTION INTRAVENOUS ONCE
Status: DISCONTINUED | OUTPATIENT
Start: 2024-12-25 | End: 2024-12-25

## 2024-12-25 RX ORDER — IOPAMIDOL 755 MG/ML
75 INJECTION, SOLUTION INTRAVASCULAR
Status: COMPLETED | OUTPATIENT
Start: 2024-12-25 | End: 2024-12-25

## 2024-12-25 RX ORDER — TOPIRAMATE 50 MG/1
100 TABLET, FILM COATED ORAL NIGHTLY
Status: DISCONTINUED | OUTPATIENT
Start: 2024-12-25 | End: 2024-12-27 | Stop reason: HOSPADM

## 2024-12-25 RX ORDER — BUDESONIDE AND FORMOTEROL FUMARATE DIHYDRATE 160; 4.5 UG/1; UG/1
2 AEROSOL RESPIRATORY (INHALATION)
Status: DISCONTINUED | OUTPATIENT
Start: 2024-12-25 | End: 2024-12-27 | Stop reason: HOSPADM

## 2024-12-25 RX ORDER — SODIUM CHLORIDE, SODIUM LACTATE, POTASSIUM CHLORIDE, CALCIUM CHLORIDE 600; 310; 30; 20 MG/100ML; MG/100ML; MG/100ML; MG/100ML
INJECTION, SOLUTION INTRAVENOUS CONTINUOUS
Status: DISCONTINUED | OUTPATIENT
Start: 2024-12-25 | End: 2024-12-27

## 2024-12-25 RX ORDER — MORPHINE SULFATE 2 MG/ML
2 INJECTION, SOLUTION INTRAMUSCULAR; INTRAVENOUS
Status: DISCONTINUED | OUTPATIENT
Start: 2024-12-25 | End: 2024-12-27 | Stop reason: HOSPADM

## 2024-12-25 RX ORDER — SERTRALINE HYDROCHLORIDE 25 MG/1
25 TABLET, FILM COATED ORAL DAILY
Status: DISCONTINUED | OUTPATIENT
Start: 2024-12-26 | End: 2024-12-27 | Stop reason: HOSPADM

## 2024-12-25 RX ORDER — ONDANSETRON 2 MG/ML
4 INJECTION INTRAMUSCULAR; INTRAVENOUS EVERY 6 HOURS PRN
Status: DISCONTINUED | OUTPATIENT
Start: 2024-12-25 | End: 2024-12-27 | Stop reason: HOSPADM

## 2024-12-25 RX ORDER — MIDAZOLAM HYDROCHLORIDE 2 MG/2ML
1 INJECTION, SOLUTION INTRAMUSCULAR; INTRAVENOUS ONCE
Status: COMPLETED | OUTPATIENT
Start: 2024-12-25 | End: 2024-12-25

## 2024-12-25 RX ORDER — QUETIAPINE FUMARATE 200 MG/1
200 TABLET, FILM COATED ORAL DAILY
Status: DISCONTINUED | OUTPATIENT
Start: 2024-12-26 | End: 2024-12-27 | Stop reason: HOSPADM

## 2024-12-25 RX ORDER — ATORVASTATIN CALCIUM 80 MG/1
80 TABLET, FILM COATED ORAL DAILY
Status: DISCONTINUED | OUTPATIENT
Start: 2024-12-25 | End: 2024-12-27 | Stop reason: HOSPADM

## 2024-12-25 RX ORDER — MORPHINE SULFATE 4 MG/ML
4 INJECTION, SOLUTION INTRAMUSCULAR; INTRAVENOUS
Status: DISCONTINUED | OUTPATIENT
Start: 2024-12-25 | End: 2024-12-27 | Stop reason: HOSPADM

## 2024-12-25 RX ORDER — SODIUM CHLORIDE 9 MG/ML
INJECTION, SOLUTION INTRAVENOUS PRN
Status: DISCONTINUED | OUTPATIENT
Start: 2024-12-25 | End: 2024-12-27 | Stop reason: HOSPADM

## 2024-12-25 RX ORDER — KETOROLAC TROMETHAMINE 15 MG/ML
15 INJECTION, SOLUTION INTRAMUSCULAR; INTRAVENOUS ONCE
Status: COMPLETED | OUTPATIENT
Start: 2024-12-25 | End: 2024-12-25

## 2024-12-25 RX ADMIN — ONDANSETRON 4 MG: 2 INJECTION INTRAMUSCULAR; INTRAVENOUS at 16:04

## 2024-12-25 RX ADMIN — SODIUM CHLORIDE, PRESERVATIVE FREE 10 ML: 5 INJECTION INTRAVENOUS at 23:09

## 2024-12-25 RX ADMIN — IOPAMIDOL 75 ML: 755 INJECTION, SOLUTION INTRAVENOUS at 19:04

## 2024-12-25 RX ADMIN — ONDANSETRON 4 MG: 2 INJECTION INTRAMUSCULAR; INTRAVENOUS at 20:51

## 2024-12-25 RX ADMIN — MIDAZOLAM HYDROCHLORIDE 1 MG: 1 INJECTION, SOLUTION INTRAMUSCULAR; INTRAVENOUS at 22:09

## 2024-12-25 RX ADMIN — KETOROLAC TROMETHAMINE 15 MG: 15 INJECTION, SOLUTION INTRAMUSCULAR; INTRAVENOUS at 18:42

## 2024-12-25 RX ADMIN — SODIUM CHLORIDE, POTASSIUM CHLORIDE, SODIUM LACTATE AND CALCIUM CHLORIDE 1000 ML: 600; 310; 30; 20 INJECTION, SOLUTION INTRAVENOUS at 23:06

## 2024-12-25 ASSESSMENT — ENCOUNTER SYMPTOMS
BLOOD IN STOOL: 0
VOMITING: 0
COUGH: 0
ABDOMINAL PAIN: 1
NAUSEA: 1
BACK PAIN: 0
CONSTIPATION: 1
SHORTNESS OF BREATH: 0

## 2024-12-25 ASSESSMENT — PAIN SCALES - GENERAL: PAINLEVEL_OUTOF10: 8

## 2024-12-25 ASSESSMENT — LIFESTYLE VARIABLES
HOW OFTEN DO YOU HAVE A DRINK CONTAINING ALCOHOL: NEVER
HOW MANY STANDARD DRINKS CONTAINING ALCOHOL DO YOU HAVE ON A TYPICAL DAY: PATIENT DOES NOT DRINK

## 2024-12-25 ASSESSMENT — PAIN DESCRIPTION - LOCATION: LOCATION: ABDOMEN

## 2024-12-25 NOTE — ED PROVIDER NOTES
[facility]     Emergency Department     Faculty Note/ Attestation      Pt Name: Aure Aiken                                       MRN: [id number]  Birthdate 1945  Date of evaluation: 12/25/2024    Patients PCP:    Marli Pace, DES - CNP    Note Started: 3:47 PM EST      Attestation  I performed a history and physical examination of the patient and discussed management with the resident. I reviewed the resident’s note and agree with the documented findings and plan of care. Any areas of disagreement are noted on the chart. I was personally present for the key portions of any procedures. I have documented in the chart those procedures where I was not present during the key portions. I have reviewed the emergency nurses triage note. I agree with the chief complaint, past medical history, past surgical history, allergies, medications, social and family history as documented unless otherwise noted below.    For Physician Assistant/ Nurse Practitioner cases/documentation I have personally evaluated this patient and have completed at least one if not all key elements of the E/M (history, physical exam, and MDM). Additional findings are as noted.      Initial Screens:        Donya Coma Scale  Eye Opening: Spontaneous  Best Verbal Response: Oriented  Best Motor Response: Obeys commands  Donya Coma Scale Score: 15    Vitals:    Vitals:    12/25/24 1540 12/25/24 1541 12/25/24 1544   BP:  (!) 168/100    Pulse: 87     Resp: 18     Temp:   98.3 °F (36.8 °C)   TempSrc:   Oral   SpO2: 98%     Weight: 63 kg (138 lb 14.2 oz)         CHIEF COMPLAINT       Chief Complaint   Patient presents with    Abdominal Pain             DIAGNOSTIC RESULTS             RADIOLOGY:   No orders to display         LABS:  Labs Reviewed - No data to display      EMERGENCY DEPARTMENT COURSE:     -------------------------  BP: (!) 168/100, Temp: 98.3 °F (36.8 °C), Pulse: 87, Respirations: 18      Comments    80 yo  F s/p paraesoph hernia repair with mesh 2 yrs ago  Here with nausea, abd pain  Not gerda PO  No fevers  + bloating, BM this morning  Prior SBO and pancreatitis post-op    Abd labs, lactate, CXR, CT abd Pelvis    (Please note that portions of this note were completed with a voice recognition program.  Efforts were made to edit the dictations but occasionally words are mis-transcribed.)      Erich Lara MD,, MD  Attending Emergency Physician

## 2024-12-25 NOTE — ED NOTES
Pt to room 21 with c/o abd pain. Pt reports that she has upper abd pain that radiates to her chest. Pt reports that she feels bloated and states that she is nauseous. Pt reports that she did have a bowel movement today, but still feels bloated. Pt placed on continuous cardiac monitor, bp and pulse ox. EKG completed, IV established, blood work drawn.   Pt alert and oriented x4, talking in complete sentences, respirations even and unlabored. Pt acting age appropriate. Will continue to plan of care.

## 2024-12-25 NOTE — ED PROVIDER NOTES
[facility] EMERGENCY DEPARTMENT  Emergency Department Encounter  Emergency Medicine Resident     Pt Name:Aure Aiken  MRN: [id number]  Birthdate 1945  Date of evaluation: 12/25/24  PCP:  Marli Pace APRN - CNP  Note Started: 3:52 PM EST      CHIEF COMPLAINT       Chief Complaint   Patient presents with    Abdominal Pain       HISTORY OF PRESENT ILLNESS  (Location/Symptom, Timing/Onset, Context/Setting, Quality, Duration, Modifying Factors, Severity.)      Aure Aiken is a 79 y.o. female who presents with generalized abdominal pain, nausea.  States this started earlier today.  Reports recent constipation for the last 3 days but had a good bowel movement this morning.  Denies melena or hematochezia.  Denies fever, chills, chest pain, shortness of breath, cough, dysuria, hematuria, flank pain, back pain, vaginal discharge or bleeding.  Has not take any medications for symptoms.  States symptoms have been constant since this morning, pain currently mild, was more severe earlier today.  States she is having mild nausea currently.    PAST MEDICAL / SURGICAL / SOCIAL / FAMILY HISTORY      has a past medical history of Abnormal finding on imaging, Acute respiratory failure with hypoxemia, Anemia, Bochdalek hernia, Bowel obstruction (HCC), Bronchitis, Chronic biliary pancreatitis (HCC), Chronic pneumonia, Cough, DDD (degenerative disc disease), Depression, Diverticulosis, Esophageal dysphagia, Fibromyalgia, Frequent headaches, GERD (gastroesophageal reflux disease), Glaucoma, Hiatal hernia, Hospital-acquired pneumonia, Hyperlipidemia, Knee injuries, NSTEMI (non-ST elevated myocardial infarction) (HCC), Osteoarthritis of more than one site, Pneumonia, Seasonal allergies, Sepsis (HCC), Under care of service provider, Under care of service provider, and Wellness examination.       has a past surgical history that includes Hysterectomy; Foot surgery (Right); Small intestine surgery; Carpal tunnel  recognition program.  Efforts were made to edit the dictations but occasionally words are mis-transcribed.)

## 2024-12-25 NOTE — ED PROVIDER NOTES
[facility] EMERGENCY DEPARTMENT  Emergency Department  Emergency Medicine Resident Turn-Over     Note Started: 5:10 PM EST    Care of Aure Aiken was assumed from Dr. Hatfield and is being seen for Abdominal Pain  .  The patient's initial evaluation and plan have been discussed with the prior provider who initially evaluated the patient.     EMERGENCY DEPARTMENT COURSE / MEDICAL DECISION MAKING:       MEDICATIONS GIVEN:  Orders Placed This Encounter   Medications    ondansetron (ZOFRAN) injection 4 mg    iopamidol (ISOVUE-370) 76 % injection 75 mL    ketorolac (TORADOL) injection 15 mg    midazolam PF (VERSED) injection 1 mg    ondansetron (ZOFRAN) injection 4 mg       LABS / RADIOLOGY:     Labs Reviewed   COMPREHENSIVE METABOLIC PANEL W/ REFLEX TO MG FOR LOW K - Abnormal; Notable for the following components:       Result Value    Glucose 113 (*)     Creatinine 1.0 (*)     Est, Glom Filt Rate 57 (*)     All other components within normal limits   CULTURE, URINE   CBC WITH AUTO DIFFERENTIAL   LIPASE   TROPONIN   TROPONIN   LACTIC ACID   URINALYSIS WITH MICROSCOPIC       XR CHEST PORTABLE    Result Date: 12/25/2024  EXAMINATION: ONE XRAY VIEW OF THE CHEST 12/25/2024 4:19 pm COMPARISON: 08/01/2023 HISTORY: ORDERING SYSTEM PROVIDED HISTORY: Generalized abdominal pain worse LUQ, nausea, hx paraesophageal hernia s/p mesh repair, recent constipation TECHNOLOGIST PROVIDED HISTORY: Generalized abdominal pain worse LUQ, nausea, hx paraesophageal hernia s/p mesh repair, recent constipation FINDINGS: Heart size stable.  Left basilar opacity.  No pneumothorax.  No pleural effusion.  No pulmonary vascular congestion.     Left basilar opacity could represent atelectasis or infection       RECENT VITALS:     Temp: 98.3 °F (36.8 °C),  Pulse: 98, Respirations: 18, BP: (!) 144/88, SpO2: 98 %    This patient is a 79 y.o. Female with nausea and generalized abdominal plain for the last day.  Associate symptoms include

## 2024-12-26 ENCOUNTER — APPOINTMENT (OUTPATIENT)
Dept: GENERAL RADIOLOGY | Age: 79
End: 2024-12-26
Payer: MEDICARE

## 2024-12-26 LAB
ALBUMIN SERPL-MCNC: 4 G/DL (ref 3.5–5.2)
ALBUMIN/GLOB SERPL: 1.4 {RATIO} (ref 1–2.5)
ALP SERPL-CCNC: 87 U/L (ref 35–104)
ALT SERPL-CCNC: 16 U/L (ref 10–35)
ANION GAP SERPL CALCULATED.3IONS-SCNC: 12 MMOL/L (ref 9–16)
AST SERPL-CCNC: 34 U/L (ref 10–35)
BASOPHILS # BLD: 0.03 K/UL (ref 0–0.2)
BASOPHILS NFR BLD: 1 % (ref 0–2)
BILIRUB DIRECT SERPL-MCNC: <0.1 MG/DL (ref 0–0.2)
BILIRUB INDIRECT SERPL-MCNC: NORMAL MG/DL (ref 0–1)
BILIRUB SERPL-MCNC: 0.3 MG/DL (ref 0–1.2)
BUN SERPL-MCNC: 14 MG/DL (ref 8–23)
CA-I BLD-SCNC: 1.2 MMOL/L (ref 1.13–1.33)
CALCIUM SERPL-MCNC: 9.7 MG/DL (ref 8.6–10.4)
CHLORIDE SERPL-SCNC: 105 MMOL/L (ref 98–107)
CO2 SERPL-SCNC: 24 MMOL/L (ref 20–31)
CREAT SERPL-MCNC: 1.1 MG/DL (ref 0.6–0.9)
EKG ATRIAL RATE: 83 BPM
EKG P AXIS: 42 DEGREES
EKG P-R INTERVAL: 110 MS
EKG Q-T INTERVAL: 370 MS
EKG QRS DURATION: 68 MS
EKG QTC CALCULATION (BAZETT): 434 MS
EKG R AXIS: 10 DEGREES
EKG T AXIS: 33 DEGREES
EKG VENTRICULAR RATE: 83 BPM
EOSINOPHIL # BLD: 0.16 K/UL (ref 0–0.44)
EOSINOPHILS RELATIVE PERCENT: 3 % (ref 1–4)
ERYTHROCYTE [DISTWIDTH] IN BLOOD BY AUTOMATED COUNT: 13.6 % (ref 11.8–14.4)
GFR, ESTIMATED: 50 ML/MIN/1.73M2
GLOBULIN SER CALC-MCNC: 2.9 G/DL
GLUCOSE SERPL-MCNC: 112 MG/DL (ref 74–99)
HCT VFR BLD AUTO: 41.5 % (ref 36.3–47.1)
HGB BLD-MCNC: 13.9 G/DL (ref 11.9–15.1)
IMM GRANULOCYTES # BLD AUTO: <0.03 K/UL (ref 0–0.3)
IMM GRANULOCYTES NFR BLD: 0 %
LIPASE SERPL-CCNC: 24 U/L (ref 13–60)
LYMPHOCYTES NFR BLD: 1.02 K/UL (ref 1.1–3.7)
LYMPHOCYTES RELATIVE PERCENT: 17 % (ref 24–43)
MAGNESIUM SERPL-MCNC: 2.4 MG/DL (ref 1.6–2.4)
MCH RBC QN AUTO: 31.4 PG (ref 25.2–33.5)
MCHC RBC AUTO-ENTMCNC: 33.5 G/DL (ref 28.4–34.8)
MCV RBC AUTO: 93.7 FL (ref 82.6–102.9)
MICROORGANISM SPEC CULT: NORMAL
MONOCYTES NFR BLD: 0.54 K/UL (ref 0.1–1.2)
MONOCYTES NFR BLD: 9 % (ref 3–12)
NEUTROPHILS NFR BLD: 70 % (ref 36–65)
NEUTS SEG NFR BLD: 4.2 K/UL (ref 1.5–8.1)
NRBC BLD-RTO: 0 PER 100 WBC
PHOSPHATE SERPL-MCNC: 4 MG/DL (ref 2.5–4.5)
PLATELET # BLD AUTO: 303 K/UL (ref 138–453)
PMV BLD AUTO: 9.6 FL (ref 8.1–13.5)
POTASSIUM SERPL-SCNC: 4.5 MMOL/L (ref 3.7–5.3)
PROT SERPL-MCNC: 6.9 G/DL (ref 6.6–8.7)
RBC # BLD AUTO: 4.43 M/UL (ref 3.95–5.11)
SERVICE CMNT-IMP: NORMAL
SODIUM SERPL-SCNC: 141 MMOL/L (ref 136–145)
SPECIMEN DESCRIPTION: NORMAL
WBC OTHER # BLD: 6 K/UL (ref 3.5–11.3)

## 2024-12-26 PROCEDURE — 83690 ASSAY OF LIPASE: CPT

## 2024-12-26 PROCEDURE — 2580000003 HC RX 258: Performed by: SURGERY

## 2024-12-26 PROCEDURE — 36415 COLL VENOUS BLD VENIPUNCTURE: CPT

## 2024-12-26 PROCEDURE — 2060000002 HC BURN ICU INTERMEDIATE R&B

## 2024-12-26 PROCEDURE — 6360000004 HC RX CONTRAST MEDICATION

## 2024-12-26 PROCEDURE — 80048 BASIC METABOLIC PNL TOTAL CA: CPT

## 2024-12-26 PROCEDURE — 74018 RADEX ABDOMEN 1 VIEW: CPT

## 2024-12-26 PROCEDURE — 2580000003 HC RX 258

## 2024-12-26 PROCEDURE — 6360000002 HC RX W HCPCS: Performed by: STUDENT IN AN ORGANIZED HEALTH CARE EDUCATION/TRAINING PROGRAM

## 2024-12-26 PROCEDURE — 6370000000 HC RX 637 (ALT 250 FOR IP)

## 2024-12-26 PROCEDURE — 6370000000 HC RX 637 (ALT 250 FOR IP): Performed by: STUDENT IN AN ORGANIZED HEALTH CARE EDUCATION/TRAINING PROGRAM

## 2024-12-26 PROCEDURE — 80076 HEPATIC FUNCTION PANEL: CPT

## 2024-12-26 PROCEDURE — 85025 COMPLETE CBC W/AUTO DIFF WBC: CPT

## 2024-12-26 PROCEDURE — 74250 X-RAY XM SM INT 1CNTRST STD: CPT

## 2024-12-26 PROCEDURE — 83735 ASSAY OF MAGNESIUM: CPT

## 2024-12-26 PROCEDURE — 82330 ASSAY OF CALCIUM: CPT

## 2024-12-26 PROCEDURE — 84100 ASSAY OF PHOSPHORUS: CPT

## 2024-12-26 PROCEDURE — 93010 ELECTROCARDIOGRAM REPORT: CPT | Performed by: INTERNAL MEDICINE

## 2024-12-26 RX ORDER — DIATRIZOATE MEGLUMINE AND DIATRIZOATE SODIUM 660; 100 MG/ML; MG/ML
360 SOLUTION ORAL; RECTAL
Status: DISCONTINUED | OUTPATIENT
Start: 2024-12-26 | End: 2024-12-27 | Stop reason: HOSPADM

## 2024-12-26 RX ORDER — ENOXAPARIN SODIUM 100 MG/ML
30 INJECTION SUBCUTANEOUS DAILY
Status: DISCONTINUED | OUTPATIENT
Start: 2024-12-27 | End: 2024-12-27

## 2024-12-26 RX ADMIN — MORPHINE SULFATE 4 MG: 4 INJECTION INTRAVENOUS at 12:49

## 2024-12-26 RX ADMIN — ENOXAPARIN SODIUM 40 MG: 100 INJECTION SUBCUTANEOUS at 07:54

## 2024-12-26 RX ADMIN — PHENOL 1 SPRAY: 1.5 LIQUID ORAL at 12:50

## 2024-12-26 RX ADMIN — SODIUM CHLORIDE, POTASSIUM CHLORIDE, SODIUM LACTATE AND CALCIUM CHLORIDE: 600; 310; 30; 20 INJECTION, SOLUTION INTRAVENOUS at 09:33

## 2024-12-26 RX ADMIN — DIATRIZOATE MEGLUMINE AND DIATRIZOATE SODIUM 360 ML: 660; 100 LIQUID ORAL; RECTAL at 14:48

## 2024-12-26 RX ADMIN — PHENOL 1 SPRAY: 1.5 LIQUID ORAL at 15:24

## 2024-12-26 RX ADMIN — ONDANSETRON 4 MG: 2 INJECTION INTRAMUSCULAR; INTRAVENOUS at 04:10

## 2024-12-26 RX ADMIN — ONDANSETRON 4 MG: 2 INJECTION INTRAMUSCULAR; INTRAVENOUS at 15:28

## 2024-12-26 RX ADMIN — MORPHINE SULFATE 2 MG: 4 INJECTION INTRAVENOUS at 21:12

## 2024-12-26 RX ADMIN — SODIUM CHLORIDE, POTASSIUM CHLORIDE, SODIUM LACTATE AND CALCIUM CHLORIDE: 600; 310; 30; 20 INJECTION, SOLUTION INTRAVENOUS at 01:24

## 2024-12-26 RX ADMIN — QUETIAPINE FUMARATE 200 MG: 200 TABLET ORAL at 21:04

## 2024-12-26 RX ADMIN — MORPHINE SULFATE 2 MG: 2 INJECTION, SOLUTION INTRAMUSCULAR; INTRAVENOUS at 08:00

## 2024-12-26 RX ADMIN — QUETIAPINE FUMARATE 50 MG: 25 TABLET ORAL at 21:09

## 2024-12-26 RX ADMIN — MORPHINE SULFATE 2 MG: 2 INJECTION, SOLUTION INTRAMUSCULAR; INTRAVENOUS at 00:49

## 2024-12-26 RX ADMIN — PHENOL 1 SPRAY: 1.5 LIQUID ORAL at 07:54

## 2024-12-26 RX ADMIN — PHENOL 1 SPRAY: 1.5 LIQUID ORAL at 01:08

## 2024-12-26 RX ADMIN — ROPINIROLE HYDROCHLORIDE 0.5 MG: 0.25 TABLET, FILM COATED ORAL at 21:11

## 2024-12-26 RX ADMIN — MORPHINE SULFATE 2 MG: 2 INJECTION, SOLUTION INTRAMUSCULAR; INTRAVENOUS at 15:24

## 2024-12-26 RX ADMIN — MORPHINE SULFATE 2 MG: 2 INJECTION, SOLUTION INTRAMUSCULAR; INTRAVENOUS at 03:57

## 2024-12-26 RX ADMIN — PHENOL 1 SPRAY: 1.5 LIQUID ORAL at 03:56

## 2024-12-26 ASSESSMENT — PAIN DESCRIPTION - ONSET
ONSET: ON-GOING
ONSET: ON-GOING

## 2024-12-26 ASSESSMENT — PAIN SCALES - GENERAL
PAINLEVEL_OUTOF10: 0
PAINLEVEL_OUTOF10: 7
PAINLEVEL_OUTOF10: 6
PAINLEVEL_OUTOF10: 5
PAINLEVEL_OUTOF10: 0
PAINLEVEL_OUTOF10: 5
PAINLEVEL_OUTOF10: 4
PAINLEVEL_OUTOF10: 5
PAINLEVEL_OUTOF10: 4
PAINLEVEL_OUTOF10: 5
PAINLEVEL_OUTOF10: 3
PAINLEVEL_OUTOF10: 4
PAINLEVEL_OUTOF10: 6

## 2024-12-26 ASSESSMENT — PAIN - FUNCTIONAL ASSESSMENT
PAIN_FUNCTIONAL_ASSESSMENT: ACTIVITIES ARE NOT PREVENTED
PAIN_FUNCTIONAL_ASSESSMENT: PREVENTS OR INTERFERES SOME ACTIVE ACTIVITIES AND ADLS
PAIN_FUNCTIONAL_ASSESSMENT: PREVENTS OR INTERFERES SOME ACTIVE ACTIVITIES AND ADLS

## 2024-12-26 ASSESSMENT — PAIN DESCRIPTION - FREQUENCY
FREQUENCY: CONTINUOUS
FREQUENCY: CONTINUOUS

## 2024-12-26 ASSESSMENT — PAIN DESCRIPTION - LOCATION
LOCATION: THROAT
LOCATION: HEAD;THROAT
LOCATION: THROAT;HEAD
LOCATION: ABDOMEN
LOCATION: THROAT
LOCATION: HEAD;THROAT

## 2024-12-26 ASSESSMENT — PAIN DESCRIPTION - ORIENTATION
ORIENTATION: OTHER (COMMENT)
ORIENTATION: MID
ORIENTATION: OTHER (COMMENT)
ORIENTATION: MID

## 2024-12-26 ASSESSMENT — PAIN DESCRIPTION - PAIN TYPE
TYPE: ACUTE PAIN
TYPE: ACUTE PAIN

## 2024-12-26 ASSESSMENT — PAIN DESCRIPTION - DESCRIPTORS
DESCRIPTORS: SORE;ACHING
DESCRIPTORS: ACHING;DISCOMFORT;THROBBING
DESCRIPTORS: SHARP;SORE
DESCRIPTORS: SORE
DESCRIPTORS: SHARP
DESCRIPTORS: SORE;SHARP

## 2024-12-26 NOTE — PROGRESS NOTES
abdomen 03/22/2022 HISTORY: ORDERING SYSTEM PROVIDED HISTORY: Generalized abd pain worse to LUQ, nausea. Hx paraesophageal hernia s/p repair w/ mesh, hx pancreatitis and bowel obstruction. Abnormal CXR L basilar opacity TECHNOLOGIST PROVIDED HISTORY: Generalized abd pain worse to LUQ, nausea. Hx paraesophageal hernia s/p repair w/ mesh, hx pancreatitis and bowel obstruction. Abnormal CXR L basilar opacity Decision Support Exception - unselect if not a suspected or confirmed emergency medical condition->Emergency Medical Condition (MA) Chest: Mediastinum: No enlarged lymphadenopathy.  Small lymph nodes are scattered. Small fixed hiatus hernia.  Esophageal wall is mildly thickened and mild proximal dilation. Heart: Heart size is upper limits of normal.  No pericardial effusion. Great vessels: the great vessels are patent and unremarkable. Aorta: Aorta is patent.  Mild atherosclerotic changes..  Negative for aneurysm or dissection. Lungs/pleura: Trace pleural effusions.  Posterior diaphragmatic hernia on the left.  Consolidation in the left lower lobe.  Respiratory motion.  This is unchanged in appearance from the previous study. Soft Tissues: Bilateral breast implants.  The right appears to have an increase are capsular rupture.  This is unchanged in appearance from the previous study. Osseous: No suspicious lytic or sclerotic lesions. Abdomen/Pelvis: Liver: Liver is normal density. No enhancing masses.  Normal enhancement of the intrahepatic vasculature. Spleen:  Normal size.  No enhancing masses Pancreas: No enhancing masses.  No ductal dilation. No adjacent fatty stranding. Gallbladder no calcified stones or sludge.  No pericholecystic fluid.  No wall thickening. Bile ducts: Mild intrahepatic ductal dilation.  This is similar in appearance to the previous study.  Extrahepatic bile duct is also dilated measuring 1.4 cm.  This is similar in appearance to the previous study.  Very low insertion of the cystic duct.  Adrenals: The adrenal glands are unremarkable Kidneys: Kidneys are normal in appearance.  No hydronephrosis.  No enhancing masses. Norenal stones. GI: Stomach is largely distended and filled with fluid.  The proximal small bowel is diffusely dilated.  Transition point is in the left mid abdomen. The distal small bowel is completely decompressed.  Gas and feces in the proximal colon.  Appendix is unremarkable.  Distal colon is decompressed. Scattered colonic diverticula Mesentery: No enlarged lymphadenopathy. No free fluid. No free gas. Aorta: Aorta is of normal size.  Negative for dissection.  IVC is unremarkable.Celiac axis and SMA are patent. Portal vein is patent. PELVIS GI: No small bowel dilation.  No colonic wall thickening.  No enlarged lymphadenopathy. No free fluid in the pelvis. : The bladder is unremarkable in appearance.  No large mass. Hysterectomy.1 multiple phleboliths in the pelvis. Osseous: Spondylosis IMPRESSION CT chest: 1.  Posterior diaphragmatic hernia without change. 2.  Chronic opacity in the left lower lobe CT abdomen: 1.  Small bowel obstruction.  Transition point is in the left mid abdomen. 2.  Intrahepatic and extrahepatic biliary ductal dilation. This is similar in appearance to the previous study.     XR CHEST PORTABLE    Result Date: 12/25/2024  EXAMINATION: ONE XRAY VIEW OF THE CHEST 12/25/2024 4:19 pm COMPARISON: 08/01/2023 HISTORY: ORDERING SYSTEM PROVIDED HISTORY: Generalized abdominal pain worse LUQ, nausea, hx paraesophageal hernia s/p mesh repair, recent constipation TECHNOLOGIST PROVIDED HISTORY: Generalized abdominal pain worse LUQ, nausea, hx paraesophageal hernia s/p mesh repair, recent constipation FINDINGS: Heart size stable.  Left basilar opacity.  No pneumothorax.  No pleural effusion.  No pulmonary vascular congestion.     Left basilar opacity could represent atelectasis or infection         Fabby Dhaliwal PA-C  12/26/24, 8:51 AM

## 2024-12-26 NOTE — PROGRESS NOTES
Bariatric Surgery:  Daily Progress Note           PATIENT NAME: Aure Aiken     TODAY'S DATE: 12/26/2024, 10:17 AM  CC:  Abdominal pain    SUBJECTIVE:     Pt seen and examined at bedside.  Patient is slightly improved from previous.  Afebrile, hemodynamically stable.  1 L of NG tube output overnight.  Denies passing any flatus.    OBJECTIVE:   VITALS:  /82   Pulse 90   Temp 97.9 °F (36.6 °C) (Oral)   Resp 18   Ht 1.524 m (5')   Wt 53 kg (116 lb 13.5 oz)   SpO2 94%   BMI 22.82 kg/m²      INTAKE/OUTPUT:      Intake/Output Summary (Last 24 hours) at 12/26/2024 1017  Last data filed at 12/26/2024 0437  Gross per 24 hour   Intake 1467.04 ml   Output 1242 ml   Net 225.04 ml       PHYSICAL EXAM:  General Appearance: awake, alert, oriented, in no acute distress  HEENT:  Normocephalic, atraumatic, mucus membranes moist   NG tube in place  Heart: Heart regular rate and rhythm  Lungs: Normal respiratory effort, no wheezing or stridor  Abdomen: Softly distended, mildly tender.  No rebound tenderness or guarding.  Extremities: No cyanosis, pitting edema, rashes noted.    Skin: Skin color, texture, turgor normal. No rashes or lesions.      Data:  CBC with Differential:    Lab Results   Component Value Date/Time    WBC 6.0 12/26/2024 06:04 AM    RBC 4.43 12/26/2024 06:04 AM    RBC 4.55 05/09/2012 09:45 AM    HGB 13.9 12/26/2024 06:04 AM    HCT 41.5 12/26/2024 06:04 AM     12/26/2024 06:04 AM     05/09/2012 09:45 AM    MCV 93.7 12/26/2024 06:04 AM    MCH 31.4 12/26/2024 06:04 AM    MCHC 33.5 12/26/2024 06:04 AM    RDW 13.6 12/26/2024 06:04 AM    LYMPHOPCT 17 12/26/2024 06:04 AM    MONOPCT 9 12/26/2024 06:04 AM    EOSPCT 3 12/26/2024 06:04 AM    BASOPCT 1 12/26/2024 06:04 AM    MONOSABS 0.54 12/26/2024 06:04 AM    LYMPHSABS 1.02 12/26/2024 06:04 AM    EOSABS 0.16 12/26/2024 06:04 AM    BASOSABS 0.03 12/26/2024 06:04 AM    DIFFTYPE NOT REPORTED 06/24/2021 05:06 PM     BMP:    Lab Results   Component  Value Date/Time     12/26/2024 03:49 AM    K 4.5 12/26/2024 03:49 AM     12/26/2024 03:49 AM    CO2 24 12/26/2024 03:49 AM    BUN 14 12/26/2024 03:49 AM    CREATININE 1.1 12/26/2024 03:49 AM    CALCIUM 9.7 12/26/2024 03:49 AM    GFRAA >60 03/23/2022 03:40 AM    LABGLOM 50 12/26/2024 03:49 AM    LABGLOM >60 12/11/2023 09:29 AM    GLUCOSE 112 12/26/2024 03:49 AM    GLUCOSE 87 05/09/2012 09:45 AM       Radiology Review:    XR ABDOMEN FOR NG/OG/NE TUBE PLACEMENT    Result Date: 12/26/2024  OG tube in satisfactory position. Left basilar atelectasis versus early infiltrate.     XR CHEST PORTABLE    Result Date: 12/25/2024  Left basilar opacity could represent atelectasis or infection       ASSESSMENT:        79 y.o. female who presents with concern for small bowel obstruction    Plan:  -Patient seen and evaluated.  History, physical exam, laboratory, and radiographic findings reviewed and discussed with attending.  -Follow-up a.m. KUB  -May order small bowel follow-through later today.  Pending KUB results.  -Pain and nausea control as needed  -Monitor vitals per unit standard  -Encourage I-S, deep breathing, and cough  -Strict I/O's  -Diet: N.p.o.  -IV fluid hydration      Electronically signed by Charly Olivas DO  on 12/26/2024

## 2024-12-26 NOTE — PROGRESS NOTES
Pharmacy Note     Renal Dose Adjustment    Aure Aiken is a 79 y.o. female. Pharmacist assessment of renally cleared medications.    Recent Labs     12/25/24  1602 12/26/24  0349   BUN 14 14       Recent Labs     12/25/24  1602 12/26/24  0349   CREATININE 1.0* 1.1*       Estimated Creatinine Clearance: 29 mL/min (A) (based on SCr of 1.1 mg/dL (H)).      Height:   Ht Readings from Last 1 Encounters:   12/25/24 1.524 m (5')     Weight:  Wt Readings from Last 1 Encounters:   12/25/24 53 kg (116 lb 13.5 oz)       The following medication dose has been adjusted based upon renal function per P&T Guidelines:             Enoxaparin 40 mg daily adjusted to 30 mg daily for CrCl < 30 mL/min

## 2024-12-26 NOTE — CARE COORDINATION
Case Management Assessment  Initial Evaluation    Date/Time of Evaluation: 12/26/2024 1013  Assessment Completed by: Kinza Amaral    If patient is discharged prior to next notation, then this note serves as note for discharge by case management.    Patient Name: Aure Aiken                   YOB: 1945  Diagnosis: Nausea [R11.0]  Small bowel obstruction (HCC) [K56.609]  SBO (small bowel obstruction) (HCC) [K56.609]  Generalized abdominal pain [R10.84]                   Date / Time: 12/25/2024  3:35 PM    Patient Admission Status: Inpatient   Readmission Risk (Low < 19, Mod (19-27), High > 27): Readmission Risk Score: 10.9    Current PCP: Marli Pace APRN - CNP  PCP verified by CM? (P) Yes (DES [facility]ro)    Chart Reviewed: Yes      History Provided by: (P) Patient  Patient Orientation: (P) Alert and Oriented, Person, Place, Situation, Self    Patient Cognition: (P) Alert    Hospitalization in the last 30 days (Readmission):  No    If yes, Readmission Assessment in CM Navigator will be completed.    Advance Directives:      Code Status: Full Code   Patient's Primary Decision Maker is: (P) Legal Next of Kin    Primary Decision Maker: [employer] - Child - [phone]    Discharge Planning:    Patient lives with: (P) Family Members (grandson lives w/ her) Type of Home: (P) House  Primary Care Giver: (P) Self  Patient Support Systems include: (P) Children   Current Financial resources: (P) Medicare  Current community resources: (P) None  Current services prior to admission: (P) None            Current DME:              Type of Home Care services:  (P) None    ADLS  Prior functional level: (P) Independent in ADLs/IADLs  Current functional level: (P) Assistance with the following:, Bathing, Dressing, Toileting, Cooking, Housework, Shopping, Mobility, Other (see comment) (needs assist d/t hospitalization)    PT AM-PAC:   /24  OT AM-PAC:   /24    Family can provide assistance at DC: (P)  Yes  Would you like Case Management to discuss the discharge plan with any other family members/significant others, and if so, who? (P) No  Plans to Return to Present Housing: (P) Yes  Other Identified Issues/Barriers to RETURNING to current housing: none  Potential Assistance needed at discharge: (P) N/A            Potential DME:    Patient expects to discharge to: (P) House  Plan for transportation at discharge: (P) Family    Financial    Payor: [facility] MEDICARE / Plan: [facility] MEDICARE COMPLETE / Product Type: *No Product type* /     Does insurance require precert for SNF: Yes    Potential assistance Purchasing Medications: (P) No  Meds-to-Beds request: Yes      [id number] - [place], [place] - [address] [phone] - F [phone]  [address] [address]  Phone: [phone] Fax: [phone]      Notes:    Factors facilitating achievement of predicted outcomes: Family support, Motivated, Cooperative, and Pleasant    Barriers to discharge: Decreased endurance, Medical complications, and Stairs at home    Additional Case Management Notes: plan is to return home independent w/ grandson at home and family will transport     The Plan for Transition of Care is related to the following treatment goals of Nausea [R11.0]  Small bowel obstruction (HCC) [K56.609]  SBO (small bowel obstruction) (HCC) [K56.609]  Generalized abdominal pain [R10.84]    IF APPLICABLE: The Patient and/or patient representative Aure and her family were provided with a choice of provider and agrees with the discharge plan. Freedom of choice list with basic dialogue that supports the patient's individualized plan of care/goals and shares the quality data associated with the providers was provided to: (P) Patient   Patient Representative Name:       The Patient and/or Patient Representative Agree with the Discharge Plan? (P) Yes    Case Management Services Information Letter Provided [x]    Kinza Amaral RN/CM  Case

## 2024-12-26 NOTE — H&P
Please see consult for full H&P.    80 yo F with SBO likely related to adhesive bowel disease.     NPO, NGT, IVF.  Admit to inpatient.       Delmar Jalloh MD

## 2024-12-26 NOTE — CONSULTS
General Surgery:  Consult Note        PATIENT NAME: Aure Aiken   YOB: 1945    ADMISSION DATE: 12/25/2024  3:35 PM     Admitting Provider: Dr. Jalloh    Consulted Physician: Dr. Jalloh     TODAY'S DATE: 12/25/2024    Chief Complaint: Abdominal pain, constipation, nausea  Consult Regarding: Small bowel obstruction    HISTORY OF PRESENT ILLNESS:  The patient is a 79 y.o. female with a past medical history of NSTEMI, pancreatitis, diverticulosis, hiatal hernia, and recurrent pneumonia presents to the ED for abdominal pain, bloating, and nausea. Pt has had 3 days of constipation with last BM this morning, and vomited multiple times since being in the ED. States her symptoms have improved since vomiting. Pt has past abdominal surgical history of bowel resection with primary anastomosis complicated by pancreatitis around 1995, total hysterectomy, and paraesophageal hernia repair in December of 2022. Pt attributes her baseline constipation to taking oxicodone for L4 degenerative disc disease, treated with laxitives.  Patient denies melena, hematochezia, hematemesis, chest pain, shortness of breath.  On examination at bedside patient states that her bloating and nausea have much resolved, with minimal residual left lower quadrant tenderness.  Patient is awake and pleasant, nontoxic.  Lactate 2.1, white count 7.6.  Afebrile, vital signs within normal limits.      Past Medical History:        Diagnosis Date    Abnormal finding on imaging 08/12/2021    Acute respiratory failure with hypoxemia     Anemia     Bochdalek hernia     PER CT 7-2-21    Bowel obstruction (HCC)     Bronchitis 02/19/2019    Frequent episodes of bronchitis, usually yearly with pneumonia    Chronic biliary pancreatitis (HCC) 09/22/2016    pt denies-states x1 when in hospital with bowel obstruction    Chronic pneumonia     follows with Dr. Forman    Cough 08/13/2021    has had cough for several months    DDD (degenerative disc disease)  intravenous contrast. Multiplanar reformatted images are provided for review. Automated exposure control, iterative reconstruction, and/or weight based adjustment of the mA/kV was utilized to reduce the radiation dose to as low as reasonably achievable. COMPARISON: CT chest 06/17/2022 and CT abdomen 03/22/2022 HISTORY: ORDERING SYSTEM PROVIDED HISTORY: Generalized abd pain worse to LUQ, nausea. Hx paraesophageal hernia s/p repair w/ mesh, hx pancreatitis and bowel obstruction. Abnormal CXR L basilar opacity TECHNOLOGIST PROVIDED HISTORY: Generalized abd pain worse to LUQ, nausea. Hx paraesophageal hernia s/p repair w/ mesh, hx pancreatitis and bowel obstruction. Abnormal CXR L basilar opacity Decision Support Exception - unselect if not a suspected or confirmed emergency medical condition->Emergency Medical Condition (MA) Chest: Mediastinum: No enlarged lymphadenopathy.  Small lymph nodes are scattered. Small fixed hiatus hernia.  Esophageal wall is mildly thickened and mild proximal dilation. Heart: Heart size is upper limits of normal.  No pericardial effusion. Great vessels: the great vessels are patent and unremarkable. Aorta: Aorta is patent.  Mild atherosclerotic changes..  Negative for aneurysm or dissection. Lungs/pleura: Trace pleural effusions.  Posterior diaphragmatic hernia on the left.  Consolidation in the left lower lobe.  Respiratory motion.  This is unchanged in appearance from the previous study. Soft Tissues: Bilateral breast implants.  The right appears to have an increase are capsular rupture.  This is unchanged in appearance from the previous study. Osseous: No suspicious lytic or sclerotic lesions. Abdomen/Pelvis: Liver: Liver is normal density. No enhancing masses.  Normal enhancement of the intrahepatic vasculature. Spleen:  Normal size.  No enhancing masses Pancreas: No enhancing masses.  No ductal dilation. No adjacent fatty stranding. Gallbladder no calcified stones or sludge.  No

## 2024-12-26 NOTE — ED NOTES
ED to inpatient nurses report      Chief Complaint:  Chief Complaint   Patient presents with    Abdominal Pain     Present to ED from: Home    MOA:     LOC: alert and orientated to name, place, date  Mobility: Independent  Oxygen Baseline: RA    Current needs required: NA   Pending ED orders: NA  Present condition: Stable    Why did the patient come to the ED? Pt reports that she has upper abd pain that radiates to her chest. Pt reports that she feels bloated and states that she is nauseous. Pt reports that she did have a bowel movement today, but still feels bloated.  What is the plan? NG tube decompression with SBO, if that does not work surgery  Any procedures or intervention occur? Possible surgery??  Any safety concerns??Fall Risk    Mental Status:  Level of Consciousness: Alert (0)    Psych Assessment:   Psychosocial  Psychosocial (WDL): Within Defined Limits  Vital signs   Vitals:    12/25/24 2009 12/25/24 2011 12/25/24 2028 12/25/24 2030   BP: (!) 130/117   (!) 144/88   Pulse: (!) 111 91 96 98   Resp:       Temp:       TempSrc:       SpO2: 98% 96% 98%    Weight:            Vitals:  Patient Vitals for the past 24 hrs:   BP Temp Temp src Pulse Resp SpO2 Weight   12/25/24 2030 (!) 144/88 -- -- 98 -- -- --   12/25/24 2028 -- -- -- 96 -- 98 % --   12/25/24 2011 -- -- -- 91 -- 96 % --   12/25/24 2009 (!) 130/117 -- -- (!) 111 -- 98 % --   12/25/24 1845 -- -- -- 80 -- 98 % --   12/25/24 1630 -- -- -- 81 -- 100 % --   12/25/24 1600 -- -- -- 84 -- 100 % --   12/25/24 1544 -- 98.3 °F (36.8 °C) Oral -- -- -- --   12/25/24 1541 (!) 168/100 -- -- -- -- -- --   12/25/24 1540 -- -- -- 87 18 98 % 63 kg (138 lb 14.2 oz)      Visit Vitals  BP (!) 144/88   Pulse 98   Temp 98.3 °F (36.8 °C) (Oral)   Resp 18   Wt 63 kg (138 lb 14.2 oz)   SpO2 98%   BMI 27.13 kg/m²        LDAs:   Peripheral IV 12/25/24 Proximal;Right Forearm (Active)       Ambulatory Status:  Presents to emergency department  because of falls (Syncope, seizure,  or loss of consciousness): No, Age > 70: Yes, Altered Mental Status, Intoxication with alcohol or substance confusion (Disorientation, impaired judgment, poor safety awaremess, or inability to follow instructions): No, Impaired Mobility: Ambulates or transfers with assistive devices or assistance; Unable to ambulate or transer.: No, Nursing Judgement: No    Diagnosis:  DISPOSITION Admitted 12/25/2024 09:15:21 PM   Final diagnoses:   Generalized abdominal pain   Nausea   Small bowel obstruction (HCC)        Consults:  IP CONSULT TO GENERAL SURGERY     Treatment Team:   Treatment Team:   Afaneh, Amer Abdul-Hafiz, MD Pina, Sergio E, MD Duane, Perla, RN  Delmar Jalloh MD    Treatment:  ED Course as of 12/25/24 2134   Wed Dec 25, 2024   1612 Lactic Acid, Whole Blood: 2.1 [CH]   1624 CBC with Auto Differential:    WBC 7.6   RBC 4.85   Hemoglobin Quant 15.0   Hematocrit 44.8   MCV 92.4   MCH 30.9   MCHC 33.5   RDW 13.5   Platelet Count 292   MPV 9.1   NRBC Automated 0.0   Neutrophils % 65   Lymphocyte % 24   Monocytes % 7   Eosinophils % 3   Basophils % 1   Immature Granulocytes % 0   Neutrophils Absolute 5.02   Lymphocytes Absolute 1.80   Monocytes Absolute 0.52   Eosinophils Absolute 0.21   Basophils Absolute 0.04   Immature Granulocytes Absolute 0.03  CBC within normal limits. [CH]   1647 XR CHEST PORTABLE  \"Left basilar opacity could represent atelectasis or infection\" [CH]   1647 CT A/P changed to CT C/A/P w/ IV contrast. [CH]   1647 Lipase: 27 [CH]   1647 Troponin, High Sensitivity: 12 [CH]   1648 Comprehensive Metabolic Panel w/ Reflex to MG(!):    Sodium 139   Potassium 3.8   Chloride 105   CARBON DIOXIDE 20   Anion Gap 14   Glucose 113(!)   BUN,BUNPL 14   Creatinine 1.0(!)   Est, Glom Filt Rate 57(!)   Calcium 9.6   Total Protein 7.3   Albumin 4.2   Albumin/Globulin Ratio 1.4   Total Bilirubin 0.3   Alkaline Phosphatase 93   ALT 17   AST 27  Creatinine 1.0, previous levels 0.6-0.9.  Electrolytes

## 2024-12-27 VITALS
BODY MASS INDEX: 23.81 KG/M2 | OXYGEN SATURATION: 94 % | HEIGHT: 60 IN | HEART RATE: 91 BPM | WEIGHT: 121.25 LBS | TEMPERATURE: 98.4 F | SYSTOLIC BLOOD PRESSURE: 116 MMHG | RESPIRATION RATE: 18 BRPM | DIASTOLIC BLOOD PRESSURE: 65 MMHG

## 2024-12-27 LAB
ANION GAP SERPL CALCULATED.3IONS-SCNC: 11 MMOL/L (ref 9–16)
BASOPHILS # BLD: 0.03 K/UL (ref 0–0.2)
BASOPHILS NFR BLD: 1 % (ref 0–2)
BUN SERPL-MCNC: 18 MG/DL (ref 8–23)
CA-I BLD-SCNC: 1.14 MMOL/L (ref 1.13–1.33)
CALCIUM SERPL-MCNC: 8.7 MG/DL (ref 8.6–10.4)
CHLORIDE SERPL-SCNC: 111 MMOL/L (ref 98–107)
CO2 SERPL-SCNC: 21 MMOL/L (ref 20–31)
CREAT SERPL-MCNC: 0.8 MG/DL (ref 0.6–0.9)
EOSINOPHIL # BLD: 0.15 K/UL (ref 0–0.44)
EOSINOPHILS RELATIVE PERCENT: 3 % (ref 1–4)
ERYTHROCYTE [DISTWIDTH] IN BLOOD BY AUTOMATED COUNT: 14.2 % (ref 11.8–14.4)
GFR, ESTIMATED: 74 ML/MIN/1.73M2
GLUCOSE SERPL-MCNC: 78 MG/DL (ref 74–99)
HCT VFR BLD AUTO: 39.7 % (ref 36.3–47.1)
HGB BLD-MCNC: 12.4 G/DL (ref 11.9–15.1)
IMM GRANULOCYTES # BLD AUTO: <0.03 K/UL (ref 0–0.3)
IMM GRANULOCYTES NFR BLD: 0 %
LYMPHOCYTES NFR BLD: 1.42 K/UL (ref 1.1–3.7)
LYMPHOCYTES RELATIVE PERCENT: 25 % (ref 24–43)
MAGNESIUM SERPL-MCNC: 2.3 MG/DL (ref 1.6–2.4)
MCH RBC QN AUTO: 31.3 PG (ref 25.2–33.5)
MCHC RBC AUTO-ENTMCNC: 31.2 G/DL (ref 28.4–34.8)
MCV RBC AUTO: 100.3 FL (ref 82.6–102.9)
MONOCYTES NFR BLD: 0.59 K/UL (ref 0.1–1.2)
MONOCYTES NFR BLD: 11 % (ref 3–12)
NEUTROPHILS NFR BLD: 61 % (ref 36–65)
NEUTS SEG NFR BLD: 3.4 K/UL (ref 1.5–8.1)
NRBC BLD-RTO: 0 PER 100 WBC
PHOSPHATE SERPL-MCNC: 3.2 MG/DL (ref 2.5–4.5)
PLATELET # BLD AUTO: 222 K/UL (ref 138–453)
PMV BLD AUTO: 9.7 FL (ref 8.1–13.5)
POTASSIUM SERPL-SCNC: 3.8 MMOL/L (ref 3.7–5.3)
RBC # BLD AUTO: 3.96 M/UL (ref 3.95–5.11)
SODIUM SERPL-SCNC: 143 MMOL/L (ref 136–145)
WBC OTHER # BLD: 5.6 K/UL (ref 3.5–11.3)

## 2024-12-27 PROCEDURE — 6370000000 HC RX 637 (ALT 250 FOR IP): Performed by: STUDENT IN AN ORGANIZED HEALTH CARE EDUCATION/TRAINING PROGRAM

## 2024-12-27 PROCEDURE — 82330 ASSAY OF CALCIUM: CPT

## 2024-12-27 PROCEDURE — 6360000002 HC RX W HCPCS: Performed by: STUDENT IN AN ORGANIZED HEALTH CARE EDUCATION/TRAINING PROGRAM

## 2024-12-27 PROCEDURE — 36415 COLL VENOUS BLD VENIPUNCTURE: CPT

## 2024-12-27 PROCEDURE — 85025 COMPLETE CBC W/AUTO DIFF WBC: CPT

## 2024-12-27 PROCEDURE — 80048 BASIC METABOLIC PNL TOTAL CA: CPT

## 2024-12-27 PROCEDURE — 83735 ASSAY OF MAGNESIUM: CPT

## 2024-12-27 PROCEDURE — 84100 ASSAY OF PHOSPHORUS: CPT

## 2024-12-27 RX ORDER — ENOXAPARIN SODIUM 100 MG/ML
40 INJECTION SUBCUTANEOUS DAILY
Status: DISCONTINUED | OUTPATIENT
Start: 2024-12-28 | End: 2024-12-27 | Stop reason: HOSPADM

## 2024-12-27 RX ORDER — ONDANSETRON 4 MG/1
4 TABLET, ORALLY DISINTEGRATING ORAL EVERY 8 HOURS PRN
Qty: 30 TABLET | Refills: 0 | Status: SHIPPED | OUTPATIENT
Start: 2024-12-27 | End: 2025-01-06

## 2024-12-27 RX ADMIN — ENOXAPARIN SODIUM 30 MG: 100 INJECTION SUBCUTANEOUS at 08:20

## 2024-12-27 RX ADMIN — SERTRALINE HYDROCHLORIDE 25 MG: 25 TABLET ORAL at 08:20

## 2024-12-27 NOTE — PROGRESS NOTES
Pharmacy Note     Renal Dose Adjustment    Aure Aiken is a 79 y.o. female. Pharmacist assessment of renally cleared medications.    Recent Labs     12/26/24  0349 12/27/24  0704   BUN 14 18       Recent Labs     12/26/24  0349 12/27/24  0704   CREATININE 1.1* 0.8       Estimated Creatinine Clearance: 43 mL/min (based on SCr of 0.8 mg/dL).      Height:   Ht Readings from Last 1 Encounters:   12/25/24 1.524 m (5')     Weight:  Wt Readings from Last 1 Encounters:   12/27/24 55 kg (121 lb 4.1 oz)       The following medication dose has been adjusted based upon renal function per P&T Guidelines:             Lovenox 30 mg daily has been readjusted to 40 mg daily for crcl to >30 mL/min    Jassi Oneill, PharmD, [facility]  12/27/2024 12:59 PM

## 2024-12-27 NOTE — PLAN OF CARE
Problem: Chronic Conditions and Co-morbidities  Goal: Patient's chronic conditions and co-morbidity symptoms are monitored and maintained or improved  12/27/2024 0531 by Shante Amaya RN  Outcome: Progressing  12/26/2024 1811 by Emilia Almeida RN  Outcome: Progressing     Problem: ABCDS Injury Assessment  Goal: Absence of physical injury  12/27/2024 0531 by Shante Amaya RN  Outcome: Progressing  12/26/2024 1811 by Emilia Almeida RN  Outcome: Progressing     Problem: Safety - Adult  Goal: Free from fall injury  12/27/2024 0531 by Shante Amaya RN  Outcome: Progressing  12/26/2024 1811 by Emilia Almeida RN  Outcome: Progressing     Problem: Pain  Goal: Verbalizes/displays adequate comfort level or baseline comfort level  12/27/2024 0531 by Shante Amaya RN  Outcome: Progressing  12/26/2024 1811 by Emilia Almeida RN  Outcome: Progressing

## 2024-12-27 NOTE — PLAN OF CARE
Problem: Chronic Conditions and Co-morbidities  Goal: Patient's chronic conditions and co-morbidity symptoms are monitored and maintained or improved  12/27/2024 0531 by Shante Amaya RN  Outcome: Progressing     Problem: ABCDS Injury Assessment  Goal: Absence of physical injury  12/27/2024 0531 by Shante Amaya RN  Outcome: Progressing     Problem: Safety - Adult  Goal: Free from fall injury  12/27/2024 1146 by Anne Marie Rothman RN  Outcome: Progressing  12/27/2024 0531 by Shante Amaya RN  Outcome: Progressing     Problem: Pain  Goal: Verbalizes/displays adequate comfort level or baseline comfort level  12/27/2024 0531 by Shante Amaya RN  Outcome: Progressing

## 2024-12-27 NOTE — DISCHARGE INSTRUCTIONS
Discharge Instructions for Bariatric Surgery     Diet    It is important to follow the diet progression very closely in order to prevent complications.    Once you move to solids, food must be chewed well. When making food choices, you'll need to ensure adequate protein intake, while avoiding sweets and fatty foods. Eating too much or too quickly can cause vomiting or intense pain under your breastbone. Most people quickly learn how much food they can tolerate.     Physical Activity    When home, we recommend that you take frequent walks, as tolerated, to prevent complications and increase your endurance.   Ask your doctor when you will be able to return to work.   Do not drive unless you are no longer using pain medications      Medications    Remember to avoid aspirin, aspirin-containing products, and nonsteroidal anti-inflammatory drugs (NSAIDs, such as ibuprofen, naproxen, etc.).   If you were taking these medications before the procedure, and had to stop, ask your doctor when you can resume taking them. Be sure to review your medications with your primary care provider at your follow up office visit.    Lifestyle Changes    Changing your diet and level of activity are the biggest lifestyle changes associated with your success. Be aware that you may have emotional ups and downs after this surgery.           Follow-up   Follow up with your primary care physician in one week.   Follow up with Dr. Aviles as needed    Call Your Doctor If Any of the Following Occurs   Monitor your recovery once you leave the hospital. If any of the following occur, call your doctor:   Signs of infection, including fever above 100F    Persistent nausea and/or vomiting   Pain that you can't control with the medications you've been given   Shortness of breath and/or chest pain   Tachycardia (racing heart sensation) unrelieved by rest  Pain, redness and/or swelling in your feet or legs    Sudden onset of severe left shoulder pain or

## 2024-12-27 NOTE — PROGRESS NOTES
Bariatric Surgery:  Daily Progress Note           PATIENT NAME: Aure Aiken     TODAY'S DATE: 12/27/2024, 7:04 AM  CC:  Abdominal pain    SUBJECTIVE:     Pt seen and examined at bedside.  No acute overnight events.  Small bowel follow-through demonstrates contrast progressing to colon. Several bowel movements overnight. Patient denies nausea, vomiting, abdominal pain. Tolerating clear liquids.     OBJECTIVE:   VITALS:  /65   Pulse 91   Temp 97.8 °F (36.6 °C) (Oral)   Resp 18   Ht 1.524 m (5')   Wt 55 kg (121 lb 4.1 oz)   SpO2 94%   BMI 23.68 kg/m²      INTAKE/OUTPUT:      Intake/Output Summary (Last 24 hours) at 12/27/2024 0704  Last data filed at 12/27/2024 0622  Gross per 24 hour   Intake 3888.21 ml   Output 300 ml   Net 3588.21 ml       PHYSICAL EXAM:  General Appearance: awake, alert, oriented, in no acute distress  HEENT:  Normocephalic, atraumatic, mucus membranes moist   Heart: Heart regular rate and rhythm  Lungs: Normal respiratory effort, no wheezing or stridor  Abdomen: Softly distended, nontender.  No rebound tenderness or guarding.  Extremities: No cyanosis, pitting edema, rashes noted.    Skin: Skin color, texture, turgor normal. No rashes or lesions.      Data:  CBC with Differential:    Lab Results   Component Value Date/Time    WBC 6.0 12/26/2024 06:04 AM    RBC 4.43 12/26/2024 06:04 AM    RBC 4.55 05/09/2012 09:45 AM    HGB 13.9 12/26/2024 06:04 AM    HCT 41.5 12/26/2024 06:04 AM     12/26/2024 06:04 AM     05/09/2012 09:45 AM    MCV 93.7 12/26/2024 06:04 AM    MCH 31.4 12/26/2024 06:04 AM    MCHC 33.5 12/26/2024 06:04 AM    RDW 13.6 12/26/2024 06:04 AM    LYMPHOPCT 17 12/26/2024 06:04 AM    MONOPCT 9 12/26/2024 06:04 AM    EOSPCT 3 12/26/2024 06:04 AM    BASOPCT 1 12/26/2024 06:04 AM    MONOSABS 0.54 12/26/2024 06:04 AM    LYMPHSABS 1.02 12/26/2024 06:04 AM    EOSABS 0.16 12/26/2024 06:04 AM    BASOSABS 0.03 12/26/2024 06:04 AM    DIFFTYPE NOT REPORTED 06/24/2021

## 2024-12-31 DIAGNOSIS — F41.9 ANXIETY: Chronic | ICD-10-CM

## 2024-12-31 NOTE — TELEPHONE ENCOUNTER
LOV 10/08/24  RTO 3 months  LRF 11/18/24  CSM 12/17/24    Health Maintenance   Topic Date Due    DTaP/Tdap/Td vaccine (1 - Tdap) Never done    Shingles vaccine (1 of 2) Never done    Respiratory Syncytial Virus (RSV) Pregnant or age 60 yrs+ (1 - 1-dose 75+ series) Never done    Diabetic Alb to Cr ratio (uACR) test  10/16/2019    COVID-19 Vaccine (5 - 2023-24 season) 09/01/2024    Lipids  12/11/2024    Flu vaccine (1) 10/08/2025 (Originally 8/1/2024)    Depression Monitoring  10/08/2025    GFR test (Diabetes, CKD 3-4, OR last GFR 15-59)  12/27/2025    DEXA (modify frequency per FRAX score)  Completed    Annual Wellness Visit (Medicare Advantage)  Completed    Pneumococcal 65+ years Vaccine  Completed    Hepatitis A vaccine  Aged Out    Hepatitis B vaccine  Aged Out    Hib vaccine  Aged Out    Polio vaccine  Aged Out    Meningococcal (ACWY) vaccine  Aged Out             (applicable per patient's age: Cancer Screenings, Depression Screening, Fall Risk Screening, Immunizations)    Hemoglobin A1C (%)   Date Value   03/01/2022 5.6   08/12/2020 5.6   02/19/2019 5.5     AST (U/L)   Date Value   12/26/2024 34     ALT (U/L)   Date Value   12/26/2024 16     BUN (mg/dL)   Date Value   12/27/2024 18      (goal A1C is < 7)   (goal LDL is <100) need 30-50% reduction from baseline     BP Readings from Last 3 Encounters:   12/27/24 116/65   10/08/24 104/80   08/27/24 112/80    (goal /80)      All Future Testing planned in [facility]:  Lab Frequency Next Occurrence   POCT Rapid Drug Screen Once 12/17/2024       Next Visit Date:  No future appointments.         Patient Active Problem List:     Hyperlipidemia     Fibromyalgia     Osteopenia     Hx of bilateral breast implants     Arthritis of shoulder region, right     Anxiety     Anemia, normocytic normochromic     Chronic pain associated with significant psychosocial dysfunction     Primary osteoarthritis involving multiple joints     Spondylosis of lumbar region without

## 2025-01-02 RX ORDER — ALPRAZOLAM 0.5 MG
0.5 TABLET ORAL 2 TIMES DAILY PRN
Qty: 60 TABLET | Refills: 0 | Status: SHIPPED | OUTPATIENT
Start: 2025-01-02 | End: 2025-02-01

## 2025-01-16 PROBLEM — K56.609 SMALL BOWEL OBSTRUCTION (HCC): Status: ACTIVE | Noted: 2025-01-16

## 2025-02-07 DIAGNOSIS — F41.9 ANXIETY: Chronic | ICD-10-CM

## 2025-02-11 RX ORDER — ALPRAZOLAM 0.5 MG
TABLET ORAL
Qty: 60 TABLET | Refills: 0 | OUTPATIENT
Start: 2025-02-11

## 2025-02-11 NOTE — TELEPHONE ENCOUNTER
LOV 10/8/24   RTO 2/25/25 with another office   LRF 1/2/25          Controlled Substance Monitoring:    Acute and Chronic Pain Monitoring:   RX Monitoring Periodic Controlled Substance Monitoring   6/21/2024   7:32 AM No signs of potential drug abuse or diversion identified.

## 2025-02-13 DIAGNOSIS — F41.9 ANXIETY: Chronic | ICD-10-CM

## 2025-02-14 NOTE — TELEPHONE ENCOUNTER
LOV 10/8/24   RTO seeing [facility] 2/25/25  LRF 1/2/25          Controlled Substance Monitoring:    Acute and Chronic Pain Monitoring:   RX Monitoring Periodic Controlled Substance Monitoring   6/21/2024   7:32 AM No signs of potential drug abuse or diversion identified.

## 2025-02-16 RX ORDER — ALPRAZOLAM 0.5 MG
TABLET ORAL
Qty: 60 TABLET | Refills: 0 | OUTPATIENT
Start: 2025-02-16 | End: 2025-03-18

## 2025-02-25 ENCOUNTER — OFFICE VISIT (OUTPATIENT)
Dept: PRIMARY CARE CLINIC | Age: 80
End: 2025-02-25
Payer: MEDICARE

## 2025-02-25 VITALS
WEIGHT: 139.2 LBS | DIASTOLIC BLOOD PRESSURE: 86 MMHG | BODY MASS INDEX: 27.33 KG/M2 | HEIGHT: 60 IN | HEART RATE: 78 BPM | SYSTOLIC BLOOD PRESSURE: 136 MMHG | OXYGEN SATURATION: 98 %

## 2025-02-25 DIAGNOSIS — Z76.89 ENCOUNTER TO ESTABLISH CARE: Primary | ICD-10-CM

## 2025-02-25 DIAGNOSIS — F51.01 PRIMARY INSOMNIA: ICD-10-CM

## 2025-02-25 DIAGNOSIS — J41.0 SIMPLE CHRONIC BRONCHITIS (HCC): ICD-10-CM

## 2025-02-25 DIAGNOSIS — F33.1 MODERATE EPISODE OF RECURRENT MAJOR DEPRESSIVE DISORDER (HCC): ICD-10-CM

## 2025-02-25 DIAGNOSIS — F41.9 ANXIETY: Chronic | ICD-10-CM

## 2025-02-25 DIAGNOSIS — K56.609 SMALL BOWEL OBSTRUCTION (HCC): ICD-10-CM

## 2025-02-25 PROBLEM — Z86.39 HX OF DIABETES MELLITUS: Status: RESOLVED | Noted: 2022-03-22 | Resolved: 2025-02-25

## 2025-02-25 PROBLEM — R05.1 ACUTE COUGH: Status: RESOLVED | Noted: 2024-02-12 | Resolved: 2025-02-25

## 2025-02-25 PROBLEM — J69.0 RECURRENT ASPIRATION PNEUMONIA (HCC): Status: RESOLVED | Noted: 2021-11-12 | Resolved: 2025-02-25

## 2025-02-25 PROCEDURE — G2211 COMPLEX E/M VISIT ADD ON: HCPCS | Performed by: NURSE PRACTITIONER

## 2025-02-25 PROCEDURE — G8427 DOCREV CUR MEDS BY ELIG CLIN: HCPCS | Performed by: NURSE PRACTITIONER

## 2025-02-25 PROCEDURE — G8399 PT W/DXA RESULTS DOCUMENT: HCPCS | Performed by: NURSE PRACTITIONER

## 2025-02-25 PROCEDURE — 1159F MED LIST DOCD IN RCRD: CPT | Performed by: NURSE PRACTITIONER

## 2025-02-25 PROCEDURE — 1090F PRES/ABSN URINE INCON ASSESS: CPT | Performed by: NURSE PRACTITIONER

## 2025-02-25 PROCEDURE — 3023F SPIROM DOC REV: CPT | Performed by: NURSE PRACTITIONER

## 2025-02-25 PROCEDURE — 1160F RVW MEDS BY RX/DR IN RCRD: CPT | Performed by: NURSE PRACTITIONER

## 2025-02-25 PROCEDURE — 1123F ACP DISCUSS/DSCN MKR DOCD: CPT | Performed by: NURSE PRACTITIONER

## 2025-02-25 PROCEDURE — G8419 CALC BMI OUT NRM PARAM NOF/U: HCPCS | Performed by: NURSE PRACTITIONER

## 2025-02-25 PROCEDURE — 99214 OFFICE O/P EST MOD 30 MIN: CPT | Performed by: NURSE PRACTITIONER

## 2025-02-25 PROCEDURE — 1036F TOBACCO NON-USER: CPT | Performed by: NURSE PRACTITIONER

## 2025-02-25 RX ORDER — SERTRALINE HYDROCHLORIDE 25 MG/1
25 TABLET, FILM COATED ORAL DAILY
Qty: 90 TABLET | Refills: 1 | Status: SHIPPED | OUTPATIENT
Start: 2025-02-25 | End: 2025-08-24

## 2025-02-25 RX ORDER — ALPRAZOLAM 0.25 MG
0.25 TABLET ORAL NIGHTLY PRN
Qty: 30 TABLET | Refills: 0 | Status: SHIPPED | OUTPATIENT
Start: 2025-02-25 | End: 2025-03-27

## 2025-02-25 RX ORDER — ALPRAZOLAM 0.25 MG
0.25 TABLET ORAL NIGHTLY PRN
COMMUNITY
End: 2025-02-25 | Stop reason: SDUPTHER

## 2025-02-25 RX ORDER — QUETIAPINE FUMARATE 200 MG/1
TABLET, FILM COATED ORAL
Qty: 90 TABLET | Refills: 1 | Status: SHIPPED | OUTPATIENT
Start: 2025-02-25

## 2025-02-25 RX ORDER — QUETIAPINE FUMARATE 50 MG/1
TABLET, FILM COATED ORAL
Qty: 180 TABLET | Refills: 1 | Status: SHIPPED | OUTPATIENT
Start: 2025-02-25 | End: 2026-02-23

## 2025-02-25 ASSESSMENT — PATIENT HEALTH QUESTIONNAIRE - PHQ9
SUM OF ALL RESPONSES TO PHQ QUESTIONS 1-9: 0
8. MOVING OR SPEAKING SO SLOWLY THAT OTHER PEOPLE COULD HAVE NOTICED. OR THE OPPOSITE, BEING SO FIGETY OR RESTLESS THAT YOU HAVE BEEN MOVING AROUND A LOT MORE THAN USUAL: NOT AT ALL
9. THOUGHTS THAT YOU WOULD BE BETTER OFF DEAD, OR OF HURTING YOURSELF: NOT AT ALL
3. TROUBLE FALLING OR STAYING ASLEEP: NOT AT ALL
SUM OF ALL RESPONSES TO PHQ QUESTIONS 1-9: 0
5. POOR APPETITE OR OVEREATING: NOT AT ALL
SUM OF ALL RESPONSES TO PHQ9 QUESTIONS 1 & 2: 0
4. FEELING TIRED OR HAVING LITTLE ENERGY: NOT AT ALL
2. FEELING DOWN, DEPRESSED OR HOPELESS: NOT AT ALL
7. TROUBLE CONCENTRATING ON THINGS, SUCH AS READING THE NEWSPAPER OR WATCHING TELEVISION: NOT AT ALL
1. LITTLE INTEREST OR PLEASURE IN DOING THINGS: NOT AT ALL
6. FEELING BAD ABOUT YOURSELF - OR THAT YOU ARE A FAILURE OR HAVE LET YOURSELF OR YOUR FAMILY DOWN: NOT AT ALL
10. IF YOU CHECKED OFF ANY PROBLEMS, HOW DIFFICULT HAVE THESE PROBLEMS MADE IT FOR YOU TO DO YOUR WORK, TAKE CARE OF THINGS AT HOME, OR GET ALONG WITH OTHER PEOPLE: NOT DIFFICULT AT ALL

## 2025-02-25 ASSESSMENT — ENCOUNTER SYMPTOMS
CHEST TIGHTNESS: 0
ABDOMINAL PAIN: 1
EYE REDNESS: 0
CONSTIPATION: 1
EYE ITCHING: 0
EYE DISCHARGE: 0
COUGH: 0
VOMITING: 0
DIARRHEA: 0
NAUSEA: 0
WHEEZING: 0
SINUS PRESSURE: 0
SHORTNESS OF BREATH: 0
SORE THROAT: 0
TROUBLE SWALLOWING: 0
SINUS PAIN: 0

## 2025-02-25 NOTE — PROGRESS NOTES
C/o zoloft     3. Hair loss.  She reports significant hair loss, which she attributes to stress following the loss of her .    4. Paraesophageal hernia.  She had surgery for a paraesophageal hernia on December 7, 2022, which involved mesh placement. Since the surgery, she has not experienced pneumonia, suggesting the surgery may have been beneficial in preventing aspiration.    Bronchitis  Follows with pulm. On trelogy   Follow-up  The patient will follow up in 3 months.    PROCEDURE  The patient underwent surgical repair of a paraesophageal hernia with mesh placement on 12/07/2022.      Return in about 3 months (around 5/25/2025) for awv.     Patient given educational materials - see patient instructions.  Discussed use, benefit, and side effects of prescribed medications.  All patient questions answered. Pt voiced understanding. Reviewed health maintenance.  Instructed to continue current medications, diet and exercise.  Patient agreed with treatment plan. Follow up as directed.     Electronicallysigned by DES Mckeon CNP on 2/25/2025 at 3:01 PM    The patient (or guardian, if applicable) and other individuals in attendance with the patient were advised that Artificial Intelligence will be utilized during this visit to record, process the conversation to generate a clinical note, and support improvement of the AI technology. The patient (or guardian, if applicable) and other individuals in attendance at the appointment consented to the use of AI, including the recording.

## 2025-03-14 DIAGNOSIS — F51.01 PRIMARY INSOMNIA: ICD-10-CM

## 2025-03-14 RX ORDER — QUETIAPINE FUMARATE 50 MG/1
TABLET, FILM COATED ORAL
Qty: 180 TABLET | Refills: 1 | Status: SHIPPED | OUTPATIENT
Start: 2025-03-14 | End: 2026-03-12

## 2025-03-14 NOTE — TELEPHONE ENCOUNTER
Last Visit Date: 2/25/2025   Next Visit Date: 5/29/2025     Pt reports she takes 2 tablets a day and requesting another script.

## 2025-03-18 DIAGNOSIS — F41.9 ANXIETY: Chronic | ICD-10-CM

## 2025-03-18 RX ORDER — ALPRAZOLAM 0.25 MG
0.25 TABLET ORAL NIGHTLY PRN
Qty: 30 TABLET | Refills: 0 | OUTPATIENT
Start: 2025-03-18 | End: 2025-04-17

## 2025-05-29 ENCOUNTER — OFFICE VISIT (OUTPATIENT)
Dept: PRIMARY CARE CLINIC | Age: 80
End: 2025-05-29
Payer: MEDICARE

## 2025-05-29 ENCOUNTER — HOSPITAL ENCOUNTER (OUTPATIENT)
Age: 80
Setting detail: SPECIMEN
Discharge: HOME | End: 2025-05-29

## 2025-05-29 VITALS
OXYGEN SATURATION: 98 % | BODY MASS INDEX: 28.78 KG/M2 | RESPIRATION RATE: 14 BRPM | WEIGHT: 146.6 LBS | HEART RATE: 74 BPM | DIASTOLIC BLOOD PRESSURE: 74 MMHG | SYSTOLIC BLOOD PRESSURE: 98 MMHG | HEIGHT: 60 IN

## 2025-05-29 DIAGNOSIS — R73.01 IMPAIRED FASTING GLUCOSE: ICD-10-CM

## 2025-05-29 DIAGNOSIS — Z00.00 MEDICARE ANNUAL WELLNESS VISIT, SUBSEQUENT: Primary | ICD-10-CM

## 2025-05-29 DIAGNOSIS — F33.1 MODERATE EPISODE OF RECURRENT MAJOR DEPRESSIVE DISORDER (HCC): ICD-10-CM

## 2025-05-29 DIAGNOSIS — F41.9 ANXIETY: Chronic | ICD-10-CM

## 2025-05-29 DIAGNOSIS — L74.9 SWEATING ABNORMALITY: ICD-10-CM

## 2025-05-29 DIAGNOSIS — F51.01 PRIMARY INSOMNIA: ICD-10-CM

## 2025-05-29 DIAGNOSIS — E78.00 PURE HYPERCHOLESTEROLEMIA: Chronic | ICD-10-CM

## 2025-05-29 DIAGNOSIS — R25.2 MUSCLE CRAMPING: ICD-10-CM

## 2025-05-29 DIAGNOSIS — J41.0 SIMPLE CHRONIC BRONCHITIS (HCC): ICD-10-CM

## 2025-05-29 DIAGNOSIS — L65.9 HAIR LOSS: ICD-10-CM

## 2025-05-29 DIAGNOSIS — K56.609 SMALL BOWEL OBSTRUCTION (HCC): ICD-10-CM

## 2025-05-29 PROBLEM — E11.9 TYPE 2 DIABETES MELLITUS (HCC): Status: RESOLVED | Noted: 2022-04-06 | Resolved: 2025-05-29

## 2025-05-29 LAB
ALBUMIN SERPL-MCNC: 4 G/DL (ref 3.5–5.2)
ALBUMIN/GLOB SERPL: 1.4 {RATIO} (ref 1–2.5)
ALP SERPL-CCNC: 94 U/L (ref 35–104)
ALT SERPL-CCNC: 18 U/L (ref 10–35)
ANION GAP SERPL CALCULATED.3IONS-SCNC: 14 MMOL/L (ref 9–16)
AST SERPL-CCNC: 24 U/L (ref 10–35)
BILIRUB SERPL-MCNC: <0.2 MG/DL (ref 0–1.2)
BUN SERPL-MCNC: 11 MG/DL (ref 8–23)
CALCIUM SERPL-MCNC: 9.9 MG/DL (ref 8.6–10.4)
CHLORIDE SERPL-SCNC: 102 MMOL/L (ref 98–107)
CHOLEST SERPL-MCNC: 270 MG/DL (ref 0–199)
CHOLESTEROL/HDL RATIO: 4.7
CO2 SERPL-SCNC: 24 MMOL/L (ref 20–31)
CREAT SERPL-MCNC: 0.9 MG/DL (ref 0.6–0.9)
CREAT UR-MCNC: 27.1 MG/DL (ref 28–217)
ERYTHROCYTE [DISTWIDTH] IN BLOOD BY AUTOMATED COUNT: 12.5 % (ref 11.8–14.4)
EST. AVERAGE GLUCOSE BLD GHB EST-MCNC: 105 MG/DL
GFR, ESTIMATED: 64 ML/MIN/1.73M2
GLUCOSE SERPL-MCNC: 74 MG/DL (ref 74–99)
HBA1C MFR BLD: 5.3 % (ref 4–6)
HCT VFR BLD AUTO: 45.8 % (ref 36.3–47.1)
HDLC SERPL-MCNC: 58 MG/DL
HGB BLD-MCNC: 14.5 G/DL (ref 11.9–15.1)
LDLC SERPL CALC-MCNC: 152 MG/DL (ref 0–100)
MCH RBC QN AUTO: 30.9 PG (ref 25.2–33.5)
MCHC RBC AUTO-ENTMCNC: 31.7 G/DL (ref 28.4–34.8)
MCV RBC AUTO: 97.4 FL (ref 82.6–102.9)
MICROALBUMIN UR-MCNC: <12 MG/L (ref 0–20)
MICROALBUMIN/CREAT UR-RTO: ABNORMAL MCG/MG CREAT (ref 0–25)
NRBC BLD-RTO: 0 PER 100 WBC
PLATELET # BLD AUTO: 342 K/UL (ref 138–453)
PMV BLD AUTO: 9.7 FL (ref 8.1–13.5)
POTASSIUM SERPL-SCNC: 5 MMOL/L (ref 3.7–5.3)
PROT SERPL-MCNC: 6.9 G/DL (ref 6.6–8.7)
RBC # BLD AUTO: 4.7 M/UL (ref 3.95–5.11)
SODIUM SERPL-SCNC: 140 MMOL/L (ref 136–145)
T4 FREE SERPL-MCNC: 1.1 NG/DL (ref 0.9–1.7)
TRIGL SERPL-MCNC: 299 MG/DL
TSH SERPL DL<=0.05 MIU/L-ACNC: 3.84 UIU/ML (ref 0.27–4.2)
VLDLC SERPL CALC-MCNC: 60 MG/DL (ref 1–30)
WBC OTHER # BLD: 4.6 K/UL (ref 3.5–11.3)

## 2025-05-29 PROCEDURE — 1036F TOBACCO NON-USER: CPT | Performed by: NURSE PRACTITIONER

## 2025-05-29 PROCEDURE — 3023F SPIROM DOC REV: CPT | Performed by: NURSE PRACTITIONER

## 2025-05-29 PROCEDURE — G8427 DOCREV CUR MEDS BY ELIG CLIN: HCPCS | Performed by: NURSE PRACTITIONER

## 2025-05-29 PROCEDURE — 1159F MED LIST DOCD IN RCRD: CPT | Performed by: NURSE PRACTITIONER

## 2025-05-29 PROCEDURE — 1123F ACP DISCUSS/DSCN MKR DOCD: CPT | Performed by: NURSE PRACTITIONER

## 2025-05-29 PROCEDURE — G8419 CALC BMI OUT NRM PARAM NOF/U: HCPCS | Performed by: NURSE PRACTITIONER

## 2025-05-29 PROCEDURE — 1090F PRES/ABSN URINE INCON ASSESS: CPT | Performed by: NURSE PRACTITIONER

## 2025-05-29 PROCEDURE — 99214 OFFICE O/P EST MOD 30 MIN: CPT | Performed by: NURSE PRACTITIONER

## 2025-05-29 PROCEDURE — 1160F RVW MEDS BY RX/DR IN RCRD: CPT | Performed by: NURSE PRACTITIONER

## 2025-05-29 PROCEDURE — G8399 PT W/DXA RESULTS DOCUMENT: HCPCS | Performed by: NURSE PRACTITIONER

## 2025-05-29 PROCEDURE — G0439 PPPS, SUBSEQ VISIT: HCPCS | Performed by: NURSE PRACTITIONER

## 2025-05-29 RX ORDER — ALPRAZOLAM 0.25 MG
0.25 TABLET ORAL DAILY PRN
Qty: 30 TABLET | Refills: 0 | Status: SHIPPED | OUTPATIENT
Start: 2025-05-29 | End: 2025-06-28

## 2025-05-29 RX ORDER — QUETIAPINE FUMARATE 50 MG/1
TABLET, FILM COATED ORAL
Qty: 180 TABLET | Refills: 1 | Status: SHIPPED | OUTPATIENT
Start: 2025-05-29 | End: 2026-05-27

## 2025-05-29 RX ORDER — QUETIAPINE FUMARATE 200 MG/1
TABLET, FILM COATED ORAL
Qty: 90 TABLET | Refills: 1 | Status: SHIPPED | OUTPATIENT
Start: 2025-05-29

## 2025-05-29 ASSESSMENT — PATIENT HEALTH QUESTIONNAIRE - PHQ9
7. TROUBLE CONCENTRATING ON THINGS, SUCH AS READING THE NEWSPAPER OR WATCHING TELEVISION: NOT AT ALL
4. FEELING TIRED OR HAVING LITTLE ENERGY: MORE THAN HALF THE DAYS
1. LITTLE INTEREST OR PLEASURE IN DOING THINGS: NOT AT ALL
10. IF YOU CHECKED OFF ANY PROBLEMS, HOW DIFFICULT HAVE THESE PROBLEMS MADE IT FOR YOU TO DO YOUR WORK, TAKE CARE OF THINGS AT HOME, OR GET ALONG WITH OTHER PEOPLE: NOT DIFFICULT AT ALL
SUM OF ALL RESPONSES TO PHQ QUESTIONS 1-9: 6
SUM OF ALL RESPONSES TO PHQ QUESTIONS 1-9: 6
5. POOR APPETITE OR OVEREATING: NOT AT ALL
9. THOUGHTS THAT YOU WOULD BE BETTER OFF DEAD, OR OF HURTING YOURSELF: NOT AT ALL
6. FEELING BAD ABOUT YOURSELF - OR THAT YOU ARE A FAILURE OR HAVE LET YOURSELF OR YOUR FAMILY DOWN: NOT AT ALL
2. FEELING DOWN, DEPRESSED OR HOPELESS: MORE THAN HALF THE DAYS
3. TROUBLE FALLING OR STAYING ASLEEP: MORE THAN HALF THE DAYS
SUM OF ALL RESPONSES TO PHQ QUESTIONS 1-9: 6
SUM OF ALL RESPONSES TO PHQ QUESTIONS 1-9: 6
8. MOVING OR SPEAKING SO SLOWLY THAT OTHER PEOPLE COULD HAVE NOTICED. OR THE OPPOSITE, BEING SO FIGETY OR RESTLESS THAT YOU HAVE BEEN MOVING AROUND A LOT MORE THAN USUAL: NOT AT ALL

## 2025-05-29 NOTE — PATIENT INSTRUCTIONS
When you dwell on the past or the future, you miss moments that can heal and strengthen you. You may miss moments like hearing a child laugh or seeing a friendly face when you think you're all alone.  When you're accepting, you don't  the present moment. Instead you accept your thoughts and feelings as they come.  You can practice anytime, anywhere, and in any way you choose. You can practice in many ways. Here are a few ideas:  While doing your chores, like washing the dishes, let your mind focus on what's in your hand. What does the dish feel like? Is the water warm or cold?  Go outside and take a few deep breaths. What is the air like? Is it warm or cold?  When you can, take some time at the start of your day to sit alone and think.  Take a slow walk by yourself. Count your steps while you breathe in and out.  Try yoga breathing exercises, stretches, and poses to strengthen and relax your muscles.  At work, if you can, try to stop for a few moments each hour. Note how your body feels. Let yourself regroup and let your mind settle before you return to what you were doing.  If you struggle with anxiety or \"worry thoughts,\" imagine your mind as a blue mary and your worry thoughts as clouds. Now imagine those worry thoughts floating across your mind's mary. Just let them pass by as you watch.  Follow-up care is a key part of your treatment and safety. Be sure to make and go to all appointments, and call your doctor if you are having problems. It's also a good idea to know your test results and keep a list of the medicines you take.  Where can you learn more?  Go to https://[email or web address].net/patientEd and enter M676 to learn more about \"Learning About Mindfulness for Stress.\"  Current as of: July 31, 2024  Content Version: 14.4  © 6131-1608 [employer].   Care instructions adapted under license by [employer]. If you have questions about a medical condition or this instruction, always ask your

## 2025-05-29 NOTE — PROGRESS NOTES
Medicare Annual Wellness Visit    Aure Aiken is here for Medicare AWV (-Sweats /-Possible thyroid issues /-Hair loss )    Assessment & Plan  1. Hyperhidrosis.  - Excessive sweating with minimal exertion and night sweats are reported.  - Antidepressants can cause sweating but stopped over a month ago.  - A comprehensive lab workup will be conducted today to evaluate for potential diabetes, cholesterol levels, kidney function, liver function, electrolytes, complete blood count (CBC), hemoglobin levels, and thyroid function.  - Follow-up in 1 month to review lab results and reassess symptoms.    2. Hair loss.  - Significant hair loss over the past year is reported despite discontinuing Topamax and using biotin and collagen supplements.  - A comprehensive lab workup will be conducted today to evaluate for potential diabetes, cholesterol levels, kidney function, liver function, electrolytes, complete blood count (CBC), hemoglobin levels, and thyroid function.  - Follow-up in 1 month to review lab results and reassess symptoms.    3. Brittle nails.  - Brittle nails that crack and bleed are reported.  - A comprehensive lab workup will be conducted today to evaluate for potential diabetes, cholesterol levels, kidney function, liver function, electrolytes, complete blood count (CBC), hemoglobin levels, and thyroid function.  - Follow-up in 1 month to review lab results and reassess symptoms.    4. Muscle cramps.  - Frequent muscle cramps in the legs, calves, and feet are reported.  - Physical examination reveals muscle tenderness.  - A comprehensive lab workup will be conducted today to evaluate for potential diabetes, cholesterol levels, kidney function, liver function, electrolytes, complete blood count (CBC), hemoglobin levels, and thyroid function.  - Follow-up in 1 month to review lab results and reassess symptoms.    5. Anxiety.  - Increased anxiety and irritability are reported, especially towards her 
no chest pain

## 2025-05-30 ENCOUNTER — RESULTS FOLLOW-UP (OUTPATIENT)
Dept: PRIMARY CARE CLINIC | Age: 80
End: 2025-05-30

## 2025-05-30 RX ORDER — ROSUVASTATIN CALCIUM 10 MG/1
10 TABLET, COATED ORAL NIGHTLY
Qty: 90 TABLET | Refills: 3 | Status: SHIPPED | OUTPATIENT
Start: 2025-05-30 | End: 2025-06-02 | Stop reason: SDUPTHER

## 2025-06-02 RX ORDER — ROSUVASTATIN CALCIUM 10 MG/1
10 TABLET, COATED ORAL NIGHTLY
Qty: 90 TABLET | Refills: 3 | Status: SHIPPED | OUTPATIENT
Start: 2025-06-02

## 2025-06-21 ENCOUNTER — HOSPITAL ENCOUNTER (EMERGENCY)
Age: 80
Discharge: HOME | End: 2025-06-21
Attending: EMERGENCY MEDICINE
Payer: MEDICARE

## 2025-06-21 ENCOUNTER — APPOINTMENT (OUTPATIENT)
Dept: MRI IMAGING | Age: 80
End: 2025-06-21
Payer: MEDICARE

## 2025-06-21 ENCOUNTER — APPOINTMENT (OUTPATIENT)
Dept: CT IMAGING | Age: 80
End: 2025-06-21
Payer: MEDICARE

## 2025-06-21 VITALS
RESPIRATION RATE: 18 BRPM | TEMPERATURE: 98.2 F | HEART RATE: 91 BPM | OXYGEN SATURATION: 100 % | SYSTOLIC BLOOD PRESSURE: 149 MMHG | DIASTOLIC BLOOD PRESSURE: 93 MMHG

## 2025-06-21 DIAGNOSIS — R51.9 NONINTRACTABLE EPISODIC HEADACHE, UNSPECIFIED HEADACHE TYPE: Primary | ICD-10-CM

## 2025-06-21 LAB
ANION GAP SERPL CALCULATED.3IONS-SCNC: 11 MMOL/L (ref 9–16)
BASOPHILS # BLD: 0.05 K/UL (ref 0–0.2)
BASOPHILS NFR BLD: 1 % (ref 0–2)
BUN SERPL-MCNC: 16 MG/DL (ref 8–23)
CALCIUM SERPL-MCNC: 9 MG/DL (ref 8.6–10.4)
CHLORIDE SERPL-SCNC: 102 MMOL/L (ref 98–107)
CO2 SERPL-SCNC: 25 MMOL/L (ref 20–31)
CREAT SERPL-MCNC: 0.9 MG/DL (ref 0.6–0.9)
CRP SERPL HS-MCNC: 5.6 MG/L (ref 0–5)
EOSINOPHIL # BLD: 0.39 K/UL (ref 0–0.44)
EOSINOPHILS RELATIVE PERCENT: 7 % (ref 1–4)
ERYTHROCYTE [DISTWIDTH] IN BLOOD BY AUTOMATED COUNT: 12.4 % (ref 11.8–14.4)
ERYTHROCYTE [SEDIMENTATION RATE] IN BLOOD BY PHOTOMETRIC METHOD: 21 MM/HR (ref 0–30)
GFR, ESTIMATED: 64 ML/MIN/1.73M2
GLUCOSE SERPL-MCNC: 82 MG/DL (ref 74–99)
HCT VFR BLD AUTO: 42.6 % (ref 36.3–47.1)
HGB BLD-MCNC: 13.8 G/DL (ref 11.9–15.1)
IMM GRANULOCYTES # BLD AUTO: 0.04 K/UL (ref 0–0.3)
IMM GRANULOCYTES NFR BLD: 1 %
LYMPHOCYTES NFR BLD: 2.24 K/UL (ref 1.1–3.7)
LYMPHOCYTES RELATIVE PERCENT: 38 % (ref 24–43)
MCH RBC QN AUTO: 30.7 PG (ref 25.2–33.5)
MCHC RBC AUTO-ENTMCNC: 32.4 G/DL (ref 28.4–34.8)
MCV RBC AUTO: 94.9 FL (ref 82.6–102.9)
MONOCYTES NFR BLD: 0.54 K/UL (ref 0.1–1.2)
MONOCYTES NFR BLD: 9 % (ref 3–12)
NEUTROPHILS NFR BLD: 44 % (ref 36–65)
NEUTS SEG NFR BLD: 2.6 K/UL (ref 1.5–8.1)
NRBC BLD-RTO: 0 PER 100 WBC
PLATELET # BLD AUTO: 291 K/UL (ref 138–453)
PMV BLD AUTO: 9.5 FL (ref 8.1–13.5)
POTASSIUM SERPL-SCNC: 4.1 MMOL/L (ref 3.7–5.3)
RBC # BLD AUTO: 4.49 M/UL (ref 3.95–5.11)
SODIUM SERPL-SCNC: 138 MMOL/L (ref 136–145)
WBC OTHER # BLD: 5.9 K/UL (ref 3.5–11.3)

## 2025-06-21 PROCEDURE — 99285 EMERGENCY DEPT VISIT HI MDM: CPT

## 2025-06-21 PROCEDURE — 86140 C-REACTIVE PROTEIN: CPT

## 2025-06-21 PROCEDURE — 96374 THER/PROPH/DIAG INJ IV PUSH: CPT

## 2025-06-21 PROCEDURE — 85652 RBC SED RATE AUTOMATED: CPT

## 2025-06-21 PROCEDURE — 6360000004 HC RX CONTRAST MEDICATION

## 2025-06-21 PROCEDURE — 6360000002 HC RX W HCPCS

## 2025-06-21 PROCEDURE — 85025 COMPLETE CBC W/AUTO DIFF WBC: CPT

## 2025-06-21 PROCEDURE — 80048 BASIC METABOLIC PNL TOTAL CA: CPT

## 2025-06-21 PROCEDURE — 96375 TX/PRO/DX INJ NEW DRUG ADDON: CPT

## 2025-06-21 PROCEDURE — 70498 CT ANGIOGRAPHY NECK: CPT

## 2025-06-21 PROCEDURE — 70450 CT HEAD/BRAIN W/O DYE: CPT

## 2025-06-21 RX ORDER — DIPHENHYDRAMINE HYDROCHLORIDE 50 MG/ML
25 INJECTION, SOLUTION INTRAMUSCULAR; INTRAVENOUS ONCE
Status: COMPLETED | OUTPATIENT
Start: 2025-06-21 | End: 2025-06-21

## 2025-06-21 RX ORDER — METOCLOPRAMIDE HYDROCHLORIDE 5 MG/ML
10 INJECTION INTRAMUSCULAR; INTRAVENOUS ONCE
Status: COMPLETED | OUTPATIENT
Start: 2025-06-21 | End: 2025-06-21

## 2025-06-21 RX ORDER — LORAZEPAM 2 MG/ML
0.5 INJECTION INTRAMUSCULAR ONCE
Status: DISCONTINUED | OUTPATIENT
Start: 2025-06-21 | End: 2025-06-21

## 2025-06-21 RX ORDER — IOPAMIDOL 755 MG/ML
75 INJECTION, SOLUTION INTRAVASCULAR
Status: COMPLETED | OUTPATIENT
Start: 2025-06-21 | End: 2025-06-21

## 2025-06-21 RX ORDER — KETOROLAC TROMETHAMINE 15 MG/ML
15 INJECTION, SOLUTION INTRAMUSCULAR; INTRAVENOUS ONCE
Status: COMPLETED | OUTPATIENT
Start: 2025-06-21 | End: 2025-06-21

## 2025-06-21 RX ORDER — LAMOTRIGINE 25 MG/1
TABLET ORAL
Qty: 30 TABLET | Refills: 3 | Status: SHIPPED | OUTPATIENT
Start: 2025-06-21

## 2025-06-21 RX ADMIN — METOCLOPRAMIDE 10 MG: 5 INJECTION, SOLUTION INTRAMUSCULAR; INTRAVENOUS at 15:20

## 2025-06-21 RX ADMIN — KETOROLAC TROMETHAMINE 15 MG: 15 INJECTION, SOLUTION INTRAMUSCULAR; INTRAVENOUS at 15:20

## 2025-06-21 RX ADMIN — DIPHENHYDRAMINE HYDROCHLORIDE 25 MG: 50 INJECTION INTRAMUSCULAR; INTRAVENOUS at 15:20

## 2025-06-21 RX ADMIN — IOPAMIDOL 75 ML: 755 INJECTION, SOLUTION INTRAVENOUS at 14:45

## 2025-06-21 NOTE — CONSULTS
[facility] Neurology   IN-PATIENT SERVICE   [facility]    Neurology Consult Note            Date:   6/21/2025  Patient name:  Aure Aiken  Date of admission:  6/21/2025 12:45 PM  MRN:   [id number]  Account:  [id number]  YOB: 1945  PCP:    Dina Saenz APRN - CNP  Room:   02/02  Code Status:    Prior    Chief Complaint:     Chief Complaint   Patient presents with    Migraine       History Obtained From:     patient, electronic medical record    History of Present Illness:     The patient is a 80 y.o. female with past medical history of fibromyalgia, presented with left supraorbital pain of 4 days duration    Patient noticed some tenderness over her left supraorbital and temporal area when she was laying on her side.  Patient was describing stabbing and sharp pain lasting for few seconds especially when she palpates to the lesion, patient has hyperalgesia, was having difficulty describing duration, denies any visual disturbance, this seems to be associated with mild headaches.  Denies any vision loss, no photosensitivity or phonophobia, no nausea or vomiting, patient had a CT head and CTA which was unremarkable for acute abnormalities.    Patient denies any history of headaches in the past.  There was an MRI done in 2013 and was negative for acute abnormalities.      Past Medical History:     Past Medical History:   Diagnosis Date    Abnormal finding on imaging 08/12/2021    Acute respiratory failure with hypoxemia (HCC)     Anemia     Bochdalek hernia     PER CT 7-2-21    Bowel obstruction (HCC)     Bronchitis 02/19/2019    Frequent episodes of bronchitis, usually yearly with pneumonia    Chronic biliary pancreatitis (HCC) 09/22/2016    pt denies-states x1 when in hospital with bowel obstruction    Chronic pneumonia     follows with Dr. Forman    Cough 08/13/2021    has had cough for several months    DDD (degenerative disc disease)     Depression     pt denies     and visual disturbance.   Respiratory:  Negative for cough, choking, shortness of breath and wheezing.    Cardiovascular:  Negative for chest pain, palpitations and leg swelling.   Gastrointestinal:  Negative for abdominal distention, abdominal pain, constipation, diarrhea, nausea and vomiting.   Endocrine: Negative for polyuria.   Genitourinary:  Negative for difficulty urinating, dysuria, enuresis, frequency and hematuria.   Musculoskeletal:  Negative for arthralgias and back pain.   Skin:  Negative for color change and wound.   Neurological:  Positive for headaches. Negative for tremors, seizures, weakness and light-headedness.   Hematological:  Does not bruise/bleed easily.   Psychiatric/Behavioral:  Negative for agitation, behavioral problems, confusion and hallucinations. The patient is not nervous/anxious.          Physical Exam:   BP (!) 149/93   Pulse 91   Temp 98.2 °F (36.8 °C) (Oral)   Resp 18   SpO2 100%   Temp (24hrs), Av.2 °F (36.8 °C), Min:98.2 °F (36.8 °C), Max:98.2 °F (36.8 °C)    No results for input(s): \"POCGLU\" in the last 72 hours.  No intake or output data in the 24 hours ending 25 1741      Neurological Exam    GENERAL  Appears comfortable and in no distress   HEENT  NC/ AT   HEART  S1 and S2 heard; palpation of pulses: radial pulse    NECK  Supple and no bruits heard   MENTAL STATUS:  Alert, oriented, intact memory, no confusion, normal speech, normal language, no hallucination or delusion   CRANIAL NERVES: II     -      Visual fields intact to confrontation  III,IV,VI -  PERR, EOMs full, no ptosis  V     -     Normal facial sensation   VII    -     Normal facial symmetry  VIII   -     Intact hearing   IX,X -     Symmetrical palate  XI    -     Symmetrical shoulder shrug  XII   -     Midline tongue, no atrophy    MOTOR FUNCTION: RUE: Significant for good strength of grade 5/5 in proximal and distal muscle groups   LUE: Significant for good strength of grade 5/5 in proximal and

## 2025-06-21 NOTE — ED PROVIDER NOTES
[facility]     Emergency Department     Faculty Note/ Attestation      Pt Name: Aure Aiken                                       MRN: [id number]  Birthdate 1945  Date of evaluation: 6/21/2025  Note Started: 1:12 PM EDT    Patients PCP:    Dina Saenz, DES - CNP    Attestation  I performed a history and physical examination of the patient and discussed management with the resident. I reviewed the resident’s note and agree with the documented findings and plan of care. Any areas of disagreement are noted on the chart. I was personally present for the key portions of any procedures. I have documented in the chart those procedures where I was not present during the key portions. I have reviewed the emergency nurses triage note. I agree with the chief complaint, past medical history, past surgical history, allergies, medications, social and family history as documented unless otherwise noted below.    For Physician Assistant/ Nurse Practitioner cases/documentation I have personally evaluated this patient and have completed at least one if not all key elements of the E/M (history, physical exam, and MDM). Additional findings are as noted.    Initial Screens:        [place] Coma Scale  Eye Opening: Spontaneous  Best Verbal Response: Oriented  Best Motor Response: Obeys commands  [place] Coma Scale Score: 15    Vitals:    Vitals:    06/21/25 1248   BP: (!) 149/93   Pulse: 91   Resp: 18   Temp: 98.2 °F (36.8 °C)   TempSrc: Oral   SpO2: 100%       CHIEF COMPLAINT       Chief Complaint   Patient presents with   • Migraine     The pt is a 79 YO with HA x 4 days she notes that the pain started with brushing her hair.  The pain comes and goes.  The pt currently not having headache at this time.  The pain is provoked by touching the hair.  No hx of anything like this.  Was seen as UC without plan or treatment options for her.  The pt has no fevers or chills.      EMERGENCY DEPARTMENT

## 2025-06-21 NOTE — ED PROVIDER NOTES
[facility] EMERGENCY DEPARTMENT  Emergency Department Encounter  Emergency Medicine Resident     Pt Name:Aure Aiken  MRN: [id number]  Birthdate 1945  Date of evaluation: 6/21/25  PCP:  Dina Saenz APRN - CNP  Note Started: 12:50 PM EDT      CHIEF COMPLAINT       Chief Complaint   Patient presents with    Migraine       HISTORY OF PRESENT ILLNESS  (Location/Symptom, Timing/Onset, Context/Setting, Quality, Duration, Modifying Factors, Severity.)      Aure Aiken is a 80 y.o. female who presents with past medical history of acute hypoxic respiratory failure, anemia, bowel obstruction, chronic pancreatitis, COPD, fibromyalgia, frequent headaches, GERD, glaucoma, hiatal hernia, NSTEMI, opioid dependence who presents for evaluation of headache.  Patient states over the past 4 days she has noted a left temporal headache.  States she noted onset when she was in bed when she went to brush her hair and touched her hand onto her left temple.  States she has pain over her right temple only with touching the area.  States she has had multiple episodes over the last 4 days.  Denies any prior history of similar.  Reports symptoms are not present at this time.  Unclear of how long symptoms last and only notes symptoms when she is touching her temple.  No symptoms otherwise. denies any radiation of pain.  No jaw claudication.  No fevers or chills.  No vision changes.  No hearing changes.  No neck pain or stiffness.  No pain elsewhere in her head.  No trauma to the area.  Review of systems otherwise negative without any recent illness cough, congestion chest pain shortness breath abdominal pain nausea vomiting diarrhea or constipation.  No facial asymmetry visual disturbance, lightheaded dizziness new numbness tingling weakness in her arms or legs    PAST MEDICAL / SURGICAL / SOCIAL / FAMILY HISTORY      has a past medical history of Abnormal finding on imaging, Acute respiratory failure with  % 9   Eosinophils % 7(!)   Basophils % 1   Immature Granulocytes % 1(!)   Neutrophils Absolute 2.60   Lymphocytes Absolute 2.24   Monocytes Absolute 0.54   Eosinophils Absolute 0.39   Basophils Absolute 0.05   Immature Granulocytes Absolute 0.04 [TF]   1500 BMP unremarkable without electrolyte abnormality.  CBC with normal hemoglobin no elevation white blood cell count.  ESR within normal limits 21 CRP slightly elevated 5.6.  Given normal ESR and no significant elevation in CRP unlikely related to temporal arteritis.  CT imaging pending.  Patient remains asymptomatic [TF]   1637 CTA HEAD NECK W CONTRAST  IMPRESSION:  No large vessel occlusion in the head or neck.   [TF]   1637 CT HEAD WO CONTRAST  IMPRESSION:  1. No acute intracranial findings.  2. Mild mucosal thickening in the ethmoid sinus   [TF]   1641 Patient seen and reexamined updated on results.  Patient still complaining of pain in her left temporal region.  Will reach out to neurology for further recommendations [TF]      ED Course User Index  [TF] Rohan Aiken MD     Neurology to bedside to see patient.  Plan for MRI however patient has reported metal and unable to undergo MRI.  Patient wishing to be discharged.  Per neurology will start patient on Lamictal.  Patient with neurology outpatient follow-up.  Instructions provided including return return precautions.  All questions answered.  Patient appropriate discharge home at this time  PROCEDURES:      CONSULTS:  IP CONSULT TO NEUROLOGY    CRITICAL CARE:  There was significant risk of life threatening deterioration of patient's condition requiring my direct management. Critical care time  minutes, excluding any documented procedures.    FINAL IMPRESSION      1. Nonintractable episodic headache, unspecified headache type          DISPOSITION / PLAN     DISPOSITION Decision To Discharge 06/21/2025 07:49:46 PM   DISPOSITION CONDITION Stable           PATIENT REFERRED TO:  Dina Saenz, APRN - CNP  [id number]

## 2025-06-21 NOTE — DISCHARGE INSTRUCTIONS
Follow-up with your primary care physician in the next 1 week.  Call to establish care with neurology at the number provided.    Take all medications as prescribed.    Smoking cigarettes or being around anyone that smokes cigarettes can cause constriction of the blood vessels in the brain which in turn can cause you to have further headaches.    For pain use acetaminophen (Tylenol) or ibuprofen (Motrin / Advil), unless prescribed medications that have acetaminophen or ibuprofen (or similar medications) in it.  You can take over the counter acetaminophen tablets (1 - 2 tablets of the 500-mg strength every 6 hours) or ibuprofen tablets (2 tablets every 4 hours).   Avoid taking narcotics for headaches (can cause a worse headache in several hours after taking the medication).  Make sure that you drink plenty of water or Gatorade (or similar solution) to keep yourself hydrated.    PLEASE RETURN TO THE EMERGENCY DEPARTMENT IMMEDIATELY for worsening symptoms, change in vision / hearing / taste, ringing in your ears, loss of sensation or difficulty moving your arms or legs

## 2025-06-21 NOTE — ED PROVIDER NOTES
Faculty Sign-Out Attestation  Handoff taken on the following patient from prior Attending Physician: Rodrigo  Note Started: 5:08 PM EDT    I was available and discussed any additional care issues that arose and coordinated the management plans with the resident(s) caring for the patient during my duty period. Any areas of disagreement with resident’s documentation of care or procedures are noted on the chart. I was personally present for the key portions of any/all procedures during my duty period. I have documented in the chart those procedures where I was not present during the key portions.    80-year-old female presents to the emergency department with left-sided temporal headache.  No recurrent history of headaches labs reveal mildly elevated CRP otherwise unremarkable.  CT head and CT angiography study of the head and neck are unremarkable.  Pending neurology evaluation and disposition.    Louis Layton DO  Attending Physician        Louis Layton DO  06/21/25 4674

## 2025-06-21 NOTE — ED NOTES
Pt to ED via triage with c/o left sided migraine intermittently for the past 4 days. Does not have migraine currently. Denies blurred vision. Was seen at urgent care PTA and advised to come to ED. Pt is a/ox4, ambulatory, RR even and non labored on room air, call light in reach.

## 2025-06-30 DIAGNOSIS — F41.9 ANXIETY: Chronic | ICD-10-CM

## 2025-06-30 DIAGNOSIS — L65.9 HAIR LOSS: Primary | ICD-10-CM

## 2025-06-30 RX ORDER — ALPRAZOLAM 0.25 MG
0.25 TABLET ORAL DAILY PRN
Qty: 30 TABLET | Refills: 0 | Status: SHIPPED | OUTPATIENT
Start: 2025-06-30 | End: 2025-07-30

## 2025-06-30 NOTE — TELEPHONE ENCOUNTER
Patient is also requesting a referral to dermatology for hair loss. Pt does not have preference on provider/location.

## 2025-07-29 ENCOUNTER — OFFICE VISIT (OUTPATIENT)
Dept: PRIMARY CARE CLINIC | Age: 80
End: 2025-07-29
Payer: MEDICARE

## 2025-07-29 VITALS
HEART RATE: 89 BPM | RESPIRATION RATE: 20 BRPM | OXYGEN SATURATION: 99 % | DIASTOLIC BLOOD PRESSURE: 80 MMHG | WEIGHT: 145.4 LBS | BODY MASS INDEX: 28.4 KG/M2 | SYSTOLIC BLOOD PRESSURE: 128 MMHG

## 2025-07-29 DIAGNOSIS — L65.9 HAIR LOSS: ICD-10-CM

## 2025-07-29 DIAGNOSIS — R51.9 LEFT TEMPORAL HEADACHE: Primary | ICD-10-CM

## 2025-07-29 DIAGNOSIS — F41.9 ANXIETY: Chronic | ICD-10-CM

## 2025-07-29 DIAGNOSIS — F51.01 PRIMARY INSOMNIA: ICD-10-CM

## 2025-07-29 PROCEDURE — 1090F PRES/ABSN URINE INCON ASSESS: CPT | Performed by: NURSE PRACTITIONER

## 2025-07-29 PROCEDURE — G8419 CALC BMI OUT NRM PARAM NOF/U: HCPCS | Performed by: NURSE PRACTITIONER

## 2025-07-29 PROCEDURE — 1123F ACP DISCUSS/DSCN MKR DOCD: CPT | Performed by: NURSE PRACTITIONER

## 2025-07-29 PROCEDURE — 1036F TOBACCO NON-USER: CPT | Performed by: NURSE PRACTITIONER

## 2025-07-29 PROCEDURE — 99214 OFFICE O/P EST MOD 30 MIN: CPT | Performed by: NURSE PRACTITIONER

## 2025-07-29 PROCEDURE — 1160F RVW MEDS BY RX/DR IN RCRD: CPT | Performed by: NURSE PRACTITIONER

## 2025-07-29 PROCEDURE — G8427 DOCREV CUR MEDS BY ELIG CLIN: HCPCS | Performed by: NURSE PRACTITIONER

## 2025-07-29 PROCEDURE — G8399 PT W/DXA RESULTS DOCUMENT: HCPCS | Performed by: NURSE PRACTITIONER

## 2025-07-29 PROCEDURE — G2211 COMPLEX E/M VISIT ADD ON: HCPCS | Performed by: NURSE PRACTITIONER

## 2025-07-29 PROCEDURE — 1159F MED LIST DOCD IN RCRD: CPT | Performed by: NURSE PRACTITIONER

## 2025-07-29 RX ORDER — QUETIAPINE FUMARATE 200 MG/1
TABLET, FILM COATED ORAL
Qty: 90 TABLET | Refills: 1 | Status: SHIPPED | OUTPATIENT
Start: 2025-07-29

## 2025-07-29 RX ORDER — TOPIRAMATE 50 MG/1
TABLET, FILM COATED ORAL
COMMUNITY

## 2025-07-29 RX ORDER — LAMOTRIGINE 50 MG/1
50 TABLET, ORALLY DISINTEGRATING ORAL 2 TIMES DAILY
Qty: 60 TABLET | Refills: 1 | Status: SHIPPED | OUTPATIENT
Start: 2025-07-29

## 2025-07-29 RX ORDER — QUETIAPINE FUMARATE 50 MG/1
TABLET, FILM COATED ORAL
Qty: 180 TABLET | Refills: 1 | Status: SHIPPED | OUTPATIENT
Start: 2025-07-29 | End: 2026-07-27

## 2025-07-29 RX ORDER — DOCUSATE SODIUM 100 MG/1
1 CAPSULE, LIQUID FILLED ORAL
COMMUNITY

## 2025-07-29 RX ORDER — ALPRAZOLAM 0.25 MG
0.25 TABLET ORAL DAILY PRN
Qty: 30 TABLET | Refills: 0 | Status: SHIPPED | OUTPATIENT
Start: 2025-07-29 | End: 2025-08-28

## 2025-07-29 SDOH — ECONOMIC STABILITY: FOOD INSECURITY: WITHIN THE PAST 12 MONTHS, YOU WORRIED THAT YOUR FOOD WOULD RUN OUT BEFORE YOU GOT MONEY TO BUY MORE.: NEVER TRUE

## 2025-07-29 SDOH — ECONOMIC STABILITY: FOOD INSECURITY: WITHIN THE PAST 12 MONTHS, THE FOOD YOU BOUGHT JUST DIDN'T LAST AND YOU DIDN'T HAVE MONEY TO GET MORE.: NEVER TRUE

## 2025-07-29 ASSESSMENT — PATIENT HEALTH QUESTIONNAIRE - PHQ9
2. FEELING DOWN, DEPRESSED OR HOPELESS: MORE THAN HALF THE DAYS
SUM OF ALL RESPONSES TO PHQ QUESTIONS 1-9: 8
5. POOR APPETITE OR OVEREATING: NOT AT ALL
7. TROUBLE CONCENTRATING ON THINGS, SUCH AS READING THE NEWSPAPER OR WATCHING TELEVISION: NOT AT ALL
6. FEELING BAD ABOUT YOURSELF - OR THAT YOU ARE A FAILURE OR HAVE LET YOURSELF OR YOUR FAMILY DOWN: NOT AT ALL
1. LITTLE INTEREST OR PLEASURE IN DOING THINGS: MORE THAN HALF THE DAYS
SUM OF ALL RESPONSES TO PHQ QUESTIONS 1-9: 8
9. THOUGHTS THAT YOU WOULD BE BETTER OFF DEAD, OR OF HURTING YOURSELF: NOT AT ALL
10. IF YOU CHECKED OFF ANY PROBLEMS, HOW DIFFICULT HAVE THESE PROBLEMS MADE IT FOR YOU TO DO YOUR WORK, TAKE CARE OF THINGS AT HOME, OR GET ALONG WITH OTHER PEOPLE: SOMEWHAT DIFFICULT
SUM OF ALL RESPONSES TO PHQ QUESTIONS 1-9: 8
SUM OF ALL RESPONSES TO PHQ QUESTIONS 1-9: 8
3. TROUBLE FALLING OR STAYING ASLEEP: MORE THAN HALF THE DAYS
8. MOVING OR SPEAKING SO SLOWLY THAT OTHER PEOPLE COULD HAVE NOTICED. OR THE OPPOSITE, BEING SO FIGETY OR RESTLESS THAT YOU HAVE BEEN MOVING AROUND A LOT MORE THAN USUAL: NOT AT ALL
4. FEELING TIRED OR HAVING LITTLE ENERGY: MORE THAN HALF THE DAYS

## 2025-07-29 ASSESSMENT — ENCOUNTER SYMPTOMS
EYE DISCHARGE: 0
ROS SKIN COMMENTS: HAIR LOSS
SORE THROAT: 0
NAUSEA: 0
SINUS PRESSURE: 0
BACK PAIN: 1
EYE ITCHING: 0
WHEEZING: 0
DIARRHEA: 0
VOMITING: 0
CHEST TIGHTNESS: 0
ABDOMINAL PAIN: 0
EYE REDNESS: 0
COUGH: 0
SHORTNESS OF BREATH: 0
SINUS PAIN: 0
TROUBLE SWALLOWING: 0

## 2025-07-29 NOTE — PROGRESS NOTES
[facility]  [facility]  [address] [place] [address] [address]  [facility] [address]  Dept: [phone]  Dept Fax: [phone]    Aure Aiken is a 80 y.o. female who presentstoday for her medical conditions/complaints as noted below.  Aure Aiken is c/o of  Chief Complaint   Patient presents with    Medication Check     Shingles on head          HPI:     History of Present Illness  The patient presents for evaluation of headaches, anxiety, hair loss, and medication management.    She experienced a sudden, intense headache while brushing her hair, which led to an emergency room visit. The pain was described as sharp and severe, occurring intermittently. She was referred to the ER by urgent care. Dr. Hurtado at the pain clinic diagnosed her with shingles, even though she had no rash. She was prescribed medication in the ER, which provided some relief after a few weeks. However, Dr. Hurtado suggested a different medication due to potential side effects of the current one, lamictal. He called in a new prescription to a pharmaceutical place in [place] that makes scripts for individuals, but they have not yet contacted her to fill it. She has been out of the medication for about a week and has noticed a decrease in the frequency of her headaches since starting the medication. She plans to contact Dr. Hurtado for the pharmacy's number. She continues to experience headaches, particularly on the left side of her head, which are so severe that she can not touch her head or sneeze. She does not currently have a neurologist. She is unable to undergo an MRI due to metal in her eyes from previous glaucoma and cataract surgeries. She has a history of shingles on her chest in the 1990s, which was accompanied by a rash.    She has run out of Xanax and has been unable to get a refill. Her anxiety levels are high due to her grandson's drug addiction and other personal issues.    She is also

## 2025-08-04 ENCOUNTER — TELEPHONE (OUTPATIENT)
Dept: PRIMARY CARE CLINIC | Age: 80
End: 2025-08-04

## (undated) DEVICE — APPLICATOR MEDICATED 26 CC SOLUTION HI LT ORNG CHLORAPREP

## (undated) DEVICE — 4-PORT MANIFOLD: Brand: NEPTUNE 2

## (undated) DEVICE — FORCEPS BX L240CM JAW DIA2.8MM L CAP W/ NDL MIC MESH TOOTH

## (undated) DEVICE — HYPODERMIC SAFETY NEEDLE: Brand: MAGELLAN

## (undated) DEVICE — GLOVE SURG SZ 8 L12IN FNGR THK13MIL BRN LTX SYN POLYMER W

## (undated) DEVICE — Device

## (undated) DEVICE — ENDO KIT W/SYRINGE: Brand: MEDLINE INDUSTRIES, INC.

## (undated) DEVICE — INSUFFLATION TUBING SET WITH FILTER, FUNNEL CONNECTOR AND LUER LOCK: Brand: JOSNOE MEDICAL INC

## (undated) DEVICE — COVER LT HNDL BLU PLAS

## (undated) DEVICE — ZIMMER® STERILE DISPOSABLE TOURNIQUET CUFF WITH PLC, DUAL PORT, SINGLE BLADDER, 30 IN. (76 CM)

## (undated) DEVICE — DRAIN SURG PENROSE 0.5X18 IN CLOSED WND DRAINAGE PREM SIL

## (undated) DEVICE — [AGGRESSIVE PLUS CUTTER, ARTHROSCOPIC SHAVER BLADE,  DO NOT RESTERILIZE,  DO NOT USE IF PACKAGE IS DAMAGED,  KEEP DRY,  KEEP AWAY FROM SUNLIGHT]: Brand: FORMULA

## (undated) DEVICE — TROCAR: Brand: KII FIOS FIRST ENTRY

## (undated) DEVICE — CHLORAPREP 26ML ORANGE

## (undated) DEVICE — GOWN,AURORA,NONRNF,XL,30/CS: Brand: MEDLINE

## (undated) DEVICE — DEFENDO AIR WATER SUCTION AND BIOPSY VALVE KIT FOR  OLYMPUS: Brand: DEFENDO AIR/WATER/SUCTION AND BIOPSY VALVE

## (undated) DEVICE — DRESSING,GAUZE,XEROFORM,CURAD,1"X8",ST: Brand: CURAD

## (undated) DEVICE — PADDING UNDERCAST W6INXL4YD WYTEX 6 PER BG

## (undated) DEVICE — SUTURE ABSRB BRAID COAT VLT SH 3-0 27IN VCRL J311H

## (undated) DEVICE — SUTURE ETHLN SZ 4-0 L18IN NONABSORBABLE BLK L19MM PS-2 3/8 1667H

## (undated) DEVICE — GASTROSTOMY TUBE WITH ENFIT CONNECTOR 22FR: Brand: GASTROSTOMY TUBE WITH ENFIT CONNECTOR

## (undated) DEVICE — BINDER ABD UNISX 4 PNL PREM 6INX6INX12IN L XL 4

## (undated) DEVICE — BITEBLOCK 54FR W/ DENT RIM BLOX

## (undated) DEVICE — INSTRUMENT REPROC SEAL/DIVIDE LAP BLUNT TP LIGASURE NANO-COAT 5MMX37CM

## (undated) DEVICE — GOWN,AURORA,NON-REINFORCED,2XL: Brand: MEDLINE

## (undated) DEVICE — TOWEL,OR,DSP,ST,NATURAL,DLX,4/PK,20PK/CS: Brand: MEDLINE

## (undated) DEVICE — GOWN,SIRUS,NONRNF,SETINSLV,XL,20/CS: Brand: MEDLINE

## (undated) DEVICE — 3.0 MM COVAC 50 INTEGRATED CABLE                                    WAND ICW: Brand: COBLATION

## (undated) DEVICE — BLANKET WRM W29.9XL79.1IN UP BODY FORC AIR MISTRAL-AIR

## (undated) DEVICE — AGGRESSIVE PLUS, ANGLED CUTTER: Brand: FORMULA

## (undated) DEVICE — ARM DRAPE

## (undated) DEVICE — GLOVE ORTHO 8   MSG9480

## (undated) DEVICE — TUBING, SUCTION, 1/4" X 12', STRAIGHT: Brand: MEDLINE

## (undated) DEVICE — COUNTER NDL 40 COUNT HLD 70 FOAM BLK ADH W/ MAG

## (undated) DEVICE — SINGLE PORT MANIFOLD: Brand: NEPTUNE 2

## (undated) DEVICE — SOLUTION ANTIFOG VIS SYS CLEARIFY LAPSCP

## (undated) DEVICE — [TOMCAT CUTTER, ARTHROSCOPIC SHAVER BLADE,  DO NOT RESTERILIZE,  DO NOT USE IF PACKAGE IS DAMAGED,  KEEP DRY,  KEEP AWAY FROM SUNLIGHT]: Brand: FORMULA

## (undated) DEVICE — GLOVE ORANGE PI 7 1/2   MSG9075

## (undated) DEVICE — NEEDLE HYPO 25GA L1.5IN BLU POLYPR HUB S STL REG BVL STR

## (undated) DEVICE — CANNULA SEAL

## (undated) DEVICE — BLADELESS OBTURATOR: Brand: WECK VISTA

## (undated) DEVICE — SUTURE ETHLN SZ 3-0 L18IN NONABSORBABLE BLK FS-1 L24MM 3/8 663H

## (undated) DEVICE — GLOVE SURG SZ 8 L12IN FNGR THK87MIL WHT LTX FREE

## (undated) DEVICE — 3M™ WARMING BLANKET, UPPER BODY, 10 PER CASE, 42268: Brand: BAIR HUGGER™

## (undated) DEVICE — SOLUTION IV IRRIG LACTATED RINGERS 3000ML 2B7487

## (undated) DEVICE — SUTURE MCRYL SZ 4-0 L18IN ABSRB UD L16MM PC-3 3/8 CIR PRIM Y845G

## (undated) DEVICE — NEEDLE, QUINCKE, 18GX3.5": Brand: MEDLINE

## (undated) DEVICE — ADHESIVE SKIN CLOSURE TOP 36 CC HI VISC DERMBND MINI

## (undated) DEVICE — SUTURE NONABSORBABLE BRAIDED 2-0 CT-2 1X30 IN ETHBND EXCEL X411H

## (undated) DEVICE — GASTROSTOMY TUBE WITH ENFIT CONNECTOR, 20FR: Brand: GASTROSTOMY TUBE WITH ENFIT CONNECTOR

## (undated) DEVICE — TUBING PMP L16FT MAIN DISP FOR AR-6400 AR-6475

## (undated) DEVICE — SUTURE VCRL SZ 0 L54IN ABSRB UD POLYGLACTIN 910 COAT BRAID J608H

## (undated) DEVICE — VESSEL SEALER EXTEND: Brand: ENDOWRIST

## (undated) DEVICE — Z INACTIVE USE 2660664 SOLUTION IRRIG 3000ML 0.9% SOD CHL USP UROMATIC PLAS CONT

## (undated) DEVICE — PACK ARTHRO W PCH

## (undated) DEVICE — SUTURE ETHBND EXCEL SZ 0 L30IN NONABSORBABLE GRN L26MM CT-2 X412H